# Patient Record
Sex: MALE | Race: BLACK OR AFRICAN AMERICAN | Employment: UNEMPLOYED | URBAN - METROPOLITAN AREA
[De-identification: names, ages, dates, MRNs, and addresses within clinical notes are randomized per-mention and may not be internally consistent; named-entity substitution may affect disease eponyms.]

---

## 2017-04-14 ENCOUNTER — HOSPITAL ENCOUNTER (EMERGENCY)
Age: 52
Discharge: HOME | End: 2017-04-14
Attending: EMERGENCY MEDICINE
Payer: COMMERCIAL

## 2017-04-14 VITALS
WEIGHT: 122 LBS | DIASTOLIC BLOOD PRESSURE: 83 MMHG | OXYGEN SATURATION: 100 % | BODY MASS INDEX: 18.49 KG/M2 | SYSTOLIC BLOOD PRESSURE: 119 MMHG | HEIGHT: 68 IN | TEMPERATURE: 98.2 F | RESPIRATION RATE: 16 BRPM

## 2017-04-14 DIAGNOSIS — R11.2 NON-INTRACTABLE VOMITING WITH NAUSEA, UNSPECIFIED VOMITING TYPE: ICD-10-CM

## 2017-04-14 DIAGNOSIS — K08.409 S/P TOOTH EXTRACTION, UNSPECIFIED EDENTULISM: ICD-10-CM

## 2017-04-14 DIAGNOSIS — F10.20 UNCOMPLICATED ALCOHOL DEPENDENCE (HCC): ICD-10-CM

## 2017-04-14 DIAGNOSIS — K92.1 BLOOD IN STOOL: ICD-10-CM

## 2017-04-14 DIAGNOSIS — R51.9 FACE PAIN: Primary | ICD-10-CM

## 2017-04-14 DIAGNOSIS — E86.0 DEHYDRATION: ICD-10-CM

## 2017-04-14 LAB
ALBUMIN SERPL BCP-MCNC: 3.6 G/DL (ref 3.5–5)
ALBUMIN/GLOB SERPL: 1 {RATIO} (ref 1.1–2.2)
ALP SERPL-CCNC: 90 U/L (ref 45–117)
ALT SERPL-CCNC: 30 U/L (ref 12–78)
ANION GAP BLD CALC-SCNC: 8 MMOL/L (ref 5–15)
APPEARANCE UR: CLEAR
AST SERPL W P-5'-P-CCNC: 35 U/L (ref 15–37)
BACTERIA URNS QL MICRO: NEGATIVE /HPF
BASOPHILS # BLD AUTO: 0 K/UL (ref 0–0.1)
BASOPHILS # BLD: 0 % (ref 0–1)
BILIRUB SERPL-MCNC: 2.8 MG/DL (ref 0.2–1)
BILIRUB UR QL: NEGATIVE
BUN SERPL-MCNC: 25 MG/DL (ref 6–20)
BUN/CREAT SERPL: 28 (ref 12–20)
CALCIUM SERPL-MCNC: 8.9 MG/DL (ref 8.5–10.1)
CHLORIDE SERPL-SCNC: 106 MMOL/L (ref 97–108)
CO2 SERPL-SCNC: 25 MMOL/L (ref 21–32)
COLOR UR: ABNORMAL
CREAT SERPL-MCNC: 0.9 MG/DL (ref 0.7–1.3)
EOSINOPHIL # BLD: 0 K/UL (ref 0–0.4)
EOSINOPHIL NFR BLD: 0 % (ref 0–7)
EPITH CASTS URNS QL MICRO: ABNORMAL /LPF
ERYTHROCYTE [DISTWIDTH] IN BLOOD BY AUTOMATED COUNT: 16 % (ref 11.5–14.5)
GLOBULIN SER CALC-MCNC: 3.7 G/DL (ref 2–4)
GLUCOSE SERPL-MCNC: 113 MG/DL (ref 65–100)
GLUCOSE UR STRIP.AUTO-MCNC: 250 MG/DL
HCT VFR BLD AUTO: 41.5 % (ref 36.6–50.3)
HGB BLD-MCNC: 14.1 G/DL (ref 12.1–17)
HGB UR QL STRIP: NEGATIVE
HYALINE CASTS URNS QL MICRO: ABNORMAL /LPF (ref 0–5)
KETONES UR QL STRIP.AUTO: ABNORMAL MG/DL
LEUKOCYTE ESTERASE UR QL STRIP.AUTO: NEGATIVE
LIPASE SERPL-CCNC: 52 U/L (ref 73–393)
LYMPHOCYTES # BLD AUTO: 22 % (ref 12–49)
LYMPHOCYTES # BLD: 1.9 K/UL (ref 0.8–3.5)
MCH RBC QN AUTO: 32.6 PG (ref 26–34)
MCHC RBC AUTO-ENTMCNC: 34 G/DL (ref 30–36.5)
MCV RBC AUTO: 96.1 FL (ref 80–99)
MONOCYTES # BLD: 1 K/UL (ref 0–1)
MONOCYTES NFR BLD AUTO: 11 % (ref 5–13)
NEUTS SEG # BLD: 5.9 K/UL (ref 1.8–8)
NEUTS SEG NFR BLD AUTO: 67 % (ref 32–75)
NITRITE UR QL STRIP.AUTO: NEGATIVE
PH UR STRIP: 6 [PH] (ref 5–8)
PLATELET # BLD AUTO: 178 K/UL (ref 150–400)
POTASSIUM SERPL-SCNC: 3.7 MMOL/L (ref 3.5–5.1)
PROT SERPL-MCNC: 7.3 G/DL (ref 6.4–8.2)
PROT UR STRIP-MCNC: ABNORMAL MG/DL
RBC # BLD AUTO: 4.32 M/UL (ref 4.1–5.7)
RBC #/AREA URNS HPF: ABNORMAL /HPF (ref 0–5)
SODIUM SERPL-SCNC: 139 MMOL/L (ref 136–145)
SP GR UR REFRACTOMETRY: 1.03 (ref 1–1.03)
UROBILINOGEN UR QL STRIP.AUTO: 1 EU/DL (ref 0.2–1)
WBC # BLD AUTO: 8.8 K/UL (ref 4.1–11.1)
WBC URNS QL MICRO: ABNORMAL /HPF (ref 0–4)

## 2017-04-14 PROCEDURE — 96361 HYDRATE IV INFUSION ADD-ON: CPT

## 2017-04-14 PROCEDURE — 85025 COMPLETE CBC W/AUTO DIFF WBC: CPT | Performed by: PHYSICIAN ASSISTANT

## 2017-04-14 PROCEDURE — 36415 COLL VENOUS BLD VENIPUNCTURE: CPT | Performed by: PHYSICIAN ASSISTANT

## 2017-04-14 PROCEDURE — 74011250636 HC RX REV CODE- 250/636: Performed by: PHYSICIAN ASSISTANT

## 2017-04-14 PROCEDURE — 96374 THER/PROPH/DIAG INJ IV PUSH: CPT

## 2017-04-14 PROCEDURE — 74011250637 HC RX REV CODE- 250/637: Performed by: PHYSICIAN ASSISTANT

## 2017-04-14 PROCEDURE — 80053 COMPREHEN METABOLIC PANEL: CPT | Performed by: PHYSICIAN ASSISTANT

## 2017-04-14 PROCEDURE — 99283 EMERGENCY DEPT VISIT LOW MDM: CPT

## 2017-04-14 PROCEDURE — 83690 ASSAY OF LIPASE: CPT | Performed by: PHYSICIAN ASSISTANT

## 2017-04-14 PROCEDURE — 81001 URINALYSIS AUTO W/SCOPE: CPT | Performed by: PHYSICIAN ASSISTANT

## 2017-04-14 PROCEDURE — 74011000250 HC RX REV CODE- 250: Performed by: PHYSICIAN ASSISTANT

## 2017-04-14 RX ORDER — OXYCODONE AND ACETAMINOPHEN 5; 325 MG/1; MG/1
2 TABLET ORAL
COMMUNITY
End: 2018-08-09

## 2017-04-14 RX ORDER — DIPHENHYDRAMINE HCL 12.5MG/5ML
50 ELIXIR ORAL
Status: COMPLETED | OUTPATIENT
Start: 2017-04-14 | End: 2017-04-14

## 2017-04-14 RX ORDER — ONDANSETRON 2 MG/ML
4 INJECTION INTRAMUSCULAR; INTRAVENOUS
Status: COMPLETED | OUTPATIENT
Start: 2017-04-14 | End: 2017-04-14

## 2017-04-14 RX ORDER — ONDANSETRON 4 MG/1
4 TABLET, ORALLY DISINTEGRATING ORAL
Qty: 10 TAB | Refills: 0 | Status: SHIPPED | OUTPATIENT
Start: 2017-04-14 | End: 2018-08-09

## 2017-04-14 RX ORDER — LIDOCAINE HYDROCHLORIDE 20 MG/ML
10 SOLUTION OROPHARYNGEAL
Status: COMPLETED | OUTPATIENT
Start: 2017-04-14 | End: 2017-04-14

## 2017-04-14 RX ADMIN — ONDANSETRON HYDROCHLORIDE 4 MG: 2 INJECTION, SOLUTION INTRAMUSCULAR; INTRAVENOUS at 19:10

## 2017-04-14 RX ADMIN — SODIUM CHLORIDE 1000 ML: 900 INJECTION, SOLUTION INTRAVENOUS at 19:10

## 2017-04-14 RX ADMIN — LIDOCAINE HYDROCHLORIDE 10 ML: 20 SOLUTION ORAL; TOPICAL at 19:12

## 2017-04-14 RX ADMIN — BENZOCAINE 1 SPRAY: 200 SPRAY DENTAL; ORAL; PERIODONTAL at 19:12

## 2017-04-14 RX ADMIN — DIPHENHYDRAMINE HYDROCHLORIDE 50 MG: 12.5 SOLUTION ORAL at 19:12

## 2017-04-14 NOTE — ED PROVIDER NOTES
HPI Comments: Amanda Umaña is a 45 y.o. Male presenting ambulatory to [address] ED with c/o multiple episodes of bloody emesis and dark stools with blood today. The pt notes additional sx of nausea and 8/10 right sided lower dental pain. The pt reports that he had two teeth extracted yesterday from the right side of his lower mouth and notes that the extraction sites had been bleeding excessively until about 3 hours ago. He states that he has been swallowing blood, throwing up blood and pooping out blood all day. He reports that he was spitting up blood into a bucket earlier today and lost consciousness for a while. He notes that he was given pain medications and ABX from his dentist yesterday and reports taking them as directed today. He denies Hx of kidney disease, Hx of liver disease, drinking any alcohol today or having had any radiation therapy secondary to his Hx of cancer recently. PCP: None  Social History:  (+) Tobacco (0.5 ppd),   (+) EtOH,      (-) Drugs     There are no other complaints, changes, or physical findings at this time. The history is provided by the patient. History reviewed. No pertinent past medical history. History reviewed. No pertinent surgical history. History reviewed. No pertinent family history. Social History     Social History    Marital status:      Spouse name: N/A    Number of children: N/A    Years of education: N/A     Occupational History    Not on file. Social History Main Topics    Smoking status: Current Every Day Smoker     Packs/day: 0.50    Smokeless tobacco: Not on file    Alcohol use Yes      Comment: 6 pack of beers per day    Drug use: No    Sexual activity: Yes     Partners: Female     Other Topics Concern    Not on file     Social History Narrative         ALLERGIES: Review of patient's allergies indicates no known allergies. Review of Systems   Constitutional: Negative. Negative for fever.    HENT: Positive for dental problem. Eyes: Negative. Respiratory: Negative. Cardiovascular: Negative. Gastrointestinal: Positive for blood in stool, nausea and vomiting. Negative for constipation and diarrhea. Denies liver disease   Endocrine:        Denies thyroid disease   Genitourinary: Negative. Negative for dysuria. Denies kidney disease   Musculoskeletal: Negative. Skin: Negative. Neurological: Positive for syncope. All other systems reviewed and are negative. Vitals:    04/14/17 1727   BP: 119/83   Resp: 16   Temp: 98.2 °F (36.8 °C)   SpO2: 100%   Weight: 55.3 kg (122 lb)   Height: 5' 8\" (1.727 m)            Physical Exam   Constitutional: He is oriented to person, place, and time. He appears well-developed and well-nourished. No distress. Pt is ambulatory without any difficulty    HENT:   Head: Normocephalic and atraumatic. Right Ear: External ear normal.   Left Ear: External ear normal.   Nose: Nose normal.   Mouth/Throat: Oropharynx is clear and moist. No oropharyngeal exudate. 3rd and 4th teeth on the bottom right s/p extraction with clots in the sockets. No redness, puss or active bleeding is appreciated. Eyes: Conjunctivae and EOM are normal. Pupils are equal, round, and reactive to light. Right eye exhibits no discharge. Left eye exhibits no discharge. No scleral icterus. Neck: Normal range of motion. Neck supple. No tracheal deviation present. No bony tenderness to the neck or spine. Cardiovascular: Normal rate, regular rhythm, normal heart sounds and intact distal pulses. Exam reveals no gallop and no friction rub. No murmur heard. Pulmonary/Chest: Effort normal and breath sounds normal. No respiratory distress. He has no wheezes. He has no rales. He exhibits no tenderness. Abdominal: Soft. Bowel sounds are normal. He exhibits no distension and no mass. There is tenderness in the left upper quadrant. There is no rebound and no guarding.    Well healed surgical scar on the central abdomen   Musculoskeletal: He exhibits no edema or tenderness. Lymphadenopathy:     He has no cervical adenopathy. Neurological: He is alert and oriented to person, place, and time. No cranial nerve deficit. Coordination normal.   Skin: Skin is warm and dry. No rash noted. No erythema. Psychiatric: He has a normal mood and affect. His behavior is normal. Judgment and thought content normal.   Nursing note and vitals reviewed. MDM  Number of Diagnoses or Management Options  Blood in stool:   Dehydration:   Face pain:   Non-intractable vomiting with nausea, unspecified vomiting type:   S/P tooth extraction, unspecified edentulism:   Uncomplicated alcohol dependence ([facility] [address]):   Diagnosis management comments:   DDx: dehydration, nausea and vomiting       Amount and/or Complexity of Data Reviewed  Clinical lab tests: ordered and reviewed  Review and summarize past medical records: yes    Patient Progress  Patient progress: stable    ED Course       Procedures    6:54 PM  The pt has had a bowel movement that in the ED that is dark and tarry. 7:34 PM  Discussed the pt with Dr. Ricky Dong and she is in agreement with the pt's plan of care. 8:23 PM  The pt's family states that the pt has not eaten anything all day today. 9:17 PM   The pt has expressed a desire to reduce and stop his abuse of alcohol. He has been given a list of local alcohol abuse resources. The pt is eating peanut butter and crackers in the ED at this time with no difficulties. 9:22 PM  [employer] final results have been reviewed with him. He has been counseled regarding his diagnosis. He verbally conveys understanding and agreement of the signs, symptoms, diagnosis, treatment and prognosis .      LABORATORY TESTS:  Recent Results (from the past 12 hour(s))   LIPASE    Collection Time: 04/14/17  7:00 PM   Result Value Ref Range    Lipase 52 (L) 73 - 393 U/L   CBC WITH AUTOMATED DIFF Collection Time: 04/14/17  7:00 PM   Result Value Ref Range    WBC 8.8 4.1 - 11.1 K/uL    RBC 4.32 4.10 - 5.70 M/uL    HGB 14.1 12.1 - 17.0 g/dL    HCT 41.5 36.6 - 50.3 %    MCV 96.1 80.0 - 99.0 FL    MCH 32.6 26.0 - 34.0 PG    MCHC 34.0 30.0 - 36.5 g/dL    RDW 16.0 (H) 11.5 - 14.5 %    PLATELET 008 322 - 374 K/uL    NEUTROPHILS 67 32 - 75 %    LYMPHOCYTES 22 12 - 49 %    MONOCYTES 11 5 - 13 %    EOSINOPHILS 0 0 - 7 %    BASOPHILS 0 0 - 1 %    ABS. NEUTROPHILS 5.9 1.8 - 8.0 K/UL    ABS. LYMPHOCYTES 1.9 0.8 - 3.5 K/UL    ABS. MONOCYTES 1.0 0.0 - 1.0 K/UL    ABS. EOSINOPHILS 0.0 0.0 - 0.4 K/UL    ABS. BASOPHILS 0.0 0.0 - 0.1 K/UL   METABOLIC PANEL, COMPREHENSIVE    Collection Time: 04/14/17  7:00 PM   Result Value Ref Range    Sodium 139 136 - 145 mmol/L    Potassium 3.7 3.5 - 5.1 mmol/L    Chloride 106 97 - 108 mmol/L    CO2 25 21 - 32 mmol/L    Anion gap 8 5 - 15 mmol/L    Glucose 113 (H) 65 - 100 mg/dL    BUN 25 (H) 6 - 20 MG/DL    Creatinine 0.90 0.70 - 1.30 MG/DL    BUN/Creatinine ratio 28 (H) 12 - 20      GFR est AA >60 >60 ml/min/1.73m2    GFR est non-AA >60 >60 ml/min/1.73m2    Calcium 8.9 8.5 - 10.1 MG/DL    Bilirubin, total 2.8 (H) 0.2 - 1.0 MG/DL    ALT (SGPT) 30 12 - 78 U/L    AST (SGOT) 35 15 - 37 U/L    Alk.  phosphatase 90 45 - 117 U/L    Protein, total 7.3 6.4 - 8.2 g/dL    Albumin 3.6 3.5 - 5.0 g/dL    Globulin 3.7 2.0 - 4.0 g/dL    A-G Ratio 1.0 (L) 1.1 - 2.2     URINALYSIS W/ RFLX MICROSCOPIC    Collection Time: 04/14/17  8:00 PM   Result Value Ref Range    Color DARK YELLOW      Appearance CLEAR CLEAR      Specific gravity 1.030 1.003 - 1.030      pH (UA) 6.0 5.0 - 8.0      Protein TRACE (A) NEG mg/dL    Glucose 250 (A) NEG mg/dL    Ketone TRACE (A) NEG mg/dL    Bilirubin NEGATIVE  NEG      Blood NEGATIVE  NEG      Urobilinogen 1.0 0.2 - 1.0 EU/dL    Nitrites NEGATIVE  NEG      Leukocyte Esterase NEGATIVE  NEG      WBC 0-4 0 - 4 /hpf    RBC 0-5 0 - 5 /hpf    Epithelial cells FEW FEW /lpf Bacteria NEGATIVE  NEG /hpf    Hyaline cast 0-2 0 - 5 /lpf       MEDICATIONS GIVEN:  Medications   lidocaine (XYLOCAINE) 2 % viscous solution 10 mL (10 mL Mouth/Throat Given by Provider 4/14/17 1912)   diphenhydrAMINE (BENADRYL) 12.5 mg/5 mL oral elixir 50 mg (50 mg Oral Given by Provider 4/14/17 1912)   benzocaine (HURRICANE) 20 % spray 1 Spray (1 Spray Mucous Membrane Given by Provider 4/14/17 1912)   ondansetron (ZOFRAN) injection 4 mg (4 mg IntraVENous Given 4/14/17 1910)   sodium chloride 0.9 % bolus infusion 1,000 mL (1,000 mL IntraVENous New Bag 4/14/17 1910)       IMPRESSION:  1. Face pain    2. S/P tooth extraction, unspecified edentulism    3. Blood in stool    4. Non-intractable vomiting with nausea, unspecified vomiting type    5. Uncomplicated alcohol dependence ([address]    6. Dehydration        PLAN:  1. Current Discharge Medication List      START taking these medications    Details   ondansetron (ZOFRAN ODT) 4 mg disintegrating tablet Take 1 Tab by mouth every eight (8) hours as needed for Nausea. Qty: 10 Tab, Refills: 0           2. Follow-up Information     Follow up With Details [address], MD  GI doctor [address] [place] [address] Dr [phone]      [address] [place] [address] EMERGENCY DEPT   [address] [place] [address]  [phone]    Your dentist Schedule an appointment as soon as possible for a visit      Local substance use clinic  if you need help quitting alcohol see attached sheet        Return to ED if worse     DISCHARGE NOTE  9:22 PM  The patient has been re-evaluated and is ready for discharge. Reviewed available results with patient. Counseled pt on diagnosis and care plan. Pt has expressed understanding, and all questions have been answered. Pt agrees with plan and agrees to F/U as recommended, or return to the ED if their sxs worsen.  Discharge instructions have been provided and explained to the pt, along with reasons to return to the ED.    This note is prepared by Gisell Corey, acting as Scribe for [employer]. Aziza Lorenz PA-C: The scribe's documentation has been prepared under my direction and personally reviewed by me in its entirety. I confirm that the note above accurately reflects all work, treatment, procedures, and medical decision making performed by me.

## 2017-04-14 NOTE — LETTER
[address] 
[facility] EMERGENCY DEPT 
[address] [place] [address] [address] [id number] [phone] Work/School Note Date: 4/14/2017 To Whom It May concern: 
 
Mili Valadez was seen and treated today in the emergency room by the following provider(s): 
Attending Provider: Denver Hires, MD 
Physician Assistant: GERI Forde. Mili Valadez may return to work on 4/18/17 or sooner, if feeling better. Sincerely, GERI Forde

## 2017-04-15 NOTE — ED NOTES
PA reviewed discharge instructions with the patient. The patient verbalized understanding. All questions and concerns were addressed. The patient declined a wheelchair and is discharged ambulatory in the care of family members with instructions and prescriptions in hand. Pt is alert and oriented x 4. Respirations are clear and unlabored. Pt reports he is feeling much better.

## 2017-04-15 NOTE — ED NOTES
Discharge instructions given to patient by GERI Osorio. Verbalized understanding of instructions. Patient discharged without difficulty. Patient discharged in stable condition via wheelchair accompanied by staff.

## 2018-05-31 ENCOUNTER — HOSPITAL ENCOUNTER (OUTPATIENT)
Dept: MRI IMAGING | Age: 53
Discharge: HOME | End: 2018-05-31
Attending: INTERNAL MEDICINE
Payer: COMMERCIAL

## 2018-05-31 VITALS — WEIGHT: 113 LBS | BODY MASS INDEX: 17.18 KG/M2

## 2018-05-31 DIAGNOSIS — C20 MALIGNANT NEOPLASM OF RECTUM (HCC): ICD-10-CM

## 2018-05-31 PROCEDURE — 74183 MRI ABD W/O CNTR FLWD CNTR: CPT

## 2018-05-31 PROCEDURE — 74011250636 HC RX REV CODE- 250/636: Performed by: INTERNAL MEDICINE

## 2018-05-31 PROCEDURE — A9585 GADOBUTROL INJECTION: HCPCS | Performed by: INTERNAL MEDICINE

## 2018-05-31 RX ADMIN — GADOBUTROL 5.5 ML: 604.72 INJECTION INTRAVENOUS at 08:42

## 2018-08-09 ENCOUNTER — HOSPITAL ENCOUNTER (EMERGENCY)
Age: 53
Discharge: HOME | End: 2018-08-09
Attending: EMERGENCY MEDICINE
Payer: COMMERCIAL

## 2018-08-09 ENCOUNTER — APPOINTMENT (OUTPATIENT)
Dept: CT IMAGING | Age: 53
End: 2018-08-09
Attending: EMERGENCY MEDICINE
Payer: COMMERCIAL

## 2018-08-09 VITALS
DIASTOLIC BLOOD PRESSURE: 92 MMHG | TEMPERATURE: 97.7 F | SYSTOLIC BLOOD PRESSURE: 133 MMHG | OXYGEN SATURATION: 99 % | RESPIRATION RATE: 20 BRPM | HEART RATE: 71 BPM

## 2018-08-09 DIAGNOSIS — E87.6 HYPOKALEMIA: ICD-10-CM

## 2018-08-09 DIAGNOSIS — R19.7 NAUSEA VOMITING AND DIARRHEA: ICD-10-CM

## 2018-08-09 DIAGNOSIS — R10.84 ABDOMINAL PAIN, GENERALIZED: Primary | ICD-10-CM

## 2018-08-09 DIAGNOSIS — R11.2 NAUSEA VOMITING AND DIARRHEA: ICD-10-CM

## 2018-08-09 LAB
ALBUMIN SERPL-MCNC: 3.2 G/DL (ref 3.5–5)
ALBUMIN/GLOB SERPL: 0.9 {RATIO} (ref 1.1–2.2)
ALP SERPL-CCNC: 108 U/L (ref 45–117)
ALT SERPL-CCNC: 23 U/L (ref 12–78)
ANION GAP SERPL CALC-SCNC: 6 MMOL/L (ref 5–15)
APPEARANCE UR: CLEAR
AST SERPL-CCNC: 20 U/L (ref 15–37)
BACTERIA URNS QL MICRO: NEGATIVE /HPF
BASOPHILS # BLD: 0 K/UL (ref 0–0.1)
BASOPHILS NFR BLD: 0 % (ref 0–1)
BILIRUB SERPL-MCNC: 1.3 MG/DL (ref 0.2–1)
BILIRUB UR QL CFM: NEGATIVE
BUN SERPL-MCNC: 8 MG/DL (ref 6–20)
BUN/CREAT SERPL: 7 (ref 12–20)
CALCIUM SERPL-MCNC: 8.9 MG/DL (ref 8.5–10.1)
CHLORIDE SERPL-SCNC: 98 MMOL/L (ref 97–108)
CO2 SERPL-SCNC: 32 MMOL/L (ref 21–32)
COLOR UR: ABNORMAL
CREAT SERPL-MCNC: 1.12 MG/DL (ref 0.7–1.3)
DIFFERENTIAL METHOD BLD: ABNORMAL
EOSINOPHIL # BLD: 0 K/UL (ref 0–0.4)
EOSINOPHIL NFR BLD: 0 % (ref 0–7)
EPITH CASTS URNS QL MICRO: ABNORMAL /LPF
ERYTHROCYTE [DISTWIDTH] IN BLOOD BY AUTOMATED COUNT: 14.9 % (ref 11.5–14.5)
GLOBULIN SER CALC-MCNC: 3.7 G/DL (ref 2–4)
GLUCOSE SERPL-MCNC: 88 MG/DL (ref 65–100)
GLUCOSE UR STRIP.AUTO-MCNC: NEGATIVE MG/DL
HCT VFR BLD AUTO: 35.9 % (ref 36.6–50.3)
HGB BLD-MCNC: 12.7 G/DL (ref 12.1–17)
HGB UR QL STRIP: NEGATIVE
IMM GRANULOCYTES # BLD: 0.1 K/UL (ref 0–0.04)
IMM GRANULOCYTES NFR BLD AUTO: 1 % (ref 0–0.5)
KETONES UR QL STRIP.AUTO: 15 MG/DL
LEUKOCYTE ESTERASE UR QL STRIP.AUTO: ABNORMAL
LYMPHOCYTES # BLD: 1.1 K/UL (ref 0.8–3.5)
LYMPHOCYTES NFR BLD: 12 % (ref 12–49)
MCH RBC QN AUTO: 35.3 PG (ref 26–34)
MCHC RBC AUTO-ENTMCNC: 35.4 G/DL (ref 30–36.5)
MCV RBC AUTO: 99.7 FL (ref 80–99)
MONOCYTES # BLD: 1.4 K/UL (ref 0–1)
MONOCYTES NFR BLD: 15 % (ref 5–13)
MUCOUS THREADS URNS QL MICRO: ABNORMAL /LPF
NEUTS SEG # BLD: 6.8 K/UL (ref 1.8–8)
NEUTS SEG NFR BLD: 73 % (ref 32–75)
NITRITE UR QL STRIP.AUTO: NEGATIVE
NRBC # BLD: 0 K/UL (ref 0–0.01)
NRBC BLD-RTO: 0 PER 100 WBC
PH UR STRIP: 6.5 [PH] (ref 5–8)
PLATELET # BLD AUTO: 247 K/UL (ref 150–400)
PMV BLD AUTO: 9.4 FL (ref 8.9–12.9)
POTASSIUM SERPL-SCNC: 2.7 MMOL/L (ref 3.5–5.1)
PROT SERPL-MCNC: 6.9 G/DL (ref 6.4–8.2)
PROT UR STRIP-MCNC: 30 MG/DL
RBC # BLD AUTO: 3.6 M/UL (ref 4.1–5.7)
RBC #/AREA URNS HPF: ABNORMAL /HPF (ref 0–5)
SODIUM SERPL-SCNC: 136 MMOL/L (ref 136–145)
SP GR UR REFRACTOMETRY: 1.02 (ref 1–1.03)
UA: UC IF INDICATED,UAUC: ABNORMAL
UROBILINOGEN UR QL STRIP.AUTO: 1 EU/DL (ref 0.2–1)
WBC # BLD AUTO: 9.4 K/UL (ref 4.1–11.1)
WBC URNS QL MICRO: ABNORMAL /HPF (ref 0–4)

## 2018-08-09 PROCEDURE — 74011250636 HC RX REV CODE- 250/636: Performed by: EMERGENCY MEDICINE

## 2018-08-09 PROCEDURE — 96365 THER/PROPH/DIAG IV INF INIT: CPT

## 2018-08-09 PROCEDURE — 80053 COMPREHEN METABOLIC PANEL: CPT | Performed by: EMERGENCY MEDICINE

## 2018-08-09 PROCEDURE — 96361 HYDRATE IV INFUSION ADD-ON: CPT

## 2018-08-09 PROCEDURE — 85025 COMPLETE CBC W/AUTO DIFF WBC: CPT | Performed by: EMERGENCY MEDICINE

## 2018-08-09 PROCEDURE — 36415 COLL VENOUS BLD VENIPUNCTURE: CPT | Performed by: EMERGENCY MEDICINE

## 2018-08-09 PROCEDURE — 74011636320 HC RX REV CODE- 636/320: Performed by: EMERGENCY MEDICINE

## 2018-08-09 PROCEDURE — 96376 TX/PRO/DX INJ SAME DRUG ADON: CPT

## 2018-08-09 PROCEDURE — 96375 TX/PRO/DX INJ NEW DRUG ADDON: CPT

## 2018-08-09 PROCEDURE — 81001 URINALYSIS AUTO W/SCOPE: CPT | Performed by: EMERGENCY MEDICINE

## 2018-08-09 PROCEDURE — 96366 THER/PROPH/DIAG IV INF ADDON: CPT

## 2018-08-09 PROCEDURE — 74011250637 HC RX REV CODE- 250/637: Performed by: EMERGENCY MEDICINE

## 2018-08-09 PROCEDURE — 99284 EMERGENCY DEPT VISIT MOD MDM: CPT

## 2018-08-09 PROCEDURE — 74177 CT ABD & PELVIS W/CONTRAST: CPT

## 2018-08-09 RX ORDER — SODIUM CHLORIDE 9 MG/ML
50 INJECTION, SOLUTION INTRAVENOUS
Status: COMPLETED | OUTPATIENT
Start: 2018-08-09 | End: 2018-08-09

## 2018-08-09 RX ORDER — OXYCODONE AND ACETAMINOPHEN 5; 325 MG/1; MG/1
2 TABLET ORAL
Qty: 15 TAB | Refills: 0 | Status: SHIPPED | OUTPATIENT
Start: 2018-08-09 | End: 2018-12-31

## 2018-08-09 RX ORDER — POTASSIUM CHLORIDE 7.45 MG/ML
10 INJECTION INTRAVENOUS
Status: DISPENSED | OUTPATIENT
Start: 2018-08-09 | End: 2018-08-09

## 2018-08-09 RX ORDER — POTASSIUM CHLORIDE 750 MG/1
40 TABLET, FILM COATED, EXTENDED RELEASE ORAL
Status: COMPLETED | OUTPATIENT
Start: 2018-08-09 | End: 2018-08-09

## 2018-08-09 RX ORDER — MORPHINE SULFATE 2 MG/ML
4 INJECTION, SOLUTION INTRAMUSCULAR; INTRAVENOUS ONCE
Status: COMPLETED | OUTPATIENT
Start: 2018-08-09 | End: 2018-08-09

## 2018-08-09 RX ORDER — MORPHINE SULFATE 2 MG/ML
4 INJECTION, SOLUTION INTRAMUSCULAR; INTRAVENOUS
Status: COMPLETED | OUTPATIENT
Start: 2018-08-09 | End: 2018-08-09

## 2018-08-09 RX ORDER — SODIUM CHLORIDE 0.9 % (FLUSH) 0.9 %
10 SYRINGE (ML) INJECTION
Status: COMPLETED | OUTPATIENT
Start: 2018-08-09 | End: 2018-08-09

## 2018-08-09 RX ORDER — MORPHINE SULFATE 2 MG/ML
4 INJECTION, SOLUTION INTRAMUSCULAR; INTRAVENOUS
Status: DISCONTINUED | OUTPATIENT
Start: 2018-08-09 | End: 2018-08-09

## 2018-08-09 RX ORDER — ONDANSETRON 2 MG/ML
4 INJECTION INTRAMUSCULAR; INTRAVENOUS
Status: COMPLETED | OUTPATIENT
Start: 2018-08-09 | End: 2018-08-09

## 2018-08-09 RX ORDER — ONDANSETRON 4 MG/1
4 TABLET, ORALLY DISINTEGRATING ORAL
Qty: 15 TAB | Refills: 0 | Status: SHIPPED | OUTPATIENT
Start: 2018-08-09 | End: 2018-12-31

## 2018-08-09 RX ADMIN — ONDANSETRON 4 MG: 2 INJECTION, SOLUTION INTRAMUSCULAR; INTRAVENOUS at 15:22

## 2018-08-09 RX ADMIN — ONDANSETRON 4 MG: 2 INJECTION, SOLUTION INTRAMUSCULAR; INTRAVENOUS at 11:49

## 2018-08-09 RX ADMIN — MORPHINE SULFATE 4 MG: 2 INJECTION, SOLUTION INTRAMUSCULAR; INTRAVENOUS at 11:49

## 2018-08-09 RX ADMIN — POTASSIUM CHLORIDE 10 MEQ: 10 INJECTION, SOLUTION INTRAVENOUS at 12:38

## 2018-08-09 RX ADMIN — Medication 10 ML: at 13:19

## 2018-08-09 RX ADMIN — SODIUM CHLORIDE 1000 ML: 900 INJECTION, SOLUTION INTRAVENOUS at 11:21

## 2018-08-09 RX ADMIN — MORPHINE SULFATE 4 MG: 2 INJECTION, SOLUTION INTRAMUSCULAR; INTRAVENOUS at 15:23

## 2018-08-09 RX ADMIN — IOPAMIDOL 100 ML: 755 INJECTION, SOLUTION INTRAVENOUS at 13:18

## 2018-08-09 RX ADMIN — POTASSIUM CHLORIDE 40 MEQ: 750 TABLET, EXTENDED RELEASE ORAL at 12:38

## 2018-08-09 RX ADMIN — POTASSIUM CHLORIDE 10 MEQ: 10 INJECTION, SOLUTION INTRAVENOUS at 14:57

## 2018-08-09 RX ADMIN — SODIUM CHLORIDE 50 ML/HR: 900 INJECTION, SOLUTION INTRAVENOUS at 13:18

## 2018-08-09 NOTE — ED NOTES
Discharge instructions reviewed with patient, copy given by Dr. Gareth Lam. Pt is accomponied by family, denies use of wheelchair.

## 2018-08-09 NOTE — ED PROVIDER NOTES
EMERGENCY DEPARTMENT HISTORY AND PHYSICAL EXAM      Date: 8/9/2018  Patient Name: Nahed De Guzman    History of Presenting Illness     Chief Complaint   Patient presents with    Abdominal Pain     x4 days with accompanied n/v/d. Patient unable to eat/drink without vomiting. History Provided By: Patient    HPI: Nahed De Guzman, 52 y.o. male with PMHx significant for rectal CA s/p surgery 2 years ago with radiation, presents ambulatory to the ED with cc of acute and chronic sharp constant 8/10 abdominal pain for 4 days with associated N/V/D that is exacerbated with PO intake and chills. Pt denies any black stools. There are no other complaints, changes, or physical findings at this time. PCP: None   Oncologist: Jerzy Tang MD    Current Facility-Administered Medications   Medication Dose Route Frequency Provider Last Rate Last Dose    potassium chloride 10 mEq in 100 ml IVPB  10 mEq IntraVENous Q1H Juancarlos Romano  mL/hr at 08/09/18 1457 10 mEq at 08/09/18 1457    morphine injection 4 mg  4 mg IntraVENous NOW Juancarlos Romano MD        ondansetron (ZOFRAN) injection 4 mg  4 mg IntraVENous NOW Juancarlos Romano MD         Current Outpatient Prescriptions   Medication Sig Dispense Refill    ondansetron (ZOFRAN ODT) 4 mg disintegrating tablet Take 1 Tab by mouth every eight (8) hours as needed for Nausea. 15 Tab 0    oxyCODONE-acetaminophen (PERCOCET) 5-325 mg per tablet Take 2 Tabs by mouth every six (6) hours as needed for Pain. Max Daily Amount: 8 Tabs. 15 Tab 0       Past History     Past Medical History:  History reviewed. No pertinent past medical history. Past Surgical History:  History reviewed. No pertinent surgical history. Family History:  History reviewed. No pertinent family history.     Social History:  Social History   Substance Use Topics    Smoking status: Current Every Day Smoker     Packs/day: 0.50    Smokeless tobacco: Never Used    Alcohol use Yes Comment: 6 pack of beers per day       Allergies:  No Known Allergies      Review of Systems   Review of Systems   Constitutional: Negative for activity change, chills and fever. HENT: Negative for congestion and sore throat. Eyes: Negative for pain and redness. Respiratory: Negative for cough, chest tightness and shortness of breath. Cardiovascular: Negative for chest pain and palpitations. Gastrointestinal: Positive for abdominal pain, diarrhea, nausea and vomiting. Genitourinary: Negative for dysuria, frequency and urgency. Musculoskeletal: Negative for back pain and neck pain. Skin: Negative for rash. Neurological: Negative for syncope, light-headedness and headaches. Psychiatric/Behavioral: Negative for confusion. All other systems reviewed and are negative. Physical Exam   Physical Exam   Constitutional: He is oriented to person, place, and time. He appears well-developed and well-nourished. No distress. HENT:   Head: Normocephalic. Nose: Nose normal.   Mouth/Throat: Oropharynx is clear and moist. No oropharyngeal exudate. Eyes: Conjunctivae are normal. Pupils are equal, round, and reactive to light. No scleral icterus. Neck: Normal range of motion. Neck supple. No JVD present. No tracheal deviation present. No thyromegaly present. Cardiovascular: Normal rate, regular rhythm and intact distal pulses. Exam reveals no gallop and no friction rub. No murmur heard. Pulmonary/Chest: Effort normal and breath sounds normal. No stridor. No respiratory distress. He has no wheezes. He has no rales. Abdominal: Soft. Bowel sounds are normal. He exhibits no distension. There is tenderness (mild diffused). There is no rebound and no guarding. Musculoskeletal: Normal range of motion. He exhibits no edema. Lymphadenopathy:     He has no cervical adenopathy. Neurological: He is alert and oriented to person, place, and time. No cranial nerve deficit.  He exhibits normal muscle tone. Coordination normal.   Skin: Skin is warm and dry. No rash noted. He is not diaphoretic. No erythema. Psychiatric: He has a normal mood and affect. His behavior is normal.   Nursing note and vitals reviewed. Diagnostic Study Results     Labs -     Recent Results (from the past 12 hour(s))   URINALYSIS W/ REFLEX CULTURE    Collection Time: 08/09/18  9:55 AM   Result Value Ref Range    Color DARK YELLOW      Appearance CLEAR CLEAR      Specific gravity 1.022 1.003 - 1.030      pH (UA) 6.5 5.0 - 8.0      Protein 30 (A) NEG mg/dL    Glucose NEGATIVE  NEG mg/dL    Ketone 15 (A) NEG mg/dL    Blood NEGATIVE  NEG      Urobilinogen 1.0 0.2 - 1.0 EU/dL    Nitrites NEGATIVE  NEG      Leukocyte Esterase SMALL (A) NEG      WBC 0-4 0 - 4 /hpf    RBC 0-5 0 - 5 /hpf    Epithelial cells MODERATE (A) FEW /lpf    Bacteria NEGATIVE  NEG /hpf    UA:UC IF INDICATED CULTURE NOT INDICATED BY UA RESULT CNI      Mucus TRACE (A) NEG /lpf   BILIRUBIN, CONFIRM    Collection Time: 08/09/18  9:55 AM   Result Value Ref Range    Bilirubin UA, confirm NEGATIVE  NEG     CBC WITH AUTOMATED DIFF    Collection Time: 08/09/18 11:20 AM   Result Value Ref Range    WBC 9.4 4.1 - 11.1 K/uL    RBC 3.60 (L) 4.10 - 5.70 M/uL    HGB 12.7 12.1 - 17.0 g/dL    HCT 35.9 (L) 36.6 - 50.3 %    MCV 99.7 (H) 80.0 - 99.0 FL    MCH 35.3 (H) 26.0 - 34.0 PG    MCHC 35.4 30.0 - 36.5 g/dL    RDW 14.9 (H) 11.5 - 14.5 %    PLATELET 250 759 - 539 K/uL    MPV 9.4 8.9 - 12.9 FL    NRBC 0.0 0  WBC    ABSOLUTE NRBC 0.00 0.00 - 0.01 K/uL    NEUTROPHILS 73 32 - 75 %    LYMPHOCYTES 12 12 - 49 %    MONOCYTES 15 (H) 5 - 13 %    EOSINOPHILS 0 0 - 7 %    BASOPHILS 0 0 - 1 %    IMMATURE GRANULOCYTES 1 (H) 0.0 - 0.5 %    ABS. NEUTROPHILS 6.8 1.8 - 8.0 K/UL    ABS. LYMPHOCYTES 1.1 0.8 - 3.5 K/UL    ABS. MONOCYTES 1.4 (H) 0.0 - 1.0 K/UL    ABS. EOSINOPHILS 0.0 0.0 - 0.4 K/UL    ABS. BASOPHILS 0.0 0.0 - 0.1 K/UL    ABS. IMM.  GRANS. 0.1 (H) 0.00 - 0.04 K/UL    DF AUTOMATED     METABOLIC PANEL, COMPREHENSIVE    Collection Time: 08/09/18 11:20 AM   Result Value Ref Range    Sodium 136 136 - 145 mmol/L    Potassium 2.7 (LL) 3.5 - 5.1 mmol/L    Chloride 98 97 - 108 mmol/L    CO2 32 21 - 32 mmol/L    Anion gap 6 5 - 15 mmol/L    Glucose 88 65 - 100 mg/dL    BUN 8 6 - 20 MG/DL    Creatinine 1.12 0.70 - 1.30 MG/DL    BUN/Creatinine ratio 7 (L) 12 - 20      GFR est AA >60 >60 ml/min/1.73m2    GFR est non-AA >60 >60 ml/min/1.73m2    Calcium 8.9 8.5 - 10.1 MG/DL    Bilirubin, total 1.3 (H) 0.2 - 1.0 MG/DL    ALT (SGPT) 23 12 - 78 U/L    AST (SGOT) 20 15 - 37 U/L    Alk. phosphatase 108 45 - 117 U/L    Protein, total 6.9 6.4 - 8.2 g/dL    Albumin 3.2 (L) 3.5 - 5.0 g/dL    Globulin 3.7 2.0 - 4.0 g/dL    A-G Ratio 0.9 (L) 1.1 - 2.2         Radiologic Studies -   CT ABD PELV W CONT   Final Result        CT Results  (Last 48 hours)               08/09/18 1317  CT ABD PELV W CONT Final result    Impression:  IMPRESSION:    1. Generalized distention of the small intestine and colon without significant   dilatation and without bowel wall thickening. Nonspecific pattern. 2. Previous cholecystectomy. Unchanged mild biliary dilatation. .               Narrative:  EXAMINATION:  CT ABD PELV W CONT   INDICATION:  Abdominal pain, 38 history, with nausea, vomiting, diarrhea, status   post rectal cancer and surgery 2 years ago   COMPARISON: 8/30/2015. CONTRAST: 100 mL of Isovue-370. TECHNIQUE:    Multislice helical CT was performed from the diaphragm to the symphysis pubis   during uneventful rapid bolus intravenous contrast administration. Oral contrast   was not administered. Contiguous 5 mm axial images were reconstructed and lung   and soft tissue windows were generated. Coronal and sagittal reformations were   generated. CT dose reduction was achieved through use of a standardized protocol   tailored for this examination and automatic exposure control for dose   modulation. FINDINGS:   LOWER CHEST: The visualized portions of the lung bases are clear. ABDOMEN:   Liver: No focal lesions. Normal size and contour. Gallbladder and bile ducts: Prior cholecystectomy. Mild biliary dilatation with   common duct diameter of 11 mm at the marly hepatis and 8 mm in the pancreatic   head. Unchanged since postcholecystectomy MRCP of 5/31/2018. Spleen: No abnormality. Pancreas: No focal lesions. Unchanged mild prominence of the pancreatic duct. Adrenal glands: No abnormality. Kidneys: No abnormality. BOWEL AND MESENTERY: No significant abnormalities in the stomach. Retained   medication in the gastric fundus. Mild generalized fluid-filled distention of   the small bowel without significant dilatation or bowel wall thickening. Fecal   retention in the colon. Surgical anastomosis in the rectum. No mesenteric mass   or adenopathy. Appendix not identified but not distended. PERITONEUM: No ascites or free intraperitoneal air. RETROPERITONEUM: Aorta  tapers without aneurysm. No retroperitoneal adenopathy   or mass. No pelvic mass or adenopathy. PELVIS:   Reproductive organs:  Unremarkable. Bladder: Diffuse wall thickening. This may be due to post therapeutic change. BONES AND SOFT TISSUES: No significant bony abnormalities. Medical Decision Making   I am the first provider for this patient. I reviewed the vital signs, available nursing notes, past medical history, past surgical history, family history and social history. Vital Signs-Reviewed the patient's vital signs.   Patient Vitals for the past 12 hrs:   Temp Pulse Resp BP SpO2   08/09/18 1101 - - - 127/68 99 %   08/09/18 0948 97.7 °F (36.5 °C) 98 20 124/81 96 %       Pulse Oximetry Analysis - 96% on room air    Cardiac Monitor:   Rate: 98 bpm  Rhythm: Normal Sinus Rhythm 124/81     Records Reviewed: Nursing Notes and Old Medical Records    Provider Notes (Medical Decision Making):   DDx: SBO, gastroenteritis    ED Course:   Initial assessment performed. The patients presenting problems have been discussed, and they are in agreement with the care plan formulated and outlined with them. I have encouraged them to ask questions as they arise throughout their visit. Disposition:  DISCHARGE NOTE  2:51 PM  The patient has been re-evaluated and is ready for discharge. Reviewed available results with patient. Counseled patient on diagnosis and care plan. Patient has expressed understanding, and all questions have been answered. Patient agrees with plan and agrees to follow up as recommended, or return to the ED if their symptoms worsen. Discharge instructions have been provided and explained to the patient, along with reasons to return to the ED. Pain resolved; pt tolerating po without difficulty; suspect viral cause of n/v/d; ct abd pelvis without acute findings; dc home with pcp/gi follow up; Lien Sam MD      PLAN:  1. Current Discharge Medication List      CONTINUE these medications which have CHANGED    Details   ondansetron (ZOFRAN ODT) 4 mg disintegrating tablet Take 1 Tab by mouth every eight (8) hours as needed for Nausea. Qty: 15 Tab, Refills: 0      oxyCODONE-acetaminophen (PERCOCET) 5-325 mg per tablet Take 2 Tabs by mouth every six (6) hours as needed for Pain. Max Daily Amount: 8 Tabs. Qty: 15 Tab, Refills: 0    Associated Diagnoses: Abdominal pain, generalized           2. Follow-up Information     Follow up With Details Comments Contact Info    Your Primary Doctor Schedule an appointment as soon as possible for a visit in 1 day      [facility] EMERGENCY DEPT Go in 1 day If symptoms worsen [address] [place] [address]  [phone]        Return to ED if worse     Diagnosis     Clinical Impression:   1. Abdominal pain, generalized    2. Hypokalemia    3. Nausea vomiting and diarrhea        Attestations:     This note is prepared by Sommer King acting as Scribe for Jacey Hunt MD.    Jacey Hunt MD: The scribe's documentation has been prepared under my direction and personally reviewed by me in its entirety. I confirm that the note above accurately reflects all work, treatment, procedures, and medical decision making performed by me.

## 2018-08-09 NOTE — ED NOTES
Assumed care of pt, pt ambulatory from triage, resting on stretcher in position of comfort, call bell within reach, pt reports abdominal pain, nausea, vomiting and diarrhea that has become worse over past several days, reports history of rectal CA and surgery 2 years ago, has chronic abdominal pain

## 2018-08-09 NOTE — ED NOTES
Bedside and Verbal shift change report given to Barbara Montes RN  (oncoming nurse) by Fabienne Garcia RN  (offgoing nurse). Report included the following information SBAR, Kardex, ED Summary, Intake/Output, MAR, Recent Results and Med Rec Status.

## 2018-08-09 NOTE — DISCHARGE INSTRUCTIONS
Abdominal Pain: Care Instructions  Your Care Instructions    Abdominal pain has many possible causes. Some aren't serious and get better on their own in a few days. Others need more testing and treatment. If your pain continues or gets worse, you need to be rechecked and may need more tests to find out what is wrong. You may need surgery to correct the problem. Don't ignore new symptoms, such as fever, nausea and vomiting, urination problems, pain that gets worse, and dizziness. These may be signs of a more serious problem. Your doctor may have recommended a follow-up visit in the next 8 to 12 hours. If you are not getting better, you may need more tests or treatment. The doctor has checked you carefully, but problems can develop later. If you notice any problems or new symptoms, get medical treatment right away. Follow-up care is a key part of your treatment and safety. Be sure to make and go to all appointments, and call your doctor if you are having problems. It's also a good idea to know your test results and keep a list of the medicines you take. How can you care for yourself at home? · Rest until you feel better. · To prevent dehydration, drink plenty of fluids, enough so that your urine is light yellow or clear like water. Choose water and other caffeine-free clear liquids until you feel better. If you have kidney, heart, or liver disease and have to limit fluids, talk with your doctor before you increase the amount of fluids you drink. · If your stomach is upset, eat mild foods, such as rice, dry toast or crackers, bananas, and applesauce. Try eating several small meals instead of two or three large ones. · Wait until 48 hours after all symptoms have gone away before you have spicy foods, alcohol, and drinks that contain caffeine. · Do not eat foods that are high in fat. · Avoid anti-inflammatory medicines such as aspirin, ibuprofen (Advil, Motrin), and naproxen (Aleve).  These can cause stomach upset. Talk to your doctor if you take daily aspirin for another health problem. When should you call for help? Call 911 anytime you think you may need emergency care. For example, call if:    · You passed out (lost consciousness).     · You pass maroon or very bloody stools.     · You vomit blood or what looks like coffee grounds.     · You have new, severe belly pain.    Call your doctor now or seek immediate medical care if:    · Your pain gets worse, especially if it becomes focused in one area of your belly.     · You have a new or higher fever.     · Your stools are black and look like tar, or they have streaks of blood.     · You have unexpected vaginal bleeding.     · You have symptoms of a urinary tract infection. These may include:  ¨ Pain when you urinate. ¨ Urinating more often than usual.  ¨ Blood in your urine.     · You are dizzy or lightheaded, or you feel like you may faint.    Watch closely for changes in your health, and be sure to contact your doctor if:    · You are not getting better after 1 day (24 hours). Where can you learn more? Go to http://margothCascaad (CircleMe)niles.info/. Enter N477 in the search box to learn more about \"Abdominal Pain: Care Instructions. \"  Current as of: November 20, 2017  Content Version: 11.7  © 8353-5960 [employer]. Care instructions adapted under license by [employer] (which disclaims liability or warranty for this information). If you have questions about a medical condition or this instruction, always ask your healthcare professional. [place] [address] any warranty or liability for your use of this information. Hypokalemia: Care Instructions  Your Care Instructions    Hypokalemia (say \"go-lf-odp-APRIL-katie-uh\") is a low level of potassium. The heart, muscles, kidneys, and nervous system all need potassium to work well. This problem has many different causes.  Kidney problems, diet, and medicines like diuretics and laxatives can cause it. So can vomiting or diarrhea. In some cases, cancer is the cause. Your doctor may do tests to find the cause of your low potassium levels. You may need medicines to bring your potassium levels back to normal. You may also need regular blood tests to check your potassium. If you have very low potassium, you may need intravenous (IV) medicines. You also may need tests to check the electrical activity of your heart. Heart problems caused by low potassium levels can be very serious. Follow-up care is a key part of your treatment and safety. Be sure to make and go to all appointments, and call your doctor if you are having problems. It's also a good idea to know your test results and keep a list of the medicines you take. How can you care for yourself at home? · If your doctor recommends it, eat foods that have a lot of potassium. These include fresh fruits, juices, and vegetables. They also include nuts, beans, and milk. · Be safe with medicines. If your doctor prescribes medicines or potassium supplements, take them exactly as directed. Call your doctor if you have any problems with your medicines. · Get your potassium levels tested as often as your doctor tells you. When should you call for help? Call 911 anytime you think you may need emergency care. For example, call if:    · You feel like your heart is missing beats.  Heart problems caused by low potassium can cause death.     · You passed out (lost consciousness).     · You have a seizure.    Call your doctor now or seek immediate medical care if:    · You feel weak or unusually tired.     · You have severe arm or leg cramps.     · You have tingling or numbness.     · You feel sick to your stomach, or you vomit.     · You have belly cramps.     · You feel bloated or constipated.     · You have to urinate a lot.     · You feel very thirsty most of the time.     · You are dizzy or lightheaded, or you feel like you may faint.     · You feel depressed, or you lose touch with reality.    Watch closely for changes in your health, and be sure to contact your doctor if:    · You do not get better as expected. Where can you learn more? Go to http://margoth-niles.info/. Enter G358 in the search box to learn more about \"Hypokalemia: Care Instructions. \"  Current as of: May 12, 2017  Content Version: 11.7  © 6530-2209 [employer]. Care instructions adapted under license by [employer] (which disclaims liability or warranty for this information). If you have questions about a medical condition or this instruction, always ask your healthcare professional. [address] any warranty or liability for your use of this information.

## 2018-12-31 ENCOUNTER — APPOINTMENT (OUTPATIENT)
Dept: CT IMAGING | Age: 53
DRG: 897 | End: 2018-12-31
Attending: EMERGENCY MEDICINE
Payer: COMMERCIAL

## 2018-12-31 ENCOUNTER — HOSPITAL ENCOUNTER (INPATIENT)
Age: 53
LOS: 3 days | Discharge: HOME | DRG: 897 | End: 2019-01-03
Attending: EMERGENCY MEDICINE | Admitting: INTERNAL MEDICINE
Payer: COMMERCIAL

## 2018-12-31 DIAGNOSIS — R56.9 SEIZURE (HCC): Primary | ICD-10-CM

## 2018-12-31 DIAGNOSIS — F19.20 POLYSUBSTANCE (EXCLUDING OPIOIDS) DEPENDENCE, DAILY USE (HCC): ICD-10-CM

## 2018-12-31 DIAGNOSIS — Z72.0 TOBACCO ABUSE: ICD-10-CM

## 2018-12-31 DIAGNOSIS — F10.920 ALCOHOLIC INTOXICATION WITHOUT COMPLICATION (HCC): ICD-10-CM

## 2018-12-31 PROBLEM — F10.10 ALCOHOL ABUSE: Status: ACTIVE | Noted: 2018-12-31

## 2018-12-31 PROBLEM — C20 RECTAL CANCER (HCC): Status: ACTIVE | Noted: 2018-12-31

## 2018-12-31 PROBLEM — F19.10 DRUG ABUSE (HCC): Status: ACTIVE | Noted: 2018-12-31

## 2018-12-31 LAB
ALBUMIN SERPL-MCNC: 3.5 G/DL (ref 3.5–5)
ALBUMIN/GLOB SERPL: 0.8 {RATIO} (ref 1.1–2.2)
ALP SERPL-CCNC: 204 U/L (ref 45–117)
ALT SERPL-CCNC: 160 U/L (ref 12–78)
AMPHET UR QL SCN: NEGATIVE
ANION GAP SERPL CALC-SCNC: 7 MMOL/L (ref 5–15)
APPEARANCE UR: CLEAR
AST SERPL-CCNC: 381 U/L (ref 15–37)
BACTERIA URNS QL MICRO: NEGATIVE /HPF
BARBITURATES UR QL SCN: NEGATIVE
BENZODIAZ UR QL: NEGATIVE
BILIRUB SERPL-MCNC: 1.1 MG/DL (ref 0.2–1)
BILIRUB UR QL: NEGATIVE
BUN SERPL-MCNC: 4 MG/DL (ref 6–20)
BUN/CREAT SERPL: 4 (ref 12–20)
CALCIUM SERPL-MCNC: 8.6 MG/DL (ref 8.5–10.1)
CANNABINOIDS UR QL SCN: NEGATIVE
CHLORIDE SERPL-SCNC: 106 MMOL/L (ref 97–108)
CO2 SERPL-SCNC: 28 MMOL/L (ref 21–32)
COCAINE UR QL SCN: POSITIVE
COLOR UR: NORMAL
CREAT SERPL-MCNC: 0.91 MG/DL (ref 0.7–1.3)
DRUG SCRN COMMENT,DRGCM: ABNORMAL
EPITH CASTS URNS QL MICRO: NORMAL /LPF
ERYTHROCYTE [DISTWIDTH] IN BLOOD BY AUTOMATED COUNT: 14.2 % (ref 11.5–14.5)
ETHANOL SERPL-MCNC: 342 MG/DL
GLOBULIN SER CALC-MCNC: 4.2 G/DL (ref 2–4)
GLUCOSE SERPL-MCNC: 78 MG/DL (ref 65–100)
GLUCOSE UR STRIP.AUTO-MCNC: NEGATIVE MG/DL
HCT VFR BLD AUTO: 42.5 % (ref 36.6–50.3)
HGB BLD-MCNC: 14.7 G/DL (ref 12.1–17)
HGB UR QL STRIP: NEGATIVE
HYALINE CASTS URNS QL MICRO: NORMAL /LPF (ref 0–5)
KETONES UR QL STRIP.AUTO: NEGATIVE MG/DL
LACTATE SERPL-SCNC: 2.1 MMOL/L (ref 0.4–2)
LACTATE SERPL-SCNC: 3 MMOL/L (ref 0.4–2)
LEUKOCYTE ESTERASE UR QL STRIP.AUTO: NEGATIVE
MCH RBC QN AUTO: 34.8 PG (ref 26–34)
MCHC RBC AUTO-ENTMCNC: 34.6 G/DL (ref 30–36.5)
MCV RBC AUTO: 100.5 FL (ref 80–99)
METHADONE UR QL: NEGATIVE
NITRITE UR QL STRIP.AUTO: NEGATIVE
NRBC # BLD: 0 K/UL (ref 0–0.01)
NRBC BLD-RTO: 0 PER 100 WBC
OPIATES UR QL: NEGATIVE
PCP UR QL: NEGATIVE
PH UR STRIP: 7 [PH] (ref 5–8)
PLATELET # BLD AUTO: 131 K/UL (ref 150–400)
PMV BLD AUTO: 11.3 FL (ref 8.9–12.9)
POTASSIUM SERPL-SCNC: 3.5 MMOL/L (ref 3.5–5.1)
PROT SERPL-MCNC: 7.7 G/DL (ref 6.4–8.2)
PROT UR STRIP-MCNC: NEGATIVE MG/DL
RBC # BLD AUTO: 4.23 M/UL (ref 4.1–5.7)
RBC #/AREA URNS HPF: NORMAL /HPF (ref 0–5)
SODIUM SERPL-SCNC: 141 MMOL/L (ref 136–145)
SP GR UR REFRACTOMETRY: 1.01 (ref 1–1.03)
UA: UC IF INDICATED,UAUC: NORMAL
UROBILINOGEN UR QL STRIP.AUTO: 1 EU/DL (ref 0.2–1)
WBC # BLD AUTO: 5.2 K/UL (ref 4.1–11.1)
WBC URNS QL MICRO: NORMAL /HPF (ref 0–4)

## 2018-12-31 PROCEDURE — 36415 COLL VENOUS BLD VENIPUNCTURE: CPT

## 2018-12-31 PROCEDURE — 81001 URINALYSIS AUTO W/SCOPE: CPT

## 2018-12-31 PROCEDURE — 80053 COMPREHEN METABOLIC PANEL: CPT

## 2018-12-31 PROCEDURE — 95816 EEG AWAKE AND DROWSY: CPT | Performed by: INTERNAL MEDICINE

## 2018-12-31 PROCEDURE — 83605 ASSAY OF LACTIC ACID: CPT

## 2018-12-31 PROCEDURE — 70450 CT HEAD/BRAIN W/O DYE: CPT

## 2018-12-31 PROCEDURE — 96367 TX/PROPH/DG ADDL SEQ IV INF: CPT

## 2018-12-31 PROCEDURE — 65660000000 HC RM CCU STEPDOWN

## 2018-12-31 PROCEDURE — 74011000250 HC RX REV CODE- 250: Performed by: EMERGENCY MEDICINE

## 2018-12-31 PROCEDURE — 74011250637 HC RX REV CODE- 250/637: Performed by: INTERNAL MEDICINE

## 2018-12-31 PROCEDURE — 99285 EMERGENCY DEPT VISIT HI MDM: CPT

## 2018-12-31 PROCEDURE — 80307 DRUG TEST PRSMV CHEM ANLYZR: CPT

## 2018-12-31 PROCEDURE — 85027 COMPLETE CBC AUTOMATED: CPT

## 2018-12-31 PROCEDURE — 96365 THER/PROPH/DIAG IV INF INIT: CPT

## 2018-12-31 PROCEDURE — 74011250636 HC RX REV CODE- 250/636: Performed by: INTERNAL MEDICINE

## 2018-12-31 PROCEDURE — 74011250636 HC RX REV CODE- 250/636: Performed by: EMERGENCY MEDICINE

## 2018-12-31 RX ORDER — SODIUM CHLORIDE 0.9 % (FLUSH) 0.9 %
5-10 SYRINGE (ML) INJECTION EVERY 8 HOURS
Status: DISCONTINUED | OUTPATIENT
Start: 2018-12-31 | End: 2019-01-03 | Stop reason: HOSPADM

## 2018-12-31 RX ORDER — IBUPROFEN 200 MG
1 TABLET ORAL DAILY
Status: DISCONTINUED | OUTPATIENT
Start: 2018-12-31 | End: 2019-01-03 | Stop reason: HOSPADM

## 2018-12-31 RX ORDER — OXYCODONE HYDROCHLORIDE 5 MG/1
15 TABLET ORAL
Status: DISCONTINUED | OUTPATIENT
Start: 2018-12-31 | End: 2019-01-03 | Stop reason: HOSPADM

## 2018-12-31 RX ORDER — LORAZEPAM 2 MG/ML
2 INJECTION INTRAMUSCULAR
Status: DISCONTINUED | OUTPATIENT
Start: 2018-12-31 | End: 2019-01-03 | Stop reason: HOSPADM

## 2018-12-31 RX ORDER — FOLIC ACID 1 MG/1
1 TABLET ORAL DAILY
Status: DISCONTINUED | OUTPATIENT
Start: 2018-12-31 | End: 2019-01-03 | Stop reason: HOSPADM

## 2018-12-31 RX ORDER — OXYCODONE AND ACETAMINOPHEN 10; 325 MG/1; MG/1
1 TABLET ORAL
Status: ON HOLD | COMMUNITY
End: 2020-12-21

## 2018-12-31 RX ORDER — ONDANSETRON 2 MG/ML
4 INJECTION INTRAMUSCULAR; INTRAVENOUS
Status: DISCONTINUED | OUTPATIENT
Start: 2018-12-31 | End: 2019-01-03 | Stop reason: HOSPADM

## 2018-12-31 RX ORDER — ASPIRIN 325 MG/1
100 TABLET, FILM COATED ORAL DAILY
Status: DISCONTINUED | OUTPATIENT
Start: 2018-12-31 | End: 2019-01-03 | Stop reason: HOSPADM

## 2018-12-31 RX ORDER — LORAZEPAM 2 MG/ML
4 INJECTION INTRAMUSCULAR
Status: DISCONTINUED | OUTPATIENT
Start: 2018-12-31 | End: 2019-01-03 | Stop reason: HOSPADM

## 2018-12-31 RX ORDER — LANOLIN ALCOHOL/MO/W.PET/CERES
100 CREAM (GRAM) TOPICAL DAILY
Status: DISCONTINUED | OUTPATIENT
Start: 2018-12-31 | End: 2018-12-31

## 2018-12-31 RX ORDER — LEVETIRACETAM 10 MG/ML
1000 INJECTION INTRAVASCULAR
Status: COMPLETED | OUTPATIENT
Start: 2018-12-31 | End: 2018-12-31

## 2018-12-31 RX ORDER — ACETAMINOPHEN 325 MG/1
650 TABLET ORAL
Status: DISCONTINUED | OUTPATIENT
Start: 2018-12-31 | End: 2019-01-03 | Stop reason: HOSPADM

## 2018-12-31 RX ORDER — CLONIDINE HYDROCHLORIDE 0.1 MG/1
0.1 TABLET ORAL
Status: DISCONTINUED | OUTPATIENT
Start: 2018-12-31 | End: 2019-01-03 | Stop reason: HOSPADM

## 2018-12-31 RX ORDER — ENOXAPARIN SODIUM 100 MG/ML
40 INJECTION SUBCUTANEOUS EVERY 24 HOURS
Status: DISCONTINUED | OUTPATIENT
Start: 2018-12-31 | End: 2018-12-31

## 2018-12-31 RX ORDER — SODIUM CHLORIDE 0.9 % (FLUSH) 0.9 %
5-10 SYRINGE (ML) INJECTION AS NEEDED
Status: DISCONTINUED | OUTPATIENT
Start: 2018-12-31 | End: 2019-01-03 | Stop reason: HOSPADM

## 2018-12-31 RX ORDER — HEPARIN SODIUM 5000 [USP'U]/ML
5000 INJECTION, SOLUTION INTRAVENOUS; SUBCUTANEOUS EVERY 12 HOURS
Status: DISCONTINUED | OUTPATIENT
Start: 2018-12-31 | End: 2019-01-03 | Stop reason: HOSPADM

## 2018-12-31 RX ORDER — HYDRALAZINE HYDROCHLORIDE 20 MG/ML
10 INJECTION INTRAMUSCULAR; INTRAVENOUS
Status: DISCONTINUED | OUTPATIENT
Start: 2018-12-31 | End: 2019-01-03 | Stop reason: HOSPADM

## 2018-12-31 RX ADMIN — SODIUM CHLORIDE 1000 ML: 900 INJECTION, SOLUTION INTRAVENOUS at 02:45

## 2018-12-31 RX ADMIN — Medication 100 MG: at 11:00

## 2018-12-31 RX ADMIN — FOLIC ACID: 5 INJECTION, SOLUTION INTRAMUSCULAR; INTRAVENOUS; SUBCUTANEOUS at 03:03

## 2018-12-31 RX ADMIN — HEPARIN SODIUM 5000 UNITS: 5000 INJECTION INTRAVENOUS; SUBCUTANEOUS at 10:09

## 2018-12-31 RX ADMIN — OXYCODONE HYDROCHLORIDE 15 MG: 5 TABLET ORAL at 16:28

## 2018-12-31 RX ADMIN — MULTIPLE VITAMINS W/ MINERALS TAB 1 TABLET: TAB at 11:00

## 2018-12-31 RX ADMIN — OXYCODONE HYDROCHLORIDE 15 MG: 5 TABLET ORAL at 21:28

## 2018-12-31 RX ADMIN — HEPARIN SODIUM 5000 UNITS: 5000 INJECTION INTRAVENOUS; SUBCUTANEOUS at 21:25

## 2018-12-31 RX ADMIN — FOLIC ACID 1 MG: 1 TABLET ORAL at 10:59

## 2018-12-31 RX ADMIN — Medication 10 ML: at 08:00

## 2018-12-31 RX ADMIN — Medication 10 ML: at 21:24

## 2018-12-31 RX ADMIN — LEVETIRACETAM 1000 MG: 10 INJECTION INTRAVENOUS at 02:34

## 2018-12-31 RX ADMIN — Medication 10 ML: at 16:28

## 2018-12-31 NOTE — ED NOTES
Bedside shift change report given to Chantel [place]  (oncoming nurse) by Mariusz Singh RN (offgoing nurse). Report included the following information SBAR, ED Summary, MAR and Recent Results.

## 2018-12-31 NOTE — ED PROVIDER NOTES
EMERGENCY DEPARTMENT HISTORY AND PHYSICAL EXAM 
 
 
Date: 12/31/2018 Patient Name: Claudell Morales History of Presenting Illness Chief Complaint Patient presents with  Seizure Seizure like activity History Provided By: Patient HPI: Claudell Morales, 52 y.o. male with PMHx significant for rectal CA s/p surgery and XRT, presents via EMS transport to the ED after family witnessed seizure-like activity at 0030 this morning lasting \"a few minutes. \" EMS reports that family stated pt has a hx of seizures in childhood but has not had them since and is not currently on any seizure medications. Per EMS, pt was non-verbal for a short period of time prior to becoming lucid. The pt denies biting his tongue or incontinence of bowel/bladder. He endorses drinking 1 bottle of wine ~ 2 hours ago and notes he drinks 1-2 bottles of wine daily. Pt denies any hx of withdrawal seizures. He is currently c/o GBA. History is limited due to EtOH. Social Hx: + EtOH (daily); + Smoker (1/2 ppd); - Illicit Drugs PCP: None Past History Past Medical History: 
History reviewed. No pertinent past medical history. Past Surgical History: 
History reviewed. No pertinent surgical history. Family History: No family history on file. Social History: 
Social History Tobacco Use  Smoking status: Current Every Day Smoker Packs/day: 0.50  Smokeless tobacco: Never Used Substance Use Topics  Alcohol use: Yes Comment: 6 pack of beers per day  Drug use: No  
 
 
Allergies: 
No Known Allergies Review of Systems Review of Systems Unable to perform ROS: Other (EtOH) Physical Exam  
Physical Exam  
Constitutional: He appears well-developed and well-nourished. Thin male HENT:  
Head: Normocephalic and atraumatic. Eyes: Conjunctivae and EOM are normal.  
Neck: Normal range of motion. Neck supple. Cardiovascular: Normal rate and regular rhythm. Pulmonary/Chest: Effort normal and breath sounds normal. No respiratory distress. Abdominal: Soft. He exhibits no distension. There is no tenderness. Musculoskeletal: Normal range of motion. Neurological: He is alert. Alert but slurring speech. Has some tongue fasciculations. Skin: Skin is warm and dry. Psychiatric: He has a normal mood and affect. Nursing note and vitals reviewed. Diagnostic Study Results Labs - Recent Results (from the past 12 hour(s)) CBC W/O DIFF Collection Time: 12/31/18  2:33 AM  
Result Value Ref Range WBC 5.2 4.1 - 11.1 K/uL  
 RBC 4.23 4. 10 - 5.70 M/uL  
 HGB 14.7 12.1 - 17.0 g/dL HCT 42.5 36.6 - 50.3 % .5 (H) 80.0 - 99.0 FL  
 MCH 34.8 (H) 26.0 - 34.0 PG  
 MCHC 34.6 30.0 - 36.5 g/dL  
 RDW 14.2 11.5 - 14.5 % PLATELET 966 (L) 962 - 400 K/uL MPV 11.3 8.9 - 12.9 FL  
 NRBC 0.0 0  WBC ABSOLUTE NRBC 0.00 0.00 - 0.01 K/uL METABOLIC PANEL, COMPREHENSIVE Collection Time: 12/31/18  2:33 AM  
Result Value Ref Range Sodium 141 136 - 145 mmol/L Potassium 3.5 3.5 - 5.1 mmol/L Chloride 106 97 - 108 mmol/L  
 CO2 28 21 - 32 mmol/L Anion gap 7 5 - 15 mmol/L Glucose 78 65 - 100 mg/dL BUN 4 (L) 6 - 20 MG/DL Creatinine 0.91 0.70 - 1.30 MG/DL  
 BUN/Creatinine ratio 4 (L) 12 - 20 GFR est AA >60 >60 ml/min/1.73m2 GFR est non-AA >60 >60 ml/min/1.73m2 Calcium 8.6 8.5 - 10.1 MG/DL Bilirubin, total 1.1 (H) 0.2 - 1.0 MG/DL  
 ALT (SGPT) 160 (H) 12 - 78 U/L  
 AST (SGOT) 381 (H) 15 - 37 U/L Alk. phosphatase 204 (H) 45 - 117 U/L Protein, total 7.7 6.4 - 8.2 g/dL Albumin 3.5 3.5 - 5.0 g/dL Globulin 4.2 (H) 2.0 - 4.0 g/dL A-G Ratio 0.8 (L) 1.1 - 2.2 LACTIC ACID Collection Time: 12/31/18  2:33 AM  
Result Value Ref Range Lactic acid 3.0 (HH) 0.4 - 2.0 MMOL/L  
URINALYSIS W/ REFLEX CULTURE Collection Time: 12/31/18  2:33 AM  
Result Value Ref Range Color YELLOW/STRAW Appearance CLEAR CLEAR Specific gravity 1.009 1.003 - 1.030    
 pH (UA) 7.0 5.0 - 8.0 Protein NEGATIVE  NEG mg/dL Glucose NEGATIVE  NEG mg/dL Ketone NEGATIVE  NEG mg/dL Bilirubin NEGATIVE  NEG Blood NEGATIVE  NEG Urobilinogen 1.0 0.2 - 1.0 EU/dL Nitrites NEGATIVE  NEG Leukocyte Esterase NEGATIVE  NEG    
 WBC 0-4 0 - 4 /hpf  
 RBC 0-5 0 - 5 /hpf Epithelial cells FEW FEW /lpf Bacteria NEGATIVE  NEG /hpf  
 UA:UC IF INDICATED CULTURE NOT INDICATED BY UA RESULT CNI Hyaline cast 0-2 0 - 5 /lpf ETHYL ALCOHOL Collection Time: 12/31/18  2:33 AM  
Result Value Ref Range ALCOHOL(ETHYL),SERUM 342 (H) <10 MG/DL  
DRUG SCREEN, URINE Collection Time: 12/31/18  2:33 AM  
Result Value Ref Range AMPHETAMINES NEGATIVE  NEG    
 BARBITURATES NEGATIVE  NEG BENZODIAZEPINES NEGATIVE  NEG    
 COCAINE POSITIVE (A) NEG METHADONE NEGATIVE  NEG    
 OPIATES NEGATIVE  NEG    
 PCP(PHENCYCLIDINE) NEGATIVE  NEG    
 THC (TH-CANNABINOL) NEGATIVE  NEG Drug screen comment (NOTE) Radiologic Studies -  
CT HEAD WO CONT Final Result IMPRESSION: No acute intracranial abnormality. CT Results  (Last 48 hours) 12/31/18 0216  CT HEAD WO CONT Final result Impression:  IMPRESSION: No acute intracranial abnormality. Narrative:  HEAD CT WITHOUT CONTRAST: 12/31/2018 2:16 AM  
   
INDICATION: Seizures new or progressive COMPARISON: 8/30/2015. PROCEDURE: Axial images of the head were obtained without contrast. Coronal and  
sagittal reformats were performed. CT dose reduction was achieved through use of  
a standardized protocol tailored for this examination and automatic exposure  
control for dose modulation. FINDINGS: Incidental note is made of cavum septum pellucidum et vergae. The  
ventricles and sulci are appropriate in size and configuration for age. No loss of gray-white differentiation to suggest late acute or early subacute  
infarction. No mass effect or intracranial hemorrhage. CXR Results  (Last 48 hours) None Medical Decision Making I am the first provider for this patient. I reviewed the vital signs, available nursing notes, past medical history, past surgical history, family history and social history. Vital Signs-Reviewed the patient's vital signs. Patient Vitals for the past 12 hrs: 
 Temp Pulse Resp BP SpO2  
12/31/18 0530  (!) 101  131/77 100 % 12/31/18 0400  (!) 112  (!) 132/108 100 % 12/31/18 0331   24 133/78 99 % 12/31/18 0330  94 12  100 % 12/31/18 0201  (!) 112 14 140/90   
12/31/18 0135 97.4 °F (36.3 °C) (!) 106 14 142/90 100 % Pulse Oximetry Analysis - 100% on RA Cardiac Monitor:  
Rate: 106 bpm 
Rhythm: Sinus Tachycardia Records Reviewed: Nursing Notes, Old Medical Records, Ambulance Run Sheet, Previous Radiology Studies and Previous Laboratory Studies Provider Notes (Medical Decision Making):  
Patient presents after a seizure. DDx: seizure 2/2 noncompliance, infection, increased stressor, electrolyte anomaly (hypoglycemia, etc), ETOH withdrawal. Less likely related to meningitis or brain mass at this time. Will obtain UA, labs and provide loading dose of antiepileptic. David Chandler's  results have been reviewed with him. He has been counseled regarding his diagnosis. He verbally conveys understanding and agreement of the signs, symptoms, diagnosis, treatment and prognosis and additionally agrees to follow up as recommended with Neurology. He also agrees with the care-plan and conveys that all of his questions have been answered. He understands also the seizure precautions.   I have also put together some discharge instructions for him that include: 1) educational information regarding their diagnosis, 2) how to care for their diagnosis at home, as well a 3) list of reasons why they would want to return to the ED prior to their follow-up appointment, should their condition change. ED Course:  
Initial assessment performed. The patients presenting problems have been discussed, and they are in agreement with the care plan formulated and outlined with them. I have encouraged them to ask questions as they arise throughout their visit. 3:35 AM 
Pt's lactate 3.0 likely related to seizure vs alcohol ketoacidosis. CONSULT NOTE:  
3:38 AM 
Shira Barksdale MD, spoke with Lexie Kelley MD, Specialty: Hospitalist 
Discussed pt's hx, disposition, and available diagnostic and imaging results. Reviewed care plans. Consultant will evaluate pt for admission. Written by Shane Holley. Mel Noel, ED Scribe, as dictated by Shira Barksdale MD 
 
Critical Care Time: CRITICAL CARE NOTE : 
3:39 AM 
 
IMPENDING DETERIORATION -CNS and Metabolic ASSOCIATED RISK FACTORS - CNS Decompensation MANAGEMENT- Bedside Assessment and Supervision of Care INTERPRETATION -  CT Scan, Blood Pressure and Cardiac Output Measures INTERVENTIONS - Neurologic interventions  and Metobolic interventions CASE REVIEW - Hospitalist, Nursing and Family TREATMENT RESPONSE -Improved PERFORMED BY - Self NOTES   : 
I have spent 35 minutes of critical care time involved in lab review, consultations with specialist, family decision- making, bedside attention and documentation. During this entire length of time I was immediately available to the patient . Shira Barkdsale MD 
 
Disposition: 
Admit Note: 
3:39 AM 
Pt is being admitted by Dr. Julee Alvarado. The results of their tests and reason(s) for their admission have been discussed with pt and/or available family. They convey agreement and understanding for the need to be admitted and for admission diagnosis. PLAN: 
1. Admit to Hospitalist 
Diagnosis Clinical Impression: 1. Seizure ([address] 2. Alcoholic intoxication without complication ([facility] [address] 3. Polysubstance (excluding opioids) dependence, daily use ([facility] [address] This note is prepared by Duy Canales. Niko Paige, acting as Scribe for Rosario Mosley MD, Rosario Mosley MD: The scribe's documentation has been prepared under my direction and personally reviewed by me in its entirety. I confirm that the note above accurately reflects all work, treatment, procedures, and medical decision making performed by me. This note will not be viewable in [address]

## 2018-12-31 NOTE — ROUTINE PROCESS
TRANSFER - OUT REPORT: 
 
Verbal report given to EHSAN Benton(name) on [employer]  being transferred to [facility](unit) for routine progression of care Report consisted of patients Situation, Background, Assessment and  
Recommendations(SBAR). Information from the following report(s) SBAR, ED Summary, [facility] and Recent Results was reviewed with the receiving nurse. Lines:  
Venous Access Device Alma Cath (Active) Date Needle Changed (Medial Site) 12/31/18 12/31/2018  6:00 AM  
Access Lateral Site Assessment Needle changed 12/31/2018  6:00 AM  
Access Time (Lateral Site) 0636 12/31/2018  6:00 AM  
Positive Blood Return (Lateral Site) Yes 12/31/2018  6:00 AM  
Alcohol Cap Used Yes 12/31/2018  6:00 AM  
  
 
Opportunity for questions and clarification was provided.

## 2018-12-31 NOTE — ED TRIAGE NOTES
Emergency Department Triage Note: 
 
Brought in by ambulance from home with chief complaint of seizure activity. Family reports sudden onset seizure like activity beginning 0030 this morning, lasting \"a few minutes. \" History of childhood seizures, none since and no home seizure medications. Per EMS, patient was non verbal for a short period prior to becoming lucid. Denies incontinence of bowel or bladder. Hx daily etoh use, last drink was a bottle of wine about two hours PTA.

## 2018-12-31 NOTE — H&P
Hospitalist Admission NoteNAME: [address] [place] [address] [address] :  1965 MRN:  [id number] Date/Time:  2018 4:09 AM 
 
Patient PCP: None 
______________________________________________________________________ Given the patient's current clinical presentation, I have a high level of concern for decompensation if discharged from the emergency department. Complex decision making was performed, which includes reviewing the patient's available past medical records, laboratory results, and x-ray films. My assessment of this patient's clinical condition and my plan of care is as follows. Assessment / Plan: 
Seizure Alcoholic but needs to rule out Brain Mets as history of rectal cancer Admit to neurotele Seizure precautions PRn IV Ativan Loaded with Keppra in ED Neurology consult Check MRI brain with and without contrast 
Check EEG Lactic acidosis Due to seizure S/p fluid bolus in Ed, will trend Abnormal LFTs Likely alcoholic hepatitis Will trend, if no improvement and worsening then may require further workup Alcohol abuse CIWa with Ativan Thiamine, Folic Acid and MVIs Drug abuse UDS positive for cocaine and alcohol Pt admitted to using weed H/o Rectal cancer Claims to be in remission Code Status: full Surrogate Decision Maker: Wife DVT Prophylaxis: Lovenox Baseline: functional  
  
Subjective: CHIEF COMPLAINT: Seizure HISTORY OF PRESENT ILLNESS:    
[address] [place] [address] [address] is a 52 y.o.  male who presents with seizure. Pt doesn't remember what happened, all he know he was drinking alcohol in the basement. As per wife, he had witnessed seizure by his daughter who called EMS. Pt reported history of seizure when he was child but none since then.  Pt also reported history of rectal cancer and had been to radiation and chemo in the past. Pt denies any fever, chills, nausea, vomiting, diarrhea, cough, shortness of breath, problems urination. In ED pt noted to have elevated lactic acid and abnormal LFTs. We were asked to admit for work up and evaluation of the above problems. Past medical history: history of seizure when kid. Rectal Cancer Past surgical history: positive for abdominal surgery Social History Tobacco Use  Smoking status: Current Every Day Smoker Packs/day: 0.50  Smokeless tobacco: Never Used Substance Use Topics  Alcohol use: Yes Comment: 6 pack of beers per day Family history: he is unaware of seizure disorder in family No Known Allergies Prior to Admission medications Not on File REVIEW OF SYSTEMS:    
I am not able to complete the review of systems because: The patient is intubated and sedated The patient has altered mental status due to his acute medical problems The patient has baseline aphasia from prior stroke(s) The patient has baseline dementia and is not reliable historian The patient is in acute medical distress and unable to provide information Total of 12 systems reviewed as follows:   
   POSITIVE= underlined text  Negative = text not underlined General:  fever, chills, sweats, generalized weakness, weight loss/gain,  
   loss of appetite Eyes:    blurred vision, eye pain, loss of vision, double vision ENT:    rhinorrhea, pharyngitis Respiratory:   cough, sputum production, SOB, SZYMANSKI, wheezing, pleuritic pain  
Cardiology:   chest pain, palpitations, orthopnea, PND, edema, syncope Gastrointestinal:  abdominal pain , N/V, diarrhea, dysphagia, constipation, bleeding Genitourinary:  frequency, urgency, dysuria, hematuria, incontinence Muskuloskeletal :  arthralgia, myalgia, back pain Hematology:  easy bruising, nose or gum bleeding, lymphadenopathy Dermatological: rash, ulceration, pruritis, color change / jaundice Endocrine:   hot flashes or polydipsia Neurological:  headache, dizziness, confusion, focal weakness, paresthesia, Speech difficulties, memory loss, gait difficulty, seizure Psychological: Feelings of anxiety, depression, agitation Objective: VITALS:   
Visit Vitals /78 Pulse 94 Temp 97.4 °F (36.3 °C) Resp 24 Ht 5' 8\" (1.727 m) Wt 54.4 kg (120 lb) SpO2 99% BMI 18.25 kg/m² PHYSICAL EXAM: 
 
 
_______________________________________________________________________ Care Plan discussed with: 
  Comments Patient y Family  y At bedside RN y   
Care Manager Consultant:  radha ED physician  
_______________________________________________________________________ Expected  Disposition:  
Home with Family y HH/PT/OT/RN   
SNF/LTC   
NAUN   
________________________________________________________________________ TOTAL TIME: 60 Minutes Critical Care Provided     Minutes non procedure based   Comments  
 y Reviewed previous records  
>50% of visit spent in counseling and coordination of care y Discussion with patient and family and questions answered 
  
 
________________________________________________________________________ Signed: George Solis MD 
 
Procedures: see electronic medical records for all procedures/Xrays and details which were not copied into this note but were reviewed prior to creation of Plan. LAB DATA REVIEWED:   
Recent Results (from the past 24 hour(s)) CBC W/O DIFF Collection Time: 12/31/18  2:33 AM  
Result Value Ref Range WBC 5.2 4.1 - 11.1 K/uL  
 RBC 4.23 4. 10 - 5.70 M/uL  
 HGB 14.7 12.1 - 17.0 g/dL HCT 42.5 36.6 - 50.3 % .5 (H) 80.0 - 99.0 FL  
 MCH 34.8 (H) 26.0 - 34.0 PG  
 MCHC 34.6 30.0 - 36.5 g/dL  
 RDW 14.2 11.5 - 14.5 % PLATELET 950 (L) 400 - 400 K/uL MPV 11.3 8.9 - 12.9 FL  
 NRBC 0.0 0  WBC ABSOLUTE NRBC 0.00 0.00 - 0.01 K/uL METABOLIC PANEL, COMPREHENSIVE Collection Time: 12/31/18  2:33 AM  
Result Value Ref Range Sodium 141 136 - 145 mmol/L Potassium 3.5 3.5 - 5.1 mmol/L Chloride 106 97 - 108 mmol/L  
 CO2 28 21 - 32 mmol/L Anion gap 7 5 - 15 mmol/L Glucose 78 65 - 100 mg/dL BUN 4 (L) 6 - 20 MG/DL Creatinine 0.91 0.70 - 1.30 MG/DL  
 BUN/Creatinine ratio 4 (L) 12 - 20 GFR est AA >60 >60 ml/min/1.73m2 GFR est non-AA >60 >60 ml/min/1.73m2 Calcium 8.6 8.5 - 10.1 MG/DL Bilirubin, total 1.1 (H) 0.2 - 1.0 MG/DL  
 ALT (SGPT) 160 (H) 12 - 78 U/L  
 AST (SGOT) 381 (H) 15 - 37 U/L Alk. phosphatase 204 (H) 45 - 117 U/L Protein, total 7.7 6.4 - 8.2 g/dL Albumin 3.5 3.5 - 5.0 g/dL Globulin 4.2 (H) 2.0 - 4.0 g/dL A-G Ratio 0.8 (L) 1.1 - 2.2 LACTIC ACID Collection Time: 12/31/18  2:33 AM  
Result Value Ref Range Lactic acid 3.0 (HH) 0.4 - 2.0 MMOL/L  
URINALYSIS W/ REFLEX CULTURE Collection Time: 12/31/18  2:33 AM  
Result Value Ref Range Color YELLOW/STRAW Appearance CLEAR CLEAR Specific gravity 1.009 1.003 - 1.030    
 pH (UA) 7.0 5.0 - 8.0 Protein NEGATIVE  NEG mg/dL Glucose NEGATIVE  NEG mg/dL Ketone NEGATIVE  NEG mg/dL Bilirubin NEGATIVE  NEG Blood NEGATIVE  NEG Urobilinogen 1.0 0.2 - 1.0 EU/dL Nitrites NEGATIVE  NEG Leukocyte Esterase NEGATIVE  NEG    
 WBC 0-4 0 - 4 /hpf  
 RBC 0-5 0 - 5 /hpf Epithelial cells FEW FEW /lpf Bacteria NEGATIVE  NEG /hpf  
 UA:UC IF INDICATED CULTURE NOT INDICATED BY UA RESULT CNI Hyaline cast 0-2 0 - 5 /lpf ETHYL ALCOHOL Collection Time: 12/31/18  2:33 AM  
Result Value Ref Range ALCOHOL(ETHYL),SERUM 342 (H) <10 MG/DL  
DRUG SCREEN, URINE Collection Time: 12/31/18  2:33 AM  
Result Value Ref Range AMPHETAMINES NEGATIVE  NEG    
 BARBITURATES NEGATIVE  NEG BENZODIAZEPINES NEGATIVE  NEG    
 COCAINE POSITIVE (A) NEG METHADONE NEGATIVE  NEG    
 OPIATES NEGATIVE  NEG    
 PCP(PHENCYCLIDINE) NEGATIVE  NEG    
 THC (TH-CANNABINOL) NEGATIVE  NEG Drug screen comment (NOTE)

## 2018-12-31 NOTE — PROGRESS NOTES
-Please complete MRI History and Safety Screening Form for this patient using [facility] only under Orders Requiring a Screening Form: 
 

## 2018-12-31 NOTE — PROGRESS NOTES
Patient has regularly received oxycodone 15 mg PO Q4H as needed based on insurance claim history:  
 
 
 
 
 
Will reorder based on discussion with [facility]/Dr. Zuleima Blanchard

## 2018-12-31 NOTE — PROGRESS NOTES
Enoxaparin 40 mg q24h ordered Pt weight is <60 kg Converted to Heparin 5000 units q12h per protocol.  
Will Emery, MARLENID

## 2018-12-31 NOTE — PROGRESS NOTES
Pt admitted this AM~ 4AM by my partner Saniya High. Chart reviewed. Agree with plans set forth by Dr Saniya High.

## 2018-12-31 NOTE — ED NOTES
Bedside and Verbal shift change report given to Ramila Aaron RN (oncoming nurse) by Rogelio Swanson RN (offgoing nurse). Report included the following information SBAR, ED Summary, MAR and Recent Results.

## 2018-12-31 NOTE — PROGRESS NOTES
* No surgery found * 
* No surgery found * Bedside shift change report given to Erika (oncoming nurse) by Conchita Brewster (offgoing nurse). Report included the following information [facility] Phone:   [phone] Significant changes during shift:  New admit form ER just arrived. Lactic acid 2.1 down from 3 in ER Patient Information Ru Cheatham 52 y.o. 
12/31/2018  1:30 AM by Ijeoma Jason MD. Ru Cheatham was admitted from Home 
 
Problem List 
 
Patient Active Problem List  
 Diagnosis Date Noted  Seizure ([facility] 12/31/2018  Alcohol abuse 12/31/2018  Drug abuse ([facility] 12/31/2018  [facility] ([facility] 12/31/2018 History reviewed. No pertinent past medical history. Core Measures: CVA: No No 
CHF:No No 
PNA:No No 
 
Post Op Surgical (If Applicable):  
 
NActivity Status: OOB to Chair No 
Ambulated this shift No  
Bed Rest No 
 
Supplemental O2: (If Applicable) NC No 
NRB No 
Venti-mask No 
On  Liters/min LINES AND DRAINS:portacath acessed in ER 
 
DVT prophylaxis: DVT prophylaxis Med- Yes DVT prophylaxis SCD or HECTOR- No  
 
Wounds: (If Applicable) Wounds- No 
 
Location Patient Safety: 
 
Falls Score Total Score: 1 Safety Level_______ Bed Alarm On? Yes Sitter? No 
 
Plan for upcoming shift: MRI and EEG needs checklist done Discharge Plan: Yes TBD Active Consults: 
IP CONSULT TO NEUROLOGY

## 2018-12-31 NOTE — CONSULTS
IP CONSULT TO NEUROLOGY  Consult performed by: Faylene Sandifer, MD  Consult ordered by: Jeff Stephen MD            NEUROLOGY CONSULT    NAME Marcela Thomas AGE 52 y.o. MRN [id number]  1965     REQUESTING PHYSICIAN: Patito Stephens MD      CHIEF COMPLAINT:  seizure     This is a 52 y.o. male with a medical history of alcoholism. He was witnessed by his daughter seizing. He denies reducing his alcohol consumption, using other substances or suffering any head trauma. He denies previous history or family history of seizures. He is currently at baseline. ASSESSMENT AND PLAN      1. Seizure ([facility]  Was positive for cocaine which would explain the provoked seizure. EEG  MRI brain    2. Alcoholic intoxication without complication (HCC)  Thiamine  DT prophylaxis  Using ativan  Discussed the dangers of ETOH withdrawal as well as continued use. 3. Tobacco abuse  Advised to quit          ALLERGIES:  Patient has no known allergies.      Current Facility-Administered Medications   Medication Dose Route Frequency    sodium chloride (NS) flush 5-10 mL  5-10 mL IntraVENous Q8H    sodium chloride (NS) flush 5-10 mL  5-10 mL IntraVENous PRN    acetaminophen (TYLENOL) tablet 650 mg  650 mg Oral Q6H PRN    ondansetron (ZOFRAN) injection 4 mg  4 mg IntraVENous Q4H PRN    nicotine (NICODERM CQ) 21 mg/24 hr patch 1 Patch  1 Patch TransDERmal DAILY    LORazepam (ATIVAN) injection 2 mg  2 mg IntraVENous Q1H PRN    LORazepam (ATIVAN) injection 4 mg  4 mg IntraVENous E9W PRN    folic acid (FOLVITE) tablet 1 mg  1 mg Oral DAILY    multivitamin, tx-iron-ca-min (THERA-M w/ IRON) tablet 1 Tab  1 Tab Oral DAILY    heparin (porcine) injection 5,000 Units  5,000 Units SubCUTAneous Q12H    thiamine mononitrate (B-1) tablet 100 mg  100 mg Oral DAILY    hydrALAZINE (APRESOLINE) 20 mg/mL injection 10 mg  10 mg IntraVENous Q6H PRN    cloNIDine HCl (CATAPRES) tablet 0.1 mg  0.1 mg Oral Q6H PRN    oxyCODONE IR (ROXICODONE) tablet 15 mg  15 mg Oral Q4H PRN       Medical History  Alcoholism  Tobacco Use    Social History     Tobacco Use    Smoking status: Current Every Day Smoker     Packs/day: 0.50    Smokeless tobacco: Never Used   Substance Use Topics    Alcohol use: Yes     Comment: 6 pack of beers per day     Family History  No seizures  No [place]     Visit Vitals  BP (!) 134/95   Pulse 93   Temp 98.1 °F (36.7 °C)   Resp 18   Ht 5' 8\" (1.727 m)   Wt 120 lb (54.4 kg)   SpO2 99%   BMI 18.25 kg/m²      General: Well developed, thin. Patient in no apparent distress   Head: Normocephalic, atraumatic, anicteric sclera   Neck Normal ROM, No thyromegally   Lungs:  Clear to auscultation bilaterally, No wheezes or rubs   Cardiac: Regular rate and rhythm with no murmurs. Abd: Bowel sounds were audible. No tenderness on palpation   Ext: No pedal edema   Skin: Supple no rash     NeurologicExam:  Mental Status: Alert and oriented to person place and time   Speech: Fluent no aphasia or dysarthria. Cranial Nerves:  II - XII Intact   Motor:  Full and symmetric strength of upper and lower proximal and distal muscles. Normal bulk and tone. Reflexes:   Deep tendon reflexes 2+/4 and symmetric. Sensory:   Symmetric and intact with no perceived deficits modalities involving small or large fibers. Gait:  deferred    Tremor:   No tremor noted. Cerebellar:  Coordination intact. Neurovascular: No carotid bruits. No JVD       REVIEWED IMAGING:    MRI :  Results from Hospital Encounter encounter on 05/31/18   MRI ABD W WO CONT    Narrative EXAM:  MRI ABD W WO CONT    INDICATION:  Malignant neoplasm of the rectum with elevated LFTs    COMPARISON: None. TECHNIQUE: Coronal T2 and postcontrast T1; Axial T2, in/out of phase, MRCP, pre-  and dynamic postcontrast T1 MRI of the abdomen. 5.5 mL Gadavist. Subtractions  were performed. FINDINGS: Liver:  There is diffuse signal loss within the liver on the out of  phase images compatible with fatty saturation. A focal lesion is not identified. Portal vein enhances normally    Biliary tree: Gallbladder is surgically absent. Common duct is mildly prominent  at 8 to 9 mm with minimal prominence of the central bile ducts. This is not  unexpected in the setting of cholecystectomy    Pancreas: Signal is within normal limits. No evidence of mass or surrounding  inflammation. Pancreatic duct is within normal limits. No divisum. Spleen: Within normal limits. Adrenal glands: Within normal limits. Kidneys: Enhance symmetrically without hydronephrosis. There are subcentimeter  lesions right kidney too small to characterize but appear to represent cyst    Vasculature: Patent. Bowel: No evidence of inflammation. Lymph nodes: No lymphadenopathy. Miscellaneous: No ascites. Bones are within normal limits. Impression IMPRESSION:   1. Diffuse fatty infiltration of the liver. A focal enhancing lesion is not  identified  2. Post cholecystectomy. Common duct measures 8 mm, not unexpected in the  setting of previous cholecystectomy         CT:  Results from Hospital Encounter encounter on 12/31/18   CT HEAD WO CONT    Narrative HEAD CT WITHOUT CONTRAST: 12/31/2018 2:16 AM    INDICATION: Seizures new or progressive    COMPARISON: 8/30/2015. PROCEDURE: Axial images of the head were obtained without contrast. Coronal and  sagittal reformats were performed. CT dose reduction was achieved through use of  a standardized protocol tailored for this examination and automatic exposure  control for dose modulation. FINDINGS: Incidental note is made of cavum septum pellucidum et vergae. The  ventricles and sulci are appropriate in size and configuration for age. No loss  of gray-white differentiation to suggest late acute or early subacute  infarction. No mass effect or intracranial hemorrhage. Impression IMPRESSION: No acute intracranial abnormality.        REVIEWED LABS:  Lab Results Component Value Date/Time    WBC 5.2 12/31/2018 02:33 AM    HCT 42.5 12/31/2018 02:33 AM    HGB 14.7 12/31/2018 02:33 AM    PLATELET 088 (L) 56/98/4018 02:33 AM     Lab Results   Component Value Date/Time    Sodium 141 12/31/2018 02:33 AM    Potassium 3.5 12/31/2018 02:33 AM    Chloride 106 12/31/2018 02:33 AM    CO2 28 12/31/2018 02:33 AM    Glucose 78 12/31/2018 02:33 AM    BUN 4 (L) 12/31/2018 02:33 AM    Creatinine 0.91 12/31/2018 02:33 AM    Calcium 8.6 12/31/2018 02:33 AM

## 2018-12-31 NOTE — PROGRESS NOTES
Received p patient from ER via stretcher. Alert and oriented x 4 C/o pain in abd that he states he has on and off since rectal ca surgery Rates pain at 9. Oriented to room. Wife at bedside.

## 2018-12-31 NOTE — PROGRESS NOTES
Received call from the lab that Lactic Acid value was 2.1. This message was delivered to the primary nurse.

## 2019-01-01 LAB
ALBUMIN SERPL-MCNC: 3 G/DL (ref 3.5–5)
ALBUMIN/GLOB SERPL: 0.8 {RATIO} (ref 1.1–2.2)
ALP SERPL-CCNC: 173 U/L (ref 45–117)
ALT SERPL-CCNC: 108 U/L (ref 12–78)
ANION GAP SERPL CALC-SCNC: 8 MMOL/L (ref 5–15)
AST SERPL-CCNC: 187 U/L (ref 15–37)
BASOPHILS # BLD: 0 K/UL (ref 0–0.1)
BASOPHILS NFR BLD: 0 % (ref 0–1)
BILIRUB SERPL-MCNC: 1.4 MG/DL (ref 0.2–1)
BUN SERPL-MCNC: 3 MG/DL (ref 6–20)
BUN/CREAT SERPL: 4 (ref 12–20)
CALCIUM SERPL-MCNC: 8.6 MG/DL (ref 8.5–10.1)
CHLORIDE SERPL-SCNC: 103 MMOL/L (ref 97–108)
CO2 SERPL-SCNC: 27 MMOL/L (ref 21–32)
CREAT SERPL-MCNC: 0.81 MG/DL (ref 0.7–1.3)
DIFFERENTIAL METHOD BLD: ABNORMAL
EOSINOPHIL # BLD: 0 K/UL (ref 0–0.4)
EOSINOPHIL NFR BLD: 1 % (ref 0–7)
ERYTHROCYTE [DISTWIDTH] IN BLOOD BY AUTOMATED COUNT: 13.9 % (ref 11.5–14.5)
GLOBULIN SER CALC-MCNC: 3.6 G/DL (ref 2–4)
GLUCOSE SERPL-MCNC: 72 MG/DL (ref 65–100)
HCT VFR BLD AUTO: 37.7 % (ref 36.6–50.3)
HGB BLD-MCNC: 13 G/DL (ref 12.1–17)
IMM GRANULOCYTES # BLD: 0 K/UL (ref 0–0.04)
IMM GRANULOCYTES NFR BLD AUTO: 0 % (ref 0–0.5)
LYMPHOCYTES # BLD: 0.8 K/UL (ref 0.8–3.5)
LYMPHOCYTES NFR BLD: 16 % (ref 12–49)
MCH RBC QN AUTO: 34.4 PG (ref 26–34)
MCHC RBC AUTO-ENTMCNC: 34.5 G/DL (ref 30–36.5)
MCV RBC AUTO: 99.7 FL (ref 80–99)
MONOCYTES # BLD: 0.6 K/UL (ref 0–1)
MONOCYTES NFR BLD: 12 % (ref 5–13)
NEUTS SEG # BLD: 3.8 K/UL (ref 1.8–8)
NEUTS SEG NFR BLD: 71 % (ref 32–75)
NRBC # BLD: 0 K/UL (ref 0–0.01)
NRBC BLD-RTO: 0 PER 100 WBC
PLATELET # BLD AUTO: 106 K/UL (ref 150–400)
PMV BLD AUTO: 11.1 FL (ref 8.9–12.9)
POTASSIUM SERPL-SCNC: 3.5 MMOL/L (ref 3.5–5.1)
PROT SERPL-MCNC: 6.6 G/DL (ref 6.4–8.2)
RBC # BLD AUTO: 3.78 M/UL (ref 4.1–5.7)
SODIUM SERPL-SCNC: 138 MMOL/L (ref 136–145)
WBC # BLD AUTO: 5.4 K/UL (ref 4.1–11.1)

## 2019-01-01 PROCEDURE — 80053 COMPREHEN METABOLIC PANEL: CPT

## 2019-01-01 PROCEDURE — 74011250637 HC RX REV CODE- 250/637: Performed by: INTERNAL MEDICINE

## 2019-01-01 PROCEDURE — 36415 COLL VENOUS BLD VENIPUNCTURE: CPT

## 2019-01-01 PROCEDURE — 74011250636 HC RX REV CODE- 250/636: Performed by: INTERNAL MEDICINE

## 2019-01-01 PROCEDURE — 85025 COMPLETE CBC W/AUTO DIFF WBC: CPT

## 2019-01-01 PROCEDURE — 94760 N-INVAS EAR/PLS OXIMETRY 1: CPT

## 2019-01-01 PROCEDURE — 65660000000 HC RM CCU STEPDOWN

## 2019-01-01 RX ADMIN — Medication 10 ML: at 14:32

## 2019-01-01 RX ADMIN — FOLIC ACID 1 MG: 1 TABLET ORAL at 10:24

## 2019-01-01 RX ADMIN — HEPARIN SODIUM 5000 UNITS: 5000 INJECTION INTRAVENOUS; SUBCUTANEOUS at 10:17

## 2019-01-01 RX ADMIN — Medication 100 MG: at 10:23

## 2019-01-01 RX ADMIN — MULTIPLE VITAMINS W/ MINERALS TAB 1 TABLET: TAB at 10:23

## 2019-01-01 RX ADMIN — Medication 10 ML: at 20:14

## 2019-01-01 RX ADMIN — Medication 10 ML: at 03:50

## 2019-01-01 RX ADMIN — ONDANSETRON 4 MG: 2 INJECTION INTRAMUSCULAR; INTRAVENOUS at 02:42

## 2019-01-01 RX ADMIN — HEPARIN SODIUM 5000 UNITS: 5000 INJECTION INTRAVENOUS; SUBCUTANEOUS at 20:14

## 2019-01-01 RX ADMIN — Medication 10 ML: at 07:54

## 2019-01-01 RX ADMIN — LORAZEPAM 2 MG: 2 INJECTION, SOLUTION INTRAMUSCULAR; INTRAVENOUS at 02:57

## 2019-01-01 RX ADMIN — ONDANSETRON 4 MG: 2 INJECTION INTRAMUSCULAR; INTRAVENOUS at 07:54

## 2019-01-01 NOTE — PROGRESS NOTES
Hospitalist Progress Note NAME: Sandra Flores :  1965 MRN:  [id number] Interim Hospital Summary: 52 y.o. male whom presented on 2018 with Assessment / Plan: 
Seizure (alcohol and cocaine induced) - Alcoholic but needs to rule out Brain Mets as history of rectal cancer Drinks 3 regular size bottles of wine daily, last alcohol intake couple days ago Smokes about cigarettes & marijuana - Pt verbally stated that he is defiantly try to quit; CM to provide outpatient rehab program list 
- appreciate neurology input; 
  CT of head (-) MRI of brain to be done EEG to be done 
  
Lactic acidosis - Due to seizure -  Received IV fluid on admission. Lactic acid down to 2.1 from 3.0 
  
Abnormal LFTs 
- Likely alcoholic hepatitis ->108 ->108 ->173 
  
Alcohol abuse  
- CIWa with Ativan 
- continue with Thiamine, Folic Acid and MVIs 
  
Drug abuse  
- UDS positive for cocaine and alcohol Pt admitted to using weed - Pt requested not to share his substance abuse issues with the family include his wife 
  
H/o Rectal cancer - Claims to be in remission 
  
Code Status: full Surrogate Decision Maker: Wife 
  
DVT Prophylaxis: Lovenox 
  
Baseline: functional 
 
Discharge: home with family Subjective: Chief Complaint / Reason for Physician Visit \"I feel tired, otherwise, I feel ok. \"  Discussed with RN events overnight. Review of Systems: 
Symptom Y/N Comments  Symptom Y/N Comments Fever/Chills n   Chest Pain n   
Poor Appetite    Edema Cough    Abdominal Pain Sputum    Joint Pain SOB/SZYMANSKI n   Pruritis/Rash Nausea/vomit n   Tolerating PT/OT Diarrhea    Tolerating Diet Constipation    Other Could NOT obtain due to:   
 
Objective: VITALS:  
Last 24hrs VS reviewed since prior progress note. Most recent are: 
Patient Vitals for the past 24 hrs: 
 Temp Pulse Resp BP SpO2 01/01/19 1203 97.5 °F (36.4 °C) 89 16 (!) 141/92 99 % 01/01/19 0737 98.2 °F (36.8 °C) 89 18 139/85 99 % 01/01/19 0348 98.2 °F (36.8 °C) 93 18 (!) 144/91 99 % 01/01/19 0256 98 °F (36.7 °C) 84 20 139/87 98 % 01/01/19 0021 98.1 °F (36.7 °C) 84 18 (!) 148/93 100 % 12/31/18 2124  95  (!) 156/97 100 % 12/31/18 1905 98.6 °F (37 °C) 87 18 125/75 97 % 12/31/18 1502 98.1 °F (36.7 °C) 93 18 (!) 134/95 99 % Intake/Output Summary (Last 24 hours) at 1/1/2019 1403 Last data filed at 1/1/2019 1329 Gross per 24 hour Intake 680 ml Output 650 ml Net 30 ml PHYSICAL EXAM: 
General: Ill appearing. Alert, cooperative, no acute distress   
EENT:  EOMI. Anicteric sclerae. MMM Resp:  CTA bilaterally, no wheezing or rales. No accessory muscle use CV:  Regular  rhythm,  No edema GI:  Soft, Non distended, Non tender.  +Bowel sounds Neurologic:  Alert and oriented X 3, normal speech, no asterix Psych:   Fair insight. Not anxious nor agitated Skin:  No rashes. No jaundice Reviewed most current lab test results and cultures  YES Reviewed most current radiology test results   YES Review and summation of old records today    NO Reviewed patient's current orders and MAR    YES 
PMH/SH reviewed - no change compared to H&P 
________________________________________________________________________ Care Plan discussed with: 
  Comments Patient y Family RN y   
Care Manager Consultant Multidiciplinary team rounds were held today with , nursing, pharmacist and clinical coordinator. Patient's plan of care was discussed; medications were reviewed and discharge planning was addressed. ________________________________________________________________________ Total NON critical care TIME:     Minutes Total CRITICAL CARE TIME Spent:   Minutes non procedure based Comments >50% of visit spent in counseling and coordination of care ________________________________________________________________________ Venice El NP Procedures: see electronic medical records for all procedures/Xrays and details which were not copied into this note but were reviewed prior to creation of Plan. LABS: 
I reviewed today's most current labs and imaging studies. Pertinent labs include: 
Recent Labs 01/01/19 0350 12/31/18 
8420 WBC 5.4 5.2 HGB 13.0 14.7 HCT 37.7 42.5 * 131* Recent Labs 01/01/19 0350 12/31/18 
5254  141  
K 3.5 3.5  106 CO2 27 28 GLU 72 78 BUN 3* 4*  
CREA 0.81 0.91  
CA 8.6 8.6 ALB 3.0* 3.5 TBILI 1.4* 1.1*  
SGOT 187* 381* * 160* Signed: )Jameel Mcconnell NP

## 2019-01-01 NOTE — PROGRESS NOTES
Pt stated he woke up to take a drink of water, then vomited an unmeasurable amount x1. Zofran given, CIWA 10, Ativan prn given

## 2019-01-01 NOTE — PROGRESS NOTES
Chief Complaint: seizure No seizures overnight. No complaints. Somnolent today wife in the room discussed plan and progress. Assesment and Plan 1. Seizure ([address] Probably cocaine induced 2. Alcoholic intoxication without complication ([address] Wants to stop So far no DTs will continue to follow 3. Polysubstance (excluding opioids) dependence, daily use ([address] Wants to stop 4. Tobacco abuse Advised to quit Allergies Patient has no known allergies. Medications Current Facility-Administered Medications Medication Dose Route Frequency  sodium chloride (NS) flush 5-10 mL  5-10 mL IntraVENous Q8H  
 sodium chloride (NS) flush 5-10 mL  5-10 mL IntraVENous PRN  
 acetaminophen (TYLENOL) tablet 650 mg  650 mg Oral Q6H PRN  
 ondansetron (ZOFRAN) injection 4 mg  4 mg IntraVENous Q4H PRN  
 nicotine (NICODERM CQ) 21 mg/24 hr patch 1 Patch  1 Patch TransDERmal DAILY  LORazepam (ATIVAN) injection 2 mg  2 mg IntraVENous Q1H PRN  
 LORazepam (ATIVAN) injection 4 mg  4 mg IntraVENous O6N PRN  
 folic acid (FOLVITE) tablet 1 mg  1 mg Oral DAILY  multivitamin, tx-iron-ca-min (THERA-M w/ IRON) tablet 1 Tab  1 Tab Oral DAILY  heparin (porcine) injection 5,000 Units  5,000 Units SubCUTAneous Q12H  thiamine mononitrate (B-1) tablet 100 mg  100 mg Oral DAILY  hydrALAZINE (APRESOLINE) 20 mg/mL injection 10 mg  10 mg IntraVENous Q6H PRN  
 cloNIDine HCl (CATAPRES) tablet 0.1 mg  0.1 mg Oral Q6H PRN  
 oxyCODONE IR (ROXICODONE) tablet 15 mg  15 mg Oral Q4H PRN Medical History 
alcoholism No seizures Poly substance abuse Review of Systems Eyes: Negative for blurred vision and double vision. Cardiovascular: Negative for chest pain and palpitations. Gastrointestinal: Negative for heartburn and nausea. Neurological: Negative for seizures and headaches. Psychiatric/Behavioral: Positive for substance abuse. Negative for suicidal ideas. Exam: 
 
Visit Vitals /89 (BP 1 Location: Left arm, BP Patient Position: At rest) Pulse 85 Temp 97.8 °F (36.6 °C) Resp 18 Ht 5' 8\" (1.727 m) Wt 120 lb (54.4 kg) SpO2 100% BMI 18.25 kg/m² General: Well developed, thin. Somnolent no distress Head: Normocephalic, atraumatic, anicteric sclera Neck Normal ROM, No thyromegally Lungs:  Clear to auscultation bilaterally, No wheezes or rubs Cardiac: Regular rate and rhythm with no murmurs. Abd: Bowel sounds were audible. No tenderness on palpation Ext: No pedal edema Skin: Supple no rash  
  
NeurologicExam: 
Mental Status: Alert and oriented to person place and time, somnolent Speech: Fluent no aphasia or dysarthria. Cranial Nerves:  II - XII Intact no diplopia Motor:  Full and symmetric strength of upper and lower proximal and distal muscles. Normal bulk and tone. Reflexes:   Deep tendon reflexes 2+/4 and symmetric. Sensory:   Symmetric and intact with no perceived deficits modalities involving small or large fibers. Gait:  deferred Tremor:   No tremor noted. Cerebellar:  Coordination intact. Neurovascular: No carotid bruits. No JVD Imaging CT Results (most recent): 
Results from [facility] Encounter encounter on 12/31/18 CT HEAD WO CONT Narrative HEAD CT WITHOUT CONTRAST: 12/31/2018 2:16 AM 
 
INDICATION: Seizures new or progressive COMPARISON: 8/30/2015. PROCEDURE: Axial images of the head were obtained without contrast. Coronal and 
sagittal reformats were performed. CT dose reduction was achieved through use of 
a standardized protocol tailored for this examination and automatic exposure 
control for dose modulation. FINDINGS: Incidental note is made of cavum septum pellucidum et vergae. The 
ventricles and sulci are appropriate in size and configuration for age. No loss 
of gray-white differentiation to suggest late acute or early subacute 
infarction. No mass effect or intracranial hemorrhage. Impression IMPRESSION: No acute intracranial abnormality. MRI Results (most recent): 
Results from Hospital Encounter encounter on 05/31/18 MRI ABD W WO CONT Narrative EXAM:  MRI ABD W WO CONT INDICATION:  Malignant neoplasm of the rectum with elevated LFTs 
 
COMPARISON: None. TECHNIQUE: Coronal T2 and postcontrast T1; Axial T2, in/out of phase, MRCP, pre- 
and dynamic postcontrast T1 MRI of the abdomen. 5.5 mL Gadavist. Subtractions 
were performed. FINDINGS: Liver: There is diffuse signal loss within the liver on the out of 
phase images compatible with fatty saturation. A focal lesion is not identified. Portal vein enhances normally Biliary tree: Gallbladder is surgically absent. Common duct is mildly prominent 
at 8 to 9 mm with minimal prominence of the central bile ducts. This is not 
unexpected in the setting of cholecystectomy Pancreas: Signal is within normal limits. No evidence of mass or surrounding 
inflammation. Pancreatic duct is within normal limits. No divisum. Spleen: Within normal limits. Adrenal glands: Within normal limits. Kidneys: Enhance symmetrically without hydronephrosis. There are subcentimeter 
lesions right kidney too small to characterize but appear to represent cyst 
 
Vasculature: Patent. Bowel: No evidence of inflammation. Lymph nodes: No lymphadenopathy. Miscellaneous: No ascites. Bones are within normal limits. Impression IMPRESSION:  
1. Diffuse fatty infiltration of the liver. A focal enhancing lesion is not 
identified 2. Post cholecystectomy. Common duct measures 8 mm, not unexpected in the 
setting of previous cholecystectomy Lab Review Lab Results Component Value Date/Time WBC 5.4 01/01/2019 03:50 AM  
 HCT 37.7 01/01/2019 03:50 AM  
 HGB 13.0 01/01/2019 03:50 AM  
 PLATELET 179 (L) 38/84/1590 03:50 AM  
 
 
Lab Results Component Value Date/Time  Sodium 138 01/01/2019 03:50 AM  
 Potassium 3.5 01/01/2019 03:50 AM  
 Chloride 103 01/01/2019 03:50 AM  
 CO2 27 01/01/2019 03:50 AM  
 Glucose 72 01/01/2019 03:50 AM  
 BUN 3 (L) 01/01/2019 03:50 AM  
 Creatinine 0.81 01/01/2019 03:50 AM  
 Calcium 8.6 01/01/2019 03:50 AM

## 2019-01-01 NOTE — PROGRESS NOTES
Reason for Admission: Seizures ([facility] RRAT Score: 0 LOW Plan for utilizing home health: Per recommendation Likelihood of Readmission: Moderate per pt acuity and comorbidities Transition of Care Plan:                   Pt is a 52year old,  [place] male, admitted with seizures ([facility] Pt was sleeping with CM attempted to meet with him. Pt wife was in the room. Pt wife confirmed address, emergency contact and PCP (Dr. Deborah Morton). Pt wife Romi Adair states they live together in a two level home. Pt has no dme and drives. Pt has not had HH or been to a SNF in the past. Pt's preferred pharmacy is the [facility]. Pt has no problems affording or accessing medications. Pt wife states they do not have advanced directives at home and is not interested in arranging any at this time. Pt wife has no questions or concerns at this time. CM will continue to follow pt for discharge planning. Care Management Interventions PCP Verified by CM: Yes Mode of Transport at Discharge: Self Transition of Care Consult (CM Consult): Discharge Planning MyChart Signup: No 
Discharge Durable Medical Equipment: No 
Health Maintenance Reviewed: Yes Physical Therapy Consult: No 
Occupational Therapy Consult: No 
Speech Therapy Consult: No 
Current Support Network: Lives with Spouse, Own Home Confirm Follow Up Transport: Family Plan discussed with Pt/Family/Caregiver: Yes Discharge Location Discharge Placement: Other:(TBD) SMITHA Robles, CEASAR Turcios [phone]

## 2019-01-01 NOTE — PROGRESS NOTES
* No surgery found * 
* No surgery found * Bedside shift change report given to [address] (oncoming nurse) by Trevin Johnson (offgoing nurse). Report included the following information [facility] Phone:   [phone] Significant changes during shift:  Complaint of dizziness Patient Information Neal Oswald 52 y.o. 
12/31/2018  1:30 AM by Roldan Lopez MD. Neal Oswald was admitted from Home 
 
Problem List 
 
Patient Active Problem List  
 Diagnosis Date Noted  Seizure ([facility] 12/31/2018  Alcohol abuse 12/31/2018  Drug abuse ([facility] 12/31/2018  Rectal cancer ([facility] 12/31/2018 History reviewed. No pertinent past medical history. Core Measures: CVA: No No 
CHF:No No 
PNA:No No 
 
Post Op Surgical (If Applicable):  
 
NActivity Status: OOB to Chair No 
Ambulated this shift No  
Bed Rest No 
 
Supplemental O2: (If Applicable) NC No 
NRB No 
Venti-mask No 
On  Liters/min LINES AND DRAINS:portacath acessed in ER 
DVT prophylaxis: DVT prophylaxis Med- Yes DVT prophylaxis SCD or HECTOR- No  
 
Wounds: (If Applicable) Wounds- No 
 
Location Patient Safety: 
 
Falls Score Total Score: 2 Safety Level_______ Bed Alarm On? Yes Sitter? No 
 
Plan for upcoming shift: Safety, seizure preacautions MRI and EEG in AM 
 
 
 
Discharge Plan: Yes TBD Active Consults: 
IP CONSULT TO NEUROLOGY

## 2019-01-01 NOTE — PROGRESS NOTES
Problem: Falls - Risk of 
Goal: *Absence of Falls Document Citlaly Fearing Fall Risk and appropriate interventions in the flowsheet. Outcome: Progressing Towards Goal 
Fall Risk Interventions: 
Mobility Interventions: Bed/chair exit alarm, Patient to call before getting OOB Medication Interventions: Bed/chair exit alarm, Patient to call before getting OOB, Teach patient to arise slowly

## 2019-01-01 NOTE — PROGRESS NOTES
* No surgery found * 
* No surgery found * Bedside shift change report given to [address] (oncoming nurse) by Mer Horowitz (offgoing nurse). Report included the following information [facility]. [facility] Phone:   [phone] Significant changes during shift:  None Patient Information Ru Cheatham 52 y.o. 
12/31/2018  1:30 AM by Ijeoma Jason MD. Ru Cheatham was admitted from Home 
 
Problem List 
 
Patient Active Problem List  
 Diagnosis Date Noted  [facility] ([facility] 12/31/2018  Alcohol abuse 12/31/2018  Drug abuse ([facility] 12/31/2018  Rectal cancer ([facility] 12/31/2018 History reviewed. No pertinent past medical history. Core Measures: CVA: No No 
CHF:No No 
PNA:No No 
 
Post Op Surgical (If Applicable):  
 
NActivity Status: OOB to Chair No 
Ambulated this shift No  
Bed Rest No 
 
Supplemental O2: (If Applicable) NC No 
NRB No 
Venti-mask No 
On  Liters/min LINES AND DRAINS:portacath acessed in ER 
DVT prophylaxis: DVT prophylaxis Med- Yes DVT prophylaxis SCD or HECTOR- No  
 
Wounds: (If Applicable) Wounds- No 
 
Location Patient Safety: 
 
Falls Score Total Score: 2 Safety Level_______ Bed Alarm On? Yes Sitter? No 
 
Plan for upcoming shift: safety, seizure precautions, MRI, EEG Discharge Plan: Yes TBD Active Consults: 
IP CONSULT TO NEUROLOGY

## 2019-01-01 NOTE — PROGRESS NOTES
* No surgery found * 
* No surgery found * Bedside shift change report given to Erika (oncoming nurse) by Rita Castro (offgoing nurse). Report included the following information [facility]. [facility] Phone:   [phone] Significant changes during shift:  C/o nausea CIWA med with zofran with relief. CIWA down to 1. Patient Information Claudell Morales 52 y.o. 
12/31/2018  1:30 AM by Vanessa Allen MD. Claudell Morales was admitted from Home 
 
Problem List 
 
Patient Active Problem List  
 Diagnosis Date Noted  Seizure ([facility] 12/31/2018  Alcohol abuse 12/31/2018  Drug abuse ([facility] 12/31/2018  Rectal cancer ([facility] 12/31/2018 History reviewed. No pertinent past medical history. Core Measures: CVA: No No 
CHF:No No 
PNA:No No 
 
Post Op Surgical (If Applicable):  
 
NActivity Status: OOB to Chair No 
Ambulated this shift No  
Bed Rest No 
 
Supplemental O2: (If Applicable) NC No 
NRB No 
Venti-mask No 
On  Liters/min LINES AND DRAINS:portacath acessed in ER 
DVT prophylaxis: DVT prophylaxis Med- Yes DVT prophylaxis SCD or HECTOR- No  
 
Wounds: (If Applicable) Wounds- No 
 
Location Patient Safety: 
 
Falls Score Total Score: 3 Safety Level_______ Bed Alarm On? Yes Sitter? No 
 
Plan for upcoming shift: MRI Discharge Plan: Yes TBD Active Consults: 
IP CONSULT TO NEUROLOGY

## 2019-01-01 NOTE — PROGRESS NOTES
Initial Nutrition Assessment: 
 
INTERVENTIONS/RECOMMENDATIONS:  
· Continue current diet · Standing weight when able ASSESSMENT:  
Chart reviewed, medically noted for seizure and ETOH abuse. Assessing pt due to low BMI. Current weight is pt stated. Weigh history shows pt has gained weight since May 2018. Weight likely due to his ETOH use. Will monitor PO intake during hospitalization. On PO thiamine, folic acid and MVI. History reviewed. No pertinent past medical history. Diet Order: Regular 
% Eaten:   
Patient Vitals for the past 72 hrs: 
 % Diet Eaten 01/01/19 0946 50 % 12/31/18 1905 40 % Pertinent Medications: [x]Reviewed: folic acid,  MVI, thiamine Pertinent Labs: [x]Reviewed:  
Food Allergies: [x]NKFA  []Other Last BM: 1/1 Edema:        []RUE   []LUE   []RLE   []LLE Pressure Injury:      [] Stage I   [] Stage II   [] Stage III   [] Stage IV Wt Readings from Last 30 Encounters:  
12/31/18 54.4 kg (120 lb) 12/31/18 54.4 kg (120 lb) 05/31/18 51.3 kg (113 lb) 04/14/17 55.3 kg (122 lb) 08/30/15 59 kg (130 lb) 03/30/15 59 kg (130 lb 0 oz) 07/15/10 59 kg (130 lb) Anthropometrics:  
Height: 5' 8\" (172.7 cm) Weight: 54.4 kg (120 lb) IBW (%IBW):   ( ) UBW (%UBW):   (  %) Last Weight Metrics: 
Weight Loss Metrics 12/31/2018 5/31/2018 4/14/2017 8/30/2015 3/30/2015 7/15/2010 Today's Wt 120 lb 113 lb 122 lb 130 lb 130 lb 130 lb BMI 18.25 kg/m2 17.18 kg/m2 18.55 kg/m2 19.77 kg/m2 19.77 kg/m2 19.77 kg/m2 BMI: Body mass index is 18.25 kg/m². This BMI is indicative of: 
 [x]Underweight    []Normal    []Overweight    [] Obesity   [] Extreme Obesity (BMI>40) Estimated Nutrition Needs (Based on):  
2000 Kcals/day(BMR: 1350 x 1.3 +250 d/t low BMI) , 55 g(1 g/kg) Protein Carbohydrate: At Least 130 g/day  Fluids: 2000 mL/day (1ml/kcal) or per primary team 
 
NUTRITION DIAGNOSES:  
Problem:  Underweight Etiology: related to likely ETOH abuse Signs/Symptoms: as evidenced by BMI of 18.3 NUTRITION INTERVENTIONS: 
Meals/Snacks: General/healthful diet GOAL:  
consume >50% of meals in 3-5 days LEARNING NEEDS (Diet, Food/Nutrient-Drug Interaction):  
 [x] None Identified 
 [] Identified and Education Provided/Documented 
 [] Identified and Pt declined/was not appropriate Cultureal, Religion, OR Ethnic Dietary Needs:  
 [x] None Identified 
 [] Identified and Addressed 
 
 [x] Interdisciplinary Care Plan Reviewed/Documented  
 [x] Discharge Planning:  General healthy diet, abstaining from [address]:  
  
Food/Nutrient Intake Outcomes: Total energy intake Physical Signs/Symptoms Outcomes: Weight/weight change, Electrolyte and renal profile, GI 
 
NUTRITION RISK:  
 [] High              [x] Moderate           []  Low  []  Minimal/Uncompromised PT SEEN FOR:  
 []  MD Consult: []Calorie Count []Diabetic Diet Education []Diet Education []Electrolyte Management []General Nutrition Management and Supplements []Management of Tube Feeding []TPN Recommendations []  RN Referral:  []MST score >=2 
   []Enteral/Parenteral Nutrition PTA []Pregnant: Gestational DM or Multigestation 
   []Pressure Ulcer/Wound Care needs [x]  Low BMI 
[]  LOS Referral  
 
 
Amanda Nathan RDN Pager [phone] Weekend Pager [phone]

## 2019-01-01 NOTE — PROGRESS NOTES
**Consult Information** 
Member Facility: [facility] Facility MRN: [id number] Consult ID: [id number] Facility Time Zone: ET 
Date and Time of Consult: 12/31/2018 10:05:13 PM 
Requesting Clinician: Romina Romero Time of Call : 12/31/2018 11:34:00 PM 
Patient Name: Malu Milligan Date of Birth: [id number] Gender: Male **Clinical Note** Clinical Note: transient episode of dizziness with essentially unremarkable vitals. Pt resolved. talked to nurse. continue to monitor closely. no new orders

## 2019-01-02 ENCOUNTER — APPOINTMENT (OUTPATIENT)
Dept: MRI IMAGING | Age: 54
DRG: 897 | End: 2019-01-02
Attending: PSYCHIATRY & NEUROLOGY
Payer: COMMERCIAL

## 2019-01-02 ENCOUNTER — APPOINTMENT (OUTPATIENT)
Dept: MRI IMAGING | Age: 54
DRG: 897 | End: 2019-01-02
Attending: INTERNAL MEDICINE
Payer: COMMERCIAL

## 2019-01-02 LAB
ALBUMIN SERPL-MCNC: 3 G/DL (ref 3.5–5)
ALBUMIN/GLOB SERPL: 0.8 {RATIO} (ref 1.1–2.2)
ALP SERPL-CCNC: 169 U/L (ref 45–117)
ALT SERPL-CCNC: 82 U/L (ref 12–78)
ANION GAP SERPL CALC-SCNC: 9 MMOL/L (ref 5–15)
AST SERPL-CCNC: 108 U/L (ref 15–37)
BASOPHILS # BLD: 0 K/UL (ref 0–0.1)
BASOPHILS NFR BLD: 0 % (ref 0–1)
BILIRUB SERPL-MCNC: 1.6 MG/DL (ref 0.2–1)
BUN SERPL-MCNC: 3 MG/DL (ref 6–20)
BUN/CREAT SERPL: 4 (ref 12–20)
CALCIUM SERPL-MCNC: 9.2 MG/DL (ref 8.5–10.1)
CHLORIDE SERPL-SCNC: 102 MMOL/L (ref 97–108)
CHOLEST SERPL-MCNC: 147 MG/DL
CO2 SERPL-SCNC: 26 MMOL/L (ref 21–32)
CREAT SERPL-MCNC: 0.78 MG/DL (ref 0.7–1.3)
DIFFERENTIAL METHOD BLD: ABNORMAL
EOSINOPHIL # BLD: 0.1 K/UL (ref 0–0.4)
EOSINOPHIL NFR BLD: 1 % (ref 0–7)
ERYTHROCYTE [DISTWIDTH] IN BLOOD BY AUTOMATED COUNT: 13.8 % (ref 11.5–14.5)
GLOBULIN SER CALC-MCNC: 3.6 G/DL (ref 2–4)
GLUCOSE SERPL-MCNC: 71 MG/DL (ref 65–100)
HCT VFR BLD AUTO: 39.8 % (ref 36.6–50.3)
HDLC SERPL-MCNC: 66 MG/DL
HDLC SERPL: 2.2 {RATIO} (ref 0–5)
HGB BLD-MCNC: 13.8 G/DL (ref 12.1–17)
IMM GRANULOCYTES # BLD: 0 K/UL (ref 0–0.04)
IMM GRANULOCYTES NFR BLD AUTO: 0 % (ref 0–0.5)
LDLC SERPL CALC-MCNC: 65.6 MG/DL (ref 0–100)
LIPID PROFILE,FLP: NORMAL
LYMPHOCYTES # BLD: 1.4 K/UL (ref 0.8–3.5)
LYMPHOCYTES NFR BLD: 22 % (ref 12–49)
MAGNESIUM SERPL-MCNC: 1.8 MG/DL (ref 1.6–2.4)
MCH RBC QN AUTO: 34.7 PG (ref 26–34)
MCHC RBC AUTO-ENTMCNC: 34.7 G/DL (ref 30–36.5)
MCV RBC AUTO: 100 FL (ref 80–99)
MONOCYTES # BLD: 0.8 K/UL (ref 0–1)
MONOCYTES NFR BLD: 13 % (ref 5–13)
NEUTS SEG # BLD: 4 K/UL (ref 1.8–8)
NEUTS SEG NFR BLD: 64 % (ref 32–75)
NRBC # BLD: 0 K/UL (ref 0–0.01)
NRBC BLD-RTO: 0 PER 100 WBC
PLATELET # BLD AUTO: 107 K/UL (ref 150–400)
PMV BLD AUTO: 11.5 FL (ref 8.9–12.9)
POTASSIUM SERPL-SCNC: 3.8 MMOL/L (ref 3.5–5.1)
PROT SERPL-MCNC: 6.6 G/DL (ref 6.4–8.2)
RBC # BLD AUTO: 3.98 M/UL (ref 4.1–5.7)
SODIUM SERPL-SCNC: 137 MMOL/L (ref 136–145)
TRIGL SERPL-MCNC: 77 MG/DL (ref ?–150)
VLDLC SERPL CALC-MCNC: 15.4 MG/DL
WBC # BLD AUTO: 6.3 K/UL (ref 4.1–11.1)

## 2019-01-02 PROCEDURE — A9575 INJ GADOTERATE MEGLUMI 0.1ML: HCPCS | Performed by: INTERNAL MEDICINE

## 2019-01-02 PROCEDURE — 83735 ASSAY OF MAGNESIUM: CPT

## 2019-01-02 PROCEDURE — 97161 PT EVAL LOW COMPLEX 20 MIN: CPT

## 2019-01-02 PROCEDURE — 80053 COMPREHEN METABOLIC PANEL: CPT

## 2019-01-02 PROCEDURE — 85025 COMPLETE CBC W/AUTO DIFF WBC: CPT

## 2019-01-02 PROCEDURE — 74011250637 HC RX REV CODE- 250/637: Performed by: INTERNAL MEDICINE

## 2019-01-02 PROCEDURE — 80061 LIPID PANEL: CPT

## 2019-01-02 PROCEDURE — 74011250636 HC RX REV CODE- 250/636: Performed by: INTERNAL MEDICINE

## 2019-01-02 PROCEDURE — 65660000000 HC RM CCU STEPDOWN

## 2019-01-02 PROCEDURE — 97535 SELF CARE MNGMENT TRAINING: CPT | Performed by: OCCUPATIONAL THERAPIST

## 2019-01-02 PROCEDURE — 36415 COLL VENOUS BLD VENIPUNCTURE: CPT

## 2019-01-02 PROCEDURE — 97165 OT EVAL LOW COMPLEX 30 MIN: CPT | Performed by: OCCUPATIONAL THERAPIST

## 2019-01-02 PROCEDURE — 97116 GAIT TRAINING THERAPY: CPT

## 2019-01-02 PROCEDURE — 70553 MRI BRAIN STEM W/O & W/DYE: CPT

## 2019-01-02 RX ORDER — POLYETHYLENE GLYCOL 3350 17 G/17G
17 POWDER, FOR SOLUTION ORAL DAILY
Status: DISCONTINUED | OUTPATIENT
Start: 2019-01-03 | End: 2019-01-03 | Stop reason: HOSPADM

## 2019-01-02 RX ORDER — GADOTERATE MEGLUMINE 376.9 MG/ML
11 INJECTION INTRAVENOUS
Status: COMPLETED | OUTPATIENT
Start: 2019-01-02 | End: 2019-01-02

## 2019-01-02 RX ADMIN — Medication 10 ML: at 20:04

## 2019-01-02 RX ADMIN — HEPARIN SODIUM 5000 UNITS: 5000 INJECTION INTRAVENOUS; SUBCUTANEOUS at 09:16

## 2019-01-02 RX ADMIN — OXYCODONE HYDROCHLORIDE 15 MG: 5 TABLET ORAL at 00:38

## 2019-01-02 RX ADMIN — Medication 10 ML: at 17:03

## 2019-01-02 RX ADMIN — MULTIPLE VITAMINS W/ MINERALS TAB 1 TABLET: TAB at 09:16

## 2019-01-02 RX ADMIN — FOLIC ACID 1 MG: 1 TABLET ORAL at 09:16

## 2019-01-02 RX ADMIN — Medication 10 ML: at 03:58

## 2019-01-02 RX ADMIN — GADOTERATE MEGLUMINE 11 ML: 376.9 INJECTION INTRAVENOUS at 18:17

## 2019-01-02 RX ADMIN — OXYCODONE HYDROCHLORIDE 15 MG: 5 TABLET ORAL at 20:04

## 2019-01-02 RX ADMIN — HEPARIN SODIUM 5000 UNITS: 5000 INJECTION INTRAVENOUS; SUBCUTANEOUS at 20:04

## 2019-01-02 RX ADMIN — Medication 100 MG: at 09:16

## 2019-01-02 NOTE — PROGRESS NOTES
Problem: Falls - Risk of 
Goal: *Absence of Falls Document Jacinto Carlos Fall Risk and appropriate interventions in the flowsheet. Outcome: Progressing Towards Goal 
Fall Risk Interventions: 
Mobility Interventions: Bed/chair exit alarm, Communicate number of staff needed for ambulation/transfer, Patient to call before getting OOB Medication Interventions: Bed/chair exit alarm, Patient to call before getting OOB, Teach patient to arise slowly Elimination Interventions: Bed/chair exit alarm

## 2019-01-02 NOTE — PROGRESS NOTES
Problem: Falls - Risk of 
Goal: *Absence of Falls Document Zuleima Morataya Fall Risk and appropriate interventions in the flowsheet. Outcome: Progressing Towards Goal 
Fall Risk Interventions: 
Mobility Interventions: Bed/chair exit alarm, Communicate number of staff needed for ambulation/transfer, Patient to call before getting OOB Medication Interventions: Bed/chair exit alarm, Patient to call before getting OOB, Teach patient to arise slowly Elimination Interventions: Bed/chair exit alarm

## 2019-01-02 NOTE — PROGRESS NOTES
Chief Complaint: seizure No seizures. No confusion. Up and walking feels well. Assesment and Plan 1. Seizure ([address] Probably cocaine induced 2. Alcoholic intoxication without complication ([address] Wants to stop So far no DTs will continue to follow 3. Tobacco abuse Advised to quit 4. History of rectal cancer S/p surgical resection and radiation Allergies Patient has no known allergies. Medications Current Facility-Administered Medications Medication Dose Route Frequency  [START ON 1/3/2019] polyethylene glycol (MIRALAX) packet 17 g  17 g Oral DAILY  sodium chloride (NS) flush 5-10 mL  5-10 mL IntraVENous Q8H  
 sodium chloride (NS) flush 5-10 mL  5-10 mL IntraVENous PRN  
 acetaminophen (TYLENOL) tablet 650 mg  650 mg Oral Q6H PRN  
 ondansetron (ZOFRAN) injection 4 mg  4 mg IntraVENous Q4H PRN  
 nicotine (NICODERM CQ) 21 mg/24 hr patch 1 Patch  1 Patch TransDERmal DAILY  LORazepam (ATIVAN) injection 2 mg  2 mg IntraVENous Q1H PRN  
 LORazepam (ATIVAN) injection 4 mg  4 mg IntraVENous S7H PRN  
 folic acid (FOLVITE) tablet 1 mg  1 mg Oral DAILY  multivitamin, tx-iron-ca-min (THERA-M w/ IRON) tablet 1 Tab  1 Tab Oral DAILY  heparin (porcine) injection 5,000 Units  5,000 Units SubCUTAneous Q12H  thiamine mononitrate (B-1) tablet 100 mg  100 mg Oral DAILY  hydrALAZINE (APRESOLINE) 20 mg/mL injection 10 mg  10 mg IntraVENous Q6H PRN  
 cloNIDine HCl (CATAPRES) tablet 0.1 mg  0.1 mg Oral Q6H PRN  
 oxyCODONE IR (ROXICODONE) tablet 15 mg  15 mg Oral Q4H PRN Medical History 
alcoholism No seizures Poly substance abuse Review of Systems Eyes: Negative for blurred vision and double vision. Cardiovascular: Negative for chest pain and palpitations. Gastrointestinal: Positive for abdominal pain. Negative for heartburn and nausea. Neurological: Negative for seizures and headaches. Psychiatric/Behavioral: Positive for substance abuse.  Negative for suicidal ideas. Exam: 
 
Visit Vitals /88 (BP 1 Location: Left arm, BP Patient Position: At rest) Pulse (!) 106 Temp 97.9 °F (36.6 °C) Resp 18 Ht 5' 8\" (1.727 m) Wt 120 lb (54.4 kg) SpO2 100% BMI 18.25 kg/m² General: Well developed, thin. alert Head: Normocephalic, atraumatic, anicteric sclera Neck Normal ROM, No thyromegally Lungs:  Clear to auscultation bilaterally, No wheezes or rubs Cardiac: Regular rate and rhythm with no murmurs. Skin: Supple no rash  
  
NeurologicExam: 
Mental Status: Alert and oriented to person place and time. Speech: Fluent no aphasia or dysarthria. Cranial Nerves:  II - XII Intact no diplopia Motor:  Full and symmetric strength of upper and lower proximal and distal muscles. Normal bulk and tone. Reflexes:   Deep tendon reflexes 2+/4 and symmetric. Sensory:   Symmetric and intact with no perceived deficits modalities involving small or large fibers. Gait:  deferred Tremor:   No tremor noted. Cerebellar:  Coordination intact. Imaging CT Results (most recent): 
Results from [facility] Encounter encounter on 12/31/18 CT HEAD WO CONT Narrative HEAD CT WITHOUT CONTRAST: 12/31/2018 2:16 AM 
 
INDICATION: Seizures new or progressive COMPARISON: 8/30/2015. PROCEDURE: Axial images of the head were obtained without contrast. Coronal and 
sagittal reformats were performed. CT dose reduction was achieved through use of 
a standardized protocol tailored for this examination and automatic exposure 
control for dose modulation. FINDINGS: Incidental note is made of cavum septum pellucidum et vergae. The 
ventricles and sulci are appropriate in size and configuration for age. No loss 
of gray-white differentiation to suggest late acute or early subacute 
infarction. No mass effect or intracranial hemorrhage. Impression IMPRESSION: No acute intracranial abnormality.   
 
 
MRI Results (most recent): 
 Results from Hospital Encounter encounter on 05/31/18 MRI ABD W WO CONT Narrative EXAM:  MRI ABD W WO CONT INDICATION:  Malignant neoplasm of the rectum with elevated LFTs 
 
COMPARISON: None. TECHNIQUE: Coronal T2 and postcontrast T1; Axial T2, in/out of phase, MRCP, pre- 
and dynamic postcontrast T1 MRI of the abdomen. 5.5 mL Gadavist. Subtractions 
were performed. FINDINGS: Liver: There is diffuse signal loss within the liver on the out of 
phase images compatible with fatty saturation. A focal lesion is not identified. Portal vein enhances normally Biliary tree: Gallbladder is surgically absent. Common duct is mildly prominent 
at 8 to 9 mm with minimal prominence of the central bile ducts. This is not 
unexpected in the setting of cholecystectomy Pancreas: Signal is within normal limits. No evidence of mass or surrounding 
inflammation. Pancreatic duct is within normal limits. No divisum. Spleen: Within normal limits. Adrenal glands: Within normal limits. Kidneys: Enhance symmetrically without hydronephrosis. There are subcentimeter 
lesions right kidney too small to characterize but appear to represent cyst 
 
Vasculature: Patent. Bowel: No evidence of inflammation. Lymph nodes: No lymphadenopathy. Miscellaneous: No ascites. Bones are within normal limits. Impression IMPRESSION:  
1. Diffuse fatty infiltration of the liver. A focal enhancing lesion is not 
identified 2. Post cholecystectomy. Common duct measures 8 mm, not unexpected in the 
setting of previous cholecystectomy Lab Review Lab Results Component Value Date/Time WBC 6.3 01/02/2019 04:00 AM  
 HCT 39.8 01/02/2019 04:00 AM  
 HGB 13.8 01/02/2019 04:00 AM  
 PLATELET 845 (L) 65/39/8379 04:00 AM  
 
 
Lab Results Component Value Date/Time  Sodium 137 01/02/2019 04:00 AM  
 Potassium 3.8 01/02/2019 04:00 AM  
 Chloride 102 01/02/2019 04:00 AM  
 CO2 26 01/02/2019 04:00 AM  
 Glucose 71 01/02/2019 04:00 AM  
 BUN 3 (L) 01/02/2019 04:00 AM  
 Creatinine 0.78 01/02/2019 04:00 AM  
 Calcium 9.2 01/02/2019 04:00 AM

## 2019-01-02 NOTE — PROGRESS NOTES
physical Therapy EVALUATION/DISCHARGE Patient: Carlyle Cornejo (52 y.o. male) Date: 1/2/2019 Primary Diagnosis: Seizure ([address] Precautions: Seizure ASSESSMENT : 
Based on the objective data described below, the patient presents with good strength, ROM, balance and mobility skills following admission for Seizure on 12/31/18. He was lying in bed when PT arrived. Agreed to therapy and cleared by his nurse to mobilize. PTA - he reports living on the 1st floor of a 2 story home with 3 steps to enter with his spouse and grand kids. He was independent with all mobility skills and ADLs without need for gait aids. He reports falling a few times due to drinking alcohol, but no injuries. He hopes to return home when he leaves the hospital. Currently he demonstrates independent skill with bed mobility, transfers supine to sit and bed to chair. He ambulated with normal gait pattern and mild path deviations, but no lob for over 300 feet. He was independent going up/down 12 steps with 1 rail using step over step technique. He was minimally fatigued from the above activities. He was left sitting in bedside chair and encouraged to be up ad arnie as tolerated in room and hallway - he verbalized understanding. Recommend home with no PT services upon discharge. No equipment needs either. Skilled physical therapy is not indicated at this time. PLAN : 
Discharge Recommendations: Home with family - no PT services Further Equipment Recommendations for Discharge: None SUBJECTIVE:  
Patient stated I feel more steady on my feet this afternoon.  OBJECTIVE DATA SUMMARY:  
HISTORY:   
History reviewed. No pertinent past medical history. History reviewed. No pertinent surgical history. Prior Level of Function/Home Situation: he reports living on the 1st floor of a 2 story home with 3 steps to enter with his spouse and grand kids.  He was independent with all mobility skills and ADLs without need for gait aids. He reports falling a few times due to drinking alcohol, but no injuries. He hopes to return home when he leaves the hospital. 
 
 
Personal factors and/or comorbidities impacting plan of care: None Home Situation Home Environment: Private residence # Steps to Enter: 3 Rails to Enter: No 
Wheelchair Ramp: No 
One/Two Story Residence: Two story, live on 1st floor Living Alone: No 
Support Systems: Spouse/Significant Other/Partner, Family member(s) Patient Expects to be Discharged to[de-identified] Private residence Current DME Used/Available at Home: None Tub or Shower Type: Tub/Shower combination EXAMINATION/PRESENTATION/DECISION MAKING:  
Critical Behavior: 
Neurologic State: Alert Orientation Level: Oriented X4 Cognition: Appropriate for age attention/concentration Hearing: Auditory Auditory Impairment: None Skin:  No findings Edema: none Range Of Motion: 
AROM: Within functional limits PROM: Within functional limits Strength:   
Strength: Within functional limits Tone & Sensation:  
Tone: Normal 
  
  
  
  
Sensation: Intact Coordination: 
Coordination: Within functional limits Vision:  
  
Functional Mobility: 
Bed Mobility: 
Rolling: Independent Supine to Sit: Independent Sit to Supine: Independent Scooting: Independent Transfers: 
Sit to Stand: Independent Stand to Sit: Independent Bed to Chair: Independent Balance:  
Sitting: Intact Standing: Intact Ambulation/Gait Training: 
  
  
  
  
Gait Description (WDL): Within defined limits(normal gait pattern - no lob) Functional Measure: 
Inocencio Alcala Balance Test: 
 
Sitting to Standing: 3 Standing Unsupported: 4 Sitting with Back Unsupported: 4 Standing to Sittin Transfers: 4 Standing Unsupported with Eyes Closed: 4 Standing Unsupported with Feet Together: 4 Reach Forward with Outstretched Arm: 4  Object: 4 Turn to Look Over Shoulders: 4 Turn 360 Degrees: 4 Alternate Foot on Step/Stool: 4 Standing Unsupported One Foot in Front: 0 Stand on One Le Total: 49 
 
 
 
56=Maximum possible score;  
0-20=High fall risk 21-40=Moderate fall risk 41-56=Low fall risk Physical Therapy Evaluation Charge Determination History Examination Presentation Decision-Making MEDIUM  Complexity : 1-2 comorbidities / personal factors will impact the outcome/ POC  HIGH Complexity : 4+ Standardized tests and measures addressing body structure, function, activity limitation and / or participation in recreation  LOW Complexity : Stable, uncomplicated  Other outcome measures Castro  LOW Based on the above components, the patient evaluation is determined to be of the following complexity level: LOW Pain: 
Pain Scale 1: Numeric (0 - 10) Pain Intensity 1: 0 Activity Tolerance:  
Good Please refer to the flowsheet for vital signs taken during this treatment. After treatment:  
[x]   Patient left in no apparent distress sitting up in chair 
[]   Patient left in no apparent distress in bed 
[x]   Call bell left within reach [x]   Nursing notified 
[]   Caregiver present 
[]   Bed alarm activated COMMUNICATION/EDUCATION:  
Communication/Collaboration: 
[x]   Fall prevention education was provided and the patient/caregiver indicated understanding. [x]   Patient/family have participated as able and agree with findings and recommendations. []   Patient is unable to participate in plan of care at this time. Findings and recommendations were discussed with: Occupational Therapist, Registered Nurse, Physician and Rehabilitation Attendant Thank you for this referral. 
Pat Vick, PT Time Calculation: 21 mins

## 2019-01-02 NOTE — PROGRESS NOTES
Hospitalist Progress Note NAME: Donna Escobar :  1965 MRN:  [id number] Interim Hospital Summary: 52 y.o. male whom presented on 2018 with Assessment / Plan: 
Seizure (alcohol and cocaine induced) - Alcoholic but needs to rule out Brain Mets as history of rectal cancer Drinks 3 regular size bottles of wine daily, last alcohol intake couple days ago Smokes about cigarettes & marijuana - Pt verbally stated that he is defiantly try to quit; CM to provide outpatient rehab program list 
- appreciate neurology input; 
  CT of head (-) MRI of brain to be done EEG normal 
  Lipid panel within normal range - pt to be discharged once MRI is complete 
  
Lactic acidosis - Due to seizure - Received IV fluid on admission. Lactic acid down to 2.1 from 3.0 
  
Abnormal LFTs 
- Likely alcoholic hepatitis; slowly trending down without intervention ->108->82 ->107->108 ->173->169 
  
Alcohol abuse  
- CIWa with Ativan 
- continue with Thiamine, Folic Acid and MVIs 
  
Drug abuse  
- UDS positive for cocaine and alcohol Pt admitted to using weed - Pt requested not to share his substance abuse issues with the family include his wife 
  
H/o Rectal cancer  
- Claims to be in remission 
- takes percocet at home. Will continue with daily bowel regimen 
  
Code Status: full  
Surrogate Decision Maker: Wife 
  
DVT Prophylaxis: Lovenox 
  
Baseline: functional 
  
Discharge: home with family Appreciate PT/OT eval; not need upon discharge Subjective: Chief Complaint / Reason for Physician Visit \"I feel better today than yesterday\". Discussed with RN events overnight. Review of Systems: 
Symptom Y/N Comments  Symptom Y/N Comments Fever/Chills n   Chest Pain n   
Poor Appetite    Edema Cough    Abdominal Pain n   
Sputum    Joint Pain SOB/SZYMANSKI n   Pruritis/Rash Nausea/vomit n   Tolerating PT/OT Diarrhea    Tolerating Diet Constipation    Other Could NOT obtain due to:   
 
Objective: VITALS:  
Last 24hrs VS reviewed since prior progress note. Most recent are: 
Patient Vitals for the past 24 hrs: 
 Temp Pulse Resp BP SpO2  
01/02/19 1210 98.4 °F (36.9 °C) (!) 103 18 (!) 143/100 99 % 01/02/19 0733 97.9 °F (36.6 °C) 88 18 132/88 98 % 01/02/19 0359 98.7 °F (37.1 °C) 100 18 144/90 99 % 01/01/19 2338 98.1 °F (36.7 °C) 94 18 132/85 100 % 01/01/19 1923 98.3 °F (36.8 °C) 99 18 154/89 100 % Intake/Output Summary (Last 24 hours) at 1/2/2019 1547 Last data filed at 1/2/2019 1210 Gross per 24 hour Intake 640 ml Output 800 ml Net -160 ml PHYSICAL EXAM: 
General: WD, WN. Alert, cooperative, no acute distress   
EENT:  EOMI. Anicteric sclerae. MMM Resp:  CTA bilaterally, no wheezing or rales. No accessory muscle use CV:  Regular  rhythm,  No edema GI:  Soft, Non distended, Non tender.  +Bowel sounds Neurologic:  Alert and oriented X 3, normal speech, mild asterix noted Psych:   Good insight. Not anxious nor agitated Skin:  No rashes. No jaundice Reviewed most current lab test results and cultures  YES Reviewed most current radiology test results   YES Review and summation of old records today    NO Reviewed patient's current orders and MAR    YES 
PMH/ reviewed - no change compared to H&P 
________________________________________________________________________ Care Plan discussed with: 
  Comments Patient y Family RN y   
Care Manager Consultant  y Dr. Isrrael Isaac  
                 y Multidiciplinary team rounds were held today with , nursing, pharmacist and clinical coordinator. Patient's plan of care was discussed; medications were reviewed and discharge planning was addressed. ________________________________________________________________________ Total NON critical care TIME:  30  Minutes Total CRITICAL CARE TIME Spent:   Minutes non procedure based Comments >50% of visit spent in counseling and coordination of care    
________________________________________________________________________ Venice Gillis NP Procedures: see electronic medical records for all procedures/Xrays and details which were not copied into this note but were reviewed prior to creation of Plan. LABS: 
I reviewed today's most current labs and imaging studies. Pertinent labs include: 
Recent Labs 01/02/19 
0400 01/01/19 
0350 12/31/18 
7106 WBC 6.3 5.4 5.2 HGB 13.8 13.0 14.7 HCT 39.8 37.7 42.5 * 106* 131* Recent Labs 01/02/19 
0400 01/01/19 
0350 12/31/18 
0284  138 141  
K 3.8 3.5 3.5  103 106 CO2 26 27 28 GLU 71 72 78 BUN 3* 3* 4*  
CREA 0.78 0.81 0.91  
CA 9.2 8.6 8.6 MG 1.8  --   --   
ALB 3.0* 3.0* 3.5 TBILI 1.6* 1.4* 1.1*  
SGOT 108* 187* 381* ALT 82* 108* 160* Signed: )Cynthia Mccarthy NP

## 2019-01-02 NOTE — PROGRESS NOTES
Occupational Therapy Orders received and medical record reviewed. Pt participated in OT Evaluation. Pt reports feeling better and better throughout the day. Pt reports that he is feeling at his baseline. No OT is needed at this time nor at discharge. Full OT note to follow. Will complete the OT orders. Thank you for the consult. 35 minutes.

## 2019-01-02 NOTE — PROGRESS NOTES
Occupational Therapy EVALUATION/discharge Patient: Rosalina Fountain (52 y.o. male) Date: 1/2/2019 Primary Diagnosis: Seizure ([address] Precautions: fall ASSESSMENT:  
Based on the objective data described below, the patient presents with admission for seizure and reports that now he is feeling back to his baseline. Pt has been up to the bathroom and ad arnie in his room prior to therapy evaluation. He demonstrates independent / supervision functioning for adls at this time. Pt is sedentary at baseline and reports independence in adls/IADLs. No OT is needed at this time. . 
Further skilled acute occupational therapy is not indicated at this time. Discharge Recommendations: None Further Equipment Recommendations for Discharge: none SUBJECTIVE:  
Patient stated I usually do.   (sit to dress) OBJECTIVE DATA SUMMARY:  
HISTORY:  
History reviewed. No pertinent past medical history. History reviewed. No pertinent surgical history. Prior Level of Function/Environment/Context: Pt lives with his wife and 4 children. He is independent in adls and IADLs. He reports that he drives, shops, and is generally sedentary and watching TV during the day. His wife does not work and can assist him at home. Occupations in which the patient is/was successful, what are the barriers preventing that success:  Medical condition, per medical record-addictive behaviors. Performance Patterns (routines, roles, habits, and rituals):  
Personal Interests and/or values:  
Expanded or extensive additional review of patient history:  
 
Home Situation Home Environment: Private residence # Steps to Enter: 3 Rails to Enter: No 
Wheelchair Ramp: No 
One/Two Story Residence: Two story, live on 1st floor Living Alone: No 
Support Systems: Spouse/Significant Other/Partner, Family member(s) Patient Expects to be Discharged to[de-identified] Private residence Current DME Used/Available at Home: None Tub or Shower Type: Tub/Shower combination Hand dominance: Right EXAMINATION OF PERFORMANCE DEFICITS: 
Cognitive/Behavioral Status: 
Neurologic State: Alert Orientation Level: Oriented X4 Cognition: Appropriate for age attention/concentration Perception: Appears intact Safety/Judgement: Awareness of environment; Fall prevention;Home safety Skin: generally intact Edema: none observed Hearing: Auditory Auditory Impairment: None Vision/Perceptual:   
Tracking: (scans environment effectively reports no vision concerns) Acuity: (no change from baseline, not formally tested) Corrective Lenses: (none) Range of Motion: BUEs:   
AROM: Within functional limits PROM: Within functional limits Strength: BUEs:  
Strength: Within functional limits Coordination: 
Coordination: Within functional limits Fine Motor Skills-Upper: Left Intact; Right Intact Gross Motor Skills-Upper: Left Intact; Right Intact Tone & Sensation: 
 
Tone: Normal 
Sensation: Intact 
 pt reports at times numbness in feet - since his abdominal surgery. (rectal CA) Balance: 
Sitting: Intact Standing: Intact Functional Mobility and Transfers for ADLs:Bed Mobility: 
Rolling: Independent Supine to Sit: Independent Sit to Supine: Independent Scooting: Independent Transfers: 
Sit to Stand: Independent Stand to Sit: Independent Bed to Chair: Independent Bathroom Mobility: Stand-by assistance Toilet Transfer : Independent ADL Assessment: 
Feeding: Independent Oral Facial Hygiene/Grooming: Independent Bathing: Stand-by assistance;Setup Upper Body Dressing: Setup Lower Body Dressing: Setup Toileting: Independent ADL Intervention and task modifications: 
  
Pt educated on role of OT and OT plan of care.   Pt participated in adls--bathroom mobility without AD, standing grooming, ambulation in room and salvador. Pt is generally independent. Suggested that he sit for dressing. And if needed have assistance stepping over the tub for bathring at home. Pt reported that he had a grab bar to hold onto and also had non slip surface in tub. Cognitive Retraining Safety/Judgement: Awareness of environment; Fall prevention;Home safety Functional Measure: 
Barthel Index: 
 
Bathin Bladder: 5 Bowels: 5 Groomin Dressing: 10 Feeding: 10 Mobility: 10 Stairs: 5 Toilet Use: 10 Transfer (Bed to Chair and Back): 15 Total: 75 The Barthel ADL Index: Guidelines 1. The index should be used as a record of what a patient does, not as a record of what a patient could do. 2. The main aim is to establish degree of independence from any help, physical or verbal, however minor and for whatever reason. 3. The need for supervision renders the patient not independent. 4. A patient's performance should be established using the best available evidence. Asking the patient, friends/relatives and nurses are the usual sources, but direct observation and common sense are also important. However direct testing is not needed. 5. Usually the patient's performance over the preceding 24-48 hours is important, but occasionally longer periods will be relevant. 6. Middle categories imply that the patient supplies over 50 per cent of the effort. 7. Use of aids to be independent is allowed. Leigha Montes De Oca., Barthel, D.W. (2103). Functional evaluation: the Barthel Index. [facility] (14)2. JAMES Rabago, Flores Colón., Charis Apley., Jaquan Vicente, [address] [place] [address] (). Measuring the change indisability after inpatient rehabilitation; comparison of the responsiveness of the Barthel Index and Functional Phelps Measure. Journal of Neurology, Neurosurgery, and Psychiatry, 66(4), 434-323.  
Leland Lennox, DIEGO.JINA.LORI, CARLENE Veliz, Tucker Calhoun M.A. (2004.) Assessment of post-stroke quality of life in cost-effectiveness studies: The usefulness of the Barthel Index and the EuroQoL-5D. [facility], 13, [phone] Occupational Therapy Evaluation Charge Determination History Examination Decision-Making MEDIUM Complexity : Expanded review of history including physical, cognitive and psychosocial  history  MEDIUM Complexity : 3-5 performance deficits relating to physical, cognitive , or psychosocial skils that result in activity limitations and / or participation restrictions MEDIUM Complexity : Patient may present with comorbidities that affect occupational performnce. Miniml to moderate modification of tasks or assistance (eg, physical or verbal ) with assesment(s) is necessary to enable patient to complete evaluation Based on the above components, the patient evaluation is determined to be of the following complexity level: MEDIUM Pain: 
Pain Scale 1: Numeric (0 - 10) Pain Intensity 1: 0 Activity Tolerance:  
Good. Pt had no complaints After treatment:  
[x]  Patient left in no apparent distress sitting up in chair 
[]  Patient left in no apparent distress in bed 
[x]  Call bell left within reach [x]  Nursing notified 
[]  Caregiver present 
[]  Bed alarm activated COMMUNICATION/EDUCATION:  
Communication/Collaboration: 
[x]      Home safety education was provided and the patient/caregiver indicated understanding. [x]      Patient/family have participated as able and agree with findings and recommendations. []      Patient is unable to participate in plan of care at this time. Findings and recommendations were discussed with: Physical Therapist and Registered Nurse ANGELA Mercado/L Time Calculation: 20 mins

## 2019-01-02 NOTE — PROGRESS NOTES
* No surgery found * 
* No surgery found * Bedside shift change report given to Cristhian Garcia RN (oncoming nurse) by Porfirio Kraft (offgoing nurse). Report included the following information [facility] Phone:   [phone] 
  
  
Significant changes during shift:  prn pain med given x1 
  
  
Patient [address] 52 y.o. 
12/31/2018  1:30 AM by Bry Rahman MD. Sandhya Anna was admitted from Home 
  
Problem List 
  
    
Patient Active Problem List  
  Diagnosis Date Noted  [facility] ([facility] 12/31/2018  Alcohol abuse 12/31/2018  Drug abuse ([address] 12/31/2018  Rectal cancer ([facility] 12/31/2018  
  
History reviewed. No pertinent past medical history. 
  
  
Core Measures: 
  
CVA: No No 
CHF:No No 
PNA:No No 
  
Post Op Surgical (If Applicable):  
  
Activity Status: 
  
OOB to Chair No 
Ambulated this shift No  
Bed Rest No 
  
Supplemental O2: (If Applicable) 
  
NC No 
NRB No 
Venti-mask No 
On  Room air 
  
  
LINES AND DRAINS:portacath acessed in ER 
  
DVT prophylaxis: 
  
DVT prophylaxis Med- Yes DVT prophylaxis SCD or HECTOR- No  
  
Wounds: (If Applicable) 
  
Wounds- No 
  
Location  
  
Patient Safety: 
  
Falls Score Total Score: 2 Safety Level_______ Bed Alarm On? Yes Sitter? No 
  
Plan for upcoming shift: safety, seizure precautions, MRI, EEG 
  
  
  
Discharge Plan: Yes TBD 
  
Active Consults: 
IP CONSULT TO NEUROLOGY

## 2019-01-02 NOTE — PROGRESS NOTES
* No surgery found * 
* No surgery found * Bedside shift change report given to Erika (oncoming nurse) by [employer] (offgoing nurse). Report included the following information [facility] Phone:   [phone] Significant changes during shift:  CIWA 1. PT and OT consult done. No needs. Held nicotene patch until after MRI Patient Information Gideon Quarles 52 y.o. 
12/31/2018  1:30 AM by Filipe Bronson MD. Gideon Quarles was admitted from Home 
 
Problem List 
 
Patient Active Problem List  
 Diagnosis Date Noted  [facility] 12/31/2018  Alcohol abuse 12/31/2018  Drug abuse ([facility] 12/31/2018  Rectal cancer ([facility] 12/31/2018 History reviewed. No pertinent past medical history. Core Measures: CVA: No No 
CHF:No No 
PNA:No No 
 
Post Op Surgical (If Applicable):  
 
NActivity Status: OOB to [facility] Ambulated this shift yes Bed Rest No 
 
Supplemental O2: (If Applicable) NC No 
NRB No 
Venti-mask No 
On  Liters/min LINES AND DRAINS:portacath acessed in ER 
DVT prophylaxis: DVT prophylaxis Med- Yes DVT prophylaxis SCD or HECTOR- No  
 
Wounds: (If Applicable) Wounds- No 
 
Location Patient Safety: 
 
Falls Score Total Score: 3 Safety Level_______ Bed Alarm On? Yes Sitter? No 
 
Plan for upcoming shift: MRI Discharge Plan: Home possibly later today Active Consults: 
IP CONSULT TO NEUROLOGY

## 2019-01-02 NOTE — PROCEDURES
Kushal Avilau, [address]      Electroencephalogram         Procedure Date: 01/02/19    Procedure ID: [id number]    Procedure Type: Routine drowsu    Patient Name: Esther Mathew     YOB: 1965    Medical Record No: [id number]    INDICATION: Seizure    Medications:    Current Facility-Administered Medications:     sodium chloride (NS) flush 5-10 mL, 5-10 mL, IntraVENous, Q8H, Mayank Oshea MD, 10 mL at 01/01/19 1432    sodium chloride (NS) flush 5-10 mL, 5-10 mL, IntraVENous, PRN, Mayank Oshea MD, 10 mL at 01/02/19 0358    acetaminophen (TYLENOL) tablet 650 mg, 650 mg, Oral, Q6H PRN, Mayank Oshea MD    ondansetron (ZOFRAN) injection 4 mg, 4 mg, IntraVENous, Q4H PRN, Mayank Oshea MD, 4 mg at 01/01/19 0754    nicotine (NICODERM CQ) 21 mg/24 hr patch 1 Patch, 1 Patch, TransDERmal, DAILY, Mayank Oshea MD, 1 Patch at 01/01/19 1018    LORazepam (ATIVAN) injection 2 mg, 2 mg, IntraVENous, Q1H PRN, Mayank Oshea MD, 2 mg at 01/01/19 0257    LORazepam (ATIVAN) injection 4 mg, 4 mg, IntraVENous, Q1H PRN, Mayank Oshea MD    folic acid (FOLVITE) tablet 1 mg, 1 mg, Oral, DAILY, Mayank Oshea MD, 1 mg at 01/02/19 0916    multivitamin, tx-iron-ca-min (THERA-M w/ IRON) tablet 1 Tab, 1 Tab, Oral, DAILY, Mayank Oshea MD, 1 Tab at 01/02/19 0916    heparin (porcine) injection 5,000 Units, 5,000 Units, SubCUTAneous, Q12H, Dionicio Macias MD, 5,000 Units at 01/02/19 0916    thiamine mononitrate (B-1) tablet 100 mg, 100 mg, Oral, DAILY, Elyssa Gonzalez MD, 100 mg at 01/02/19 0916    hydrALAZINE (APRESOLINE) 20 mg/mL injection 10 mg, 10 mg, IntraVENous, Q6H PRN, Dionicio Macias MD    cloNIDine HCl (CATAPRES) tablet 0.1 mg, 0.1 mg, Oral, Q6H PRN, Dionicio Macias MD    oxyCODONE IR (ROXICODONE) tablet 15 mg, 15 mg, Oral, Q4H PRN, Dionicio Macias MD, 15 mg at 01/02/19 0038    DESCRIPTION OF PROCEDURE: Electrodes were applied in accordance with the international 10-20 system of electrode placement. EEG was reviewed in both bipolar and referential montages    DESCRIPTION OF FINDINGS: Background consists of symmetric  9 hz activity. This activity attenuates with eye opening. With photic stimulation a symmetric occipital driving response is noted. No seizures or interictal hallmarks of epilepsy were noted. No sleep was noted    IMPRESSION:  Normal EEG. Absence of seizures on this study does not exclude epilepsy. Clinical correlation is advised.

## 2019-01-03 VITALS
TEMPERATURE: 97.8 F | HEART RATE: 85 BPM | HEIGHT: 68 IN | RESPIRATION RATE: 18 BRPM | BODY MASS INDEX: 18.19 KG/M2 | OXYGEN SATURATION: 97 % | WEIGHT: 120 LBS | DIASTOLIC BLOOD PRESSURE: 92 MMHG | SYSTOLIC BLOOD PRESSURE: 127 MMHG

## 2019-01-03 PROCEDURE — 74011250637 HC RX REV CODE- 250/637: Performed by: INTERNAL MEDICINE

## 2019-01-03 PROCEDURE — 74011250636 HC RX REV CODE- 250/636: Performed by: NURSE PRACTITIONER

## 2019-01-03 RX ORDER — FOLIC ACID 1 MG/1
1 TABLET ORAL DAILY
Qty: 30 TAB | Refills: 0 | Status: ON HOLD | OUTPATIENT
Start: 2019-01-03 | End: 2020-12-21

## 2019-01-03 RX ORDER — ASPIRIN 325 MG/1
100 TABLET, FILM COATED ORAL DAILY
Qty: 30 TAB | Refills: 0 | Status: ON HOLD | OUTPATIENT
Start: 2019-01-03 | End: 2021-09-01

## 2019-01-03 RX ORDER — IBUPROFEN 200 MG
1 TABLET ORAL DAILY
Qty: 30 PATCH | Refills: 0 | Status: SHIPPED | OUTPATIENT
Start: 2019-01-03 | End: 2019-02-02

## 2019-01-03 RX ORDER — HEPARIN 100 UNIT/ML
500 SYRINGE INTRAVENOUS ONCE
Status: COMPLETED | OUTPATIENT
Start: 2019-01-03 | End: 2019-01-03

## 2019-01-03 RX ORDER — POLYETHYLENE GLYCOL 3350 17 G/17G
17 POWDER, FOR SOLUTION ORAL DAILY
Qty: 30 PACKET | Refills: 0 | Status: ON HOLD | OUTPATIENT
Start: 2019-01-03 | End: 2020-12-21

## 2019-01-03 RX ADMIN — MULTIPLE VITAMINS W/ MINERALS TAB 1 TABLET: TAB at 08:42

## 2019-01-03 RX ADMIN — Medication 10 ML: at 09:14

## 2019-01-03 RX ADMIN — FOLIC ACID 1 MG: 1 TABLET ORAL at 08:42

## 2019-01-03 RX ADMIN — SODIUM CHLORIDE, PRESERVATIVE FREE 500 UNITS: 5 INJECTION INTRAVENOUS at 09:14

## 2019-01-03 RX ADMIN — Medication 10 ML: at 03:35

## 2019-01-03 RX ADMIN — Medication 100 MG: at 08:42

## 2019-01-03 NOTE — PROGRESS NOTES
* No surgery found * 
* No surgery found * Bedside shift change report given to [facility] RN(oncoming nurse) by Godfrey Ellis RN (offgoing nurse). Report included the following information [facility] Phone:   [phone] Significant changes during shift:  MRI done Patient Information Domingo Colorado 52 y.o. 
12/31/2018  1:30 AM by Gennaro Laughlin MD. Domingo Colorado was admitted from Home 
 
Problem List 
 
Patient Active Problem List  
 Diagnosis Date Noted  Seizure ([facility] 12/31/2018  Alcohol abuse 12/31/2018  Drug abuse ([facility] 12/31/2018  Rectal cancer ([facility] 12/31/2018 History reviewed. No pertinent past medical history. Core Measures: CVA: No No 
CHF:No No 
PNA:No No 
 
Post Op Surgical (If Applicable):  
 
NActivity Status: OOB to [facility] Ambulated this shift yes Bed Rest No 
 
Supplemental O2: (If Applicable) NC No 
NRB No 
Venti-mask No 
On  Liters/min LINES AND DRAINS:portacath acessed in ER 
DVT prophylaxis: DVT prophylaxis Med- Yes DVT prophylaxis SCD or HECTOR- No  
 
Wounds: (If Applicable) Wounds- No 
 
Location Patient Safety: 
 
Falls Score Total Score: 2 Safety Level_______ Bed Alarm On? Yes Sitter? No 
 
Plan for upcoming shift: safetyMAURA Discharge Plan: Home possibly later today Active Consults: 
IP CONSULT TO NEUROLOGY

## 2019-01-03 NOTE — DISCHARGE INSTRUCTIONS
Patient Discharge Instructions     Pt Name  [address] [facility] [address]   Date of Birth 1965   Age  52 y.o. Medical Record Number  [id number]   PCP None    Admit date:  12/31/2018 @    [place] [address]    Room Number  2885/97   Date of Discharge 1/3/2019     Admission Diagnoses:     Seizure ([address]        No Known Allergies     You were admitted to [address] for  Seizure [facility])    [place] (BUT NOT LIMITED TO ):  Present on Admission:  **None**      DIET:  Regular Diet       Recommended activity: Activity as tolerated  Follow up : Follow-up Information     Follow up With Specialties Details Why Contact Info    primary care provider    We asked our case mangement team to help with finiding your primary care doctor so you can follow up in one week        Seizure activity induced by Cocaine. Please stop using cocaine and drinking alcohol. Patient Education   Cocaine (On the skin)   Cocaine (koe-AMAYA)  Numbs your mouth, throat, or nose before medical procedures. Brand Name(s):   There may be other brand names for this medicine. When This Medicine Should Not Be Used: This medicine is not right for everyone. Do not use this medicine if you had an allergic reaction to cocaine. How to Use This Medicine:   Liquid  · A nurse or other trained health professional will give you this medicine. Drugs and Foods to Avoid:      Ask your doctor or pharmacist before using any other medicine, including over-the-counter medicines, vitamins, and herbal products. Warnings While Using This Medicine:   · Tell your doctor if you are pregnant or breastfeeding, or if you are allergic to other medicines. · Tell your doctor if you have any irritation in your mouth, throat, or nose, or if you have any disease or illness.   Possible Side Effects While Using This Medicine:   Call your doctor right away if you notice any of these side effects:  · Allergic reaction: Itching or hives, swelling in your face or hands, swelling or tingling in your mouth or throat, chest tightness, trouble breathing  · Blurred vision  · Feeling nervous, restless, agitated, or anxious  · Fever, chills  · Extreme weakness  · Nausea, vomiting, or stomach pain  · Slow or fast heartbeat  · Slow or shallow breathing  · Tremor or seizure  If you notice other side effects that you think are caused by this medicine, tell your doctor. Call your doctor for medical advice about side effects. You may report side effects to FDA at 7-650-AHM-8446  © 2017 [facility] Information is for End User's use only and may not be sold, redistributed or otherwise used for commercial purposes. The above information is an  only. It is not intended as medical advice for individual conditions or treatments. Talk to your doctor, nurse or pharmacist before following any medical regimen to see if it is safe and effective for you. Patient Education   Patient Education        Stopping Smoking: Care Instructions  Your Care Instructions  Cigarette smokers crave the nicotine in cigarettes. Giving it up is much harder than simply changing a habit. Your body has to stop craving the nicotine. It is hard to quit, but you can do it. There are many tools that people use to quit smoking. You may find that combining tools works best for you. There are several steps to quitting. First you get ready to quit. Then you get support to help you. After that, you learn new skills and behaviors to become a nonsmoker. For many people, a necessary step is getting and using medicine. Your doctor will help you set up the plan that best meets your needs. You may want to attend a smoking cessation program to help you quit smoking. When you choose a program, look for one that has proven success. Ask your doctor for ideas.  You will greatly increase your chances of success if you take medicine as well as get counseling or join a cessation program.  Some of the changes you feel when you first quit tobacco are uncomfortable. Your body will miss the nicotine at first, and you may feel short-tempered and grumpy. You may have trouble sleeping or concentrating. Medicine can help you deal with these symptoms. You may struggle with changing your smoking habits and rituals. The last step is the tricky one: Be prepared for the smoking urge to continue for a time. This is a lot to deal with, but keep at it. You will feel better. Follow-up care is a key part of your treatment and safety. Be sure to make and go to all appointments, and call your doctor if you are having problems. It's also a good idea to know your test results and keep a list of the medicines you take. How can you care for yourself at home? · Ask your family, friends, and coworkers for support. You have a better chance of quitting if you have help and support. · Join a support group, such as Nicotine Anonymous, for people who are trying to quit smoking. · Consider signing up for a smoking cessation program, such as the American Lung Association's Freedom from Smoking program.  · Get text messaging support. Go to the website at www.smokefree. gov to sign up for the [facility] program.  · Set a quit date. Pick your date carefully so that it is not right in the middle of a big deadline or stressful time. Once you quit, do not even take a puff. Get rid of all ashtrays and lighters after your last cigarette. Clean your house and your clothes so that they do not smell of smoke. · Learn how to be a nonsmoker. Think about ways you can avoid those things that make you reach for a cigarette. ? Avoid situations that put you at greatest risk for smoking. For some people, it is hard to have a drink with friends without smoking. For others, they might skip a coffee break with coworkers who smoke. ? Change your daily routine.  Take a different route to work or eat a meal in a different place. · Cut down on stress. Calm yourself or release tension by doing an activity you enjoy, such as reading a book, taking a hot bath, or gardening. · Talk to your doctor or pharmacist about nicotine replacement therapy, which replaces the nicotine in your body. You still get nicotine but you do not use tobacco. Nicotine replacement products help you slowly reduce the amount of nicotine you need. These products come in several forms, many of them available over-the-counter:  ? Nicotine patches  ? Nicotine gum and lozenges  ? Nicotine inhaler  · Ask your doctor about bupropion (Wellbutrin) or varenicline (Chantix), which are prescription medicines. They do not contain nicotine. They help you by reducing withdrawal symptoms, such as stress and anxiety. · Some people find hypnosis, acupuncture, and massage helpful for ending the smoking habit. · Eat a healthy diet and get regular exercise. Having healthy habits will help your body move past its craving for nicotine. · Be prepared to keep trying. Most people are not successful the first few times they try to quit. Do not get mad at yourself if you smoke again. Make a list of things you learned and think about when you want to try again, such as next week, next month, or next year. Where can you learn more? Go to http://margoth-niles.info/. Enter W771 in the search box to learn more about \"Stopping Smoking: Care Instructions. \"  Current as of: November 29, 2017  Content Version: 11.8  © 7947-1903 [employer]. Care instructions adapted under license by [employer] (which disclaims liability or warranty for this information). If you have questions about a medical condition or this instruction, always ask your healthcare professional. [place] [address] any warranty or liability for your use of this information.             Alcohol and Drug Problems: Care Instructions  Your Care Instructions    You can improve your life and health by stopping your use of alcohol or drugs. Ending dependency on alcohol or drugs may help you feel and sleep better. You may get along better with your family, friends, and coworkers. There are medicines and programs that can help. Follow-up care is a key part of your treatment and safety. Be sure to make and go to all appointments, and call your doctor if you are having problems. It's also a good idea to know your test results and keep a list of the medicines you take. How can you care for yourself at home? · If you have been given medicine to help keep you sober or reduce your cravings, be sure to take it as prescribed. · Talk to your doctor about programs that can help you stop using drugs or drinking alcohol. · If your doctor prescribes disulfiram (Antabuse), do not drink any alcohol while you are taking this medicine. You may have severe, even life-threatening, side effects from even small amounts of alcohol. · Do not tempt yourself by keeping alcohol or drugs in your home. · Learn how to say no when other people drink or use drugs. Or don't spend time with people who drink or use drugs. · Use the time and money spent on drinking or drugs to do something fun with your family or friends. Preventing a relapse  · Do not drink alcohol or use drugs at all. Using any amount of alcohol or drugs greatly increases your risk for relapse. · Seek help from organizations such as Alcoholics Anonymous, Narcotics Anonymous, or treatment facilities if you feel the need to drink alcohol or use drugs again. · Remember that recovery is a lifelong process. · Stay away from situations, friends, or places that may lead you to drink or use drugs. · Have a plan to spot and deal with relapse. Learn to recognize changes in your thinking that lead you to drink or use drugs. These are warning signs. Get help before you start to drink or use drugs again.   · Get help as soon as you can if you relapse. Some people make a plan with another person that outlines what they want that person to do for them if they relapse. The plan usually includes how to handle the relapse and who to notify in case of relapse. · Don't give up. Remember that a relapse does not mean that you have failed. Use the experience to learn the triggers that lead you to drink or use drugs. Then quit again. Many people have several relapses before they are able to quit for good. When should you call for help? Call 911 anytime you think you may need emergency care. For example, call if:    · You feel you cannot stop from hurting yourself or someone else.   [facility] your doctor now or seek immediate medical care if:    · You have serious withdrawal symptoms such as confusion, hallucinations, or severe trembling.    Watch closely for changes in your health, and be sure to contact your doctor if:    · You have a relapse.     · You need more help or support to stop. Where can you learn more? Go to http://margoth-niles.info/. Enter 299-6345296 in the search box to learn more about \"Alcohol and Drug Problems: Care Instructions. \"  Current as of: May 8, 2018  Content Version: 11.8  © 4986-8775 [employer]. Care instructions adapted under license by [employer] (which disclaims liability or warranty for this information). If you have questions about a medical condition or this instruction, always ask your healthcare professional. [address] any warranty or liability for your use of this information. · It is important that you take the medication exactly as they are prescribed. · Keep your medication in the bottles provided by the pharmacist and keep a list of the medication names, dosages, and times to be taken in your wallet. · Do not take other medications without consulting your doctor.        ADDITIONAL INFORMATION: If you experience any of the following symptoms or have any health problem not listed below, then please call your primary care physician or return to the emergency room if you cannot get hold of your doctor: Fever, chills, nausea, vomiting, diarrhea, change in mentation, falling, bleeding, shortness of breath. I understand that if any problems occur once I am discharged, I am supposed to call my Primary care physician for further care or seek help in the Emergency Department at the nearest Healthcare facility. I have had an opportunity to discuss my clinical issues with my doctor and nursing staff. I understand and acknowledge receipt of the above instructions.                                                                                                                                            Physician's or R.N.'s Signature                                                            Date/Time                                                                                                                                              Patient or Representative Signature                                                 Date/Time

## 2019-01-03 NOTE — DISCHARGE SUMMARY
Hospitalist Discharge Summary     Patient ID:  Domingo Colorado  [id number]  52 y.o.  1965    PCP on record: None    Admit date: 12/31/2018  Discharge date and time: 1/3/2019      DISCHARGE DIAGNOSIS:  Seizure (alcohol and cocaine induced)  Lactic acidosis  Abnormal LFTs  Alcohol abuse   Drug abuse   H/o Rectal cancer         CONSULTATIONS:  IP CONSULT TO NEUROLOGY    Excerpted HPI from H&P of Gennaro Laughlin MD:  Domingo Colorado is a 52 y.o.  male who presents with seizure. Pt doesn't remember what happened, all he know he was drinking alcohol in the basement. As per wife, he had witnessed seizure by his daughter who called EMS. Pt reported history of seizure when he was child but none since then. Pt also reported history of rectal cancer and had been to radiation and chemo in the past. Pt denies any fever, chills, nausea, vomiting, diarrhea, cough, shortness of breath, problems urination. In ED pt noted to have elevated lactic acid and abnormal LFTs.            ______________________________________________________________________  DISCHARGE SUMMARY/HOSPITAL COURSE:  for full details see H&P, daily progress notes, labs, consult notes. Seizure (cocaine induced)  - discussed heavily about stop using cocaine, ETOH and smoking cessation.    Drinks 3 regular size bottles of wine daily, last alcohol intake couple days ago    Smokes about cigarettes & marijuana  - Pt verbally stated that he is defiantly try to quit; CM to provide outpatient rehab program list  - appreciate neurology input;    CT of head (-)    MRI of brain (-)    EEG normal    Lipid panel within normal range    Lactic acidosis  - Due to seizure which resolved. - Received IV fluid on admission.  Lactic acid down to 2.1 from 3.0     Abnormal LFTs  - Likely alcoholic hepatitis; slowly trending down without intervention    ->108->82    ->107->108    ->173->169     Alcohol abuse   - was on CIWA monitoring during admission, did not go through DT.  - continue with Thiamine, Folic Acid and MVIs     Drug abuse   - UDS positive for cocaine and alcohol    Pt admitted to using weed  - Pt requested not to share his substance abuse issues with the family include his wife     H/o Rectal cancer   - Claims to be in remission  - takes percocet at home. Will continue with daily bowel regimen. Pt was evaluated by PT/OT. No needs upon discharge.  _______________________________________________________________________  Patient seen and examined by me on discharge day. Pertinent Findings:  Gen:    Not in distress  Chest: Clear lungs  CVS:   Regular rhythm. No edema  Abd:  Soft, not distended, not tender  Neuro:  Alert, orient x 4  _______________________________________________________________________  DISCHARGE MEDICATIONS:   Discharge Medication List as of 1/3/2019  8:43 AM      START taking these medications    Details   folic acid (FOLVITE) 1 mg tablet Take 1 Tab by mouth daily. , Print, Disp-30 Tab, R-0      multivitamin, tx-iron-ca-min (THERA-M W/ IRON) 9 mg iron-400 mcg tab tablet Take 1 Tab by mouth daily. , Print, Disp-30 Tab, R-0      nicotine (NICODERM CQ) 21 mg/24 hr 1 Patch by TransDERmal route daily for 30 days. , Print, Disp-30 Patch, R-0      polyethylene glycol (MIRALAX) 17 gram packet Take 1 Packet by mouth daily. , Print, Disp-30 Packet, R-0      thiamine mononitrate (B-1) 100 mg tablet Take 1 Tab by mouth daily. , Print, Disp-30 Tab, R-0         CONTINUE these medications which have NOT CHANGED    Details   oxyCODONE-acetaminophen (PERCOCET)  mg per tablet Take 1 Tab by mouth every four (4) hours as needed for Pain (abd pain pt statese he takes 15 mg). , Historical Med             My Recommended Diet, Activity, Wound Care, and follow-up labs are listed in the patient's Discharge Insturctions which I have personally completed and reviewed.     _______________________________________________________________________  DISPOSITION:    Home with Family: y   Home with HH/PT/OT/RN:    SNF/LTC:    NAUN:    OTHER:        Condition at Discharge:  Stable  _______________________________________________________________________  Follow up with:   PCP : None  Follow-up Information     Follow up With Specialties Details Why Contact Info    Navya King MD Internal Medicine Go on 1/18/2019 Please follow up on January 18, 2019 at 3:00.  [address] [facility] [address]  [phone]                Total time in minutes spent coordinating this discharge (includes going over instructions, follow-up, prescriptions, and preparing report for sign off to her PCP) :  31 minutes    Signed:  Venice Gilliam NP

## 2019-01-03 NOTE — PROGRESS NOTES
* No surgery found * 
* No surgery found * Bedside shift change report given to Hugo MOSER(oncoming nurse) by Randolph Paez (offgoing nurse). Report included the following information [facility]. 
  
[facility] Phone:   [phone] 
  
  
Significant changes during shift:  none 
  
  
Patient Information 
  
[employer] 52 y.o. 
12/31/2018  1:30 AM by Kate Iverson MD. [employer] was admitted from Home 
  
Problem List 
  
    
Patient Active Problem List  
  Diagnosis Date Noted  Seizure ([facility] 12/31/2018  Alcohol abuse 12/31/2018  Drug abuse ([facility] 12/31/2018  Rectal cancer ([facility] 12/31/2018  
  
History reviewed. No pertinent past medical history. 
  
  
Core Measures: 
  
CVA: No No 
CHF:No No 
PNA:No No 
  
Post Op Surgical (If Applicable):  
  
NActivity Status: 
  
OOB to [facility] Ambulated this shift yes Bed Rest No 
  
Supplemental O2: (If Applicable) 
  
NC No 
NRB No 
Venti-mask No 
On  Liters/min 
  
  
LINES AND DRAINS:portacath acessed in ER 
  
DVT prophylaxis: 
  
DVT prophylaxis Med- Yes DVT prophylaxis SCD or HECTOR- No  
  
Wounds: (If Applicable) 
  
Wounds- No 
  
Location  
  
Patient Safety: 
  
Falls Score Total Score: 2 Safety Level_______ Bed Alarm On? Yes Sitter?  No 
  
Plan for upcoming shift: safetyMAURA 
  
  
  
Discharge Plan: Home possibly later today 
  
Active Consults: 
IP CONSULT TO NEUROLOGY

## 2019-12-13 NOTE — DISCHARGE INSTRUCTIONS
Dehydration: Care Instructions  Your Care Instructions  Dehydration happens when your body loses too much fluid. This might happen when you do not drink enough water or you lose large amounts of fluids from your body because of diarrhea, vomiting, or sweating. Severe dehydration can be life-threatening. Water and minerals called electrolytes help put your body fluids back in balance. Learn the early signs of fluid loss, and drink more fluids to prevent dehydration. Follow-up care is a key part of your treatment and safety. Be sure to make and go to all appointments, and call your doctor if you are having problems. It's also a good idea to know your test results and keep a list of the medicines you take. How can you care for yourself at home? · To prevent dehydration, drink plenty of fluids, enough so that your urine is light yellow or clear like water. Choose water and other caffeine-free clear liquids until you feel better. If you have kidney, heart, or liver disease and have to limit fluids, talk with your doctor before you increase the amount of fluids you drink. · If you do not feel like eating or drinking, try taking small sips of water, sports drinks, or other rehydration drinks. · Get plenty of rest.  To prevent dehydration  · Add more fluids to your diet and daily routine, unless your doctor has told you not to. · During hot weather, drink more fluids. Drink even more fluids if you exercise a lot. Stay away from drinks with alcohol or caffeine. · Watch for the symptoms of dehydration. These include:  ¨ A dry, sticky mouth. ¨ Dark yellow urine, and not much of it. ¨ Dry and sunken eyes. ¨ Feeling very tired. · Learn what problems can lead to dehydration. These include:  ¨ Diarrhea, fever, and vomiting. ¨ Any illness with a fever, such as pneumonia or the flu. ¨ Activities that cause heavy sweating, such as endurance races and heavy outdoor work in hot or humid weather.   ¨ Alcohol or drug abuse or withdrawal.  ¨ Certain medicines, such as cold and allergy pills (antihistamines), diet pills (diuretics), and laxatives. ¨ Certain diseases, such as diabetes, cancer, and heart or kidney disease. When should you call for help? Call 911 anytime you think you may need emergency care. For example, call if:  · You passed out (lost consciousness). Call your doctor now or seek immediate medical care if:  · You are confused and cannot think clearly. · You are dizzy or lightheaded, or you feel like you may faint. · You have signs of needing more fluids. You have sunken eyes and a dry mouth, and you pass only a little dark urine. · You cannot keep fluids down. Watch closely for changes in your health, and be sure to contact your doctor if:  · You are not making tears. · Your skin is very dry and sags slowly back into place after you pinch it. · Your mouth and eyes are very dry. Where can you learn more? Go to http://margoth-niles.info/. Enter K591 in the search box to learn more about \"Dehydration: Care Instructions. \"  Current as of: May 27, 2016  Content Version: 11.2  © 8395-4237 [employer]. Care instructions adapted under license by [employer] (which disclaims liability or warranty for this information). If you have questions about a medical condition or this instruction, always ask your healthcare professional. [place] [address] any warranty or liability for your use of this information. Emergency [address] by RAFIA [facility]  1138 [facility], [address]  Open M, W, F: 8AM - 5PM and T, Th: 8AM-6PM  Phone: [phone], press 4  $70 for Emergency Care  $60 for first routine care, then pay by sliding scale based upon income.     [facility]  [facility] [address], [address]  Phone: [phone]    [employer]  [address] [place] [address], [address]  Open Monday - Friday 8AM - 4:30 PM  Phone: 2017 Juni  of Dentistry Urgent 1575 [facility], 1000 [facility], [facility] 45, 1st 1000 [facility] starting at 8:30 AM M-F  Phone: [phone], press 2  Fee: $150 per tooth (x-ray & extractions only)  Pediatrics Phone[de-identified] [phone], 8-5 M-F    [address] Dentistry, 1000 [facility], [facility] 45, 2nd Floor, [address] [place] [address] starting at 8:30 AM - 3 PM [facility] [address]  [address], 175 [facility]  Phone: [phone] or [phone]  Emergency Hours: 9:30AM - 11AM (extractions)  Simple tooth extraction $ per tooth. #75 for x-ray    [facility] Residents only, over the age of 25  Phone: [phone]. Leave message saying you need an appointment to register. Hours: Tuesday Evenings     [facility] Departments     For adult and child immunizations, family planning, TB screening, STD testing and women's health services. [facility]: [place] [phone]      [facility] 25   657 [facility]   [address]   170 [facility] 200 [facility] [address]  [phone]      [facility]1 [facility]          [address] [place] [address]     For primary care services, woman and child wellness, and some clinics providing specialty care. [facility] -- 1011 [facility]. [facility]5 [facility] [phone]   411 [facility] 200 [facility] 1720 [facility] [phone]   339 Formerly [facility] [address]  [phone]   [address]  [employer] [address] [place] [address] 2982 [facility]  [phone] 7700 [facility]  [address] [phone]   [facility] 81 [facility] [phone]   [facility] 1051 [facility], 804 \[facility] [place] [facility]\"" Clinic: [facility] keon Corey 79 [facility], [address] [phone]     [address] [phone]   [address] [phone]   [employer]  [address], [place] [address] ([email or web address]) [phone]         Sexual Health/Woman Wellness Clinics    For STD/HIV testing and treatment, pregnancy testing and services, men's health, birth control services, LGBT services, and hepatitis/HPV vaccine services. Wilfrido [facility] Theresa for [employer] 201 N. [facility] 75 [facility] 1579 [address] [phone]   [facility] [address], 5th floor [phone]   Pregnancy 3928 [facility] [address] for Women [address]  [place] [phone]         [place] 9919 [facility] [address] [place] [address]   [phone]   [place]   [phone]   Women, Infant and Children's Services: [address] [phone] [address] [phone]   [facility]   [phone]   [facility]   [phone]   [facility]     [phone]   [address] Crisis   [phone]   [facility]BT[facility] IBS[facility]XQQW[facility] Health/Substance Abuse Authority [phone]     Local Primary [facility] Physicians  64 [facility] [phone]  MD Roddy Chatterjee MD Marily Rossetti, MD [facility] [phone]  FAROOQ Rubio MD Milderd Raring, MD Leonor Blank, MD [address] [phone]  MD Cheryl Garcia MD [facility] [phone]  MD Lidia Silvestre MD Nickey Petrin MD  Swetha Ambika, MD   [facility] [phone]  [facility] MD Crissy Lomeli, MD Oleta Boast, NP [address] [phone]  Ji Espinal, MD Wyline Spatz, MD Rylan Valdez, MD Yael Virk, MD Lucy Santana, MD Jim Gupta, MD Ximena Whitaker MD   33 57 [facility]  Leo Muniz MD [facility] [phone]  Digna Davis, MD Edvin Dougherty, NP  Melissa Pastrana, MD Holly Clark, MD Gatito Brand, MD Dixon Hdz, MD Ella Montanez, MD   651 N [facility] [phone]  Ciaran Hodge, MD Madelin Chavez, P  Oliva Braswell, NP  Pily Santizo, MD Sherin Raymundo, MD Woody Ch, MD Brenda Sanabria MD [facility] [phone]  Frankie Casey, MD Michelle Horowitz, MD Francy Rubi, MD Jo Ann Miguel, MD Dino Warren, MD   [address] [phone]  Rubina Dubose, MD Geronimo Singleton, MD [facility] [phone]  Johnnie Gibbons, MD Radha Padilla, MD Mykel Mcmillan MD   [facility] [phone]  MD Yuli Newton, MD Kelsea Dalton, MD Niki Jackson, MD Radha Bliss, MD Matthias Nelson, NP  Robe López MD 1619 [facility]   [phone]  MD Leigha Ga MD Shaun Decamp, MD   1705 [facility] [phone]  MD Keanu Gallegos, FNP  Harpreet Skinner, SPARKLE Skinner, FNP Tressie Sa, PA-C Spence Sloan, MD Raiford Meigs, NP   Antoni Clark, DO Miscellaneous:  Jane Fernandez -[phone]            Learning About Alcohol Misuse  What is alcohol misuse? Alcohol misuse means drinking so much that it causes problems for you or others. Early problems with alcohol can start at home. You may argue with loved ones about how much you're drinking. Your job may be affected because of drinking. You may drink when it's dangerous or illegal, such as when you drive.   Drinking too much for a long time can lead to health conditions like high blood pressure and liver problems. What are the symptoms? Symptoms of alcohol misuse may include:  · Drinking much more than you planned. · Drinking even though it's causing problems for you or others. · Putting yourself in situations where you might get hurt. · Wanting to cut down or stop drinking, but not being able to. · Feeling guilty about how much you're drinking. How is alcohol misuse treated? Getting help for problems with alcohol is up to you. But you don't have to do it alone. There are many people and kinds of treatments to help with alcohol problems. Talking to your doctor is the first step. When you get a doctor's help, treatment for alcohol problems can be safer and quicker. Treatment options can include:  · Treatment programs. Examples are group therapy, one or more types of counseling, and alcohol education. · Medicines. A doctor or counselor can help you know what kinds of medicines might help with cravings. · Free social support groups. These groups include AA (Alcoholics Anonymous) and SMART (Self-Management and Recovery Training). Your doctor can help you decide which type of program is best for you. Follow-up care is a key part of your treatment and safety. Be sure to make and go to all appointments, and call your doctor if you are having problems. It's also a good idea to know your test results and keep a list of the medicines you take. Where can you learn more? Go to http://margoth-niles.info/. Enter 065 [id number] [id number] in the search box to learn more about \"Learning About Alcohol Misuse. \"  Current as of: January 3, 2017  Content Version: 11.2  © 1840-7649 [employer]. Care instructions adapted under license by [employer] (which disclaims liability or warranty for this information).  If you have questions about a medical condition or this instruction, always ask your healthcare professional. [employer], [facility] disclaims any warranty or liability for your use of this information. Nausea and Vomiting: Care Instructions  Your Care Instructions    When you are nauseated, you may feel weak and sweaty and notice a lot of saliva in your mouth. Nausea often leads to vomiting. Most of the time you do not need to worry about nausea and vomiting, but they can be signs of other illnesses. Two common causes of nausea and vomiting are stomach flu and food poisoning. Nausea and vomiting from viral stomach flu will usually start to improve within 24 hours. Nausea and vomiting from food poisoning may last from 12 to 48 hours. The doctor has checked you carefully, but problems can develop later. If you notice any problems or new symptoms, get medical treatment right away. Follow-up care is a key part of your treatment and safety. Be sure to make and go to all appointments, and call your doctor if you are having problems. It's also a good idea to know your test results and keep a list of the medicines you take. How can you care for yourself at home? · To prevent dehydration, drink plenty of fluids, enough so that your urine is light yellow or clear like water. Choose water and other caffeine-free clear liquids until you feel better. If you have kidney, heart, or liver disease and have to limit fluids, talk with your doctor before you increase the amount of fluids you drink. · Rest in bed until you feel better. · When you are able to eat, try clear soups, mild foods, and liquids until all symptoms are gone for 12 to 48 hours. Other good choices include dry toast, crackers, cooked cereal, and gelatin dessert, such as Jell-O. When should you call for help? Call 911 anytime you think you may need emergency care. For example, call if:  · You passed out (lost consciousness).   Call your doctor now or seek immediate medical care if:  · You have symptoms of dehydration, such as:  ¨ Dry eyes and a dry mouth.  ¨ Passing only a little dark urine. ¨ Feeling thirstier than usual.  · You have new or worsening belly pain. · You have a new or higher fever. · You vomit blood or what looks like coffee grounds. Watch closely for changes in your health, and be sure to contact your doctor if:  · You have ongoing nausea and vomiting. · Your vomiting is getting worse. · Your vomiting lasts longer than 2 days. · You are not getting better as expected. Where can you learn more? Go to http://margoth-niles.info/. Enter 25 [id number] in the search box to learn more about \"Nausea and Vomiting: Care Instructions. \"  Current as of: May 27, 2016  Content Version: 11.2  © 2932-5406 [employer], [employer]. Care instructions adapted under license by [employer] (which disclaims liability or warranty for this information). If you have questions about a medical condition or this instruction, always ask your healthcare professional. [place] [address] any warranty or liability for your use of this information. Dapsone Pregnancy And Lactation Text: This medication is Pregnancy Category C and is not considered safe during pregnancy or breast feeding.

## 2020-11-19 ENCOUNTER — DOCUMENTATION ONLY (OUTPATIENT)
Dept: ONCOLOGY | Age: 55
End: 2020-11-19

## 2020-11-19 NOTE — PROGRESS NOTES
Patient was referred for history of rectal cancer.  Records scanned please review and I will call and get patient scheduled

## 2020-11-20 NOTE — PROGRESS NOTES
Please get further notes from [address] Castillo, looks like Gopi Bocanegra was his doctor. Also please have pt let us know who his GI doctor. We also need him to either bring his latest films with him or he or his PCP needs to get [facility] or [facility] to send us the scans before we can schedule. Thank you.

## 2020-12-01 ENCOUNTER — TELEPHONE (OUTPATIENT)
Dept: ONCOLOGY | Age: 55
End: 2020-12-01

## 2020-12-01 NOTE — TELEPHONE ENCOUNTER
[employer]  Social Work Navigator Encounter     Patient Name:  Vinnie Pena    Medical History: hx of rectal cancer    Advance Directives:    Narrative:     Barriers to Care:     Plan:    Referral:     Other referral  No show for 1st appt

## 2020-12-20 ENCOUNTER — APPOINTMENT (OUTPATIENT)
Dept: CT IMAGING | Age: 55
DRG: 438 | End: 2020-12-20
Attending: EMERGENCY MEDICINE
Payer: MEDICARE

## 2020-12-20 ENCOUNTER — HOSPITAL ENCOUNTER (INPATIENT)
Age: 55
LOS: 7 days | Discharge: NURSING, REHAB OR LONG-TERM CARE | DRG: 438 | End: 2020-12-27
Attending: EMERGENCY MEDICINE | Admitting: STUDENT IN AN ORGANIZED HEALTH CARE EDUCATION/TRAINING PROGRAM
Payer: MEDICARE

## 2020-12-20 DIAGNOSIS — K85.20 ALCOHOL-INDUCED ACUTE PANCREATITIS, UNSPECIFIED COMPLICATION STATUS: Primary | ICD-10-CM

## 2020-12-20 DIAGNOSIS — K62.5 RECTAL BLEEDING: ICD-10-CM

## 2020-12-20 PROBLEM — K85.90 ACUTE PANCREATITIS: Status: ACTIVE | Noted: 2020-12-20

## 2020-12-20 PROBLEM — K85.90 PANCREATITIS: Status: ACTIVE | Noted: 2020-12-20

## 2020-12-20 LAB
ALBUMIN SERPL-MCNC: 4.3 G/DL (ref 3.5–5)
ALBUMIN/GLOB SERPL: 1.1 {RATIO} (ref 1.1–2.2)
ALP SERPL-CCNC: 147 U/L (ref 45–117)
ALT SERPL-CCNC: 52 U/L (ref 12–78)
AMPHET UR QL SCN: NEGATIVE
ANION GAP SERPL CALC-SCNC: 15 MMOL/L (ref 5–15)
APPEARANCE UR: CLEAR
AST SERPL-CCNC: 94 U/L (ref 15–37)
BACTERIA URNS QL MICRO: NEGATIVE /HPF
BARBITURATES UR QL SCN: NEGATIVE
BASOPHILS # BLD: 0 K/UL (ref 0–0.1)
BASOPHILS NFR BLD: 0 % (ref 0–1)
BENZODIAZ UR QL: NEGATIVE
BILIRUB SERPL-MCNC: 1.5 MG/DL (ref 0.2–1)
BILIRUB UR QL CFM: POSITIVE
BUN SERPL-MCNC: 13 MG/DL (ref 6–20)
BUN/CREAT SERPL: 11 (ref 12–20)
CALCIUM SERPL-MCNC: 9.9 MG/DL (ref 8.5–10.1)
CANNABINOIDS UR QL SCN: NEGATIVE
CHLORIDE SERPL-SCNC: 99 MMOL/L (ref 97–108)
CO2 SERPL-SCNC: 21 MMOL/L (ref 21–32)
COCAINE UR QL SCN: NEGATIVE
COLOR UR: ABNORMAL
CREAT SERPL-MCNC: 1.21 MG/DL (ref 0.7–1.3)
DIFFERENTIAL METHOD BLD: ABNORMAL
DRUG SCRN COMMENT,DRGCM: NORMAL
EOSINOPHIL # BLD: 0 K/UL (ref 0–0.4)
EOSINOPHIL NFR BLD: 0 % (ref 0–7)
EPITH CASTS URNS QL MICRO: ABNORMAL /LPF
ERYTHROCYTE [DISTWIDTH] IN BLOOD BY AUTOMATED COUNT: 15.2 % (ref 11.5–14.5)
ETHANOL SERPL-MCNC: <10 MG/DL
GLOBULIN SER CALC-MCNC: 3.8 G/DL (ref 2–4)
GLUCOSE SERPL-MCNC: 136 MG/DL (ref 65–100)
GLUCOSE UR STRIP.AUTO-MCNC: 100 MG/DL
HCT VFR BLD AUTO: 32.8 % (ref 36.6–50.3)
HGB BLD-MCNC: 11.3 G/DL (ref 12.1–17)
HGB UR QL STRIP: ABNORMAL
IMM GRANULOCYTES # BLD AUTO: 0.1 K/UL (ref 0–0.04)
IMM GRANULOCYTES NFR BLD AUTO: 1 % (ref 0–0.5)
KETONES UR QL STRIP.AUTO: >80 MG/DL
LACTATE SERPL-SCNC: 1.8 MMOL/L (ref 0.4–2)
LEUKOCYTE ESTERASE UR QL STRIP.AUTO: NEGATIVE
LIPASE SERPL-CCNC: 1272 U/L (ref 73–393)
LYMPHOCYTES # BLD: 0.7 K/UL (ref 0.8–3.5)
LYMPHOCYTES NFR BLD: 12 % (ref 12–49)
MCH RBC QN AUTO: 32.6 PG (ref 26–34)
MCHC RBC AUTO-ENTMCNC: 34.5 G/DL (ref 30–36.5)
MCV RBC AUTO: 94.5 FL (ref 80–99)
METHADONE UR QL: NEGATIVE
MONOCYTES # BLD: 0.8 K/UL (ref 0–1)
MONOCYTES NFR BLD: 13 % (ref 5–13)
NEUTS SEG # BLD: 4.4 K/UL (ref 1.8–8)
NEUTS SEG NFR BLD: 74 % (ref 32–75)
NITRITE UR QL STRIP.AUTO: NEGATIVE
NRBC # BLD: 0 K/UL (ref 0–0.01)
NRBC BLD-RTO: 0 PER 100 WBC
OPIATES UR QL: NEGATIVE
PCP UR QL: NEGATIVE
PH UR STRIP: 6.5 [PH] (ref 5–8)
PLATELET # BLD AUTO: 93 K/UL (ref 150–400)
PMV BLD AUTO: 11.8 FL (ref 8.9–12.9)
POTASSIUM SERPL-SCNC: 3.7 MMOL/L (ref 3.5–5.1)
PROT SERPL-MCNC: 8.1 G/DL (ref 6.4–8.2)
PROT UR STRIP-MCNC: 30 MG/DL
RBC # BLD AUTO: 3.47 M/UL (ref 4.1–5.7)
RBC #/AREA URNS HPF: ABNORMAL /HPF (ref 0–5)
RBC MORPH BLD: ABNORMAL
SODIUM SERPL-SCNC: 135 MMOL/L (ref 136–145)
SP GR UR REFRACTOMETRY: 1.01 (ref 1–1.03)
UA: UC IF INDICATED,UAUC: ABNORMAL
UROBILINOGEN UR QL STRIP.AUTO: 1 EU/DL (ref 0.2–1)
WBC # BLD AUTO: 6 K/UL (ref 4.1–11.1)
WBC URNS QL MICRO: ABNORMAL /HPF (ref 0–4)
YEAST BUDDING URNS QL: PRESENT
YEAST URNS QL MICRO: PRESENT

## 2020-12-20 PROCEDURE — C9113 INJ PANTOPRAZOLE SODIUM, VIA: HCPCS | Performed by: EMERGENCY MEDICINE

## 2020-12-20 PROCEDURE — 81001 URINALYSIS AUTO W/SCOPE: CPT

## 2020-12-20 PROCEDURE — 74011000250 HC RX REV CODE- 250: Performed by: EMERGENCY MEDICINE

## 2020-12-20 PROCEDURE — 74011250636 HC RX REV CODE- 250/636: Performed by: STUDENT IN AN ORGANIZED HEALTH CARE EDUCATION/TRAINING PROGRAM

## 2020-12-20 PROCEDURE — 83605 ASSAY OF LACTIC ACID: CPT

## 2020-12-20 PROCEDURE — 74011250637 HC RX REV CODE- 250/637: Performed by: STUDENT IN AN ORGANIZED HEALTH CARE EDUCATION/TRAINING PROGRAM

## 2020-12-20 PROCEDURE — 96375 TX/PRO/DX INJ NEW DRUG ADDON: CPT

## 2020-12-20 PROCEDURE — 85025 COMPLETE CBC W/AUTO DIFF WBC: CPT

## 2020-12-20 PROCEDURE — 83690 ASSAY OF LIPASE: CPT

## 2020-12-20 PROCEDURE — 74011000250 HC RX REV CODE- 250: Performed by: STUDENT IN AN ORGANIZED HEALTH CARE EDUCATION/TRAINING PROGRAM

## 2020-12-20 PROCEDURE — 99285 EMERGENCY DEPT VISIT HI MDM: CPT

## 2020-12-20 PROCEDURE — 93005 ELECTROCARDIOGRAM TRACING: CPT

## 2020-12-20 PROCEDURE — 96374 THER/PROPH/DIAG INJ IV PUSH: CPT

## 2020-12-20 PROCEDURE — 86900 BLOOD TYPING SEROLOGIC ABO: CPT

## 2020-12-20 PROCEDURE — 80053 COMPREHEN METABOLIC PANEL: CPT

## 2020-12-20 PROCEDURE — 65270000032 HC RM SEMIPRIVATE

## 2020-12-20 PROCEDURE — 74011000636 HC RX REV CODE- 636: Performed by: STUDENT IN AN ORGANIZED HEALTH CARE EDUCATION/TRAINING PROGRAM

## 2020-12-20 PROCEDURE — 80307 DRUG TEST PRSMV CHEM ANLYZR: CPT

## 2020-12-20 PROCEDURE — 74177 CT ABD & PELVIS W/CONTRAST: CPT

## 2020-12-20 PROCEDURE — 36415 COLL VENOUS BLD VENIPUNCTURE: CPT

## 2020-12-20 PROCEDURE — 74011250636 HC RX REV CODE- 250/636: Performed by: EMERGENCY MEDICINE

## 2020-12-20 RX ORDER — ACETAMINOPHEN 650 MG/1
650 SUPPOSITORY RECTAL
Status: DISCONTINUED | OUTPATIENT
Start: 2020-12-20 | End: 2020-12-27 | Stop reason: HOSPADM

## 2020-12-20 RX ORDER — FOLIC ACID 1 MG/1
1 TABLET ORAL DAILY
Status: DISCONTINUED | OUTPATIENT
Start: 2020-12-20 | End: 2020-12-27 | Stop reason: HOSPADM

## 2020-12-20 RX ORDER — OXYCODONE HYDROCHLORIDE 5 MG/1
5 TABLET ORAL
Status: DISCONTINUED | OUTPATIENT
Start: 2020-12-20 | End: 2020-12-27 | Stop reason: HOSPADM

## 2020-12-20 RX ORDER — LORAZEPAM 1 MG/1
2 TABLET ORAL
Status: DISCONTINUED | OUTPATIENT
Start: 2020-12-20 | End: 2020-12-22

## 2020-12-20 RX ORDER — ACETAMINOPHEN 325 MG/1
650 TABLET ORAL
Status: DISCONTINUED | OUTPATIENT
Start: 2020-12-20 | End: 2020-12-27 | Stop reason: HOSPADM

## 2020-12-20 RX ORDER — ONDANSETRON 2 MG/ML
4 INJECTION INTRAMUSCULAR; INTRAVENOUS
Status: DISCONTINUED | OUTPATIENT
Start: 2020-12-20 | End: 2020-12-27 | Stop reason: HOSPADM

## 2020-12-20 RX ORDER — ONDANSETRON 2 MG/ML
4 INJECTION INTRAMUSCULAR; INTRAVENOUS
Status: COMPLETED | OUTPATIENT
Start: 2020-12-20 | End: 2020-12-20

## 2020-12-20 RX ORDER — HYDROMORPHONE HYDROCHLORIDE 2 MG/ML
1 INJECTION, SOLUTION INTRAMUSCULAR; INTRAVENOUS; SUBCUTANEOUS
Status: DISCONTINUED | OUTPATIENT
Start: 2020-12-20 | End: 2020-12-24

## 2020-12-20 RX ORDER — PROMETHAZINE HYDROCHLORIDE 25 MG/1
12.5 TABLET ORAL
Status: DISCONTINUED | OUTPATIENT
Start: 2020-12-20 | End: 2020-12-27 | Stop reason: HOSPADM

## 2020-12-20 RX ORDER — METRONIDAZOLE 500 MG/100ML
500 INJECTION, SOLUTION INTRAVENOUS EVERY 12 HOURS
Status: DISCONTINUED | OUTPATIENT
Start: 2020-12-20 | End: 2020-12-22

## 2020-12-20 RX ORDER — SODIUM CHLORIDE 0.9 % (FLUSH) 0.9 %
5-40 SYRINGE (ML) INJECTION EVERY 8 HOURS
Status: DISCONTINUED | OUTPATIENT
Start: 2020-12-20 | End: 2020-12-21 | Stop reason: SDUPTHER

## 2020-12-20 RX ORDER — SODIUM CHLORIDE 0.9 % (FLUSH) 0.9 %
5-40 SYRINGE (ML) INJECTION AS NEEDED
Status: DISCONTINUED | OUTPATIENT
Start: 2020-12-20 | End: 2020-12-27 | Stop reason: HOSPADM

## 2020-12-20 RX ORDER — LORAZEPAM 1 MG/1
1 TABLET ORAL
Status: DISCONTINUED | OUTPATIENT
Start: 2020-12-20 | End: 2020-12-22

## 2020-12-20 RX ORDER — LANOLIN ALCOHOL/MO/W.PET/CERES
100 CREAM (GRAM) TOPICAL DAILY
Status: DISCONTINUED | OUTPATIENT
Start: 2020-12-20 | End: 2020-12-27 | Stop reason: HOSPADM

## 2020-12-20 RX ORDER — SODIUM CHLORIDE 0.9 % (FLUSH) 0.9 %
5-40 SYRINGE (ML) INJECTION EVERY 8 HOURS
Status: DISCONTINUED | OUTPATIENT
Start: 2020-12-20 | End: 2020-12-27 | Stop reason: HOSPADM

## 2020-12-20 RX ORDER — SODIUM CHLORIDE, SODIUM LACTATE, POTASSIUM CHLORIDE, CALCIUM CHLORIDE 600; 310; 30; 20 MG/100ML; MG/100ML; MG/100ML; MG/100ML
150 INJECTION, SOLUTION INTRAVENOUS CONTINUOUS
Status: DISCONTINUED | OUTPATIENT
Start: 2020-12-20 | End: 2020-12-23

## 2020-12-20 RX ORDER — OXYCODONE HYDROCHLORIDE 5 MG/1
10 TABLET ORAL
Status: DISCONTINUED | OUTPATIENT
Start: 2020-12-20 | End: 2020-12-24

## 2020-12-20 RX ORDER — ENOXAPARIN SODIUM 100 MG/ML
40 INJECTION SUBCUTANEOUS DAILY
Status: DISCONTINUED | OUTPATIENT
Start: 2020-12-21 | End: 2020-12-20

## 2020-12-20 RX ORDER — POLYETHYLENE GLYCOL 3350 17 G/17G
17 POWDER, FOR SOLUTION ORAL DAILY PRN
Status: DISCONTINUED | OUTPATIENT
Start: 2020-12-20 | End: 2020-12-27 | Stop reason: HOSPADM

## 2020-12-20 RX ADMIN — SODIUM CHLORIDE, SODIUM LACTATE, POTASSIUM CHLORIDE, AND CALCIUM CHLORIDE 150 ML/HR: 600; 310; 30; 20 INJECTION, SOLUTION INTRAVENOUS at 20:46

## 2020-12-20 RX ADMIN — ONDANSETRON 4 MG: 2 INJECTION INTRAMUSCULAR; INTRAVENOUS at 13:32

## 2020-12-20 RX ADMIN — Medication 10 ML: at 20:47

## 2020-12-20 RX ADMIN — OXYCODONE HYDROCHLORIDE 5 MG: 5 TABLET ORAL at 22:12

## 2020-12-20 RX ADMIN — ONDANSETRON 4 MG: 2 INJECTION INTRAMUSCULAR; INTRAVENOUS at 22:12

## 2020-12-20 RX ADMIN — SODIUM CHLORIDE 1000 ML: 900 INJECTION, SOLUTION INTRAVENOUS at 19:29

## 2020-12-20 RX ADMIN — FOLIC ACID 1 MG: 1 TABLET ORAL at 16:57

## 2020-12-20 RX ADMIN — SODIUM CHLORIDE 1000 ML: 9 INJECTION, SOLUTION INTRAVENOUS at 13:32

## 2020-12-20 RX ADMIN — Medication 10 ML: at 20:46

## 2020-12-20 RX ADMIN — SODIUM CHLORIDE 40 MG: 9 INJECTION INTRAMUSCULAR; INTRAVENOUS; SUBCUTANEOUS at 13:32

## 2020-12-20 RX ADMIN — SODIUM CHLORIDE 1000 ML: 9 INJECTION, SOLUTION INTRAVENOUS at 17:03

## 2020-12-20 RX ADMIN — METRONIDAZOLE 500 MG: 500 INJECTION, SOLUTION INTRAVENOUS at 17:15

## 2020-12-20 RX ADMIN — Medication 100 MG: at 16:57

## 2020-12-20 RX ADMIN — IOPAMIDOL 100 ML: 755 INJECTION, SOLUTION INTRAVENOUS at 15:58

## 2020-12-20 RX ADMIN — CEFTRIAXONE SODIUM 1 G: 1 INJECTION, POWDER, FOR SOLUTION INTRAMUSCULAR; INTRAVENOUS at 16:57

## 2020-12-20 NOTE — ED PROVIDER NOTES
EMERGENCY DEPARTMENT HISTORY AND PHYSICAL EXAM      Date: 12/20/2020  Patient Name: Candice Almaraz    History of Presenting Illness     Chief Complaint   Patient presents with    Vomiting       History Provided By: Patient    HPI: Candice Almaraz, 60 y.o. male with h/o rectal CA, and alcoholism presents via EMS to the ED with cc of intermittent episodes of NV x 2 days with associated mild to moderate lightheadedness, abdominal pain. Reports having black stools x a few days. Reports daily alcohol use. Reports hx of stomach ulcers. Denies any alleviating or exacerbating factors. Pt specifically denies any fever, chills, CP, SOB. There are no other complaints, changes, or physical findings at this time. PCP: None    No current facility-administered medications on file prior to encounter. Current Outpatient Medications on File Prior to Encounter   Medication Sig Dispense Refill    folic acid (FOLVITE) 1 mg tablet Take 1 Tab by mouth daily. 30 Tab 0    multivitamin, tx-iron-ca-min (THERA-M W/ IRON) 9 mg iron-400 mcg tab tablet Take 1 Tab by mouth daily. 30 Tab 0    polyethylene glycol (MIRALAX) 17 gram packet Take 1 Packet by mouth daily. 30 Packet 0    thiamine mononitrate (B-1) 100 mg tablet Take 1 Tab by mouth daily. 30 Tab 0    oxyCODONE-acetaminophen (PERCOCET)  mg per tablet Take 1 Tab by mouth every four (4) hours as needed for Pain (abd pain pt statese he takes 15 mg). Past History     Past Medical History:  Past Medical History:   Diagnosis Date    Gastrointestinal disorder     Gastric Ulcers; Rectal CA       Past Surgical History:  History reviewed. No pertinent surgical history. Family History:  History reviewed. No pertinent family history.     Social History:  Social History     Tobacco Use    Smoking status: Current Every Day Smoker     Packs/day: 0.50    Smokeless tobacco: Never Used   Substance Use Topics    Alcohol use: Not on file     Comment: 6 pack of beers per day    Drug use: No       Allergies:  No Known Allergies      Review of Systems   Review of Systems   Constitutional: Negative for fever. HENT: Negative for sore throat and trouble swallowing. Eyes: Negative for photophobia and redness. Respiratory: Negative for cough and shortness of breath. Cardiovascular: Negative for chest pain and leg swelling. Gastrointestinal: Positive for abdominal pain, blood in stool, nausea and vomiting. Negative for constipation and diarrhea. Endocrine: Negative for polydipsia and polyuria. Genitourinary: Negative for dysuria, hematuria and scrotal swelling. Musculoskeletal: Negative for back pain and joint swelling. Skin: Negative for rash. Neurological: Positive for light-headedness. Negative for dizziness, syncope, weakness and headaches. Psychiatric/Behavioral: Negative for suicidal ideas. All other systems reviewed and are negative. Physical Exam   Physical Exam  Vitals signs and nursing note reviewed. Constitutional:       General: He is not in acute distress. Appearance: He is well-developed. HENT:      Head: Normocephalic and atraumatic. Eyes:      Conjunctiva/sclera: Conjunctivae normal.      Pupils: Pupils are equal, round, and reactive to light. Neck:      Musculoskeletal: Normal range of motion and neck supple. Cardiovascular:      Rate and Rhythm: Normal rate and regular rhythm. Heart sounds: Normal heart sounds. Pulmonary:      Effort: Pulmonary effort is normal. No respiratory distress. Breath sounds: Normal breath sounds. No wheezing. Abdominal:      General: Bowel sounds are normal. There is no distension. Palpations: Abdomen is soft. Tenderness: There is abdominal tenderness in the epigastric area. There is no guarding or rebound. Musculoskeletal: Normal range of motion. General: No tenderness. Skin:     General: Skin is warm and dry. Findings: No rash.    Neurological: Mental Status: He is alert and oriented to person, place, and time. Cranial Nerves: No cranial nerve deficit. Psychiatric:         Behavior: Behavior normal.         Diagnostic Study Results     Labs -   No results found for this or any previous visit (from the past 12 hour(s)). Radiologic Studies -   No orders to display     CT Results  (Last 48 hours)    None        CXR Results  (Last 48 hours)    None            Medical Decision Making   I am the first provider for this patient. I reviewed the vital signs, available nursing notes, past medical history, past surgical history, family history and social history. Vital Signs-Reviewed the patient's vital signs. Patient Vitals for the past 12 hrs:   Temp Pulse Resp BP SpO2   12/20/20 1258 98.4 °F (36.9 °C) 81 16 (!) 152/75 100 %       Records Reviewed: Nursing Notes and Old Medical Records    Provider Notes (Medical Decision Making):   DDx: alcoholic gastritis, pancreatitis, GI bleed, alcohol abuse     ED Course:   Initial assessment performed. The patients presenting problems have been discussed, and they are in agreement with the care plan formulated and outlined with them. I have encouraged them to ask questions as they arise throughout their visit. Procedure Note - Rectal Exam:   1:12 PM  Performed by: Aislinn Hogue MD  Chaperoned by: RN  Rectal exam performed. Soft, brown stool was collected. Hemeoccult positive  The procedure took 1-15 minutes, and pt tolerated well. Consult Note:  2:40 Tana Mendenhall MD spoke with Dr. Amrajit Euceda  Specialty: Hospitalist  Discussed pts hx, disposition, and available diagnostic and imaging results. Reviewed care plans. Consultant agrees with plans as outlined. Will evaluate Pt. Consult Note:  3:54 Tana Mendenhall MD spoke with Dr. Amarjit Euceda  Specialty: Hospitalist  Dr. Amarjit Euceda has evaluated Pt in the ED and will admit.          Critical Care Time:   None    Disposition:  ADMIT  3:55 PM  Patient is being admitted to the hospital. The results of their tests and reasons for their admission have been discussed with them and/or available family. They convey agreement and understanding for the need to be admitted and for their admission diagnosis. Consultation has been made with the inpatient physician specialist for hospitalization. PLAN:  1. Current Discharge Medication List        2. Follow-up Information    None       Return to ED if worse     Diagnosis     Clinical Impression: No diagnosis found. Attestations: This note is prepared by Brenda Ovalles, acting as Scribe for Reina Delvalle MD.    Reina Delvalle MD: The scribe's documentation has been prepared under my direction and personally reviewed by me in its entirety. I confirm that the note above accurately reflects all work, treatment, procedures, and medical decision making performed by me.

## 2020-12-20 NOTE — ED NOTES
Patient reported daily drinking of \"liquor\" but none in four days. Also stated a history of rectal ca .

## 2020-12-20 NOTE — Clinical Note
Status[de-identified] INPATIENT [101]   Type of Bed: Telemetry [19]   Inpatient Hospitalization Certified Necessary for the Following Reasons: 3.  Patient receiving treatment that can only be provided in an inpatient setting (further clarification in H&P documentation)   Admitting Diagnosis: Pancreatitis [053860]   Admitting Physician: Willi Ryan   Attending Physician: Willi Ryan   Estimated Length of Stay: 2 Midnights   Discharge Plan[de-identified] [facility]

## 2020-12-20 NOTE — ED NOTES
Emergency Department Nursing Plan of Care       The Nursing Plan of Care is developed from the Nursing assessment and Emergency Department Attending provider initial evaluation. The plan of care may be reviewed in the ED Provider note.     The Plan of Care was developed with the following considerations:   Patient / Family readiness to learn indicated by:verbalized understanding  Persons(s) to be included in education: patient  Barriers to Learning/Limitations:No    Signed     Eduardo Willams RN    12/20/2020   1:23 PM

## 2020-12-21 LAB
ABO + RH BLD: NORMAL
ALBUMIN SERPL-MCNC: 3.6 G/DL (ref 3.5–5)
ALBUMIN/GLOB SERPL: 1 {RATIO} (ref 1.1–2.2)
ALP SERPL-CCNC: 128 U/L (ref 45–117)
ALT SERPL-CCNC: 38 U/L (ref 12–78)
ANION GAP SERPL CALC-SCNC: 13 MMOL/L (ref 5–15)
AST SERPL-CCNC: 67 U/L (ref 15–37)
ATRIAL RATE: 78 BPM
BASOPHILS # BLD: 0.1 K/UL (ref 0–0.1)
BASOPHILS NFR BLD: 1 % (ref 0–1)
BILIRUB DIRECT SERPL-MCNC: 0.2 MG/DL (ref 0–0.2)
BILIRUB SERPL-MCNC: 1 MG/DL (ref 0.2–1)
BUN SERPL-MCNC: 6 MG/DL (ref 6–20)
BUN/CREAT SERPL: 8 (ref 12–20)
CALCIUM SERPL-MCNC: 9.5 MG/DL (ref 8.5–10.1)
CALCULATED P AXIS, ECG09: 77 DEGREES
CALCULATED R AXIS, ECG10: 80 DEGREES
CALCULATED T AXIS, ECG11: 70 DEGREES
CHLORIDE SERPL-SCNC: 101 MMOL/L (ref 97–108)
CO2 SERPL-SCNC: 24 MMOL/L (ref 21–32)
CREAT SERPL-MCNC: 0.74 MG/DL (ref 0.7–1.3)
DIAGNOSIS, 93000: NORMAL
DIFFERENTIAL METHOD BLD: ABNORMAL
EOSINOPHIL # BLD: 0.1 K/UL (ref 0–0.4)
EOSINOPHIL NFR BLD: 1 % (ref 0–7)
ERYTHROCYTE [DISTWIDTH] IN BLOOD BY AUTOMATED COUNT: 15.1 % (ref 11.5–14.5)
FOLATE SERPL-MCNC: 7.5 NG/ML (ref 5–21)
GLOBULIN SER CALC-MCNC: 3.6 G/DL (ref 2–4)
GLUCOSE SERPL-MCNC: 90 MG/DL (ref 65–100)
HCT VFR BLD AUTO: 30.5 % (ref 36.6–50.3)
HGB BLD-MCNC: 10.7 G/DL (ref 12.1–17)
IMM GRANULOCYTES # BLD AUTO: 0 K/UL (ref 0–0.04)
IMM GRANULOCYTES NFR BLD AUTO: 0 % (ref 0–0.5)
INR PPP: 1.1 (ref 0.9–1.1)
IRON SATN MFR SERPL: 43 % (ref 20–50)
IRON SERPL-MCNC: 106 UG/DL (ref 35–150)
LIPASE SERPL-CCNC: 972 U/L (ref 73–393)
LYMPHOCYTES # BLD: 0.9 K/UL (ref 0.8–3.5)
LYMPHOCYTES NFR BLD: 15 % (ref 12–49)
MAGNESIUM SERPL-MCNC: 1.6 MG/DL (ref 1.6–2.4)
MCH RBC QN AUTO: 32.4 PG (ref 26–34)
MCHC RBC AUTO-ENTMCNC: 35.1 G/DL (ref 30–36.5)
MCV RBC AUTO: 92.4 FL (ref 80–99)
MONOCYTES # BLD: 0.6 K/UL (ref 0–1)
MONOCYTES NFR BLD: 10 % (ref 5–13)
NEUTS SEG # BLD: 4.1 K/UL (ref 1.8–8)
NEUTS SEG NFR BLD: 73 % (ref 32–75)
NRBC # BLD: 0 K/UL (ref 0–0.01)
NRBC BLD-RTO: 0 PER 100 WBC
P-R INTERVAL, ECG05: 140 MS
PHOSPHATE SERPL-MCNC: 1.3 MG/DL (ref 2.6–4.7)
PLATELET # BLD AUTO: 81 K/UL (ref 150–400)
PLATELET COMMENTS,PCOM: ABNORMAL
PMV BLD AUTO: 12.4 FL (ref 8.9–12.9)
POTASSIUM SERPL-SCNC: 3.3 MMOL/L (ref 3.5–5.1)
PROT SERPL-MCNC: 7.2 G/DL (ref 6.4–8.2)
PROTHROMBIN TIME: 11 SEC (ref 9–11.1)
Q-T INTERVAL, ECG07: 364 MS
QRS DURATION, ECG06: 82 MS
QTC CALCULATION (BEZET), ECG08: 414 MS
RBC # BLD AUTO: 3.3 M/UL (ref 4.1–5.7)
RBC MORPH BLD: ABNORMAL
RETICS # AUTO: 0.03 M/UL (ref 0.03–0.1)
RETICS/RBC NFR AUTO: 1.1 % (ref 0.7–2.1)
SODIUM SERPL-SCNC: 138 MMOL/L (ref 136–145)
TIBC SERPL-MCNC: 245 UG/DL (ref 250–450)
VENTRICULAR RATE, ECG03: 78 BPM
VIT B12 SERPL-MCNC: 1320 PG/ML (ref 193–986)
WBC # BLD AUTO: 5.8 K/UL (ref 4.1–11.1)

## 2020-12-21 PROCEDURE — 36415 COLL VENOUS BLD VENIPUNCTURE: CPT

## 2020-12-21 PROCEDURE — 85025 COMPLETE CBC W/AUTO DIFF WBC: CPT

## 2020-12-21 PROCEDURE — 65270000032 HC RM SEMIPRIVATE

## 2020-12-21 PROCEDURE — 82746 ASSAY OF FOLIC ACID SERUM: CPT

## 2020-12-21 PROCEDURE — 94760 N-INVAS EAR/PLS OXIMETRY 1: CPT

## 2020-12-21 PROCEDURE — 80048 BASIC METABOLIC PNL TOTAL CA: CPT

## 2020-12-21 PROCEDURE — 85610 PROTHROMBIN TIME: CPT

## 2020-12-21 PROCEDURE — 84100 ASSAY OF PHOSPHORUS: CPT

## 2020-12-21 PROCEDURE — 74011250636 HC RX REV CODE- 250/636: Performed by: STUDENT IN AN ORGANIZED HEALTH CARE EDUCATION/TRAINING PROGRAM

## 2020-12-21 PROCEDURE — 97116 GAIT TRAINING THERAPY: CPT

## 2020-12-21 PROCEDURE — 80076 HEPATIC FUNCTION PANEL: CPT

## 2020-12-21 PROCEDURE — 97535 SELF CARE MNGMENT TRAINING: CPT | Performed by: OCCUPATIONAL THERAPIST

## 2020-12-21 PROCEDURE — 74011000258 HC RX REV CODE- 258: Performed by: STUDENT IN AN ORGANIZED HEALTH CARE EDUCATION/TRAINING PROGRAM

## 2020-12-21 PROCEDURE — 85045 AUTOMATED RETICULOCYTE COUNT: CPT

## 2020-12-21 PROCEDURE — 74011000250 HC RX REV CODE- 250: Performed by: STUDENT IN AN ORGANIZED HEALTH CARE EDUCATION/TRAINING PROGRAM

## 2020-12-21 PROCEDURE — 83690 ASSAY OF LIPASE: CPT

## 2020-12-21 PROCEDURE — 82607 VITAMIN B-12: CPT

## 2020-12-21 PROCEDURE — 83540 ASSAY OF IRON: CPT

## 2020-12-21 PROCEDURE — 97165 OT EVAL LOW COMPLEX 30 MIN: CPT | Performed by: OCCUPATIONAL THERAPIST

## 2020-12-21 PROCEDURE — 74011250636 HC RX REV CODE- 250/636: Performed by: HOSPITALIST

## 2020-12-21 PROCEDURE — 97161 PT EVAL LOW COMPLEX 20 MIN: CPT

## 2020-12-21 PROCEDURE — 83735 ASSAY OF MAGNESIUM: CPT

## 2020-12-21 PROCEDURE — 74011250637 HC RX REV CODE- 250/637: Performed by: STUDENT IN AN ORGANIZED HEALTH CARE EDUCATION/TRAINING PROGRAM

## 2020-12-21 RX ORDER — POTASSIUM CHLORIDE 20 MEQ/1
20 TABLET, EXTENDED RELEASE ORAL DAILY
COMMUNITY
End: 2020-12-27

## 2020-12-21 RX ORDER — FAMOTIDINE 20 MG/1
20 TABLET, FILM COATED ORAL DAILY
Status: ON HOLD | COMMUNITY
End: 2021-05-21

## 2020-12-21 RX ORDER — TAMSULOSIN HYDROCHLORIDE 0.4 MG/1
0.4 CAPSULE ORAL DAILY
Status: DISCONTINUED | OUTPATIENT
Start: 2020-12-21 | End: 2020-12-27

## 2020-12-21 RX ORDER — TAMSULOSIN HYDROCHLORIDE 0.4 MG/1
0.4 CAPSULE ORAL DAILY
Status: DISCONTINUED | OUTPATIENT
Start: 2020-12-21 | End: 2020-12-21

## 2020-12-21 RX ORDER — MAGNESIUM SULFATE HEPTAHYDRATE 40 MG/ML
2 INJECTION, SOLUTION INTRAVENOUS ONCE
Status: COMPLETED | OUTPATIENT
Start: 2020-12-21 | End: 2020-12-21

## 2020-12-21 RX ORDER — TAMSULOSIN HYDROCHLORIDE 0.4 MG/1
0.4 CAPSULE ORAL DAILY
COMMUNITY
End: 2020-12-27

## 2020-12-21 RX ORDER — NALOXONE HYDROCHLORIDE 0.4 MG/ML
0.4 INJECTION, SOLUTION INTRAMUSCULAR; INTRAVENOUS; SUBCUTANEOUS
Status: DISCONTINUED | OUTPATIENT
Start: 2020-12-21 | End: 2020-12-27 | Stop reason: HOSPADM

## 2020-12-21 RX ORDER — LANOLIN ALCOHOL/MO/W.PET/CERES
1000 CREAM (GRAM) TOPICAL DAILY
Status: ON HOLD | COMMUNITY
End: 2021-09-01

## 2020-12-21 RX ORDER — BISMUTH SUBSALICYLATE 262 MG
1 TABLET,CHEWABLE ORAL DAILY
Status: ON HOLD | COMMUNITY
End: 2021-05-21

## 2020-12-21 RX ORDER — POTASSIUM CHLORIDE 7.45 MG/ML
10 INJECTION INTRAVENOUS
Status: COMPLETED | OUTPATIENT
Start: 2020-12-21 | End: 2020-12-21

## 2020-12-21 RX ORDER — ASPIRIN 81 MG/1
81 TABLET ORAL DAILY
Status: ON HOLD | COMMUNITY
End: 2021-09-01

## 2020-12-21 RX ORDER — METOPROLOL TARTRATE 25 MG/1
12.5 TABLET, FILM COATED ORAL 2 TIMES DAILY
COMMUNITY
End: 2020-12-27

## 2020-12-21 RX ADMIN — Medication 1 CAPSULE: at 09:06

## 2020-12-21 RX ADMIN — OXYCODONE HYDROCHLORIDE 5 MG: 5 TABLET ORAL at 09:24

## 2020-12-21 RX ADMIN — POTASSIUM CHLORIDE 10 MEQ: 7.46 INJECTION, SOLUTION INTRAVENOUS at 08:00

## 2020-12-21 RX ADMIN — CEFTRIAXONE SODIUM 1 G: 1 INJECTION, POWDER, FOR SOLUTION INTRAMUSCULAR; INTRAVENOUS at 18:19

## 2020-12-21 RX ADMIN — METRONIDAZOLE 500 MG: 500 INJECTION, SOLUTION INTRAVENOUS at 17:08

## 2020-12-21 RX ADMIN — TAMSULOSIN HYDROCHLORIDE 0.4 MG: 0.4 CAPSULE ORAL at 09:06

## 2020-12-21 RX ADMIN — MAGNESIUM SULFATE HEPTAHYDRATE 2 G: 40 INJECTION, SOLUTION INTRAVENOUS at 10:36

## 2020-12-21 RX ADMIN — POTASSIUM CHLORIDE 10 MEQ: 7.46 INJECTION, SOLUTION INTRAVENOUS at 09:06

## 2020-12-21 RX ADMIN — OXYCODONE HYDROCHLORIDE 5 MG: 5 TABLET ORAL at 19:51

## 2020-12-21 RX ADMIN — Medication 10 ML: at 14:00

## 2020-12-21 RX ADMIN — METRONIDAZOLE 500 MG: 500 INJECTION, SOLUTION INTRAVENOUS at 04:09

## 2020-12-21 RX ADMIN — Medication 10 ML: at 21:02

## 2020-12-21 RX ADMIN — SODIUM CHLORIDE, SODIUM LACTATE, POTASSIUM CHLORIDE, AND CALCIUM CHLORIDE 150 ML/HR: 600; 310; 30; 20 INJECTION, SOLUTION INTRAVENOUS at 03:57

## 2020-12-21 RX ADMIN — Medication 100 MG: at 09:06

## 2020-12-21 RX ADMIN — Medication 10 ML: at 05:48

## 2020-12-21 RX ADMIN — POTASSIUM BICARBONATE 40 MEQ: 391 TABLET, EFFERVESCENT ORAL at 10:36

## 2020-12-21 RX ADMIN — POTASSIUM PHOSPHATE, MONOBASIC AND POTASSIUM PHOSPHATE, DIBASIC: 224; 236 INJECTION, SOLUTION, CONCENTRATE INTRAVENOUS at 13:10

## 2020-12-21 RX ADMIN — ONDANSETRON 4 MG: 2 INJECTION INTRAMUSCULAR; INTRAVENOUS at 19:51

## 2020-12-21 RX ADMIN — FOLIC ACID 1 MG: 1 TABLET ORAL at 09:06

## 2020-12-21 RX ADMIN — Medication 10 ML: at 13:10

## 2020-12-21 NOTE — ACP (ADVANCE CARE PLANNING)
Current Advanced Directive/Advance Care Plan: Patient stated that he would want CPR and ventilation. He would want his sisters, Janna Cramer [phone] and Jd Weiss [phone] to make medical decisions for him.     Gris De Jesus, BENJAMIN/CEASAR  [phone]

## 2020-12-21 NOTE — H&P
Hospitalist Admission Note    NAME: Deanne Dominique   :  1965   MRN:  [id number]   Room Number: 383/97  @ [facility]     Date/Time:  2020 11:45 PM    Patient PCP: Serenity Solis MD  ______________________________________________________________________  Given the patient's current clinical presentation, I have a high level of concern for decompensation if discharged from the emergency department. Complex decision making was performed, which includes reviewing the patient's available past medical records, laboratory results, and x-ray films. My assessment of this patient's clinical condition and my plan of care is as follows. Assessment / Plan: Active Problems:    Pancreatitis (2020)      Acute pancreatitis (2020)    Acute on chronic pancreatitis POA  Known history of chronic pancreatitis. Lipase 1272. Ongoing alcohol abuse. CT abdomen/pelvis reviewed independentlyfat stranding of pancreatic tail representing focal pancreatitis. Clear liquid diet  IV hydration  Pain management with oral oxycodone and IV Dilaudid per pain scale            Low-density lesion in the pancreas POA  Noted on CT abdomen/pelvis. Nonspecific. We will need follow-up CT scan      Enteritis POA  CT abdomen/pelvis  reviewed independentlyabuse moderate long segment enteritis of distal small bowel. History of partial small bowel obstruction few months ago that was treated conservatively at [facility]. Lactic acid 1.8, do not suspect ischemic enteritis. History of radiation therapy in the past with adhesions, and small bowel obstruction. Provide hydration  Ceftriaxone, Flagyl for now. Alcohol abuse POA   EtOH level less than 10. Last drink 4 days ago. CIWA monitoring  Follow Ativan per CIWA scale      Acute renal failure POA  Baseline creatinine 0.72.6. Creatinine 1.21. Patient clinically volume depleted.   Likely prerenal due to hypovolemia. Administered fluid bolus and aggressive IV hydration  CHEM 10 tomorrow  Strict I's and O's, avoid nephrotoxic medications, renally dose medications. Alcoholic hepatitis POA  ALT 52, AST 94, T bili 1.5. US Liver 8/2020 from [facility] revealed hepatis steatosis. - check INR  - Repeat LFT tomorrow AM      Dehydration POA  Due to decreased oral intake.   -Administered 2 L normal saline in the ER  -Additional fluid bolus 1 L normal saline followed by lactated ringers at 150cc/hr          BPH   Continue tamsulosin      Normochromic normocytic anemia POA  At baseline. Baseline is ~ 9. Suspect nutritional deficiency, alcohol induced, depression.  - Anemia work up  - Folate repletion      Thrombocytopenia, POA  likely due to alcohol induced bone marrow suppression. Baseline is within normal limits 200-300 per [facility] records  - Repeat CBC tomorrow AM      Generalized weakness POA  Physical deconditioning POA  -- PT/OT consult     Body mass index is 17.64 kg/m². Severe protein calorie malnutrition POA  - RD consult    Code Status: full   Surrogate Decision Maker:  Sister       DVT Prophylaxis: Lovenox  GI Prophylaxis: not indicated  Lines : PIV  Baseline:ambulatory independently        Subjective:   CHIEF COMPLAINT: Intractable nausea vomiting    HISTORY OF PRESENT ILLNESS:     Jj Chi is a 60 y.o. With hx of colorectal cancer s/p XRT, chemotherapy and resection with anastomosis, chronic pancreatitis, chronic colitis, and chronic diarrhea presents to the ER with nausea, vomiting, abdominal pain for the past 1 week. Patient reports acute on chronic abdominal pain that is periumbilical, sharp, 6/31 intensity, nonradiating, exacerbated by food intake, no specific alleviating factors that began 1 week ago followed by nausea and nonbloody nonbilious emesis and inability to tolerate oral intake.   He has not had anything to eat in the last 1 week, and reports not being able to tolerate water for the past 2 days. Had 3 dark black stools yesterday. Denies eating anything out of the ordinary. Denies bright red blood in stool. No fevers or chills. No sick contacts. No known exposure to COVID-19 or recent travel. History of chronic pancreatitis, most recent exacerbation in September 2020 which he was admitted to [facility]. Hospital course was complicated by partial small bowel obstruction that was treated conservatively, patient required NG tube placement during that hospital admission. History of rectal cancer status post resection followed by chemotherapy and radiation, 4 years ago at [facility].    Patient reports drinking 1 pint of vodka daily, last drink was 4 days ago. He states he has not been able to tolerate alcohol due to nausea. Denies history of alcohol withdrawal.      We were asked to admit for work up and evaluation of the above problems. Past Medical History:   Diagnosis Date    Gastrointestinal disorder     Gastric Ulcers; Rectal CA        History reviewed. No pertinent surgical history. Social History     Tobacco Use    Smoking status: Current Every Day Smoker     Packs/day: 0.50    Smokeless tobacco: Never Used   Substance Use Topics    Alcohol use: Not on file     Comment: 6 pack of beers per day        History reviewed. No family history of diabetes, hypertension, hyperlipidemia. No Known Allergies     Prior to Admission medications    Medication Sig Start Date End Date Taking? Authorizing Provider   folic acid (FOLVITE) 1 mg tablet Take 1 Tab by mouth daily. 1/3/19   Venice Crawford NP   multivitamin, tx-iron-ca-min (THERA-M W/ IRON) 9 mg iron-400 mcg tab tablet Take 1 Tab by mouth daily. 1/3/19   Venice Crawford NP   polyethylene glycol (MIRALAX) 17 gram packet Take 1 Packet by mouth daily. 1/3/19   Venice Crawford NP   thiamine mononitrate (B-1) 100 mg tablet Take 1 Tab by mouth daily.  1/3/19   Venice Marroquin, DEANNA   oxyCODONE-acetaminophen (PERCOCET)  mg per tablet Take 1 Tab by mouth every four (4) hours as needed for Pain (abd pain pt statese he takes 15 mg). Provider, Historical       REVIEW OF SYSTEMS:     I am not able to complete the review of systems because: The patient is intubated and sedated    The patient has altered mental status due to his acute medical problems    The patient has baseline aphasia from prior stroke(s)    The patient has baseline dementia and is not reliable historian    The patient is in acute medical distress and unable to provide information           Total of 12 systems reviewed as follows:       POSITIVE= underlined text  Negative = text not underlined  General:  fever, chills, sweats, generalized weakness, weight loss/gain,      loss of appetite   Eyes:    blurred vision, eye pain, loss of vision, double vision  ENT:    rhinorrhea, pharyngitis   Respiratory:   cough, sputum production, SOB, SZYMANSKI, wheezing, pleuritic pain   Cardiology:   chest pain, palpitations, orthopnea, PND, edema, syncope   Gastrointestinal:  abdominal pain , N/V, diarrhea, dysphagia, constipation, bleeding   Genitourinary:  frequency, urgency, dysuria, hematuria, incontinence   Muskuloskeletal :  arthralgia, myalgia, back pain  Hematology:  easy bruising, nose or gum bleeding, lymphadenopathy   Dermatological: rash, ulceration, pruritis, color change / jaundice  Endocrine:   hot flashes or polydipsia   Neurological:  headache, dizziness, confusion, focal weakness, paresthesia,     Speech difficulties, memory loss, gait difficulty  Psychological: Feelings of anxiety, depression, agitation    Objective:   VITALS:    Visit Vitals  BP (!) 146/86 (BP 1 Location: Left arm, BP Patient Position: Post activity)   Pulse 71   Temp 99 °F (37.2 °C)   Resp 14   Ht 5' 8\" (1.727 m)   Wt 52.6 kg (116 lb)   SpO2 100%   BMI 17.64 kg/m²       PHYSICAL EXAM:    General:    Alert, cooperative, cachectic, no distress, appears stated age.      HEENT: Atraumatic, anicteric sclerae, pink conjunctivae     No oral ulcers, mucosa moist, throat clear, dentition fair  Neck:  Supple, symmetrical,  thyroid: non tender  Lungs:   Clear to auscultation bilaterally. No Wheezing or Rhonchi. No rales. Chest wall:  No tenderness  No Accessory muscle use. Heart:   Regular  rhythm,  No  murmur   No edema  Abdomen:   Soft, diffusely tender to palpation most prominent periumbilical area, + guarding, no rigidity or rebound tenderness. Not distended. Bowel sounds decreased  Extremities: No cyanosis. No clubbing,      Skin turgor normal, Capillary refill normal, Radial dial pulse 2+  Skin:     Not pale. Not Jaundiced  No rashes   Psych:  Good insight. Not depressed. Not anxious or agitated. Neurologic: EOMs intact. No facial asymmetry. No aphasia or slurred speech. Symmetrical strength, Sensation grossly intact. Alert and oriented X 4.     ______________________________________________________________________    Care Plan discussed with:  Patient/Family and Nurse    Expected  Disposition:   PT, OT, RN  ________________________________________________________________________  TOTAL TIME:  36 Minutes    Critical Care Provided     Minutes non procedure based      Comments    x Reviewed previous records   >50% of visit spent in counseling and coordination of care x Discussion with patient and/or family and questions answered       ________________________________________________________________________  Signed: Robert Oreilly MD    Procedures: see electronic medical records for all procedures/Xrays and details which were not copied into this note but were reviewed prior to creation of Plan.     LAB DATA REVIEWED:    Recent Results (from the past 24 hour(s))   CBC WITH AUTOMATED DIFF    Collection Time: 12/20/20  1:19 PM   Result Value Ref Range    WBC 6.0 4.1 - 11.1 K/uL    RBC 3.47 (L) 4.10 - 5.70 M/uL    HGB 11.3 (L) 12.1 - 17.0 g/dL    HCT 32.8 (L) 36.6 - 50.3 %    MCV 94.5 80.0 - 99.0 FL    MCH 32.6 26.0 - 34.0 PG    MCHC 34.5 30.0 - 36.5 g/dL    RDW 15.2 (H) 11.5 - 14.5 %    PLATELET 93 (L) 072 - 400 K/uL    MPV 11.8 8.9 - 12.9 FL    NRBC 0.0 0  WBC    ABSOLUTE NRBC 0.00 0.00 - 0.01 K/uL    NEUTROPHILS 74 32 - 75 %    LYMPHOCYTES 12 12 - 49 %    MONOCYTES 13 5 - 13 %    EOSINOPHILS 0 0 - 7 %    BASOPHILS 0 0 - 1 %    IMMATURE GRANULOCYTES 1 (H) 0.0 - 0.5 %    ABS. NEUTROPHILS 4.4 1.8 - 8.0 K/UL    ABS. LYMPHOCYTES 0.7 (L) 0.8 - 3.5 K/UL    ABS. MONOCYTES 0.8 0.0 - 1.0 K/UL    ABS. EOSINOPHILS 0.0 0.0 - 0.4 K/UL    ABS. BASOPHILS 0.0 0.0 - 0.1 K/UL    ABS. IMM. GRANS. 0.1 (H) 0.00 - 0.04 K/UL    DF SMEAR SCANNED      RBC COMMENTS ANISOCYTOSIS  1+       METABOLIC PANEL, COMPREHENSIVE    Collection Time: 12/20/20  1:19 PM   Result Value Ref Range    Sodium 135 (L) 136 - 145 mmol/L    Potassium 3.7 3.5 - 5.1 mmol/L    Chloride 99 97 - 108 mmol/L    CO2 21 21 - 32 mmol/L    Anion gap 15 5 - 15 mmol/L    Glucose 136 (H) 65 - 100 mg/dL    BUN 13 6 - 20 MG/DL    Creatinine 1.21 0.70 - 1.30 MG/DL    BUN/Creatinine ratio 11 (L) 12 - 20      GFR est AA >60 >60 ml/min/1.73m2    GFR est non-AA >60 >60 ml/min/1.73m2    Calcium 9.9 8.5 - 10.1 MG/DL    Bilirubin, total 1.5 (H) 0.2 - 1.0 MG/DL    ALT (SGPT) 52 12 - 78 U/L    AST (SGOT) 94 (H) 15 - 37 U/L    Alk.  phosphatase 147 (H) 45 - 117 U/L    Protein, total 8.1 6.4 - 8.2 g/dL    Albumin 4.3 3.5 - 5.0 g/dL    Globulin 3.8 2.0 - 4.0 g/dL    A-G Ratio 1.1 1.1 - 2.2     LIPASE    Collection Time: 12/20/20  1:19 PM   Result Value Ref Range    Lipase 1,272 (H) 73 - 393 U/L   ETHYL ALCOHOL    Collection Time: 12/20/20  1:19 PM   Result Value Ref Range    ALCOHOL(ETHYL),SERUM <10 <10 MG/DL   BLOOD TYPE, (ABO+RH)    Collection Time: 12/20/20  1:19 PM   Result Value Ref Range    ABO/Rh(D) O POSITIVE     Comment SAMPLE NOT USABLE FOR CROSSMATCH    LACTIC ACID    Collection Time: 12/20/20  3:21 PM   Result Value Ref Range    Lactic acid 1.8 0.4 - 2.0 MMOL/L   EKG, 12 LEAD, INITIAL    Collection Time: 12/20/20  4:56 PM   Result Value Ref Range    Ventricular Rate 78 BPM    Atrial Rate 78 BPM    P-R Interval 140 ms    QRS Duration 82 ms    Q-T Interval 364 ms    QTC Calculation (Bezet) 414 ms    Calculated P Axis 77 degrees    Calculated R Axis 80 degrees    Calculated T Axis 70 degrees    Diagnosis       Normal sinus rhythm  Normal ECG  When compared with ECG of 30-AUG-2015 13:45,  premature ventricular complexes are no longer present  T wave amplitude has increased in Inferior leads     DRUG SCREEN, URINE    Collection Time: 12/20/20  5:08 PM   Result Value Ref Range    AMPHETAMINES Negative NEG      BARBITURATES Negative NEG      BENZODIAZEPINES Negative NEG      COCAINE Negative NEG      METHADONE Negative NEG      OPIATES Negative NEG      PCP(PHENCYCLIDINE) Negative NEG      THC (TH-CANNABINOL) Negative NEG      Drug screen comment (NOTE)    URINALYSIS W/ REFLEX CULTURE    Collection Time: 12/20/20  5:08 PM    Specimen: Miscellaneous sample; Urine    Urine specimen   Result Value Ref Range    Color YELLOW/STRAW      Appearance CLEAR CLEAR      Specific gravity 1.010 1.003 - 1.030      pH (UA) 6.5 5.0 - 8.0      Protein 30 (A) NEG mg/dL    Glucose 100 (A) NEG mg/dL    Ketone >80 (A) NEG mg/dL    Blood SMALL (A) NEG      Urobilinogen 1.0 0.2 - 1.0 EU/dL    Nitrites Negative NEG      Leukocyte Esterase Negative NEG      WBC 0-4 0 - 4 /hpf    RBC 0-5 0 - 5 /hpf    Epithelial cells FEW FEW /lpf    Bacteria Negative NEG /hpf    UA:UC IF INDICATED CULTURE NOT INDICATED BY UA RESULT CNI      Budding yeast PRESENT (A) NEG      Yeast w/hyphae PRESENT (A) NEG     BILIRUBIN, CONFIRM    Collection Time: 12/20/20  5:08 PM   Result Value Ref Range    Bilirubin UA, confirm Positive (A) NEG

## 2020-12-21 NOTE — PROGRESS NOTES
Hospitalist Progress Note    NAME: Refugio Mayen   :  1965   MRN:  [id number]   Room Number:    @ [facility]       Interim Hospital Summary: 60 y.o. male whom presented on 2020 with      Assessment / Plan:    Acute on chronic pancreatitis POA  Known history of chronic pancreatitis. Lipase 1272. Ongoing alcohol abuse. CT abdomen/pelvis reviewed independentlyfat stranding of pancreatic tail representing focal pancreatitis.      Advance to low fat diet. IV hydration  Pain management with oral oxycodone and IV Dilaudid per pain scale       Enteritis POA  CT abdomen/pelvis  reviewed independentlyabuse moderate long segment enteritis of distal small bowel. History of partial small bowel obstruction few months ago that was treated conservatively at [facility]. Lactic acid 1.8, do not suspect ischemic enteritis. History of radiation therapy in the past with adhesions, and small bowel obstruction.     continue hydration  Ceftriaxone, Flagyl for now. - Advance diet.           Low-density lesion in the pancreas POA  Noted on CT abdomen/pelvis. Nonspecific. We will need follow-up CT scan as outpatient.          Alcohol abuse POA   EtOH level less than 10. Last drink 4 days ago.     CIWA monitoring  Oral Ativan per CIWA scale       Acute renal failure POA,resolved  Baseline creatinine 0.72.6. Creatinine 1.21. Patient clinically volume depleted. Likely prerenal due to hypovolemia. Resolved with hydration.         Elevated transaminases POA  ALT 52, AST 94, T bili 1.5. US Liver 2020 from [facility] revealed hepatis steatosis. INR 1.1. Dehydration POA,resolved  Due to decreased oral intake. BPH   Continue tamsulosin        Normochromic normocytic anemia POA  At baseline. Baseline is ~ 9.   Suspect nutritional deficiency, alcohol induced, depression.  - Anemia work up pending  - Folate repletion        Thrombocytopenia, POA  likely due to alcohol induced bone marrow suppression. Baseline is within normal limits 200-300 per [facility] records. Repeat PLT 81.     - Continue to monitor for s/s of bleeding.         Generalized weakness POA  Physical deconditioning POA  -PT/OT consulted. HH recommended. Needs rolling walker.      Body mass index is 17.64 kg/m². Severe protein calorie malnutrition POA  - RD following     Code Status: full   Surrogate Decision Maker:  Sister         DVT Prophylaxis: Lovenox  GI Prophylaxis: not indicated  Lines : PIV  Baseline:ambulatory independently       Subjective:     Chief Complaint / Reason for Physician Visit  \"My pain is better but it still hurts. ..i want to eat\". Discussed with RN events overnight. Review of Systems:  No fevers, chills, appetite change, cough, sputum production, shortness of breath, dyspnea on exertion, nausea, vomitting, diarrhea, constipation, chest pain, leg edema,  joint pain, rash, itching. Tolerating clear liquid diet. Objective:     VITALS:   Last 24hrs VS reviewed since prior progress note. Most recent are:  Patient Vitals for the past 24 hrs:   Temp Pulse Resp BP SpO2   12/21/20 1453 97 °F (36.1 °C) 67  (!) 150/79 100 %   12/21/20 1200  85      12/21/20 1129 97.9 °F (36.6 °C) 63  (!) 151/86 100 %   12/21/20 0936  83  (!) 140/84 100 %   12/21/20 0809     100 %   12/21/20 0800 97.4 °F (36.3 °C) 76  (!) 140/75 99 %   12/21/20 0408 98.2 °F (36.8 °C) 65 16 (!) 144/84 100 %   12/21/20 0146 98 °F (36.7 °C) 79 14 137/81 100 %   12/20/20 2146 99 °F (37.2 °C) 71 14 (!) 146/86 100 %   12/20/20 2120  76 10  100 %   12/20/20 2030  76 9  100 %   12/20/20 1900  69 12  100 %   12/20/20 1809  90 20 134/73 100 %       Intake/Output Summary (Last 24 hours) at 12/21/2020 1725  Last data filed at 12/21/2020 9598  Gross per 24 hour   Intake 1620 ml   Output 900 ml   Net 720 ml        PHYSICAL EXAM:  General: Cachectic, Alert, cooperative, no acute distress    EENT:  EOMI. Anicteric sclerae. MMM  Resp:  CTA bilaterally, no wheezing or rales. No accessory muscle use  CV:  Regular  rhythm,  normal S1/S2, no murmurs rubs gallops, No edema  GI:  Soft, diffusely tender on palpation, + guarding, Non distended,  +Bowel sounds. Neurologic:  Alert and oriented X 3, normal speech,   Psych:   Good insight. Not anxious nor agitated  Skin:  No rashes. No jaundice    Reviewed most current lab test results and cultures  YES  Reviewed most current radiology test results   YES  Review and summation of old records today    NO  Reviewed patient's current orders and MAR    YES  PMH/SH reviewed - no change compared to H&P  ________________________________________________________________________  Care Plan discussed with:    Comments   Patient x    Family      RN x    Care Manager x    Consultant                       x Multidiciplinary team rounds were held today with , nursing, pharmacist and clinical coordinator. Patient's plan of care was discussed; medications were reviewed and discharge planning was addressed. ________________________________________________________________________  Total NON critical care TIME:  35   Minutes    Total CRITICAL CARE TIME Spent:   Minutes non procedure based      Comments   >50% of visit spent in counseling and coordination of care     ________________________________________________________________________  Johnathan Vasques MD     Procedures: see electronic medical records for all procedures/Xrays and details which were not copied into this note but were reviewed prior to creation of Plan. LABS:  I reviewed today's most current labs and imaging studies.   Pertinent labs include:  Recent Labs     12/21/20  0433 12/20/20  1319   WBC 5.8 6.0   HGB 10.7* 11.3*   HCT 30.5* 32.8*   PLT 81* 93*     Recent Labs     12/21/20  0433 12/20/20  1319    135*   K 3.3* 3.7    99   CO2 24 21   GLU 90 136*   BUN 6 13   CREA 0.74 1.21   CA 9.5 9.9   MG 1.6  --    PHOS 1.3*  --    ALB 3.6 4.3   TBILI 1.0 1.5*   ALT 38 52   INR 1.1  --        Signed: Dustin Martinez MD

## 2020-12-21 NOTE — PROGRESS NOTES
Reason for Admission:   Per Dr. Esther Milligan is a 60 y.o. With hx of colorectal cancer s/p XRT, chemotherapy and resection with anastomosis, chronic pancreatitis, chronic colitis, and chronic diarrhea presents to the ER with nausea, vomiting, abdominal pain for the past 1 week. Patient is here under INPATIENT status. Has [employer] Medicare. Patient signed IM letter. Copy placed in chart and noted on Document List.                   RUR Score: 18%                    Plan for utilizing home health:  Patient has been seen by PT and they are recommending home health services and getting a rolling walker for use at home. PCP: First and Last name:  Oswaldo Kilgore MD   Name of Practice: [facility]   Are you a current patient: Yes/No: Y   Approximate date of last visit: 2 months ago   Can you participate in a virtual visit with your PCP: Y                     Current Advanced Directive/Advance Care Plan: Patient stated that he would want CPR and ventilation. He would want his sisters, Luella Bumpers [phone] and Rolanda Grimes [phone] to make medical decisions for him. Transition of Care Plan:     Met with patient for assessment. Lives alone in senior apartment. Receives $1000 a month is social security. Uses pharmacy in the [employer] and also uses mail service for medications. Patient stated that he will need assistance with a ride home at time of discharge. Patient stated that he has 4 grown children- in contact with 2 of them Ana Dickerson and Gerson García [phone]). Emergency contact listed in wife, Mariola Jorge. Patient stated that they have been  for a year. Plan  1. Arrange for follow-up PCP appointment with Dr. Roberto Nick. CM tried to call for appointment- office closed at 4:30pm.  2.  Substance abuse resource information put on AVS- Warm Line  3. Arrange for home health services and order a Rolling Walker.   Talked with patient and he is in agreement with plan. Stated no preference in home health agencies or DME companies. 0481 Referral sent to [address] [place], [place] [address]e.     BENJAMIN Jeronimo/CEASAR  [phone]

## 2020-12-21 NOTE — PROGRESS NOTES
Problem: Self Care Deficits Care Plan (Adult)  Goal: *Acute Goals and Plan of Care (Insert Text)  Description:   FUNCTIONAL STATUS PRIOR TO ADMISSION: Patient was independent and active without use of DME. Re-orts fall a few weeks ago    HOME SUPPORT: The patient lived alone, reports he does not have assist    Occupational Therapy Goals  Initiated 12/21/2020  1. Patient will perform grooming with modified independence standing at the sink within 7 day(s). 2.  Patient will perform lower body dressing with modified independence within 7 day(s). 3.  Patient will perform simple home management with modified independence within 7 day(s). 4.  Patient will perform toilet transfers with modified independence within 7 day(s). 5.  Patient will perform all aspects of toileting with modified independence within 7 day(s). Outcome: Not Met    OCCUPATIONAL THERAPY EVALUATION  Patient: [place] (89 y.o. male)  Date: 12/21/2020  Primary Diagnosis: Pancreatitis [K85.90]  Acute pancreatitis [K85.90]        Precautions:   Fall, Seizure    ASSESSMENT  Based on the objective data described below, the patient presents with decreased balance standing edge of bed and LE's buckling. Provided walker after discussed use with PT. Patient instructed on need for assist with all transfers at this time and potential need for increased assist at home. Will continue to follow, recommend increased time out of bed after seizure precautions lifted. Current Level of Function Impacting Discharge (ADLs/self-care): min assist functional transfers, min assist toileting, CGA LE ADL's    Functional Outcome Measure: The patient scored 55/100 on the Barthel Index outcome measure       Other factors to consider for discharge: lives alone with minimal support     Patient will benefit from skilled therapy intervention to address the above noted impairments.        PLAN :  Recommendations and Planned Interventions: self care training, functional mobility training, therapeutic exercise, balance training, therapeutic activities, endurance activities, patient education, home safety training, and family training/education    Frequency/Duration: Patient will be followed by occupational therapy 3 times a week to address goals. Recommendation for discharge: (in order for the patient to meet his/her long term goals)  Occupational therapy at least 2 days/week in the home AND ensure assist and/or supervision for safety with ADL mobility, IADL tasks    This discharge recommendation:  Has not yet been discussed the attending provider and/or case management    IF patient discharges home will need the following DME: rolling walker per PT       SUBJECTIVE:   Patient stated I haven't eaten so I'm weak.     OBJECTIVE DATA SUMMARY:   HISTORY:   Past Medical History:   Diagnosis Date    Gastrointestinal disorder     Gastric Ulcers; Rectal CA   History reviewed. No pertinent surgical history. Expanded or extensive additional review of patient history:     Home Situation  Home Environment: Apartment  # Steps to Enter: 0  Living Alone: Yes  Support Systems: None  Patient Expects to be Discharged to[de-identified] Apartment  Current DME Used/Available at Home: None  Tub or Shower Type: Tub/Shower combination    Hand dominance: Right    EXAMINATION OF PERFORMANCE DEFICITS:  Cognitive/Behavioral Status:  Neurologic State: Alert  Orientation Level: Oriented X4  Cognition: Follows commands; Appropriate for age attention/concentration  Perception: Appears intact  Perseveration: No perseveration noted  Safety/Judgement: Awareness of environment; Fall prevention;Home safety;Decreased awareness of need for assistance;Decreased awareness of need for safety;Decreased insight into deficits    Skin: intact    Edema: none noted    Hearing:   Auditory  Auditory Impairment: None    Vision/Perceptual:                                Corrective Lenses: Reading glasses    Range of Motion:  AROM: Within functional limits  PROM: Within functional limits      Strength:  Strength: Generally decreased, functional        Coordination:  Coordination: Generally decreased, functional  Fine Motor Skills-Upper: Left Intact; Right Intact    Gross Motor Skills-Upper: Left Intact; Right Intact    Tone & Sensation:  Tone: Normal        Balance:  Sitting: Intact  Standing: Impaired  Standing - Static: Fair;Constant support  Standing - Dynamic : Fair;Constant support    Functional Mobility and Transfers for ADLs:  Bed Mobility:  Rolling: Modified independent  Supine to Sit: Supervision  Sit to Supine: Supervision  Scooting: Supervision    Transfers:  Sit to Stand: Minimum assistance;Assist x1;Additional time  Stand to Sit: Minimum assistance; Additional time;Assist x1  Bed to Chair: Minimum assistance; Additional time; Adaptive equipment;Assist x1  Bathroom Mobility: Minimum assistance  Toilet Transfer : Minimum assistance; Adaptive equipment; Additional time;Assist x1    ADL Assessment:  Feeding: Independent    Oral Facial Hygiene/Grooming: Setup; Other (comment)(seated)         Upper Body Dressing: Setup    Lower Body Dressing: Contact guard assistance; Other (comment)(over hips in standing, instructed on compensatory technique)    Toileting: Minimum assistance; Other (comment); Assist x1;Adaptive equipment(standing to use urinal with rolling walker)                ADL Intervention and task modifications:   Educated on use of call bell, fall prevention and increased fall risk due to decreased balance and weakness    Instructed on hand placement for sit to stand and stand to sit to increase safety, instructed on body mechanics and benefit of scoot to edge of chair/bed prior to standing, initially demonstrated need for increased assist to stand with hips further back on bed, with reposition to edge of bed completed with min assist.      Patient instructed and indicated understanding the benefits of maintaining activity tolerance, functional mobility, and independence with self care tasks during acute stay  to ensure safe return home and to baseline. Encouraged patient to increase frequency and duration OOB once off of seizure  precautions, be out of bed or edge of bed for all meals with supervision, perform daily ADLs (as approved by RN/MD regarding bathing etc), and performing functional mobility to/from bathroom with assist      Cognitive Retraining  Safety/Judgement: Awareness of environment; Fall prevention;Home safety;Decreased awareness of need for assistance;Decreased awareness of need for safety;Decreased insight into deficits       Functional Measure:  Barthel Index:    Bathin  Bladder: 10  Bowels: 10  Groomin  Dressin  Feeding: 10  Mobility: 0  Stairs: 0  Toilet Use: 5  Transfer (Bed to Chair and Back): 10  Total: 55/100        The Barthel ADL Index: Guidelines  1. The index should be used as a record of what a patient does, not as a record of what a patient could do. 2. The main aim is to establish degree of independence from any help, physical or verbal, however minor and for whatever reason. 3. The need for supervision renders the patient not independent. 4. A patient's performance should be established using the best available evidence. Asking the patient, friends/relatives and nurses are the usual sources, but direct observation and common sense are also important. However direct testing is not needed. 5. Usually the patient's performance over the preceding 24-48 hours is important, but occasionally longer periods will be relevant. 6. Middle categories imply that the patient supplies over 50 per cent of the effort. 7. Use of aids to be independent is allowed. Mary Hernandez., Barthel, D.W. (1391). Functional evaluation: the Barthel Index. [facility] (14)2. JAMES Angeles, Simi Helton., Rosalee Boyle., Gabino Dominguez, [address] ().  Measuring the change indisability after inpatient rehabilitation; comparison of the responsiveness of the Barthel Index and Functional [place] Measure. Journal of Neurology, Neurosurgery, and Psychiatry, 66(4), 239-642. KWAME Reina, CARLENE Veliz, & Janna Rice M.A. (2004.) Assessment of post-stroke quality of life in cost-effectiveness studies: The usefulness of the Barthel Index and the EuroQoL-5D. Quality of Life Research, 15, 105-81         Occupational Therapy Evaluation Charge Determination   History Examination Decision-Making   LOW Complexity : Brief history review  LOW Complexity : 1-3 performance deficits relating to physical, cognitive , or psychosocial skils that result in activity limitations and / or participation restrictions  MEDIUM Complexity : Patient may present with comorbidities that affect occupational performnce. Miniml to moderate modification of tasks or assistance (eg, physical or verbal ) with assesment(s) is necessary to enable patient to complete evaluation       Based on the above components, the patient evaluation is determined to be of the following complexity level: LOW   Pain Rating:  Not rated, abdominal pain at rest and with movment    Activity Tolerance:   Fair and requires rest breaks    After treatment patient left in no apparent distress:    Supine in bed, Call bell within reach, and 4 side rails up and pads on bed for seizure precaution    COMMUNICATION/EDUCATION:   The patients plan of care was discussed with: Physical therapist and Registered nurse. Home safety education was provided and the patient/caregiver indicated understanding. and Patient/family have participated as able in goal setting and plan of care. This patients plan of care is appropriate for delegation to [facility].     Thank you for this referral.  Glenn Ovalles OTR/L  Time Calculation: 27 mins

## 2020-12-21 NOTE — PROGRESS NOTES
Bedside shift change report given to Curly Malik (oncoming nurse) by Slim Welch (offgoing nurse).  Report included the following information SBAR, Kardex, Intake/Output, MAR, Recent Results and Cardiac Rhythm SA.

## 2020-12-21 NOTE — PROGRESS NOTES
Comprehensive Nutrition Assessment    Type and Reason for Visit: (P) Initial, Consult    Nutrition Recommendations/Plan: as medically appropriate advance to GI lite diet high protein high kcal trays. Clear liquid supplement for now, Add Magic Cups and Ensure compact to meal trays once diet is advanced. Nutrition Assessment:  (P) PT admitted for acute pancreatitis, n/v, inability to tolerate po intake. Reports no food intake x 1 week and no water intake x 2 days. acute renal failure likely prerenal per MD 2' hypovolemia; chronic diarrhea and ongoing ETOH use, reporta drinking 1 pint vodka per day; last drink 4 days PTA; hx rectal cancer, partial SBO, US [facility] c/w hepatic steatosis. Pt meet ASPEN criteria for severe malnutrition    Meets Criteria for Chronic Malnutrition   [x] Severe Malnutrition, as evidenced by:   [x] Severe muscle wasting, loss of subcutaneous fat   [x] Nutritional intake of <75% of recommended intake for >1 month   [x] Weight loss of  >5% in 1 month, >7.5% in 3 months, >10% in 6 months, >20% in 1 year   [] Severe edema   []Moderate Malnutrition, as evidenced by:   [] Mild muscle wasting, loss of subcutaneous fat   [] Nutritional intake <75% of recommended intake for >1 month   [] Weight loss of  5% in 1 month, 7.5% in 3 months, 10% in 6 months, or 20% in 1 year   [] Mild edema         Malnutrition Assessment:  Malnutrition Status:     severe  Context:      acute and chronic illness  Findings of the 6 clinical characteristics of malnutrition:   Energy Intake:   0%  Weight Loss:      > 10% x 1 month  Body Fat Loss:   ,   severe  Muscle Mass Loss:   ,  moderation  Fluid Accumulation:   ,  no   Strength:    not measured      Estimated Daily Nutrient Needs:  Energy (kcal): (P) 2018; Weight Used for Energy Requirements: (P) Current  Protein (g): (P) 75.6(1.43 g/kg);  Weight Used for Protein Requirements: (P) Current  Fluid (ml/day): (P) 3600; Method Used for Fluid Requirements: (P) Other (comment)(lactated ringers 150 mL/hr per MD order)      Nutrition Related Findings:  (P) Underweight, meets ASPEN criteria for severe malnutrition      Wounds:    (P) None       Current Nutrition Therapies:  DIET CLEAR LIQUID    Anthropometric Measures:  Height:  (P) 5' 8\" (172.7 cm)  Current Body Wt:  (P) 52.6 kg (116 lb)   Admission Body Wt:       Usual Body Wt:        Ideal Body Wt:  (P) 154 lbs:  (P) 75.3 %   Adjusted Body Weight:   ; Weight Adjustment for: (P) No adjustment   Adjusted BMI:       BMI Category:  (P) Underweight (BMI less than 18.5)       Nutrition Diagnosis:   (P) Inadequate protein-energy intake, Increased nutrient needs, Severe malnutrition, In context of chronic illness, Underweight, Unintended weight loss related to (P) inadequate protein-energy intake, increased demand for energy/nutrients as evidenced by (P) NPO or clear liquid status due to medical condition, intake 0-25%, poor intake prior to admission, BMI, weight loss, weight loss greater than or equal to 10% in 6 months, severe loss of subcutaneous fat, moderate muscle loss, nausea, vomiting, diarrhea, poor dentition    Nutrition Interventions:   Food and/or Nutrient Delivery: (P) Modify current diet, Start oral nutrition supplement  Nutrition Education and Counseling: (P) Education not indicated  Coordination of Nutrition Care: (P) Continue to monitor while inpatient, Feeding assistance/environmental change    Goals:  (P) advance diet to GI lite and po intake > 50% most meals and ONS next 4-7 days       Nutrition Monitoring and Evaluation:   Behavioral-Environmental Outcomes: (P) Beliefs and attitudes, Knowledge or skill  Food/Nutrient Intake Outcomes: (P) Diet advancement/tolerance, Supplement intake  Physical Signs/Symptoms Outcomes: (P) Diarrhea, Nausea/vomiting, GI status, Fluid status or edema, Nutrition focused physical findings, Weight    Discharge Planning:    (P) Continue oral nutrition supplement, Continue current diet(advance diet as medically appropriate)     Electronically signed by Pennie Oscar, PhD, RD, [address]  on 12/21/2020 at 8:13 AM    Contact: [phone]

## 2020-12-21 NOTE — ED NOTES
Pt reting comfortably in hospital bed; denies needing anything at this time; emesis bag, urinal and call bell at bedside.

## 2020-12-21 NOTE — PROGRESS NOTES
Pt is A&Ox4 and complains of lower abdominal pain-PRN oxy given per eMAR w/ improvement. VSS, due medications given per orders. IV patent-LR @150, RA, able to ambulate w/ SBA and walker. Poor appetite observed by writer. Pt is briefed for occasional incontinence. Call light within reach,hourly safety rounds maintained. Will continue to monitor.

## 2020-12-21 NOTE — PROGRESS NOTES
TRANSFER - IN REPORT:    Verbal report received from PauloRN(name) on [facility]  being received from ED(unit) for routine progression of care      Report consisted of patients Situation, Background, Assessment and   Recommendations(SBAR). Information from the following report(s) SBAR, Kardex, ED Summary, Intake/Output, MAR, Recent Results and Cardiac Rhythm NSR was reviewed with the receiving nurse. Opportunity for questions and clarification was provided.

## 2020-12-21 NOTE — ED NOTES
TRANSFER - OUT REPORT:    Verbal report given to EHSAN Bustamante(name) on Leona Krause  being transferred to Telemetry  207(unit) for routine progression of care       Report consisted of patients Situation, Background, Assessment and   Recommendations(SBAR). Information from the following report(s) SBAR, Kardex, ED Summary, [facility] and Recent Results was reviewed with the receiving nurse. Lines:   Venous Access Device [place] Cath (Active)       Peripheral IV 12/20/20 Right Antecubital (Active)   Site Assessment Clean, dry, & intact 12/20/20 1320   Phlebitis Assessment 0 12/20/20 1320   Infiltration Assessment 0 12/20/20 1320   Dressing Status Clean, dry, & intact 12/20/20 1320   Dressing Type Tape;Transparent 12/20/20 1320   Hub Color/Line Status Pink;Flushed;Patent 12/20/20 1320   Action Taken Blood drawn 12/20/20 1320   Alcohol Cap Used No 12/20/20 1320        Opportunity for questions and clarification was provided.       Patient transported with:   Monitor  Registered Nurse

## 2020-12-21 NOTE — PROGRESS NOTES
[facility]: MED RECONCILIATION    Comments/Recommendations:     Medication reconciliation performed with patient's PCP, Dr Dawson Lynn, office at patient's request. Patient states he is not able to recall any medication information and last doses. Medications added:     Tamsulosin  Famotidine  Metoprolol Tartrate  Potassium   Aspirin  Vitamin B-12    Medications removed:    Folic Acid  Perocet  Miralax    Information obtained from: Karin Espinal at Dr. Dawson Lynn office    Allergies: Patient has no known allergies. Prior to Admission Medications:     Medication Documentation Review Audit       Reviewed by Loulou Garcia, Nadine Bose (Pharmacist) on 12/21/20 at 1012      Medication Sig Documenting Provider Last Dose Status Taking?   aspirin delayed-release 81 mg tablet Take 81 mg by mouth daily. Provider, Historical Unknown Unknown time Active No   cyanocobalamin (Vitamin B-12) 1,000 mcg tablet Take 1,000 mcg by mouth daily. Provider, Historical Unknown Unknown time Active No   famotidine (PEPCID) 20 mg tablet Take 20 mg by mouth daily. Provider, Historical Unknown Unknown time Active No   metoprolol tartrate (LOPRESSOR) 25 mg tablet Take 12.5 mg by mouth two (2) times a day. Provider, Historical Unknown Unknown time Active No   multivitamin (ONE A DAY) tablet Take 1 Tab by mouth daily. Provider, Historical Unknown Unknown time Active No   potassium chloride (K-DUR, KLOR-CON) 20 mEq tablet Take 20 mEq by mouth daily. Provider, Historical Unknown Unknown time Active No   tamsulosin (FLOMAX) 0.4 mg capsule Take 0.4 mg by mouth daily. Provider, Historical Unknown Unknown time Active No   thiamine mononitrate (B-1) 100 mg tablet Take 1 Tab by mouth daily.  Sabine NIÑO NP Not Taking Unknown time Active No                    Samantha Hodgkin, PHARMD   Contact: [phone]

## 2020-12-21 NOTE — PROGRESS NOTES
Problem: Mobility Impaired (Adult and Pediatric)  Goal: *Acute Goals and Plan of Care (Insert Text)  Description: FUNCTIONAL STATUS PRIOR TO ADMISSION: Patient was independent and active without use of DME.    HOME SUPPORT PRIOR TO ADMISSION: The patient lived alone with no local support. Physical Therapy Goals  Initiated 12/21/2020  1. Patient will move from supine to sit and sit to supine  in bed with independence within 7 day(s). 2.  Patient will transfer from bed to chair and chair to bed with modified independence using the least restrictive device within 7 day(s). 3.  Patient will perform sit to stand with modified independence within 7 day(s). 4.  Patient will ambulate with modified independence for 150 feet with the least restrictive device within 7 day(s). Outcome: Progressing Towards Goal     PHYSICAL THERAPY EVALUATION  Patient: Irasema Romano (1 y.o. male)  Date: 12/21/2020  Primary Diagnosis: Pancreatitis [K85.90]  Acute pancreatitis [K85.90]        Precautions:   Fall    ASSESSMENT  Based on the objective data described below, the patient presents with generalized weakness, impaired balance and coordination and overall decreased functional mobility d/t acute pancreatitis. Patient required RW to ambulate safely for 40ft. Declined up in chair due to dizziness but VSS. Returned to bed with seizure precautions pads in place. He reports independence mobility without at basel;ine but does experience shakiness and impaired balance when in withdrawal from ETOH. Current Level of Function Impacting Discharge (mobility/balance): min A x1 with RW    Functional Outcome Measure: The patient scored 7/28 on the tinetti outcome measure which is indicative of high fall risk. Other factors to consider for discharge: medical clearance     Patient will benefit from skilled therapy intervention to address the above noted impairments.        PLAN :  Recommendations and Planned Interventions: bed mobility training, transfer training, gait training, therapeutic exercises, neuromuscular re-education, patient and family training/education, and therapeutic activities      Frequency/Duration: Patient will be followed by physical therapy:  3 times a week to address goals. Recommendation for discharge: (in order for the patient to meet his/her long term goals)  Physical therapy at least 2 days/week in the home     This discharge recommendation:  Has not yet been discussed the attending provider and/or case management    IF patient discharges home will need the following DME: rolling walker         SUBJECTIVE:   Patient stated I feel weak.     OBJECTIVE DATA SUMMARY:   HISTORY:    Past Medical History:   Diagnosis Date    Gastrointestinal disorder     Gastric Ulcers; Rectal CA   History reviewed. No pertinent surgical history. Personal factors and/or comorbidities impacting plan of care: ETHO, N/V    Home Situation  Home Environment: Apartment  # Steps to Enter: 0  Living Alone: Yes  Support Systems: None  Patient Expects to be Discharged to[de-identified] Apartment  Current DME Used/Available at Home: None    EXAMINATION/PRESENTATION/DECISION MAKING:   Critical Behavior:              Hearing: Auditory  Auditory Impairment: None  Skin:  NT  Edema: NT  Range Of Motion:  AROM: Within functional limits           PROM: Within functional limits           Strength:    Strength: Generally decreased, functional                    Tone & Sensation:   Tone: Normal                              Coordination:  Coordination: Generally decreased, functional  Vision:      Functional Mobility:  Bed Mobility:  Rolling: Modified independent  Supine to Sit: Supervision  Sit to Supine: Supervision  Scooting: Supervision  Transfers:  Sit to Stand: Minimum assistance;Assist x1;Additional time  Stand to Sit: Minimum assistance; Additional time;Assist x1                       Balance:   Sitting: Intact  Standing: Impaired  Standing - Static: Fair;Constant support  Standing - Dynamic : Fair;Constant support  Ambulation/Gait Training:  Distance (ft): 40 Feet (ft)  Assistive Device: Gait belt;Walker, rolling  Ambulation - Level of Assistance: Minimal assistance;Assist x1;Additional time        Gait Abnormalities: Ataxic; Shuffling gait        Base of Support: Narrowed     Speed/Denise: Pace decreased (<100 feet/min)  Step Length: Left lengthened;Right lengthened                     Functional Measure:  Tinetti test:    Sitting Balance: 1  Arises: 0  Attempts to Rise: 0  Immediate Standing Balance: 0  Standing Balance: 1  Nudged: 0  Eyes Closed: 0  Turn 360 Degrees - Continuous/Discontinuous: 0  Turn 360 Degrees - Steady/Unsteady: 0  Sitting Down: 1  Balance Score: 3 Balance total score  Indication of Gait: 0  R Step Length/Height: 1  L Step Length/Height: 1  R Foot Clearance: 0  L Foot Clearance: 0  Step Symmetry: 1  Step Continuity: 0  Path: 1  Trunk: 0  Walking Time: 0  Gait Score: 4 Gait total score  Total Score: 7/28 Overall total score         Tinetti Tool Score Risk of Falls  <19 = High Fall Risk  19-24 = Moderate Fall Risk  25-28 = Low Fall Risk  Tinetti ME. Performance-Oriented Assessment of Mobility Problems in Elderly Patients. Millan 66; [id number].  (Scoring Description: PT Bulletin Feb. 10, 1993)    Older adults: Brea White et al, 2009; n = 1000 [facility] elderly evaluated with ABC, BRANDI, ADL, and IADL)  · Mean BRANDI score for males aged 69-68 years = 26.21(3.40)  · Mean BRANDI score for females age 69-68 years = 25.16(4.30)  · Mean BRANDI score for males over 80 years = 23.29(6.02)  · Mean BRANDI score for females over 80 years = 17.20(8.32)           Physical Therapy Evaluation Charge Determination   History Examination Presentation Decision-Making   HIGH Complexity :3+ comorbidities / personal factors will impact the outcome/ POC  MEDIUM Complexity : 3 Standardized tests and measures addressing body structure, function, activity limitation and / or participation in recreation  LOW Complexity : Stable, uncomplicated  HIGH Complexity : FOTO score of 1- 25       Based on the above components, the patient evaluation is determined to be of the following complexity level: LOW     Pain Rating:  Denies pain during session    Activity Tolerance:   Fair    After treatment patient left in no apparent distress:   Supine in bed and Call bell within reach    COMMUNICATION/EDUCATION:   The patients plan of care was discussed with: Occupational therapist and Registered nurse. Fall prevention education was provided and the patient/caregiver indicated understanding., Patient/family have participated as able in goal setting and plan of care. , and Patient/family agree to work toward stated goals and plan of care.     Thank you for this referral.  Britni Marc, PT   Time Calculation: 21 mins

## 2020-12-22 LAB
ALBUMIN SERPL-MCNC: 3.5 G/DL (ref 3.5–5)
ALBUMIN/GLOB SERPL: 0.9 {RATIO} (ref 1.1–2.2)
ALP SERPL-CCNC: 131 U/L (ref 45–117)
ALT SERPL-CCNC: 38 U/L (ref 12–78)
ANION GAP SERPL CALC-SCNC: 10 MMOL/L (ref 5–15)
AST SERPL-CCNC: 83 U/L (ref 15–37)
BASOPHILS # BLD: 0.1 K/UL (ref 0–0.1)
BASOPHILS NFR BLD: 1 % (ref 0–1)
BILIRUB SERPL-MCNC: 0.9 MG/DL (ref 0.2–1)
BUN SERPL-MCNC: 2 MG/DL (ref 6–20)
BUN/CREAT SERPL: 3 (ref 12–20)
CALCIUM SERPL-MCNC: 9.5 MG/DL (ref 8.5–10.1)
CHLORIDE SERPL-SCNC: 96 MMOL/L (ref 97–108)
CO2 SERPL-SCNC: 29 MMOL/L (ref 21–32)
CREAT SERPL-MCNC: 0.7 MG/DL (ref 0.7–1.3)
DIFFERENTIAL METHOD BLD: ABNORMAL
EOSINOPHIL # BLD: 0.1 K/UL (ref 0–0.4)
EOSINOPHIL NFR BLD: 2 % (ref 0–7)
ERYTHROCYTE [DISTWIDTH] IN BLOOD BY AUTOMATED COUNT: 15 % (ref 11.5–14.5)
GLOBULIN SER CALC-MCNC: 3.7 G/DL (ref 2–4)
GLUCOSE SERPL-MCNC: 103 MG/DL (ref 65–100)
HCT VFR BLD AUTO: 31.9 % (ref 36.6–50.3)
HGB BLD-MCNC: 11.3 G/DL (ref 12.1–17)
IMM GRANULOCYTES # BLD AUTO: 0 K/UL (ref 0–0.04)
IMM GRANULOCYTES NFR BLD AUTO: 0 % (ref 0–0.5)
LIPASE SERPL-CCNC: 1253 U/L (ref 73–393)
LYMPHOCYTES # BLD: 1.3 K/UL (ref 0.8–3.5)
LYMPHOCYTES NFR BLD: 20 % (ref 12–49)
MAGNESIUM SERPL-MCNC: 1.8 MG/DL (ref 1.6–2.4)
MCH RBC QN AUTO: 32.9 PG (ref 26–34)
MCHC RBC AUTO-ENTMCNC: 35.4 G/DL (ref 30–36.5)
MCV RBC AUTO: 93 FL (ref 80–99)
MONOCYTES # BLD: 0.6 K/UL (ref 0–1)
MONOCYTES NFR BLD: 9 % (ref 5–13)
NEUTS SEG # BLD: 4.2 K/UL (ref 1.8–8)
NEUTS SEG NFR BLD: 68 % (ref 32–75)
NRBC # BLD: 0 K/UL (ref 0–0.01)
NRBC BLD-RTO: 0 PER 100 WBC
PHOSPHATE SERPL-MCNC: 1.8 MG/DL (ref 2.6–4.7)
PLATELET # BLD AUTO: 93 K/UL (ref 150–400)
PMV BLD AUTO: 12.6 FL (ref 8.9–12.9)
POTASSIUM SERPL-SCNC: 3.1 MMOL/L (ref 3.5–5.1)
PROT SERPL-MCNC: 7.2 G/DL (ref 6.4–8.2)
RBC # BLD AUTO: 3.43 M/UL (ref 4.1–5.7)
RBC MORPH BLD: ABNORMAL
SODIUM SERPL-SCNC: 135 MMOL/L (ref 136–145)
WBC # BLD AUTO: 6.3 K/UL (ref 4.1–11.1)

## 2020-12-22 PROCEDURE — 83735 ASSAY OF MAGNESIUM: CPT

## 2020-12-22 PROCEDURE — 74011000258 HC RX REV CODE- 258: Performed by: STUDENT IN AN ORGANIZED HEALTH CARE EDUCATION/TRAINING PROGRAM

## 2020-12-22 PROCEDURE — 97116 GAIT TRAINING THERAPY: CPT

## 2020-12-22 PROCEDURE — 74011250636 HC RX REV CODE- 250/636: Performed by: STUDENT IN AN ORGANIZED HEALTH CARE EDUCATION/TRAINING PROGRAM

## 2020-12-22 PROCEDURE — 94760 N-INVAS EAR/PLS OXIMETRY 1: CPT

## 2020-12-22 PROCEDURE — 74011000250 HC RX REV CODE- 250: Performed by: STUDENT IN AN ORGANIZED HEALTH CARE EDUCATION/TRAINING PROGRAM

## 2020-12-22 PROCEDURE — 97535 SELF CARE MNGMENT TRAINING: CPT

## 2020-12-22 PROCEDURE — 87635 SARS-COV-2 COVID-19 AMP PRB: CPT

## 2020-12-22 PROCEDURE — 85025 COMPLETE CBC W/AUTO DIFF WBC: CPT

## 2020-12-22 PROCEDURE — 80053 COMPREHEN METABOLIC PANEL: CPT

## 2020-12-22 PROCEDURE — 36415 COLL VENOUS BLD VENIPUNCTURE: CPT

## 2020-12-22 PROCEDURE — 83690 ASSAY OF LIPASE: CPT

## 2020-12-22 PROCEDURE — 65270000032 HC RM SEMIPRIVATE

## 2020-12-22 PROCEDURE — 74011250637 HC RX REV CODE- 250/637: Performed by: STUDENT IN AN ORGANIZED HEALTH CARE EDUCATION/TRAINING PROGRAM

## 2020-12-22 PROCEDURE — 84100 ASSAY OF PHOSPHORUS: CPT

## 2020-12-22 RX ORDER — METRONIDAZOLE 250 MG/1
500 TABLET ORAL EVERY 12 HOURS
Status: DISCONTINUED | OUTPATIENT
Start: 2020-12-22 | End: 2020-12-27 | Stop reason: HOSPADM

## 2020-12-22 RX ORDER — HEPARIN SODIUM 5000 [USP'U]/ML
5000 INJECTION, SOLUTION INTRAVENOUS; SUBCUTANEOUS EVERY 12 HOURS
Status: DISCONTINUED | OUTPATIENT
Start: 2020-12-22 | End: 2020-12-27 | Stop reason: HOSPADM

## 2020-12-22 RX ORDER — LANOLIN ALCOHOL/MO/W.PET/CERES
400 CREAM (GRAM) TOPICAL ONCE
Status: COMPLETED | OUTPATIENT
Start: 2020-12-22 | End: 2020-12-22

## 2020-12-22 RX ADMIN — HYDROMORPHONE HYDROCHLORIDE 1 MG: 2 INJECTION, SOLUTION INTRAMUSCULAR; INTRAVENOUS; SUBCUTANEOUS at 18:53

## 2020-12-22 RX ADMIN — OXYCODONE HYDROCHLORIDE 5 MG: 5 TABLET ORAL at 06:26

## 2020-12-22 RX ADMIN — Medication 1 CAPSULE: at 08:27

## 2020-12-22 RX ADMIN — Medication 10 ML: at 13:29

## 2020-12-22 RX ADMIN — OXYCODONE HYDROCHLORIDE 10 MG: 5 TABLET ORAL at 17:17

## 2020-12-22 RX ADMIN — HYDROMORPHONE HYDROCHLORIDE 1 MG: 2 INJECTION, SOLUTION INTRAMUSCULAR; INTRAVENOUS; SUBCUTANEOUS at 08:36

## 2020-12-22 RX ADMIN — CEFTRIAXONE SODIUM 1 G: 1 INJECTION, POWDER, FOR SOLUTION INTRAMUSCULAR; INTRAVENOUS at 18:47

## 2020-12-22 RX ADMIN — Medication 100 MG: at 08:27

## 2020-12-22 RX ADMIN — TAMSULOSIN HYDROCHLORIDE 0.4 MG: 0.4 CAPSULE ORAL at 08:27

## 2020-12-22 RX ADMIN — ONDANSETRON 4 MG: 2 INJECTION INTRAMUSCULAR; INTRAVENOUS at 08:35

## 2020-12-22 RX ADMIN — Medication 10 ML: at 21:54

## 2020-12-22 RX ADMIN — POTASSIUM BICARBONATE 40 MEQ: 391 TABLET, EFFERVESCENT ORAL at 13:28

## 2020-12-22 RX ADMIN — METRONIDAZOLE 500 MG: 250 TABLET ORAL at 16:34

## 2020-12-22 RX ADMIN — SODIUM CHLORIDE: 900 INJECTION, SOLUTION INTRAVENOUS at 13:28

## 2020-12-22 RX ADMIN — METRONIDAZOLE 500 MG: 500 INJECTION, SOLUTION INTRAVENOUS at 04:18

## 2020-12-22 RX ADMIN — HEPARIN SODIUM 5000 UNITS: 5000 INJECTION, SOLUTION INTRAVENOUS; SUBCUTANEOUS at 22:00

## 2020-12-22 RX ADMIN — Medication 10 ML: at 05:12

## 2020-12-22 RX ADMIN — SODIUM CHLORIDE, SODIUM LACTATE, POTASSIUM CHLORIDE, AND CALCIUM CHLORIDE 150 ML/HR: 600; 310; 30; 20 INJECTION, SOLUTION INTRAVENOUS at 11:27

## 2020-12-22 RX ADMIN — FOLIC ACID 1 MG: 1 TABLET ORAL at 08:27

## 2020-12-22 RX ADMIN — MAGNESIUM OXIDE 400 MG (241.3 MG MAGNESIUM) TABLET 400 MG: TABLET at 13:27

## 2020-12-22 RX ADMIN — SODIUM CHLORIDE, SODIUM LACTATE, POTASSIUM CHLORIDE, AND CALCIUM CHLORIDE 150 ML/HR: 600; 310; 30; 20 INJECTION, SOLUTION INTRAVENOUS at 18:52

## 2020-12-22 RX ADMIN — Medication 20 ML: at 08:37

## 2020-12-22 RX ADMIN — ONDANSETRON 4 MG: 2 INJECTION INTRAMUSCULAR; INTRAVENOUS at 17:17

## 2020-12-22 RX ADMIN — SODIUM CHLORIDE, SODIUM LACTATE, POTASSIUM CHLORIDE, AND CALCIUM CHLORIDE 150 ML/HR: 600; 310; 30; 20 INJECTION, SOLUTION INTRAVENOUS at 20:28

## 2020-12-22 RX ADMIN — POTASSIUM BICARBONATE 40 MEQ: 391 TABLET, EFFERVESCENT ORAL at 14:45

## 2020-12-22 RX ADMIN — HEPARIN SODIUM 5000 UNITS: 5000 INJECTION, SOLUTION INTRAVENOUS; SUBCUTANEOUS at 11:41

## 2020-12-22 NOTE — PROGRESS NOTES
KENTRELL  Readmission score=18%  Referral sent through [employer] to the following [address], [facility], [employer], [facility], [facility] and [place]. No answer yet from [facility].   BENJAMIN Daley/CEASAR  [phone]

## 2020-12-22 NOTE — PROGRESS NOTES
Problem: Self Care Deficits Care Plan (Adult)  Goal: *Acute Goals and Plan of Care (Insert Text)  Description:   FUNCTIONAL STATUS PRIOR TO ADMISSION: Patient was independent and active without use of DME. Re-orts fall a few weeks ago    HOME SUPPORT: The patient lived alone, reports he does not have assist    Occupational Therapy Goals  Initiated 12/21/2020  1. Patient will perform grooming with modified independence standing at the sink within 7 day(s). 2.  Patient will perform lower body dressing with modified independence within 7 day(s). 3.  Patient will perform simple home management with modified independence within 7 day(s). 4.  Patient will perform toilet transfers with modified independence within 7 day(s). 5.  Patient will perform all aspects of toileting with modified independence within 7 day(s). Outcome: Progressing Towards Goal    OCCUPATIONAL THERAPY TREATMENT  Patient: Leona Krause (50 y.o. male)  Date: 12/22/2020  Diagnosis: Pancreatitis [K85.90]  Acute pancreatitis [K85.90] <principal problem not specified>       Precautions: Fall, Seizure  Chart, occupational therapy assessment, plan of care, and goals were reviewed. ASSESSMENT  Patient continues with skilled OT services and is progressing towards goals. Patient agreed to walk to bathroom. His legs are very wobbly and he needed increased assistance getting up from toilet as it is low. He verbalized concern about managing at home as he needs physical assistance for basic tasks. Patient receptive to rehab prior to discharge home. Discussed with PT who took concerns to [facility].    Current Level of Function Impacting Discharge (ADLs): mod A    Other factors to consider for discharge: none         PLAN :  Patient continues to benefit from skilled intervention to address the above impairments. Continue treatment per established plan of care. to address goals.     Recommend with staff: use gait belt and RW    Recommend next OT session: continue POC    Recommendation for discharge: (in order for the patient to meet his/her long term goals)  Therapy up to 5 days/week in SNF setting    This discharge recommendation:  Has been made in collaboration with the attending provider and/or case management    IF patient discharges home will need the following DME: walker: rolling       SUBJECTIVE:   Patient stated i'm worried about how I'm going to do at home. Brenda Degroot    OBJECTIVE DATA SUMMARY:   Cognitive/Behavioral Status:                      Functional Mobility and Transfers for ADLs:  Bed Mobility:  Rolling: Supervision  Supine to Sit: Supervision  Scooting: Supervision    Transfers:  Sit to Stand: Minimum assistance(Mod A from low surface)  Functional Transfers  Toilet Transfer : Moderate assistance       Balance:  Sitting: Intact  Standing: Impaired  Standing - Static: Fair;Constant support  Standing - Dynamic : Fair;Constant support    ADL Intervention:            Upper Body Bathing  Bathing Assistance: Set-up  Position Performed: Seated in chair    Lower Body Bathing  Bathing Assistance: Set-up; Stand-by assistance  Position Performed: Seated in chair              Toileting  Toileting Assistance: Minimum assistance             Pain:  none    Activity Tolerance:   Fair    After treatment patient left in no apparent distress:   Sitting in chair    COMMUNICATION/COLLABORATION:   The patients plan of care was discussed with: Physical therapist and Registered nurse.      Charo Arias  Time Calculation: 23 mins

## 2020-12-22 NOTE — PROGRESS NOTES
9676) Bedside shift change report given to Cordelia Davidson RN (oncoming nurse) by Devora Jacobsen RN (offgoing nurse). Report included the following information SBAR, Kardex and MAR.   0945) small amount of vomiting with breakfast  [phone] Discuss with Jaki, care managerGaby for placement  1650) Consult Dr. Rajinder Blandon for COVID test for placement  1920) Bedside shift change report given to Devora Jacobsen RN (oncoming nurse) by Cordelia Davidson RN (offgoing nurse). Report included the following information SBAR, Kardex and MAR.

## 2020-12-22 NOTE — PROGRESS NOTES
Bedside shift change report given to EHSAN Amaya (oncoming nurse) by Riya Alcala (offgoing nurse). Report included the following information SBAR, Kardex, Intake/Output, MAR, Recent Results and Cardiac Rhythm SA. Patient resting in bed quietly watching TV.

## 2020-12-22 NOTE — PROGRESS NOTES
Attempted to see for treatment. Patient just receiving breakfast tray and request OT return later.     Demetrice Mason MS, OTR/L, CSRS

## 2020-12-22 NOTE — PROGRESS NOTES
Hospitalist Progress Note    NAME: Isha Dumas   :  1965   MRN:  [id number]   Room Number:    @ [facility]       Interim Hospital Summary: 60 y.o. male whom presented on 2020 with      Assessment / Plan:      Anticipated dc date : 1-2 days  Anticipated disposition : SNF  Barriers to dc : Pain control, SNF placement    Acute on chronic pancreatitis POA  Known history of chronic pancreatitis. Lipase 1272. Ongoing alcohol abuse. CT abdomen/pelvis reviewed independentlyfat stranding of pancreatic tail representing focal pancreatitis.      low fat diet. IV hydration  Pain management with oral oxycodone and IV Dilaudid per pain scale  - Repeat Lipase increased from 972 () to 1253         Enteritis POA  CT abdomen/pelvis  reviewed independentlyabuse moderate long segment enteritis of distal small bowel. History of partial small bowel obstruction few months ago that was treated conservatively at [facility]. Lactic acid 1.8, do not suspect ischemic enteritis. History of radiation therapy in the past with adhesions, and small bowel obstruction.      Continue hydration  Ceftriaxone, Flagyl for now. Tolerating diet      Refeeding syndrome, not POA  Hypokalemia, K 3.1  - Replete orally. Hypomagnesemia POA  Mg 1.8. Replete  Hypophosphatemia POA   PO4 1.8  - Kphos repletion. BPH   Continue tamsulosin        Normochromic normocytic anemia POA  At baseline. Baseline is ~ 9. Suspect nutritional deficiency, alcohol induced, depression. Lab Results   Component Value Date/Time    Iron 106 2020 04:33 AM    TIBC 245 (L) 2020 04:33 AM    Iron % saturation 43 2020 04:33 AM     Lab Results   Component Value Date/Time    Folate 7.5 2020 31:71 AM     - Folic acid.         Thrombocytopenia, POA  likely due to alcohol induced bone marrow suppression. Baseline is within normal limits 200-300 per [facility] records.  Repeat PLT 93.      - Continue to monitor for s/s of bleeding.         Generalized weakness POA  Physical deconditioning POA  -PT/OT consulted. HH recommended. Needs rolling walker. Low-density lesion in the pancreas POA  Noted on CT abdomen/pelvis. Nonspecific. We will need follow-up CT scan as outpatient.        Elevated transaminases POA  ALT 38, AST 83, T bili 0.9. US Liver 8/2020 from [address] [place] [address] revealed hepatis steatosis. INR 1.1.         Alcohol abuse POA   EtOH level less than 10. Last drink 4 days ago. Discontinue CIWA monitoring.        Acute renal failure POA,resolved  Baseline creatinine 0.72.6. Creatinine 1.21. Patient clinically volume depleted. Likely prerenal due to hypovolemia. Resolved with hydration.      Dehydration POA,resolved  Due to decreased oral intake.           Body mass index is 17.64 kg/m². Severe protein calorie malnutrition POA  - RD following     Code Status: full   Surrogate Decision Maker:  Sister         DVT Prophylaxis: Heparin sc  GI Prophylaxis: not indicated  Lines : PIV  Baseline:ambulatory independently          Subjective:     Chief Complaint / Reason for Physician Visit  \"Still having pain in my belly. .. I'm nauseous\". Patient has emesis after breakfast this morning. Discussed with RN events overnight. Review of Systems:  + abdominal pain, N/V. No fevers, chills, appetite change, cough, sputum production, shortness of breath, dyspnea on exertion,diarrhea, constipation, chest pain, leg edema,  joint pain, rash, itching. Tolerating PT/OT. Tolerating diet. Objective:     VITALS:   Last 24hrs VS reviewed since prior progress note.  Most recent are:  Patient Vitals for the past 24 hrs:   Temp Pulse Resp BP SpO2   12/22/20 0825 96.8 °F (36 °C) 86 16 (!) 148/86 100 %   12/22/20 0444 97.5 °F (36.4 °C) 73 15 (!) 145/80 100 %   12/22/20 0400  64      12/22/20 0000  66      12/21/20 2336 97.6 °F (36.4 °C) 64 14 136/76 100 %   12/21/20 2000  67      12/21/20 1923 97.3 °F (36.3 °C) 63 16 133/74 100 %   12/21/20 1600  66      12/21/20 1453 97 °F (36.1 °C) 67  (!) 150/79 100 %   12/21/20 1200  85      12/21/20 1129 97.9 °F (36.6 °C) 63  (!) 151/86 100 %       Intake/Output Summary (Last 24 hours) at 12/22/2020 1039  Last data filed at 12/22/2020 0945  Gross per 24 hour   Intake 300 ml   Output 850 ml   Net -550 ml        PHYSICAL EXAM:  General: Alert, cooperative, no acute distress    EENT:  EOMI. Anicteric sclerae. MMM  Resp:  CTA bilaterally, no wheezing or rales. No accessory muscle use  CV:  Regular  rhythm,  normal S1/S2, no murmurs rubs gallops, No edema  GI:  Soft, Non distended, TTP diffusely most prominent in periumbilical area, + guarding, no rigidity/RT,  +Bowel sounds  Neurologic:  Alert and oriented X 3, normal speech. Psych:   Good insight. Not anxious nor agitated  Skin:  No rashes. No jaundice    Reviewed most current lab test results and cultures  YES  Reviewed most current radiology test results   YES  Review and summation of old records today    NO  Reviewed patient's current orders and MAR    YES  PMH/SH reviewed - no change compared to H&P  ________________________________________________________________________  Care Plan discussed with:    Comments   Patient x    Family      RN x    Care Manager x    Consultant                       x Multidiciplinary team rounds were held today with , nursing, pharmacist and clinical coordinator. Patient's plan of care was discussed; medications were reviewed and discharge planning was addressed.      ________________________________________________________________________  Total NON critical care TIME:  35  Minutes    Total CRITICAL CARE TIME Spent:   Minutes non procedure based      Comments   >50% of visit spent in counseling and coordination of care     ________________________________________________________________________  Lorrene Bumpers, MD     Procedures: see electronic medical records for all procedures/Xrays and details which were not copied into this note but were reviewed prior to creation of Plan. LABS:  I reviewed today's most current labs and imaging studies.   Pertinent labs include:  Recent Labs     12/22/20  0954 12/21/20 0433 12/20/20  1319   WBC 6.3 5.8 6.0   HGB 11.3* 10.7* 11.3*   HCT 31.9* 30.5* 32.8*   PLT 93* 81* 93*     Recent Labs     12/21/20  0433 12/20/20  1319    135*   K 3.3* 3.7    99   CO2 24 21   GLU 90 136*   BUN 6 13   CREA 0.74 1.21   CA 9.5 9.9   MG 1.6  --    PHOS 1.3*  --    ALB 3.6 4.3   TBILI 1.0 1.5*   ALT 38 52   INR 1.1  --        Signed: Summer Roy MD

## 2020-12-22 NOTE — INTERDISCIPLINARY ROUNDS
IDR with Marcial SAMUELS), Ren Kong and Mary Saenz (pharmacits, Jaki (),  and Jayjay Dumont (RN) to discuss plan of care including labs this morning, continue LR, vomit this morning with breakfast, possible discharge tomorrow, home health, request SNF for PT, discontinue CIWA, substance abuse resources

## 2020-12-22 NOTE — PROGRESS NOTES
KENTRELL   RUR 19%     Referral for Rolling Walker  Freedom of choice discussed Jaki to put form on the chart. Order sent to [address] [place] [address]  [phone] via Fax. Home Health orders sent to [facility] for review     As of today patient asked about SNF. He has a Medicare Advantage Plan and will need to obtain Authorization for this level of care.   See Jaki's note relating to this    [email or web address] MSW RN   [phone]

## 2020-12-22 NOTE — PROGRESS NOTES
KENTRELL  Readmission score: 19%  Discussed in IDT rounds today. Patient stating to therapy that he cannot go home and wishes to go to SNF. IDT in agreement with plan. CM sent referrals to area SNF for possible placement. [address] and spoke with Elvina Rubinstein- admissions rep. for [facility].  asked for clinical information to be sent via [employer]. She will review information and call back with response. [phone]  Received call back from Elvina Rubinstein ([phone]). Patient has been accepted and they will have a bed ready tomorrow at [facility]. CM called floor and requested COVID test be completed. CM ask Ms. Nogueira about whether rapid or PCR test was necessary. She stated that either test is fine. 1547 CM met with patient to discuss plans. Explained that [facility] SNF has accepted patient for short term rehab. Explained that SNF providers are limited due to [facility]. Patient ask where [facility] is located and CM explained that SNF is very near to [facility].  Mr. Sánchez Bowie stated that he is familiar with location and is in agreement with discharge plan. He signed Freedom of Choice letter. Letter placed in patient's chart along with list of SNF. CM talked with Dr. Mayra Last about plans for needed COVID test and placement at [facility].     Niall Cope, BSW/CEASAR  [address] [place] [address], BSW/CM  [phone]

## 2020-12-22 NOTE — FACE TO FACE
Face to Face Order for DME Pt Name  Yancy Mehta Date of Birth 1965 Age  60 y.o. Medical Record Number  [id number] Room Number  207/01 Admit date:  12/20/2020 Date of Face to Face: 12/22/2020 Admission Diagnosis:  Acute on chronic pancreatitis POA Generalized weakness POA Physical deconditioning POA Diagnoses Present on Admission: 
 
Acute on chronic pancreatitis POA Generalized weakness POA Physical deconditioning POA Past Medical History:  
Diagnosis Date  Gastrointestinal disorder Gastric Ulcers; Rectal CA Visit Vitals BP (!) 148/86 Pulse 86 Temp 96.8 °F (36 °C) Resp 16 Ht 5' 8\" (1.727 m) Wt 52.6 kg (116 lb) SpO2 100% BMI 17.64 kg/m² No Known Allergies ? I certify that the patient needs DME as prescribed below and I will not be following this patient in the Community. ? Dr. Susan Hernandez MD will be responsible for signing the Plan of Care and will follow/coordinate ongoing care. ? Initial Orders for Care: ? Rolling walker ? Report to Kelsea Gibbons MD 
 
? I certify that I am taking care of the patient today (Please see hospital Discharge Records for details of clinical issues pertaining to this order). ________________________________________________________________________ Karthik Cox MD

## 2020-12-22 NOTE — PROGRESS NOTES
Problem: Falls - Risk of  Goal: *Absence of Falls  Description: Document Richard Merlin Fall Risk and appropriate interventions in the flowsheet.   Outcome: Progressing Towards Goal  Note: Fall Risk Interventions:  Mobility Interventions: Bed/chair exit alarm, PT Consult for mobility concerns, Strengthening exercises (ROM-active/passive)         Medication Interventions: Bed/chair exit alarm, Patient to call before getting OOB, Teach patient to arise slowly    Elimination Interventions: Bed/chair exit alarm, Call light in reach, Urinal in reach              Problem: Pancreatitis  Goal: *Control of acute pain  Outcome: Progressing Towards Goal     Problem: Pancreatitis  Goal: *Absence of nausea/vomiting  Outcome: Progressing Towards Goal     Problem: Pancreatitis  Goal: *Optimize nutritional status  Outcome: Progressing Towards Goal

## 2020-12-23 LAB
ALBUMIN SERPL-MCNC: 3.1 G/DL (ref 3.5–5)
ALBUMIN/GLOB SERPL: 1 {RATIO} (ref 1.1–2.2)
ALP SERPL-CCNC: 118 U/L (ref 45–117)
ALT SERPL-CCNC: 40 U/L (ref 12–78)
ANION GAP SERPL CALC-SCNC: 4 MMOL/L (ref 5–15)
AST SERPL-CCNC: 85 U/L (ref 15–37)
BASOPHILS # BLD: 0 K/UL (ref 0–0.1)
BASOPHILS NFR BLD: 0 % (ref 0–1)
BILIRUB DIRECT SERPL-MCNC: 0.2 MG/DL (ref 0–0.2)
BILIRUB SERPL-MCNC: 0.7 MG/DL (ref 0.2–1)
BUN SERPL-MCNC: 2 MG/DL (ref 6–20)
BUN/CREAT SERPL: 3 (ref 12–20)
CALCIUM SERPL-MCNC: 9.3 MG/DL (ref 8.5–10.1)
CHLORIDE SERPL-SCNC: 100 MMOL/L (ref 97–108)
CO2 SERPL-SCNC: 32 MMOL/L (ref 21–32)
CREAT SERPL-MCNC: 0.65 MG/DL (ref 0.7–1.3)
DIFFERENTIAL METHOD BLD: ABNORMAL
EOSINOPHIL # BLD: 0.1 K/UL (ref 0–0.4)
EOSINOPHIL NFR BLD: 2 % (ref 0–7)
ERYTHROCYTE [DISTWIDTH] IN BLOOD BY AUTOMATED COUNT: 15.1 % (ref 11.5–14.5)
GLOBULIN SER CALC-MCNC: 3.2 G/DL (ref 2–4)
GLUCOSE SERPL-MCNC: 106 MG/DL (ref 65–100)
HCT VFR BLD AUTO: 29.3 % (ref 36.6–50.3)
HEALTH STATUS, XMCV2T: NORMAL
HGB BLD-MCNC: 10.2 G/DL (ref 12.1–17)
IMM GRANULOCYTES # BLD AUTO: 0 K/UL (ref 0–0.04)
IMM GRANULOCYTES NFR BLD AUTO: 0 % (ref 0–0.5)
LIPASE SERPL-CCNC: 1413 U/L (ref 73–393)
LYMPHOCYTES # BLD: 0.8 K/UL (ref 0.8–3.5)
LYMPHOCYTES NFR BLD: 17 % (ref 12–49)
MAGNESIUM SERPL-MCNC: 1.7 MG/DL (ref 1.6–2.4)
MCH RBC QN AUTO: 32.7 PG (ref 26–34)
MCHC RBC AUTO-ENTMCNC: 34.8 G/DL (ref 30–36.5)
MCV RBC AUTO: 93.9 FL (ref 80–99)
MONOCYTES # BLD: 0.5 K/UL (ref 0–1)
MONOCYTES NFR BLD: 10 % (ref 5–13)
NEUTS SEG # BLD: 3.5 K/UL (ref 1.8–8)
NEUTS SEG NFR BLD: 71 % (ref 32–75)
NRBC # BLD: 0 K/UL (ref 0–0.01)
NRBC BLD-RTO: 0 PER 100 WBC
PHOSPHATE SERPL-MCNC: 2.3 MG/DL (ref 2.6–4.7)
PLATELET # BLD AUTO: 86 K/UL (ref 150–400)
POTASSIUM SERPL-SCNC: 4.1 MMOL/L (ref 3.5–5.1)
PROT SERPL-MCNC: 6.3 G/DL (ref 6.4–8.2)
RBC # BLD AUTO: 3.12 M/UL (ref 4.1–5.7)
RBC MORPH BLD: ABNORMAL
SARS-COV-2, COV2: NOT DETECTED
SODIUM SERPL-SCNC: 136 MMOL/L (ref 136–145)
SOURCE, COVRS: NORMAL
SPECIMEN SOURCE, FCOV2M: NORMAL
SPECIMEN TYPE, XMCV1T: NORMAL
WBC # BLD AUTO: 4.9 K/UL (ref 4.1–11.1)

## 2020-12-23 PROCEDURE — 74011250636 HC RX REV CODE- 250/636: Performed by: STUDENT IN AN ORGANIZED HEALTH CARE EDUCATION/TRAINING PROGRAM

## 2020-12-23 PROCEDURE — 97116 GAIT TRAINING THERAPY: CPT

## 2020-12-23 PROCEDURE — 84100 ASSAY OF PHOSPHORUS: CPT

## 2020-12-23 PROCEDURE — 74011250637 HC RX REV CODE- 250/637: Performed by: STUDENT IN AN ORGANIZED HEALTH CARE EDUCATION/TRAINING PROGRAM

## 2020-12-23 PROCEDURE — 65270000032 HC RM SEMIPRIVATE

## 2020-12-23 PROCEDURE — 74011000258 HC RX REV CODE- 258: Performed by: STUDENT IN AN ORGANIZED HEALTH CARE EDUCATION/TRAINING PROGRAM

## 2020-12-23 PROCEDURE — 85025 COMPLETE CBC W/AUTO DIFF WBC: CPT

## 2020-12-23 PROCEDURE — 83690 ASSAY OF LIPASE: CPT

## 2020-12-23 PROCEDURE — 36415 COLL VENOUS BLD VENIPUNCTURE: CPT

## 2020-12-23 PROCEDURE — 83735 ASSAY OF MAGNESIUM: CPT

## 2020-12-23 PROCEDURE — 80048 BASIC METABOLIC PNL TOTAL CA: CPT

## 2020-12-23 PROCEDURE — 80076 HEPATIC FUNCTION PANEL: CPT

## 2020-12-23 RX ORDER — SODIUM,POTASSIUM PHOSPHATES 280-250MG
1 POWDER IN PACKET (EA) ORAL
Status: COMPLETED | OUTPATIENT
Start: 2020-12-23 | End: 2020-12-23

## 2020-12-23 RX ORDER — SODIUM CHLORIDE, SODIUM LACTATE, POTASSIUM CHLORIDE, CALCIUM CHLORIDE 600; 310; 30; 20 MG/100ML; MG/100ML; MG/100ML; MG/100ML
75 INJECTION, SOLUTION INTRAVENOUS CONTINUOUS
Status: DISCONTINUED | OUTPATIENT
Start: 2020-12-23 | End: 2020-12-24

## 2020-12-23 RX ADMIN — METRONIDAZOLE 500 MG: 250 TABLET ORAL at 09:11

## 2020-12-23 RX ADMIN — SODIUM CHLORIDE, SODIUM LACTATE, POTASSIUM CHLORIDE, AND CALCIUM CHLORIDE 150 ML/HR: 600; 310; 30; 20 INJECTION, SOLUTION INTRAVENOUS at 02:43

## 2020-12-23 RX ADMIN — POTASSIUM & SODIUM PHOSPHATES POWDER PACK 280-160-250 MG 1 PACKET: 280-160-250 PACK at 10:43

## 2020-12-23 RX ADMIN — HEPARIN SODIUM 5000 UNITS: 5000 INJECTION, SOLUTION INTRAVENOUS; SUBCUTANEOUS at 10:43

## 2020-12-23 RX ADMIN — METRONIDAZOLE 500 MG: 250 TABLET ORAL at 21:34

## 2020-12-23 RX ADMIN — FOLIC ACID 1 MG: 1 TABLET ORAL at 09:11

## 2020-12-23 RX ADMIN — Medication 100 MG: at 09:11

## 2020-12-23 RX ADMIN — HEPARIN SODIUM 5000 UNITS: 5000 INJECTION, SOLUTION INTRAVENOUS; SUBCUTANEOUS at 22:16

## 2020-12-23 RX ADMIN — OXYCODONE HYDROCHLORIDE 5 MG: 5 TABLET ORAL at 07:31

## 2020-12-23 RX ADMIN — Medication 10 ML: at 21:34

## 2020-12-23 RX ADMIN — OXYCODONE HYDROCHLORIDE 5 MG: 5 TABLET ORAL at 19:28

## 2020-12-23 RX ADMIN — Medication 10 ML: at 10:44

## 2020-12-23 RX ADMIN — Medication 1 CAPSULE: at 09:11

## 2020-12-23 RX ADMIN — SODIUM CHLORIDE, SODIUM LACTATE, POTASSIUM CHLORIDE, AND CALCIUM CHLORIDE 75 ML/HR: 600; 310; 30; 20 INJECTION, SOLUTION INTRAVENOUS at 19:20

## 2020-12-23 RX ADMIN — CEFTRIAXONE SODIUM 1 G: 1 INJECTION, POWDER, FOR SOLUTION INTRAMUSCULAR; INTRAVENOUS at 19:20

## 2020-12-23 RX ADMIN — TAMSULOSIN HYDROCHLORIDE 0.4 MG: 0.4 CAPSULE ORAL at 09:11

## 2020-12-23 RX ADMIN — Medication 10 ML: at 05:50

## 2020-12-23 NOTE — PROGRESS NOTES
KENTRELL-  Readmission score=19%  CEASAR spoke with Yesika Turner (admissions at [facility]). COVID test is negative. Areli Lemus requested that results of test and last three days of MAR be faxed to her at [phone]. CEASAR to arrange transport for around 3pm today. Notified nursing of plans.     BENJAMIN Wiley/CEASAR  [phone]

## 2020-12-23 NOTE — PROGRESS NOTES
Bedside shift change report given to Jorge Hodge RN (oncoming nurse) by Aleshia Geiger RN (offgoing nurse). Report included the following information SBAR, Kardex and MAR. Patient resting quietly in bed watching tv, no complaints of pain or nausea at this time.

## 2020-12-23 NOTE — PROGRESS NOTES
IDT met to discuss plan of care. Patient has had COVID test in preparation to go to SNF. Resulted negative. CM to work on transition today.   BENJAMIN Garcia/CEASAR  [phone]

## 2020-12-23 NOTE — PROGRESS NOTES
Problem: Falls - Risk of  Goal: *Absence of Falls  Description: Document Shania Neville Fall Risk and appropriate interventions in the flowsheet.   Outcome: Progressing Towards Goal  Note: Fall Risk Interventions:  Mobility Interventions: PT Consult for mobility concerns, Strengthening exercises (ROM-active/passive), Utilize walker, cane, or other assistive device    Mentation Interventions: Adequate sleep, hydration, pain control    Medication Interventions: Bed/chair exit alarm, Evaluate medications/consider consulting pharmacy    Elimination Interventions: Call light in reach              Problem: Pancreatitis  Goal: *Control of acute pain  Outcome: Progressing Towards Goal     Problem: Pancreatitis  Goal: *Absence of nausea/vomiting  Outcome: Progressing Towards Goal     Problem: Pancreatitis  Goal: *Optimize nutritional status  Outcome: Progressing Towards Goal

## 2020-12-23 NOTE — PROGRESS NOTES
KENTRELL:  Readmission score=19%  1315  CM spoke to Dr. Magdalene Restrepo about discharge. Patient is not feeling well and he has cancelled discharge for today. CM called Jayme Morgan [phone] to inform her of change in plans. CM called [facility] [phone] to let them know that discharge has been cancelled. CM called patient's sister, Prakash [facility] [phone] to inform her that transfer to [facility] has been cancelled for today.   Yolis Reno, BSW/CEASAR  [phone]

## 2020-12-23 NOTE — PROGRESS NOTES
4690 Bedside shift report given to [facility] from [facility]. Resting quietly at this time. SBAR, MAR and Results reviewed. 8620 pt took medications without complication. Education provided on medication and uses. Pt verbalized understanding. 1100 order to discontinue telemetry. Order had already . Telemetry monitor removed and returned to monitor tech station. 1130 Case Management states pt will be leaving later this afternoon. Exact time to be determined. 1505 pt vomited small amount of his lunch so transfer is cancelled until tomorrow. Pt unable to keep lunch down. Physician aware. 1600 no change in assessment. 1900 report given to Monique SKY RN using SBAR, MAR, Results.

## 2020-12-23 NOTE — PROGRESS NOTES
Problem: Mobility Impaired (Adult and Pediatric)  Goal: *Acute Goals and Plan of Care (Insert Text)  Description: FUNCTIONAL STATUS PRIOR TO ADMISSION: Patient was independent and active without use of DME.    HOME SUPPORT PRIOR TO ADMISSION: The patient lived alone with no local support. Physical Therapy Goals  Initiated 12/21/2020  1. Patient will move from supine to sit and sit to supine  in bed with independence within 7 day(s). 2.  Patient will transfer from bed to chair and chair to bed with modified independence using the least restrictive device within 7 day(s). 3.  Patient will perform sit to stand with modified independence within 7 day(s). 4.  Patient will ambulate with modified independence for 150 feet with the least restrictive device within 7 day(s). Outcome: Progressing Towards Goal    PHYSICAL THERAPY TREATMENT  Patient: Lenin Vail (41 y.o. male)  Date: 12/23/2020  Diagnosis: Pancreatitis [K85.90]  Acute pancreatitis [K85.90] <principal problem not specified>       Precautions: Fall, Seizure  Chart, physical therapy assessment, plan of care and goals were reviewed. ASSESSMENT  Patient continues with skilled PT services and is progressing towards goals. Patient received in bedside chair and motivated for progress. Noted improvement in overall gait quality compared to last session. He remains challenged with sit to stand transfers requiring verbal cues for hand placment and 2 attempts to come to standing d/t a posteriorly shifted center of gravity. Gait training 2x40' using a RW and requiring minimal assistance with a seated rest break between. He required frequent cues for posture and c/o moderate fatigue upon return to his room. Patient is making gains but lives alone and has no support in home. Recommend discharge to SNF rehab d/t limited support and elevated risk of falls.     Current Level of Function Impacting Discharge (mobility/balance): minimal assist and 2 attempts for transfers, fatigues quickly             PLAN :  Patient continues to benefit from skilled intervention to address the above impairments. Continue treatment per established plan of care. to address goals. Recommendation for discharge: (in order for the patient to meet his/her long term goals)  Therapy up to 5 days/week in SNF setting    This discharge recommendation:  Has been made in collaboration with the attending provider and/or case management    IF patient discharges home will need the following DME: to be determined (TBD)       SUBJECTIVE:   Patient stated I want to get out of here.     OBJECTIVE DATA SUMMARY:   Critical Behavior:  Neurologic State: Alert  Orientation Level: Oriented X4  Cognition: Follows commands  Safety/Judgement: Awareness of environment, Fall prevention, Home safety, Decreased awareness of need for assistance, Decreased awareness of need for safety, Decreased insight into deficits  Functional Mobility Training:  Bed Mobility:        Sit to Supine: Contact guard assistance           Transfers:  Sit to Stand: Minimum assistance; Additional time  Stand to Sit: Minimum assistance                             Balance:  Sitting: Intact  Standing: Impaired  Standing - Static: Fair;Constant support  Standing - Dynamic : Constant support; Fair  Ambulation/Gait Training:  Distance (ft): 40 Feet (ft)(x2)  Assistive Device: Gait belt;Walker, rolling  Ambulation - Level of Assistance: Minimal assistance        Gait Abnormalities: Ataxic; Shuffling gait; Path deviations        Base of Support: Narrowed                          *    Activity Tolerance:   Fair    After treatment patient left in no apparent distress:   Supine in bed and Call bell within reach    COMMUNICATION/COLLABORATION:   The patients plan of care was discussed with: Registered nurse.      Eliza Burt, PT, DPT   Time Calculation: 13 mins

## 2020-12-23 NOTE — PROGRESS NOTES
Hospitalist Progress Note    NAME: Jessica Perales   :  1965   MRN:  [id number]   Room Number:    @ [facility]       Interim Hospital Summary: 60 y.o. male whom presented on 2020 with      Assessment / Plan:    Anticipated dc date : 1-2 days  Anticipated disposition : SNF  Barriers to dc : Pain control, SNF placement     Acute on chronic pancreatitis POA  Known history of chronic pancreatitis. Lipase 1272. Ongoing alcohol abuse. CT abdomen/pelvis reviewed independentlyfat stranding of pancreatic tail representing focal pancreatitis.      low fat diet. IV hydration  Pain management with oral oxycodone and IV Dilaudid per pain scale  - Lipase 972 > 1253> 1413            Enteritis POA  CT abdomen/pelvis  reviewed independentlyabuse moderate long segment enteritis of distal small bowel. History of partial small bowel obstruction few months ago that was treated conservatively at [facility]. Lactic acid 1.8, do not suspect ischemic enteritis. History of radiation therapy in the past with adhesions, and small bowel obstruction.      Continue hydration  Ceftriaxone, Flagyl for now. Tolerating diet      Refeeding syndrome, not POA  Hypokalemia, K 3.1  - Replete orally. Hypomagnesemia POA  Mg 1.8. Replete  Hypophosphatemia POA   PO4 1.8  - Kphos repletion.            BPH   Continue tamsulosin        Normochromic normocytic anemia POA  At baseline. Baseline is ~ 9. Suspect nutritional deficiency, alcohol induced, depression. Lab Results   Component Value Date/Time     Iron 106 2020 04:33 AM     TIBC 245 (L) 2020 04:33 AM     Iron % saturation 43 2020 04:33 AM            Lab Results   Component Value Date/Time     Folate 7.5 2020 43:35 AM      - Folic acid.         Thrombocytopenia, POA  likely due to alcohol induced bone marrow suppression. Baseline is within normal limits 200-300 per [facility] records.  Repeat PLT 93.      - Continue to monitor for s/s of bleeding.         Generalized weakness POA  Physical deconditioning POA  -PT/OT consulted. HH recommended. Needs rolling walker.         Low-density lesion in the pancreas POA  Noted on CT abdomen/pelvis. Nonspecific. We will need follow-up CT scan as outpatient.         Elevated transaminases POA  ALT 38, AST 83, T bili 0.9. US Liver 8/2020 from [facility] revealed hepatis steatosis. INR 1.1.         Alcohol abuse POA   EtOH level less than 10. Last drink 4 days ago. Discontinue CIWA monitoring.         Acute renal failure POA,resolved  Baseline creatinine 0.72.6. Creatinine 1.21. Patient clinically volume depleted. Likely prerenal due to hypovolemia. Resolved with hydration.      Dehydration POA,resolved  Due to decreased oral intake.           Body mass index is 17.64 kg/m². Severe protein calorie malnutrition POA  - RD following     Code Status: full   Surrogate Decision Maker:           DVT Prophylaxis: Heparin sc  GI Prophylaxis: not indicated  Lines : PIV  Baseline:ambulatory independently             Subjective:     Chief Complaint / Reason for Physician Visit  Couldn't tolerate diet had nausea and vomiting   Discussed with RN events overnight. Review of Systems:  No fevers, chills, appetite change, cough, sputum production, shortness of breath, dyspnea on exertion, nausea, vomitting, diarrhea, constipation, chest pain, leg edema, abdominal pain, joint pain, rash, itching. Tolerating PT/OT. Tolerating diet. Objective:     VITALS:   Last 24hrs VS reviewed since prior progress note.  Most recent are:  Patient Vitals for the past 24 hrs:   Temp Pulse Resp BP SpO2   12/23/20 0731 98.2 °F (36.8 °C) 68 16 (!) 141/71 98 %   12/23/20 0004 97.2 °F (36.2 °C) 69 14 138/84 98 %   12/22/20 1925 97.4 °F (36.3 °C) 63 14 (!) 151/79 97 %   12/22/20 1715 97.5 °F (36.4 °C) 63 12 129/76 100 %       Intake/Output Summary (Last 24 hours) at 12/23/2020 1426  Last data filed at 12/23/2020 3768  Gross per 24 hour   Intake    Output 900 ml   Net -900 ml        PHYSICAL EXAM:  General: WD, WN. Alert, cooperative, no acute distress    EENT:  EOMI. Anicteric sclerae. MMM  Resp:  CTA bilaterally, no wheezing or rales. No accessory muscle use  CV:  Regular  rhythm,  normal S1/S2, no murmurs rubs gallops, No edema  GI:  Epigastric tenderness   Neurologic:  Alert and oriented X 3, normal speech,   Psych:   Good insight. Not anxious nor agitated  Skin:  No rashes. No jaundice    Reviewed most current lab test results and cultures  YES  Reviewed most current radiology test results   YES  Review and summation of old records today    NO  Reviewed patient's current orders and MAR    YES  PMH/SH reviewed - no change compared to H&P  ________________________________________________________________________  Care Plan discussed with:    Comments   Patient x    Family      RN x    Care Manager x    Consultant                       x Multidiciplinary team rounds were held today with , nursing, pharmacist and clinical coordinator. Patient's plan of care was discussed; medications were reviewed and discharge planning was addressed. ________________________________________________________________________  Total NON critical care TIME:  25  Minutes    Total CRITICAL CARE TIME Spent:   Minutes non procedure based      Comments   >50% of visit spent in counseling and coordination of care     ________________________________________________________________________  Nikita Luna MD     Procedures: see electronic medical records for all procedures/Xrays and details which were not copied into this note but were reviewed prior to creation of Plan. LABS:  I reviewed today's most current labs and imaging studies.   Pertinent labs include:  Recent Labs     12/23/20  0356 12/22/20  0954 12/21/20  0433   WBC 4.9 6.3 5.8   HGB 10.2* 11.3* 10.7*   HCT 29.3* 31.9* 30.5*   PLT 86* 93* 81*     Recent Labs 12/23/20  0356 12/22/20  0954 12/21/20  0433    135* 138   K 4.1 3.1* 3.3*    96* 101   CO2 32 29 24   * 103* 90   BUN 2* 2* 6   CREA 0.65* 0.70 0.74   CA 9.3 9.5 9.5   MG 1.7 1.8 1.6   PHOS 2.3* 1.8* 1.3*   ALB 3.1* 3.5 3.6   TBILI 0.7 0.9 1.0   ALT 40 38 38   INR  --   --  1.1       Signed: Carlton Suero MD

## 2020-12-24 LAB
ALBUMIN SERPL-MCNC: 3 G/DL (ref 3.5–5)
ALBUMIN/GLOB SERPL: 1 {RATIO} (ref 1.1–2.2)
ALP SERPL-CCNC: 117 U/L (ref 45–117)
ALT SERPL-CCNC: 45 U/L (ref 12–78)
ANION GAP SERPL CALC-SCNC: 7 MMOL/L (ref 5–15)
AST SERPL-CCNC: 92 U/L (ref 15–37)
BASOPHILS # BLD: 0 K/UL (ref 0–0.1)
BASOPHILS NFR BLD: 0 % (ref 0–1)
BILIRUB DIRECT SERPL-MCNC: 0.1 MG/DL (ref 0–0.2)
BILIRUB SERPL-MCNC: 0.3 MG/DL (ref 0.2–1)
BUN SERPL-MCNC: 1 MG/DL (ref 6–20)
BUN/CREAT SERPL: 1 (ref 12–20)
CALCIUM SERPL-MCNC: 9.3 MG/DL (ref 8.5–10.1)
CHLORIDE SERPL-SCNC: 102 MMOL/L (ref 97–108)
CO2 SERPL-SCNC: 29 MMOL/L (ref 21–32)
CREAT SERPL-MCNC: 0.77 MG/DL (ref 0.7–1.3)
DIFFERENTIAL METHOD BLD: ABNORMAL
EOSINOPHIL # BLD: 0.1 K/UL (ref 0–0.4)
EOSINOPHIL NFR BLD: 1 % (ref 0–7)
ERYTHROCYTE [DISTWIDTH] IN BLOOD BY AUTOMATED COUNT: 15.5 % (ref 11.5–14.5)
GLOBULIN SER CALC-MCNC: 3.1 G/DL (ref 2–4)
GLUCOSE SERPL-MCNC: 102 MG/DL (ref 65–100)
HAV IGM SER QL: NONREACTIVE
HBV CORE IGM SER QL: NONREACTIVE
HBV SURFACE AG SER QL: 0.78 INDEX
HBV SURFACE AG SER QL: NEGATIVE
HCT VFR BLD AUTO: 28.4 % (ref 36.6–50.3)
HCV AB SERPL QL IA: NONREACTIVE
HCV COMMENT,HCGAC: NORMAL
HGB BLD-MCNC: 9.8 G/DL (ref 12.1–17)
IMM GRANULOCYTES # BLD AUTO: 0 K/UL (ref 0–0.04)
IMM GRANULOCYTES NFR BLD AUTO: 1 % (ref 0–0.5)
LYMPHOCYTES # BLD: 1.2 K/UL (ref 0.8–3.5)
LYMPHOCYTES NFR BLD: 21 % (ref 12–49)
MCH RBC QN AUTO: 32.7 PG (ref 26–34)
MCHC RBC AUTO-ENTMCNC: 34.5 G/DL (ref 30–36.5)
MCV RBC AUTO: 94.7 FL (ref 80–99)
MONOCYTES # BLD: 0.9 K/UL (ref 0–1)
MONOCYTES NFR BLD: 15 % (ref 5–13)
NEUTS SEG # BLD: 3.6 K/UL (ref 1.8–8)
NEUTS SEG NFR BLD: 62 % (ref 32–75)
NRBC # BLD: 0 K/UL (ref 0–0.01)
NRBC BLD-RTO: 0 PER 100 WBC
PLATELET # BLD AUTO: 121 K/UL (ref 150–400)
PMV BLD AUTO: 11.3 FL (ref 8.9–12.9)
POTASSIUM SERPL-SCNC: 3.7 MMOL/L (ref 3.5–5.1)
PROT SERPL-MCNC: 6.1 G/DL (ref 6.4–8.2)
RBC # BLD AUTO: 3 M/UL (ref 4.1–5.7)
SODIUM SERPL-SCNC: 138 MMOL/L (ref 136–145)
SP1: NORMAL
SP2: NORMAL
SP3: NORMAL
WBC # BLD AUTO: 5.7 K/UL (ref 4.1–11.1)

## 2020-12-24 PROCEDURE — 85025 COMPLETE CBC W/AUTO DIFF WBC: CPT

## 2020-12-24 PROCEDURE — 74011250637 HC RX REV CODE- 250/637: Performed by: STUDENT IN AN ORGANIZED HEALTH CARE EDUCATION/TRAINING PROGRAM

## 2020-12-24 PROCEDURE — 80076 HEPATIC FUNCTION PANEL: CPT

## 2020-12-24 PROCEDURE — 80074 ACUTE HEPATITIS PANEL: CPT

## 2020-12-24 PROCEDURE — 74011250636 HC RX REV CODE- 250/636: Performed by: STUDENT IN AN ORGANIZED HEALTH CARE EDUCATION/TRAINING PROGRAM

## 2020-12-24 PROCEDURE — 36415 COLL VENOUS BLD VENIPUNCTURE: CPT

## 2020-12-24 PROCEDURE — 80048 BASIC METABOLIC PNL TOTAL CA: CPT

## 2020-12-24 PROCEDURE — 65270000032 HC RM SEMIPRIVATE

## 2020-12-24 RX ORDER — LEVOFLOXACIN 750 MG/1
750 TABLET ORAL EVERY 24 HOURS
Status: DISCONTINUED | OUTPATIENT
Start: 2020-12-24 | End: 2020-12-27 | Stop reason: HOSPADM

## 2020-12-24 RX ADMIN — HEPARIN SODIUM 5000 UNITS: 5000 INJECTION, SOLUTION INTRAVENOUS; SUBCUTANEOUS at 22:10

## 2020-12-24 RX ADMIN — OXYCODONE HYDROCHLORIDE 5 MG: 5 TABLET ORAL at 22:10

## 2020-12-24 RX ADMIN — SODIUM CHLORIDE, SODIUM LACTATE, POTASSIUM CHLORIDE, AND CALCIUM CHLORIDE 75 ML/HR: 600; 310; 30; 20 INJECTION, SOLUTION INTRAVENOUS at 11:14

## 2020-12-24 RX ADMIN — LEVOFLOXACIN 750 MG: 750 TABLET, FILM COATED ORAL at 11:30

## 2020-12-24 RX ADMIN — HEPARIN SODIUM 5000 UNITS: 5000 INJECTION, SOLUTION INTRAVENOUS; SUBCUTANEOUS at 11:30

## 2020-12-24 RX ADMIN — Medication 10 ML: at 05:25

## 2020-12-24 RX ADMIN — FOLIC ACID 1 MG: 1 TABLET ORAL at 10:00

## 2020-12-24 RX ADMIN — Medication 1 CAPSULE: at 09:59

## 2020-12-24 RX ADMIN — TAMSULOSIN HYDROCHLORIDE 0.4 MG: 0.4 CAPSULE ORAL at 09:59

## 2020-12-24 RX ADMIN — METRONIDAZOLE 500 MG: 250 TABLET ORAL at 09:59

## 2020-12-24 RX ADMIN — Medication 100 MG: at 09:59

## 2020-12-24 RX ADMIN — Medication 10 ML: at 13:22

## 2020-12-24 RX ADMIN — Medication 10 ML: at 22:13

## 2020-12-24 RX ADMIN — METRONIDAZOLE 500 MG: 250 TABLET ORAL at 22:10

## 2020-12-24 RX ADMIN — ONDANSETRON 4 MG: 2 INJECTION INTRAMUSCULAR; INTRAVENOUS at 13:20

## 2020-12-24 NOTE — PROGRESS NOTES
Hospitalist Progress Note    NAME: Dwayne Fraire   :  1965   MRN:  [id number]   Room Number:    @ [facility] Summary: 60 y.o. male whom presented on 2020 with      Assessment / Plan:        #Acute on chronic pancreatitis POA  -Lipase 972 > 1253> 1413   - CT abd/pelvis Nonspecific 18 mm low-density lesion in the pancreatic tail. There is mild fat stranding surrounding the pancreatic tail which may represent focal  Pancreatitiis. - MRI abdomen 2020 [facility] post cholecystectomy  Findings are consistent with chronic pancreatitis in the head and neck    - Diet challenge passed     # Enteritis   - CT abd/pelvis 2020: Moderate long segment enteritis involving the distal small bowel  - change IV CTX to levo for total 7 days   - Flagyl 500 mg BID for totalt 7 days     #Alcohol abuse POA  -Alcohol level less than 10 last drink was 4 days ago before admission    #TATE on admission resolved    #Alcoholic hepatitis  - AST 67> 83> 85> 92  - ALT 38> 40> 45   - Alt phos 128> 117  - Low Maddrey score    - check hepatitis panel   - CT  liver : No mass or biliary dilatation. Hepatic steatosis    # BPH   -On Flomax    #Normochromic normocytic anemia  -Liver 9.8    #Thrombocytopenia  -Suspect secondary to alcohol use  -Platelet 503 today    #Generalized weakness and physical deconditioning  -Needs SNF          #Low density density lesion in the pancreas  -Outpatient follow-up with MRI         Body mass index is 17.64 kg/m². Severe protein calorie malnutrition POA  - RD following    Code Status: full   Surrogate Decision Maker:  Sister         DVT Prophylaxis: Heparin sc  GI Prophylaxis: not indicated  Lines : PIV  Baseline:ambulatory independently       Subjective:     Chief Complaint / Reason for Physician Visit  \"some belly pain but able to keep food down  Discussed with RN events overnight.      Review of Systems:  No fevers, chills, appetite change, cough, sputum production, shortness of breath, dyspnea on exertion, nausea, vomitting, diarrhea, constipation, chest pain, leg edema, abdominal pain, joint pain, rash, itching. Tolerating PT/OT. Tolerating diet. Objective:     VITALS:   Last 24hrs VS reviewed since prior progress note. Most recent are:  Patient Vitals for the past 24 hrs:   Temp Pulse Resp BP SpO2   12/23/20 1959 98.3 °F (36.8 °C) 86 14 137/74 97 %   12/23/20 1600 98.4 °F (36.9 °C) 72 16 (!) 102/58 95 %       Intake/Output Summary (Last 24 hours) at 12/24/2020 1000  Last data filed at 12/23/2020 1920  Gross per 24 hour   Intake 150 ml   Output    Net 150 ml        PHYSICAL EXAM:  General: WD, WN. Alert, cooperative, no acute distress    EENT:  EOMI. Anicteric sclerae. MMM  Resp:  CTA bilaterally, no wheezing or rales. No accessory muscle use  CV:  Regular  rhythm,  normal S1/S2, no murmurs rubs gallops, No edema  GI:  Mild tenderness on palpation  Neurologic:  Alert and oriented X 3, normal speech,   Psych:   Good insight. Not anxious nor agitated  Skin:  No rashes. No jaundice    Reviewed most current lab test results and cultures  YES  Reviewed most current radiology test results   YES  Review and summation of old records today    NO  Reviewed patient's current orders and MAR    YES  PMH/SH reviewed - no change compared to H&P  ________________________________________________________________________  Care Plan discussed with:    Comments   Patient x    Family      RN x    Care Manager x    Consultant                       x Multidiciplinary team rounds were held today with , nursing, pharmacist and clinical coordinator. Patient's plan of care was discussed; medications were reviewed and discharge planning was addressed.      ________________________________________________________________________  Total NON critical care TIME: 25 Minutes    Total CRITICAL CARE TIME Spent:   Minutes non procedure based      Comments   >50% of visit spent in counseling and coordination of care     ________________________________________________________________________  Aury Gomez MD     Procedures: see electronic medical records for all procedures/Xrays and details which were not copied into this note but were reviewed prior to creation of Plan. LABS:  I reviewed today's most current labs and imaging studies.   Pertinent labs include:  Recent Labs     12/24/20  0400 12/23/20  0356 12/22/20  0954   WBC 5.7 4.9 6.3   HGB 9.8* 10.2* 11.3*   HCT 28.4* 29.3* 31.9*   * 86* 93*     Recent Labs     12/24/20  0400 12/23/20  0356 12/22/20  0954    136 135*   K 3.7 4.1 3.1*    100 96*   CO2 29 32 29   * 106* 103*   BUN 1* 2* 2*   CREA 0.77 0.65* 0.70   CA 9.3 9.3 9.5   MG  --  1.7 1.8   PHOS  --  2.3* 1.8*   ALB 3.0* 3.1* 3.5   TBILI 0.3 0.7 0.9   ALT 45 40 38       Signed: Aury Gomez MD

## 2020-12-24 NOTE — PROGRESS NOTES
Problem: Falls - Risk of  Goal: *Absence of Falls  Description: Document Axel Rubin Fall Risk and appropriate interventions in the flowsheet.   Outcome: Progressing Towards Goal  Note: Fall Risk Interventions:  Mobility Interventions: PT Consult for mobility concerns, Utilize walker, cane, or other assistive device, Strengthening exercises (ROM-active/passive)    Mentation Interventions: Adequate sleep, hydration, pain control    Medication Interventions: Patient to call before getting OOB, Teach patient to arise slowly    Elimination Interventions: Call light in reach, Urinal in reach              Problem: Pancreatitis  Goal: *Control of acute pain  Outcome: Progressing Towards Goal     Problem: Pancreatitis  Goal: *Absence of nausea/vomiting  Outcome: Progressing Towards Goal     Problem: Pancreatitis  Goal: *Optimize nutritional status  Outcome: Progressing Towards Goal

## 2020-12-24 NOTE — PROGRESS NOTES
0715 Bedside shift change report given to Nancy Castillo (oncoming nurse) by Melvin El (offgoing nurse). Report included the following information SBAR, Kardex, MAR and Recent Results. 1145 Hepatitis panel lab drawn and sent to lab.     1320 Patient had a vomiting episode. PRN IV zofran administered as ordered. 1915 Bedside shift change report given to James (oncoming nurse) by Nancy Castillo (offgoing nurse). Report included the following information SBAR, Kardex, MAR and Recent Results.

## 2020-12-24 NOTE — PROGRESS NOTES
Bedside shift report given to Monique from [facility]. Resting quietly at this time. SBAR, MAR and Results reviewed.

## 2020-12-24 NOTE — PROGRESS NOTES
KENTRELL-  Readmission score=19%  IDT to discuss plan of care. Patient's discharge to [facility] has been postponed till tomorrow 12/25. Staff continues to monitor intake. Labs to be drawn today. CM called and left voice mail message for Margaret Lilly ([phone])  to inform her about status of patient's discharge. Margaret Lilly returned call and was informed that patient may be ready for discharge tomorrow. CM will need to call her 12/25 and check to see that they still have a bed available for him. BENJAMIN Castro/CEASAR  [phone]  .

## 2020-12-25 LAB
ALBUMIN SERPL-MCNC: 3.1 G/DL (ref 3.5–5)
ALBUMIN/GLOB SERPL: 0.9 {RATIO} (ref 1.1–2.2)
ALP SERPL-CCNC: 121 U/L (ref 45–117)
ALT SERPL-CCNC: 44 U/L (ref 12–78)
AST SERPL-CCNC: 75 U/L (ref 15–37)
BILIRUB DIRECT SERPL-MCNC: 0.1 MG/DL (ref 0–0.2)
BILIRUB SERPL-MCNC: 0.3 MG/DL (ref 0.2–1)
GLOBULIN SER CALC-MCNC: 3.4 G/DL (ref 2–4)
PROT SERPL-MCNC: 6.5 G/DL (ref 6.4–8.2)

## 2020-12-25 PROCEDURE — 74011250637 HC RX REV CODE- 250/637: Performed by: STUDENT IN AN ORGANIZED HEALTH CARE EDUCATION/TRAINING PROGRAM

## 2020-12-25 PROCEDURE — 74011250636 HC RX REV CODE- 250/636: Performed by: STUDENT IN AN ORGANIZED HEALTH CARE EDUCATION/TRAINING PROGRAM

## 2020-12-25 PROCEDURE — 36415 COLL VENOUS BLD VENIPUNCTURE: CPT

## 2020-12-25 PROCEDURE — 80076 HEPATIC FUNCTION PANEL: CPT

## 2020-12-25 PROCEDURE — 65270000032 HC RM SEMIPRIVATE

## 2020-12-25 PROCEDURE — 87635 SARS-COV-2 COVID-19 AMP PRB: CPT

## 2020-12-25 RX ADMIN — Medication 1 CAPSULE: at 09:08

## 2020-12-25 RX ADMIN — TAMSULOSIN HYDROCHLORIDE 0.4 MG: 0.4 CAPSULE ORAL at 09:08

## 2020-12-25 RX ADMIN — METRONIDAZOLE 500 MG: 250 TABLET ORAL at 09:08

## 2020-12-25 RX ADMIN — LEVOFLOXACIN 750 MG: 750 TABLET, FILM COATED ORAL at 11:59

## 2020-12-25 RX ADMIN — OXYCODONE HYDROCHLORIDE 5 MG: 5 TABLET ORAL at 22:39

## 2020-12-25 RX ADMIN — HEPARIN SODIUM 5000 UNITS: 5000 INJECTION, SOLUTION INTRAVENOUS; SUBCUTANEOUS at 11:58

## 2020-12-25 RX ADMIN — METRONIDAZOLE 500 MG: 250 TABLET ORAL at 22:41

## 2020-12-25 RX ADMIN — Medication 10 ML: at 16:00

## 2020-12-25 RX ADMIN — Medication 10 ML: at 22:40

## 2020-12-25 RX ADMIN — Medication 100 MG: at 09:08

## 2020-12-25 RX ADMIN — OXYCODONE HYDROCHLORIDE 5 MG: 5 TABLET ORAL at 03:15

## 2020-12-25 RX ADMIN — FOLIC ACID 1 MG: 1 TABLET ORAL at 09:08

## 2020-12-25 RX ADMIN — HEPARIN SODIUM 5000 UNITS: 5000 INJECTION, SOLUTION INTRAVENOUS; SUBCUTANEOUS at 22:42

## 2020-12-25 NOTE — PROGRESS NOTES
Care plan reviewed  Problem: Falls - Risk of  Goal: *Absence of Falls  Description: Document Carlos Arteaga Fall Risk and appropriate interventions in the flowsheet.   Outcome: Progressing Towards Goal  Note: Fall Risk Interventions:  Mobility Interventions: Utilize walker, cane, or other assistive device    Mentation Interventions: Adequate sleep, hydration, pain control    Medication Interventions: Patient to call before getting OOB    Elimination Interventions: Call light in reach              Problem: Patient Education: Go to Patient Education Activity  Goal: Patient/Family Education  Outcome: Progressing Towards Goal     Problem: Pancreatitis  Goal: *Control of acute pain  Outcome: Progressing Towards Goal  Goal: *Absence of nausea/vomiting  Outcome: Progressing Towards Goal  Goal: *Optimize nutritional status  Outcome: Progressing Towards Goal  Goal: *Labs within defined limits  Outcome: Progressing Towards Goal     Problem: Patient Education: Go to Patient Education Activity  Goal: Patient/Family Education  Outcome: Progressing Towards Goal     Problem: Patient Education: Go to Patient Education Activity  Goal: Patient/Family Education  Outcome: Progressing Towards Goal     Problem: Patient Education: Go to Patient Education Activity  Goal: Patient/Family Education  Outcome: Progressing Towards Goal

## 2020-12-25 NOTE — PROGRESS NOTES
Hospitalist Progress Note    NAME: Vladimir Muñoz   :  1965   MRN:  [id number]   Room Number:    @ [facility] Summary: 60 y.o. male whom presented on 2020 with      Assessment / Plan:        #Acute on chronic pancreatitis POA  -Lipase 972 > 1253> 1413   - CT abd/pelvis Nonspecific 18 mm low-density lesion in the pancreatic tail. There is mild fat stranding surrounding the pancreatic tail which may represent focal  Pancreatitiis. - MRI abdomen 2020 [facility] post cholecystectomy  Findings are consistent with chronic pancreatitis in the head and neck     - Diet challenge passed      # Enteritis   - CT abd/pelvis 2020: Moderate long segment enteritis involving the distal small bowel  - change IV CTX to levo for total 7 days   - Flagyl 500 mg BID for totalt 7 days      #Alcohol abuse POA  -Alcohol level less than 10 last drink was 4 days ago before admission     #TATE on admission resolved     #Alcoholic hepatitis POA resolving   - AST 67> 83> 85> 92 > 75   - ALT 38> 40> 45 > 44   - Alt phos 128> 117 > 121   - Low Maddrey score    -hepatitis panel neg  - CT  liver : No mass or biliary dilatation. Hepatic steatosis     # BPH   -On Flomax     #Normochromic normocytic anemia  -Liver 9.8     #Thrombocytopenia  -Suspect secondary to alcohol use  -Platelet 760 today     #Generalized weakness and physical deconditioning  -Needs SNF pending covid testing               #Low density density lesion in the pancreas  -Outpatient follow-up with MRI           Body mass index is 17.64 kg/m². Severe protein calorie malnutrition POA  - RD following     Code Status: full   Surrogate Decision Maker:  Sister         DVT Prophylaxis: Heparin sc  GI Prophylaxis: not indicated  Lines : PIV  Baseline:ambulatory independently         Subjective:     Chief Complaint / Reason for Physician Visit  Some discomfort w/ food  Discussed with RN events overnight.      Review of Systems:  No fevers, chills, appetite change, cough, sputum production, shortness of breath, dyspnea on exertion, nausea, vomitting, diarrhea, constipation, chest pain, leg edema, , joint pain, rash, itching. Tolerating PT/OT. Tolerating diet. Objective:     VITALS:   Last 24hrs VS reviewed since prior progress note. Most recent are:  Patient Vitals for the past 24 hrs:   Temp Pulse Resp BP SpO2   12/25/20 0912 98.4 °F (36.9 °C) 91 18 126/83 100 %   12/25/20 0313 98.2 °F (36.8 °C) 75 16 (!) 148/81 100 %   12/24/20 1938 98.5 °F (36.9 °C) 77 16 139/80 100 %   12/24/20 1630 98 °F (36.7 °C) 72 16 (!) 142/82 100 %       Intake/Output Summary (Last 24 hours) at 12/25/2020 0931  Last data filed at 12/25/2020 0520  Gross per 24 hour   Intake    Output 250 ml   Net -250 ml        PHYSICAL EXAM:  General: WD, WN. Alert, cooperative, no acute distress    EENT:  EOMI. Anicteric sclerae. MMM  Resp:  CTA bilaterally, no wheezing or rales. No accessory muscle use  CV:  Regular  rhythm,  normal S1/S2, no murmurs rubs gallops, No edema  GI:  Mild epigastric tenderness   Neurologic:  Alert and oriented X 3, normal speech,   Psych:   Good insight. Not anxious nor agitated  Skin:  No rashes. No jaundice    Reviewed most current lab test results and cultures  YES  Reviewed most current radiology test results   YES  Review and summation of old records today    NO  Reviewed patient's current orders and MAR    YES  PMH/SH reviewed - no change compared to H&P  ________________________________________________________________________  Care Plan discussed with:    Comments   Patient x    Family      RN x    Care Manager x    Consultant                        Multidiciplinary team rounds were held today with , nursing, pharmacist and clinical coordinator. Patient's plan of care was discussed; medications were reviewed and discharge planning was addressed. ________________________________________________________________________  Total NON critical care TIME:  20   Minutes    Total CRITICAL CARE TIME Spent:   Minutes non procedure based      Comments   >50% of visit spent in counseling and coordination of care     ________________________________________________________________________  Yonathan Chatterjee MD     Procedures: see electronic medical records for all procedures/Xrays and details which were not copied into this note but were reviewed prior to creation of Plan. LABS:  I reviewed today's most current labs and imaging studies.   Pertinent labs include:  Recent Labs     12/24/20  0400 12/23/20  0356 12/22/20  0954   WBC 5.7 4.9 6.3   HGB 9.8* 10.2* 11.3*   HCT 28.4* 29.3* 31.9*   * 86* 93*     Recent Labs     12/25/20  0316 12/24/20  0400 12/23/20  0356 12/22/20  0954   NA  --  138 136 135*   K  --  3.7 4.1 3.1*   CL  --  102 100 96*   CO2  --  29 32 29   GLU  --  102* 106* 103*   BUN  --  1* 2* 2*   CREA  --  0.77 0.65* 0.70   CA  --  9.3 9.3 9.5   MG  --   --  1.7 1.8   PHOS  --   --  2.3* 1.8*   ALB 3.1* 3.0* 3.1* 3.5   TBILI 0.3 0.3 0.7 0.9   ALT 44 45 40 38       Signed: Yonathan Chatterjee MD

## 2020-12-25 NOTE — PROGRESS NOTES
Problem: Pancreatitis  Goal: *Control of acute pain  Outcome: Progressing Towards Goal  Goal: *Absence of nausea/vomiting  Outcome: Progressing Towards Goal  Goal: *Optimize nutritional status  Outcome: Progressing Towards Goal  Goal: *Labs within defined limits  Outcome: Progressing Towards Goal

## 2020-12-25 NOTE — PROGRESS NOTES
Bedside shift change report given to Elisabet Madrid RN  (oncoming nurse) by Israel Silver RN  (offgoing nurse). Report included the following information SBAR and MAR.

## 2020-12-26 LAB
ALBUMIN SERPL-MCNC: 3 G/DL (ref 3.5–5)
ALBUMIN/GLOB SERPL: 0.9 {RATIO} (ref 1.1–2.2)
ALP SERPL-CCNC: 117 U/L (ref 45–117)
ALT SERPL-CCNC: 39 U/L (ref 12–78)
AST SERPL-CCNC: 51 U/L (ref 15–37)
BILIRUB DIRECT SERPL-MCNC: 0.1 MG/DL (ref 0–0.2)
BILIRUB SERPL-MCNC: 0.3 MG/DL (ref 0.2–1)
GLOBULIN SER CALC-MCNC: 3.3 G/DL (ref 2–4)
HEALTH STATUS, XMCV2T: NORMAL
PROT SERPL-MCNC: 6.3 G/DL (ref 6.4–8.2)
SARS-COV-2, COV2: NOT DETECTED
SOURCE, COVRS: NORMAL
SPECIMEN SOURCE, FCOV2M: NORMAL
SPECIMEN TYPE, XMCV1T: NORMAL

## 2020-12-26 PROCEDURE — 74011250637 HC RX REV CODE- 250/637: Performed by: STUDENT IN AN ORGANIZED HEALTH CARE EDUCATION/TRAINING PROGRAM

## 2020-12-26 PROCEDURE — 80076 HEPATIC FUNCTION PANEL: CPT

## 2020-12-26 PROCEDURE — 74011250636 HC RX REV CODE- 250/636: Performed by: STUDENT IN AN ORGANIZED HEALTH CARE EDUCATION/TRAINING PROGRAM

## 2020-12-26 PROCEDURE — 65270000032 HC RM SEMIPRIVATE

## 2020-12-26 PROCEDURE — 94760 N-INVAS EAR/PLS OXIMETRY 1: CPT

## 2020-12-26 PROCEDURE — 36415 COLL VENOUS BLD VENIPUNCTURE: CPT

## 2020-12-26 RX ORDER — SODIUM CHLORIDE, SODIUM LACTATE, POTASSIUM CHLORIDE, CALCIUM CHLORIDE 600; 310; 30; 20 MG/100ML; MG/100ML; MG/100ML; MG/100ML
100 INJECTION, SOLUTION INTRAVENOUS CONTINUOUS
Status: DISPENSED | OUTPATIENT
Start: 2020-12-26 | End: 2020-12-26

## 2020-12-26 RX ADMIN — FOLIC ACID 1 MG: 1 TABLET ORAL at 08:38

## 2020-12-26 RX ADMIN — TAMSULOSIN HYDROCHLORIDE 0.4 MG: 0.4 CAPSULE ORAL at 08:38

## 2020-12-26 RX ADMIN — Medication 1 CAPSULE: at 08:38

## 2020-12-26 RX ADMIN — Medication 10 ML: at 14:38

## 2020-12-26 RX ADMIN — LEVOFLOXACIN 750 MG: 750 TABLET, FILM COATED ORAL at 05:34

## 2020-12-26 RX ADMIN — METRONIDAZOLE 500 MG: 250 TABLET ORAL at 08:38

## 2020-12-26 RX ADMIN — Medication 100 MG: at 08:44

## 2020-12-26 RX ADMIN — SODIUM CHLORIDE, POTASSIUM CHLORIDE, SODIUM LACTATE AND CALCIUM CHLORIDE 100 ML/HR: 600; 310; 30; 20 INJECTION, SOLUTION INTRAVENOUS at 16:31

## 2020-12-26 RX ADMIN — METRONIDAZOLE 500 MG: 250 TABLET ORAL at 22:12

## 2020-12-26 RX ADMIN — Medication 10 ML: at 05:35

## 2020-12-26 RX ADMIN — HEPARIN SODIUM 5000 UNITS: 5000 INJECTION, SOLUTION INTRAVENOUS; SUBCUTANEOUS at 10:24

## 2020-12-26 RX ADMIN — Medication 10 ML: at 23:41

## 2020-12-26 RX ADMIN — OXYCODONE HYDROCHLORIDE 5 MG: 5 TABLET ORAL at 22:12

## 2020-12-26 NOTE — PROGRESS NOTES
Problem: Patient Education: Go to Patient Education Activity  Goal: Patient/Family Education  Outcome: Progressing Towards Goal     Problem: Pancreatitis  Goal: *Labs within defined limits  Outcome: Progressing Towards Goal

## 2020-12-26 NOTE — PROGRESS NOTES
KENTRELL   RUR     Patient had been accepted at [facility]  But was not ready for discharge two days ago. Called Karol Bates anticipate a bed at [facility]. She requested Last 2-3 days of MD notes. COVID test results and any supporting documents to show patient ready for discharge. To be fax to  [phone]   She will review the information and get back with me. Talked to Nursing Supervisor regarding the above and will fax. Once get confirmation from Lucille Baum will arrange transportation for am.     Malorie Welch MSW RN   [phone]      Addendum: 5:39PM   Call back from [address] [place] [address] will have a bed tomorrow. Called patient in the room to discussed transfer to SNF and freedom of choice. Patient is still in agreement for transfer to SNF in the am. This will be short-term -then home with home care. Second IMM letter- Verbal ok for discharge. Copy to go on chart and copy for patient. Called sister called patient's sister, Eden Iyer [phone] discussed above. She asked why he could not go home with Home Health. Discussed patient's decision to go to SNF for rehab. Also may have some difficulty finding Home Health as needed. She said she would go along with patient's decision. Asked how long he would be there- this would depend on patient's condition, rehab needs and authorization from insurance co.     Discussed transportation. Patient to go via [facility]. This was previous arranged by Ms. Lina Velasquez . Called - 2033. They changed the date and time. They can transport at 11:00AM.  They will need a new face sheet and PCS fax. Called Nursing Supervisor and she agreed to fax forms the them. [phone]    [address] is [address]  [address] - they will assigned room tomorrow. Nursing to call report to [phone]. Will need to send AVS, Discharge Summary, Currrent MAR,  H &P,  Last PT and OT assessment.   Hard Prescription for any pain medications       Pat BONE RN [phone]

## 2020-12-26 NOTE — PROGRESS NOTES
Bedside shift change report given to Will Pina RN (oncoming nurse) by Yajaira Casey RN (offgoing nurse). Report included the following information SBAR, Kardex, MAR and Recent Results.     Pt awake watching tv

## 2020-12-26 NOTE — PROGRESS NOTES
Problem: Falls - Risk of  Goal: *Absence of Falls  Description: Document Roseline Wheat Fall Risk and appropriate interventions in the flowsheet.   Outcome: Progressing Towards Goal  Note: Fall Risk Interventions:  Mobility Interventions: Utilize walker, cane, or other assistive device    Mentation Interventions: Adequate sleep, hydration, pain control    Medication Interventions: Patient to call before getting OOB    Elimination Interventions: Call light in reach              Problem: Pancreatitis  Goal: *Control of acute pain  Outcome: Progressing Towards Goal  Goal: *Absence of nausea/vomiting  Outcome: Progressing Towards Goal  Goal: *Optimize nutritional status  Outcome: Progressing Towards Goal  Goal: *Labs within defined limits  Outcome: Progressing Towards Goal     Problem: Patient Education: Go to Patient Education Activity  Goal: Patient/Family Education  Outcome: Progressing Towards Goal

## 2020-12-26 NOTE — PROGRESS NOTES
Hospitalist Progress Note    NAME: Magdy Holland   :  1965   MRN:  [id number]   Room Number:  503/13  @ [address] [place] [address] Summary: 60 y.o. male whom presented on 2020 with      Assessment / Plan:    #Dizzy upon standing  - orthostatic positive     Patient Vitals for the past 12 hrs:   Temp Pulse Resp BP SpO2   20 1240  97  119/81    20 1239  80  (!) 144/85    20 1238 97.7 °F (36.5 °C) 71 18 (!) 147/89 100 %   20 0848 98.3 °F (36.8 °C) 82 18 138/82 100 %   - IVF bolus  - recheck in them, if still Positive will hold Flomax and add finasteride     #Acute on chronic pancreatitis POA resolved   -UVYOJF 128 > 7641> 1469   - CT abd/pelvis Nonspecific 18 mm low-density lesion in the pancreatic tail. There is mild fat stranding surrounding the pancreatic tail which may represent focal  Pancreatitiis. - MRI abdomen 2020 [facility] post cholecystectomy  Findings are consistent with chronic pancreatitis in the head and neck     - Diet challenge passed      # Enteritis   - CT abd/pelvis 2020: Moderate long segment enteritis involving the distal small bowel  - change IV CTX to levo for total 7 days   - Flagyl 500 mg BID for totalt 7 days      #Alcohol abuse POA  -Alcohol level less than 10 last drink was 4 days ago before admission     #TATE on admission resolved     #Alcoholic hepatitis POA resolving -stable   - AST 67> 83> 85> 92 > 75 > 51   - ALT 38> 40> 45 > 44 > 39   - Alt phos 128> 117 > 121 > 117   - Low Maddrey score    -hepatitis panel neg  - CT  liver : No mass or biliary dilatation. Hepatic steatosis     # BPH   -On Flomax     #Normochromic normocytic anemia  -Liver 9.8     #Thrombocytopenia  -Suspect secondary to alcohol use  -Platelet 479 today     #Generalized weakness and physical deconditioning  -Needs SNF          #Low density density lesion in the pancreas  -Outpatient follow-up with MRI        Body mass index is 17.64 kg/m². Severe protein calorie malnutrition POA  - RD following     Code Status: full   Surrogate Decision Maker:  Sister         DVT Prophylaxis: Heparin sc  GI Prophylaxis: not indicated  Lines : PIV  Baseline:ambulatory independently     Subjective:     Chief Complaint / Reason for Physician Visit dizzy upon standing discussed with RN events overnight. Review of Systems:  No fevers, chills, appetite change, cough, sputum production, shortness of breath, dyspnea on exertion, nausea, vomitting, diarrhea, constipation, chest pain, leg edema, abdominal pain, joint pain, rash, itching. Tolerating PT/OT. Tolerating diet. Objective:     VITALS:   Last 24hrs VS reviewed since prior progress note. Most recent are:  Patient Vitals for the past 24 hrs:   Temp Pulse Resp BP SpO2   12/26/20 0848 98.3 °F (36.8 °C) 82 18 138/82 100 %   12/25/20 2228 97.4 °F (36.3 °C) 75 18 132/80 100 %   12/25/20 1707 97.6 °F (36.4 °C) 72 18 137/79 100 %   12/25/20 0912 98.4 °F (36.9 °C) 91 18 126/83 100 %       Intake/Output Summary (Last 24 hours) at 12/26/2020 0854  Last data filed at 12/26/2020 0537  Gross per 24 hour   Intake 220 ml   Output 500 ml   Net -280 ml        PHYSICAL EXAM:  General: WD, WN. Alert, cooperative, no acute distress    EENT:  EOMI. Anicteric sclerae. MMM  Resp:  CTA bilaterally, no wheezing or rales. No accessory muscle use  CV:  Regular  rhythm,  normal S1/S2, no murmurs rubs gallops, No edema  GI:  Soft, Non distended, Non tender. +Bowel sounds  Neurologic:  Alert and oriented X 3, normal speech,   Psych:   Good insight. Not anxious nor agitated  Skin:  No rashes.   No jaundice    Reviewed most current lab test results and cultures  YES  Reviewed most current radiology test results   YES  Review and summation of old records today    NO  Reviewed patient's current orders and MAR    YES  PMH/SH reviewed - no change compared to H&P  ________________________________________________________________________  Care Plan discussed with:    Comments   Patient x    Family      RN x    Care Manager x    Consultant                       x Multidiciplinary team rounds were held today with , nursing, pharmacist and clinical coordinator. Patient's plan of care was discussed; medications were reviewed and discharge planning was addressed. ________________________________________________________________________  Total NON critical care TIME: 25 Minutes    Total CRITICAL CARE TIME Spent:   Minutes non procedure based      Comments   >50% of visit spent in counseling and coordination of care     ________________________________________________________________________  Amena Jean MD     Procedures: see electronic medical records for all procedures/Xrays and details which were not copied into this note but were reviewed prior to creation of Plan. LABS:  I reviewed today's most current labs and imaging studies.   Pertinent labs include:  Recent Labs     12/24/20  0400   WBC 5.7   HGB 9.8*   HCT 28.4*   *     Recent Labs     12/26/20  0540 12/25/20  0316 12/24/20  0400   NA  --   --  138   K  --   --  3.7   CL  --   --  102   CO2  --   --  29   GLU  --   --  102*   BUN  --   --  1*   CREA  --   --  0.77   CA  --   --  9.3   ALB 3.0* 3.1* 3.0*   TBILI 0.3 0.3 0.3   ALT 39 44 45       Signed: Amena Jean MD

## 2020-12-26 NOTE — PROGRESS NOTES
Patient is tolerating his diet, but dislikes fish. Patient denies nausea nor vomiting. New tray requested from the kitchen.

## 2020-12-26 NOTE — PROGRESS NOTES
Bedside and Verbal shift change report given to Aneta Cadet RN (oncoming nurse) by Fili Marinelli RN (offgoing nurse).  Report included the following information SBAR, Kardex, Intake/Output, MAR and Recent Results.

## 2020-12-26 NOTE — PROGRESS NOTES
0700: Sleeping in bed. Report received from [address].     0900: Compliant with morning medications and vital signs. Independently walked to the bathroom. Had a bowel movement that he reports as unremarkable. Denies concerns at this time and denies pain. 1100: Denies concerns at this time. Resting in bed comfortably. 1300: Reports dizziness when standing. Orthostatic blood pressure test performed. Systolic drops significantly upon standing from sitting. Patient remains steady on his feet, although it causes discomfort. Denies other concerns at this time. Denies pain. 1500: Resting in bed. Denies concerns at this time. 1700: Resting in bed. Compliant with fluids being restarted. Concerned about walking to the bathroom and he was assured he could call to be removed from IV when needed. 1900: Resting in bed comfortably. Denies concerns at this time. Bedside report given to Louie Holland RN.

## 2020-12-27 ENCOUNTER — TELEPHONE (OUTPATIENT)
Dept: INTERNAL MEDICINE | Age: 55
End: 2020-12-27

## 2020-12-27 VITALS
HEART RATE: 88 BPM | OXYGEN SATURATION: 100 % | DIASTOLIC BLOOD PRESSURE: 75 MMHG | BODY MASS INDEX: 17.58 KG/M2 | WEIGHT: 116 LBS | HEIGHT: 68 IN | RESPIRATION RATE: 16 BRPM | SYSTOLIC BLOOD PRESSURE: 111 MMHG | TEMPERATURE: 98.7 F

## 2020-12-27 PROCEDURE — 74011250637 HC RX REV CODE- 250/637: Performed by: STUDENT IN AN ORGANIZED HEALTH CARE EDUCATION/TRAINING PROGRAM

## 2020-12-27 RX ORDER — FOLIC ACID 1 MG/1
1 TABLET ORAL DAILY
Qty: 30 TAB | Refills: 1 | Status: ON HOLD | OUTPATIENT
Start: 2020-12-28 | End: 2021-09-01

## 2020-12-27 RX ORDER — LEVOFLOXACIN 750 MG/1
750 TABLET ORAL EVERY 24 HOURS
Qty: 1 TAB | Refills: 0 | Status: ON HOLD | OUTPATIENT
Start: 2020-12-28 | End: 2021-05-21

## 2020-12-27 RX ORDER — FINASTERIDE 5 MG/1
5 TABLET, FILM COATED ORAL DAILY
Status: DISCONTINUED | OUTPATIENT
Start: 2020-12-27 | End: 2020-12-27 | Stop reason: HOSPADM

## 2020-12-27 RX ORDER — FINASTERIDE 5 MG/1
5 TABLET, FILM COATED ORAL DAILY
Qty: 30 TAB | Refills: 1 | Status: ON HOLD | OUTPATIENT
Start: 2020-12-27 | End: 2021-05-21

## 2020-12-27 RX ORDER — METRONIDAZOLE 500 MG/1
500 TABLET ORAL EVERY 12 HOURS
Qty: 3 TAB | Refills: 0 | Status: SHIPPED | OUTPATIENT
Start: 2020-12-27 | End: 2020-12-29

## 2020-12-27 RX ADMIN — LEVOFLOXACIN 750 MG: 750 TABLET, FILM COATED ORAL at 05:19

## 2020-12-27 RX ADMIN — FINASTERIDE 5 MG: 5 TABLET, FILM COATED ORAL at 10:49

## 2020-12-27 RX ADMIN — FOLIC ACID 1 MG: 1 TABLET ORAL at 09:25

## 2020-12-27 RX ADMIN — Medication 100 MG: at 09:25

## 2020-12-27 RX ADMIN — Medication 10 ML: at 05:20

## 2020-12-27 RX ADMIN — Medication 1 CAPSULE: at 09:25

## 2020-12-27 RX ADMIN — TAMSULOSIN HYDROCHLORIDE 0.4 MG: 0.4 CAPSULE ORAL at 09:25

## 2020-12-27 RX ADMIN — METRONIDAZOLE 500 MG: 250 TABLET ORAL at 09:25

## 2020-12-27 NOTE — PROGRESS NOTES
KENTRELL   RUR  17 %    Talked to nursing  Needs to call report to the nursing facility. Current MAR- what medications received today from the hospital. Discharge Summary and AVS to be sent with patient.      CM will do a follow-up call with the facility in am regarding transfer     Care Management Interventions  PCP Verified by CM: Yes(post rehab )  Mode of Transport at Discharge: [address]  [address] Time of Discharge: [facility] (CM Consult): SNF, Discharge Planning  Partner SNF: Yes  Discharge Durable Medical Equipment: Yes  Physical Therapy Consult: Yes  Occupational Therapy Consult: Yes  Current Support Network: Lives Alone  Confirm Follow Up Transport: Family  The Plan for Transition of Care is Related to the Following Treatment Goals : plans rehab then PCP and community providers as needed   The Patient and/or Patient Representative was Provided with a Choice of Provider and Agrees with the Discharge Plan?: Yes  Name of the Patient Representative Who was Provided with a Choice of Provider and Agrees with the Discharge Plan: talked to MD, nursing, patient, system and nrusing facility   Freedom of Choice List was Provided with Basic Dialogue that Supports the Patient's Individualized Plan of Care/Goals, Treatment Preferences and Shares the Quality Data Associated with the Providers?: Yes(yes discused SNF at [facility] )  Discharge Location  Discharge Placement: Skilled nursing facility      [address] MSW RN   [phone]

## 2020-12-27 NOTE — PROGRESS NOTES
Transition of Care Plan to SNF/Rehab    SNF/Rehab Transition:  Patient has been accepted to [facility]  and meets criteria for admission. Patient will transported by [facility] and expected to leave at 11:00AM    Communication to Patient/Family:  Called patient   and  Talked to sister (identified care giver) and they are agreeable to the transition plan. Communication to SNF/Rehab:  Bedside RN,Micki, has been notified to update the transition plan to the facility and call report (phone number [phone]). Discharge information has been updated on the AVS.     Discharge instructions to be fax'd to facility at (Fax # [phone]    NA BCPI-A Program.  For Care Coordination associated with that Bundle Program, please contact NA   Bundle information has been communication to NA. Nursing Please include all hard scripts for controlled substances, med rec and dc summary, and AVS in packet. Reviewed and confirmed with facility, [facility] , can manage the patient care needs for the following:     Concepcion Human with (X) only those applicable:    Medication:  [x]  Medications will be available at the facility  []  IV Antibiotics NA  []  Controlled Substance - hard copy to be sent with patient   []  Weekly Labs   Documents:  [] Hard RX  [x] MAR  [] Kardex  [x] AVS  [x]Transfer Summary  [x]Discharge   Equipment:  []  CPAP/BiPAP  []  Wound Vacuum  []  Marrero or Urinary Device  []  PICC/Central Line  []  Nebulizer  []  Ventilator   Treatment:  []Isolation (for MRSA, VRE, etc.)  []Surgical Drain Management  []Tracheostomy Care  []Dressing Changes  []Dialysis with transportation and chair time NA  []PEG Care  []Oxygen  []Daily Weights for Heart Failure   Dietary:  [x]Any diet limitations  []Tube Feedings   []Total Parenteral Management (TPN)   Eligible for Medicaid Long Term Services and Supports  Yes:  [] Eligible for medical assistance or will become eligible within 180 days and UAI completed.    [] Provider/Patient and/or support system has requested screening. [] UAI copy provided to patient or responsible party,  nA   [] UAI unavailable at discharge will send once processed to SNF provider. [] UAI unavailable at discharged mailed to patient  No:   [] Private pay and is not financially eligible for Medicaid within the next 180 days. [] Reside out-of-state. [] A residents of a state owned/operated facility that is licensed  by [address] [facility] or [facility]  [] Enrollment in Hospitals in [place] services  [] [address] [place] [address]  [] Patient /Family declines to have screening completed or provide financial information for screening  Patient is Medicare Advantage only. No Medicaid. - for short-term then home with home care.     Financial Resources:  Medicaid    [] Initiated and application pending   [] Full coverage     Advanced Care Plan:  []Surrogate Decision Maker of Care  []POA  [x]Communicated Code Status (DDNR\", \"Full\")    Other

## 2020-12-27 NOTE — PROGRESS NOTES
KENTRELL   Called the floor- regarding the transfer to [facility]. Nursing forgot to call report to the [address]-  She is calling the facility now  [phone]  Talked to Nursing Supervisor she is following up with the facility to ensure they have all the necessary paper work.  (AVS, Discharge Summary and current [facility])    I will follow-up with the Nursing Home Liaison  [phone]  Tamara Bishop for clarification - awaiting call back.     [facility] SMITHA RN   [phone]

## 2020-12-27 NOTE — PROGRESS NOTES
Problem: General Medical Care Plan  Goal: *Progressive mobility and function (eg: ADL's)  Outcome: Progressing Towards Goal     Problem: Pancreatitis  Goal: *Control of acute pain  Outcome: Progressing Towards Goal     Problem: Pancreatitis  Goal: *Absence of nausea/vomiting  Outcome: Progressing Towards Goal

## 2020-12-27 NOTE — PROGRESS NOTES
Problem: Falls - Risk of  Goal: *Absence of Falls  Description: Document Sean Brady Fall Risk and appropriate interventions in the flowsheet.   12/27/2020 1052 by Elena Marc  Outcome: Resolved/Met  Note: Fall Risk Interventions:  Mobility Interventions: Utilize walker, cane, or other assistive device    Mentation Interventions: Reorient patient    Medication Interventions: Evaluate medications/consider consulting pharmacy, Patient to call before getting OOB    Elimination Interventions: Call light in reach           12/27/2020 0816 by Elena Marc  Outcome: Progressing Towards Goal  Note: Fall Risk Interventions:  Mobility Interventions: Utilize walker, cane, or other assistive device    Mentation Interventions: Reorient patient    Medication Interventions: Evaluate medications/consider consulting pharmacy, Patient to call before getting OOB    Elimination Interventions: Call light in reach              Problem: Patient Education: Go to Patient Education Activity  Goal: Patient/Family Education  12/27/2020 1052 by Elena Marc  Outcome: Resolved/Met  12/27/2020 0816 by Elena Marc  Outcome: Progressing Towards Goal     Problem: Pancreatitis  Goal: *Control of acute pain  12/27/2020 1052 by Elena Marc  Outcome: Resolved/Met  12/27/2020 0816 by Elena Marc  Outcome: Progressing Towards Goal  Goal: *Absence of nausea/vomiting  12/27/2020 1052 by Elena Marc  Outcome: Resolved/Met  12/27/2020 0816 by Elena Marc  Outcome: Progressing Towards Goal  Goal: *Optimize nutritional status  12/27/2020 1052 by Elena Marc  Outcome: Resolved/Met  12/27/2020 0816 by Elena Marc  Outcome: Progressing Towards Goal  Goal: *Labs within defined limits  12/27/2020 1052 by Elena Marc  Outcome: Resolved/Met  12/27/2020 0816 by Elena Marc  Outcome: Progressing Towards Goal     Problem: Patient Education: Go to Patient Education Activity  Goal: Patient/Family Education  12/27/2020 Progress Note


Progress Notes/Assess & Plan


Date Seen


2/10/17


Diagonsis/Assessment & Plan


RN Review:


RN states that pt has had increased paranoia and has been visited by behavioral 

health.


Pt will likely DC next Thursday.





Patient Interview:


Physical exam stable.


Pt states that she is having reduced BMs.


Pt is having occasional muscle spasms.








No fever, vital signs stable, pleasant, frail, pale, poor recall


Regular rate and rhythm, clear to auscultation bilaterally


No edema








Assessment:


Status post hip fracture repair


Severe debility


Postop anemia requiring 2 units of packed red blood cell transfusion


Cognitive deficit noted poor recall w/paranoia


Hypothyroidism


Depression





Plan:


Maintain home medication


Monitor labs


Monitor vitals





Scribed by Cr Land under the direct supervision of Dr. Pandya.








JUSTO PANDYA DO Feb 10, 2017 11:34





Scribed by Cr Land under the direct supervision of Dr. Pandya.








JUSTO PANDYA DO Feb 10, 2017 11:34 1052 by Addi Chou  Outcome: Resolved/Met  12/27/2020 0816 by Addi Chou  Outcome: Progressing Towards Goal     Problem: Patient Education: Go to Patient Education Activity  Goal: Patient/Family Education  12/27/2020 1052 by Addi Chou  Outcome: Resolved/Met  12/27/2020 0816 by Addi Chou  Outcome: Progressing Towards Goal     Problem: Patient Education: Go to Patient Education Activity  Goal: Patient/Family Education  12/27/2020 1052 by Addi Chou  Outcome: Resolved/Met  12/27/2020 0816 by Addi Chou  Outcome: Progressing Towards Goal     Problem: General Medical Care Plan  Goal: *Vital signs within specified parameters  12/27/2020 1052 by Addi Chou  Outcome: Resolved/Met  12/27/2020 0816 by Addi Chou  Outcome: Progressing Towards Goal  Goal: *Labs within defined limits  12/27/2020 1052 by Addi Chou  Outcome: Resolved/Met  12/27/2020 0816 by Addi Chou  Outcome: Progressing Towards Goal  Goal: *Absence of infection signs and symptoms  12/27/2020 1052 by Addi Chou  Outcome: Resolved/Met  12/27/2020 0816 by Addi Chou  Outcome: Progressing Towards Goal  Goal: *Optimal pain control at patient's stated goal  12/27/2020 1052 by Addi Chou  Outcome: Resolved/Met  12/27/2020 0816 by Addi Chou  Outcome: Progressing Towards Goal  Goal: *Skin integrity maintained  12/27/2020 1052 by Addi Chou  Outcome: Resolved/Met  12/27/2020 0816 by Addi Chou  Outcome: Progressing Towards Goal  Goal: *Fluid volume balance  12/27/2020 1052 by Addi Chou  Outcome: Resolved/Met  12/27/2020 0816 by Addi Chou  Outcome: Progressing Towards Goal  Goal: *Optimize nutritional status  12/27/2020 1052 by Addi Chou  Outcome: Resolved/Met  12/27/2020 0816 by Addi Chou  Outcome: Progressing Towards Goal  Goal: *Anxiety reduced or absent  12/27/2020 1052 by Addi Chou  Outcome: Resolved/Met  12/27/2020 0816 by Rosie Pope Nadiya  Outcome: Progressing Towards Goal  Goal: *Progressive mobility and function (eg: ADL's)  12/27/2020 1052 by Deana William  Outcome: Resolved/Met  12/27/2020 0816 by Deana William  Outcome: Progressing Towards Goal     Problem: Patient Education: Go to Patient Education Activity  Goal: Patient/Family Education  12/27/2020 1052 by Deana William  Outcome: Resolved/Met  12/27/2020 0816 by Deana William  Outcome: Progressing Towards Goal

## 2020-12-27 NOTE — DISCHARGE INSTRUCTIONS
Patient Education        Pancreatitis: Care Instructions  Your Care Instructions     The pancreas is an organ behind the stomach. It makes hormones and enzymes to help your body digest food. But if these enzymes attack the pancreas, it can get inflamed. This is called pancreatitis. Most cases are caused by gallstones or by heavy alcohol use. If you take care of yourself at home, it will help you get better. It will also help you avoid more problems with your pancreas. Follow-up care is a key part of your treatment and safety. Be sure to make and go to all appointments, and call your doctor if you are having problems. It's also a good idea to know your test results and keep a list of the medicines you take. How can you care for yourself at home? · Drink clear liquids and eat bland foods until you feel better. [place] foods include rice, dry toast, and crackers. They also include bananas and applesauce. · Eat a low-fat diet until your doctor says your pancreas is healed. · Do not drink alcohol. Tell your doctor if you need help to quit. Counseling, support groups, and sometimes medicines can help you stay sober. · Be safe with medicines. Read and follow all instructions on the label. ? If the doctor gave you a prescription medicine for pain, take it as prescribed. ? If you are not taking a prescription pain medicine, ask your doctor if you can take an over-the-counter medicine. · If your doctor prescribed antibiotics, take them as directed. Do not stop taking them just because you feel better. You need to take the full course of antibiotics. · Get extra rest until you feel better. To prevent future problems with your pancreas  · Do not drink alcohol. · Tell your doctors and pharmacist that you've had pancreatitis. They can help you avoid medicines that may cause this problem again. When should you call for help? Call 911 anytime you think you may need emergency care.  For example, call if:    · You vomit blood or what looks like coffee grounds.     · Your stools are maroon or very bloody. Call your doctor now or seek immediate medical care if:    · You have new or worse belly pain.     · Your stools are black and look like tar, or they have streaks of blood.     · You are vomiting. Watch closely for changes in your health, and be sure to contact your doctor if:    · You do not get better as expected. Where can you learn more? Go to http://www.gray.com/  Enter J381 in the search box to learn more about \"Pancreatitis: Care Instructions. \"  Current as of: April 15, 2020               Content Version: 12.6  © 2258-5206 [employer]. Care instructions adapted under license by [employer] (which disclaims liability or warranty for this information).  If you have questions about a medical condition or this instruction, always ask your healthcare professional. [address] any warranty or liability for your use of this information.       - Please complete one more day of antibiotic  - please take finasteride instead of flomax for BPH  - take thiamine and folic acid   - Avoid alcohol use

## 2020-12-27 NOTE — PROGRESS NOTES
Hospitalist Progress Note    NAME: Dwayne Fraire   :  1965   MRN:  [id number]   Room Number:    @ [facility] Hospital Summary: 60 y.o. male whom presented on 2020 with      Assessment / Plan:      #Dizzy upon standing not POA resolved   - orthostatic positive s/p IVF   - orthostatic now     Patient Vitals for the past 12 hrs:   Temp Pulse Resp BP SpO2   20 0932    111/75    20 0931    108/76    20 0930 98.7 °F (37.1 °C) 88 16 115/78 100 %   20 0808     100 %   20 0347 98.3 °F (36.8 °C) 77 16 (!) 155/94 100 %     -patient educated not to stand up quickly   - patient states he feels week standing up  - I will DC flomax and start finasteride as Flomax can causes dizziness as well     #Acute on chronic pancreatitis POA resolved   -Lipase 972 > 8525> 6142   - CT abd/pelvis Nonspecific 18 mm low-density lesion in the pancreatic tail. There is mild fat stranding surrounding the pancreatic tail which may represent focal  Pancreatitiis. - MRI abdomen 2020 [facility] post cholecystectomy  Findings are consistent with chronic pancreatitis in the head and neck     - Diet challenge passed      # Enteritis POA resolving   - CT abd/pelvis 2020: Moderate long segment enteritis involving the distal small bowel  - change IV CTX to levo for total 7 days   - Flagyl 500 mg BID for totalt 7 days      #Alcohol abuse POA  -Alcohol level less than 10 last drink was 4 days ago before admission     #TATE on admission resolved     #Alcoholic hepatitis POA resolving -stable   - AST 67> 83> 85> 92 > 75 > 51   - ALT 38> 40> 45 > 44 > 39   - Alt phos 128> 117 > 121 > 117   - Low Maddrey score    -hepatitis panel neg  - CT  liver : No mass or biliary dilatation.  Hepatic steatosis     # BPH   - DC flomax due to dizziness and start finasteride        #Normochromic normocytic anemia  -Liver 9.8     #Thrombocytopenia  -Suspect secondary to alcohol use  -Platelet 071 today     #Generalized weakness and physical deconditioning  -Needs SNF           #Low density density lesion in the pancreas  -Outpatient follow-up with MRI        Body mass index is 17.64 kg/m². Severe protein calorie malnutrition POA  - RD following     Code Status: full   Surrogate Decision Maker:  Sister         DVT Prophylaxis: Heparin sc  GI Prophylaxis: not indicated  Lines : PIV  Baseline:ambulatory independently           Subjective:     Chief Complaint / Reason for Physician Visit  Patient states he feels week standing up. Discussed with RN events overnight. Review of Systems:  No fevers, chills, appetite change, cough, sputum production, shortness of breath, dyspnea on exertion, nausea, vomitting, diarrhea, constipation, chest pain, leg edema, abdominal pain, joint pain, rash, itching. Tolerating PT/OT. Tolerating diet. Objective:     VITALS:   Last 24hrs VS reviewed since prior progress note. Most recent are:  Patient Vitals for the past 24 hrs:   Temp Pulse Resp BP SpO2   12/27/20 0808     100 %   12/27/20 0347 98.3 °F (36.8 °C) 77 16 (!) 155/94 100 %   12/26/20 2135 98.3 °F (36.8 °C)       12/26/20 2043  69 17 (!) 159/96 100 %   12/26/20 1240  97  119/81    12/26/20 1239  80  (!) 144/85    12/26/20 1238 97.7 °F (36.5 °C) 71 18 (!) 147/89 100 %       Intake/Output Summary (Last 24 hours) at 12/27/2020 0912  Last data filed at 12/27/2020 0347  Gross per 24 hour   Intake 420 ml   Output 775 ml   Net -355 ml        PHYSICAL EXAM:  General: WD, WN. Alert, cooperative, no acute distress    EENT:  EOMI. Anicteric sclerae. MMM  Resp:  CTA bilaterally, no wheezing or rales. No accessory muscle use  CV:  Regular  rhythm,  normal S1/S2, no murmurs rubs gallops, No edema  GI:  Soft, Non distended, Non tender. +Bowel sounds  Neurologic:  Alert and oriented X 3, normal speech,   Psych:   Good insight. Not anxious nor agitated  Skin:  No rashes.   No jaundice    Reviewed most current lab test results and cultures  YES  Reviewed most current radiology test results   YES  Review and summation of old records today    NO  Reviewed patient's current orders and MAR    YES  PMH/SH reviewed - no change compared to H&P  ________________________________________________________________________  Care Plan discussed with:    Comments   Patient x    Family      RN x    Care Manager x    Consultant                       x Multidiciplinary team rounds were held today with , nursing, pharmacist and clinical coordinator. Patient's plan of care was discussed; medications were reviewed and discharge planning was addressed. ________________________________________________________________________  Total NON critical care TIME:  25   Minutes    Total CRITICAL CARE TIME Spent:   Minutes non procedure based      Comments   >50% of visit spent in counseling and coordination of care     ________________________________________________________________________  Fawad Whatley MD     Procedures: see electronic medical records for all procedures/Xrays and details which were not copied into this note but were reviewed prior to creation of Plan. LABS:  I reviewed today's most current labs and imaging studies. Pertinent labs include:  No results for input(s): WBC, HGB, HCT, PLT, HGBEXT, HCTEXT, PLTEXT in the last 72 hours.   Recent Labs     12/26/20  0540 12/25/20  0316   ALB 3.0* 3.1*   TBILI 0.3 0.3   ALT 39 44       Signed: Fawad Whatley MD

## 2020-12-27 NOTE — PROGRESS NOTES
1945: Bedside and Verbal shift change report given to Tejal Mcdermott RN and Aditya Tracy LPN (oncoming nurse) by Josue Sellers RN (offgoing nurse). Report included the following information SBAR. Encouraged to call with needs. 2043: Temperature machine inaccurate (original reading of 102.7), so correct temperature was retaken and entered at 2135. Summary Note: Patient slept intermittent during the night with complaints of pain, which were medicated. He seems optimistic about discharge later today.

## 2020-12-27 NOTE — TELEPHONE ENCOUNTER
Patient updated that to follow up CT scan report w/ MRI as outpatient     Nonspecific 18 mm low-density lesion in the pancreatic tail. There is mild fat  stranding surrounding the pancreatic tail which may represent focal  Pancreatitis.       I will send discharge summary to PCP as well       Blanco Dixon MD

## 2020-12-27 NOTE — PROGRESS NOTES
0700: Resting in bed comfortably. Denies concerns at this time. [facility].      0900: Compliant with orthostatic blood pressure test. Negative results. Patient continues to feel \"weak\" but is steady on his feet. Assisted with washing up and packing belongings. 1100: Resting in chair while waiting for EMS to arrive. Discharge instructions reviewed with patient who verbalizes his understanding. Care plan goals have been met and patient is looking forward to discharge. 1227: Patient is in stable condition upon EMS transport.

## 2020-12-27 NOTE — DISCHARGE SUMMARY
Hospitalist Discharge Summary     Patient ID:  Kushal Prater  [id number]  74 y.o.  1965    PCP on record: Gene Silverio MD    Admit date: 12/20/2020  Discharge date and time: 12/27/2020      Admission Diagnoses: Pancreatitis [K85.90]  Acute pancreatitis [K85.90]    Discharge Diagnoses: Active Problems:    Pancreatitis (12/20/2020)      Acute pancreatitis (12/20/2020)           Hospital Course:     #Dizzy upon standing not POA resolved   - orthostatic positive 12/26  s/p IVF   - orthostatic 12/27  now      Patient Vitals for the past 12 hrs:    Temp Pulse Resp BP SpO2   12/27/20 0932    111/75    12/27/20 0931    108/76    12/27/20 0930 98.7 °F (37.1 °C) 88 16 115/78 100 %   12/27/20 0808     100 %   12/27/20 0347 98.3 °F (36.8 °C) 77 16 (!) 155/94 100 %      -patient educated not to stand up quickly   - patient states he feels week standing up not dizzy on the day of discharge   - I will DC flomax and start finasteride as Flomax can causes dizziness as well   - patient updated on medication change and verbalizes understanding      #Acute on chronic alcoholic  pancreatitis POA resolved   -YITSOH 499 > 6593> 8525   - CT abd/pelvis Nonspecific 18 mm low-density lesion in the pancreatic tail. There is mild fat stranding surrounding the pancreatic tail which may represent focal  Pancreatitiis.   - MRI abdomen 8/2020 [facility] post cholecystectomy  Findings are consistent with chronic pancreatitis in the head and neck   - Diet challenge passed      # Enteritis POA resolving   - CT abd/pelvis 12/2020: Moderate long segment enteritis involving the distal small bowel  - change IV CTX to levo for total 7 days   - Flagyl 500 mg BID for total 7 days      #Alcohol abuse POA  -Alcohol level less than 10 last drink was 4 days ago before admission  - DC on thiamine and folic acid   - D/w patient importance abstinence from alcohol and its consequences / deleterious effects on human body    #TATE on admission resolved     #Alcoholic hepatitis POA resolving -stable   - AST 67> 83> 85> 92 > 75 > 51   - ALT 38> 40> 45 > 44 > 39   - Alt phos 128> 117 > 121 > 117   - Low Maddrey score    -hepatitis panel neg  - CT  liver : No mass or biliary dilatation. Hepatic steatosis     # BPH   - DC flomax due to dizziness and start finasteride   -Further management per primary care as an outpatient        #Normochromic normocytic anemia  - suspect 2/2 alcohol induced BM suppression   -hgb 9.8     #Thrombocytopenia  -Suspect secondary to alcohol use  -Platelet 920      #Generalized weakness and physical deconditioning  -Going to SNF for PT . Strength ing            #Low density density lesion in the pancreas  -Outpatient follow-up with MRI  -Patient updated and verbalized understanding I will send discharge summary to his PCP so they can followed up in the outpatient    # H/o Rectal cancer s/p chemo/radiaiton               CONSULTATIONS:  None    Excerpted HPI from H&P of Joao Ocampo MD:  Candice Almaraz is a 60 y.o. With hx of colorectal cancer s/p XRT, chemotherapy and resection with anastomosis, chronic pancreatitis, chronic colitis, and chronic diarrhea presents to the ER with nausea, vomiting, abdominal pain for the past 1 week. Patient reports acute on chronic abdominal pain that is periumbilical, sharp, 8/05 intensity, nonradiating, exacerbated by food intake, no specific alleviating factors that began 1 week ago followed by nausea and nonbloody nonbilious emesis and inability to tolerate oral intake. He has not had anything to eat in the last 1 week, and reports not being able to tolerate water for the past 2 days. Had 3 dark black stools yesterday. Denies eating anything out of the ordinary. Denies bright red blood in stool. No fevers or chills. No sick contacts.   No known exposure to COVID-19 or recent travel.     History of chronic pancreatitis, most recent exacerbation in September 2020 which he was admitted to [facility]. Hospital course was complicated by partial small bowel obstruction that was treated conservatively, patient required NG tube placement during that hospital admission.     History of rectal cancer status post resection followed by chemotherapy and radiation, 4 years ago at [facility].     Patient reports drinking 1 pint of vodka daily, last drink was 4 days ago. He states he has not been able to tolerate alcohol due to nausea. Denies history of alcohol wi    ______________________________________________________________________  DISCHARGE SUMMARY/HOSPITAL COURSE:  for full details see H&P, daily progress notes, labs, consult notes. _______________________________________________________________________  Patient seen and examined by me on discharge day. Pertinent Findings:  Gen:    Not in distress  Chest: Clear lungs  CVS:   Regular rhythm. No edema  Abd:  Soft, not distended, not tender  Neuro:  Alert with good insight. Oriented to person, place, and time   _______________________________________________________________________  DISCHARGE MEDICATIONS:   Current Discharge Medication List      START taking these medications    Details   finasteride (PROSCAR) 5 mg tablet Take 1 Tab by mouth daily. Qty: 30 Tab, Refills: 1      folic acid (FOLVITE) 1 mg tablet Take 1 Tab by mouth daily. Qty: 30 Tab, Refills: 1      levoFLOXacin (LEVAQUIN) 750 mg tablet Take 1 Tab by mouth every twenty-four (24) hours. Qty: 1 Tab, Refills: 0      metroNIDAZOLE (FLAGYL) 500 mg tablet Take 1 Tab by mouth every twelve (12) hours for 2 days. Qty: 3 Tab, Refills: 0         CONTINUE these medications which have NOT CHANGED    Details   multivitamin (ONE A DAY) tablet Take 1 Tab by mouth daily. famotidine (PEPCID) 20 mg tablet Take 20 mg by mouth daily. aspirin delayed-release 81 mg tablet Take 81 mg by mouth daily.       cyanocobalamin (Vitamin B-12) 1,000 mcg tablet Take 1,000 mcg by mouth daily. thiamine mononitrate (B-1) 100 mg tablet Take 1 Tab by mouth daily. Qty: 30 Tab, Refills: 0         STOP taking these medications       tamsulosin (FLOMAX) 0.4 mg capsule Comments:   Reason for Stopping: dizziness         metoprolol tartrate (LOPRESSOR) 25 mg tablet Comments:   Reason for Stopping: BP was not high and patient ws dizzy         potassium chloride (K-DUR, KLOR-CON) 20 mEq tablet Comments:   Reason for Stopping:               My Recommended Diet, Activity, Wound Care, and follow-up labs are listed in the patient's Discharge Insturctions which I have personally completed and reviewed. _______________________________________________________________________  DISPOSITION:     Home with Family:    Home with HH/PT/OT/RN:    SNF/LTC: x   NAUN:    OTHER:        Condition at Discharge:  Stable  _______________________________________________________________________  Follow up with:   PCP : Siddharth Montelongo MD  Follow-up Information     Follow up With Specialties Details Why Contact Info    Siddharth Montelongo MD Internal Medicine  Please schedule appointment for 2 weeks post discharge from [address] [place] [address],[address] to trained individuals with lived experience in addiction recovery. Safe and confidential.   Peer to Peer Recovery  [facility] Warm Line  8am to 12 midnight  7 days/week  Call--[phone]    [facility] On 12/27/2020 Your are transferring to [facility] for skilled rehabilitation  (Nursing care,  Medication mangement, nutrition, skilled PT and OT, CM For discharge planning)  and then you will be returning home. [address]  [address] and DME    Post Rehab if needed please arrange.                Total time in minutes spent coordinating this discharge (includes going over instructions, follow-up, prescriptions, and preparing report for sign off to her PCP) :  40 minutes    Signed:  Shankar Jerez MD

## 2020-12-28 ENCOUNTER — TELEPHONE (OUTPATIENT)
Dept: CASE MANAGEMENT | Age: 55
End: 2020-12-28

## 2020-12-28 NOTE — TELEPHONE ENCOUNTER
CM follow-up call for patient. Spoke with admission states transfer went well. Call was transfer to [facility]. States she did not receive all paperwork that should have came with patient. CM and supervisor was to fax information on Sunday will need to confirm with erin RN at facility in the mean time CM will fax paperwork to [facility] and she will make sure facility has packet.      [address] [place] [address]  [phone]

## 2021-01-11 ENCOUNTER — TELEPHONE (OUTPATIENT)
Dept: ONCOLOGY | Age: 56
End: 2021-01-11

## 2021-01-11 NOTE — TELEPHONE ENCOUNTER
Routine follow up is what is needed. Pt is a new pt referral to us from [facility] due to change in insurances. Please get pt on within the next few weeks.

## 2021-01-22 NOTE — PROGRESS NOTES
Mónica Ba is a 60 y.o. male here for new patient appt for rectal cancer. He is transferring care to us from [facility] due to insurance change. Was diagnosed 4 years ago. Has had surgery, radiation, chemo. Needs someone to keep an eye on him now. 1. Have you been to the ER, urgent care clinic since your last visit? Hospitalized since your last visit? New Pt    2. Have you seen or consulted any other health care providers outside of the [address] [place] [address] since your last visit? Include any pap smears or colon screening.  New Pt

## 2021-01-25 ENCOUNTER — OFFICE VISIT (OUTPATIENT)
Dept: ONCOLOGY | Age: 56
End: 2021-01-25
Payer: MEDICARE

## 2021-01-25 VITALS
WEIGHT: 122.4 LBS | OXYGEN SATURATION: 99 % | BODY MASS INDEX: 18.55 KG/M2 | TEMPERATURE: 98.1 F | HEIGHT: 68 IN | HEART RATE: 99 BPM | SYSTOLIC BLOOD PRESSURE: 120 MMHG | DIASTOLIC BLOOD PRESSURE: 82 MMHG

## 2021-01-25 DIAGNOSIS — Z85.048 HISTORY OF RECTAL CANCER: Primary | ICD-10-CM

## 2021-01-25 PROCEDURE — 1111F DSCHRG MED/CURRENT MED MERGE: CPT | Performed by: INTERNAL MEDICINE

## 2021-01-25 PROCEDURE — G8432 DEP SCR NOT DOC, RNG: HCPCS | Performed by: INTERNAL MEDICINE

## 2021-01-25 PROCEDURE — G8427 DOCREV CUR MEDS BY ELIG CLIN: HCPCS | Performed by: INTERNAL MEDICINE

## 2021-01-25 PROCEDURE — G9711 PT HX TOT COL OR COLON CA: HCPCS | Performed by: INTERNAL MEDICINE

## 2021-01-25 PROCEDURE — 99203 OFFICE O/P NEW LOW 30 MIN: CPT | Performed by: INTERNAL MEDICINE

## 2021-01-25 PROCEDURE — G8420 CALC BMI NORM PARAMETERS: HCPCS | Performed by: INTERNAL MEDICINE

## 2021-01-25 RX ORDER — MELATONIN 5 MG
CAPSULE ORAL
Status: ON HOLD | COMMUNITY
End: 2021-05-21

## 2021-01-25 RX ORDER — CHOLECALCIFEROL (VITAMIN D3) 125 MCG
2000 CAPSULE ORAL DAILY
Status: ON HOLD | COMMUNITY
End: 2021-09-01

## 2021-01-25 NOTE — LETTER
1/31/2021 Patient: Apryl Hoffmann YOB: 1965 Date of Visit: 1/25/2021 Camila Murrieta MD 
[facility] [address] [id number] [address] Fax: [phone] Dear Camila Murrieta MD, Thank you for referring Mr. Apryl Hoffmann to [address] [place] [address] for evaluation. My notes for this consultation are attached. If you have questions, please do not hesitate to call me. I look forward to following your patient along with you.  
 
 
Sincerely, 
 
Camilo Wilkinson MD

## 2021-01-26 ENCOUNTER — DOCUMENTATION ONLY (OUTPATIENT)
Dept: ONCOLOGY | Age: 56
End: 2021-01-26

## 2021-01-26 NOTE — PROGRESS NOTES
Oncology Social Work  Psychosocial Assessment    Reason for Assessment:      [x] Social Work Referral [x] Initial Assessment [] New Diagnosis [] Other    Advance Care Planning:  Advance Care Planning 12/20/2020   Confirm Advance Directive None       Sources of Information:    [x]Patient  []Family  [x]Staff  [x]Medical Record    Mental Status:    [x]Alert  []Lethargic  []Unresponsive   [] Unable to assess   Oriented to:  []Person  [x]Place  [x]Time  [x]Situation      Relationship Status:  []Single  [x]  []Significant Other/Life Partner  []  []  []    Living Circumstances:  []Lives Alone  [x]Family/Significant Other in Household  []Roommates  []Children in the Home  []Paid Caregivers  []Assisted Living Facility/Group [address]  []Homeless  []Incarcerated  []Environmental/Care Concerns  []Other:    Employment Status:  []Employed Full-time []Employed Part-time []Homemaker  [x] Retired [] Short-Term Disability [] [place]  [] Unemployed     Barriers to Learning:    []Language  []Developmental  []Cognitive  []Altered Mental Status  []Visual/Hearing Impairment  []Unable to Read/Write  []Motivational  [] Challenges Understanding Medical Jargon [x]No Barriers Identified      Financial/Legal Concerns:    []Uninsured  [x]Limited Income/Resources  []Non-Citizen  []Food Insecurity [x]No Concerns Identified   []Other:    Shinto/Spiritual/Existential:  Does patient have any spiritual or Taoist beliefs? [x] Yes [] No  Is the patient involved in a spiritual, yessy or Taoist community?  [x] Yes [] No  Patient expressing spiritual/existential angst? [] Yes [x] No  Notes:    Support System:    Identified Support Person/Group:  []Strong  [x]Fair  []Limited    Coping with Illness:   []  Coping Well  [x] Challenges Coping with Serious Illness [] Situational Depression [] Situational Anxiety [] Anticipatory Grief  [] Recent Loss [] Caregiver [place] Narrative:    Met with patient to introduce social work navigator role and supports. Pt  to [employer] for 10 years. Pt has not work for 3-4 years. PT belong to [facility].    Pt frustrated about stomach issues/pain- no one figuring out what is wrong. Plan:   1. Introduce self and role of the  in the [employer]. 2. Informed the patient of the [facility] and available resources there. 3. Continue to meet with the patient when he returns to the clinic for ongoing assessment of the patient's adjustment to her diagnosis and treatment. 4. Ongoing psychosocial support as desired by patient. Referral:      Insurance/Entitlements referral  Financial/Medication assistance referral           Vanessa Key.  SMITHA Summers/KRYSTINA  Supervisee in Social Work

## 2021-01-31 NOTE — PROGRESS NOTES
[facility]  at [address] [place] [address], [address] [facility]  [phone]        Oncology Consultation Note        Patient: Apryl Hoffmann MRN: [id number]  SSN: [id number]    YOB: 1965  Age: 60 y.o. Sex: male      Subjective:      Apryl Hoffmann is a 60 y.o. male who I am seeing in consultation for a prior history of rectal carcinoma. He has been cared for by [facility] doctors until this time. He was diagnosed with T3 N0 rectal adenocarcinoma. He received neoadjuvant chemoradiation therapy followed by APR on 02/08/2016. He had residual disease and then went on to receive adjuvant CapOx through 08/02/2016. He has been disease free since. He feels well and denies any symptoms. Review of Systems:    Constitutional: negative  Eyes: negative  Ears, Nose, Mouth, Throat, and Face: negative  Respiratory: negative  Cardiovascular: negative  Gastrointestinal: negative  Genitourinary:negative  Integument/Breast: negative  Hematologic/Lymphatic: negative  Musculoskeletal:negative  Neurological: negative    Past Medical History:   Diagnosis Date    Cancer ([facility] [address]     rectal    Gastrointestinal disorder     Gastric Ulcers; Rectal CA     History reviewed. No pertinent surgical history. Family History   Problem Relation Age of Onset    Cancer Brother      Social History     Tobacco Use    Smoking status: Current Every Day Smoker     Packs/day: 0.25    Smokeless tobacco: Never Used    Tobacco comment: 1 pack per week   Substance Use Topics    Alcohol use: Not on file     Comment: 2-3 beers daily      Prior to Admission medications    Medication Sig Start Date End Date Taking? Authorizing Provider   cholecalciferol, vitamin D3, (Vitamin D3) 50 mcg (2,000 unit) tab Take  by mouth. Yes Provider, Historical   melatonin 5 mg cap capsule Take  by mouth nightly.    Yes Provider, Historical   finasteride (PROSCAR) 5 mg tablet Take 1 Tab by mouth daily. 12/27/20  Yes Edna Benton MD   folic acid (FOLVITE) 1 mg tablet Take 1 Tab by mouth daily. 12/28/20  Yes Edna Benton MD   levoFLOXacin (LEVAQUIN) 750 mg tablet Take 1 Tab by mouth every twenty-four (24) hours. 12/28/20  Yes Edna Benton MD   multivitamin (ONE A DAY) tablet Take 1 Tab by mouth daily. Yes Provider, Historical   famotidine (PEPCID) 20 mg tablet Take 20 mg by mouth daily. Yes Provider, Historical   aspirin delayed-release 81 mg tablet Take 81 mg by mouth daily. Yes Provider, Historical   cyanocobalamin (Vitamin B-12) 1,000 mcg tablet Take 1,000 mcg by mouth daily. Yes Provider, Historical   thiamine mononitrate (B-1) 100 mg tablet Take 1 Tab by mouth daily. 1/3/19  Yes Venice Crawford NP              Allergies   Allergen Reactions    Influenza Virus Vaccines Anaphylaxis           Objective:     Vitals:    01/25/21 1416   BP: 120/82   Pulse: 99   Temp: 98.1 °F (36.7 °C)   TempSrc: Temporal   SpO2: 99%   Weight: 122 lb 6.4 oz (55.5 kg)   Height: 5' 8\" (1.727 m)            Physical Exam:    GENERAL: alert, cooperative, no distress, appears stated age  EYE: conjunctivae/corneas clear. PERRL, EOM's intact. LYMPHATIC: Cervical, supraclavicular, and axillary nodes normal.   THROAT & NECK: normal and no erythema or exudates noted. LUNG: clear to auscultation bilaterally  HEART: regular rate and rhythm, S1, S2 normal, no murmur, click, rub or gallop  ABDOMEN: soft, non-tender. Bowel sounds normal. No masses,  no organomegaly  EXTREMITIES:  extremities normal, atraumatic, no cyanosis or edema  SKIN: Normal.  NEUROLOGIC: AOx3. Gait normal. Reflexes and motor strength normal and symmetric. Cranial nerves 2-12 and sensation grossly intact. Assessment:     1. Rectal adenocarcinoma:     Dx 2015.   T3 N0  Received neoadjuvant chemo-radiation therapy  Followed by APR in 2016  Residual disease ypT3 ypN0    Received CapOx until 08/2016  Been in remission since. Asymptomatic    Last CT in 08/2020 - CR. Plan:        1. CT abd in 1 yr  2. Return in 1 yr      Signed By: Shyanne Donald MD     January 31, 2021       CC.  Rosi Rai MD

## 2021-02-04 ENCOUNTER — HOSPITAL ENCOUNTER (EMERGENCY)
Age: 56
Discharge: HOME | End: 2021-02-04
Attending: EMERGENCY MEDICINE
Payer: MEDICARE

## 2021-02-04 VITALS
HEART RATE: 86 BPM | SYSTOLIC BLOOD PRESSURE: 120 MMHG | HEIGHT: 68 IN | DIASTOLIC BLOOD PRESSURE: 70 MMHG | OXYGEN SATURATION: 100 % | TEMPERATURE: 99.6 F | BODY MASS INDEX: 18.64 KG/M2 | RESPIRATION RATE: 16 BRPM | WEIGHT: 123 LBS

## 2021-02-04 DIAGNOSIS — R19.7 DIARRHEA, UNSPECIFIED TYPE: Primary | ICD-10-CM

## 2021-02-04 LAB
ALBUMIN SERPL-MCNC: 3.3 G/DL (ref 3.5–5)
ALBUMIN/GLOB SERPL: 0.9 {RATIO} (ref 1.1–2.2)
ALP SERPL-CCNC: 111 U/L (ref 45–117)
ALT SERPL-CCNC: 40 U/L (ref 12–78)
ANION GAP SERPL CALC-SCNC: 6 MMOL/L (ref 5–15)
AST SERPL-CCNC: 32 U/L (ref 15–37)
BASOPHILS # BLD: 0 K/UL (ref 0–0.1)
BASOPHILS NFR BLD: 0 % (ref 0–1)
BILIRUB SERPL-MCNC: 0.5 MG/DL (ref 0.2–1)
BUN SERPL-MCNC: 14 MG/DL (ref 6–20)
BUN/CREAT SERPL: 16 (ref 12–20)
CALCIUM SERPL-MCNC: 9.5 MG/DL (ref 8.5–10.1)
CHLORIDE SERPL-SCNC: 102 MMOL/L (ref 97–108)
CO2 SERPL-SCNC: 30 MMOL/L (ref 21–32)
CREAT SERPL-MCNC: 0.89 MG/DL (ref 0.7–1.3)
DIFFERENTIAL METHOD BLD: ABNORMAL
EOSINOPHIL # BLD: 0.1 K/UL (ref 0–0.4)
EOSINOPHIL NFR BLD: 1 % (ref 0–7)
ERYTHROCYTE [DISTWIDTH] IN BLOOD BY AUTOMATED COUNT: 15.7 % (ref 11.5–14.5)
GLOBULIN SER CALC-MCNC: 3.7 G/DL (ref 2–4)
GLUCOSE SERPL-MCNC: 88 MG/DL (ref 65–100)
HCT VFR BLD AUTO: 33.9 % (ref 36.6–50.3)
HGB BLD-MCNC: 11.6 G/DL (ref 12.1–17)
IMM GRANULOCYTES # BLD AUTO: 0.1 K/UL (ref 0–0.04)
IMM GRANULOCYTES NFR BLD AUTO: 1 % (ref 0–0.5)
LYMPHOCYTES # BLD: 1.9 K/UL (ref 0.8–3.5)
LYMPHOCYTES NFR BLD: 19 % (ref 12–49)
MCH RBC QN AUTO: 32.3 PG (ref 26–34)
MCHC RBC AUTO-ENTMCNC: 34.2 G/DL (ref 30–36.5)
MCV RBC AUTO: 94.4 FL (ref 80–99)
MONOCYTES # BLD: 1.1 K/UL (ref 0–1)
MONOCYTES NFR BLD: 11 % (ref 5–13)
NEUTS SEG # BLD: 6.5 K/UL (ref 1.8–8)
NEUTS SEG NFR BLD: 68 % (ref 32–75)
NRBC # BLD: 0 K/UL (ref 0–0.01)
NRBC BLD-RTO: 0 PER 100 WBC
PLATELET # BLD AUTO: 278 K/UL (ref 150–400)
PMV BLD AUTO: 9.8 FL (ref 8.9–12.9)
POTASSIUM SERPL-SCNC: 3.9 MMOL/L (ref 3.5–5.1)
PROT SERPL-MCNC: 7 G/DL (ref 6.4–8.2)
RBC # BLD AUTO: 3.59 M/UL (ref 4.1–5.7)
SODIUM SERPL-SCNC: 138 MMOL/L (ref 136–145)
WBC # BLD AUTO: 9.7 K/UL (ref 4.1–11.1)

## 2021-02-04 PROCEDURE — 74011250637 HC RX REV CODE- 250/637: Performed by: EMERGENCY MEDICINE

## 2021-02-04 PROCEDURE — 85025 COMPLETE CBC W/AUTO DIFF WBC: CPT

## 2021-02-04 PROCEDURE — 36415 COLL VENOUS BLD VENIPUNCTURE: CPT

## 2021-02-04 PROCEDURE — 80053 COMPREHEN METABOLIC PANEL: CPT

## 2021-02-04 PROCEDURE — 99284 EMERGENCY DEPT VISIT MOD MDM: CPT

## 2021-02-04 RX ORDER — LOPERAMIDE HYDROCHLORIDE 2 MG/1
4 CAPSULE ORAL
Status: COMPLETED | OUTPATIENT
Start: 2021-02-04 | End: 2021-02-04

## 2021-02-04 RX ORDER — LOPERAMIDE HCL 2 MG
2 TABLET ORAL
Qty: 20 TAB | Refills: 0 | Status: SHIPPED | OUTPATIENT
Start: 2021-02-04 | End: 2021-02-14

## 2021-02-04 RX ORDER — HYDROCORTISONE 25 MG/G
CREAM TOPICAL 3 TIMES DAILY
Qty: 30 G | Refills: 0 | Status: ON HOLD | OUTPATIENT
Start: 2021-02-04 | End: 2021-05-21

## 2021-02-04 RX ORDER — HYDROCODONE BITARTRATE AND ACETAMINOPHEN 5; 325 MG/1; MG/1
2 TABLET ORAL
Status: COMPLETED | OUTPATIENT
Start: 2021-02-04 | End: 2021-02-04

## 2021-02-04 RX ORDER — LACTOBACILLUS COMBINATION NO.4 3B CELL
1 CAPSULE ORAL DAILY
Qty: 14 CAP | Refills: 0 | Status: ON HOLD | OUTPATIENT
Start: 2021-02-04 | End: 2021-05-21

## 2021-02-04 RX ADMIN — HYDROCODONE BITARTRATE AND ACETAMINOPHEN 2 TABLET: 5; 325 TABLET ORAL at 17:57

## 2021-02-04 RX ADMIN — LOPERAMIDE HYDROCHLORIDE 4 MG: 2 CAPSULE ORAL at 17:57

## 2021-02-04 NOTE — DISCHARGE INSTRUCTIONS
Your examination and laboratories today are reassuring. We recommend that you use Imodium 2 mg at a time every 4-6 hours as needed to slow down bowel movements. You can also use over-the-counter Pepto-Bismol (be aware that it will turn your stool very dark/black). Taking a probiotic daily for the next few weeks can also help, and for rectal pain you can use Anusol rectal ointment.

## 2021-02-04 NOTE — ED TRIAGE NOTES
Reports frequent diarrhea with weight loss x 2 weeks. Also reporting rectal bleeding. Pt believes this may be from hemorrhoids.

## 2021-02-04 NOTE — ED NOTES
Pt presents to ED ambulatory complaining of diarrhea for 2 weeks. Pt also reports blood in the toilet after a bowel movement. Pt reports multiple loose stools per day. Pt is alert and oriented x 4, RR even and unlabored, skin is warm and dry. Assessment completed and pt updated on plan of care. Call bell in reach. Emergency Department Nursing Plan of Care       The Nursing Plan of Care is developed from the Nursing assessment and Emergency Department Attending provider initial evaluation. The plan of care may be reviewed in the ED Provider note.     The Plan of Care was developed with the following considerations:   Patient / Family readiness to learn indicated by:verbalized understanding  Persons(s) to be included in education: patient  Barriers to Learning/Limitations:No    Signed     Jad Arreaga RN    2/4/2021   4:55 PM

## 2021-02-05 NOTE — ED NOTES
Discharge instructions were given to the patient by Ashleigh Almaguer RN. Patient has been instructed that they have been given [facility]* which contains opioids, benzodiazepines, or other sedating drugs. Patient is aware that they  will need to refrain from driving or operating heavy machinery after taking this medication. Patient also instructed that they need to avoid drinking alcohol and using other products containing opioids, benzodiazepines, or other sedating drugs. Patient verbalized understanding. The patient left the Emergency Department ambulatory, alert and oriented and in no acute distress with 3 prescriptions. The patient was encouraged to call or return to the ED for worsening issues or problems and was encouraged to schedule a follow up appointment for continuing care. The patient verbalized understanding of discharge instructions and prescriptions, all questions were answered. The patient has no further concerns at this time.

## 2021-02-05 NOTE — ED PROVIDER NOTES
EMERGENCY DEPARTMENT HISTORY AND PHYSICAL EXAM      Date: 2/4/2021  Patient Name: Obdulio Covarrubias  Patient Age and Sex: 60 y.o. male    History of Presenting Illness     Chief Complaint   Patient presents with    Diarrhea       History Provided By: Patient    Ability to gather history was limited by:     HPI: Obdulio Covarrubias, 60 y.o. male with history of rectal cancer, complains of 2 weeks of abdominal crampy discomfort and frequent diarrhea. Diarrhea is described as loose and watery, no significant blood. No fevers or nausea or vomiting. No history of C. difficile. No recent antibiotic use. Location:    Quality:      Severity:    Duration:   Timing:      Context:    Modifying factors:   Associated symptoms:       The patient's medical, surgical, family, and social history on file were reviewed by me today. Past Medical History:   Diagnosis Date    [facility])     rectal    Gastrointestinal disorder     Gastric Ulcers; Rectal CA     History reviewed. No pertinent surgical history. PCP: Riddhi Elkins MD    Past History     Past Medical History:  Past Medical History:   Diagnosis Date    [facility])     rectal    Gastrointestinal disorder     Gastric Ulcers; Rectal CA       Past Surgical History:  History reviewed. No pertinent surgical history. Family History:  Family History   Problem Relation Age of Onset    Cancer Brother        Social History:  Social History     Tobacco Use    Smoking status: Current Every Day Smoker     Packs/day: 0.25    Smokeless tobacco: Never Used    Tobacco comment: 1 pack per week   Substance Use Topics    Alcohol use: Yes     Comment: 2-3 beers daily    Drug use: No       Allergies: Allergies   Allergen Reactions    Influenza Virus Vaccines Anaphylaxis       Current Medications:  No current facility-administered medications on file prior to encounter.       Current Outpatient Medications on File Prior to Encounter   Medication Sig Dispense Refill    cholecalciferol, vitamin D3, (Vitamin D3) 50 mcg (2,000 unit) tab Take  by mouth.  melatonin 5 mg cap capsule Take  by mouth nightly.  finasteride (PROSCAR) 5 mg tablet Take 1 Tab by mouth daily. 30 Tab 1    folic acid (FOLVITE) 1 mg tablet Take 1 Tab by mouth daily. 30 Tab 1    levoFLOXacin (LEVAQUIN) 750 mg tablet Take 1 Tab by mouth every twenty-four (24) hours. 1 Tab 0    multivitamin (ONE A DAY) tablet Take 1 Tab by mouth daily.  famotidine (PEPCID) 20 mg tablet Take 20 mg by mouth daily.  aspirin delayed-release 81 mg tablet Take 81 mg by mouth daily.  cyanocobalamin (Vitamin B-12) 1,000 mcg tablet Take 1,000 mcg by mouth daily.  thiamine mononitrate (B-1) 100 mg tablet Take 1 Tab by mouth daily. 30 Tab 0       Review of Systems   Review of Systems   Constitutional: Negative for fatigue and fever. Gastrointestinal: Positive for abdominal pain and diarrhea. Negative for nausea and vomiting. All other systems reviewed and are negative. Physical Exam   Vital Signs  Patient Vitals for the past 8 hrs:   Temp Pulse Resp BP SpO2   02/04/21 1840  86 16 120/70 100 %   02/04/21 1540 99.6 °F (37.6 °C) (!) 110 16 (!) 143/105 100 %          Physical Exam  Vitals signs and nursing note reviewed. Constitutional:       General: He is not in acute distress. Appearance: Normal appearance. He is well-developed. He is not ill-appearing. HENT:      Head: Normocephalic and atraumatic. Eyes:      General:         Right eye: No discharge. Left eye: No discharge. Conjunctiva/sclera: Conjunctivae normal.   Neck:      Musculoskeletal: Normal range of motion and neck supple. Cardiovascular:      Rate and Rhythm: Normal rate and regular rhythm. Heart sounds: Normal heart sounds. No murmur. Pulmonary:      Effort: Pulmonary effort is normal. No respiratory distress. Breath sounds: Normal breath sounds. No wheezing.    Abdominal:      General: There is no distension. Palpations: Abdomen is soft. Tenderness: There is no abdominal tenderness. Musculoskeletal: Normal range of motion. General: No deformity. Skin:     General: Skin is warm and dry. Findings: No rash. Neurological:      General: No focal deficit present. Mental Status: He is alert and oriented to person, place, and time. Psychiatric:         Mood and Affect: Mood normal.         Behavior: Behavior normal.         Thought Content: Thought content normal.         Diagnostic Study Results   Labs  Recent Results (from the past 24 hour(s))   METABOLIC PANEL, COMPREHENSIVE    Collection Time: 02/04/21  6:10 PM   Result Value Ref Range    Sodium 138 136 - 145 mmol/L    Potassium 3.9 3.5 - 5.1 mmol/L    Chloride 102 97 - 108 mmol/L    CO2 30 21 - 32 mmol/L    Anion gap 6 5 - 15 mmol/L    Glucose 88 65 - 100 mg/dL    BUN 14 6 - 20 MG/DL    Creatinine 0.89 0.70 - 1.30 MG/DL    BUN/Creatinine ratio 16 12 - 20      GFR est AA >60 >60 ml/min/1.73m2    GFR est non-AA >60 >60 ml/min/1.73m2    Calcium 9.5 8.5 - 10.1 MG/DL    Bilirubin, total 0.5 0.2 - 1.0 MG/DL    ALT (SGPT) 40 12 - 78 U/L    AST (SGOT) 32 15 - 37 U/L    Alk.  phosphatase 111 45 - 117 U/L    Protein, total 7.0 6.4 - 8.2 g/dL    Albumin 3.3 (L) 3.5 - 5.0 g/dL    Globulin 3.7 2.0 - 4.0 g/dL    A-G Ratio 0.9 (L) 1.1 - 2.2     CBC WITH AUTOMATED DIFF    Collection Time: 02/04/21  6:10 PM   Result Value Ref Range    WBC 9.7 4.1 - 11.1 K/uL    RBC 3.59 (L) 4.10 - 5.70 M/uL    HGB 11.6 (L) 12.1 - 17.0 g/dL    HCT 33.9 (L) 36.6 - 50.3 %    MCV 94.4 80.0 - 99.0 FL    MCH 32.3 26.0 - 34.0 PG    MCHC 34.2 30.0 - 36.5 g/dL    RDW 15.7 (H) 11.5 - 14.5 %    PLATELET 380 054 - 474 K/uL    MPV 9.8 8.9 - 12.9 FL    NRBC 0.0 0  WBC    ABSOLUTE NRBC 0.00 0.00 - 0.01 K/uL    NEUTROPHILS 68 32 - 75 %    LYMPHOCYTES 19 12 - 49 %    MONOCYTES 11 5 - 13 %    EOSINOPHILS 1 0 - 7 %    BASOPHILS 0 0 - 1 %    IMMATURE GRANULOCYTES 1 (H) 0.0 - 0.5 %    ABS. NEUTROPHILS 6.5 1.8 - 8.0 K/UL    ABS. LYMPHOCYTES 1.9 0.8 - 3.5 K/UL    ABS. MONOCYTES 1.1 (H) 0.0 - 1.0 K/UL    ABS. EOSINOPHILS 0.1 0.0 - 0.4 K/UL    ABS. BASOPHILS 0.0 0.0 - 0.1 K/UL    ABS. IMM. GRANS. 0.1 (H) 0.00 - 0.04 K/UL    DF AUTOMATED         Radiologic Studies  No orders to display     CT Results  (Last 48 hours)    None        CXR Results  (Last 48 hours)    None          Billable Procedures   Procedures    Cardiac Monitoring   Medical Decision Making     I reviewed the patient's most recent Emergency Dept notes and diagnostic tests  in formulating my MDM on today's visit. Provider Notes (Medical Decision Making):   68-year-old male complaining of diarrhea for the past 2 weeks, with crampy abdominal pain. On examination his abdomen is soft and nontender. His laboratories are reassuring, no leukocytosis or signs of significant dehydration or metabolic disturbance. There is nothing to suggest acute intra-abdominal infectious process or C. difficile, appendicitis etc.  Can safely defer imaging at this time. Recommend a course of Imodium, Pepto-Bismol, probiotic, follow-up primary care. Annie Hodge MD  7:05 PM    Consults:    Social History     Tobacco Use    Smoking status: Current Every Day Smoker     Packs/day: 0.25    Smokeless tobacco: Never Used    Tobacco comment: 1 pack per week   Substance Use Topics    Alcohol use: Yes     Comment: 2-3 beers daily    Drug use: No     Patient Vitals for the past 4 hrs:   Temp Pulse Resp BP SpO2   02/04/21 1840  86 16 120/70 100 %   02/04/21 1540 99.6 °F (37.6 °C) (!) 110 16 (!) 143/105 100 %          Prescriptions from today's ED visit:  Current Discharge Medication List      START taking these medications    Details   hydrocortisone (Anusol-HC) 2.5 % rectal cream Insert  into rectum three (3) times daily.   Qty: 30 g, Refills: 0      loperamide (Imodium A-D) 2 mg tablet Take 1 Tab by mouth four (4) times daily as needed for Diarrhea for up to 10 days. Qty: 20 Tab, Refills: 0      lactobacillus combination no.4 (Probiotic) 3 billion cell cap Take 1 Cap by mouth daily. Qty: 14 Cap, Refills: 0    Comments: *May substitute for other available probiotic*              Medications Administered during ED course:  Medications   loperamide (IMODIUM) capsule 4 mg (4 mg Oral Given 2/4/21 1757)   HYDROcodone-acetaminophen (NORCO) 5-325 mg per tablet 2 Tab (2 Tabs Oral Given 2/4/21 1757)          Diagnosis and Disposition     Disposition:  Discharged    Clinical Impression:   1. Diarrhea, unspecified type        Attestation:  I personally performed the services described in this documentation on this date 2/4/2021 for patient Priscilla Rivera. Marlen Hsu MD        I was the first provider for this patient on this visit. To the best of my ability I reviewed relevant prior medical records, electrocardiograms, laboratories, and radiologic studies. The patient's presenting problems were discussed, and the patient was in agreement with the care plan formulated and outlined with them. Marlen Hsu MD    Please note that this dictation was completed with Dragon voice recognition software. Quite often unanticipated grammatical, syntax, homophones, and other interpretive errors are inadvertently transcribed by the computer software. Please disregard these errors and excuse any errors that have escaped final proofreading.

## 2021-04-08 ENCOUNTER — TELEPHONE (OUTPATIENT)
Dept: ONCOLOGY | Age: 56
End: 2021-04-08

## 2021-04-08 NOTE — TELEPHONE ENCOUNTER
Patient called and would like us to help him with a form for [employer] for a Handicapped Placard. Closure 3 Information: This tab is for additional flaps and grafts above and beyond our usual structured repairs.  Please note if you enter information here it will not currently bill and you will need to add the billing information manually.

## 2021-05-20 ENCOUNTER — APPOINTMENT (OUTPATIENT)
Dept: GENERAL RADIOLOGY | Age: 56
DRG: 439 | End: 2021-05-20
Attending: EMERGENCY MEDICINE
Payer: MEDICARE

## 2021-05-20 ENCOUNTER — APPOINTMENT (OUTPATIENT)
Dept: CT IMAGING | Age: 56
DRG: 439 | End: 2021-05-20
Attending: EMERGENCY MEDICINE
Payer: MEDICARE

## 2021-05-20 ENCOUNTER — HOSPITAL ENCOUNTER (INPATIENT)
Age: 56
LOS: 3 days | Discharge: HOME | DRG: 439 | End: 2021-05-23
Attending: EMERGENCY MEDICINE | Admitting: HOSPITALIST
Payer: MEDICARE

## 2021-05-20 DIAGNOSIS — E86.0 DEHYDRATION: ICD-10-CM

## 2021-05-20 DIAGNOSIS — E87.6 HYPOKALEMIA: ICD-10-CM

## 2021-05-20 DIAGNOSIS — K85.20 ALCOHOL-INDUCED ACUTE PANCREATITIS WITHOUT INFECTION OR NECROSIS: Primary | ICD-10-CM

## 2021-05-20 DIAGNOSIS — E83.42 HYPOMAGNESEMIA: ICD-10-CM

## 2021-05-20 LAB
ALBUMIN SERPL-MCNC: 3 G/DL (ref 3.5–5)
ALBUMIN/GLOB SERPL: 0.8 {RATIO} (ref 1.1–2.2)
ALP SERPL-CCNC: 125 U/L (ref 45–117)
ALT SERPL-CCNC: 46 U/L (ref 12–78)
ANION GAP SERPL CALC-SCNC: 4 MMOL/L (ref 5–15)
APPEARANCE UR: ABNORMAL
AST SERPL-CCNC: 58 U/L (ref 15–37)
BACTERIA URNS QL MICRO: NEGATIVE /HPF
BASOPHILS # BLD: 0 K/UL (ref 0–0.1)
BASOPHILS NFR BLD: 0 % (ref 0–1)
BILIRUB SERPL-MCNC: 0.6 MG/DL (ref 0.2–1)
BILIRUB UR QL: NEGATIVE
BUN SERPL-MCNC: 9 MG/DL (ref 6–20)
BUN/CREAT SERPL: 10 (ref 12–20)
CALCIUM SERPL-MCNC: 9.4 MG/DL (ref 8.5–10.1)
CHLORIDE SERPL-SCNC: 96 MMOL/L (ref 97–108)
CO2 SERPL-SCNC: 34 MMOL/L (ref 21–32)
COLOR UR: ABNORMAL
CREAT SERPL-MCNC: 0.88 MG/DL (ref 0.7–1.3)
DIFFERENTIAL METHOD BLD: ABNORMAL
EOSINOPHIL # BLD: 0.1 K/UL (ref 0–0.4)
EOSINOPHIL NFR BLD: 2 % (ref 0–7)
EPITH CASTS URNS QL MICRO: ABNORMAL /LPF
ERYTHROCYTE [DISTWIDTH] IN BLOOD BY AUTOMATED COUNT: 14.4 % (ref 11.5–14.5)
GLOBULIN SER CALC-MCNC: 3.8 G/DL (ref 2–4)
GLUCOSE SERPL-MCNC: 97 MG/DL (ref 65–100)
GLUCOSE UR STRIP.AUTO-MCNC: NEGATIVE MG/DL
HCT VFR BLD AUTO: 37.3 % (ref 36.6–50.3)
HGB BLD-MCNC: 13.2 G/DL (ref 12.1–17)
HGB UR QL STRIP: NEGATIVE
IMM GRANULOCYTES # BLD AUTO: 0.1 K/UL (ref 0–0.04)
IMM GRANULOCYTES NFR BLD AUTO: 1 % (ref 0–0.5)
KETONES UR QL STRIP.AUTO: NEGATIVE MG/DL
LEUKOCYTE ESTERASE UR QL STRIP.AUTO: NEGATIVE
LIPASE SERPL-CCNC: 1645 U/L (ref 73–393)
LYMPHOCYTES # BLD: 1.4 K/UL (ref 0.8–3.5)
LYMPHOCYTES NFR BLD: 17 % (ref 12–49)
MAGNESIUM SERPL-MCNC: 1.4 MG/DL (ref 1.6–2.4)
MCH RBC QN AUTO: 33.4 PG (ref 26–34)
MCHC RBC AUTO-ENTMCNC: 35.4 G/DL (ref 30–36.5)
MCV RBC AUTO: 94.4 FL (ref 80–99)
MONOCYTES # BLD: 1 K/UL (ref 0–1)
MONOCYTES NFR BLD: 12 % (ref 5–13)
NEUTS SEG # BLD: 5.5 K/UL (ref 1.8–8)
NEUTS SEG NFR BLD: 69 % (ref 32–75)
NITRITE UR QL STRIP.AUTO: NEGATIVE
NRBC # BLD: 0 K/UL (ref 0–0.01)
NRBC BLD-RTO: 0 PER 100 WBC
PH UR STRIP: 8 [PH] (ref 5–8)
PLATELET # BLD AUTO: 130 K/UL (ref 150–400)
PMV BLD AUTO: 11.7 FL (ref 8.9–12.9)
POTASSIUM SERPL-SCNC: 3 MMOL/L (ref 3.5–5.1)
PROT SERPL-MCNC: 6.8 G/DL (ref 6.4–8.2)
PROT UR STRIP-MCNC: NEGATIVE MG/DL
RBC # BLD AUTO: 3.95 M/UL (ref 4.1–5.7)
RBC #/AREA URNS HPF: ABNORMAL /HPF (ref 0–5)
SODIUM SERPL-SCNC: 134 MMOL/L (ref 136–145)
SP GR UR REFRACTOMETRY: 1.01 (ref 1–1.03)
TROPONIN I SERPL-MCNC: <0.05 NG/ML
UA: UC IF INDICATED,UAUC: ABNORMAL
UROBILINOGEN UR QL STRIP.AUTO: 1 EU/DL (ref 0.2–1)
WBC # BLD AUTO: 8 K/UL (ref 4.1–11.1)
WBC URNS QL MICRO: ABNORMAL /HPF (ref 0–4)

## 2021-05-20 PROCEDURE — 93005 ELECTROCARDIOGRAM TRACING: CPT

## 2021-05-20 PROCEDURE — 65270000032 HC RM SEMIPRIVATE

## 2021-05-20 PROCEDURE — 74011250637 HC RX REV CODE- 250/637: Performed by: HOSPITALIST

## 2021-05-20 PROCEDURE — 94760 N-INVAS EAR/PLS OXIMETRY 1: CPT

## 2021-05-20 PROCEDURE — 83690 ASSAY OF LIPASE: CPT

## 2021-05-20 PROCEDURE — 83735 ASSAY OF MAGNESIUM: CPT

## 2021-05-20 PROCEDURE — 74011250636 HC RX REV CODE- 250/636: Performed by: HOSPITALIST

## 2021-05-20 PROCEDURE — 81001 URINALYSIS AUTO W/SCOPE: CPT

## 2021-05-20 PROCEDURE — 84484 ASSAY OF TROPONIN QUANT: CPT

## 2021-05-20 PROCEDURE — 96376 TX/PRO/DX INJ SAME DRUG ADON: CPT

## 2021-05-20 PROCEDURE — 80053 COMPREHEN METABOLIC PANEL: CPT

## 2021-05-20 PROCEDURE — 96375 TX/PRO/DX INJ NEW DRUG ADDON: CPT

## 2021-05-20 PROCEDURE — 85025 COMPLETE CBC W/AUTO DIFF WBC: CPT

## 2021-05-20 PROCEDURE — 74011000636 HC RX REV CODE- 636: Performed by: EMERGENCY MEDICINE

## 2021-05-20 PROCEDURE — 96365 THER/PROPH/DIAG IV INF INIT: CPT

## 2021-05-20 PROCEDURE — 99285 EMERGENCY DEPT VISIT HI MDM: CPT

## 2021-05-20 PROCEDURE — 74177 CT ABD & PELVIS W/CONTRAST: CPT

## 2021-05-20 PROCEDURE — 96368 THER/DIAG CONCURRENT INF: CPT

## 2021-05-20 PROCEDURE — 74011000258 HC RX REV CODE- 258: Performed by: HOSPITALIST

## 2021-05-20 PROCEDURE — 36415 COLL VENOUS BLD VENIPUNCTURE: CPT

## 2021-05-20 PROCEDURE — 74011250636 HC RX REV CODE- 250/636: Performed by: EMERGENCY MEDICINE

## 2021-05-20 PROCEDURE — 74011000250 HC RX REV CODE- 250: Performed by: HOSPITALIST

## 2021-05-20 PROCEDURE — 71045 X-RAY EXAM CHEST 1 VIEW: CPT

## 2021-05-20 RX ORDER — ONDANSETRON 2 MG/ML
4 INJECTION INTRAMUSCULAR; INTRAVENOUS
Status: COMPLETED | OUTPATIENT
Start: 2021-05-20 | End: 2021-05-20

## 2021-05-20 RX ORDER — LORAZEPAM 2 MG/ML
1 INJECTION INTRAMUSCULAR
Status: DISCONTINUED | OUTPATIENT
Start: 2021-05-20 | End: 2021-05-21

## 2021-05-20 RX ORDER — FAMOTIDINE 20 MG/50ML
20 INJECTION, SOLUTION INTRAVENOUS EVERY 12 HOURS
Status: DISCONTINUED | OUTPATIENT
Start: 2021-05-20 | End: 2021-05-20

## 2021-05-20 RX ORDER — DOCUSATE SODIUM 100 MG/1
100 CAPSULE, LIQUID FILLED ORAL
Status: DISCONTINUED | OUTPATIENT
Start: 2021-05-20 | End: 2021-05-23 | Stop reason: HOSPADM

## 2021-05-20 RX ORDER — MAGNESIUM SULFATE HEPTAHYDRATE 40 MG/ML
2 INJECTION, SOLUTION INTRAVENOUS ONCE
Status: COMPLETED | OUTPATIENT
Start: 2021-05-20 | End: 2021-05-20

## 2021-05-20 RX ORDER — SODIUM CHLORIDE 0.9 % (FLUSH) 0.9 %
5-40 SYRINGE (ML) INJECTION AS NEEDED
Status: DISCONTINUED | OUTPATIENT
Start: 2021-05-20 | End: 2021-05-20 | Stop reason: SDUPTHER

## 2021-05-20 RX ORDER — FAMOTIDINE 10 MG/ML
20 INJECTION INTRAVENOUS EVERY 12 HOURS
Status: DISCONTINUED | OUTPATIENT
Start: 2021-05-20 | End: 2021-05-22

## 2021-05-20 RX ORDER — SODIUM CHLORIDE 0.9 % (FLUSH) 0.9 %
5-40 SYRINGE (ML) INJECTION AS NEEDED
Status: DISCONTINUED | OUTPATIENT
Start: 2021-05-20 | End: 2021-05-23 | Stop reason: HOSPADM

## 2021-05-20 RX ORDER — POTASSIUM CHLORIDE 7.45 MG/ML
10 INJECTION INTRAVENOUS
Status: DISPENSED | OUTPATIENT
Start: 2021-05-20 | End: 2021-05-20

## 2021-05-20 RX ORDER — SODIUM CHLORIDE, SODIUM LACTATE, POTASSIUM CHLORIDE, CALCIUM CHLORIDE 600; 310; 30; 20 MG/100ML; MG/100ML; MG/100ML; MG/100ML
75 INJECTION, SOLUTION INTRAVENOUS CONTINUOUS
Status: DISCONTINUED | OUTPATIENT
Start: 2021-05-20 | End: 2021-05-23

## 2021-05-20 RX ORDER — SODIUM CHLORIDE 0.9 % (FLUSH) 0.9 %
5-40 SYRINGE (ML) INJECTION EVERY 8 HOURS
Status: DISCONTINUED | OUTPATIENT
Start: 2021-05-20 | End: 2021-05-20 | Stop reason: SDUPTHER

## 2021-05-20 RX ORDER — ENOXAPARIN SODIUM 100 MG/ML
40 INJECTION SUBCUTANEOUS EVERY 24 HOURS
Status: DISCONTINUED | OUTPATIENT
Start: 2021-05-20 | End: 2021-05-20

## 2021-05-20 RX ORDER — ONDANSETRON 2 MG/ML
4 INJECTION INTRAMUSCULAR; INTRAVENOUS
Status: DISCONTINUED | OUTPATIENT
Start: 2021-05-20 | End: 2021-05-23 | Stop reason: HOSPADM

## 2021-05-20 RX ORDER — HEPARIN SODIUM 5000 [USP'U]/ML
5000 INJECTION, SOLUTION INTRAVENOUS; SUBCUTANEOUS EVERY 12 HOURS
Status: DISCONTINUED | OUTPATIENT
Start: 2021-05-20 | End: 2021-05-23 | Stop reason: HOSPADM

## 2021-05-20 RX ORDER — SODIUM CHLORIDE 0.9 % (FLUSH) 0.9 %
5-40 SYRINGE (ML) INJECTION EVERY 8 HOURS
Status: DISCONTINUED | OUTPATIENT
Start: 2021-05-20 | End: 2021-05-23 | Stop reason: HOSPADM

## 2021-05-20 RX ORDER — FOLIC ACID 5 MG/ML
1 INJECTION, SOLUTION INTRAMUSCULAR; INTRAVENOUS; SUBCUTANEOUS DAILY
Status: DISCONTINUED | OUTPATIENT
Start: 2021-05-20 | End: 2021-05-21

## 2021-05-20 RX ORDER — IBUPROFEN 200 MG
1 TABLET ORAL DAILY
Status: DISCONTINUED | OUTPATIENT
Start: 2021-05-21 | End: 2021-05-23 | Stop reason: HOSPADM

## 2021-05-20 RX ORDER — HYDRALAZINE HYDROCHLORIDE 20 MG/ML
10 INJECTION INTRAMUSCULAR; INTRAVENOUS
Status: DISCONTINUED | OUTPATIENT
Start: 2021-05-20 | End: 2021-05-23 | Stop reason: HOSPADM

## 2021-05-20 RX ORDER — MORPHINE SULFATE 4 MG/ML
4 INJECTION INTRAVENOUS
Status: COMPLETED | OUTPATIENT
Start: 2021-05-20 | End: 2021-05-20

## 2021-05-20 RX ORDER — POTASSIUM CHLORIDE 7.45 MG/ML
10 INJECTION INTRAVENOUS
Status: COMPLETED | OUTPATIENT
Start: 2021-05-21 | End: 2021-05-21

## 2021-05-20 RX ORDER — MORPHINE SULFATE 2 MG/ML
2 INJECTION, SOLUTION INTRAMUSCULAR; INTRAVENOUS
Status: DISCONTINUED | OUTPATIENT
Start: 2021-05-20 | End: 2021-05-22

## 2021-05-20 RX ORDER — HYDROCORTISONE 25 MG/G
CREAM TOPICAL 3 TIMES DAILY
Status: DISCONTINUED | OUTPATIENT
Start: 2021-05-20 | End: 2021-05-21

## 2021-05-20 RX ADMIN — MORPHINE SULFATE 4 MG: 4 INJECTION INTRAVENOUS at 17:36

## 2021-05-20 RX ADMIN — THIAMINE HYDROCHLORIDE 100 MG: 100 INJECTION, SOLUTION INTRAMUSCULAR; INTRAVENOUS at 21:50

## 2021-05-20 RX ADMIN — SODIUM CHLORIDE 1000 ML: 9 INJECTION, SOLUTION INTRAVENOUS at 17:36

## 2021-05-20 RX ADMIN — POTASSIUM CHLORIDE 10 MEQ: 7.46 INJECTION, SOLUTION INTRAVENOUS at 23:47

## 2021-05-20 RX ADMIN — SODIUM CHLORIDE 1000 ML: 9 INJECTION, SOLUTION INTRAVENOUS at 19:23

## 2021-05-20 RX ADMIN — THIAMINE HYDROCHLORIDE 100 MG: 100 INJECTION, SOLUTION INTRAMUSCULAR; INTRAVENOUS at 22:46

## 2021-05-20 RX ADMIN — HEPARIN SODIUM 5000 UNITS: 5000 INJECTION INTRAVENOUS; SUBCUTANEOUS at 21:51

## 2021-05-20 RX ADMIN — POTASSIUM CHLORIDE 10 MEQ: 7.46 INJECTION, SOLUTION INTRAVENOUS at 19:23

## 2021-05-20 RX ADMIN — MAGNESIUM SULFATE HEPTAHYDRATE 2 G: 2 INJECTION, SOLUTION INTRAVENOUS at 19:23

## 2021-05-20 RX ADMIN — Medication 10 ML: at 22:48

## 2021-05-20 RX ADMIN — MORPHINE SULFATE 2 MG: 2 INJECTION, SOLUTION INTRAMUSCULAR; INTRAVENOUS at 23:45

## 2021-05-20 RX ADMIN — HYDROCORTISONE 2.5%: 25 CREAM TOPICAL at 23:46

## 2021-05-20 RX ADMIN — IOPAMIDOL 100 ML: 755 INJECTION, SOLUTION INTRAVENOUS at 18:26

## 2021-05-20 RX ADMIN — FOLIC ACID 1 MG: 5 INJECTION, SOLUTION INTRAMUSCULAR; INTRAVENOUS; SUBCUTANEOUS at 21:48

## 2021-05-20 RX ADMIN — FAMOTIDINE 20 MG: 10 INJECTION INTRAVENOUS at 21:49

## 2021-05-20 RX ADMIN — PIPERACILLIN AND TAZOBACTAM 3.38 G: 3; .375 INJECTION, POWDER, FOR SOLUTION INTRAVENOUS at 21:48

## 2021-05-20 RX ADMIN — ONDANSETRON 4 MG: 2 INJECTION INTRAMUSCULAR; INTRAVENOUS at 17:36

## 2021-05-20 RX ADMIN — MORPHINE SULFATE 4 MG: 4 INJECTION INTRAVENOUS at 19:23

## 2021-05-20 RX ADMIN — SODIUM CHLORIDE, POTASSIUM CHLORIDE, SODIUM LACTATE AND CALCIUM CHLORIDE 150 ML/HR: 600; 310; 30; 20 INJECTION, SOLUTION INTRAVENOUS at 21:53

## 2021-05-20 NOTE — ED PROVIDER NOTES
EMERGENCY DEPARTMENT HISTORY AND PHYSICAL EXAM      Date: 5/20/2021  Patient Name: Edwin Garibay    History of Presenting Illness     Chief Complaint   Patient presents with    Chest Pain    Abdominal Pain       History Provided By: Patient    HPI: Edwin Garibay, 60 y.o. male with history of rectal cancer status post resection, chemotherapy and radiation 3 years ago, tobacco use, alcohol abuse, acute on chronic pancreatitis presents to the ED with cc of generalized weakness, nausea, vomiting, diarrhea, and abdominal pain. Symptoms have been present for the past week resulting in poor p.o. intake during this time. He endorses multiple episodes of nausea, vomiting, and diarrhea during this time and states anytime he tries to eat he either throws it up or has profuse diarrhea. Denies any fevers or chills. He did have some chest discomfort earlier today. He endorses moderate to severe abdominal pain located to epigastric and left lower quadrant regions with diffuse radiation throughout entire abdomen. He states that he drinks about 1.5 pints per day of liquor and last drink was 3 days ago. He states that he has gotten so weak that he has had recurrent falls and is very unsteady on his feet. There are no other complaints, changes, or physical findings at this time. PCP: Leah Kumar MD    No current facility-administered medications on file prior to encounter. Current Outpatient Medications on File Prior to Encounter   Medication Sig Dispense Refill    hydrocortisone (Anusol-HC) 2.5 % rectal cream Insert  into rectum three (3) times daily. 30 g 0    lactobacillus combination no.4 (Probiotic) 3 billion cell cap Take 1 Cap by mouth daily. 14 Cap 0    cholecalciferol, vitamin D3, (Vitamin D3) 50 mcg (2,000 unit) tab Take  by mouth.  melatonin 5 mg cap capsule Take  by mouth nightly.  finasteride (PROSCAR) 5 mg tablet Take 1 Tab by mouth daily.  30 Tab 1    folic acid (FOLVITE) 1 mg tablet Take 1 Tab by mouth daily. 30 Tab 1    levoFLOXacin (LEVAQUIN) 750 mg tablet Take 1 Tab by mouth every twenty-four (24) hours. 1 Tab 0    multivitamin (ONE A DAY) tablet Take 1 Tab by mouth daily.  famotidine (PEPCID) 20 mg tablet Take 20 mg by mouth daily.  aspirin delayed-release 81 mg tablet Take 81 mg by mouth daily.  cyanocobalamin (Vitamin B-12) 1,000 mcg tablet Take 1,000 mcg by mouth daily.  thiamine mononitrate (B-1) 100 mg tablet Take 1 Tab by mouth daily. 30 Tab 0       Past History     Past Medical History:  Past Medical History:   Diagnosis Date    [facility]     rectal    Gastrointestinal disorder     Gastric Ulcers; Rectal CA       Past Surgical History:  History reviewed. No pertinent surgical history. Family History:  Family History   Problem Relation Age of Onset    Cancer Brother        Social History:  Social History     Tobacco Use    Smoking status: Current Every Day Smoker     Packs/day: 0.25    Smokeless tobacco: Never Used   Substance Use Topics    Alcohol use: Yes     Comment: 2-3 beers daily    Drug use: No       Allergies: Allergies   Allergen Reactions    Influenza Virus Vaccines Anaphylaxis         Review of Systems   Review of Systems   Constitutional: Negative for chills and fever. Respiratory: Negative for cough and shortness of breath. Cardiovascular: Positive for chest pain. Negative for leg swelling. Gastrointestinal: Positive for abdominal pain, diarrhea, nausea and vomiting. Genitourinary: Negative. Musculoskeletal: Positive for gait problem. Negative for back pain. Skin: Negative for color change and rash. Neurological: Positive for weakness. Negative for dizziness, light-headedness and headaches. All other systems reviewed and are negative. Physical Exam   Physical Exam  Vitals and nursing note reviewed. Constitutional:       General: He is not in acute distress.      Appearance: Normal appearance. He is not ill-appearing or toxic-appearing. HENT:      Head: Normocephalic and atraumatic. Nose: Nose normal.      Mouth/Throat:      Mouth: Mucous membranes are dry. Eyes:      Extraocular Movements: Extraocular movements intact. Pupils: Pupils are equal, round, and reactive to light. Cardiovascular:      Rate and Rhythm: Normal rate and regular rhythm. Heart sounds: No murmur heard. Pulmonary:      Effort: Pulmonary effort is normal. No respiratory distress. Breath sounds: Normal breath sounds. No wheezing. Abdominal:      General: There is no distension. Palpations: Abdomen is soft. There is no mass. Tenderness: There is abdominal tenderness. There is guarding and rebound. Hernia: No hernia is present. Comments: There is diffuse reproducible TTP with guarding and rebound located to epigastric, right upper quadrant, left upper quadrant, and left lower quadrant regions. Musculoskeletal:         General: No swelling or tenderness. Normal range of motion. Cervical back: Normal range of motion and neck supple. Right lower leg: No edema. Left lower leg: No edema. Skin:     General: Skin is warm and dry. Coloration: Skin is not pale. Findings: No erythema. Neurological:      General: No focal deficit present. Mental Status: He is alert and oriented to person, place, and time.          Diagnostic Study Results     Labs -     Recent Results (from the past 12 hour(s))   EKG, 12 LEAD, INITIAL    Collection Time: 05/20/21  4:43 PM   Result Value Ref Range    Ventricular Rate 75 BPM    Atrial Rate 75 BPM    P-R Interval 146 ms    QRS Duration 80 ms    Q-T Interval 380 ms    QTC Calculation (Bezet) 424 ms    Calculated P Axis 82 degrees    Calculated R Axis 83 degrees    Calculated T Axis 71 degrees    Diagnosis       Normal sinus rhythm  Early repolarization  Normal ECG  When compared with ECG of 20-DEC-2020 16:56,  No significant change was found     CBC WITH AUTOMATED DIFF    Collection Time: 05/20/21  5:25 PM   Result Value Ref Range    WBC 8.0 4.1 - 11.1 K/uL    RBC 3.95 (L) 4.10 - 5.70 M/uL    HGB 13.2 12.1 - 17.0 g/dL    HCT 37.3 36.6 - 50.3 %    MCV 94.4 80.0 - 99.0 FL    MCH 33.4 26.0 - 34.0 PG    MCHC 35.4 30.0 - 36.5 g/dL    RDW 14.4 11.5 - 14.5 %    PLATELET 681 (L) 025 - 400 K/uL    MPV 11.7 8.9 - 12.9 FL    NRBC 0.0 0  WBC    ABSOLUTE NRBC 0.00 0.00 - 0.01 K/uL    NEUTROPHILS 69 32 - 75 %    LYMPHOCYTES 17 12 - 49 %    MONOCYTES 12 5 - 13 %    EOSINOPHILS 2 0 - 7 %    BASOPHILS 0 0 - 1 %    IMMATURE GRANULOCYTES 1 (H) 0.0 - 0.5 %    ABS. NEUTROPHILS 5.5 1.8 - 8.0 K/UL    ABS. LYMPHOCYTES 1.4 0.8 - 3.5 K/UL    ABS. MONOCYTES 1.0 0.0 - 1.0 K/UL    ABS. EOSINOPHILS 0.1 0.0 - 0.4 K/UL    ABS. BASOPHILS 0.0 0.0 - 0.1 K/UL    ABS. IMM. GRANS. 0.1 (H) 0.00 - 0.04 K/UL    DF AUTOMATED     METABOLIC PANEL, COMPREHENSIVE    Collection Time: 05/20/21  5:25 PM   Result Value Ref Range    Sodium 134 (L) 136 - 145 mmol/L    Potassium 3.0 (L) 3.5 - 5.1 mmol/L    Chloride 96 (L) 97 - 108 mmol/L    CO2 34 (H) 21 - 32 mmol/L    Anion gap 4 (L) 5 - 15 mmol/L    Glucose 97 65 - 100 mg/dL    BUN 9 6 - 20 MG/DL    Creatinine 0.88 0.70 - 1.30 MG/DL    BUN/Creatinine ratio 10 (L) 12 - 20      GFR est AA >60 >60 ml/min/1.73m2    GFR est non-AA >60 >60 ml/min/1.73m2    Calcium 9.4 8.5 - 10.1 MG/DL    Bilirubin, total 0.6 0.2 - 1.0 MG/DL    ALT (SGPT) 46 12 - 78 U/L    AST (SGOT) 58 (H) 15 - 37 U/L    Alk.  phosphatase 125 (H) 45 - 117 U/L    Protein, total 6.8 6.4 - 8.2 g/dL    Albumin 3.0 (L) 3.5 - 5.0 g/dL    Globulin 3.8 2.0 - 4.0 g/dL    A-G Ratio 0.8 (L) 1.1 - 2.2     TROPONIN I    Collection Time: 05/20/21  5:25 PM   Result Value Ref Range    Troponin-I, Qt. <0.05 <0.05 ng/mL   LIPASE    Collection Time: 05/20/21  5:25 PM   Result Value Ref Range    Lipase 1,645 (H) 73 - 393 U/L   MAGNESIUM    Collection Time: 05/20/21  5:25 PM Result Value Ref Range    Magnesium 1.4 (L) 1.6 - 2.4 mg/dL   URINALYSIS W/ REFLEX CULTURE    Collection Time: 05/20/21  7:22 PM    Specimen: Urine   Result Value Ref Range    Color DARK YELLOW      Appearance CLOUDY (A) CLEAR      Specific gravity 1.010 1.003 - 1.030      pH (UA) 8.0 5.0 - 8.0      Protein Negative NEG mg/dL    Glucose Negative NEG mg/dL    Ketone Negative NEG mg/dL    Bilirubin Negative NEG      Blood Negative NEG      Urobilinogen 1.0 0.2 - 1.0 EU/dL    Nitrites Negative NEG      Leukocyte Esterase Negative NEG      WBC PENDING /hpf    RBC PENDING /hpf    Epithelial cells PENDING /lpf    Bacteria PENDING /hpf    UA:UC IF INDICATED PENDING        Radiologic Studies -   CT ABD PELV W CONT   Final Result      1. Chronic long segment enteritis of the distal small bowel. Interval   development of partial small bowel obstruction proximal to the abnormal loops of   distal small bowel. Findings are compatible with an acute flare of inflammatory   bowel disease. 2. Acute on chronic pancreatitis of the distal pancreatic body and tail. No   focal fluid collections. 3. No other significant change. XR CHEST PORT   Final Result   No evidence of acute cardiopulmonary process. CT Results  (Last 48 hours)               05/20/21 1826  CT ABD PELV W CONT Final result    Impression:      1. Chronic long segment enteritis of the distal small bowel. Interval   development of partial small bowel obstruction proximal to the abnormal loops of   distal small bowel. Findings are compatible with an acute flare of inflammatory   bowel disease. 2. Acute on chronic pancreatitis of the distal pancreatic body and tail. No   focal fluid collections. 3. No other significant change. Narrative:  EXAM: CT ABD PELV W CONT       INDICATION: LLQ pain, N/V/D       COMPARISON: CT December 2020        CONTRAST: 100 mL of Isovue-370.        TECHNIQUE:    Following the uneventful intravenous administration of contrast, thin axial   images were obtained through the abdomen and pelvis. Coronal and sagittal   reconstructions were generated. Oral contrast was not administered. CT dose   reduction was achieved through use of a standardized protocol tailored for this   examination and automatic exposure control for dose modulation. FINDINGS:    LOWER THORAX: No significant abnormality in the incidentally imaged lower chest.   LIVER: Diffuse fatty infiltration of the liver. BILIARY TREE: Status post cholecystectomy. Chronic CBD dilatation, up to 9 mm. SPLEEN: within normal limits. PANCREAS: Low-density lesions are involving the pancreatic tail, with less   inflammation than previous. However, there is some peripancreatic inflammation   involving the distal body and tail. ADRENALS: Unremarkable. KIDNEYS: Right renal cyst requires no follow-up. No hydronephrosis   STOMACH: Unremarkable. SMALL BOWEL: Mild ileus in the central abdomen, with a small bowel loop   measuring 3.1 cm. Chronic long segment enteritis in the pelvis, with resultant   partial small bowel obstruction, with small bowel dilatation worse than prior   study, now up to 4.4 cm. COLON: Postsurgical changes in the pelvis. Presacral edema   APPENDIX: Not visualized   PERITONEUM: No ascites or pneumoperitoneum. RETROPERITONEUM: No lymphadenopathy or aortic aneurysm. REPRODUCTIVE ORGANS: Prostate is noted   URINARY BLADDER: Bladder distention   BONES: No destructive bone lesion. ABDOMINAL WALL: No mass or hernia. ADDITIONAL COMMENTS: N/A               CXR Results  (Last 48 hours)               05/20/21 1706  XR CHEST PORT Final result    Impression:  No evidence of acute cardiopulmonary process. Narrative:  INDICATION:  CP, SOB        FINDINGS: Single AP portable view of the chest obtained at 1700 demonstrates a   normal cardiomediastinal silhouette. The lungs are clear bilaterally. There is a   right chest portacatheter.  No osseous abnormalities are seen. Medical Decision Making   I am the first provider for this patient. I reviewed the vital signs, available nursing notes, past medical history, past surgical history, family history and social history. Vital Signs-Reviewed the patient's vital signs. Patient Vitals for the past 12 hrs:   Temp Pulse Resp BP SpO2   05/20/21 1655 98.6 °F (37 °C)       05/20/21 1650  83 20 127/78 100 %       Records Reviewed: Nursing Notes, Old Medical Records, Previous Radiology Studies and Previous Laboratory Studies  I personally reviewed oncology office visit records from 1/25/21. Provider Notes (Medical Decision Making):   58-year-old male here with acute on chronic alcohol induced pancreatitis with nausea, vomiting, diarrhea, poor p.o. intake, dehydration, and gait instability. He is found to have hypomagnesemia and hypokalemia. Exam as above. He is afebrile and vital signs are stable. CT with findings consistent of acute on chronic pancreatitis especially given lipase 1600, CT also demonstrating concern for possible partial SBO. I discussed with Dr. John Hale, surgery, who feels that there is no acute surgical intervention needed, this may represent an ileus and the patient is passing flatus and diarrhea, no need to transfer at this time and patient can stay here and will be seen by Dr. John Hale in the morning. I feel patient will benefit from admission for IV fluids, reconditioning, pain management, and electrolyte replacement. I have repleted magnesium and potassium here in the ED. ED Course:   Initial assessment performed. The patients presenting problems have been discussed, and they are in agreement with the care plan formulated and outlined with them. I have encouraged them to ask questions as they arise throughout their visit.     ED Course as of May 20 1951   Thu May 20, 2021   1658 EKG per my interpretation normal sinus rhythm, rate 75 bpm, normal axis, no acute ischemic changes, no interval changes. [AK]      ED Course User Index  [AK] Jennifer Neville MD         Cardiac Monitoring: The cardiac monitor revealed the following rhythm as interpreted by me: Normal Sinus Rhythm rate 85 bpm  The cardiac monitor was ordered secondary to the patient's reported complaint of chest pain and to monitor the patient for dysrhythmia. Kathi Newby MD      Admission Note:  Patient is being admitted to the hospital by Dr. Monica Burgos, Service: Hospitalist.  The results of their tests and reasons for their admission have been discussed with them and available family. They convey agreement and understanding for the need to be admitted and for their admission diagnosis. Disposition:  Admit      Diagnosis     Clinical Impression:   1. Alcohol-induced acute pancreatitis without infection or necrosis    2. Hypomagnesemia    3. Hypokalemia    4. Dehydration        Attestations:  I am the first and primary provider of record for this patient's ED encounter. I personally performed the services described above in this documentation. Kathi Newby MD    Please note that this dictation was completed with [employer], the computer voice recognition software. Quite often unanticipated grammatical, syntax, homophones, and other interpretive errors are inadvertently transcribed by the computer software. Please disregard these errors. Please excuse any errors that have escaped final proofreading. Thank you.

## 2021-05-20 NOTE — ED NOTES
Pt presents to ED via EMS with multiple complaints. Pt reports chest pain stating \"my heart hurts\" with hx of MI. Pt NSR on EKG and cardiac monitor. Pt reports lower abdominal pain, but noted to be tender on palpation throughout abdomen. Pt reports hx of rectal cancer and pancreatitis. Pt reports he drinks alcohol almost every day. Pt also reports loose stools and vomiting for several days. Pt has port a cath, reports it has not been accessed in over a year. Pt reports feeling weak, has been having more frequent falls, and reports weight loss. Pt denies taking medications for relief. Pt is alert and oriented x 4, RR even and unlabored, skin is warm and dry. Assessment completed and pt updated on plan of care. Call bell in reach. Emergency Department Nursing Plan of Care       The Nursing Plan of Care is developed from the Nursing assessment and Emergency Department Attending provider initial evaluation. The plan of care may be reviewed in the ED Provider note.     The Plan of Care was developed with the following considerations:   Patient / Family readiness to learn indicated by:verbalized understanding  Persons(s) to be included in education: patient  Barriers to Learning/Limitations:No    Signed     Tia Benitez RN    5/20/2021   5:00 PM

## 2021-05-21 LAB
ALBUMIN SERPL-MCNC: 2.5 G/DL (ref 3.5–5)
ALBUMIN/GLOB SERPL: 0.7 {RATIO} (ref 1.1–2.2)
ALP SERPL-CCNC: 120 U/L (ref 45–117)
ALT SERPL-CCNC: 86 U/L (ref 12–78)
ANION GAP SERPL CALC-SCNC: 5 MMOL/L (ref 5–15)
AST SERPL-CCNC: 141 U/L (ref 15–37)
ATRIAL RATE: 75 BPM
BASOPHILS # BLD: 0 K/UL (ref 0–0.1)
BASOPHILS NFR BLD: 0 % (ref 0–1)
BILIRUB DIRECT SERPL-MCNC: 0.2 MG/DL (ref 0–0.2)
BILIRUB SERPL-MCNC: 0.7 MG/DL (ref 0.2–1)
BUN SERPL-MCNC: 5 MG/DL (ref 6–20)
BUN/CREAT SERPL: 6 (ref 12–20)
CALCIUM SERPL-MCNC: 8.1 MG/DL (ref 8.5–10.1)
CALCULATED P AXIS, ECG09: 82 DEGREES
CALCULATED R AXIS, ECG10: 83 DEGREES
CALCULATED T AXIS, ECG11: 71 DEGREES
CHLORIDE SERPL-SCNC: 105 MMOL/L (ref 97–108)
CO2 SERPL-SCNC: 30 MMOL/L (ref 21–32)
CREAT SERPL-MCNC: 0.85 MG/DL (ref 0.7–1.3)
DIAGNOSIS, 93000: NORMAL
DIFFERENTIAL METHOD BLD: ABNORMAL
EOSINOPHIL # BLD: 0.1 K/UL (ref 0–0.4)
EOSINOPHIL NFR BLD: 2 % (ref 0–7)
ERYTHROCYTE [DISTWIDTH] IN BLOOD BY AUTOMATED COUNT: 14.7 % (ref 11.5–14.5)
GLOBULIN SER CALC-MCNC: 3.4 G/DL (ref 2–4)
GLUCOSE SERPL-MCNC: 108 MG/DL (ref 65–100)
HCT VFR BLD AUTO: 30.6 % (ref 36.6–50.3)
HGB BLD-MCNC: 10.6 G/DL (ref 12.1–17)
IMM GRANULOCYTES # BLD AUTO: 0 K/UL (ref 0–0.04)
IMM GRANULOCYTES NFR BLD AUTO: 1 % (ref 0–0.5)
LACTATE SERPL-SCNC: 0.8 MMOL/L (ref 0.4–2)
LYMPHOCYTES # BLD: 0.9 K/UL (ref 0.8–3.5)
LYMPHOCYTES NFR BLD: 16 % (ref 12–49)
MAGNESIUM SERPL-MCNC: 1.8 MG/DL (ref 1.6–2.4)
MCH RBC QN AUTO: 33.5 PG (ref 26–34)
MCHC RBC AUTO-ENTMCNC: 34.6 G/DL (ref 30–36.5)
MCV RBC AUTO: 96.8 FL (ref 80–99)
MONOCYTES # BLD: 0.6 K/UL (ref 0–1)
MONOCYTES NFR BLD: 11 % (ref 5–13)
NEUTS SEG # BLD: 3.9 K/UL (ref 1.8–8)
NEUTS SEG NFR BLD: 70 % (ref 32–75)
NRBC # BLD: 0 K/UL (ref 0–0.01)
NRBC BLD-RTO: 0 PER 100 WBC
P-R INTERVAL, ECG05: 146 MS
PHOSPHATE SERPL-MCNC: 2.7 MG/DL (ref 2.6–4.7)
PLATELET # BLD AUTO: 102 K/UL (ref 150–400)
PMV BLD AUTO: 10.9 FL (ref 8.9–12.9)
POTASSIUM SERPL-SCNC: 3.1 MMOL/L (ref 3.5–5.1)
PROT SERPL-MCNC: 5.9 G/DL (ref 6.4–8.2)
Q-T INTERVAL, ECG07: 380 MS
QRS DURATION, ECG06: 80 MS
QTC CALCULATION (BEZET), ECG08: 424 MS
RBC # BLD AUTO: 3.16 M/UL (ref 4.1–5.7)
SODIUM SERPL-SCNC: 140 MMOL/L (ref 136–145)
VENTRICULAR RATE, ECG03: 75 BPM
WBC # BLD AUTO: 5.6 K/UL (ref 4.1–11.1)

## 2021-05-21 PROCEDURE — 74011250637 HC RX REV CODE- 250/637: Performed by: STUDENT IN AN ORGANIZED HEALTH CARE EDUCATION/TRAINING PROGRAM

## 2021-05-21 PROCEDURE — 74011000258 HC RX REV CODE- 258: Performed by: HOSPITALIST

## 2021-05-21 PROCEDURE — 99221 1ST HOSP IP/OBS SF/LOW 40: CPT | Performed by: SURGERY

## 2021-05-21 PROCEDURE — 74011250636 HC RX REV CODE- 250/636: Performed by: STUDENT IN AN ORGANIZED HEALTH CARE EDUCATION/TRAINING PROGRAM

## 2021-05-21 PROCEDURE — 74011000258 HC RX REV CODE- 258: Performed by: STUDENT IN AN ORGANIZED HEALTH CARE EDUCATION/TRAINING PROGRAM

## 2021-05-21 PROCEDURE — 74011250636 HC RX REV CODE- 250/636: Performed by: HOSPITALIST

## 2021-05-21 PROCEDURE — 85025 COMPLETE CBC W/AUTO DIFF WBC: CPT

## 2021-05-21 PROCEDURE — 83735 ASSAY OF MAGNESIUM: CPT

## 2021-05-21 PROCEDURE — 94760 N-INVAS EAR/PLS OXIMETRY 1: CPT

## 2021-05-21 PROCEDURE — 83605 ASSAY OF LACTIC ACID: CPT

## 2021-05-21 PROCEDURE — 84100 ASSAY OF PHOSPHORUS: CPT

## 2021-05-21 PROCEDURE — 74011250637 HC RX REV CODE- 250/637: Performed by: HOSPITALIST

## 2021-05-21 PROCEDURE — 80048 BASIC METABOLIC PNL TOTAL CA: CPT

## 2021-05-21 PROCEDURE — 80076 HEPATIC FUNCTION PANEL: CPT

## 2021-05-21 PROCEDURE — 36415 COLL VENOUS BLD VENIPUNCTURE: CPT

## 2021-05-21 PROCEDURE — 65270000032 HC RM SEMIPRIVATE

## 2021-05-21 RX ORDER — LORAZEPAM 1 MG/1
4 TABLET ORAL
Status: DISCONTINUED | OUTPATIENT
Start: 2021-05-21 | End: 2021-05-23 | Stop reason: HOSPADM

## 2021-05-21 RX ORDER — METRONIDAZOLE 500 MG/100ML
500 INJECTION, SOLUTION INTRAVENOUS EVERY 12 HOURS
Status: DISCONTINUED | OUTPATIENT
Start: 2021-05-21 | End: 2021-05-23 | Stop reason: HOSPADM

## 2021-05-21 RX ORDER — LANOLIN ALCOHOL/MO/W.PET/CERES
100 CREAM (GRAM) TOPICAL DAILY
Status: DISCONTINUED | OUTPATIENT
Start: 2021-05-22 | End: 2021-05-23 | Stop reason: HOSPADM

## 2021-05-21 RX ORDER — FOLIC ACID 1 MG/1
1 TABLET ORAL DAILY
Status: DISCONTINUED | OUTPATIENT
Start: 2021-05-21 | End: 2021-05-23 | Stop reason: HOSPADM

## 2021-05-21 RX ORDER — METOPROLOL TARTRATE 25 MG/1
12.5 TABLET, FILM COATED ORAL 2 TIMES DAILY
COMMUNITY
End: 2022-02-05

## 2021-05-21 RX ORDER — LORAZEPAM 1 MG/1
2 TABLET ORAL
Status: DISCONTINUED | OUTPATIENT
Start: 2021-05-21 | End: 2021-05-23 | Stop reason: HOSPADM

## 2021-05-21 RX ADMIN — FAMOTIDINE 20 MG: 10 INJECTION INTRAVENOUS at 08:50

## 2021-05-21 RX ADMIN — FOLIC ACID 1 MG: 1 TABLET ORAL at 12:04

## 2021-05-21 RX ADMIN — HYDROCORTISONE 2.5%: 25 CREAM TOPICAL at 08:55

## 2021-05-21 RX ADMIN — HEPARIN SODIUM 5000 UNITS: 5000 INJECTION INTRAVENOUS; SUBCUTANEOUS at 21:33

## 2021-05-21 RX ADMIN — POTASSIUM CHLORIDE 10 MEQ: 7.46 INJECTION, SOLUTION INTRAVENOUS at 02:29

## 2021-05-21 RX ADMIN — HYDROCORTISONE 2.5%: 25 CREAM TOPICAL at 16:32

## 2021-05-21 RX ADMIN — METRONIDAZOLE 500 MG: 500 INJECTION, SOLUTION INTRAVENOUS at 10:53

## 2021-05-21 RX ADMIN — Medication 10 ML: at 16:30

## 2021-05-21 RX ADMIN — ONDANSETRON 4 MG: 2 INJECTION INTRAMUSCULAR; INTRAVENOUS at 16:30

## 2021-05-21 RX ADMIN — MORPHINE SULFATE 2 MG: 2 INJECTION, SOLUTION INTRAMUSCULAR; INTRAVENOUS at 16:30

## 2021-05-21 RX ADMIN — SODIUM CHLORIDE, POTASSIUM CHLORIDE, SODIUM LACTATE AND CALCIUM CHLORIDE 150 ML/HR: 600; 310; 30; 20 INJECTION, SOLUTION INTRAVENOUS at 08:50

## 2021-05-21 RX ADMIN — METRONIDAZOLE 500 MG: 500 INJECTION, SOLUTION INTRAVENOUS at 21:33

## 2021-05-21 RX ADMIN — FAMOTIDINE 20 MG: 10 INJECTION INTRAVENOUS at 21:33

## 2021-05-21 RX ADMIN — Medication 10 ML: at 06:25

## 2021-05-21 RX ADMIN — THIAMINE HYDROCHLORIDE 100 MG: 100 INJECTION, SOLUTION INTRAMUSCULAR; INTRAVENOUS at 12:04

## 2021-05-21 RX ADMIN — CEFTRIAXONE SODIUM 1 G: 1 INJECTION, POWDER, FOR SOLUTION INTRAMUSCULAR; INTRAVENOUS at 10:53

## 2021-05-21 RX ADMIN — MORPHINE SULFATE 2 MG: 2 INJECTION, SOLUTION INTRAMUSCULAR; INTRAVENOUS at 10:53

## 2021-05-21 RX ADMIN — PIPERACILLIN AND TAZOBACTAM 3.38 G: 3; .375 INJECTION, POWDER, FOR SOLUTION INTRAVENOUS at 06:25

## 2021-05-21 RX ADMIN — MORPHINE SULFATE 2 MG: 2 INJECTION, SOLUTION INTRAMUSCULAR; INTRAVENOUS at 21:36

## 2021-05-21 RX ADMIN — HEPARIN SODIUM 5000 UNITS: 5000 INJECTION INTRAVENOUS; SUBCUTANEOUS at 08:50

## 2021-05-21 RX ADMIN — PIPERACILLIN AND TAZOBACTAM 3.38 G: 3; .375 INJECTION, POWDER, FOR SOLUTION INTRAVENOUS at 02:16

## 2021-05-21 NOTE — PROGRESS NOTES
TRANSFER - IN REPORT:    Verbal report received from [place] [id number] [facility] (name) on Gabriel Soares  being received from ED (unit) for routine progression of care      Report consisted of patients Situation, Background, Assessment and   Recommendations(SBAR). Information from the following report(s) SBAR, Kardex, ED Summary, MAR, Accordion and Recent Results was reviewed with the receiving nurse. Opportunity for questions and clarification was provided. Assessment completed upon patients arrival to unit and care assumed.

## 2021-05-21 NOTE — PROGRESS NOTES
[facility] Dosing Services: Renal Dosing    Physician:     Piperacillin-Tazobactam was automatically dose-adjusted per P&T Committee Protocol    Consult provided for this 60 y.o. male for the indication of intra-abdominal infection    Previous Regimen Piperacillin-Tazobactam 3.375g IV every 8 hours over 240 minutes    Serum Creatinine Lab Results   Component Value Date/Time    Creatinine 0.88 05/20/2021 05:25 PM       Creatinine Clearance CrCl cannot be calculated (Unknown ideal weight.). Dosage changed to:  Piperacillin-Tazobactam 3.375 grams IV now over 30 minutes followed in 6 hours by Piperacillin-Tazobactam 3.375 grams IV every 8 hours over 240 minutes     Pharmacy to continue to monitor patient daily.   TEA Cano

## 2021-05-21 NOTE — TELEMEDICINE
2181: On call provider nursing staff attempted to collect lactic acid drawn x4 this shift unsuccessful attempt.  Confirmation # [id number]

## 2021-05-21 NOTE — ED NOTES
TRANSFER - OUT REPORT:    Verbal report given to [facility], RN(name) on Michelle Mcqueen  being transferred to Telemetry (unit) for routine progression of care       Report consisted of patients Situation, Background, Assessment and   Recommendations(SBAR). Information from the following report(s) SBAR and ED Summary was reviewed with the receiving nurse. Lines:   Venous Access Device [place]ma Cath (Active)       Peripheral IV 05/20/21 Right Antecubital (Active)   Site Assessment Clean, dry, & intact 05/20/21 1729   Phlebitis Assessment 0 05/20/21 1729   Infiltration Assessment 0 05/20/21 1729   Dressing Status Clean, dry, & intact 05/20/21 1729   Dressing Type Transparent 05/20/21 1729   Hub Color/Line Status Blue;Flushed 05/20/21 1729        Opportunity for questions and clarification was provided.       Patient transported with:   Registered Nurse

## 2021-05-21 NOTE — ED NOTES
Pt ambulatory to restroom with unsteady gait x1 assist. Attempted to call report, informed that accepting RN will call back

## 2021-05-21 NOTE — PROGRESS NOTES
0715 - Bedside shift report received from [facility], RN    0900 - Pt resting comfortably    1100 - Pt has visitor at bedside    1300 - Pt eating lunch    1500 - Pt watching TV    1700 - Pt eating supper    1900 - Bedside shift report given to Marisol Laboy Tranexamic Acid Pregnancy And Lactation Text: It is unknown if this medication is safe during pregnancy or breast feeding.

## 2021-05-21 NOTE — PROGRESS NOTES
[facility]: MED RECONCILIATION    Comments/Recommendations:   Med rec performed via interview with patient at bedside. Patient was a very poor historian so refill history was obtained for PTA med list.   Of note, colestipol was last filled 2/24 x 30 day supply -- patient did not recognize and patient does not believe he's taking. Mirtazapine 15 mg was last filled 2/9 x 30 days -- patient does not believe he's taking and does not recall medication. Tamsulosin 0.4 mg, last filled 1/15 #90 -- patient does not recall medication and does not believe he's taking. These medications were left off of medication list based on refill history. Medications added:     Metoprolol 25 mg po daily -- patient unsure if he's taking; last filled 3/18 # 90 and appears patient would be currently on. Medications removed:    Melatonin -- pt states he stopped taking because he ran out  MVI -- patient did not believe he was taking  Probiotic -- pt did not recognize and does not think he is taking  Finasteride 5 mg po daily -- Last filled 1/29/21 #90, patient does not recall medication and does not believe he is taking  Famotidine -- last filled 1/12 # 30, patient states he ran out    Allergies: Influenza virus vaccines    Prior to Admission Medications:     Prior to Admission Medications   Prescriptions Last Dose Informant Patient Reported? Taking?   aspirin delayed-release 81 mg tablet  Self Yes Yes   Sig: Take 81 mg by mouth daily. cholecalciferol, vitamin D3, (Vitamin D3) 50 mcg (2,000 unit) tab  Self Yes Yes   Sig: Take  by mouth.   cyanocobalamin (Vitamin B-12) 1,000 mcg tablet  Self Yes Yes   Sig: Take 1,000 mcg by mouth daily. folic acid (FOLVITE) 1 mg tablet  Self No Yes   Sig: Take 1 Tab by mouth daily. metoprolol tartrate (LOPRESSOR) 25 mg tablet  Self Yes Yes   Sig: Take 25 mg by mouth daily. thiamine mononitrate (B-1) 100 mg tablet  Self No Yes   Sig: Take 1 Tab by mouth daily.       Facility-Administered Medications: None       Lucy Gleason, PHARMD   Contact: [phone]

## 2021-05-21 NOTE — PROGRESS NOTES
2101: pt arrived on unit from ED via wheelchair with ED staff. Pt exhibited pleasant disposition. Axo: 4 with period of forgetfulness. Cooperative with staff. Assessment and vital sign collected. 2149: IV medication administered per order. Patient tolerated well. 2345: PRN IV morphine request via pt relation left lower quadrant 8/10. Positive effect.

## 2021-05-21 NOTE — CONSULTS
Patient seen at request of Dr. Karol Crum. Information obtained from patient and review of chart. Dangelo Wong is an 60 y.o. male who was recently admitted with abdominal pain, nausea and vomitting. Mr. Leonard Montero tells me that he has been experiencing abdominal pain for some time now. Over the past few days, he began experiencing nausea and vomitting. No hematemesis or coffee ground emesis. No NSAID use. No fevers or chills Associated diarrhea. No melena or blood per rectum. No dysuria or hematuria. Mr. Leonard Montero also reports chest pain and shortness of breath. He has otherwise been in his usual state of health. CT scan abdomen/pelvis with IV contrast - 5/20/2021 - Chronic long segment enteritis of the distal small bowel. Interval development of partial small bowel obstruction proximal to the abnormal loops of distal small bowel. Findings are compatible with an acute flare of inflammatory bowel disease. Acute on chronic pancreatitis of the distal pancreatic body and tail. No focal fluid collections. Of note, Mr. Leonard Montero was admitted in December, 2020 with pancreatitis and enteritis. He improved with non-operative management. Furthermore, Mr. Leonard Montero is s/p neoadjuvant chemoradiotherapy for a T3N0 rectal adenocarcinoma followed by LAR and subsequent ileostomy closure. Allergies - Influenza virus vaccines    Meds - Reviewed. PMH -   Past Medical History:   Diagnosis Date    Cancer [facility])     rectal    Gastrointestinal disorder     Gastric Ulcers; Rectal CA     PSH - History reviewed. No pertinent surgical history. Fam Hx -   Family History   Problem Relation Age of Onset    Cancer Brother      Soc Hx -   Social History     Tobacco Use    Smoking status: Current Every Day Smoker     Packs/day: 0.25    Smokeless tobacco: Never Used   Substance Use Topics    Alcohol use: Yes     Comment: 2-3 beers daily     Patient is a thin man in no acute distress.   Visit Vitals  BP 131/75   Pulse (!) 57   Temp 97.9 °F (36.6 °C)   Resp 16   Wt 118 lb 4.8 oz (53.7 kg)   SpO2 100%   BMI 17.99 kg/m²     HEENT: Anicteric. Neck: Supple without palpable lymphadenopathy. Cor: RRR. Chest: Right side marly-cath site is clean. Lungs: Bilateral breath sounds. Clear to auscultation. Abd: Soft. Somewhat distended. Tender. No guarding or rebound. Well healed surgical scars. Ext: No edema. Neuro: Grossly Non focal.     Labs -   Recent Results (from the past 24 hour(s))   EKG, 12 LEAD, INITIAL    Collection Time: 05/20/21  4:43 PM   Result Value Ref Range    Ventricular Rate 75 BPM    Atrial Rate 75 BPM    P-R Interval 146 ms    QRS Duration 80 ms    Q-T Interval 380 ms    QTC Calculation (Bezet) 424 ms    Calculated P Axis 82 degrees    Calculated R Axis 83 degrees    Calculated T Axis 71 degrees    Diagnosis       Normal sinus rhythm  Early repolarization  Normal ECG  When compared with ECG of 20-DEC-2020 16:56,  No significant change was found  Confirmed by Ratna Saunders M.D., [place] ([id number]) on 5/21/2021 8:01:41 AM     CBC WITH AUTOMATED DIFF    Collection Time: 05/20/21  5:25 PM   Result Value Ref Range    WBC 8.0 4.1 - 11.1 K/uL    RBC 3.95 (L) 4.10 - 5.70 M/uL    HGB 13.2 12.1 - 17.0 g/dL    HCT 37.3 36.6 - 50.3 %    MCV 94.4 80.0 - 99.0 FL    MCH 33.4 26.0 - 34.0 PG    MCHC 35.4 30.0 - 36.5 g/dL    RDW 14.4 11.5 - 14.5 %    PLATELET 685 (L) 570 - 400 K/uL    MPV 11.7 8.9 - 12.9 FL    NRBC 0.0 0  WBC    ABSOLUTE NRBC 0.00 0.00 - 0.01 K/uL    NEUTROPHILS 69 32 - 75 %    LYMPHOCYTES 17 12 - 49 %    MONOCYTES 12 5 - 13 %    EOSINOPHILS 2 0 - 7 %    BASOPHILS 0 0 - 1 %    IMMATURE GRANULOCYTES 1 (H) 0.0 - 0.5 %    ABS. NEUTROPHILS 5.5 1.8 - 8.0 K/UL    ABS. LYMPHOCYTES 1.4 0.8 - 3.5 K/UL    ABS. MONOCYTES 1.0 0.0 - 1.0 K/UL    ABS. EOSINOPHILS 0.1 0.0 - 0.4 K/UL    ABS. BASOPHILS 0.0 0.0 - 0.1 K/UL    ABS. IMM.  GRANS. 0.1 (H) 0.00 - 0.04 K/UL    DF AUTOMATED     METABOLIC PANEL, COMPREHENSIVE    Collection Time: 05/20/21  5:25 PM   Result Value Ref Range    Sodium 134 (L) 136 - 145 mmol/L    Potassium 3.0 (L) 3.5 - 5.1 mmol/L    Chloride 96 (L) 97 - 108 mmol/L    CO2 34 (H) 21 - 32 mmol/L    Anion gap 4 (L) 5 - 15 mmol/L    Glucose 97 65 - 100 mg/dL    BUN 9 6 - 20 MG/DL    Creatinine 0.88 0.70 - 1.30 MG/DL    BUN/Creatinine ratio 10 (L) 12 - 20      GFR est AA >60 >60 ml/min/1.73m2    GFR est non-AA >60 >60 ml/min/1.73m2    Calcium 9.4 8.5 - 10.1 MG/DL    Bilirubin, total 0.6 0.2 - 1.0 MG/DL    ALT (SGPT) 46 12 - 78 U/L    AST (SGOT) 58 (H) 15 - 37 U/L    Alk.  phosphatase 125 (H) 45 - 117 U/L    Protein, total 6.8 6.4 - 8.2 g/dL    Albumin 3.0 (L) 3.5 - 5.0 g/dL    Globulin 3.8 2.0 - 4.0 g/dL    A-G Ratio 0.8 (L) 1.1 - 2.2     TROPONIN I    Collection Time: 05/20/21  5:25 PM   Result Value Ref Range    Troponin-I, Qt. <0.05 <0.05 ng/mL   LIPASE    Collection Time: 05/20/21  5:25 PM   Result Value Ref Range    Lipase 1,645 (H) 73 - 393 U/L   MAGNESIUM    Collection Time: 05/20/21  5:25 PM   Result Value Ref Range    Magnesium 1.4 (L) 1.6 - 2.4 mg/dL   URINALYSIS W/ REFLEX CULTURE    Collection Time: 05/20/21  7:22 PM    Specimen: Urine   Result Value Ref Range    Color DARK YELLOW      Appearance CLOUDY (A) CLEAR      Specific gravity 1.010 1.003 - 1.030      pH (UA) 8.0 5.0 - 8.0      Protein Negative NEG mg/dL    Glucose Negative NEG mg/dL    Ketone Negative NEG mg/dL    Bilirubin Negative NEG      Blood Negative NEG      Urobilinogen 1.0 0.2 - 1.0 EU/dL    Nitrites Negative NEG      Leukocyte Esterase Negative NEG      WBC 5-10 0 - 4 /hpf    RBC 0-5 0 - 5 /hpf    Epithelial cells FEW FEW /lpf    Bacteria Negative NEG /hpf    UA:UC IF INDICATED CULTURE NOT INDICATED BY UA RESULT CNI     LACTIC ACID    Collection Time: 05/21/21  5:35 AM   Result Value Ref Range    Lactic acid 0.8 0.4 - 2.0 MMOL/L   CBC WITH AUTOMATED DIFF    Collection Time: 05/21/21 12:05 PM   Result Value Ref Range    WBC 5.6 4.1 - 11.1 K/uL    RBC 3.16 (L) 4.10 - 5.70 M/uL    HGB 10.6 (L) 12.1 - 17.0 g/dL    HCT 30.6 (L) 36.6 - 50.3 %    MCV 96.8 80.0 - 99.0 FL    MCH 33.5 26.0 - 34.0 PG    MCHC 34.6 30.0 - 36.5 g/dL    RDW 14.7 (H) 11.5 - 14.5 %    PLATELET 135 (L) 464 - 400 K/uL    MPV 10.9 8.9 - 12.9 FL    NRBC 0.0 0  WBC    ABSOLUTE NRBC 0.00 0.00 - 0.01 K/uL    NEUTROPHILS 70 32 - 75 %    LYMPHOCYTES 16 12 - 49 %    MONOCYTES 11 5 - 13 %    EOSINOPHILS 2 0 - 7 %    BASOPHILS 0 0 - 1 %    IMMATURE GRANULOCYTES 1 (H) 0.0 - 0.5 %    ABS. NEUTROPHILS 3.9 1.8 - 8.0 K/UL    ABS. LYMPHOCYTES 0.9 0.8 - 3.5 K/UL    ABS. MONOCYTES 0.6 0.0 - 1.0 K/UL    ABS. EOSINOPHILS 0.1 0.0 - 0.4 K/UL    ABS. BASOPHILS 0.0 0.0 - 0.1 K/UL    ABS. IMM. GRANS. 0.0 0.00 - 0.04 K/UL    DF AUTOMATED     METABOLIC PANEL, BASIC    Collection Time: 05/21/21 12:05 PM   Result Value Ref Range    Sodium 140 136 - 145 mmol/L    Potassium 3.1 (L) 3.5 - 5.1 mmol/L    Chloride 105 97 - 108 mmol/L    CO2 30 21 - 32 mmol/L    Anion gap 5 5 - 15 mmol/L    Glucose 108 (H) 65 - 100 mg/dL    BUN 5 (L) 6 - 20 MG/DL    Creatinine 0.85 0.70 - 1.30 MG/DL    BUN/Creatinine ratio 6 (L) 12 - 20      GFR est AA >60 >60 ml/min/1.73m2    GFR est non-AA >60 >60 ml/min/1.73m2    Calcium 8.1 (L) 8.5 - 10.1 MG/DL   MAGNESIUM    Collection Time: 05/21/21 12:05 PM   Result Value Ref Range    Magnesium 1.8 1.6 - 2.4 mg/dL   PHOSPHORUS    Collection Time: 05/21/21 12:05 PM   Result Value Ref Range    Phosphorus 2.7 2.6 - 4.7 MG/DL   HEPATIC FUNCTION PANEL    Collection Time: 05/21/21 12:05 PM   Result Value Ref Range    Protein, total 5.9 (L) 6.4 - 8.2 g/dL    Albumin 2.5 (L) 3.5 - 5.0 g/dL    Globulin 3.4 2.0 - 4.0 g/dL    A-G Ratio 0.7 (L) 1.1 - 2.2      Bilirubin, total 0.7 0.2 - 1.0 MG/DL    Bilirubin, direct 0.2 0.0 - 0.2 MG/DL    Alk. phosphatase 120 (H) 45 - 117 U/L    AST (SGOT) 141 (H) 15 - 37 U/L    ALT (SGPT) 86 (H) 12 - 78 U/L     CT Scan - Reviewed.     Imp: Mr. Sofia Elizalde is a 60 y.o. male with pancreatitis and a partial SBO due to an acute flair of enteritis of the distal small bowel. Plan: 1. At this point in time, do not believe that there is an acute indication for surgical intervention. 2. Clear liquid diet as tolerated. 3. IV hydration - will decrease rate to 75 ml/hour. 4. Continue IV antibiotics - Rocephin and Flagyl. 5. Can use [place] cath as needed. 6. Plans per Dr. Francisco Holland. Following.

## 2021-05-21 NOTE — PROGRESS NOTES
Problem: Falls - Risk of  Goal: *Absence of Falls  Description: Document Gerardo Yip Fall Risk and appropriate interventions in the flowsheet.   Outcome: Progressing Towards Goal  Note: Fall Risk Interventions:            Medication Interventions: Teach patient to arise slowly                   Problem: Patient Education: Go to Patient Education Activity  Goal: Patient/Family Education  Outcome: Progressing Towards Goal     Problem: General Medical Care Plan  Goal: *Vital signs within specified parameters  Outcome: Progressing Towards Goal  Goal: *Absence of infection signs and symptoms  Outcome: Progressing Towards Goal  Goal: *Skin integrity maintained  Outcome: Progressing Towards Goal     Problem: Pain  Goal: *Control of Pain  Outcome: Progressing Towards Goal     Problem: Patient Education: Go to Patient Education Activity  Goal: Patient/Family Education  Outcome: Progressing Towards Goal     Problem: Nausea/Vomiting (Adult)  Goal: *Absence of nausea/vomiting  Outcome: Progressing Towards Goal

## 2021-05-21 NOTE — H&P
Hospitalist Admission Note    NAME: Adam Corona   :  1965   MRN:  [id number]   Room Number: 960/41  @ [facility]     Date/Time:  2021 2:11 PM    Patient PCP: Ashish Robins MD  ______________________________________________________________________  Given the patient's current clinical presentation, I have a high level of concern for decompensation if discharged from the emergency department. Complex decision making was performed, which includes reviewing the patient's available past medical records, laboratory results, and x-ray films. My assessment of this patient's clinical condition and my plan of care is as follows. Assessment / Plan: Active Problems:    Pancreatitis (2020)      Acute on chronic alcoholic pancreatitis POA  Enteritis POA  Acute on chronic abdominal pain, lipase elevated at 1645, CT abdomen pelvis independently reviewed reveals chronic long segment enteritis of distal small bowel, acute on chronic pancreatitis, no focal fluid collections.  - NPO, advance diet as tolerated  - Pain control  With oral and IV medications  - General surgery  - IV Abx for intra-abdominal infection    Orthostatic hypotension POA  Likely due to hypovolemia  - Continue IV hydration     Alcoholic hepatitis POA  Due to ongoing alcohol abuse.   -Check PT/INR   -repeat LFT tomorrow    Alcohol abuse and dependence POA  Patient was counseled extensively on the need to abstain from drugs its addictive tendencies, its deleterious effects on the brain, cardiovascular system, lungs as well as its financial & social sequelae      Normochromic normocytic anemia POA      Benign prostatic hyperplasia POA  Continue finasteride. Low-density lesion in the pancreas POA  - outpatient follow up. History of rectal cancer status post chemoradiation POA      Body mass index is 17.99 kg/m².   Code Status: full   Surrogate Decision Maker:  Name:     Rel: Shyla Lacey     Spouse Home Ph:  Work Ph:  Mobile Ph: [phone]          DVT Prophylaxis: Lovenox  GI Prophylaxis: not indicated  Baseline: ambulatory independently        Subjective:   CHIEF COMPLAINT: abdominal pain, nausea, vomitting    HISTORY OF PRESENT ILLNESS:     Michelle Mcqueen is a 60 y.o.  male with PMH of rectal cancer, alcohol abuse, pancreatitis, enteritis who presents to ED with c/o abdominal pain, nausea, loss of appetite. Patient reports chronic lower abdominal pain and rectal pain since rectal surgeries rectal carcinoma about 4 years ago. Patient reports that for the last 2 weeks he has had progressively worsening dull aching 8/10 abdominal pain dominantly in the periumbilical area similar to his pancreatitis pain. Reports diarrhea, nausea, loss of appetite and inability to tolerate oral intake for the past few days. Continues to drink alcohol daily about 1 pint. He has had previous episodes for pancreatitis, enteritis most recently in December 2020. Denies current illicit drug use. Denies fevers or chills or recent exposure to COVID-19. We were asked to admit for work up and evaluation of the above problems. Past Medical History:   Diagnosis Date    Cancer [facility])     rectal    Gastrointestinal disorder     Gastric Ulcers; Rectal CA        History reviewed. No pertinent surgical history. Social History     Tobacco Use    Smoking status: Current Every Day Smoker     Packs/day: 0.25    Smokeless tobacco: Never Used   Substance Use Topics    Alcohol use: Yes     Comment: 2-3 beers daily        Family History   Problem Relation Age of Onset    Cancer Brother      Allergies   Allergen Reactions    Influenza Virus Vaccines Anaphylaxis        Prior to Admission medications    Medication Sig Start Date End Date Taking? Authorizing Provider   hydrocortisone (Anusol-HC) 2.5 % rectal cream Insert  into rectum three (3) times daily.  2/4/21   Matthew Turner MD ABRAN   lactobacillus combination no.4 (Probiotic) 3 billion cell cap Take 1 Cap by mouth daily. 2/4/21   Marysol Rojas MD   cholecalciferol, vitamin D3, (Vitamin D3) 50 mcg (2,000 unit) tab Take  by mouth. Provider, Historical   melatonin 5 mg cap capsule Take  by mouth nightly. Provider, Historical   finasteride (PROSCAR) 5 mg tablet Take 1 Tab by mouth daily. 12/27/20   Walter Holly MD   folic acid (FOLVITE) 1 mg tablet Take 1 Tab by mouth daily. 12/28/20   Walter Holly MD   levoFLOXacin (LEVAQUIN) 750 mg tablet Take 1 Tab by mouth every twenty-four (24) hours. 12/28/20   Walter Holly MD   multivitamin (ONE A DAY) tablet Take 1 Tab by mouth daily. Provider, Historical   famotidine (PEPCID) 20 mg tablet Take 20 mg by mouth daily. Provider, Historical   aspirin delayed-release 81 mg tablet Take 81 mg by mouth daily. Provider, Historical   cyanocobalamin (Vitamin B-12) 1,000 mcg tablet Take 1,000 mcg by mouth daily. Provider, Historical   thiamine mononitrate (B-1) 100 mg tablet Take 1 Tab by mouth daily. 1/3/19   Venice Crawford, NP       REVIEW OF SYSTEMS:     I am not able to complete the review of systems because:    The patient is intubated and sedated    The patient has altered mental status due to his acute medical problems    The patient has baseline aphasia from prior stroke(s)    The patient has baseline dementia and is not reliable historian    The patient is in acute medical distress and unable to provide information           Total of 12 systems reviewed as follows:       POSITIVE= underlined text  Negative = text not underlined  General:  fever, chills, sweats, generalized weakness, weight loss/gain,      loss of appetite   Eyes:    blurred vision, eye pain, loss of vision, double vision  ENT:    rhinorrhea, pharyngitis   Respiratory:   cough, sputum production, SOB, SZYMANSKI, wheezing, pleuritic pain   Cardiology:   chest pain, palpitations, orthopnea, PND, edema, syncope Gastrointestinal:  abdominal pain , N/V, diarrhea, dysphagia, constipation, bleeding   Genitourinary:  frequency, urgency, dysuria, hematuria, incontinence   Muskuloskeletal :  arthralgia, myalgia, back pain  Hematology:  easy bruising, nose or gum bleeding, lymphadenopathy   Dermatological: rash, ulceration, pruritis, color change / jaundice  Endocrine:   hot flashes or polydipsia   Neurological:  headache, dizziness, confusion, focal weakness, paresthesia,     Speech difficulties, memory loss, gait difficulty  Psychological: Feelings of anxiety, depression, agitation    Objective:   VITALS:    Visit Vitals  /75   Pulse (!) 57   Temp 97.9 °F (36.6 °C)   Resp 16   Wt 53.7 kg (118 lb 4.8 oz)   SpO2 100%   BMI 17.99 kg/m²       PHYSICAL EXAM:    General:    Alert, cooperative, no distress, appears stated age. HEENT: Atraumatic, anicteric sclerae, pink conjunctivae     No oral ulcers, mucosa moist, throat clear, dentition fair  Neck:  Supple, symmetrical,  thyroid: non tender  Lungs:   Clear to auscultation bilaterally. No Wheezing or Rhonchi. No rales. Chest wall:  No tenderness  No Accessory muscle use. Heart:   Regular  rhythm,  No  murmur   No edema  Abdomen:   Soft, tenderness to palpation in periumbilical area, Not distended. Bowel sounds normal  Extremities: No cyanosis. No clubbing,      Skin turgor normal, Capillary refill normal, Radial dial pulse 2+  Skin:     Not pale. Not Jaundiced  No rashes   Psych:  Good insight. Not depressed. Not anxious or agitated. Neurologic: EOMs intact. No facial asymmetry. No aphasia or slurred speech. Symmetrical strength, Sensation grossly intact.  Alert and oriented X 4     ______________________________________________________________________    Care Plan discussed with:  Patient/Family, Nurse and     Expected  Disposition:  Home w/Family  ________________________________________________________________________  TOTAL TIME:  25 Minutes    Critical Care Provided     Minutes non procedure based      Comments    x Reviewed previous records   >50% of visit spent in counseling and coordination of care x Discussion with patient and/or family and questions answered       ________________________________________________________________________  Signed: Kera Flores MD    Procedures: see electronic medical records for all procedures/Xrays and details which were not copied into this note but were reviewed prior to creation of Plan. LAB DATA REVIEWED:    Recent Results (from the past 24 hour(s))   EKG, 12 LEAD, INITIAL    Collection Time: 05/20/21  4:43 PM   Result Value Ref Range    Ventricular Rate 75 BPM    Atrial Rate 75 BPM    P-R Interval 146 ms    QRS Duration 80 ms    Q-T Interval 380 ms    QTC Calculation (Bezet) 424 ms    Calculated P Axis 82 degrees    Calculated R Axis 83 degrees    Calculated T Axis 71 degrees    Diagnosis       Normal sinus rhythm  Early repolarization  Normal ECG  When compared with ECG of 20-DEC-2020 16:56,  No significant change was found  Confirmed by Cordelia Coleman M.D., [place] ([id number]) on 5/21/2021 8:01:41 AM     CBC WITH AUTOMATED DIFF    Collection Time: 05/20/21  5:25 PM   Result Value Ref Range    WBC 8.0 4.1 - 11.1 K/uL    RBC 3.95 (L) 4.10 - 5.70 M/uL    HGB 13.2 12.1 - 17.0 g/dL    HCT 37.3 36.6 - 50.3 %    MCV 94.4 80.0 - 99.0 FL    MCH 33.4 26.0 - 34.0 PG    MCHC 35.4 30.0 - 36.5 g/dL    RDW 14.4 11.5 - 14.5 %    PLATELET 515 (L) 425 - 400 K/uL    MPV 11.7 8.9 - 12.9 FL    NRBC 0.0 0  WBC    ABSOLUTE NRBC 0.00 0.00 - 0.01 K/uL    NEUTROPHILS 69 32 - 75 %    LYMPHOCYTES 17 12 - 49 %    MONOCYTES 12 5 - 13 %    EOSINOPHILS 2 0 - 7 %    BASOPHILS 0 0 - 1 %    IMMATURE GRANULOCYTES 1 (H) 0.0 - 0.5 %    ABS. NEUTROPHILS 5.5 1.8 - 8.0 K/UL    ABS. LYMPHOCYTES 1.4 0.8 - 3.5 K/UL    ABS. MONOCYTES 1.0 0.0 - 1.0 K/UL    ABS. EOSINOPHILS 0.1 0.0 - 0.4 K/UL    ABS. BASOPHILS 0.0 0.0 - 0.1 K/UL    ABS. IMM. GRANS. 0.1 (H) 0.00 - 0.04 K/UL    DF AUTOMATED     METABOLIC PANEL, COMPREHENSIVE    Collection Time: 05/20/21  5:25 PM   Result Value Ref Range    Sodium 134 (L) 136 - 145 mmol/L    Potassium 3.0 (L) 3.5 - 5.1 mmol/L    Chloride 96 (L) 97 - 108 mmol/L    CO2 34 (H) 21 - 32 mmol/L    Anion gap 4 (L) 5 - 15 mmol/L    Glucose 97 65 - 100 mg/dL    BUN 9 6 - 20 MG/DL    Creatinine 0.88 0.70 - 1.30 MG/DL    BUN/Creatinine ratio 10 (L) 12 - 20      GFR est AA >60 >60 ml/min/1.73m2    GFR est non-AA >60 >60 ml/min/1.73m2    Calcium 9.4 8.5 - 10.1 MG/DL    Bilirubin, total 0.6 0.2 - 1.0 MG/DL    ALT (SGPT) 46 12 - 78 U/L    AST (SGOT) 58 (H) 15 - 37 U/L    Alk.  phosphatase 125 (H) 45 - 117 U/L    Protein, total 6.8 6.4 - 8.2 g/dL    Albumin 3.0 (L) 3.5 - 5.0 g/dL    Globulin 3.8 2.0 - 4.0 g/dL    A-G Ratio 0.8 (L) 1.1 - 2.2     TROPONIN I    Collection Time: 05/20/21  5:25 PM   Result Value Ref Range    Troponin-I, Qt. <0.05 <0.05 ng/mL   LIPASE    Collection Time: 05/20/21  5:25 PM   Result Value Ref Range    Lipase 1,645 (H) 73 - 393 U/L   MAGNESIUM    Collection Time: 05/20/21  5:25 PM   Result Value Ref Range    Magnesium 1.4 (L) 1.6 - 2.4 mg/dL   URINALYSIS W/ REFLEX CULTURE    Collection Time: 05/20/21  7:22 PM    Specimen: Urine   Result Value Ref Range    Color DARK YELLOW      Appearance CLOUDY (A) CLEAR      Specific gravity 1.010 1.003 - 1.030      pH (UA) 8.0 5.0 - 8.0      Protein Negative NEG mg/dL    Glucose Negative NEG mg/dL    Ketone Negative NEG mg/dL    Bilirubin Negative NEG      Blood Negative NEG      Urobilinogen 1.0 0.2 - 1.0 EU/dL    Nitrites Negative NEG      Leukocyte Esterase Negative NEG      WBC 5-10 0 - 4 /hpf    RBC 0-5 0 - 5 /hpf    Epithelial cells FEW FEW /lpf    Bacteria Negative NEG /hpf    UA:UC IF INDICATED CULTURE NOT INDICATED BY UA RESULT CNI     LACTIC ACID    Collection Time: 05/21/21  5:35 AM   Result Value Ref Range    Lactic acid 0.8 0.4 - 2.0 MMOL/L   CBC WITH AUTOMATED DIFF Collection Time: 05/21/21 12:05 PM   Result Value Ref Range    WBC 5.6 4.1 - 11.1 K/uL    RBC 3.16 (L) 4.10 - 5.70 M/uL    HGB 10.6 (L) 12.1 - 17.0 g/dL    HCT 30.6 (L) 36.6 - 50.3 %    MCV 96.8 80.0 - 99.0 FL    MCH 33.5 26.0 - 34.0 PG    MCHC 34.6 30.0 - 36.5 g/dL    RDW 14.7 (H) 11.5 - 14.5 %    PLATELET 207 (L) 797 - 400 K/uL    MPV 10.9 8.9 - 12.9 FL    NRBC 0.0 0  WBC    ABSOLUTE NRBC 0.00 0.00 - 0.01 K/uL    NEUTROPHILS 70 32 - 75 %    LYMPHOCYTES 16 12 - 49 %    MONOCYTES 11 5 - 13 %    EOSINOPHILS 2 0 - 7 %    BASOPHILS 0 0 - 1 %    IMMATURE GRANULOCYTES 1 (H) 0.0 - 0.5 %    ABS. NEUTROPHILS 3.9 1.8 - 8.0 K/UL    ABS. LYMPHOCYTES 0.9 0.8 - 3.5 K/UL    ABS. MONOCYTES 0.6 0.0 - 1.0 K/UL    ABS. EOSINOPHILS 0.1 0.0 - 0.4 K/UL    ABS. BASOPHILS 0.0 0.0 - 0.1 K/UL    ABS. IMM. GRANS. 0.0 0.00 - 0.04 K/UL    DF AUTOMATED     METABOLIC PANEL, BASIC    Collection Time: 05/21/21 12:05 PM   Result Value Ref Range    Sodium 140 136 - 145 mmol/L    Potassium 3.1 (L) 3.5 - 5.1 mmol/L    Chloride 105 97 - 108 mmol/L    CO2 30 21 - 32 mmol/L    Anion gap 5 5 - 15 mmol/L    Glucose 108 (H) 65 - 100 mg/dL    BUN 5 (L) 6 - 20 MG/DL    Creatinine 0.85 0.70 - 1.30 MG/DL    BUN/Creatinine ratio 6 (L) 12 - 20      GFR est AA >60 >60 ml/min/1.73m2    GFR est non-AA >60 >60 ml/min/1.73m2    Calcium 8.1 (L) 8.5 - 10.1 MG/DL   MAGNESIUM    Collection Time: 05/21/21 12:05 PM   Result Value Ref Range    Magnesium 1.8 1.6 - 2.4 mg/dL   PHOSPHORUS    Collection Time: 05/21/21 12:05 PM   Result Value Ref Range    Phosphorus 2.7 2.6 - 4.7 MG/DL   HEPATIC FUNCTION PANEL    Collection Time: 05/21/21 12:05 PM   Result Value Ref Range    Protein, total 5.9 (L) 6.4 - 8.2 g/dL    Albumin 2.5 (L) 3.5 - 5.0 g/dL    Globulin 3.4 2.0 - 4.0 g/dL    A-G Ratio 0.7 (L) 1.1 - 2.2      Bilirubin, total 0.7 0.2 - 1.0 MG/DL    Bilirubin, direct 0.2 0.0 - 0.2 MG/DL    Alk.  phosphatase 120 (H) 45 - 117 U/L    AST (SGOT) 141 (H) 15 - 37 U/L    ALT (SGPT) 86 (H) 12 - 78 U/L

## 2021-05-21 NOTE — PROGRESS NOTES
[facility] Dosing Services: Anticoagulation    Enoxaparin has been changed to heparin for a weight less than 60kg. Pharmacy automatically make dose adjustments for patients with a weight less than 60kg.    60 y.o. male  Indication: prophylaxis of VTE. Wt Readings from Last 1 Encounters:   05/20/21 48.7 kg (107 lb 5.8 oz)       Ht Readings from Last 1 Encounters:   02/04/21 172.7 cm (68\")         Plan:  Enoxaparin changed to heparin 5,000 units subcutaneously every 12 hours. Previous  Regimen Enoxaparin 40 mg subcutaneously daily   Creatinine Clearance CrCl cannot be calculated (Unknown ideal weight.). Creatinine Lab Results   Component Value Date/Time    Creatinine 0.88 05/20/2021 05:25 PM       Platelet Lab Results   Component Value Date/Time    PLATELET 162 (L) 72/67/5919 05:25 PM      H/H Lab Results   Component Value Date/Time    HGB 13.2 05/20/2021 05:25 PM        Epidural Catheter: None  Other anticoagulants: None  Relevant drug interactions: None    Pharmacy to monitor patients progress. Will communicate further recommendations regarding patients anticoagulation therapy with prescriber.   Thank you,     Diana Smith, PharmD   Contact: [phone]

## 2021-05-21 NOTE — H&P
**Physician Signature**  This document was electronically signed by: Zoey Jean MD  05/20/2021 08:16 PM    **Consult Information**  Member Facility: [address] [place] [address] Rd., [address] MRN: [id number]  Facility Time Zone: ET  Date and Time of Request: 05/20/2021 06:59:30 PM  Requesting Clinician: Dr. Muriel Martin  Patient Name: Walter Butcher  Date of Birth: [id number]  Gender: Male    **Problem/Plan**    Acute on chronic pancreatitis: - secondary to alcohol use  - lipase markedly elevated. - WBC wnl. BS wnl as well. - stable vitals  - NPO, IV hydration with LR, pain control. Enteritis, SBO: - h/o rectal ca. - no h/o IBD  - ED attending d/w Gen surgeon, thinks findings consistent with ileus  - check lactic acid  - IV zosyn x 2 doses for now. - IV fluids. - stool softener prn    Alcohol abuse: - [facility] protocol  - IV thiamine, folic acid, prn ativan    h/o rectal ca: - resume anusol suppository for now given CT abd findings. Tobacco use: - nicotine patch  - tobacco cessation advised. **General**  Code Status: Full Code  History of Present Illness: Pt is a 60 yr old male with h/o tobacco use, daily alcohol use, rectal cancer, pancreatitis who presents with acute pancreatitis, abdominal pain. Pt states that he has had lower abdominal, rectal pain for the past 4 years since rectal surgery. However, over the past 2 weeks, has not been able to tolerate oral intake. He has been drinking alcohol though, about a pint a day. Abdominal pain currently is 8/10. No nausea/emesis. Abdomen is non distended. Last hospitalization was in December with similar episode including pancreatitis, enteritis . ED course - stable vitals. WBC wnl. Cr wnl. CT abd:  1. Chronic long segment enteritis of the distal small bowel. Interval  development of partial small bowel obstruction proximal to the abnormal loops of  distal small bowel.  Findings are compatible with an acute flare of inflammatory  bowel disease. 2. Acute on chronic pancreatitis of the distal pancreatic body and tail. No  focal fluid collections. 3. No other significant change. General surgery consulted: no indication for surgery at the moment. Review of Systems: Per HPI, otherwise, rest of the ROS reviewed and negative. Past Medical History:  Cancer [facility])      rectal   Gastrointestinal disorder      Gastric Ulcers; Rectal CA  Surgical History: Cholecystectomy  Social History: daily smoker  2-3 beers/pint a day  Family History: Brother - cancer    **Allergies and Medications**  Allergies: NKDA  Influenza vaccine  Current Medications: IV  fluids  Home Medications: Reviewed    **Vital Signs**  Temperature F: 98.6  Temperature C: 37.0  Blood Pressure (mmHg): 122/78  Heart Rate (bpm): 83  Vitals Entry Time: 05/20/2021 07:54:21 PM    **Exam**  Exam: Gen: awake, alert. No acute distress. HEENT: NCAT, sclera non icteric  Pulm: no resp distress, no wheezing or use of accessory muscles. Abd: non distended. Ext: non edematous  Neuro: CN II - XII grossly intact, speech clear  Psych: alert and oriented x3. Cooperative with exam.    **Attestation**  Interaction Mode: Video & Phone  Phone Duration (mins): 3  Time of Call : 05/20/2021 07:28 PM  Video Duration (mins): 10  Time of Video : 05/20/2021 08:00 PM  Interaction Attestation: Clinical telemedicine services delivered using HIPAA-compliant interactive video audio telecommunications while the patient and the rendering provider were not in the same physical location. A written summary report was provided to the requesting/treating provider at the originating site.   Evaluation Duration (mins): 49

## 2021-05-22 LAB
ALBUMIN SERPL-MCNC: 2.4 G/DL (ref 3.5–5)
ALBUMIN/GLOB SERPL: 0.8 {RATIO} (ref 1.1–2.2)
ALP SERPL-CCNC: 122 U/L (ref 45–117)
ALT SERPL-CCNC: 68 U/L (ref 12–78)
ANION GAP SERPL CALC-SCNC: 7 MMOL/L (ref 5–15)
AST SERPL-CCNC: 77 U/L (ref 15–37)
BASOPHILS # BLD: 0 K/UL (ref 0–0.1)
BASOPHILS NFR BLD: 0 % (ref 0–1)
BILIRUB DIRECT SERPL-MCNC: 0.2 MG/DL (ref 0–0.2)
BILIRUB SERPL-MCNC: 0.5 MG/DL (ref 0.2–1)
BUN SERPL-MCNC: 4 MG/DL (ref 6–20)
BUN/CREAT SERPL: 6 (ref 12–20)
CALCIUM SERPL-MCNC: 8.1 MG/DL (ref 8.5–10.1)
CHLORIDE SERPL-SCNC: 102 MMOL/L (ref 97–108)
CO2 SERPL-SCNC: 30 MMOL/L (ref 21–32)
CREAT SERPL-MCNC: 0.7 MG/DL (ref 0.7–1.3)
DIFFERENTIAL METHOD BLD: ABNORMAL
EOSINOPHIL # BLD: 0.1 K/UL (ref 0–0.4)
EOSINOPHIL NFR BLD: 1 % (ref 0–7)
ERYTHROCYTE [DISTWIDTH] IN BLOOD BY AUTOMATED COUNT: 14.7 % (ref 11.5–14.5)
FOLATE SERPL-MCNC: 22.4 NG/ML (ref 5–21)
GLOBULIN SER CALC-MCNC: 3.2 G/DL (ref 2–4)
GLUCOSE SERPL-MCNC: 95 MG/DL (ref 65–100)
HCT VFR BLD AUTO: 32.5 % (ref 36.6–50.3)
HGB BLD-MCNC: 11.2 G/DL (ref 12.1–17)
IMM GRANULOCYTES # BLD AUTO: 0 K/UL (ref 0–0.04)
IMM GRANULOCYTES NFR BLD AUTO: 1 % (ref 0–0.5)
IRON SATN MFR SERPL: 13 % (ref 20–50)
IRON SERPL-MCNC: 22 UG/DL (ref 35–150)
LIPASE SERPL-CCNC: 368 U/L (ref 73–393)
LIPASE SERPL-CCNC: 380 U/L (ref 73–393)
LYMPHOCYTES # BLD: 1.1 K/UL (ref 0.8–3.5)
LYMPHOCYTES NFR BLD: 13 % (ref 12–49)
MAGNESIUM SERPL-MCNC: 1.7 MG/DL (ref 1.6–2.4)
MCH RBC QN AUTO: 32.8 PG (ref 26–34)
MCHC RBC AUTO-ENTMCNC: 34.5 G/DL (ref 30–36.5)
MCV RBC AUTO: 95.3 FL (ref 80–99)
MONOCYTES # BLD: 1 K/UL (ref 0–1)
MONOCYTES NFR BLD: 13 % (ref 5–13)
NEUTS SEG # BLD: 6 K/UL (ref 1.8–8)
NEUTS SEG NFR BLD: 73 % (ref 32–75)
NRBC # BLD: 0 K/UL (ref 0–0.01)
NRBC BLD-RTO: 0 PER 100 WBC
PHOSPHATE SERPL-MCNC: 2.6 MG/DL (ref 2.6–4.7)
PLATELET # BLD AUTO: 133 K/UL (ref 150–400)
PMV BLD AUTO: 10.6 FL (ref 8.9–12.9)
POTASSIUM SERPL-SCNC: 3 MMOL/L (ref 3.5–5.1)
PROT SERPL-MCNC: 5.6 G/DL (ref 6.4–8.2)
RBC # BLD AUTO: 3.41 M/UL (ref 4.1–5.7)
RETICS # AUTO: 0.05 M/UL (ref 0.03–0.1)
RETICS/RBC NFR AUTO: 1.6 % (ref 0.7–2.1)
SODIUM SERPL-SCNC: 139 MMOL/L (ref 136–145)
TIBC SERPL-MCNC: 171 UG/DL (ref 250–450)
VIT B12 SERPL-MCNC: >2000 PG/ML (ref 193–986)
WBC # BLD AUTO: 8.3 K/UL (ref 4.1–11.1)

## 2021-05-22 PROCEDURE — 83690 ASSAY OF LIPASE: CPT

## 2021-05-22 PROCEDURE — 80076 HEPATIC FUNCTION PANEL: CPT

## 2021-05-22 PROCEDURE — 83735 ASSAY OF MAGNESIUM: CPT

## 2021-05-22 PROCEDURE — 36415 COLL VENOUS BLD VENIPUNCTURE: CPT

## 2021-05-22 PROCEDURE — 74011250636 HC RX REV CODE- 250/636: Performed by: HOSPITALIST

## 2021-05-22 PROCEDURE — 65270000032 HC RM SEMIPRIVATE

## 2021-05-22 PROCEDURE — 85025 COMPLETE CBC W/AUTO DIFF WBC: CPT

## 2021-05-22 PROCEDURE — 74011250637 HC RX REV CODE- 250/637: Performed by: STUDENT IN AN ORGANIZED HEALTH CARE EDUCATION/TRAINING PROGRAM

## 2021-05-22 PROCEDURE — 74011250636 HC RX REV CODE- 250/636: Performed by: SURGERY

## 2021-05-22 PROCEDURE — 74011250636 HC RX REV CODE- 250/636: Performed by: STUDENT IN AN ORGANIZED HEALTH CARE EDUCATION/TRAINING PROGRAM

## 2021-05-22 PROCEDURE — 82746 ASSAY OF FOLIC ACID SERUM: CPT

## 2021-05-22 PROCEDURE — 83540 ASSAY OF IRON: CPT

## 2021-05-22 PROCEDURE — 82607 VITAMIN B-12: CPT

## 2021-05-22 PROCEDURE — 74011000258 HC RX REV CODE- 258: Performed by: STUDENT IN AN ORGANIZED HEALTH CARE EDUCATION/TRAINING PROGRAM

## 2021-05-22 PROCEDURE — 80048 BASIC METABOLIC PNL TOTAL CA: CPT

## 2021-05-22 PROCEDURE — 74011250637 HC RX REV CODE- 250/637: Performed by: HOSPITALIST

## 2021-05-22 PROCEDURE — 85045 AUTOMATED RETICULOCYTE COUNT: CPT

## 2021-05-22 PROCEDURE — 84100 ASSAY OF PHOSPHORUS: CPT

## 2021-05-22 RX ORDER — OXYCODONE HYDROCHLORIDE 5 MG/1
5 TABLET ORAL
Status: DISCONTINUED | OUTPATIENT
Start: 2021-05-22 | End: 2021-05-23 | Stop reason: HOSPADM

## 2021-05-22 RX ORDER — OXYCODONE HYDROCHLORIDE 5 MG/1
10 TABLET ORAL
Status: DISCONTINUED | OUTPATIENT
Start: 2021-05-22 | End: 2021-05-23 | Stop reason: HOSPADM

## 2021-05-22 RX ORDER — PANTOPRAZOLE SODIUM 40 MG/1
40 TABLET, DELAYED RELEASE ORAL
Status: DISCONTINUED | OUTPATIENT
Start: 2021-05-23 | End: 2021-05-23 | Stop reason: HOSPADM

## 2021-05-22 RX ORDER — MORPHINE SULFATE 2 MG/ML
1 INJECTION, SOLUTION INTRAMUSCULAR; INTRAVENOUS
Status: DISCONTINUED | OUTPATIENT
Start: 2021-05-22 | End: 2021-05-23 | Stop reason: HOSPADM

## 2021-05-22 RX ORDER — NALOXONE HYDROCHLORIDE 0.4 MG/ML
0.4 INJECTION, SOLUTION INTRAMUSCULAR; INTRAVENOUS; SUBCUTANEOUS
Status: DISCONTINUED | OUTPATIENT
Start: 2021-05-22 | End: 2021-05-23 | Stop reason: HOSPADM

## 2021-05-22 RX ADMIN — Medication 10 ML: at 16:28

## 2021-05-22 RX ADMIN — METRONIDAZOLE 500 MG: 500 INJECTION, SOLUTION INTRAVENOUS at 09:19

## 2021-05-22 RX ADMIN — Medication 10 ML: at 01:46

## 2021-05-22 RX ADMIN — HEPARIN SODIUM 5000 UNITS: 5000 INJECTION INTRAVENOUS; SUBCUTANEOUS at 09:18

## 2021-05-22 RX ADMIN — Medication 100 MG: at 09:18

## 2021-05-22 RX ADMIN — MORPHINE SULFATE 2 MG: 2 INJECTION, SOLUTION INTRAMUSCULAR; INTRAVENOUS at 09:48

## 2021-05-22 RX ADMIN — SODIUM CHLORIDE, POTASSIUM CHLORIDE, SODIUM LACTATE AND CALCIUM CHLORIDE 75 ML/HR: 600; 310; 30; 20 INJECTION, SOLUTION INTRAVENOUS at 22:52

## 2021-05-22 RX ADMIN — MORPHINE SULFATE 2 MG: 2 INJECTION, SOLUTION INTRAMUSCULAR; INTRAVENOUS at 01:41

## 2021-05-22 RX ADMIN — FAMOTIDINE 20 MG: 10 INJECTION INTRAVENOUS at 09:19

## 2021-05-22 RX ADMIN — Medication 10 ML: at 21:01

## 2021-05-22 RX ADMIN — Medication 10 ML: at 06:00

## 2021-05-22 RX ADMIN — OXYCODONE HYDROCHLORIDE 5 MG: 5 TABLET ORAL at 16:28

## 2021-05-22 RX ADMIN — OXYCODONE HYDROCHLORIDE 5 MG: 5 TABLET ORAL at 21:04

## 2021-05-22 RX ADMIN — FOLIC ACID 1 MG: 1 TABLET ORAL at 09:18

## 2021-05-22 RX ADMIN — HEPARIN SODIUM 5000 UNITS: 5000 INJECTION INTRAVENOUS; SUBCUTANEOUS at 21:00

## 2021-05-22 RX ADMIN — CEFTRIAXONE SODIUM 1 G: 1 INJECTION, POWDER, FOR SOLUTION INTRAMUSCULAR; INTRAVENOUS at 09:19

## 2021-05-22 RX ADMIN — METRONIDAZOLE 500 MG: 500 INJECTION, SOLUTION INTRAVENOUS at 21:01

## 2021-05-22 NOTE — PROGRESS NOTES
KENTRELL     Need PCP  To call on Monday. Possible GI /Surgery follow-up   Transportation home   Second IMM   Possible completion on ACP forms     Reason for Admission:     Patient came in through the ER.     60 y.o.  male with PMH of rectal cancer, alcohol abuse, pancreatitis, enteritis who presents to ED with c/o abdominal pain, nausea, loss of appetite. Patient reports chronic lower abdominal pain and rectal pain since rectal surgeries rectal carcinoma about 4 years ago. Patient reports that for the last 2 weeks he has had progressively worsening dull aching 8/10 abdominal pain dominantly in the periumbilical area similar to his pancreatitis pain. Reports diarrhea, nausea, loss of appetite and inability to tolerate oral intake for the past few days. Continues to drink alcohol daily about 1 pint. He has had previous episodes for pancreatitis, enteritis most recently in December 2020. He was send by Hematology , Dr. Ferguson Mins back in Jan 2021. Outpatient     Work-up in progress   Surgery consult     Patient has Medicare Advantage Plan- Inpatient status. Second IMM letter signed tonight. If not discharged in  48 hours will update the form. He has medications benefits with co-payment. Asked about Medicaid- he said he did not know if he qualified. His monthly income is about $ 1400.00 - most likely will not qualified for full benefits but meet for partial benefits unless he meets a spend-down and accept LTC. He can follow-up with an application - discussed how to do this. He gave a lower income last time he was here. RUR Score:   28 %                  PCP: First and Last name:   Skip Lo MD     Name of Practice:    Are you a current patient: Yes/No:    Approximate date of last visit:  A few months ago    Can you participate in a virtual visit if needed: think in person visit is needed     Do you (patient/family) have any concerns for transition/discharge? Transportation to appointments               Plan for utilizing home health: To assess ongoing to determine needs. Current Advanced Directive/Advance Care Plan:  Full Code      Healthcare Decision Maker:   Click here to complete HealthCare Decision Makers including selection of the Healthcare Decision Maker Relationship (ie \"Primary\")            Primary Decision Maker: Moose Quintero - Sister - [phone]    Secondary Decision Maker: Jen Dasilva - Sister - [phone]    Transition of Care Plan:        CM Discussed patient in rounds with MD and team.   Met with patient at the bedside. Patient is known to us from previous admit. His last inpatient admit he went to [facility] for SNF. December 2020. He indicates he  has been having some problems with nausea for a few months. .    He indicates the address and phone # on the face sheet correct. Had him repeat the phone #  [phone]     He said he live alone then he said he lives with his wife Letty Kim. He said she has a  Lot of medical problems -   Asked about ACP-He said he wants his sister to [facility] [phone] to make medical decision for him if he is unable to do so. Asked about completing the paper work - he agreed. When I went back the room to give him the copy of the IMM letter  He said he may have to wait for this. Asked nursing to consult Spiritual Care to see if someone can see him tomorrow. Asked about PCP appointment. He agreed for me to arrange this. Will called on Monday. Asked about specialist he was seeing  He [facility] was unsure of the name of the provider think Akimarva Pitts ? ? Care Management Interventions  PCP Verified by CM: Yes  Mode of Transport at Discharge:  Other (see comment)  Transition of Care Consult (CM Consult): Discharge Planning  Current Support Network: Lives with Spouse  Confirm Follow Up Transport: Family  The Plan for Transition of Care is Related to the Following Treatment Goals : PCP. surgeradha follow-up    The Patient and/or Patient Representative was Provided with a Choice of Provider and Agrees with the Discharge Plan?: Yes  Name of the Patient Representative Who was Provided with a Choice of Provider and Agrees with the Discharge Plan: talked to patient and care team to follow-up with sister   [place] of Choice List was Provided with Basic Dialogue that Supports the Patient's Individualized Plan of Care/Goals, Treatment Preferences and Shares the Quality Data Associated with the Providers?: Yes  Discharge Location  Discharge Placement: Home with outpatient services    [facility] SMITHA RN   [phone]

## 2021-05-22 NOTE — PROGRESS NOTES
0710 - Bedside shift report received from [facility], RN    0900 - Pt eating breakfast    1100 - Pt watching TV    1300 - Pt eating lunch    1500 - Pt watching TV    1700 - Pt eating lunch    1900 - Bedside shift report given to Crystal Neely

## 2021-05-22 NOTE — PROGRESS NOTES
Problem: Falls - Risk of  Goal: *Absence of Falls  Description: Document [facility] Fall Risk and appropriate interventions in the flowsheet.   Outcome: Progressing Towards Goal  Note: Fall Risk Interventions:            Medication Interventions: Teach patient to arise slowly                   Problem: Patient Education: Go to Patient Education Activity  Goal: Patient/Family Education  Outcome: Progressing Towards Goal     Problem: General Medical Care Plan  Goal: *Vital signs within specified parameters  Outcome: Progressing Towards Goal  Goal: *Absence of infection signs and symptoms  Outcome: Progressing Towards Goal  Goal: *Skin integrity maintained  Outcome: Progressing Towards Goal     Problem: Pain  Goal: *Control of Pain  Outcome: Progressing Towards Goal     Problem: Patient Education: Go to Patient Education Activity  Goal: Patient/Family Education  Outcome: Progressing Towards Goal     Problem: Nausea/Vomiting (Adult)  Goal: *Absence of nausea/vomiting  Outcome: Progressing Towards Goal

## 2021-05-22 NOTE — PROGRESS NOTES
Hospitalist Progress Note    NAME: Rama Phan   :  1965   MRN:  [id number]   Room Number:    @ [facility] Summary: 60 y.o. male whom presented on 2021 with      Assessment / Plan:    Acute on chronic alcoholic pancreatitis POA  Enteritis POA  Acute on chronic abdominal pain, lipase elevated at 1645, CT abdomen pelvis independently reviewed reveals chronic long segment enteritis of distal small bowel, acute on chronic pancreatitis, no focal fluid collections. - Advance diet  - Pain control  With oral and IV medications  - General surgery consulted - no acute surgical intervention needed  - Patient needs outpatient GI follow up  - IV Abx for intra-abdominal infection     Orthostatic hypotension POA  Likely due to hypovolemia  - Continue IV hydration      Alcoholic hepatitis POA  Due to ongoing alcohol abuse.        Alcohol abuse and dependence POA  Patient was counseled extensively on the need to abstain from drugs its addictive tendencies, its deleterious effects on the brain, cardiovascular system, lungs as well as its financial & social sequelae        Normochromic normocytic anemia POA  Suggestive of iron deficiency anemia.   - Start iron supplementation at discharge  - Needs age appropriate cancer screening      Benign prostatic hyperplasia POA  Continue finasteride.      Low-density lesion in the pancreas POA  - outpatient follow up.      History of rectal cancer status post chemoradiation POA        Body mass index is 17.99 kg/m². Code Status: full   Surrogate Decision Maker:  Name:     Rel:  cara Molina     Spouse Home Ph:  Work Ph:  Mobile Ph: [phone]           DVT Prophylaxis: Lovenox  GI Prophylaxis: not indicated  Baseline: ambulatory independently         Subjective:     Chief Complaint / Reason for Physician Visit  \"feeling much better, want to try eating\". Discussed with RN events overnight.      Review of Systems:  + nausea, vomitting, abd pain. No fevers, chills, appetite change, cough, sputum production, shortness of breath, dyspnea on exertion, , diarrhea, constipation, chest pain, leg edema, joint pain, rash, itching. . Tolerating diet. Objective:     VITALS:   Last 24hrs VS reviewed since prior progress note. Most recent are:  Patient Vitals for the past 24 hrs:   Temp Pulse Resp BP SpO2   05/22/21 1241 98.3 °F (36.8 °C) 75 12 134/71 100 %   05/22/21 0801 98.2 °F (36.8 °C) 67 12 (!) 153/93 100 %   05/22/21 0346 98 °F (36.7 °C) 69 18 124/74 100 %   05/21/21 1939 98 °F (36.7 °C) 83 18 132/80 100 %   05/21/21 1628 98.6 °F (37 °C) 82 16 133/73 100 %       Intake/Output Summary (Last 24 hours) at 5/22/2021 1328  Last data filed at 5/22/2021 0346  Gross per 24 hour   Intake 240 ml   Output 300 ml   Net -60 ml        PHYSICAL EXAM:  General: WD, WN. Alert, cooperative, no acute distress    EENT:  EOMI. Anicteric sclerae. MMM  Resp:  CTA bilaterally, no wheezing or rales. No accessory muscle use  CV:  Regular  rhythm,  normal S1/S2, no murmurs rubs gallops, No edema  GI:  Soft, Non distended, twan-umbilical tenderness  +Bowel sounds  Neurologic:  Alert and oriented X 3, normal speech,   Psych:   Good insight. Not anxious nor agitated  Skin:  No rashes. No jaundice    Reviewed most current lab test results and cultures  YES  Reviewed most current radiology test results   YES  Review and summation of old records today    NO  Reviewed patient's current orders and MAR    YES  PMH/SH reviewed - no change compared to H&P  ________________________________________________________________________  Care Plan discussed with:    Comments   Patient x    Family      RN x    Care Manager x    Consultant  x                     x Multidiciplinary team rounds were held today with , nursing, pharmacist and clinical coordinator. Patient's plan of care was discussed; medications were reviewed and discharge planning was addressed. ________________________________________________________________________  Total NON critical care TIME: 35  Minutes    Total CRITICAL CARE TIME Spent:   Minutes non procedure based      Comments   >50% of visit spent in counseling and coordination of care     ________________________________________________________________________  Mikey Capps MD     Procedures: see electronic medical records for all procedures/Xrays and details which were not copied into this note but were reviewed prior to creation of Plan. LABS:  I reviewed today's most current labs and imaging studies.   Pertinent labs include:  Recent Labs     05/22/21  0323 05/21/21  1205 05/20/21  1725   WBC 8.3 5.6 8.0   HGB 11.2* 10.6* 13.2   HCT 32.5* 30.6* 37.3   * 102* 130*     Recent Labs     05/22/21  0323 05/21/21  1205 05/20/21  1725    140 134*   K 3.0* 3.1* 3.0*    105 96*   CO2 30 30 34*   GLU 95 108* 97   BUN 4* 5* 9   CREA 0.70 0.85 0.88   CA 8.1* 8.1* 9.4   MG 1.7 1.8 1.4*   PHOS 2.6 2.7  --    ALB 2.4* 2.5* 3.0*   TBILI 0.5 0.7 0.6   ALT 68 86* 46       Signed: Mikey Capps MD

## 2021-05-22 NOTE — PROGRESS NOTES
0710: Bedside shift change report given to Marlena Antony RN  (oncoming nurse) by José Miguel Gamez RN(offgoing nurse). Report included the following information SBAR, Kardex, MAR and Accordion. Cardiac monitor NSR.

## 2021-05-22 NOTE — PROGRESS NOTES
1930:Bedside shift change report given to [facility] (oncoming nurse) by Glendy Pope (offgoing nurse). Report included the following information SBAR, Kardex, MAR, Accordion and Recent Results. Cardiac monitor NSR.      2000: pt alert and orientated. Resting in bed watching tv. Pt pleasant and cooperative. Assessment and vital collected. 2136: PRN morphine IV requested via patient r/t left lower abdomen pain 8/10. positve effect. 0141:PRN morphine IV requested via patient r/t left lower abdomen pain 8/10. positve effect. 0400: labs collected and sent for evaluation.

## 2021-05-23 VITALS
OXYGEN SATURATION: 100 % | SYSTOLIC BLOOD PRESSURE: 140 MMHG | HEART RATE: 79 BPM | TEMPERATURE: 98.5 F | DIASTOLIC BLOOD PRESSURE: 84 MMHG | RESPIRATION RATE: 12 BRPM | BODY MASS INDEX: 17.99 KG/M2 | WEIGHT: 118.3 LBS

## 2021-05-23 LAB
ALBUMIN SERPL-MCNC: 2.4 G/DL (ref 3.5–5)
ALBUMIN/GLOB SERPL: 0.7 {RATIO} (ref 1.1–2.2)
ALP SERPL-CCNC: 136 U/L (ref 45–117)
ALT SERPL-CCNC: 51 U/L (ref 12–78)
ANION GAP SERPL CALC-SCNC: 6 MMOL/L (ref 5–15)
AST SERPL-CCNC: 44 U/L (ref 15–37)
BASOPHILS # BLD: 0 K/UL (ref 0–0.1)
BASOPHILS NFR BLD: 0 % (ref 0–1)
BILIRUB DIRECT SERPL-MCNC: 0.1 MG/DL (ref 0–0.2)
BILIRUB SERPL-MCNC: 0.4 MG/DL (ref 0.2–1)
BUN SERPL-MCNC: 2 MG/DL (ref 6–20)
BUN/CREAT SERPL: 3 (ref 12–20)
CALCIUM SERPL-MCNC: 8.7 MG/DL (ref 8.5–10.1)
CHLORIDE SERPL-SCNC: 104 MMOL/L (ref 97–108)
CO2 SERPL-SCNC: 30 MMOL/L (ref 21–32)
CREAT SERPL-MCNC: 0.67 MG/DL (ref 0.7–1.3)
DIFFERENTIAL METHOD BLD: ABNORMAL
EOSINOPHIL # BLD: 0.1 K/UL (ref 0–0.4)
EOSINOPHIL NFR BLD: 1 % (ref 0–7)
ERYTHROCYTE [DISTWIDTH] IN BLOOD BY AUTOMATED COUNT: 14.7 % (ref 11.5–14.5)
GLOBULIN SER CALC-MCNC: 3.3 G/DL (ref 2–4)
GLUCOSE SERPL-MCNC: 88 MG/DL (ref 65–100)
HCT VFR BLD AUTO: 31.9 % (ref 36.6–50.3)
HGB BLD-MCNC: 11.1 G/DL (ref 12.1–17)
IMM GRANULOCYTES # BLD AUTO: 0.1 K/UL (ref 0–0.04)
IMM GRANULOCYTES NFR BLD AUTO: 1 % (ref 0–0.5)
LYMPHOCYTES # BLD: 1.1 K/UL (ref 0.8–3.5)
LYMPHOCYTES NFR BLD: 15 % (ref 12–49)
MAGNESIUM SERPL-MCNC: 1.7 MG/DL (ref 1.6–2.4)
MCH RBC QN AUTO: 32.7 PG (ref 26–34)
MCHC RBC AUTO-ENTMCNC: 34.8 G/DL (ref 30–36.5)
MCV RBC AUTO: 94.1 FL (ref 80–99)
MONOCYTES # BLD: 1.2 K/UL (ref 0–1)
MONOCYTES NFR BLD: 17 % (ref 5–13)
NEUTS SEG # BLD: 4.8 K/UL (ref 1.8–8)
NEUTS SEG NFR BLD: 66 % (ref 32–75)
NRBC # BLD: 0 K/UL (ref 0–0.01)
NRBC BLD-RTO: 0 PER 100 WBC
PHOSPHATE SERPL-MCNC: 2.6 MG/DL (ref 2.6–4.7)
PLATELET # BLD AUTO: 150 K/UL (ref 150–400)
PMV BLD AUTO: 10.9 FL (ref 8.9–12.9)
POTASSIUM SERPL-SCNC: 3 MMOL/L (ref 3.5–5.1)
PROT SERPL-MCNC: 5.7 G/DL (ref 6.4–8.2)
RBC # BLD AUTO: 3.39 M/UL (ref 4.1–5.7)
RBC MORPH BLD: ABNORMAL
SODIUM SERPL-SCNC: 140 MMOL/L (ref 136–145)
WBC # BLD AUTO: 7.3 K/UL (ref 4.1–11.1)

## 2021-05-23 PROCEDURE — 80048 BASIC METABOLIC PNL TOTAL CA: CPT

## 2021-05-23 PROCEDURE — 36415 COLL VENOUS BLD VENIPUNCTURE: CPT

## 2021-05-23 PROCEDURE — 74011250637 HC RX REV CODE- 250/637: Performed by: HOSPITALIST

## 2021-05-23 PROCEDURE — 85025 COMPLETE CBC W/AUTO DIFF WBC: CPT

## 2021-05-23 PROCEDURE — 74011250636 HC RX REV CODE- 250/636: Performed by: STUDENT IN AN ORGANIZED HEALTH CARE EDUCATION/TRAINING PROGRAM

## 2021-05-23 PROCEDURE — 74011250636 HC RX REV CODE- 250/636: Performed by: HOSPITALIST

## 2021-05-23 PROCEDURE — 80076 HEPATIC FUNCTION PANEL: CPT

## 2021-05-23 PROCEDURE — 84100 ASSAY OF PHOSPHORUS: CPT

## 2021-05-23 PROCEDURE — 99231 SBSQ HOSP IP/OBS SF/LOW 25: CPT | Performed by: SURGERY

## 2021-05-23 PROCEDURE — 74011250637 HC RX REV CODE- 250/637: Performed by: STUDENT IN AN ORGANIZED HEALTH CARE EDUCATION/TRAINING PROGRAM

## 2021-05-23 PROCEDURE — 74011000258 HC RX REV CODE- 258: Performed by: STUDENT IN AN ORGANIZED HEALTH CARE EDUCATION/TRAINING PROGRAM

## 2021-05-23 PROCEDURE — 83735 ASSAY OF MAGNESIUM: CPT

## 2021-05-23 RX ORDER — METRONIDAZOLE 500 MG/1
500 TABLET ORAL 2 TIMES DAILY
Qty: 14 TABLET | Refills: 0 | Status: SHIPPED | OUTPATIENT
Start: 2021-05-23 | End: 2021-05-23 | Stop reason: SDUPTHER

## 2021-05-23 RX ORDER — OXYCODONE HYDROCHLORIDE 5 MG/1
5 TABLET ORAL
Qty: 12 TABLET | Refills: 0 | Status: SHIPPED | OUTPATIENT
Start: 2021-05-23 | End: 2021-05-23 | Stop reason: SDUPTHER

## 2021-05-23 RX ORDER — PANTOPRAZOLE SODIUM 40 MG/1
40 TABLET, DELAYED RELEASE ORAL
Qty: 30 TABLET | Refills: 0 | Status: ON HOLD | OUTPATIENT
Start: 2021-05-24 | End: 2021-09-01

## 2021-05-23 RX ORDER — CEFDINIR 300 MG/1
300 CAPSULE ORAL 2 TIMES DAILY
Qty: 15 CAPSULE | Refills: 0 | Status: SHIPPED | OUTPATIENT
Start: 2021-05-23 | End: 2021-08-31 | Stop reason: ALTCHOICE

## 2021-05-23 RX ORDER — IBUPROFEN 200 MG
1 TABLET ORAL DAILY
Qty: 30 PATCH | Refills: 0 | Status: SHIPPED | OUTPATIENT
Start: 2021-05-24 | End: 2021-06-23

## 2021-05-23 RX ORDER — METRONIDAZOLE 500 MG/1
500 TABLET ORAL 2 TIMES DAILY
Qty: 15 TABLET | Refills: 0 | Status: ON HOLD | OUTPATIENT
Start: 2021-05-23 | End: 2021-09-01

## 2021-05-23 RX ORDER — OXYCODONE HYDROCHLORIDE 5 MG/1
5 TABLET ORAL
Qty: 10 TABLET | Refills: 0 | Status: SHIPPED | OUTPATIENT
Start: 2021-05-23 | End: 2021-05-26

## 2021-05-23 RX ORDER — CEFDINIR 300 MG/1
300 CAPSULE ORAL 2 TIMES DAILY
Qty: 14 CAPSULE | Refills: 0 | Status: SHIPPED | OUTPATIENT
Start: 2021-05-23 | End: 2021-05-23 | Stop reason: SDUPTHER

## 2021-05-23 RX ADMIN — FOLIC ACID 1 MG: 1 TABLET ORAL at 09:27

## 2021-05-23 RX ADMIN — METRONIDAZOLE 500 MG: 500 INJECTION, SOLUTION INTRAVENOUS at 10:22

## 2021-05-23 RX ADMIN — PANTOPRAZOLE SODIUM 40 MG: 40 TABLET, DELAYED RELEASE ORAL at 06:35

## 2021-05-23 RX ADMIN — POTASSIUM BICARBONATE 40 MEQ: 782 TABLET, EFFERVESCENT ORAL at 09:28

## 2021-05-23 RX ADMIN — Medication 100 MG: at 09:27

## 2021-05-23 RX ADMIN — CEFTRIAXONE SODIUM 1 G: 1 INJECTION, POWDER, FOR SOLUTION INTRAMUSCULAR; INTRAVENOUS at 09:29

## 2021-05-23 RX ADMIN — Medication 10 ML: at 06:35

## 2021-05-23 RX ADMIN — OXYCODONE HYDROCHLORIDE 10 MG: 5 TABLET ORAL at 08:11

## 2021-05-23 RX ADMIN — ONDANSETRON 4 MG: 2 INJECTION INTRAMUSCULAR; INTRAVENOUS at 08:24

## 2021-05-23 RX ADMIN — HEPARIN SODIUM 5000 UNITS: 5000 INJECTION INTRAVENOUS; SUBCUTANEOUS at 08:14

## 2021-05-23 RX ADMIN — OXYCODONE HYDROCHLORIDE 5 MG: 5 TABLET ORAL at 04:17

## 2021-05-23 NOTE — PROGRESS NOTES
Pt cell phone [phone] (cell phone) Pt alternative number [phone] (house number) if needed to call for appointment information. [phone]) pt permission to speak with daughter Peter Sanchez [phone] about appointments since she will be driving him. [phone]) . Catrina Muller Reviewed discharge instructions with pt and pt daughter including planning for follow-up appointments, new medications and side effects, medications to continue, pancreatitis education, signs/symptoms of stroke and heart attack, and MyChart information. Pt expressed understanding. IV was removed. Belongings sent home with pt. Pt wheeled downstairs, ride with daughter Adonis Wills [phone]) Daughter at nurses station, stating the [address] was closed, ask if prescriptions could be sent to another pharmacy    [phone]) Daughter call from [facility] DR BRENNA VAZ, prescriptions not yet received. Discuss with Dr. Eric Rogers; prescriptions now sent.

## 2021-05-23 NOTE — PROGRESS NOTES
This writer sent message to on call MD, via [employer]:    Patient am labs revealed a potassium of 3.0.  Please advise

## 2021-05-23 NOTE — DISCHARGE SUMMARY
Hospitalist Discharge Summary     Patient ID:  Mikie Munguia  [id number]  34 y.o.  1965    PCP on record: Lizzy Rojas MD    Admit date: 5/20/2021  Discharge date and time: 5/23/2021      Admission Diagnoses: Pancreatitis [K85.90]    Discharge Diagnoses: Active Problems:    Pancreatitis (12/20/2020)           Hospital Course:   Acute on chronic alcoholic pancreatitis POA  Enteritis POA  Acute on chronic abdominal pain, lipase elevated at 1645, CT abdomen pelvis independently reviewed reveals chronic long segment enteritis of distal small bowel, acute on chronic pancreatitis, no focal fluid collections. Treated with IV fluids, pain management, abx. General surgery consulted - no acute surgical intervention needed    - Patient needs outpatient GI follow up       Orthostatic hypotension POA,resolved  Likely due to hypovolemia       Alcoholic hepatitis POA,resolved  Due to ongoing alcohol abuse.         Alcohol abuse and dependence POA  Patient was counseled extensively on the need to abstain from drugs its addictive tendencies, its deleterious effects on the brain, cardiovascular system, lungs as well as its financial & social sequelae        Normochromic normocytic anemia POA  Suggestive of iron deficiency anemia.   - Start iron supplementation at discharge  - Needs age appropriate cancer screening      Benign prostatic hyperplasia POA  Continue finasteride.      Low-density lesion in the pancreas POA  - outpatient follow up.      History of rectal cancer status post chemoradiation POA           CONSULTATIONS:  IP CONSULT TO GENERAL SURGERY    Excerpted HPI from H&P of Lyndsay Navarro MD  60 y.o.  male with PMH of rectal cancer, alcohol abuse, pancreatitis, enteritis who presents to ED with c/o abdominal pain, nausea, loss of appetite. Patient reports chronic lower abdominal pain and rectal pain since rectal surgeries rectal carcinoma about 4 years ago.   Patient reports that for the last 2 weeks he has had progressively worsening dull aching 8/10 abdominal pain dominantly in the periumbilical area similar to his pancreatitis pain. Reports diarrhea, nausea, loss of appetite and inability to tolerate oral intake for the past few days. Continues to drink alcohol daily about 1 pint. He has had previous episodes for pancreatitis, enteritis most recently in December 2020. Denies current illicit drug use. Denies fevers or chills or recent exposure to COVID-19.     We were asked to admit for work up and evaluation of the above problems.        ______________________________________________________________________  DISCHARGE SUMMARY/HOSPITAL COURSE:  for full details see H&P, daily progress notes, labs, consult notes. Visit Vitals  BP (!) 140/84   Pulse 79   Temp 98.5 °F (36.9 °C)   Resp 12   Wt 53.7 kg (118 lb 4.8 oz)   SpO2 100%   BMI 17.99 kg/m²       _______________________________________________________________________  Patient seen and examined by me on discharge day. Pertinent Findings:  Gen:    Not in distress  Chest: Clear lungs  CVS:   Regular rhythm. No edema  Abd:  Soft, not distended, not tender  Neuro:  Alert with good insight. Oriented to person, place, and time   _______________________________________________________________________  DISCHARGE MEDICATIONS:   Current Discharge Medication List      START taking these medications    Details   pantoprazole (PROTONIX) 40 mg tablet Take 1 Tablet by mouth Daily (before breakfast). Qty: 30 Tablet, Refills: 0  Start date: 5/24/2021      nicotine (NICODERM CQ) 14 mg/24 hr patch 1 Patch by TransDERmal route daily for 30 days. Qty: 30 Patch, Refills: 0  Start date: 5/24/2021, End date: 6/23/2021      oxyCODONE IR (Roxicodone) 5 mg immediate release tablet Take 1 Tablet by mouth every six (6) hours as needed for Pain for up to 3 days.  Max Daily Amount: 20 mg.  Qty: 12 Tablet, Refills: 0  Start date: 5/23/2021, End date: 5/26/2021    Associated Diagnoses: Alcohol-induced acute pancreatitis without infection or necrosis      metroNIDAZOLE (FLAGYL) 500 mg tablet Take 1 Tablet by mouth two (2) times a day. Qty: 14 Tablet, Refills: 0  Start date: 5/23/2021      cefdinir (OMNICEF) 300 mg capsule Take 1 Capsule by mouth two (2) times a day. Qty: 14 Capsule, Refills: 0  Start date: 5/23/2021         CONTINUE these medications which have NOT CHANGED    Details   metoprolol tartrate (LOPRESSOR) 25 mg tablet Take 25 mg by mouth daily. cholecalciferol, vitamin D3, (Vitamin D3) 50 mcg (2,000 unit) tab Take 2,000 Units by mouth daily. folic acid (FOLVITE) 1 mg tablet Take 1 Tab by mouth daily. Qty: 30 Tab, Refills: 1      aspirin delayed-release 81 mg tablet Take 81 mg by mouth daily. cyanocobalamin (Vitamin B-12) 1,000 mcg tablet Take 1,000 mcg by mouth daily. thiamine mononitrate (B-1) 100 mg tablet Take 1 Tab by mouth daily. Qty: 30 Tab, Refills: 0             My Recommended Diet, Activity, Wound Care, and follow-up labs are listed in the patient's Discharge Insturctions which I have personally completed and reviewed. _______________________________________________________________________  DISPOSITION:     Home with Family: x   Home with HH/PT/OT/RN:    SNF/LTC:    NAUN:    OTHER:        Condition at Discharge:  Stable  _______________________________________________________________________  Follow up with:   PCP : Lena Castro MD  Follow-up Information     Follow up With Specialties Details Why Contact Info    Lena Castro MD Internal Medicine Schedule an appointment as soon as possible for a visit Dr. Isis Henriquez would like for you to follow-up with your PCP within 1 week from discharge. A Care Manager will call your PCP to assist with setting up this appointment tomorrow and will call you back with the date/time.  [address] [facility]      Eva Jameson MD General Surgery, Breast Surgery, Oncology  A Care Manager will call Dr. Jeanne Paz office tomorrow to set up a follow-up appointment for you. She will call you with the date/time. [address] [facility]  [phone]      GI [address] will contact a GI Specialist office tomorrow to set up a follow-up appointment. She will call you with the date/time. [employer] Call their [address] [facility]  for subtance abuse and mental health services. [address] [facility]  [address] [facility]  Call Trained individuals will offer support and recovery resources in [place].                Total time in minutes spent coordinating this discharge (includes going over instructions, follow-up, prescriptions, and preparing report for sign off to her PCP) :  35 minutes    Signed:  Candis Fonseca MD

## 2021-05-23 NOTE — PROGRESS NOTES
0710) Bedside shift change report given to Sary Cruz, RN (oncoming nurse) by Salome Gomes RN (offgoing nurse). Report included the following information SBAR, Kardex and MAR.   0825) pt given zofran for nausea before oral potassium  0900) pt able to tolerate oatmeal for breakfast  1125) Discuss with Dr. Delphine Hay, ambulate pt and then discharge  65) Pt walk independently to end of hallway  [phone]) discuss pt daughter request to speak with doctor.  Pt would like outpatient resources for rehab

## 2021-05-23 NOTE — PROGRESS NOTES
Mr. Dominic Najera is moving bowels, tolerating diet. No emesis. Tm 98.7 Tc 98.5 HR: 79 BP: 140/84 Resp Rate: 12 100% sat on room air. Intake/Output Summary (Last 24 hours) at 5/23/2021 1120  Last data filed at 5/23/2021 0635  Gross per 24 hour   Intake 180 ml   Output    Net 180 ml   Exam: Cor: RRR. Lungs: Bilateral breath sounds. Clear to auscultation. Abd: Soft. Less tender. No guarding or rebound. No distension. Labs:   Recent Results (from the past 12 hour(s))   CBC WITH AUTOMATED DIFF    Collection Time: 05/23/21  4:37 AM   Result Value Ref Range    WBC 7.3 4.1 - 11.1 K/uL    RBC 3.39 (L) 4.10 - 5.70 M/uL    HGB 11.1 (L) 12.1 - 17.0 g/dL    HCT 31.9 (L) 36.6 - 50.3 %    MCV 94.1 80.0 - 99.0 FL    MCH 32.7 26.0 - 34.0 PG    MCHC 34.8 30.0 - 36.5 g/dL    RDW 14.7 (H) 11.5 - 14.5 %    PLATELET 916 755 - 574 K/uL    MPV 10.9 8.9 - 12.9 FL    NRBC 0.0 0  WBC    ABSOLUTE NRBC 0.00 0.00 - 0.01 K/uL    NEUTROPHILS 66 32 - 75 %    LYMPHOCYTES 15 12 - 49 %    MONOCYTES 17 (H) 5 - 13 %    EOSINOPHILS 1 0 - 7 %    BASOPHILS 0 0 - 1 %    IMMATURE GRANULOCYTES 1 (H) 0.0 - 0.5 %    ABS. NEUTROPHILS 4.8 1.8 - 8.0 K/UL    ABS. LYMPHOCYTES 1.1 0.8 - 3.5 K/UL    ABS. MONOCYTES 1.2 (H) 0.0 - 1.0 K/UL    ABS. EOSINOPHILS 0.1 0.0 - 0.4 K/UL    ABS. BASOPHILS 0.0 0.0 - 0.1 K/UL    ABS. IMM.  GRANS. 0.1 (H) 0.00 - 0.04 K/UL    DF SMEAR SCANNED      RBC COMMENTS ANISOCYTOSIS  1+       METABOLIC PANEL, BASIC    Collection Time: 05/23/21  4:37 AM   Result Value Ref Range    Sodium 140 136 - 145 mmol/L    Potassium 3.0 (L) 3.5 - 5.1 mmol/L    Chloride 104 97 - 108 mmol/L    CO2 30 21 - 32 mmol/L    Anion gap 6 5 - 15 mmol/L    Glucose 88 65 - 100 mg/dL    BUN 2 (L) 6 - 20 MG/DL    Creatinine 0.67 (L) 0.70 - 1.30 MG/DL    BUN/Creatinine ratio 3 (L) 12 - 20      GFR est AA >60 >60 ml/min/1.73m2    GFR est non-AA >60 >60 ml/min/1.73m2    Calcium 8.7 8.5 - 10.1 MG/DL   MAGNESIUM Collection Time: 05/23/21  4:37 AM   Result Value Ref Range    Magnesium 1.7 1.6 - 2.4 mg/dL   PHOSPHORUS    Collection Time: 05/23/21  4:37 AM   Result Value Ref Range    Phosphorus 2.6 2.6 - 4.7 MG/DL   HEPATIC FUNCTION PANEL    Collection Time: 05/23/21  4:37 AM   Result Value Ref Range    Protein, total 5.7 (L) 6.4 - 8.2 g/dL    Albumin 2.4 (L) 3.5 - 5.0 g/dL    Globulin 3.3 2.0 - 4.0 g/dL    A-G Ratio 0.7 (L) 1.1 - 2.2      Bilirubin, total 0.4 0.2 - 1.0 MG/DL    Bilirubin, direct 0.1 0.0 - 0.2 MG/DL    Alk. phosphatase 136 (H) 45 - 117 U/L    AST (SGOT) 44 (H) 15 - 37 U/L    ALT (SGPT) 51 12 - 78 U/L   Mr. Octavia Cedillo is doing well, partial SBO seems to have resolved. Also, pancreatitis resolved. Diet as tolerated. Saline lock IVF. Pain medication and anti-emetics as needed. OOB, Ambulate. Can discharge to home when ready. Plans per Dr. Mayank Guidry.

## 2021-05-23 NOTE — PROGRESS NOTES
Bedside shift change report given to [facility] DARA (oncoming nurse) by Antonia Banks (offgoing nurse). Report included the following information SBAR, Kardex, Intake/Output, MAR and Recent Results.

## 2021-05-23 NOTE — PROGRESS NOTES
**Physician Signature**  This document was electronically signed by: Joy Jimenez MD  05/23/2021 06:51 AM    **Consult Information**  Member Facility: [address] [place] [address] Rd[address] MRN: [id number]  Consult ID: [id number]  Facility Time Zone: ET  Date and Time of Request: 05/23/2021 06:42:54 AM  Requesting Clinician: Yessy Steven  Patient Name: Gala Rodriges  Date of Birth: [id number]  Gender: Male    **Clinical Note**  Clinical Note: Order placed for PO Potassium for K of 3. **Attestation**  Interaction Mode: Phone Only  Phone Duration (mins): 2  Time of Call : 05/23/2021 06:51 AM  Interaction Attestation: Interprofessional internet consultation was delivered through telephonic and/or electronic communication upon the request of the patients treating provider, while the requesting and the rendering provider were not in the same physical location. A written summary report was provided to the requesting provider at the originating site.   Evaluation Duration (mins): 3

## 2021-05-23 NOTE — PROGRESS NOTES
While this writer in to obtain blood for am labs, patient asked about administering rectal hydrocortisone cream (Anusol) that was at the bedside. Educated patient that instructions for cream read to be administered \"three times daily\" and it was not scheduled at this time. Patient stated \"so when do I start using it because I never used any of it before. \" This writer opened [facility] for exact times for patient and medication was not listed on patient's MAR. Through further investigation, medication was found to be discontinued on 5/22/2021 but scanned as given three times prior to discontinuing. Medication at bedside with silver tamper seal intact. Nursing Supervisor, Katya Rubin RN notified.

## 2021-06-14 ENCOUNTER — TELEPHONE (OUTPATIENT)
Dept: SURGERY | Age: 56
End: 2021-06-14

## 2021-06-14 ENCOUNTER — OFFICE VISIT (OUTPATIENT)
Dept: SURGERY | Age: 56
End: 2021-06-14
Payer: MEDICARE

## 2021-06-14 VITALS
OXYGEN SATURATION: 96 % | RESPIRATION RATE: 18 BRPM | HEART RATE: 87 BPM | BODY MASS INDEX: 18.34 KG/M2 | TEMPERATURE: 98 F | DIASTOLIC BLOOD PRESSURE: 76 MMHG | SYSTOLIC BLOOD PRESSURE: 158 MMHG | WEIGHT: 121 LBS | HEIGHT: 68 IN

## 2021-06-14 DIAGNOSIS — C20 RECTAL CANCER (HCC): Primary | ICD-10-CM

## 2021-06-14 PROCEDURE — G8432 DEP SCR NOT DOC, RNG: HCPCS | Performed by: SURGERY

## 2021-06-14 PROCEDURE — G8419 CALC BMI OUT NRM PARAM NOF/U: HCPCS | Performed by: SURGERY

## 2021-06-14 PROCEDURE — G9711 PT HX TOT COL OR COLON CA: HCPCS | Performed by: SURGERY

## 2021-06-14 PROCEDURE — 1111F DSCHRG MED/CURRENT MED MERGE: CPT | Performed by: SURGERY

## 2021-06-14 PROCEDURE — G8427 DOCREV CUR MEDS BY ELIG CLIN: HCPCS | Performed by: SURGERY

## 2021-06-14 PROCEDURE — 99212 OFFICE O/P EST SF 10 MIN: CPT | Performed by: SURGERY

## 2021-06-14 RX ORDER — OXYCODONE HYDROCHLORIDE 5 MG/1
5 TABLET ORAL
Qty: 6 TABLET | Refills: 0 | Status: SHIPPED | OUTPATIENT
Start: 2021-06-14 | End: 2021-06-17

## 2021-06-14 NOTE — TELEPHONE ENCOUNTER
Patient identified with two patient identifiers. Patient informed script sent to [facility] on file. Patient expressed understanding will  and return call if any other questions or concerns.

## 2021-06-14 NOTE — TELEPHONE ENCOUNTER
Pt. Called and stated he called the pharmacy to  his medication and it had not been called in yet. I verbally verified pharmacy info. V[facility]ella Goodpasture Vonzella Goodpasture V[facility]ella Goodpasture [address] [facility] [phone]

## 2021-06-14 NOTE — PROGRESS NOTES
1. Have you been to the ER, urgent care clinic since your last visit? Hospitalized since your last visit? No    2. Have you seen or consulted any other health care providers outside of the [address] [place] [address] since your last visit? Include any pap smears or colon screening.  No

## 2021-06-14 NOTE — PROGRESS NOTES
Yahir Calhoun is a 60 y.o. male who returns following a recent hospitalization for pancreatitis and enteritis. Mr. Vida Sifuentes was admitted on May 20, 2021 with acute on chronic alcoholic pancreatitis and enteritis. He was managed non-operatively and was discharged on May 23, 2021. Doing well since then. Still c/o abdominal pain. No fevers or chills. No nausea or vomitting. Moving bowels. He has otherwise been in his usual state of health. In terms of his h/o rectal cancer, Mr. Vida Sifuentes tells me that his last colonoscopy, approximately six months ago, was unrevealing. (Records not available.)    Past Medical History:   Diagnosis Date    Cancer [facility])     rectal    Gastrointestinal disorder     Gastric Ulcers; Rectal CA     History reviewed. No pertinent surgical history. Family History   Problem Relation Age of Onset    Cancer Brother      Social History     Socioeconomic History    Marital status: LEGALLY      Spouse name: Not on file    Number of children: Not on file    Years of education: Not on file    Highest education level: Not on file   Tobacco Use    Smoking status: Current Every Day Smoker     Packs/day: 0.25    Smokeless tobacco: Never Used   Substance and Sexual Activity    Alcohol use: Yes     Comment: 2-3 beers daily    Drug use: No    Sexual activity: Yes     Partners: Female     Social Determinants of Health     Financial Resource Strain:     Difficulty of Paying Living Expenses:    Food Insecurity:     Worried About Running Out of Food in the Last Year:     [address] in the Last Year:    Transportation Needs:     Lack of Transportation (Medical):      Lack of Transportation (Non-Medical):    Physical Activity:     Days of Exercise per Week:     Minutes of Exercise per Session:    Stress:     Feeling of Stress :    Social Connections:     Frequency of Communication with Friends and Family:     Frequency of Social Gatherings with Friends and Family:     Attends Hoahaoism Services:     Active Member of Clubs or Organizations:     Attends Club or Organization Meetings:     Marital Status:      Review of systems negative except as noted. Review of Systems   Constitutional: Negative for chills and fever. Gastrointestinal: Positive for abdominal pain. Negative for blood in stool, nausea and vomiting. Physical Exam  Vitals reviewed. Constitutional:       General: He is not in acute distress. Appearance: Normal appearance. He is normal weight. HENT:      Head: Normocephalic and atraumatic. Eyes:      General: No scleral icterus. Cardiovascular:      Rate and Rhythm: Normal rate and regular rhythm. Pulmonary:      Effort: Pulmonary effort is normal.      Breath sounds: Normal breath sounds. Abdominal:      General: There is no distension. Palpations: Abdomen is soft. Tenderness: There is abdominal tenderness. There is no guarding or rebound. Comments: Well healed surgical scars. Musculoskeletal:         General: Normal range of motion. Neurological:      General: No focal deficit present. Mental Status: He is alert. ASSESSMENT and PLAN  Mr. Octavia Cedillo is doing well following recent hospitalization. Will check a CT scan of the abdomen/pelvis with po/IV contrast to reassess the segment of enteritis noted on the previous CT and will see him following that to review findings with him. Diet and activity as tolerated. Follow up with 'mckenzie Martinez as scheduled. Discussed plan with Mr. Octavia Cedillo and he is agreeable.       CC: MD Lianet Marquis MD

## 2021-08-31 ENCOUNTER — APPOINTMENT (OUTPATIENT)
Dept: CT IMAGING | Age: 56
DRG: 871 | End: 2021-08-31
Attending: NURSE PRACTITIONER
Payer: MEDICARE

## 2021-08-31 ENCOUNTER — HOSPITAL ENCOUNTER (INPATIENT)
Age: 56
LOS: 2 days | Discharge: ANOTHER HOSPITAL | DRG: 871 | End: 2021-09-02
Attending: STUDENT IN AN ORGANIZED HEALTH CARE EDUCATION/TRAINING PROGRAM | Admitting: STUDENT IN AN ORGANIZED HEALTH CARE EDUCATION/TRAINING PROGRAM
Payer: MEDICARE

## 2021-08-31 ENCOUNTER — APPOINTMENT (OUTPATIENT)
Dept: ULTRASOUND IMAGING | Age: 56
DRG: 871 | End: 2021-08-31
Attending: NURSE PRACTITIONER
Payer: MEDICARE

## 2021-08-31 DIAGNOSIS — K52.9 ENTERITIS: Primary | ICD-10-CM

## 2021-08-31 DIAGNOSIS — N30.00 ACUTE CYSTITIS WITHOUT HEMATURIA: ICD-10-CM

## 2021-08-31 DIAGNOSIS — N43.3 HYDROCELE IN ADULT: ICD-10-CM

## 2021-08-31 DIAGNOSIS — N13.30 HYDRONEPHROSIS, BILATERAL: ICD-10-CM

## 2021-08-31 LAB
ALBUMIN SERPL-MCNC: 2.1 G/DL (ref 3.5–5)
ALBUMIN/GLOB SERPL: 0.5 {RATIO} (ref 1.1–2.2)
ALP SERPL-CCNC: 179 U/L (ref 45–117)
ALT SERPL-CCNC: 65 U/L (ref 12–78)
ANION GAP SERPL CALC-SCNC: 6 MMOL/L (ref 5–15)
APPEARANCE UR: ABNORMAL
APPEARANCE UR: ABNORMAL
AST SERPL-CCNC: 81 U/L (ref 15–37)
BACTERIA URNS QL MICRO: ABNORMAL /HPF
BACTERIA URNS QL MICRO: NEGATIVE /HPF
BASOPHILS # BLD: 0 K/UL (ref 0–0.1)
BASOPHILS NFR BLD: 0 % (ref 0–1)
BILIRUB SERPL-MCNC: 0.7 MG/DL (ref 0.2–1)
BILIRUB UR QL CFM: POSITIVE
BILIRUB UR QL: NEGATIVE
BUN SERPL-MCNC: 9 MG/DL (ref 6–20)
BUN/CREAT SERPL: 8 (ref 12–20)
CALCIUM SERPL-MCNC: 8.6 MG/DL (ref 8.5–10.1)
CHLORIDE SERPL-SCNC: 96 MMOL/L (ref 97–108)
CO2 SERPL-SCNC: 33 MMOL/L (ref 21–32)
COLOR UR: ABNORMAL
COLOR UR: ABNORMAL
CREAT SERPL-MCNC: 1.13 MG/DL (ref 0.7–1.3)
DIFFERENTIAL METHOD BLD: ABNORMAL
EOSINOPHIL # BLD: 0 K/UL (ref 0–0.4)
EOSINOPHIL NFR BLD: 0 % (ref 0–7)
EPITH CASTS URNS QL MICRO: ABNORMAL /LPF
EPITH CASTS URNS QL MICRO: ABNORMAL /LPF
ERYTHROCYTE [DISTWIDTH] IN BLOOD BY AUTOMATED COUNT: 14 % (ref 11.5–14.5)
GLOBULIN SER CALC-MCNC: 4.5 G/DL (ref 2–4)
GLUCOSE SERPL-MCNC: 123 MG/DL (ref 65–100)
GLUCOSE UR STRIP.AUTO-MCNC: NEGATIVE MG/DL
GLUCOSE UR STRIP.AUTO-MCNC: NEGATIVE MG/DL
HCT VFR BLD AUTO: 35.9 % (ref 36.6–50.3)
HCT VFR BLD AUTO: 38.2 % (ref 36.6–50.3)
HGB BLD-MCNC: 12.7 G/DL (ref 12.1–17)
HGB BLD-MCNC: 13.4 G/DL (ref 12.1–17)
HGB UR QL STRIP: ABNORMAL
HGB UR QL STRIP: ABNORMAL
IMM GRANULOCYTES # BLD AUTO: 0.2 K/UL (ref 0–0.04)
IMM GRANULOCYTES NFR BLD AUTO: 1 % (ref 0–0.5)
KETONES UR QL STRIP.AUTO: NEGATIVE MG/DL
KETONES UR QL STRIP.AUTO: NEGATIVE MG/DL
LACTATE SERPL-SCNC: 1.1 MMOL/L (ref 0.4–2)
LACTATE SERPL-SCNC: 1.8 MMOL/L (ref 0.4–2)
LEUKOCYTE ESTERASE UR QL STRIP.AUTO: ABNORMAL
LEUKOCYTE ESTERASE UR QL STRIP.AUTO: ABNORMAL
LIPASE SERPL-CCNC: 74 U/L (ref 73–393)
LYMPHOCYTES # BLD: 1.1 K/UL (ref 0.8–3.5)
LYMPHOCYTES NFR BLD: 7 % (ref 12–49)
MCH RBC QN AUTO: 33.4 PG (ref 26–34)
MCHC RBC AUTO-ENTMCNC: 35.1 G/DL (ref 30–36.5)
MCV RBC AUTO: 95.3 FL (ref 80–99)
MONOCYTES # BLD: 1.4 K/UL (ref 0–1)
MONOCYTES NFR BLD: 9 % (ref 5–13)
NEUTS SEG # BLD: 13 K/UL (ref 1.8–8)
NEUTS SEG NFR BLD: 83 % (ref 32–75)
NITRITE UR QL STRIP.AUTO: NEGATIVE
NITRITE UR QL STRIP.AUTO: NEGATIVE
NRBC # BLD: 0 K/UL (ref 0–0.01)
NRBC BLD-RTO: 0 PER 100 WBC
PH UR STRIP: 6.5 [PH] (ref 5–8)
PH UR STRIP: 7.5 [PH] (ref 5–8)
PLATELET # BLD AUTO: 300 K/UL (ref 150–400)
PMV BLD AUTO: 10.5 FL (ref 8.9–12.9)
POTASSIUM SERPL-SCNC: 3.4 MMOL/L (ref 3.5–5.1)
PROT SERPL-MCNC: 6.6 G/DL (ref 6.4–8.2)
PROT UR STRIP-MCNC: 30 MG/DL
PROT UR STRIP-MCNC: >300 MG/DL
RBC # BLD AUTO: 4.01 M/UL (ref 4.1–5.7)
RBC #/AREA URNS HPF: >100 /HPF (ref 0–5)
RBC #/AREA URNS HPF: ABNORMAL /HPF (ref 0–5)
RBC MORPH BLD: ABNORMAL
SODIUM SERPL-SCNC: 135 MMOL/L (ref 136–145)
SP GR UR REFRACTOMETRY: 1.01 (ref 1–1.03)
SP GR UR REFRACTOMETRY: 1.01 (ref 1–1.03)
UA: UC IF INDICATED,UAUC: ABNORMAL
UA: UC IF INDICATED,UAUC: ABNORMAL
UROBILINOGEN UR QL STRIP.AUTO: 1 EU/DL (ref 0.2–1)
UROBILINOGEN UR QL STRIP.AUTO: 1 EU/DL (ref 0.2–1)
WBC # BLD AUTO: 15.7 K/UL (ref 4.1–11.1)
WBC MORPH BLD: ABNORMAL
WBC URNS QL MICRO: ABNORMAL /HPF (ref 0–4)
WBC URNS QL MICRO: ABNORMAL /HPF (ref 0–4)

## 2021-08-31 PROCEDURE — 87077 CULTURE AEROBIC IDENTIFY: CPT

## 2021-08-31 PROCEDURE — 96365 THER/PROPH/DIAG IV INF INIT: CPT

## 2021-08-31 PROCEDURE — 96375 TX/PRO/DX INJ NEW DRUG ADDON: CPT

## 2021-08-31 PROCEDURE — 83690 ASSAY OF LIPASE: CPT

## 2021-08-31 PROCEDURE — 96368 THER/DIAG CONCURRENT INF: CPT

## 2021-08-31 PROCEDURE — 87591 N.GONORRHOEAE DNA AMP PROB: CPT

## 2021-08-31 PROCEDURE — 85018 HEMOGLOBIN: CPT

## 2021-08-31 PROCEDURE — 74177 CT ABD & PELVIS W/CONTRAST: CPT

## 2021-08-31 PROCEDURE — 96374 THER/PROPH/DIAG INJ IV PUSH: CPT

## 2021-08-31 PROCEDURE — 87086 URINE CULTURE/COLONY COUNT: CPT

## 2021-08-31 PROCEDURE — 76870 US EXAM SCROTUM: CPT

## 2021-08-31 PROCEDURE — 83605 ASSAY OF LACTIC ACID: CPT

## 2021-08-31 PROCEDURE — 65270000032 HC RM SEMIPRIVATE

## 2021-08-31 PROCEDURE — 74011000636 HC RX REV CODE- 636: Performed by: STUDENT IN AN ORGANIZED HEALTH CARE EDUCATION/TRAINING PROGRAM

## 2021-08-31 PROCEDURE — 74011250636 HC RX REV CODE- 250/636: Performed by: STUDENT IN AN ORGANIZED HEALTH CARE EDUCATION/TRAINING PROGRAM

## 2021-08-31 PROCEDURE — 74011000258 HC RX REV CODE- 258: Performed by: STUDENT IN AN ORGANIZED HEALTH CARE EDUCATION/TRAINING PROGRAM

## 2021-08-31 PROCEDURE — 96366 THER/PROPH/DIAG IV INF ADDON: CPT

## 2021-08-31 PROCEDURE — 87040 BLOOD CULTURE FOR BACTERIA: CPT

## 2021-08-31 PROCEDURE — 85025 COMPLETE CBC W/AUTO DIFF WBC: CPT

## 2021-08-31 PROCEDURE — 74011250637 HC RX REV CODE- 250/637: Performed by: STUDENT IN AN ORGANIZED HEALTH CARE EDUCATION/TRAINING PROGRAM

## 2021-08-31 PROCEDURE — 36415 COLL VENOUS BLD VENIPUNCTURE: CPT

## 2021-08-31 PROCEDURE — 81001 URINALYSIS AUTO W/SCOPE: CPT

## 2021-08-31 PROCEDURE — 74011000250 HC RX REV CODE- 250: Performed by: NURSE PRACTITIONER

## 2021-08-31 PROCEDURE — 74011250636 HC RX REV CODE- 250/636: Performed by: NURSE PRACTITIONER

## 2021-08-31 PROCEDURE — 96361 HYDRATE IV INFUSION ADD-ON: CPT

## 2021-08-31 PROCEDURE — 80053 COMPREHEN METABOLIC PANEL: CPT

## 2021-08-31 PROCEDURE — 93005 ELECTROCARDIOGRAM TRACING: CPT

## 2021-08-31 PROCEDURE — 99285 EMERGENCY DEPT VISIT HI MDM: CPT

## 2021-08-31 PROCEDURE — 51702 INSERT TEMP BLADDER CATH: CPT

## 2021-08-31 RX ORDER — FOLIC ACID 1 MG/1
1 TABLET ORAL DAILY
Status: DISCONTINUED | OUTPATIENT
Start: 2021-09-01 | End: 2021-09-02 | Stop reason: HOSPADM

## 2021-08-31 RX ORDER — LORAZEPAM 2 MG/ML
2 INJECTION INTRAMUSCULAR
Status: DISCONTINUED | OUTPATIENT
Start: 2021-08-31 | End: 2021-09-02 | Stop reason: HOSPADM

## 2021-08-31 RX ORDER — ONDANSETRON 2 MG/ML
4 INJECTION INTRAMUSCULAR; INTRAVENOUS
Status: DISCONTINUED | OUTPATIENT
Start: 2021-08-31 | End: 2021-09-01

## 2021-08-31 RX ORDER — SODIUM CHLORIDE 0.9 % (FLUSH) 0.9 %
5-10 SYRINGE (ML) INJECTION
Status: COMPLETED | OUTPATIENT
Start: 2021-08-31 | End: 2021-08-31

## 2021-08-31 RX ORDER — POLYETHYLENE GLYCOL 3350 17 G/17G
17 POWDER, FOR SOLUTION ORAL DAILY PRN
Status: DISCONTINUED | OUTPATIENT
Start: 2021-08-31 | End: 2021-09-02 | Stop reason: HOSPADM

## 2021-08-31 RX ORDER — HEPARIN SODIUM 5000 [USP'U]/ML
5000 INJECTION, SOLUTION INTRAVENOUS; SUBCUTANEOUS EVERY 12 HOURS
Status: DISCONTINUED | OUTPATIENT
Start: 2021-09-01 | End: 2021-08-31

## 2021-08-31 RX ORDER — LANOLIN ALCOHOL/MO/W.PET/CERES
100 CREAM (GRAM) TOPICAL DAILY
Status: DISCONTINUED | OUTPATIENT
Start: 2021-09-01 | End: 2021-09-02 | Stop reason: HOSPADM

## 2021-08-31 RX ORDER — LORAZEPAM 2 MG/ML
1 INJECTION INTRAMUSCULAR
Status: DISCONTINUED | OUTPATIENT
Start: 2021-08-31 | End: 2021-09-02 | Stop reason: HOSPADM

## 2021-08-31 RX ORDER — ACETAMINOPHEN 650 MG/1
650 SUPPOSITORY RECTAL
Status: DISCONTINUED | OUTPATIENT
Start: 2021-08-31 | End: 2021-09-01

## 2021-08-31 RX ORDER — ENOXAPARIN SODIUM 100 MG/ML
40 INJECTION SUBCUTANEOUS DAILY
Status: DISCONTINUED | OUTPATIENT
Start: 2021-09-01 | End: 2021-08-31

## 2021-08-31 RX ORDER — FENTANYL CITRATE 50 UG/ML
50 INJECTION, SOLUTION INTRAMUSCULAR; INTRAVENOUS
Status: COMPLETED | OUTPATIENT
Start: 2021-08-31 | End: 2021-08-31

## 2021-08-31 RX ORDER — ACETAMINOPHEN 325 MG/1
650 TABLET ORAL
Status: DISCONTINUED | OUTPATIENT
Start: 2021-08-31 | End: 2021-09-01

## 2021-08-31 RX ORDER — SODIUM CHLORIDE 0.9 % (FLUSH) 0.9 %
5-40 SYRINGE (ML) INJECTION EVERY 8 HOURS
Status: DISCONTINUED | OUTPATIENT
Start: 2021-08-31 | End: 2021-09-02 | Stop reason: HOSPADM

## 2021-08-31 RX ORDER — MORPHINE SULFATE 2 MG/ML
2 INJECTION, SOLUTION INTRAMUSCULAR; INTRAVENOUS
Status: COMPLETED | OUTPATIENT
Start: 2021-08-31 | End: 2021-08-31

## 2021-08-31 RX ORDER — TRAMADOL HYDROCHLORIDE 50 MG/1
50 TABLET ORAL
Status: DISCONTINUED | OUTPATIENT
Start: 2021-08-31 | End: 2021-09-01

## 2021-08-31 RX ORDER — SODIUM CHLORIDE, SODIUM LACTATE, POTASSIUM CHLORIDE, CALCIUM CHLORIDE 600; 310; 30; 20 MG/100ML; MG/100ML; MG/100ML; MG/100ML
75 INJECTION, SOLUTION INTRAVENOUS CONTINUOUS
Status: DISCONTINUED | OUTPATIENT
Start: 2021-08-31 | End: 2021-09-02

## 2021-08-31 RX ORDER — SODIUM CHLORIDE 0.9 % (FLUSH) 0.9 %
5-40 SYRINGE (ML) INJECTION AS NEEDED
Status: DISCONTINUED | OUTPATIENT
Start: 2021-08-31 | End: 2021-09-02 | Stop reason: HOSPADM

## 2021-08-31 RX ORDER — ONDANSETRON 4 MG/1
4 TABLET, ORALLY DISINTEGRATING ORAL
Status: DISCONTINUED | OUTPATIENT
Start: 2021-08-31 | End: 2021-09-02 | Stop reason: HOSPADM

## 2021-08-31 RX ADMIN — TRAMADOL HYDROCHLORIDE 50 MG: 50 TABLET, FILM COATED ORAL at 21:52

## 2021-08-31 RX ADMIN — IOPAMIDOL 100 ML: 755 INJECTION, SOLUTION INTRAVENOUS at 11:10

## 2021-08-31 RX ADMIN — VANCOMYCIN HYDROCHLORIDE 1250 MG: 1 INJECTION, POWDER, LYOPHILIZED, FOR SOLUTION INTRAVENOUS at 21:42

## 2021-08-31 RX ADMIN — SODIUM CHLORIDE, POTASSIUM CHLORIDE, SODIUM LACTATE AND CALCIUM CHLORIDE 150 ML/HR: 600; 310; 30; 20 INJECTION, SOLUTION INTRAVENOUS at 21:42

## 2021-08-31 RX ADMIN — CEFEPIME HYDROCHLORIDE 2 G: 2 INJECTION, POWDER, FOR SOLUTION INTRAVENOUS at 20:50

## 2021-08-31 RX ADMIN — ACETAMINOPHEN 650 MG: 325 TABLET ORAL at 20:05

## 2021-08-31 RX ADMIN — CEFTRIAXONE SODIUM 1 G: 1 INJECTION, POWDER, FOR SOLUTION INTRAMUSCULAR; INTRAVENOUS at 11:44

## 2021-08-31 RX ADMIN — Medication 10 ML: at 21:43

## 2021-08-31 RX ADMIN — MORPHINE SULFATE 2 MG: 2 INJECTION, SOLUTION INTRAMUSCULAR; INTRAVENOUS at 17:14

## 2021-08-31 RX ADMIN — SODIUM CHLORIDE 1000 ML: 9 INJECTION, SOLUTION INTRAVENOUS at 14:26

## 2021-08-31 RX ADMIN — FENTANYL CITRATE 50 MCG: 50 INJECTION, SOLUTION INTRAMUSCULAR; INTRAVENOUS at 13:24

## 2021-08-31 RX ADMIN — Medication 10 ML: at 11:10

## 2021-08-31 NOTE — ED NOTES
Pt presents to ED ambulatory complaining of urinary hesitancy x 1 week. Pt reports his is also having urine frequency, dysuria, and hematuria for a week. Pt reports he is unable to sleep because of his urine frequency. Pt reports he has noticed swelling to his testicles. Pt denies injury to area. Pt reports lower abdominal cramping. Pt denies N/V. Pt reports he has had a decreased appetite for 1 week. Pt is alert and oriented x 4, RR even and unlabored, skin is warm and dry. Assessment completed and pt updated on plan of care. Call bell in reach. Emergency Department Nursing Plan of Care       The Nursing Plan of Care is developed from the Nursing assessment and Emergency Department Attending provider initial evaluation. The plan of care may be reviewed in the ED Provider note.     The Plan of Care was developed with the following considerations:   Patient / Family readiness to learn indicated by:verbalized understanding  Persons(s) to be included in education: patient  Barriers to Learning/Limitations:No    Signed     Breann Constantino RN    8/31/2021   9:58 AM

## 2021-08-31 NOTE — CONSULTS
[facility] Consult - [facility]  Requesting physician:   Date of consult: 08/31/21    CC: b/l severe hydronephrosis and hydroureter    This is an \"e-consult\" with data obtained by chart review and by discussion with the requesting physician. I did not directly examine or interview the patient. HPI:  56yr with pmhx of colorectal cancer s/p chemotherapy/xrt and resection, htn, GERD, seizure during etoh withdrawal, CAD, chronic pancreatitis, BPH, b/l hydro noted on abd ct 8/20, chronic malnutrition who presents to ED with complaints of dysuria and difficulty urinating associated with poor po intake x 1 week. Abd ct with contrast with radiology findings of acute on chronic pancreatitis, b/l hydronephrosis and hydroureter and cyststis 2/2 enteritis, distal sbo 2/2 enteritis and scrotal simple hydrocele. Pt is afebrile with sbp in 140's, wbc of  15.7 with 83% neutrophilia. PMH:  Past Medical History:   Diagnosis Date    Cancer [facility])     rectal    Gastrointestinal disorder     Gastric Ulcers; Rectal CA    Hypertension          PSH:  History reviewed. No pertinent surgical history.     FH:  Family History   Problem Relation Age of Onset   Del Kiki Cancer Brother        SH:  Social History     Socioeconomic History    Marital status: LEGALLY      Spouse name: Not on file    Number of children: Not on file    Years of education: Not on file    Highest education level: Not on file   Occupational History    Not on file   Tobacco Use    Smoking status: Current Every Day Smoker     Packs/day: 0.25    Smokeless tobacco: Never Used   Substance and Sexual Activity    Alcohol use: Yes     Comment: 2-3 beers daily    Drug use: No    Sexual activity: Yes     Partners: Female   Other Topics Concern    Not on file   Social History Narrative    Not on file     Social Determinants of Health     Financial Resource Strain:     Difficulty of Paying Living Expenses:    Food Insecurity:     Worried About [facility] in the Last Year:    [address] in the Last Year:    Transportation Needs:     Lack of Transportation (Medical):  Lack of Transportation (Non-Medical):    Physical Activity:     Days of Exercise per Week:     Minutes of Exercise per Session:    Stress:     Feeling of Stress :    Social Connections:     Frequency of Communication with Friends and Family:     Frequency of Social Gatherings with Friends and Family:     Attends [employer] Services:     Active Member of Clubs or Organizations:     Attends Club or Organization Meetings:     Marital Status:    Intimate Partner Violence:     Fear of Current or Ex-Partner:     Emotionally Abused:     Physically Abused:     Sexually Abused:        Home meds:  Prior to Admission Medications   Prescriptions Last Dose Informant Patient Reported? Taking?   aspirin delayed-release 81 mg tablet 8/31/2021 at Unknown time Self Yes Yes   Sig: Take 81 mg by mouth daily. cholecalciferol, vitamin D3, (Vitamin D3) 50 mcg (2,000 unit) tab 8/31/2021 at Unknown time Self Yes Yes   Sig: Take 2,000 Units by mouth daily. cyanocobalamin (Vitamin B-12) 1,000 mcg tablet 8/31/2021 at Unknown time Self Yes Yes   Sig: Take 1,000 mcg by mouth daily. folic acid (FOLVITE) 1 mg tablet 8/31/2021 at Unknown time Self No Yes   Sig: Take 1 Tab by mouth daily. metoprolol tartrate (LOPRESSOR) 25 mg tablet 8/31/2021 at Unknown time Self Yes Yes   Sig: Take 25 mg by mouth daily. metroNIDAZOLE (FLAGYL) 500 mg tablet Not Taking at Unknown time  No No   Sig: Take 1 Tablet by mouth two (2) times a day. Patient not taking: Reported on 8/31/2021   pantoprazole (PROTONIX) 40 mg tablet 8/31/2021 at Unknown time  No Yes   Sig: Take 1 Tablet by mouth Daily (before breakfast). thiamine mononitrate (B-1) 100 mg tablet 8/31/2021 at Unknown time Self No Yes   Sig: Take 1 Tab by mouth daily.       Facility-Administered Medications: None       Current inpatient medications:  No current facility-administered medications for this encounter. Current Outpatient Medications:     pantoprazole (PROTONIX) 40 mg tablet, Take 1 Tablet by mouth Daily (before breakfast). , Disp: 30 Tablet, Rfl: 0    metoprolol tartrate (LOPRESSOR) 25 mg tablet, Take 25 mg by mouth daily. , Disp: , Rfl:     cholecalciferol, vitamin D3, (Vitamin D3) 50 mcg (2,000 unit) tab, Take 2,000 Units by mouth daily. , Disp: , Rfl:     folic acid (FOLVITE) 1 mg tablet, Take 1 Tab by mouth daily. , Disp: 30 Tab, Rfl: 1    aspirin delayed-release 81 mg tablet, Take 81 mg by mouth daily. , Disp: , Rfl:     cyanocobalamin (Vitamin B-12) 1,000 mcg tablet, Take 1,000 mcg by mouth daily. , Disp: , Rfl:     thiamine mononitrate (B-1) 100 mg tablet, Take 1 Tab by mouth daily. , Disp: 30 Tab, Rfl: 0    metroNIDAZOLE (FLAGYL) 500 mg tablet, Take 1 Tablet by mouth two (2) times a day. (Patient not taking: Reported on 8/31/2021), Disp: 15 Tablet, Rfl: 0    Allergies:   Allergies   Allergen Reactions    Influenza Virus Vaccines Anaphylaxis       ROS:   Not performed    Vitals:  Patient Vitals for the past 24 hrs:   Temp Pulse Resp BP SpO2   08/31/21 1500 -- 88 -- (!) 146/87 100 %   08/31/21 0903 98.7 °F (37.1 °C) 84 18 (!) 148/82 99 %       I/O:    Intake/Output Summary (Last 24 hours) at 8/31/2021 1737  Last data filed at 8/31/2021 1716  Gross per 24 hour   Intake 1000 ml   Output --   Net 1000 ml       Physical exam:  Not performed    Labs/imaging:  Recent Results (from the past 24 hour(s))   URINALYSIS W/ REFLEX CULTURE    Collection Time: 08/31/21  9:42 AM    Specimen: Urine   Result Value Ref Range    Color DARK YELLOW      Appearance TURBID (A) CLEAR      Specific gravity 1.010 1.003 - 1.030      pH (UA) 6.5 5.0 - 8.0      Protein 30 (A) NEG mg/dL    Glucose Negative NEG mg/dL    Ketone Negative NEG mg/dL    Blood SMALL (A) NEG      Urobilinogen 1.0 0.2 - 1.0 EU/dL    Nitrites Negative NEG      Leukocyte Esterase LARGE (A) NEG      Bilirubin UA, confirm Positive (A) NEG      WBC 20-50 0 - 4 /hpf    RBC 0-5 0 - 5 /hpf    Epithelial cells FEW FEW /lpf    Bacteria 4+ (A) NEG /hpf    UA:UC IF INDICATED URINE CULTURE ORDERED (A) CNI     METABOLIC PANEL, COMPREHENSIVE    Collection Time: 08/31/21 10:06 AM   Result Value Ref Range    Sodium 135 (L) 136 - 145 mmol/L    Potassium 3.4 (L) 3.5 - 5.1 mmol/L    Chloride 96 (L) 97 - 108 mmol/L    CO2 33 (H) 21 - 32 mmol/L    Anion gap 6 5 - 15 mmol/L    Glucose 123 (H) 65 - 100 mg/dL    BUN 9 6 - 20 MG/DL    Creatinine 1.13 0.70 - 1.30 MG/DL    BUN/Creatinine ratio 8 (L) 12 - 20      GFR est AA >60 >60 ml/min/1.73m2    GFR est non-AA >60 >60 ml/min/1.73m2    Calcium 8.6 8.5 - 10.1 MG/DL    Bilirubin, total 0.7 0.2 - 1.0 MG/DL    ALT (SGPT) 65 12 - 78 U/L    AST (SGOT) 81 (H) 15 - 37 U/L    Alk. phosphatase 179 (H) 45 - 117 U/L    Protein, total 6.6 6.4 - 8.2 g/dL    Albumin 2.1 (L) 3.5 - 5.0 g/dL    Globulin 4.5 (H) 2.0 - 4.0 g/dL    A-G Ratio 0.5 (L) 1.1 - 2.2     CBC WITH AUTOMATED DIFF    Collection Time: 08/31/21 10:06 AM   Result Value Ref Range    WBC 15.7 (H) 4.1 - 11.1 K/uL    RBC 4.01 (L) 4.10 - 5.70 M/uL    HGB 13.4 12.1 - 17.0 g/dL    HCT 38.2 36.6 - 50.3 %    MCV 95.3 80.0 - 99.0 FL    MCH 33.4 26.0 - 34.0 PG    MCHC 35.1 30.0 - 36.5 g/dL    RDW 14.0 11.5 - 14.5 %    PLATELET 535 686 - 957 K/uL    MPV 10.5 8.9 - 12.9 FL    NRBC 0.0 0  WBC    ABSOLUTE NRBC 0.00 0.00 - 0.01 K/uL    NEUTROPHILS 83 (H) 32 - 75 %    LYMPHOCYTES 7 (L) 12 - 49 %    MONOCYTES 9 5 - 13 %    EOSINOPHILS 0 0 - 7 %    BASOPHILS 0 0 - 1 %    IMMATURE GRANULOCYTES 1 (H) 0.0 - 0.5 %    ABS. NEUTROPHILS 13.0 (H) 1.8 - 8.0 K/UL    ABS. LYMPHOCYTES 1.1 0.8 - 3.5 K/UL    ABS. MONOCYTES 1.4 (H) 0.0 - 1.0 K/UL    ABS. EOSINOPHILS 0.0 0.0 - 0.4 K/UL    ABS. BASOPHILS 0.0 0.0 - 0.1 K/UL    ABS. IMM.  GRANS. 0.2 (H) 0.00 - 0.04 K/UL    DF SMEAR SCANNED      RBC COMMENTS ANISOCYTOSIS  1+        WBC COMMENTS TOXIC GRANULATION     LACTIC ACID    Collection Time: 08/31/21 11:57 AM   Result Value Ref Range    Lactic acid 1.8 0.4 - 2.0 MMOL/L         IMPRESSION     Significant improvement of acute superimposed on chronic pancreatitis  Interval development of bilateral hydronephrosis and hydroureter and cystitis,  probably secondary to enteritis  Persistent or recurrent distal small bowel obstruction secondary to enteritis,  with a similar pattern as that seen in May, 2021  Right scrotal simple hydrocele demonstrated on recent ultrasound. A/P:    1. B/L hydronephrosis and hydroureter with cystitis:   -etiology likely 2/2 PAYTON with history of radiation, bph and severe enteritis. -would place mondragon and get Urology evaluation   -get blood and urine cultures   -given severe obstruction and cystitis would start on vanc and imipenem and titrate based on cultures to cover for esbl and mrsa    2. Macrina stage 1 :    -crt rise from baseline 0.6  -2/2 obstruction + UTI   -abd ct notes as above, would get renal us     3. Pancreatitis:   -check lipase and lactate     4 . SBO  -surgery to evaluate today         Thank you for this consult. I will continue to follow the patient with you. Please feel free to call with questions.      Raymundo Victor M.D.  [facility]

## 2021-08-31 NOTE — PROGRESS NOTES
[facility] Dosing Services: Vancomycin Dosing Progress Note    Consult for antibiotic dosing of vancomycin by Dr. Nora Bob  Pharmacist reviewed antibiotic appropriateness for 60year old , male  for indication of UTI  Day of Therapy: 1    Plan:  Vancomycin therapy:   Start with loading dose of vancomycin 1250 mg IV (25 mg/kg, max 2500 mg)   Follow with maintenance dose of vancomycin 500 mg IV every 12 hours at 9/1/21 on 0800    Dose calculated to approximate a   Target AUC/FILIPE of <500  Trough of 10-15 mcg/mL. Plan: Scr elevated. Start loading and maintenance dose for a predicted AUC of 405 and trough of 13.4 based on [address] Date Dose & Interval Measured (mcg/mL) Extrapolated (mcg/mL)   ? ? ? ?   ? ? ? ?   ? ? ? ? Other Antimicrobial  (not dosed by pharmacist)   Cefepime 2g IV every 12 hours    Cultures     8/31/21: Blood Cx: pending   8/31/21: Urine Cx: pending      Serum Creatinine     Lab Results   Component Value Date/Time    Creatinine 1.13 08/31/2021 10:06 AM       Creatinine Clearance Estimated Creatinine Clearance: 55.3 mL/min (based on SCr of 1.13 mg/dL). Temp   98.7 °F (37.1 °C)    WBC   Lab Results   Component Value Date/Time    WBC 15.7 (H) 08/31/2021 10:06 AM         Pharmacy to follow daily and will make changes to dose and/or frequency based on clinical status.     Thank you,     Anna Marie Jacobs, PharmD   Contact: [phone]

## 2021-08-31 NOTE — ED NOTES
Patient has been instructed that they have been given Fentanyl* which contains opioids, benzodiazepines, or other sedating drugs. Patient is aware that they  will need to refrain from driving or operating heavy machinery after taking this medication. Patient also instructed that they need to avoid drinking alcohol and using other products containing opioids, benzodiazepines, or other sedating drugs. Patient verbalized understanding.

## 2021-08-31 NOTE — ED PROVIDER NOTES
EMERGENCY DEPARTMENT HISTORY AND PHYSICAL EXAM      Date: 8/31/2021  Patient Name: Canary Severance    History of Presenting Illness     Chief Complaint   Patient presents with    Urinary Pain    Decreased Appetite       History Provided By: Patient    Additional History (Context): Canary Severance is a 60 y.o. male with Rectal Cancer, hypertension who presents with urinary pain and decreased appetite. Symptom onset 1 week ago. Patient states he has decreased stream, urgency and dysuria. Patient states he noticed a little blood around his toilet seat. He also noticed white pus coming out of his penis. Patient states he is not sexually active. He reports initial scrotal swelling that he states has improved. Initially testicular pain which has resolved. Denies fever, chills, nausea and vomiting. Reports lower abdominal pain and left side pain. Reports hx of intermittent constipation and diarrhea > 3 years since his rectal surgery for rectal CA. He is not taking stool softners.   Denies history of kidney stones, inguinal/scrotal hernia, testicular torsion, BPH    PCP: Sandre Galeazzi, MD    Current Facility-Administered Medications   Medication Dose Route Frequency Provider Last Rate Last Admin    sodium chloride (NS) flush 5-40 mL  5-40 mL IntraVENous Q8H Tanya Sterling MD        sodium chloride (NS) flush 5-40 mL  5-40 mL IntraVENous PRN Gage العلي MD        acetaminophen (TYLENOL) tablet 650 mg  650 mg Oral Q6H PRN Gage العلي MD        Or    acetaminophen (TYLENOL) suppository 650 mg  650 mg Rectal Q6H PRN Gage العلي MD        polyethylene glycol (MIRALAX) packet 17 g  17 g Oral DAILY PRN Gage العلي MD        ondansetron (ZOFRAN ODT) tablet 4 mg  4 mg Oral Q8H PRN Gage العلي MD        Or    ondansetron [facility]) injection 4 mg  4 mg IntraVENous Q6H PRN Gage العلي MD        lactated Ringers infusion  150 mL/hr IntraVENous CONTINUOUS Gage العلي MD  LORazepam (ATIVAN) injection 1 mg  1 mg IntraVENous Q4H PRN Kate Meier MD        LORazepam (ATIVAN) injection 2 mg  2 mg IntraVENous Q4H PRN Kate Meier MD        [START ON 9/1/2021] thiamine HCL (B-1) tablet 100 mg  100 mg Oral DAILY Kate Meier MD        [START ON 5/3/0979] folic acid (FOLVITE) tablet 1 mg  1 mg Oral DAILY Albert Dueñas MD        traMADoL Jamie Keira) tablet 50 mg  50 mg Oral Q6H PRN Kate Meier MD        cefepime (MAXIPIME) 2 g in 0.9% sodium chloride (MBP/ADV) 100 mL MBP  2 g IntraVENous Q12H Kate Meier MD        vancomycin (VANCOCIN) 1,250 mg in 0.9% sodium chloride 250 mL IVPB  1,250 mg IntraVENous ONCE Kate Meier MD         Current Outpatient Medications   Medication Sig Dispense Refill    pantoprazole (PROTONIX) 40 mg tablet Take 1 Tablet by mouth Daily (before breakfast). 30 Tablet 0    metoprolol tartrate (LOPRESSOR) 25 mg tablet Take 25 mg by mouth daily.  cholecalciferol, vitamin D3, (Vitamin D3) 50 mcg (2,000 unit) tab Take 2,000 Units by mouth daily.  folic acid (FOLVITE) 1 mg tablet Take 1 Tab by mouth daily. 30 Tab 1    aspirin delayed-release 81 mg tablet Take 81 mg by mouth daily.  cyanocobalamin (Vitamin B-12) 1,000 mcg tablet Take 1,000 mcg by mouth daily.  thiamine mononitrate (B-1) 100 mg tablet Take 1 Tab by mouth daily. 30 Tab 0    metroNIDAZOLE (FLAGYL) 500 mg tablet Take 1 Tablet by mouth two (2) times a day. (Patient not taking: Reported on 8/31/2021) 15 Tablet 0       Past History     Past Medical History:  Past Medical History:   Diagnosis Date    Cancer [facility])     rectal    Gastrointestinal disorder     Gastric Ulcers; Rectal CA    Hypertension        Past Surgical History:  History reviewed. No pertinent surgical history.     Family History:  Family History   Problem Relation Age of Onset    Cancer Brother        Social History:  Social History     Tobacco Use    Smoking status: Current Every Day Smoker     Packs/day: 0.25    Smokeless tobacco: Never Used   Substance Use Topics    Alcohol use: Yes     Comment: 2-3 beers daily    Drug use: No       Allergies: Allergies   Allergen Reactions    Influenza Virus Vaccines Anaphylaxis         Review of Systems   Review of Systems   Constitutional: Negative for chills and fever. HENT: Negative for congestion, rhinorrhea and sore throat. Respiratory: Negative for cough and shortness of breath. Cardiovascular: Negative for chest pain and leg swelling. Gastrointestinal: Positive for abdominal pain. Negative for constipation, diarrhea, nausea and vomiting. Genitourinary: Positive for decreased urine volume, discharge, dysuria, flank pain, hematuria, scrotal swelling, testicular pain and urgency. Negative for frequency, penile pain and penile swelling. Musculoskeletal: Negative for arthralgias, back pain and neck pain. Skin: Negative for wound. Neurological: Negative for dizziness, weakness, numbness and headaches. All other systems reviewed and are negative. Physical Exam     Vitals:    08/31/21 0903 08/31/21 1500 08/31/21 1931   BP: (!) 148/82 (!) 146/87 (!) 152/89   Pulse: 84 88 100   Resp: 18  19   Temp: 98.7 °F (37.1 °C)  100.3 °F (37.9 °C)   SpO2: 99% 100% 100%   Weight: 54.9 kg (121 lb)     Height: 5' 8\" (1.727 m)       Physical Exam  Vitals and nursing note reviewed. Exam conducted with a chaperone present. Constitutional:       General: He is not in acute distress. Appearance: He is well-developed. He is not toxic-appearing or diaphoretic. HENT:      Head: Normocephalic and atraumatic. Right Ear: Tympanic membrane, ear canal and external ear normal.      Left Ear: Tympanic membrane, ear canal and external ear normal.      Nose: Nose normal.      Mouth/Throat:      Mouth: Mucous membranes are moist.      Pharynx: Oropharynx is clear. No oropharyngeal exudate or posterior oropharyngeal erythema.    Eyes:      Extraocular Movements: Extraocular movements intact. Conjunctiva/sclera: Conjunctivae normal.      Pupils: Pupils are equal, round, and reactive to light. Cardiovascular:      Rate and Rhythm: Normal rate and regular rhythm. Pulses: Normal pulses. Heart sounds: Normal heart sounds. Pulmonary:      Effort: Pulmonary effort is normal.      Breath sounds: Normal breath sounds. Abdominal:      General: Bowel sounds are normal. There is no distension. Palpations: Abdomen is soft. There is no mass. Tenderness: There is abdominal tenderness in the left lower quadrant. There is left CVA tenderness. There is no right CVA tenderness, guarding or rebound. Hernia: No hernia is present. Genitourinary:     Penis: Normal.       Comments: Mild scrotal swelling. No palpable scrotal mass. Tenderness to L testes. White discharge noted at urethra. Musculoskeletal:         General: Normal range of motion. Cervical back: Normal range of motion and neck supple. Lymphadenopathy:      Cervical: No cervical adenopathy. Skin:     General: Skin is warm and dry. Neurological:      Mental Status: He is alert and oriented to person, place, and time. GCS: GCS eye subscore is 4. GCS verbal subscore is 5. GCS motor subscore is 6. Cranial Nerves: No cranial nerve deficit. Psychiatric:         Thought Content:  Thought content normal.           Diagnostic Study Results     Labs -     Recent Results (from the past 12 hour(s))   URINALYSIS W/ REFLEX CULTURE    Collection Time: 08/31/21  9:42 AM    Specimen: Urine   Result Value Ref Range    Color DARK YELLOW      Appearance TURBID (A) CLEAR      Specific gravity 1.010 1.003 - 1.030      pH (UA) 6.5 5.0 - 8.0      Protein 30 (A) NEG mg/dL    Glucose Negative NEG mg/dL    Ketone Negative NEG mg/dL    Blood SMALL (A) NEG      Urobilinogen 1.0 0.2 - 1.0 EU/dL    Nitrites Negative NEG      Leukocyte Esterase LARGE (A) NEG      Bilirubin UA, confirm Positive (A) NEG      WBC 20-50 0 - 4 /hpf    RBC 0-5 0 - 5 /hpf    Epithelial cells FEW FEW /lpf    Bacteria 4+ (A) NEG /hpf    UA:UC IF INDICATED URINE CULTURE ORDERED (A) CNI     METABOLIC PANEL, COMPREHENSIVE    Collection Time: 08/31/21 10:06 AM   Result Value Ref Range    Sodium 135 (L) 136 - 145 mmol/L    Potassium 3.4 (L) 3.5 - 5.1 mmol/L    Chloride 96 (L) 97 - 108 mmol/L    CO2 33 (H) 21 - 32 mmol/L    Anion gap 6 5 - 15 mmol/L    Glucose 123 (H) 65 - 100 mg/dL    BUN 9 6 - 20 MG/DL    Creatinine 1.13 0.70 - 1.30 MG/DL    BUN/Creatinine ratio 8 (L) 12 - 20      GFR est AA >60 >60 ml/min/1.73m2    GFR est non-AA >60 >60 ml/min/1.73m2    Calcium 8.6 8.5 - 10.1 MG/DL    Bilirubin, total 0.7 0.2 - 1.0 MG/DL    ALT (SGPT) 65 12 - 78 U/L    AST (SGOT) 81 (H) 15 - 37 U/L    Alk. phosphatase 179 (H) 45 - 117 U/L    Protein, total 6.6 6.4 - 8.2 g/dL    Albumin 2.1 (L) 3.5 - 5.0 g/dL    Globulin 4.5 (H) 2.0 - 4.0 g/dL    A-G Ratio 0.5 (L) 1.1 - 2.2     CBC WITH AUTOMATED DIFF    Collection Time: 08/31/21 10:06 AM   Result Value Ref Range    WBC 15.7 (H) 4.1 - 11.1 K/uL    RBC 4.01 (L) 4.10 - 5.70 M/uL    HGB 13.4 12.1 - 17.0 g/dL    HCT 38.2 36.6 - 50.3 %    MCV 95.3 80.0 - 99.0 FL    MCH 33.4 26.0 - 34.0 PG    MCHC 35.1 30.0 - 36.5 g/dL    RDW 14.0 11.5 - 14.5 %    PLATELET 746 978 - 359 K/uL    MPV 10.5 8.9 - 12.9 FL    NRBC 0.0 0  WBC    ABSOLUTE NRBC 0.00 0.00 - 0.01 K/uL    NEUTROPHILS 83 (H) 32 - 75 %    LYMPHOCYTES 7 (L) 12 - 49 %    MONOCYTES 9 5 - 13 %    EOSINOPHILS 0 0 - 7 %    BASOPHILS 0 0 - 1 %    IMMATURE GRANULOCYTES 1 (H) 0.0 - 0.5 %    ABS. NEUTROPHILS 13.0 (H) 1.8 - 8.0 K/UL    ABS. LYMPHOCYTES 1.1 0.8 - 3.5 K/UL    ABS. MONOCYTES 1.4 (H) 0.0 - 1.0 K/UL    ABS. EOSINOPHILS 0.0 0.0 - 0.4 K/UL    ABS. BASOPHILS 0.0 0.0 - 0.1 K/UL    ABS. IMM.  GRANS. 0.2 (H) 0.00 - 0.04 K/UL    DF SMEAR SCANNED      RBC COMMENTS ANISOCYTOSIS  1+        WBC COMMENTS TOXIC GRANULATION     LACTIC ACID    Collection Time: 08/31/21 11:57 AM   Result Value Ref Range    Lactic acid 1.8 0.4 - 2.0 MMOL/L       Radiologic Studies -   CT ABD PELV W CONT   Final Result      Significant improvement of acute superimposed on chronic pancreatitis   Interval development of bilateral hydronephrosis and hydroureter and cystitis,   probably secondary to enteritis   Persistent or recurrent distal small bowel obstruction secondary to enteritis,   with a similar pattern as that seen in May, 2021   Right scrotal simple hydrocele demonstrated on recent ultrasound. US SCROTUM/TESTICLES   Final Result      Large right anechoic hydrocele   No evidence for testicular torsion or hernia                   US RETROPERITONEUM COMP    (Results Pending)     CT Results  (Last 48 hours)               08/31/21 1118  CT ABD PELV W CONT Final result    Impression:      Significant improvement of acute superimposed on chronic pancreatitis   Interval development of bilateral hydronephrosis and hydroureter and cystitis,   probably secondary to enteritis   Persistent or recurrent distal small bowel obstruction secondary to enteritis,   with a similar pattern as that seen in May, 2021   Right scrotal simple hydrocele demonstrated on recent ultrasound. Narrative:  EXAM: CT ABD PELV W CONT       INDICATION: LLQ pain with testicular swelling  r/o kidney stone and inguinal   hernia       COMPARISON: 5/20/2021        CONTRAST: 100 mL of Isovue-370. TECHNIQUE:    Following the uneventful intravenous administration of contrast, thin axial   images were obtained through the abdomen and pelvis. Coronal and sagittal   reconstructions were generated. Oral contrast was not administered. CT dose   reduction was achieved through use of a standardized protocol tailored for this   examination and automatic exposure control for dose modulation. FINDINGS:    LOWER THORAX: No significant abnormality in the incidentally imaged lower chest.   LIVER: No mass.    BILIARY TREE: Prior cholecystectomy CBD is not dilated. SPLEEN: within normal limits. PANCREAS: No ductal dilatation. Pancreatic tail contains a persistent 1.5 cm   pancreatic cyst (axial image 24). There are calcifications in the pancreatic   head as previously seen. The previously described inflammatory change   surrounding the body has resolved. There is near complete resolution surrounding   the tail (series 3, image 29). ADRENALS: Unremarkable. KIDNEYS: Bilateral new severe hydronephrosis and hydroureter to the level of the   bladder. Mild diffuse bladder wall thickening. STOMACH: Unremarkable. SMALL BOWEL: Diffuse thickening, elongation, and straightening of the small   bowel loops is redemonstrated in the pelvis. Small bowel dilatation up to 3.9   cm. COLON: Postsurgical changes in the pelvis. Presacral edema unchanged (series 5,   image 65). .   APPENDIX: Not visualized   PERITONEUM: No pneumoperitoneum. Small amount of the associated free fluid in   the pelvis. RETROPERITONEUM: No lymphadenopathy or aortic aneurysm. REPRODUCTIVE ORGANS: Small prostate. Right hydrocele. URINARY BLADDER: No mass or calculus. BONES: No destructive bone lesion. ABDOMINAL WALL: No mass or hernia. ADDITIONAL COMMENTS: N/A               CXR Results  (Last 48 hours)    None            Medical Decision Making   I am the first provider for this patient. I reviewed the vital signs, available nursing notes, past medical history, past surgical history, family history and social history. Vital Signs-Reviewed the patient's vital signs    Records Reviewed: Nursing Notes, Old Medical Records, Previous Radiology Studies and Previous Laboratory Studies     75-year-old male presenting for urinary pain and decreased appetite exhibiting tenderness to left lower quadrant and left testes tenderness. No palpable masses noted to scrotal region but mild swelling present.   Plan obtain labs in addition will obtain ultrasound of scrotum and CT of abdomen pelvis. Differential diagnosis included Testicular torsion, scrotal abscess, inguinal hernia, scrotal hernia, kidney stone, UTI, pyelonephritis, BPH    ED Course:   ED Course as of Aug 31 1940   Tue Aug 31, 2021   1255 Consult Note:   Discussed case with Dr Collin Ramos. Leukocytosis with CT remarkable for enteritis, acute cystitis. He recommends admission for enteritis with Pain management and IV abx. Pt to remain NPO. [NA]   1310 Consult Note:   Discussed H&P, labs and imaging with hospitalist Dr Юлия Hernadez. He recommends urology consult for hydronephrosis and hydroureter prior to acceptance for admission. [NA]   1353 Consult Note:   Discussed case with Dr Ciera Melo with urology but states he is unable to consult because he is located with [facility]. Plan to transfer pt due to lack of urology services. Will consult hospitalist at [facility]     [NA]   1435 Consult Note:   Discussed case with inpatient hospitalist Dr Erica Drew at [facility]. He recommends pt is stable for admission at [facility]. Recommend IV hydration and repeat creatinine for hydronephrosis. Discussed recommendations with Dr Юлия Hernadez. [NA]   1553 Consult Note:   Hospitalist Dr Fred Dickerson assess patient and consulted with [facility] nephrology. Pt has previous hx of hydronephrosis s/p radiation for rectal cancer and did not follow up as recommended. It was suggested pt needs urology evaluation. Will have consultations collaborated between hospitalist at [facility] and Dr Keira Lorenzo   6459 Consult note:   Per Dr Юлия Hernadez, Dr Erica Drew accepts pt for admission. ETA on bed placement up to 48 hours. Will attempt ER to ER transfer     [NA]   6851 Consult Note:   Discussed case with Dr Luh Tariq, ER attending at [facility]  whom declines due to bed capacity and pt is stable to wait inpatient bed.  Dr Erica Drew present with Dr Luh Tariq  during this call and agrees that pt is stable to wait for inpatient bed assignment    [NA]   9927 Consult Note:   Acceptance for admission pending bladder scan and mondragon placement. Bladder scan 125 ml pre mondragon mondragon placement. After mondragon placement large amount blood noted along with a long clot which I suspect may be cause of hydronephrosis. [NA]   [id number] Consult Note:   Dr Reba Pringle at bedside to evaluate patient. Plan is to admit patient to the floor at [facility] pending bed placement at [facility]. Admission orders placed by Dr Reba Pringle. [NA]      ED Course User Index  [NA] Sulaiman Cifuentes, DEANNA         Disposition:  Admit to [facility] pending transfer to [facility] for urological consultation. Follow-up Information    None         Current Discharge Medication List          Provider Notes (Medical Decision Making):     Procedures:  Procedures    Core Measures:    Critical Care Time:       Diagnosis     Clinical Impression:   1. Enteritis    2. Acute cystitis without hematuria    3.  Hydrocele in adult

## 2021-08-31 NOTE — ED NOTES
Pt tolerated mondragon insertion well. Initial drainage in mondragon was yellow urine then the drainage became bloody. Belén NP was informed and came to assess the pt. A long clot was seen in the mondragon tubing. The clot passed through tubing into the bag. Dr. Nisa Yao made aware and visualized clot.

## 2021-08-31 NOTE — CONSULTS
Hospitalist Consultation Note    NAME:  Angela Oreilly   :   1965   MRN:   [id number]   Room Number: ER06/06  @ [facility]     ATTENDING: No admitting provider for patient encounter. PCP:  Mariella Nageotte, MD    Date/Time:  2021 4:15 PM      Recommendations/Plan:       Active Problems:    * No active hospital problems. *       #Acute kidney injury  #Severe b/l hydronephrosis and hydroureter  #Acute cystitis  -Patient complaining of dysuria abdominal pain and difficulty in urination  -CT abdomen pelvis reviewed in dependentlyBilateral new severe hydronephrosis and hydroureter to the level of the bladder. Mild diffuse bladder wall thickening.  -Serum creatinine 1.17(  baseline 0.69) , UA with blood leukoesterase this bacteria and WBC, serum white count 15     -Discussed case extensively with [facility] nephrology ( Dr Karissa Boggs) patient had similar complaints last year and was admitted to [facility]. Given recurrence of severe  B/l hydronephrosis and hydroureter. Recommendation is to be assessed by urology inpatient as patient might need intervention if hydronephrosis/  hydroureter persist.   [facility]  neither has interventional radiology nor urology services to assess the patient, patient would need higher level of care.   Dr. Karissa Boggs, nephrologist from [facility] in agreement with the plan  -Recommend ceftriaxone IV   -Urine culture/ Blood CTX   -Ultrasound of kidneys and bladder to make sure bladder distention is not causing hydroureter/hydronephrosis  -Trend creatinine  -May need Marrero catheter for decompression and reevaluation of hydronephrosis and hydroureter  -I have discussed my recommendation and my discussion with nephrology with ED providers (NP  + MD )       #Small bowel obstruction surgery on board  #Acute on chronic pancreatitis  #Rectal cancer status post chemoradiation  #History of BPH  #Alcohol use              Subjective:   REQUESTING PHYSICIAN:  REASON FOR CONSULT:      Belle Bhatia is a 60 y.o.  male who I was asked to see for hydronephrosis and hydroureter. Patient states that he been having difficulty with urination for past couple of days including dysuria and difficulty starting urination and burning sensation while he pees. Patient endorsed some low abdominal pain as well. Patient also endorsed  decreased appetite for 1 week and unable to sleep because of urinary complaints  Patient has a history of rectal cancer and received chemoradiation for that in the past            Past Medical History:   Diagnosis Date    Cancer [facility])     rectal    Gastrointestinal disorder     Gastric Ulcers; Rectal CA    Hypertension       History reviewed. No pertinent surgical history. Social History     Tobacco Use    Smoking status: Current Every Day Smoker     Packs/day: 0.25    Smokeless tobacco: Never Used   Substance Use Topics    Alcohol use: Yes     Comment: 2-3 beers daily      Family History   Problem Relation Age of Onset    Cancer Brother        Allergies   Allergen Reactions    Influenza Virus Vaccines Anaphylaxis      Prior to Admission medications    Medication Sig Start Date End Date Taking? Authorizing Provider   pantoprazole (PROTONIX) 40 mg tablet Take 1 Tablet by mouth Daily (before breakfast). 5/24/21  Yes Orene Schaumann, MD   metoprolol tartrate (LOPRESSOR) 25 mg tablet Take 25 mg by mouth daily. Yes Provider, Historical   cholecalciferol, vitamin D3, (Vitamin D3) 50 mcg (2,000 unit) tab Take 2,000 Units by mouth daily. Yes Provider, Historical   folic acid (FOLVITE) 1 mg tablet Take 1 Tab by mouth daily. 12/28/20  Yes Carloz Latif MD   aspirin delayed-release 81 mg tablet Take 81 mg by mouth daily. Yes Provider, Historical   cyanocobalamin (Vitamin B-12) 1,000 mcg tablet Take 1,000 mcg by mouth daily. Yes Provider, Historical   thiamine mononitrate (B-1) 100 mg tablet Take 1 Tab by mouth daily.  1/3/19  Yes Venice Crawford NP metroNIDAZOLE (FLAGYL) 500 mg tablet Take 1 Tablet by mouth two (2) times a day. Patient not taking: Reported on 2021   Marcia Mao MD       REVIEW OF SYSTEMS:     Total of 12 systems reviewed as follows:   I am not able to complete the review of systems because: The patient is intubated and sedated    The patient has altered mental status due to his acute medical problems    The patient has baseline aphasia from prior stroke(s)    The patient has baseline dementia and is not reliable historian                 POSITIVE= underlined text  Negative = text not underlined  General:  fever, chills, sweats, generalized weakness, weight loss/gain,      loss of appetite   Eyes:    blurred vision, eye pain, loss of vision, double vision  ENT:    rhinorrhea, pharyngitis   Respiratory:   cough, sputum production, SOB, wheezing, SZYMANSKI, pleuritic pain   Cardiology:   chest pain, palpitations, orthopnea, PND, edema, syncope   Gastrointestinal:  abdominal pain , N/V, dysphagia, diarrhea, constipation, bleeding   Genitourinary:  frequency, urgency, dysuria, hematuria, incontinence   Muskuloskeletal :  arthralgia, myalgia   Hematology:  easy bruising, nose or gum bleeding, lymphadenopathy   Dermatological: rash, ulceration, pruritis   Endocrine:   hot flashes or polydipsia   Neurological:  headache, dizziness, confusion, focal weakness, paresthesia,     Speech difficulties, memory loss, gait disturbance  Psychological: Feelings of anxiety, depression, agitation    Objective:   VITALS:    Visit Vitals  BP (!) 146/87 (BP 1 Location: Right upper arm, BP Patient Position: At rest)   Pulse 88   Temp 98.7 °F (37.1 °C)   Resp 18   Ht 5' 8\" (1.727 m)   Wt 54.9 kg (121 lb)   SpO2 100%   BMI 18.40 kg/m²     Temp (24hrs), Av.7 °F (37.1 °C), Min:98.7 °F (37.1 °C), Max:98.7 °F (37.1 °C)      PHYSICAL EXAM:   General:    Alert, cooperative, no distress, appears stated age.      HEENT: Atraumatic, anicteric sclerae, pink conjunctivae     No oral ulcers, mucosa moist, throat clear  Neck:  Supple, symmetrical,  thyroid: non tender  Lungs:   Clear to auscultation bilaterally. No Wheezing or Rhonchi. No rales. Chest wall:  No tenderness  No Accessory muscle use. Heart:   Regular  rhythm,  No  murmur   No edema  Abdomen:   Soft, non-tender. Not distended. Bowel sounds normal, supra pubic tenderness   Extremities: No cyanosis. No clubbing  Skin:     Not pale. Not Jaundiced  No rashes   Psych:  Good insight. Not depressed. Not anxious or agitated. Neurologic: EOMs intact. No facial asymmetry. No aphasia or slurred speech. Symmetrical strength, Alert and oriented X 4.     _______________________________________________________________________  Care Plan discussed with:    Comments   Patient x    Family      RN x    Care Manager                    Consultant:  x Nephrology    ____________________________________________________________________  TOTAL TIME:     60 mins    Comments    x Reviewed previous records   >50% of visit spent in counseling and coordination of care x Discussion with patient and/or family and questions answered       Critical Care Provided     Minutes non procedure based  ________________________________________________________________________  Signed: Shankar Newton MD      Procedures: see electronic medical records for all procedures/Xrays and details which were not copied into this note but were reviewed prior to creation of Plan.     LAB DATA REVIEWED:    Recent Results (from the past 24 hour(s))   URINALYSIS W/ REFLEX CULTURE    Collection Time: 08/31/21  9:42 AM    Specimen: Urine   Result Value Ref Range    Color DARK YELLOW      Appearance TURBID (A) CLEAR      Specific gravity 1.010 1.003 - 1.030      pH (UA) 6.5 5.0 - 8.0      Protein 30 (A) NEG mg/dL    Glucose Negative NEG mg/dL    Ketone Negative NEG mg/dL    Blood SMALL (A) NEG      Urobilinogen 1.0 0.2 - 1.0 EU/dL    Nitrites Negative NEG Leukocyte Esterase LARGE (A) NEG      Bilirubin UA, confirm Positive (A) NEG      WBC 20-50 0 - 4 /hpf    RBC 0-5 0 - 5 /hpf    Epithelial cells FEW FEW /lpf    Bacteria 4+ (A) NEG /hpf    UA:UC IF INDICATED URINE CULTURE ORDERED (A) CNI     METABOLIC PANEL, COMPREHENSIVE    Collection Time: 08/31/21 10:06 AM   Result Value Ref Range    Sodium 135 (L) 136 - 145 mmol/L    Potassium 3.4 (L) 3.5 - 5.1 mmol/L    Chloride 96 (L) 97 - 108 mmol/L    CO2 33 (H) 21 - 32 mmol/L    Anion gap 6 5 - 15 mmol/L    Glucose 123 (H) 65 - 100 mg/dL    BUN 9 6 - 20 MG/DL    Creatinine 1.13 0.70 - 1.30 MG/DL    BUN/Creatinine ratio 8 (L) 12 - 20      GFR est AA >60 >60 ml/min/1.73m2    GFR est non-AA >60 >60 ml/min/1.73m2    Calcium 8.6 8.5 - 10.1 MG/DL    Bilirubin, total 0.7 0.2 - 1.0 MG/DL    ALT (SGPT) 65 12 - 78 U/L    AST (SGOT) 81 (H) 15 - 37 U/L    Alk. phosphatase 179 (H) 45 - 117 U/L    Protein, total 6.6 6.4 - 8.2 g/dL    Albumin 2.1 (L) 3.5 - 5.0 g/dL    Globulin 4.5 (H) 2.0 - 4.0 g/dL    A-G Ratio 0.5 (L) 1.1 - 2.2     CBC WITH AUTOMATED DIFF    Collection Time: 08/31/21 10:06 AM   Result Value Ref Range    WBC 15.7 (H) 4.1 - 11.1 K/uL    RBC 4.01 (L) 4.10 - 5.70 M/uL    HGB 13.4 12.1 - 17.0 g/dL    HCT 38.2 36.6 - 50.3 %    MCV 95.3 80.0 - 99.0 FL    MCH 33.4 26.0 - 34.0 PG    MCHC 35.1 30.0 - 36.5 g/dL    RDW 14.0 11.5 - 14.5 %    PLATELET 724 559 - 025 K/uL    MPV 10.5 8.9 - 12.9 FL    NRBC 0.0 0  WBC    ABSOLUTE NRBC 0.00 0.00 - 0.01 K/uL    NEUTROPHILS 83 (H) 32 - 75 %    LYMPHOCYTES 7 (L) 12 - 49 %    MONOCYTES 9 5 - 13 %    EOSINOPHILS 0 0 - 7 %    BASOPHILS 0 0 - 1 %    IMMATURE GRANULOCYTES 1 (H) 0.0 - 0.5 %    ABS. NEUTROPHILS 13.0 (H) 1.8 - 8.0 K/UL    ABS. LYMPHOCYTES 1.1 0.8 - 3.5 K/UL    ABS. MONOCYTES 1.4 (H) 0.0 - 1.0 K/UL    ABS. EOSINOPHILS 0.0 0.0 - 0.4 K/UL    ABS. BASOPHILS 0.0 0.0 - 0.1 K/UL    ABS. IMM.  GRANS. 0.2 (H) 0.00 - 0.04 K/UL    DF SMEAR SCANNED      RBC COMMENTS ANISOCYTOSIS  1+ WBC COMMENTS TOXIC GRANULATION     LACTIC ACID    Collection Time: 08/31/21 11:57 AM   Result Value Ref Range    Lactic acid 1.8 0.4 - 2.0 MMOL/L       _____________________________  Hospitalist: Jessica Ruth MD

## 2021-08-31 NOTE — H&P
Hospitalist Admission Note    NAME: Yancy Mehta   :  1965   MRN:  [id number]   Room Number: ER06/06  @ [facility]     Date/Time:  2021 6:12 PM    Patient PCP: Susan Hernandez MD  ______________________________________________________________________  Given the patient's current clinical presentation, I have a high level of concern for decompensation if discharged from the emergency department. Complex decision making was performed, which includes reviewing the patient's available past medical records, laboratory results, and x-ray films. My assessment of this patient's clinical condition and my plan of care is as follows. Assessment / Plan: Active Problems:    * No active hospital problems. *    #Acute kidney injury  #Severe b/l hydronephrosis and hydroureter  #Acute cystitis  -Patient complaining of dysuria abdominal pain and difficulty in urination  -CT abdomen pelvis reviewed in dependentlyBilateral new severe hydronephrosis and hydroureter to the level of the bladder.  Mild diffuse bladder wall thickening.  -Serum creatinine 1.17(  baseline 0.69) , UA with blood leukoesterase this bacteria and WBC, serum white count 15      -Patient is accepted to [facility] by Dr. Abhijeet Ornelas as primary and urology to consult but given no bed availability, patient to be temporarily admitted to [facility] until he has a bed  - IV vanc and Cefepime    -Urine culture/ Blood CTX   -Ultrasound of kidneys and bladder to make sure bladder distention is not causing hydroureter/hydronephrosis  -Trend creatinine  -Marrero catheter for decompression and reevaluation of hydronephrosis and hydroureter          #Small bowel obstruction  - NPO   -  surgery on board  - IVF     #Acute on chronic pancreatitis  #Rectal cancer status post chemoradiation  #History of BPH  #Alcohol use  - [facility] protocol   - thiamine and folic acid     # Tobacco use   - Patient was counseled extensively on the need to abstain from tobacco, its addictive tendencies, its deleterious effects on the lungs, cardiovascular  as well as its financial & social sequelae            Body mass index is 18.4 kg/m². Code Status: Full   Surrogate Decision Maker:    DVT Prophylaxis: Lovenox and SCD's  GI Prophylaxis: not indicated          Subjective:   CHIEF COMPLAINT: Burning sensation and lower abdominal pain x 1 week     HISTORY OF PRESENT ILLNESS:     Jessica Campbell is a 60 y.o.  male who I was asked to see for hydronephrosis and hydroureter. Patient states that he been having difficulty with urination for past couple of days including dysuria and difficulty starting urination and burning sensation while he pees. Patient endorsed some low abdominal pain as well. Patient also endorsed  decreased appetite for 1 week and unable to sleep because of urinary complaints  Patient has a history of rectal cancer and received chemoradiation for that in the past    We were asked to admit for work up and evaluation of the above problems. Past Medical History:   Diagnosis Date    Cancer [facility])     rectal    Gastrointestinal disorder     Gastric Ulcers; Rectal CA    Hypertension         History reviewed. No pertinent surgical history. Social History     Tobacco Use    Smoking status: Current Every Day Smoker     Packs/day: 0.25    Smokeless tobacco: Never Used   Substance Use Topics    Alcohol use: Yes     Comment: 2-3 beers daily        Family History   Problem Relation Age of Onset    Cancer Brother      Allergies   Allergen Reactions    Influenza Virus Vaccines Anaphylaxis        Prior to Admission medications    Medication Sig Start Date End Date Taking? Authorizing Provider   pantoprazole (PROTONIX) 40 mg tablet Take 1 Tablet by mouth Daily (before breakfast). 5/24/21  Yes Alexandrea Morgan MD   metoprolol tartrate (LOPRESSOR) 25 mg tablet Take 25 mg by mouth daily.    Yes Provider, Historical   cholecalciferol, vitamin D3, (Vitamin D3) 50 mcg (2,000 unit) tab Take 2,000 Units by mouth daily. Yes Provider, Historical   folic acid (FOLVITE) 1 mg tablet Take 1 Tab by mouth daily. 12/28/20  Yes Bruce Santa MD   aspirin delayed-release 81 mg tablet Take 81 mg by mouth daily. Yes Provider, Historical   cyanocobalamin (Vitamin B-12) 1,000 mcg tablet Take 1,000 mcg by mouth daily. Yes Provider, Historical   thiamine mononitrate (B-1) 100 mg tablet Take 1 Tab by mouth daily. 1/3/19  Yes Venice Crawford NP   metroNIDAZOLE (FLAGYL) 500 mg tablet Take 1 Tablet by mouth two (2) times a day. Patient not taking: Reported on 8/31/2021 5/23/21   Maciej Rice MD       REVIEW OF SYSTEMS:     I am not able to complete the review of systems because:    The patient is intubated and sedated    The patient has altered mental status due to his acute medical problems    The patient has baseline aphasia from prior stroke(s)    The patient has baseline dementia and is not reliable historian    The patient is in acute medical distress and unable to provide information           Total of 12 systems reviewed as follows:       POSITIVE= underlined text  Negative = text not underlined  General:  fever, chills, sweats, generalized weakness, weight loss/gain,      loss of appetite   Eyes:    blurred vision, eye pain, loss of vision, double vision  ENT:    rhinorrhea, pharyngitis   Respiratory:   cough, sputum production, SOB, SZYMANSKI, wheezing, pleuritic pain   Cardiology:   chest pain, palpitations, orthopnea, PND, edema, syncope   Gastrointestinal:  abdominal pain , N/V, diarrhea, dysphagia, constipation, bleeding   Genitourinary:  frequency, urgency, dysuria, hematuria, incontinence   Muskuloskeletal :  arthralgia, myalgia, back pain  Hematology:  easy bruising, nose or gum bleeding, lymphadenopathy   Dermatological: rash, ulceration, pruritis, color change / jaundice  Endocrine:   hot flashes or polydipsia Neurological:  headache, dizziness, confusion, focal weakness, paresthesia,     Speech difficulties, memory loss, gait difficulty  Psychological: Feelings of anxiety, depression, agitation    Objective:   VITALS:    Visit Vitals  BP (!) 146/87 (BP 1 Location: Right upper arm, BP Patient Position: At rest)   Pulse 88   Temp 98.7 °F (37.1 °C)   Resp 18   Ht 5' 8\" (1.727 m)   Wt 54.9 kg (121 lb)   SpO2 100%   BMI 18.40 kg/m²       PHYSICAL EXAM:    General:    Alert, cooperative, no distress, appears stated age. HEENT: Atraumatic, anicteric sclerae, pink conjunctivae     No oral ulcers, mucosa moist, throat clear, dentition fair  Neck:  Supple, symmetrical,  thyroid: non tender  Lungs:   Clear to auscultation bilaterally. No Wheezing or Rhonchi. No rales. Chest wall:  No tenderness  No Accessory muscle use. Heart:   Regular  rhythm,  No  murmur   No edema  Abdomen:   Soft, non-tender. Not distended. Bowel sounds normal, suprapubic tenderness   Extremities: No cyanosis. No clubbing,      Skin turgor normal, Capillary refill normal, Radial dial pulse 2+  Skin:     Not pale. Not Jaundiced  No rashes   Psych:  Good insight. Not depressed. Not anxious or agitated. Neurologic: EOMs intact. No facial asymmetry. No aphasia or slurred speech. Symmetrical strength, Sensation grossly intact.  Alert and oriented X 4.     ______________________________________________________________________    Care Plan discussed with:  Patient/Family    Expected  Disposition:  Home w/Family  ________________________________________________________________________  TOTAL TIME:  28 Minutes    Critical Care Provided     Minutes non procedure based      Comments    x Reviewed previous records   >50% of visit spent in counseling and coordination of care x Discussion with patient and/or family and questions answered       ________________________________________________________________________  Signed: Deena Mckinney, MD    Procedures: see electronic medical records for all procedures/Xrays and details which were not copied into this note but were reviewed prior to creation of Plan. LAB DATA REVIEWED:    Recent Results (from the past 24 hour(s))   URINALYSIS W/ REFLEX CULTURE    Collection Time: 08/31/21  9:42 AM    Specimen: Urine   Result Value Ref Range    Color DARK YELLOW      Appearance TURBID (A) CLEAR      Specific gravity 1.010 1.003 - 1.030      pH (UA) 6.5 5.0 - 8.0      Protein 30 (A) NEG mg/dL    Glucose Negative NEG mg/dL    Ketone Negative NEG mg/dL    Blood SMALL (A) NEG      Urobilinogen 1.0 0.2 - 1.0 EU/dL    Nitrites Negative NEG      Leukocyte Esterase LARGE (A) NEG      Bilirubin UA, confirm Positive (A) NEG      WBC 20-50 0 - 4 /hpf    RBC 0-5 0 - 5 /hpf    Epithelial cells FEW FEW /lpf    Bacteria 4+ (A) NEG /hpf    UA:UC IF INDICATED URINE CULTURE ORDERED (A) CNI     METABOLIC PANEL, COMPREHENSIVE    Collection Time: 08/31/21 10:06 AM   Result Value Ref Range    Sodium 135 (L) 136 - 145 mmol/L    Potassium 3.4 (L) 3.5 - 5.1 mmol/L    Chloride 96 (L) 97 - 108 mmol/L    CO2 33 (H) 21 - 32 mmol/L    Anion gap 6 5 - 15 mmol/L    Glucose 123 (H) 65 - 100 mg/dL    BUN 9 6 - 20 MG/DL    Creatinine 1.13 0.70 - 1.30 MG/DL    BUN/Creatinine ratio 8 (L) 12 - 20      GFR est AA >60 >60 ml/min/1.73m2    GFR est non-AA >60 >60 ml/min/1.73m2    Calcium 8.6 8.5 - 10.1 MG/DL    Bilirubin, total 0.7 0.2 - 1.0 MG/DL    ALT (SGPT) 65 12 - 78 U/L    AST (SGOT) 81 (H) 15 - 37 U/L    Alk.  phosphatase 179 (H) 45 - 117 U/L    Protein, total 6.6 6.4 - 8.2 g/dL    Albumin 2.1 (L) 3.5 - 5.0 g/dL    Globulin 4.5 (H) 2.0 - 4.0 g/dL    A-G Ratio 0.5 (L) 1.1 - 2.2     CBC WITH AUTOMATED DIFF    Collection Time: 08/31/21 10:06 AM   Result Value Ref Range    WBC 15.7 (H) 4.1 - 11.1 K/uL    RBC 4.01 (L) 4.10 - 5.70 M/uL    HGB 13.4 12.1 - 17.0 g/dL    HCT 38.2 36.6 - 50.3 %    MCV 95.3 80.0 - 99.0 FL    MCH 33.4 26.0 - 34.0 PG    MCHC 35.1 30.0 - 36.5 g/dL    RDW 14.0 11.5 - 14.5 %    PLATELET 755 152 - 971 K/uL    MPV 10.5 8.9 - 12.9 FL    NRBC 0.0 0  WBC    ABSOLUTE NRBC 0.00 0.00 - 0.01 K/uL    NEUTROPHILS 83 (H) 32 - 75 %    LYMPHOCYTES 7 (L) 12 - 49 %    MONOCYTES 9 5 - 13 %    EOSINOPHILS 0 0 - 7 %    BASOPHILS 0 0 - 1 %    IMMATURE GRANULOCYTES 1 (H) 0.0 - 0.5 %    ABS. NEUTROPHILS 13.0 (H) 1.8 - 8.0 K/UL    ABS. LYMPHOCYTES 1.1 0.8 - 3.5 K/UL    ABS. MONOCYTES 1.4 (H) 0.0 - 1.0 K/UL    ABS. EOSINOPHILS 0.0 0.0 - 0.4 K/UL    ABS. BASOPHILS 0.0 0.0 - 0.1 K/UL    ABS. IMM.  GRANS. 0.2 (H) 0.00 - 0.04 K/UL    DF SMEAR SCANNED      RBC COMMENTS ANISOCYTOSIS  1+        WBC COMMENTS TOXIC GRANULATION     LACTIC ACID    Collection Time: 08/31/21 11:57 AM   Result Value Ref Range    Lactic acid 1.8 0.4 - 2.0 MMOL/L

## 2021-09-01 ENCOUNTER — APPOINTMENT (OUTPATIENT)
Dept: ULTRASOUND IMAGING | Age: 56
DRG: 871 | End: 2021-09-01
Attending: STUDENT IN AN ORGANIZED HEALTH CARE EDUCATION/TRAINING PROGRAM
Payer: MEDICARE

## 2021-09-01 PROBLEM — R31.9 HEMATURIA: Status: ACTIVE | Noted: 2021-09-01

## 2021-09-01 LAB
ANION GAP SERPL CALC-SCNC: 10 MMOL/L (ref 5–15)
ANION GAP SERPL CALC-SCNC: 9 MMOL/L (ref 5–15)
ATRIAL RATE: 79 BPM
BASOPHILS # BLD: 0.1 K/UL (ref 0–0.1)
BASOPHILS NFR BLD: 0 % (ref 0–1)
BUN SERPL-MCNC: 10 MG/DL (ref 6–20)
BUN SERPL-MCNC: 8 MG/DL (ref 6–20)
BUN/CREAT SERPL: 10 (ref 12–20)
BUN/CREAT SERPL: 11 (ref 12–20)
CALCIUM SERPL-MCNC: 8.2 MG/DL (ref 8.5–10.1)
CALCIUM SERPL-MCNC: 8.2 MG/DL (ref 8.5–10.1)
CALCULATED P AXIS, ECG09: 78 DEGREES
CALCULATED R AXIS, ECG10: 66 DEGREES
CALCULATED T AXIS, ECG11: 63 DEGREES
CHLORIDE SERPL-SCNC: 100 MMOL/L (ref 97–108)
CHLORIDE SERPL-SCNC: 101 MMOL/L (ref 97–108)
CO2 SERPL-SCNC: 27 MMOL/L (ref 21–32)
CO2 SERPL-SCNC: 29 MMOL/L (ref 21–32)
CREAT SERPL-MCNC: 0.8 MG/DL (ref 0.7–1.3)
CREAT SERPL-MCNC: 0.93 MG/DL (ref 0.7–1.3)
DIAGNOSIS, 93000: NORMAL
DIFFERENTIAL METHOD BLD: ABNORMAL
EOSINOPHIL # BLD: 0 K/UL (ref 0–0.4)
EOSINOPHIL NFR BLD: 0 % (ref 0–7)
ERYTHROCYTE [DISTWIDTH] IN BLOOD BY AUTOMATED COUNT: 13.8 % (ref 11.5–14.5)
GLUCOSE SERPL-MCNC: 68 MG/DL (ref 65–100)
GLUCOSE SERPL-MCNC: 81 MG/DL (ref 65–100)
HCT VFR BLD AUTO: 32.5 % (ref 36.6–50.3)
HCT VFR BLD AUTO: 34.3 % (ref 36.6–50.3)
HGB BLD-MCNC: 11.4 G/DL (ref 12.1–17)
HGB BLD-MCNC: 11.9 G/DL (ref 12.1–17)
IMM GRANULOCYTES # BLD AUTO: 0.1 K/UL (ref 0–0.04)
IMM GRANULOCYTES NFR BLD AUTO: 1 % (ref 0–0.5)
LYMPHOCYTES # BLD: 1 K/UL (ref 0.8–3.5)
LYMPHOCYTES NFR BLD: 8 % (ref 12–49)
MAGNESIUM SERPL-MCNC: 1.5 MG/DL (ref 1.6–2.4)
MCH RBC QN AUTO: 32.6 PG (ref 26–34)
MCHC RBC AUTO-ENTMCNC: 34.7 G/DL (ref 30–36.5)
MCV RBC AUTO: 94 FL (ref 80–99)
MONOCYTES # BLD: 1.5 K/UL (ref 0–1)
MONOCYTES NFR BLD: 11 % (ref 5–13)
NEUTS SEG # BLD: 10.8 K/UL (ref 1.8–8)
NEUTS SEG NFR BLD: 80 % (ref 32–75)
NRBC # BLD: 0 K/UL (ref 0–0.01)
NRBC BLD-RTO: 0 PER 100 WBC
P-R INTERVAL, ECG05: 124 MS
PLATELET # BLD AUTO: 275 K/UL (ref 150–400)
PMV BLD AUTO: 10.7 FL (ref 8.9–12.9)
POTASSIUM SERPL-SCNC: 2.4 MMOL/L (ref 3.5–5.1)
POTASSIUM SERPL-SCNC: 2.9 MMOL/L (ref 3.5–5.1)
Q-T INTERVAL, ECG07: 386 MS
QRS DURATION, ECG06: 82 MS
QTC CALCULATION (BEZET), ECG08: 442 MS
RBC # BLD AUTO: 3.65 M/UL (ref 4.1–5.7)
SODIUM SERPL-SCNC: 137 MMOL/L (ref 136–145)
SODIUM SERPL-SCNC: 139 MMOL/L (ref 136–145)
VENTRICULAR RATE, ECG03: 79 BPM
WBC # BLD AUTO: 13.5 K/UL (ref 4.1–11.1)

## 2021-09-01 PROCEDURE — 80048 BASIC METABOLIC PNL TOTAL CA: CPT

## 2021-09-01 PROCEDURE — 74011000258 HC RX REV CODE- 258: Performed by: STUDENT IN AN ORGANIZED HEALTH CARE EDUCATION/TRAINING PROGRAM

## 2021-09-01 PROCEDURE — 83735 ASSAY OF MAGNESIUM: CPT

## 2021-09-01 PROCEDURE — 77030040361 HC SLV COMPR DVT MDII -B

## 2021-09-01 PROCEDURE — 85025 COMPLETE CBC W/AUTO DIFF WBC: CPT

## 2021-09-01 PROCEDURE — 74011250636 HC RX REV CODE- 250/636: Performed by: STUDENT IN AN ORGANIZED HEALTH CARE EDUCATION/TRAINING PROGRAM

## 2021-09-01 PROCEDURE — 85018 HEMOGLOBIN: CPT

## 2021-09-01 PROCEDURE — 65270000032 HC RM SEMIPRIVATE

## 2021-09-01 PROCEDURE — 74011250637 HC RX REV CODE- 250/637: Performed by: STUDENT IN AN ORGANIZED HEALTH CARE EDUCATION/TRAINING PROGRAM

## 2021-09-01 PROCEDURE — 99231 SBSQ HOSP IP/OBS SF/LOW 25: CPT | Performed by: SURGERY

## 2021-09-01 PROCEDURE — 76770 US EXAM ABDO BACK WALL COMP: CPT

## 2021-09-01 PROCEDURE — 2709999900 HC NON-CHARGEABLE SUPPLY

## 2021-09-01 PROCEDURE — 74011250636 HC RX REV CODE- 250/636: Performed by: INTERNAL MEDICINE

## 2021-09-01 PROCEDURE — 51798 US URINE CAPACITY MEASURE: CPT

## 2021-09-01 PROCEDURE — 36415 COLL VENOUS BLD VENIPUNCTURE: CPT

## 2021-09-01 RX ORDER — ACETAMINOPHEN 500 MG
1000 TABLET ORAL
Status: DISCONTINUED | OUTPATIENT
Start: 2021-09-01 | End: 2021-09-02 | Stop reason: HOSPADM

## 2021-09-01 RX ORDER — NALOXONE HYDROCHLORIDE 0.4 MG/ML
0.4 INJECTION, SOLUTION INTRAMUSCULAR; INTRAVENOUS; SUBCUTANEOUS
Status: DISCONTINUED | OUTPATIENT
Start: 2021-09-01 | End: 2021-09-02 | Stop reason: HOSPADM

## 2021-09-01 RX ORDER — OXYCODONE HYDROCHLORIDE 5 MG/1
5 TABLET ORAL
Status: DISCONTINUED | OUTPATIENT
Start: 2021-09-01 | End: 2021-09-02 | Stop reason: HOSPADM

## 2021-09-01 RX ORDER — OXYCODONE AND ACETAMINOPHEN 5; 325 MG/1; MG/1
1 TABLET ORAL
Status: DISCONTINUED | OUTPATIENT
Start: 2021-09-01 | End: 2021-09-01

## 2021-09-01 RX ORDER — MAGNESIUM SULFATE HEPTAHYDRATE 40 MG/ML
2 INJECTION, SOLUTION INTRAVENOUS ONCE
Status: COMPLETED | OUTPATIENT
Start: 2021-09-01 | End: 2021-09-01

## 2021-09-01 RX ORDER — FAMOTIDINE 20 MG/1
20 TABLET, FILM COATED ORAL
COMMUNITY

## 2021-09-01 RX ORDER — OXYCODONE HYDROCHLORIDE 5 MG/1
10 TABLET ORAL
Status: DISCONTINUED | OUTPATIENT
Start: 2021-09-01 | End: 2021-09-02 | Stop reason: HOSPADM

## 2021-09-01 RX ORDER — MIRTAZAPINE 15 MG/1
15 TABLET, FILM COATED ORAL
COMMUNITY

## 2021-09-01 RX ORDER — POTASSIUM CHLORIDE 750 MG/1
20 TABLET, FILM COATED, EXTENDED RELEASE ORAL
Status: DISCONTINUED | OUTPATIENT
Start: 2021-09-01 | End: 2021-09-01

## 2021-09-01 RX ORDER — POTASSIUM CHLORIDE 7.45 MG/ML
10 INJECTION INTRAVENOUS
Status: COMPLETED | OUTPATIENT
Start: 2021-09-01 | End: 2021-09-01

## 2021-09-01 RX ORDER — FINASTERIDE 5 MG/1
5 TABLET, FILM COATED ORAL DAILY
COMMUNITY

## 2021-09-01 RX ADMIN — POTASSIUM CHLORIDE 10 MEQ: 7.46 INJECTION, SOLUTION INTRAVENOUS at 05:49

## 2021-09-01 RX ADMIN — ONDANSETRON 4 MG: 4 TABLET, ORALLY DISINTEGRATING ORAL at 09:29

## 2021-09-01 RX ADMIN — POTASSIUM CHLORIDE 10 MEQ: 7.46 INJECTION, SOLUTION INTRAVENOUS at 09:05

## 2021-09-01 RX ADMIN — MAGNESIUM SULFATE 2 G: 2 INJECTION INTRAVENOUS at 09:06

## 2021-09-01 RX ADMIN — Medication 10 ML: at 09:37

## 2021-09-01 RX ADMIN — Medication 10 ML: at 23:50

## 2021-09-01 RX ADMIN — CEFEPIME HYDROCHLORIDE 2 G: 2 INJECTION, POWDER, FOR SOLUTION INTRAVENOUS at 09:35

## 2021-09-01 RX ADMIN — LORAZEPAM 1 MG: 2 INJECTION INTRAMUSCULAR; INTRAVENOUS at 13:07

## 2021-09-01 RX ADMIN — Medication 100 MG: at 09:27

## 2021-09-01 RX ADMIN — Medication 10 ML: at 16:49

## 2021-09-01 RX ADMIN — VANCOMYCIN HYDROCHLORIDE 1000 MG: 1 INJECTION, POWDER, LYOPHILIZED, FOR SOLUTION INTRAVENOUS at 17:18

## 2021-09-01 RX ADMIN — POTASSIUM CHLORIDE 10 MEQ: 7.46 INJECTION, SOLUTION INTRAVENOUS at 04:36

## 2021-09-01 RX ADMIN — POTASSIUM BICARBONATE 40 MEQ: 391 TABLET, EFFERVESCENT ORAL at 19:25

## 2021-09-01 RX ADMIN — POTASSIUM BICARBONATE 40 MEQ: 391 TABLET, EFFERVESCENT ORAL at 20:33

## 2021-09-01 RX ADMIN — CEFEPIME HYDROCHLORIDE 2 G: 2 INJECTION, POWDER, FOR SOLUTION INTRAVENOUS at 20:32

## 2021-09-01 RX ADMIN — SODIUM CHLORIDE, POTASSIUM CHLORIDE, SODIUM LACTATE AND CALCIUM CHLORIDE 150 ML/HR: 600; 310; 30; 20 INJECTION, SOLUTION INTRAVENOUS at 07:48

## 2021-09-01 RX ADMIN — SODIUM CHLORIDE, POTASSIUM CHLORIDE, SODIUM LACTATE AND CALCIUM CHLORIDE 75 ML/HR: 600; 310; 30; 20 INJECTION, SOLUTION INTRAVENOUS at 17:18

## 2021-09-01 RX ADMIN — OXYCODONE HYDROCHLORIDE AND ACETAMINOPHEN 1 TABLET: 5; 325 TABLET ORAL at 11:53

## 2021-09-01 RX ADMIN — ONDANSETRON 4 MG: 4 TABLET, ORALLY DISINTEGRATING ORAL at 19:25

## 2021-09-01 RX ADMIN — TRAMADOL HYDROCHLORIDE 50 MG: 50 TABLET, FILM COATED ORAL at 06:56

## 2021-09-01 RX ADMIN — POTASSIUM CHLORIDE 10 MEQ: 7.46 INJECTION, SOLUTION INTRAVENOUS at 06:56

## 2021-09-01 RX ADMIN — FOLIC ACID 1 MG: 1 TABLET ORAL at 09:26

## 2021-09-01 NOTE — PROGRESS NOTES
0710) Bedside shift change report given to Ariela Rose RN (oncoming nurse) by Mena Stephens, EHSAN (offgoing nurse). Report included the following information SBAR, Kardex, [facility] and Cardiac Rhythm NSR with PAC's.   0934) Consult Dr. Shasta Canales, pt able to take oral pills, pt request another pain medication. Urine still bloody  1115) IDR with Dr. Shasta Canales (MD), Luis Gracia and Pili Jacome (), Cm Saenz ([address] [place] [address]) Yoandy Preciado, EHSAN (nurse supervisor), Vivienne Case (wound RN/infection control), and Mena Stephens (RN) to discuss plan of care including pt BP elevated, pt pain, DVT prevention, bladder irrigation. 26) [facility]. Pt bladder was manually irrigated five times with 60 mL of sterile water. Several blood blots visualized. 1307) 1 mg of ativan given for CIWA 8. Pt vomiting, sweaty, with tremors in hands  1310) Pt bladder irrigated with 20 mL, large clot  1650) Dr. Micah Rodriguez at bedside. Bladder scan--no urine found (only 10 mL for bladder balloon). 1655) Discuss BP was in 170's with Dr. Shasta Canales, now 252  1922) Consult Dr. Shasta Canales for potassium 2.9. Order for two doses of 40 mEq potassium. 1920) Bedside shift change report given to Emani Sharma RN (oncoming nurse) by Mena Stephens RN (offgoing nurse). Report included the following information SBAR, Kardex, MAR and Cardiac Rhythm NSR. Pt large amount of emesis. Gown changed.

## 2021-09-01 NOTE — PROGRESS NOTES
Physician Progress Note      PATIENT:               La Seth  CSN #:                  [id number]  :                       1965  ADMIT DATE:       2021 9:14 AM  DISCH DATE:  RESPONDING  PROVIDER #:        Junette Prader MD          QUERY TEXT:    Pt admitted with Urinary pain and decreased appetite. Pt noted to have HR: 100; RR: 20, Tmax: 100.3; and WBC: 15.7. If possible, please document in the progress notes and discharge summary if you are evaluating and /or treating any of the following: The medical record reflects the following:  Risk Factors: C/o Urinary pain and decreased appetite; Hx: Rectal CA/ HTN/ Smoker  Clinical Indicators: tmax: 100.3; hr: 100; rr: 20. ...wbc: 15.7; bands: 13.0; h/h:13.4/38. 2. Aidan Brittle ^ 11.4/32.5.... Aidan Brittle Aidan Brittle Bun/Cr: 9/1.13. .^ 8/0.80; Alk phos: 179; Lac acid: 1.8. Aidan Brittle Aidan Brittle Aidan Brittle UA: le: large; wbc: 20-50; bact: 4+. .. Aidan Brittle Aidan Brittle Repeat UA: le: mod; wbc: 10-20; bact: neg. .. Aidan Brittle UCx: >100,000 colonies/ml; PROBABLE ALPHA STREPTOCOCCUS. ....... US Scrotum w/ Testicles: large right anechoic hydrocele. ... CT Abd/Pelv: A/C Pancreatitis. .. Aidan Brittle Aidan Brittle Interval development B/L hydronephrosis and hydroureter and cystitis==> probably 2/2 Enteritis; Persistent or Recurrent distal SBO 2/2 Enteritis  Treatment: IVF NS Bolus; IV Abx: PO Tylenol; Lac acid; UA/UCx; US Scrotum; CT Abd/Pelvis; BCx: Trend creatinine; Marrero catheter for decompression and reevaluation of hydronephrosis and hydroureter  ? Thank you,    Jeff Patel  [facility]    Options provided:  -- Sepsis, present on admission  -- Sepsis, present on admission now resolved  -- Sepsis, not present on admission  -- Sepsis was ruled out  -- Other - I will add my own diagnosis  -- Disagree - Not applicable / Not valid  -- Disagree - Clinically unable to determine / Unknown  -- Refer to Clinical Documentation Reviewer    PROVIDER RESPONSE TEXT:    This patient has Sepsis which was present on admission.     Query created by: Baljit Dioxn on 2021 2:38 PM      Electronically signed by:  Abad Little MD 9/1/2021 3:28 PM

## 2021-09-01 NOTE — PROGRESS NOTES
Hospitalist Progress Note    NAME: Alpesh Bernal   :  1965   MRN:  [id number]   Room Number:  293/76  @ [facility]       Interim Hospital Summary: 60 y.o. male whom presented on 2021 with      Assessment / Plan:    #Acute kidney injury POA resolving   #Severe b/l hydronephrosis and hydroureter POA resolving  d/t UB blood clot s/p evacuation w/ mondragon catheter   # Gross hematuria   #Acute cystitis  # sepsis d/t cystitis   -Patient complaining of dysuria abdominal pain and difficulty in urination  -CT abdomen pelvis reviewed in dependentlyBilateral new severe hydronephrosis and hydroureter to the level of the bladder. Mild diffuse bladder wall thickening.  -Serum creatinine 1.17(  baseline 0.69) , UA with blood leukoesterase this bacteria and WBC, serum white count 15   - passage of blood clot w/ mondragon            -Patient is accepted to [facility] [place] by Dr. Zaki Tabares as primary and urology to consult but given no bed availability, patient to be temporarily admitted to [facility] until he has a bed  --US retroperitoneum, w/ minimal left hydronephrosis   - manual bladder irrigation w/ clot evacuation   - Continue IV vanc and Cefepime    -Urine culture/ Blood CTX   -Trend creatinine  -Trend H&H's  -Transfuse to keep hemoglobin above 7  -Hold DVT prophylaxis           #Small bowel obstruction resolving  -  surgery on board  - IVF   - passing gas and BM   - advance diet      #Acute on chronic pancreatitis lipase 74  #Rectal cancer status post chemoradiation  #History of BPH  #Alcohol use  - [facility] protocol   - thiamine and folic acid      # Tobacco use   - Patient was counseled extensively on the need to abstain from tobacco, its addictive tendencies, its deleterious effects on the lungs, cardiovascular  as well as its financial & social sequelae              Body mass index is 18.4 kg/m².   Code Status: Full   Surrogate Decision Maker:     DVT Prophylaxis: Lovenox and SCD's  GI Prophylaxis: not indicated             Subjective:     Chief Complaint / Reason for Physician Visit  Lower abdominal pain . Discussed with RN events overnight. Review of Systems:  No fevers, chills, appetite change, cough, sputum production, shortness of breath, dyspnea on exertion, nausea, vomitting, diarrhea, constipation, chest pain, leg edema, abdominal pain, joint pain, rash, itching. Tolerating PT/OT. Tolerating diet. Objective:     VITALS:   Last 24hrs VS reviewed since prior progress note. Most recent are:  Patient Vitals for the past 24 hrs:   Temp Pulse Resp BP SpO2   09/01/21 0742 98.3 °F (36.8 °C) 78 16 (!) 164/85 99 %   09/01/21 0409 97.4 °F (36.3 °C) 88 18 (!) 142/72 98 %   09/01/21 0110 98.6 °F (37 °C) (!) 101 18 (!) 152/72 98 %   08/31/21 2215 -- -- -- -- 98 %   08/31/21 2139 98 °F (36.7 °C) 83 20 (!) 162/86 98 %   08/31/21 2054 99 °F (37.2 °C) 89 20 (!) 145/86 99 %   08/31/21 1931 100.3 °F (37.9 °C) 100 19 (!) 152/89 100 %   08/31/21 1500 -- 88 -- (!) 146/87 100 %   08/31/21 0903 98.7 °F (37.1 °C) 84 18 (!) 148/82 99 %       Intake/Output Summary (Last 24 hours) at 9/1/2021 0848  Last data filed at 9/1/2021 0600  Gross per 24 hour   Intake 2795 ml   Output 1250 ml   Net 1545 ml        PHYSICAL EXAM:  General: WD, WN. Alert, cooperative, no acute distress    EENT:  EOMI. Anicteric sclerae. MMM  Resp:  CTA bilaterally, no wheezing or rales. No accessory muscle use  CV:  Regular  rhythm,  normal S1/S2, no murmurs rubs gallops, No edema  GI:  Soft, Non distended, TTP lower abdomen . +Bowel sounds  Neurologic:  Alert and oriented X 3, normal speech,   Psych:   Good insight. Not anxious nor agitated  Skin:  No rashes.   No jaundice    Reviewed most current lab test results and cultures  YES  Reviewed most current radiology test results   YES  Review and summation of old records today    NO  Reviewed patient's current orders and MAR    YES  PMH/SH reviewed - no change compared to H&P  ________________________________________________________________________  Care Plan discussed with:    Comments   Patient x    Family      RN x    Care Manager x    Consultant  x GS                     x Multidiciplinary team rounds were held today with , nursing, pharmacist and clinical coordinator. Patient's plan of care was discussed; medications were reviewed and discharge planning was addressed. ________________________________________________________________________  Total NON critical care TIME:25 Minutes    Total CRITICAL CARE TIME Spent:   Minutes non procedure based      Comments   >50% of visit spent in counseling and coordination of care x    ________________________________________________________________________  Leah Allison MD     Procedures: see electronic medical records for all procedures/Xrays and details which were not copied into this note but were reviewed prior to creation of Plan. LABS:  I reviewed today's most current labs and imaging studies.   Pertinent labs include:  Recent Labs     09/01/21 0220 08/31/21 2035 08/31/21  1006   WBC 13.5*  --  15.7*   HGB 11.9* 12.7 13.4   HCT 34.3* 35.9* 38.2     --  300     Recent Labs     09/01/21 0220 08/31/21  1006    135*   K 2.4* 3.4*    96*   CO2 29 33*   GLU 81 123*   BUN 10 9   CREA 0.93 1.13   CA 8.2* 8.6   MG 1.5*  --    ALB  --  2.1*   TBILI  --  0.7   ALT  --  65       Signed: Leah Allison MD

## 2021-09-01 NOTE — ED NOTES
TRANSFER - OUT REPORT:    Verbal report given to [facility]) on Melissa Gilmore  being transferred to telemetry(unit) for routine progression of care       Report consisted of patients Situation, Background, Assessment and   Recommendations(SBAR). Information from the following report(s) SBAR, ED Summary, [facility] and Recent Results was reviewed with the receiving nurse. Lines:   Venous Access Device Alma Cath (Active)       Peripheral IV 08/31/21 Right Wrist (Active)   Site Assessment Clean, dry, & intact 08/31/21 2042   Phlebitis Assessment 0 08/31/21 2042   Infiltration Assessment 0 08/31/21 2042   Dressing Status Clean, dry, & intact 08/31/21 2042   Dressing Type Transparent 08/31/21 2042   Hub Color/Line Status Pink;Flushed 08/31/21 2042        Opportunity for questions and clarification was provided.       Patient transported with:   Monitor

## 2021-09-01 NOTE — PROGRESS NOTES
KENTRELL  RUR: 22%    1656  CM attempted to assess patient, was asleep at the time. On chart sisters are listed as decisionmaker, they did not answer any questions for me. They asked me to call his wife, but they are . Will call wife for further questions. Ongoing assessment. Full assessment to follow.      HARMONY ThackerN, [place], [place]  [phone]

## 2021-09-01 NOTE — PROGRESS NOTES
[facility] Ad[facility] Pharmacy Medication Reconciliation- IN PROGRESS    Information obtained from: [facility] available1:Yes     Comments/recommendations:    Unable to reach patient for medication reconciliation. Per medication reconciliation progress notes on 5/21/21 and 12/21/20 , patient is a poor historian. PTA medication list updated from recent dispense history from [facility]. Compliance could not be assessed. Please interpret PTA list with caution. Pharmacy to re-attempt to speak to patient tomorrow. Per [facility], a 90 days supply of all PTA medications were last dispensed on 8/2/21. Medication changes (since last review): Added   Finasteride   Mirtazapine   Famotidine  Removed   Aspirin   Vitamin D   Vitamin N-86   Folic Acid   Pantoprazole   Thiamine  Adjusted   Metoprolol-adjusted to 12.5 mg bid     [facility] [employer] () was accessed to determine fill history of any controlled medications. Last filled 1 day supply of oxycodone 5mg Qty 6; 1 DS on 6/14/21   [facility] reflects medications filled and processed through the patient's insurance, however                this data does NOT capture whether the medication was picked up or is currently being taken by the patient. Patient allergies: Allergies as of 08/31/2021 - Fully Reviewed 08/31/2021   Allergen Reaction Noted    Influenza virus vaccines Anaphylaxis 12/24/2020     Prior to Admission Medications   Prescriptions Last Dose Informant Patient Reported? Taking?   famotidine (Pepcid) 20 mg tablet   Yes Yes   Sig: Take 20 mg by mouth nightly as needed for Heartburn. finasteride (PROSCAR) 5 mg tablet   Yes Yes   Sig: Take 5 mg by mouth daily. metoprolol tartrate (LOPRESSOR) 25 mg tablet  Self Yes Yes   Sig: Take 12.5 mg by mouth two (2) times a day. mirtazapine (REMERON) 15 mg tablet   Yes Yes   Sig: Take 15 mg by mouth nightly. Facility-Administered Medications: None     Thank you,     Albert Roberson, PharmD   Contact: [phone]

## 2021-09-01 NOTE — PROGRESS NOTES
Patient passing blood clots in the mondragon Catheter. Dot have services to do Manual or  continuous  bladder irrigation for clot evacuation.  Patient is accepted to [facility] for urology eval. awaiting bed   Come H/H monitoring

## 2021-09-01 NOTE — PROGRESS NOTES
Primary Nurse Lance Mcpherson RN and [facility], RN performed a dual skin assessment on this patient No impairment noted  Jack score is 23  Enlarged scrotum noted.

## 2021-09-01 NOTE — PROGRESS NOTES
Telemed: Pt is NPO. can we change the PO potassium order to IV please.     Plan:  Potassium replacement changed to IV

## 2021-09-01 NOTE — PROGRESS NOTES
[facility] Dosing Services: Antimicrobial Stewardship Daily Doc  Consult for dosing of vancomycin by Dr. Srikanth Bekcman  Indication: UTI  Day of Therapy: 2    Ht Readings from Last 1 Encounters:   08/31/21 172.7 cm (68\")        Wt Readings from Last 1 Encounters:   08/31/21 49.3 kg (108 lb 11.2 oz)        Vancomycin therapy:  Previous maintenance dose was not ordered. Will order vancomycin 1000 mg IV x 1 followed by maintenance dose vancomycin 750 mg IV every 12 hours   Last level:  mcg/mL  Dose calculated to approximate a           a. Target AUC/FILIPE of 400-600          b. Trough of 10-15 mcg/mL     Dose administration notes:   Doses given appropriately as scheduled    Other Antimicrobial   (not dosed by pharmacist) cefepime   Cultures 8/31 urine: Probable alpha Strep, Probable Enterocccus (prelim)  8/31 blood: In process   Serum Creatinine Lab Results   Component Value Date/Time    Creatinine 0.80 09/01/2021 10:37 AM       Creatinine Clearance Estimated Creatinine Clearance: 70.2 mL/min (based on SCr of 0.8 mg/dL). Temp Temp: 97.9 °F (36.6 °C)     WBC Lab Results   Component Value Date/Time    WBC 13.5 (H) 09/01/2021 02:20 AM      Procalcitonin No results found for: PCT   For Antifungals, Metronidazole and Nafcillin: Lab Results   Component Value Date/Time    ALT (SGPT) 65 08/31/2021 10:06 AM    AST (SGOT) 81 (H) 08/31/2021 10:06 AM    Alk.  phosphatase 179 (H) 08/31/2021 10:06 AM    Bilirubin, total 0.7 08/31/2021 10:06 AM        Thank you,  Daniela Burleson, PharmD, BCPS  [phone]

## 2021-09-01 NOTE — PROGRESS NOTES
TRANSFER - IN REPORT:    Verbal report received from [facility]) on Amelia Pastor  being received from ED(unit) for routine progression of care      Report consisted of patients Situation, Background, Assessment and   Recommendations(SBAR). Information from the following report(s) Kardex, ED Summary, Procedure Summary, Intake/Output, MAR and Recent Results was reviewed with the receiving nurse. Opportunity for questions and clarification was provided. Assessment completed upon patients arrival to unit and care assumed.

## 2021-09-01 NOTE — CONSULTS
Patient seen at request of Dr. Catalina Johnson. Information obtained from patient and review of chart. Jennifer Quinones is an 60 y.o. male who was recently admitted with TATE, bilateral hydronephrosis, hydroureter and acute cystitis as well as a small bowel obstruction. Mr. Christina Rodriguez has been experiencing difficulty with urination as well as dysuria for the past few days. Associated lower abdominal pain. No fevers or chills. Nausea but no emesis. Mr. Christina Rodriguez has been experiencing diarrhea. No bleeding per rectum. He has otherwise been in his usual state of health. Of note, Mr. Christina Rodriguez received neoadjuvant chemoradiation therapy prior to [facility] for a T3N0 rectal adenocarcinoma. Scrotal ultrasound - 8/31/2021 - Large right anechoic hydrocele No evidence for testicular torsion or hernia. CT scan abdomen/pelvis with IV contrast - 8/31/2021 - Significant improvement of acute superimposed on chronic pancreatitis Interval development of bilateral hydronephrosis and hydroureter and cystitis, probably secondary to enteritis  Persistent or recurrent distal small bowel obstruction secondary to enteritis, with a similar pattern as that seen in May, 2021. Right scrotal simple hydrocele demonstrated on recent ultrasound. A mondragon catheter has been placed with bloody appearing urine with clots. Retroperitoneal ultrasound - today - Minimal left hydronephrosis. Otherwise unremarkable. Allergies - Influenza virus vaccines    Meds - Reviewed. PMH -   Past Medical History:   Diagnosis Date    Cancer [facility])     rectal    Gastrointestinal disorder     Gastric Ulcers; Rectal CA    Hematuria 9/1/2021    Hypertension      PSH - History reviewed. No pertinent surgical history.     Fam Hx -   Family History   Problem Relation Age of Onset    Cancer Brother      Soc Hx -   Social History     Tobacco Use    Smoking status: Current Every Day Smoker     Packs/day: 0.25    Smokeless tobacco: Never Used Substance Use Topics    Alcohol use: Yes     Comment: 2-3 beers daily     Patient is a thin man in no acute distress. Visit Vitals  BP (!) 151/86   Pulse 86   Temp 98 °F (36.7 °C)   Resp 14   Ht 5' 8\" (1.727 m)   Wt 108 lb 11.2 oz (49.3 kg)   SpO2 100%   BMI 16.53 kg/m²     HEENT: Anicteric. Neck: Supple without palpable lymphadenopathy. Cor: RRR. Chest: Alma Cath site is clean. Lungs: Clear to auscultation. Bilateral breath sounds. Abd: Soft. Non distended. Non tender. No associated guarding or rebound. : Marrero in place with bloody urine and clots. Ext: No edema.   Neuro: Grossly Non focal.     Labs -   Recent Results (from the past 24 hour(s))   EKG, 12 LEAD, INITIAL    Collection Time: 08/31/21  8:13 PM   Result Value Ref Range    Ventricular Rate 79 BPM    Atrial Rate 79 BPM    P-R Interval 124 ms    QRS Duration 82 ms    Q-T Interval 386 ms    QTC Calculation (Bezet) 442 ms    Calculated P Axis 78 degrees    Calculated R Axis 66 degrees    Calculated T Axis 63 degrees    Diagnosis       Sinus rhythm with premature atrial complexes  Otherwise normal ECG  When compared with ECG of 20-MAY-2021 16:43,  premature atrial complexes are now present    Confirmed by Irina Martin M.D., [place] ([id number]) on 9/1/2021 8:41:41 AM     LACTIC ACID    Collection Time: 08/31/21  8:35 PM   Result Value Ref Range    Lactic acid 1.1 0.4 - 2.0 MMOL/L   URINALYSIS W/ REFLEX CULTURE    Collection Time: 08/31/21  8:35 PM    Specimen: Urine   Result Value Ref Range    Color RED      Appearance BLOODY (A) CLEAR      Specific gravity 1.010 1.003 - 1.030      pH (UA) 7.5 5.0 - 8.0      Protein >300 (A) NEG mg/dL    Glucose Negative NEG mg/dL    Ketone Negative NEG mg/dL    Bilirubin Negative NEG      Blood LARGE (A) NEG      Urobilinogen 1.0 0.2 - 1.0 EU/dL    Nitrites Negative NEG      Leukocyte Esterase MODERATE (A) NEG      WBC 10-20 0 - 4 /hpf    RBC >100 (H) 0 - 5 /hpf    Epithelial cells FEW FEW /lpf Bacteria Negative NEG /hpf    UA:UC IF INDICATED URINE CULTURE ORDERED (A) CNI     HGB & HCT    Collection Time: 08/31/21  8:35 PM   Result Value Ref Range    HGB 12.7 12.1 - 17.0 g/dL    HCT 35.9 (L) 36.6 - 50.3 %   LIPASE    Collection Time: 08/31/21  8:35 PM   Result Value Ref Range    Lipase 74 73 - 393 U/L   CULTURE, BLOOD, PAIRED    Collection Time: 08/31/21  8:35 PM    Specimen: Blood   Result Value Ref Range    Special Requests: NO SPECIAL REQUESTS      Culture result: NO GROWTH AFTER 6 HOURS     CBC WITH AUTOMATED DIFF    Collection Time: 09/01/21  2:20 AM   Result Value Ref Range    WBC 13.5 (H) 4.1 - 11.1 K/uL    RBC 3.65 (L) 4.10 - 5.70 M/uL    HGB 11.9 (L) 12.1 - 17.0 g/dL    HCT 34.3 (L) 36.6 - 50.3 %    MCV 94.0 80.0 - 99.0 FL    MCH 32.6 26.0 - 34.0 PG    MCHC 34.7 30.0 - 36.5 g/dL    RDW 13.8 11.5 - 14.5 %    PLATELET 432 507 - 521 K/uL    MPV 10.7 8.9 - 12.9 FL    NRBC 0.0 0  WBC    ABSOLUTE NRBC 0.00 0.00 - 0.01 K/uL    NEUTROPHILS 80 (H) 32 - 75 %    LYMPHOCYTES 8 (L) 12 - 49 %    MONOCYTES 11 5 - 13 %    EOSINOPHILS 0 0 - 7 %    BASOPHILS 0 0 - 1 %    IMMATURE GRANULOCYTES 1 (H) 0.0 - 0.5 %    ABS. NEUTROPHILS 10.8 (H) 1.8 - 8.0 K/UL    ABS. LYMPHOCYTES 1.0 0.8 - 3.5 K/UL    ABS. MONOCYTES 1.5 (H) 0.0 - 1.0 K/UL    ABS. EOSINOPHILS 0.0 0.0 - 0.4 K/UL    ABS. BASOPHILS 0.1 0.0 - 0.1 K/UL    ABS. IMM.  GRANS. 0.1 (H) 0.00 - 0.04 K/UL    DF AUTOMATED     METABOLIC PANEL, BASIC    Collection Time: 09/01/21  2:20 AM   Result Value Ref Range    Sodium 139 136 - 145 mmol/L    Potassium 2.4 (LL) 3.5 - 5.1 mmol/L    Chloride 100 97 - 108 mmol/L    CO2 29 21 - 32 mmol/L    Anion gap 10 5 - 15 mmol/L    Glucose 81 65 - 100 mg/dL    BUN 10 6 - 20 MG/DL    Creatinine 0.93 0.70 - 1.30 MG/DL    BUN/Creatinine ratio 11 (L) 12 - 20      GFR est AA >60 >60 ml/min/1.73m2    GFR est non-AA >60 >60 ml/min/1.73m2    Calcium 8.2 (L) 8.5 - 10.1 MG/DL   MAGNESIUM    Collection Time: 09/01/21  2:20 AM   Result Value Ref Range    Magnesium 1.5 (L) 1.6 - 2.4 mg/dL   HGB & HCT    Collection Time: 09/01/21 10:37 AM   Result Value Ref Range    HGB 11.4 (L) 12.1 - 17.0 g/dL    HCT 32.5 (L) 36.6 - 47.3 %   METABOLIC PANEL, BASIC    Collection Time: 09/01/21 10:37 AM   Result Value Ref Range    Sodium 137 136 - 145 mmol/L    Potassium 2.9 (L) 3.5 - 5.1 mmol/L    Chloride 101 97 - 108 mmol/L    CO2 27 21 - 32 mmol/L    Anion gap 9 5 - 15 mmol/L    Glucose 68 65 - 100 mg/dL    BUN 8 6 - 20 MG/DL    Creatinine 0.80 0.70 - 1.30 MG/DL    BUN/Creatinine ratio 10 (L) 12 - 20      GFR est AA >60 >60 ml/min/1.73m2    GFR est non-AA >60 >60 ml/min/1.73m2    Calcium 8.2 (L) 8.5 - 10.1 MG/DL     Imaging studies - Reviewed. Imp: Mr. Jazz Lamar is a 60 y.o. male with bilateral hydronephrosis, hydroureter and a small bowel obstruction. Plan: 1. Clinically, SBO seems to have resolved. 2. Will try regular diet. 3. Continue IVF at 75 ml/hour for now. 4. Leave mondragon and irrigate as needed. 5. Will stop q 12 hour H and H as Hgb has been stable. 6. Labs in AM.    7. Pain medication and anti-emetics as needed. 8. Plans per Dr. Octavio Stinson - awaiting transfer to [facility] for Urology evaluation. Following.

## 2021-09-01 NOTE — PROGRESS NOTES
Telemed: Patient's potassium is 2.4. Plan: Chart reviewed.  Check mag, replete potassium and follow repeat labs

## 2021-09-02 ENCOUNTER — HOSPITAL ENCOUNTER (INPATIENT)
Age: 56
LOS: 6 days | Discharge: HOME HEALTH | DRG: 696 | End: 2021-09-08
Attending: INTERNAL MEDICINE | Admitting: GENERAL ACUTE CARE HOSPITAL
Payer: MEDICARE

## 2021-09-02 VITALS
HEIGHT: 68 IN | DIASTOLIC BLOOD PRESSURE: 94 MMHG | TEMPERATURE: 97.8 F | OXYGEN SATURATION: 100 % | RESPIRATION RATE: 18 BRPM | SYSTOLIC BLOOD PRESSURE: 178 MMHG | WEIGHT: 121.5 LBS | HEART RATE: 96 BPM | BODY MASS INDEX: 18.41 KG/M2

## 2021-09-02 DIAGNOSIS — N13.30 HYDRONEPHROSIS, BILATERAL: Primary | ICD-10-CM

## 2021-09-02 DIAGNOSIS — K56.609 SBO (SMALL BOWEL OBSTRUCTION) (HCC): ICD-10-CM

## 2021-09-02 LAB
ANION GAP SERPL CALC-SCNC: 6 MMOL/L (ref 5–15)
BACTERIA SPEC CULT: ABNORMAL
BACTERIA SPEC CULT: ABNORMAL
BACTERIA SPEC CULT: NORMAL
BASOPHILS # BLD: 0.1 K/UL (ref 0–0.1)
BASOPHILS NFR BLD: 1 % (ref 0–1)
BUN SERPL-MCNC: 6 MG/DL (ref 6–20)
BUN/CREAT SERPL: 8 (ref 12–20)
C TRACH RRNA SPEC QL NAA+PROBE: NEGATIVE
CALCIUM SERPL-MCNC: 8.2 MG/DL (ref 8.5–10.1)
CC UR VC: ABNORMAL
CHLORIDE SERPL-SCNC: 101 MMOL/L (ref 97–108)
CO2 SERPL-SCNC: 33 MMOL/L (ref 21–32)
CREAT SERPL-MCNC: 0.74 MG/DL (ref 0.7–1.3)
DIFFERENTIAL METHOD BLD: ABNORMAL
EOSINOPHIL # BLD: 0.1 K/UL (ref 0–0.4)
EOSINOPHIL NFR BLD: 1 % (ref 0–7)
ERYTHROCYTE [DISTWIDTH] IN BLOOD BY AUTOMATED COUNT: 13.7 % (ref 11.5–14.5)
GLUCOSE SERPL-MCNC: 100 MG/DL (ref 65–100)
HCT VFR BLD AUTO: 30 % (ref 36.6–50.3)
HGB BLD-MCNC: 10.6 G/DL (ref 12.1–17)
IMM GRANULOCYTES # BLD AUTO: 0.1 K/UL (ref 0–0.04)
IMM GRANULOCYTES NFR BLD AUTO: 1 % (ref 0–0.5)
LYMPHOCYTES # BLD: 1 K/UL (ref 0.8–3.5)
LYMPHOCYTES NFR BLD: 10 % (ref 12–49)
MAGNESIUM SERPL-MCNC: 1.5 MG/DL (ref 1.6–2.4)
MCH RBC QN AUTO: 33.2 PG (ref 26–34)
MCHC RBC AUTO-ENTMCNC: 35.3 G/DL (ref 30–36.5)
MCV RBC AUTO: 94 FL (ref 80–99)
MONOCYTES # BLD: 1.2 K/UL (ref 0–1)
MONOCYTES NFR BLD: 13 % (ref 5–13)
N GONORRHOEA RRNA SPEC QL NAA+PROBE: NEGATIVE
NEUTS SEG # BLD: 7.1 K/UL (ref 1.8–8)
NEUTS SEG NFR BLD: 74 % (ref 32–75)
NRBC # BLD: 0 K/UL (ref 0–0.01)
NRBC BLD-RTO: 0 PER 100 WBC
PLATELET # BLD AUTO: 272 K/UL (ref 150–400)
PMV BLD AUTO: 10.6 FL (ref 8.9–12.9)
POTASSIUM SERPL-SCNC: 2.9 MMOL/L (ref 3.5–5.1)
POTASSIUM SERPL-SCNC: 3.2 MMOL/L (ref 3.5–5.1)
RBC # BLD AUTO: 3.19 M/UL (ref 4.1–5.7)
SERVICE CMNT-IMP: ABNORMAL
SERVICE CMNT-IMP: NORMAL
SODIUM SERPL-SCNC: 140 MMOL/L (ref 136–145)
SPECIMEN SOURCE: NORMAL
WBC # BLD AUTO: 9.6 K/UL (ref 4.1–11.1)

## 2021-09-02 PROCEDURE — 74011000258 HC RX REV CODE- 258: Performed by: STUDENT IN AN ORGANIZED HEALTH CARE EDUCATION/TRAINING PROGRAM

## 2021-09-02 PROCEDURE — 36415 COLL VENOUS BLD VENIPUNCTURE: CPT

## 2021-09-02 PROCEDURE — 74011000258 HC RX REV CODE- 258: Performed by: GENERAL ACUTE CARE HOSPITAL

## 2021-09-02 PROCEDURE — 74011250637 HC RX REV CODE- 250/637: Performed by: STUDENT IN AN ORGANIZED HEALTH CARE EDUCATION/TRAINING PROGRAM

## 2021-09-02 PROCEDURE — 80048 BASIC METABOLIC PNL TOTAL CA: CPT

## 2021-09-02 PROCEDURE — 74011250636 HC RX REV CODE- 250/636: Performed by: FAMILY MEDICINE

## 2021-09-02 PROCEDURE — 84132 ASSAY OF SERUM POTASSIUM: CPT

## 2021-09-02 PROCEDURE — 99231 SBSQ HOSP IP/OBS SF/LOW 25: CPT | Performed by: SURGERY

## 2021-09-02 PROCEDURE — 74011250636 HC RX REV CODE- 250/636: Performed by: STUDENT IN AN ORGANIZED HEALTH CARE EDUCATION/TRAINING PROGRAM

## 2021-09-02 PROCEDURE — 74011250636 HC RX REV CODE- 250/636: Performed by: NURSE PRACTITIONER

## 2021-09-02 PROCEDURE — 85025 COMPLETE CBC W/AUTO DIFF WBC: CPT

## 2021-09-02 PROCEDURE — 83735 ASSAY OF MAGNESIUM: CPT

## 2021-09-02 PROCEDURE — 74011250636 HC RX REV CODE- 250/636: Performed by: GENERAL ACUTE CARE HOSPITAL

## 2021-09-02 PROCEDURE — 65270000029 HC RM PRIVATE

## 2021-09-02 PROCEDURE — 74011250637 HC RX REV CODE- 250/637: Performed by: SURGERY

## 2021-09-02 PROCEDURE — 74011000250 HC RX REV CODE- 250: Performed by: GENERAL ACUTE CARE HOSPITAL

## 2021-09-02 RX ORDER — SODIUM CHLORIDE 0.9 % (FLUSH) 0.9 %
5-40 SYRINGE (ML) INJECTION EVERY 8 HOURS
Status: CANCELLED | OUTPATIENT
Start: 2021-09-02

## 2021-09-02 RX ORDER — LANOLIN ALCOHOL/MO/W.PET/CERES
100 CREAM (GRAM) TOPICAL DAILY
Status: DISCONTINUED | OUTPATIENT
Start: 2021-09-03 | End: 2021-09-02

## 2021-09-02 RX ORDER — LIDOCAINE HYDROCHLORIDE 20 MG/ML
JELLY TOPICAL ONCE
Status: COMPLETED | OUTPATIENT
Start: 2021-09-02 | End: 2021-09-02

## 2021-09-02 RX ORDER — LANOLIN ALCOHOL/MO/W.PET/CERES
100 CREAM (GRAM) TOPICAL DAILY
Status: CANCELLED | OUTPATIENT
Start: 2021-09-03

## 2021-09-02 RX ORDER — OXYCODONE HYDROCHLORIDE 5 MG/1
10 TABLET ORAL
Status: CANCELLED | OUTPATIENT
Start: 2021-09-02

## 2021-09-02 RX ORDER — POLYETHYLENE GLYCOL 3350 17 G/17G
17 POWDER, FOR SOLUTION ORAL DAILY PRN
Status: DISCONTINUED | OUTPATIENT
Start: 2021-09-02 | End: 2021-09-07

## 2021-09-02 RX ORDER — ASPIRIN 325 MG/1
100 TABLET, FILM COATED ORAL DAILY
Status: DISCONTINUED | OUTPATIENT
Start: 2021-09-03 | End: 2021-09-08 | Stop reason: HOSPADM

## 2021-09-02 RX ORDER — ONDANSETRON 2 MG/ML
4 INJECTION INTRAMUSCULAR; INTRAVENOUS
Status: DISCONTINUED | OUTPATIENT
Start: 2021-09-02 | End: 2021-09-08 | Stop reason: HOSPADM

## 2021-09-02 RX ORDER — NALOXONE HYDROCHLORIDE 0.4 MG/ML
0.4 INJECTION, SOLUTION INTRAMUSCULAR; INTRAVENOUS; SUBCUTANEOUS
Status: CANCELLED | OUTPATIENT
Start: 2021-09-02

## 2021-09-02 RX ORDER — POLYETHYLENE GLYCOL 3350 17 G/17G
17 POWDER, FOR SOLUTION ORAL DAILY PRN
Status: CANCELLED | OUTPATIENT
Start: 2021-09-02

## 2021-09-02 RX ORDER — LORAZEPAM 2 MG/ML
2 INJECTION INTRAMUSCULAR
Status: CANCELLED | OUTPATIENT
Start: 2021-09-02

## 2021-09-02 RX ORDER — ONDANSETRON 4 MG/1
4 TABLET, ORALLY DISINTEGRATING ORAL
Status: CANCELLED | OUTPATIENT
Start: 2021-09-02

## 2021-09-02 RX ORDER — ACETAMINOPHEN 500 MG
1000 TABLET ORAL
Status: CANCELLED | OUTPATIENT
Start: 2021-09-02

## 2021-09-02 RX ORDER — POTASSIUM CHLORIDE 7.45 MG/ML
10 INJECTION INTRAVENOUS ONCE
Status: COMPLETED | OUTPATIENT
Start: 2021-09-02 | End: 2021-09-02

## 2021-09-02 RX ORDER — SODIUM CHLORIDE 0.9 % (FLUSH) 0.9 %
5-40 SYRINGE (ML) INJECTION AS NEEDED
Status: CANCELLED | OUTPATIENT
Start: 2021-09-02

## 2021-09-02 RX ORDER — ONDANSETRON 4 MG/1
4 TABLET, ORALLY DISINTEGRATING ORAL
Status: DISCONTINUED | OUTPATIENT
Start: 2021-09-02 | End: 2021-09-08 | Stop reason: HOSPADM

## 2021-09-02 RX ORDER — LORAZEPAM 2 MG/ML
1 INJECTION INTRAMUSCULAR
Status: CANCELLED | OUTPATIENT
Start: 2021-09-02

## 2021-09-02 RX ORDER — MAGNESIUM SULFATE HEPTAHYDRATE 40 MG/ML
2 INJECTION, SOLUTION INTRAVENOUS ONCE
Status: COMPLETED | OUTPATIENT
Start: 2021-09-02 | End: 2021-09-02

## 2021-09-02 RX ORDER — LORAZEPAM 2 MG/ML
1 INJECTION INTRAMUSCULAR
Status: DISCONTINUED | OUTPATIENT
Start: 2021-09-02 | End: 2021-09-08 | Stop reason: HOSPADM

## 2021-09-02 RX ORDER — SODIUM CHLORIDE 0.9 % (FLUSH) 0.9 %
5-40 SYRINGE (ML) INJECTION EVERY 8 HOURS
Status: DISCONTINUED | OUTPATIENT
Start: 2021-09-02 | End: 2021-09-08 | Stop reason: HOSPADM

## 2021-09-02 RX ORDER — ACETAMINOPHEN 500 MG
1000 TABLET ORAL
Status: DISCONTINUED | OUTPATIENT
Start: 2021-09-02 | End: 2021-09-08 | Stop reason: HOSPADM

## 2021-09-02 RX ORDER — FENTANYL CITRATE 50 UG/ML
25 INJECTION, SOLUTION INTRAMUSCULAR; INTRAVENOUS ONCE
Status: COMPLETED | OUTPATIENT
Start: 2021-09-02 | End: 2021-09-02

## 2021-09-02 RX ORDER — FOLIC ACID 1 MG/1
1 TABLET ORAL DAILY
Status: DISCONTINUED | OUTPATIENT
Start: 2021-09-03 | End: 2021-09-08 | Stop reason: HOSPADM

## 2021-09-02 RX ORDER — FOLIC ACID 1 MG/1
1 TABLET ORAL DAILY
Status: CANCELLED | OUTPATIENT
Start: 2021-09-03

## 2021-09-02 RX ORDER — LIDOCAINE HYDROCHLORIDE 20 MG/ML
JELLY TOPICAL ONCE
Status: ACTIVE | OUTPATIENT
Start: 2021-09-02 | End: 2021-09-03

## 2021-09-02 RX ORDER — ONDANSETRON 2 MG/ML
4 INJECTION INTRAMUSCULAR; INTRAVENOUS
Status: CANCELLED | OUTPATIENT
Start: 2021-09-02

## 2021-09-02 RX ORDER — SODIUM CHLORIDE 0.9 % (FLUSH) 0.9 %
5-40 SYRINGE (ML) INJECTION AS NEEDED
Status: DISCONTINUED | OUTPATIENT
Start: 2021-09-02 | End: 2021-09-08 | Stop reason: HOSPADM

## 2021-09-02 RX ORDER — POTASSIUM CHLORIDE 7.45 MG/ML
10 INJECTION INTRAVENOUS
Status: DISPENSED | OUTPATIENT
Start: 2021-09-02 | End: 2021-09-02

## 2021-09-02 RX ORDER — OXYCODONE HYDROCHLORIDE 5 MG/1
10 TABLET ORAL
Status: DISCONTINUED | OUTPATIENT
Start: 2021-09-02 | End: 2021-09-08 | Stop reason: HOSPADM

## 2021-09-02 RX ORDER — LORAZEPAM 2 MG/ML
2 INJECTION INTRAMUSCULAR
Status: DISCONTINUED | OUTPATIENT
Start: 2021-09-02 | End: 2021-09-08 | Stop reason: HOSPADM

## 2021-09-02 RX ORDER — NALOXONE HYDROCHLORIDE 0.4 MG/ML
0.4 INJECTION, SOLUTION INTRAMUSCULAR; INTRAVENOUS; SUBCUTANEOUS
Status: DISCONTINUED | OUTPATIENT
Start: 2021-09-02 | End: 2021-09-08 | Stop reason: HOSPADM

## 2021-09-02 RX ORDER — ONDANSETRON 2 MG/ML
4 INJECTION INTRAMUSCULAR; INTRAVENOUS
Status: DISCONTINUED | OUTPATIENT
Start: 2021-09-02 | End: 2021-09-02 | Stop reason: HOSPADM

## 2021-09-02 RX ADMIN — OXYCODONE HYDROCHLORIDE 10 MG: 5 TABLET ORAL at 15:50

## 2021-09-02 RX ADMIN — LIDOCAINE HYDROCHLORIDE 1 ML/ML: 20 JELLY TOPICAL at 21:57

## 2021-09-02 RX ADMIN — ONDANSETRON 4 MG: 2 INJECTION INTRAMUSCULAR; INTRAVENOUS at 13:21

## 2021-09-02 RX ADMIN — Medication 10 ML: at 13:22

## 2021-09-02 RX ADMIN — ONDANSETRON 4 MG: 2 INJECTION INTRAMUSCULAR; INTRAVENOUS at 22:33

## 2021-09-02 RX ADMIN — ONDANSETRON 4 MG: 4 TABLET, ORALLY DISINTEGRATING ORAL at 06:11

## 2021-09-02 RX ADMIN — MAGNESIUM SULFATE 2 G: 2 INJECTION INTRAVENOUS at 09:11

## 2021-09-02 RX ADMIN — OXYCODONE HYDROCHLORIDE 10 MG: 5 TABLET ORAL at 07:40

## 2021-09-02 RX ADMIN — SODIUM CHLORIDE, POTASSIUM CHLORIDE, SODIUM LACTATE AND CALCIUM CHLORIDE 75 ML/HR: 600; 310; 30; 20 INJECTION, SOLUTION INTRAVENOUS at 07:17

## 2021-09-02 RX ADMIN — LORAZEPAM 2 MG: 2 INJECTION INTRAMUSCULAR; INTRAVENOUS at 14:24

## 2021-09-02 RX ADMIN — Medication 100 MG: at 09:14

## 2021-09-02 RX ADMIN — VANCOMYCIN HYDROCHLORIDE 750 MG: 750 INJECTION, POWDER, LYOPHILIZED, FOR SOLUTION INTRAVENOUS at 04:30

## 2021-09-02 RX ADMIN — Medication 10 ML: at 23:33

## 2021-09-02 RX ADMIN — FENTANYL CITRATE 25 MCG: 50 INJECTION, SOLUTION INTRAMUSCULAR; INTRAVENOUS at 22:33

## 2021-09-02 RX ADMIN — POTASSIUM CHLORIDE 10 MEQ: 7.46 INJECTION, SOLUTION INTRAVENOUS at 09:11

## 2021-09-02 RX ADMIN — Medication 10 ML: at 07:18

## 2021-09-02 RX ADMIN — CEFEPIME HYDROCHLORIDE 2 G: 2 INJECTION, POWDER, FOR SOLUTION INTRAVENOUS at 07:29

## 2021-09-02 RX ADMIN — POTASSIUM CHLORIDE 10 MEQ: 7.46 INJECTION, SOLUTION INTRAVENOUS at 07:17

## 2021-09-02 RX ADMIN — POTASSIUM CHLORIDE 10 MEQ: 7.46 INJECTION, SOLUTION INTRAVENOUS at 11:25

## 2021-09-02 RX ADMIN — POTASSIUM CHLORIDE 10 MEQ: 7.46 INJECTION, SOLUTION INTRAVENOUS at 05:16

## 2021-09-02 RX ADMIN — LORAZEPAM 2 MG: 2 INJECTION INTRAMUSCULAR; INTRAVENOUS at 07:40

## 2021-09-02 RX ADMIN — LORAZEPAM 2 MG: 2 INJECTION INTRAMUSCULAR; INTRAVENOUS at 13:20

## 2021-09-02 RX ADMIN — CEFEPIME HYDROCHLORIDE 2 G: 2 INJECTION, POWDER, FOR SOLUTION INTRAVENOUS at 23:30

## 2021-09-02 RX ADMIN — FOLIC ACID 1 MG: 1 TABLET ORAL at 09:14

## 2021-09-02 NOTE — PROGRESS NOTES
Physician Progress Note      PATIENT:               Jay Lei  CSN #:                  [id number]  :                       1965  ADMIT DATE:       2021 9:14 AM  DISCH DATE:  RESPONDING  PROVIDER #:        Harmony Spangler MD          QUERY TEXT:    Patient admitted with hydronephrosis . If possible, please document in progress notes and discharge summary if you are evaluating and /or treating any of the following: The medical record reflects the following:  Risk Factors:  ETOH use, reports drinking 1 pint vodka per day; hx pancreatitis, rectal cancer  Clinical Indicators:  RD: Meets Criteria for Chronic Malnutrition  ? ? Severe Malnutrition, as evidenced by:  ?? Severe muscle wasting, loss of subcutaneous fat  ?? Nutritional intake of <75% of recommended intake for >1 month  ? ? Weight loss of  >5% in 1 month, >7.5% in 3 months, >10% in 6 months, >20% in 1 year  Treatment: Reg diet as tolerated with high protein high kcal trays. RD added Kirsten and [employer] RN/FEDE     ASPEN Criteria:  https://aspenjournals. onlinelibrary. huntley. com/doi/full/10.1177/8182995475060059  Options provided:  -- Protein calorie malnutrition severe  -- Underweight with BMI 18  -- Other - I will add my own diagnosis  -- Disagree - Not applicable / Not valid  -- Disagree - Clinically unable to determine / Unknown  -- Refer to Clinical Documentation Reviewer    PROVIDER RESPONSE TEXT:    This patient has severe protein calorie malnutrition.     Query created by: Melissa Willingham on 2021 10:10 AM      Electronically signed by:  Harmony Spangler MD 2021 10:18 AM

## 2021-09-02 NOTE — PROGRESS NOTES
Discharge Disposition    Patient is being transferred to [facility] for higher level of care and urology services.      Care Management Interventions  PCP Verified by CM: No  Mode of Transport at Discharge: BLS  Transition of Care Consult (CM Consult): Discharge Planning  The Plan for Transition of Care is Related to the Following Treatment Goals : transfer  Discharge Location  Discharge Placement: Transferred to higher level of care    MOJGAN Babin, [address] [place] [address]  [phone]

## 2021-09-02 NOTE — DISCHARGE SUMMARY
Hospitalist Discharge Summary     Patient ID:  Gerhard Fuchs  [id number]  41 y.o.  1965    PCP on record: Glenn Villaseñor MD    Admit date: 8/31/2021  Discharge date and time: 9/2/2021      Admission Diagnoses: Hydronephrosis, bilateral [N13.30]    Discharge Diagnoses: Active Problems:    Hydronephrosis, bilateral (8/31/2021)      Hematuria (9/1/2021)           Hospital Course:   #Acute kidney injury POA resolved  #Severe b/l hydronephrosis and hydroureter POA resolving  d/t UB blood clot s/p evacuation w/ mondragon catheter   # Gross hematuria   #Acute cystitis  # sepsis d/t cystitis resolved   -Patient complaining of dysuria abdominal pain and difficulty in urination  -CT abdomen pelvis reviewed in dependentlyBilateral new severe hydronephrosis and hydroureter to the level of the bladder. Mild diffuse bladder wall thickening.  -Serum creatinine 1. 17(  baseline 0.69) , UA with blood leukoesterase this bacteria and WBC, serum white count 15   - passage of blood clot w/ mondragon              -Patient is accepted to  [facility] by Dr Carol Carrillo as primary and urology to consult but given no bed availability, patient to be temporarily admitted to [facility] until he has a bed  --US retroperitoneum, w/ minimal left hydronephrosis   - manual bladder irrigation w/ clot evacuation   - Continue IV vanc and Cefepime    -Urine culture w/ alpha streptococcus and some enterococcus pending sensitives   -Trend creatinine  -Trend H&H's  -Transfuse to keep hemoglobin above 7  -Hold DVT prophylaxis           #Small bowel obstruction resolved  - surgery on board  - IVF   - passing gas and BM   - advance diet      #Acute on chronic pancreatitis lipase 74  #Rectal cancer status post chemoradiation  #History of BPH  #Alcohol use  - [facility] protocol   - thiamine and folic acid      # Tobacco use   - Patient was counseled extensively on the need to abstain from tobacco, its addictive tendencies, its deleterious effects on the lungs, cardiovascular  as well as its financial & social sequelae             CONSULTATIONS:  IP CONSULT TO GENERAL SURGERY  IP CONSULT TO NEPHROLOGY    Excerpted HPI from H&P of Peyman Guardado MD:  Ayaz is a 60 y. o.   male who I was asked to see for hydronephrosis and hydroureter. Mushtaq Guzman states that he been having difficulty with urination for past couple of days including dysuria and difficulty starting urination and burning sensation while he pees.  Patient endorsed some low abdominal pain as well.  Patient also endorsed  decreased appetite for 1 week and unable to sleep because of urinary complaints  Patient has a history of rectal cancer and received chemoradiation for that in the past    ______________________________________________________________________  DISCHARGE SUMMARY/HOSPITAL COURSE:  for full details see H&P, daily progress notes, labs, consult notes. _______________________________________________________________________  Patient seen and examined by me on discharge day. Pertinent Findings:  Gen:    Not in distress  Chest: Clear lungs  CVS:   Regular rhythm. No edema  Abd:  Soft, not distended, not tender  Neuro:  Alert with good insight. Oriented to person, place, and time   _______________________________________________________________________  DISCHARGE MEDICATIONS:   Current Discharge Medication List          My Recommended Diet, Activity, Wound Care, and follow-up labs are listed in the patient's Discharge Insturctions which I have personally completed and reviewed.     _______________________________________________________________________  DISPOSITION:     Home with Family:    Home with HH/PT/OT/RN:    SNF/LTC:    NAUN:    OTHER:        Condition at Discharge:  ED [facility]   _______________________________________________________________________  Follow up with:   PCP : Soy Sarmiento MD  Follow-up Information    None Total time in minutes spent coordinating this discharge (includes going over instructions, follow-up, prescriptions, and preparing report for sign off to her PCP) :  50 minutes    Signed:  Ruy Carey MD

## 2021-09-02 NOTE — PROGRESS NOTES
TELE-HOSPITALIST CROSS COVER NOTE:    Visit Vitals  BP (!) 150/86 (BP 1 Location: Left lower arm, BP Patient Position: At rest)   Pulse 89   Temp 98.9 °F (37.2 °C)   Resp 16   Ht 5' 8\" (1.727 m)   Wt 49.3 kg (108 lb 11.2 oz)   SpO2 98%   BMI 16.53 kg/m²         D/W RN; medical records reviewed; S.K 2.9; electrolyte replacement with KCl 10mEq IV Q1H x 4 doses now.       Yumiko Pena

## 2021-09-02 NOTE — PROGRESS NOTES
Pharmacy Automatic Renal Dosing Protocol - Antimicrobials    Indication for Antimicrobials: UTI    Current Regimen of Each Antimicrobial:  Cefepime 2 grams Q12H  (Start Date ; Day # 1)  Vancomycin 1250 mg x 1 then 750 mg Q12H (Start , 1/10)    Previous Antimicrobial Therapy:    Vancomycin Goal Level: -500 mg*hr/liter per 24 hours    Vancomycin Levels  Date Dose & Interval Measured (mcg/mL) Steady State (mcg/mL)                       Date & time of next level:     Significant Cultures:     Radiology / Imaging results: (X-ray, CT scan or MRI):       Labs:  Recent Labs     21  0240 21  1037 21  0220 21  1006   CREA 0.74 0.80 0.93 1.13   BUN 6 8 10 9   WBC 9.6  --  13.5* 15.7*     Temp (24hrs), Av.2 °F (36.8 °C), Min:97.7 °F (36.5 °C), Max:98.9 °F (37.2 °C)      Paralysis, amputations, malnutrition: BMI 8.5, Albumin 2.1  Creatinine Clearance (mL/min) or Dialysis: 84.8    Impression/Plan:   Antibiotics as above  BMP daily     Pharmacy will follow daily and adjust medications as appropriate for renal function and/or serum levels. Thank you,  Zaid Murphy, [facility]      Recommended duration of therapy  http://[facility]/[place]/[facility]/Pharmacy/Clinical%20Companion/Duration%20of%20ABX%20therapy. docx    Renal Dosing  http://[facility]/[place]/[facility]/Pharmacy/Clinical%20Companion/Renal%[id number]. pdf

## 2021-09-02 NOTE — PROGRESS NOTES
Mr. Carleene Harada has no complaints today. Tolerating diet. Moving bowels. Tm 98.9 Tc 97.9 HR: 84 BP: 103/59 Resp Rate: 14 100% sat on room air. Intake/Output Summary (Last 24 hours) at 9/2/2021 1001  Last data filed at 9/2/2021 0751  Gross per 24 hour   Intake 280 ml   Output 2075 ml   Net -1795 ml   Exam: Cor: RRR. Lungs: Bilateral breath sounds. Clear to auscultation. Abd: Soft. Tender. No guarding or rebound. Slightly distended. : Marrero in place. Urine still bloody with clots. Labs:   Recent Results (from the past 12 hour(s))   CBC WITH AUTOMATED DIFF    Collection Time: 09/02/21  2:40 AM   Result Value Ref Range    WBC 9.6 4.1 - 11.1 K/uL    RBC 3.19 (L) 4.10 - 5.70 M/uL    HGB 10.6 (L) 12.1 - 17.0 g/dL    HCT 30.0 (L) 36.6 - 50.3 %    MCV 94.0 80.0 - 99.0 FL    MCH 33.2 26.0 - 34.0 PG    MCHC 35.3 30.0 - 36.5 g/dL    RDW 13.7 11.5 - 14.5 %    PLATELET 326 902 - 576 K/uL    MPV 10.6 8.9 - 12.9 FL    NRBC 0.0 0  WBC    ABSOLUTE NRBC 0.00 0.00 - 0.01 K/uL    NEUTROPHILS 74 32 - 75 %    LYMPHOCYTES 10 (L) 12 - 49 %    MONOCYTES 13 5 - 13 %    EOSINOPHILS 1 0 - 7 %    BASOPHILS 1 0 - 1 %    IMMATURE GRANULOCYTES 1 (H) 0.0 - 0.5 %    ABS. NEUTROPHILS 7.1 1.8 - 8.0 K/UL    ABS. LYMPHOCYTES 1.0 0.8 - 3.5 K/UL    ABS. MONOCYTES 1.2 (H) 0.0 - 1.0 K/UL    ABS. EOSINOPHILS 0.1 0.0 - 0.4 K/UL    ABS. BASOPHILS 0.1 0.0 - 0.1 K/UL    ABS. IMM.  GRANS. 0.1 (H) 0.00 - 0.04 K/UL    DF AUTOMATED     METABOLIC PANEL, BASIC    Collection Time: 09/02/21  2:40 AM   Result Value Ref Range    Sodium 140 136 - 145 mmol/L    Potassium 2.9 (L) 3.5 - 5.1 mmol/L    Chloride 101 97 - 108 mmol/L    CO2 33 (H) 21 - 32 mmol/L    Anion gap 6 5 - 15 mmol/L    Glucose 100 65 - 100 mg/dL    BUN 6 6 - 20 MG/DL    Creatinine 0.74 0.70 - 1.30 MG/DL    BUN/Creatinine ratio 8 (L) 12 - 20      GFR est AA >60 >60 ml/min/1.73m2    GFR est non-AA >60 >60 ml/min/1.73m2    Calcium 8.2 (L) 8.5 - 10.1 MG/DL   MAGNESIUM    Collection Time: 09/02/21  2:40 AM   Result Value Ref Range    Magnesium 1.5 (L) 1.6 - 2.4 mg/dL   Mr. Osiris Glez is doing well - hematuria persists. Diet as tolerated. Saline lock IVF. In view of hematuria, will leave mondragon for now. Continue IV abx - Cefepime and Vancomycin. Urine C and S pending. Pain medication and anti-emetics as needed. Plans per Dr. Atif Lundberg. Following.

## 2021-09-02 NOTE — PROGRESS NOTES
0968) .. TRANSFER - OUT REPORT:    Verbal report given to Beata Heredia RN(name) on Gerhard Fuchs  being transferred to Oncology (unit) for routine progression of care       Report consisted of patients Situation, Background, Assessment and   Recommendations(SBAR). Information from the following report(s) SBAR, Kardex, [facility] and Cardiac Rhythm NSR was reviewed with the receiving nurse. Lines:   Venous Access Device Alma Cath (Active)       Peripheral IV 08/31/21 Right Wrist (Active)   Site Assessment Clean, dry, & intact 09/02/21 0400   Phlebitis Assessment 0 09/02/21 0400   Infiltration Assessment 0 09/02/21 0400   Dressing Status Clean, dry, & intact 09/02/21 0400   Dressing Type Transparent;Tape 09/02/21 0400   Hub Color/Line Status Pink; Infusing 09/02/21 0400   Action Taken Open ports on tubing capped 09/02/21 0400   Alcohol Cap Used Yes 09/02/21 0400       Peripheral IV 09/02/21 Anterior;Left;Proximal Forearm (Active)   Site Assessment Clean, dry, & intact 09/02/21 0626   Phlebitis Assessment 0 09/02/21 0626   Infiltration Assessment 0 09/02/21 0626   Dressing Status Clean, dry, & intact 09/02/21 0626   Dressing Type Transparent;Tape 09/02/21 0626   Hub Color/Line Status Blue;Flushed;Capped 09/02/21 6799   Action Taken Open ports on tubing capped 09/02/21 0626   Alcohol Cap Used Yes 09/02/21 6538        Opportunity for questions and clarification was provided.       Patient transported with:   [facility]

## 2021-09-02 NOTE — PROGRESS NOTES
Comprehensive Nutrition Assessment     Type and Reason for Visit: (P) Initial     Nutrition Recommendations/Plan: Reg diet as tolerated with high protein high kcal trays. RD added Magic Cups and Ensure compact to meal trays    Nutrition Assessment:  (P) PT admitted for hydronephrosis (BL), dysuria and poor appetite. K+ noted low. Previously admitted for acute pancreatitis, n/v, inability to tolerate po intake. Reports poor appetite x 1 week this admission. Hx of ongoing ETOH use, reports drinking 1 pint vodka per day; hx pancreatitis, rectal cancer, partial SBO, US [facility] c/w hepatic steatosis. Pt meets ASPEN criteria for severe malnutrition     Meets Criteria for Chronic Malnutrition   [x]? Severe Malnutrition, as evidenced by:              [x]? Severe muscle wasting, loss of subcutaneous fat              [x]? Nutritional intake of <75% of recommended intake for >1 month              [x]? Weight loss of  >5% in 1 month, >7.5% in 3 months, >10% in 6 months, >20% in 1 year              []? Severe edema   []? Moderate Malnutrition, as evidenced by:              []? Mild muscle wasting, loss of subcutaneous fat              []? Nutritional intake <75% of recommended intake for >1 month              []? Weight loss of  5% in 1 month, 7.5% in 3 months, 10% in 6 months, or 20% in 1 year              []? Mild edema                     Malnutrition Assessment:  Malnutrition Status:     severe  Context:      acute and chronic illness  Findings of the 6 clinical characteristics of malnutrition:   Energy Intake:   0%  Weight Loss:      > 10% x 1 month  Body Fat Loss:   ,   severe  Muscle Mass Loss:   ,  moderate  Fluid Accumulation:   ,  no   Strength:    not measured        Estimated Daily Nutrient Needs:  Energy (kcal): (P) 2018; Weight Used for Energy Requirements: (P) Current  Protein (g): (P) 75.6(1.43 g/kg);  Weight Used for Protein Requirements: (P) Current  Fluid (ml/day): (P) 1800; Method Used for Fluid Requirements: (P) Other (comment)(lactated ringers 75 mL/hr per MD order)        Nutrition Related Findings:  (P) Underweight, meets ASPEN criteria for severe malnutrition       Wounds:    (P) None        Current Nutrition Therapies:  DIET REG     Anthropometric Measures:  · Height:  (P) 5' 8\" (172.7 cm)  · Current Body Wt:  (P) 52.6 kg (121 lb 8 oz)   · Admission Body Wt:       · Usual Body Wt:        · Ideal Body Wt:  (P) 154 lbs:  (P) 75.3 %   · Adjusted Body Weight:   ; Weight Adjustment for: (P) No adjustment   · Adjusted BMI:       · BMI Category:  (P) Underweight (BMI less than 18.5)        Nutrition Diagnosis:   · (P) Inadequate protein-energy intake, Increased nutrient needs, Severe malnutrition, In context of chronic illness, Underweight, Unintended weight loss related to (P) inadequate protein-energy intake, increased demand for energy/nutrients as evidenced by (P) NPO or clear liquid status due to medical condition, intake 0-25%, poor intake prior to admission, BMI, weight loss, weight loss greater than or equal to 10% in 6 months, severe loss of subcutaneous fat, moderate muscle loss, nausea, vomiting, diarrhea, poor dentition     Nutrition Interventions:   Food and/or Nutrient Delivery: (P) Modify current diet, Start oral nutrition supplement  Nutrition Education and Counseling: (P) Education not indicated  Coordination of Nutrition Care: (P) Continue to monitor while inpatient, Feeding assistance/environmental change     Goals:  (P) advance diet to GI lite and po intake > 50% most meals and ONS next 4-7 days        Nutrition Monitoring and Evaluation:   Behavioral-Environmental Outcomes: (P) Beliefs and attitudes, Knowledge or skill  Food/Nutrient Intake Outcomes: (P) Diet advancement/tolerance, Supplement intake  Physical Signs/Symptoms Outcomes: (P) Diarrhea, Nausea/vomiting, GI status, Fluid status or edema, Nutrition focused physical findings, Weight     Discharge Planning:    (P) Continue oral nutrition supplement, Continue current diet(advance diet as medically appropriate)      Electronically signed by Rohan Alonso, PhD, RD, [address] on 9/2/2021 at 6:51 AM

## 2021-09-02 NOTE — PROGRESS NOTES
0710) Bedside shift change report given to [facility], RN (oncoming nurse) by Prema Quintanilla RN (offgoing nurse). Report included the following information SBAR, Kardex and MAR.   1030) IDR with Dr. Otis Prater (MD), Maverick Jackson, EHSAN (), Fletcher Anaya (nurse supervisor), Albaro Mcmillan (wound RN/infection control), and [facility] (RN) to discuss plan of care including IV zofran, CIWA monitoring, recheck potassium, consider [facility] for transfer  [phone]) Call to [facility] [phone] to give report. Request to call back in 15 minutes  1600) Pt transfer with [facility]. [facility] notified.

## 2021-09-02 NOTE — H&P
Hospitalist Admission Note    NAME: Amelia Pastor   :  1965   MRN:  [id number]     Date/Time:  2021 7:19 PM    Patient PCP: Rai Fitch MD  ______________________________________________________________________  Given the patient's current clinical presentation, I have a high level of concern for decompensation if discharged from the emergency department. Complex decision making was performed, which includes reviewing the patient's available past medical records, laboratory results, and x-ray films. My assessment of this patient's clinical condition and my plan of care is as follows. Assessment / Plan:  Gross hematuria  B/L Hydronephrosis and hydroureter - improved  Acute cystitis  TATE - resolved  BPH  Pt transferred from [facility] for Urology eval  Urology Consult  Will continue with empiric abx  Follow up CX results  Trend Hgb - currently 10.6  Transfuse pRBC if Hgb less than 7    CT Abdo/Pelv: IMPRESSION     Significant improvement of acute superimposed on chronic pancreatitis  Interval development of bilateral hydronephrosis and hydroureter and cystitis,  probably secondary to enteritis  Persistent or recurrent distal small bowel obstruction secondary to enteritis,  with a similar pattern as that seen in May, 2021  Right scrotal simple hydrocele demonstrated on recent ultrasound. Acute on chronic pancreatitis, resolved  Lipase wnl now    Alcohol abuse  CIWA protocol    SBO, resolved  Pt passing gas and moving his bowels  Was seen by Surg at [facility]    Code Status: Full  Surrogate Decision Maker:   DVT Prophylaxis: scd  GI Prophylaxis: not indicated  Baseline: independent      Subjective:   CHIEF COMPLAINT: transfer from [address] [place] [address] Rd:     Amelia Pastor is a 60 y.o.  male who presents with CC listed above. Pt is a poor historian. He denies any previous history of hematuria. He does not know why he was transferred to [facility].  As per EMR review, it is for Urology desire. He initially presented to [facility] for \"urinary pain\" and \"decreased appetite\". He was noted to be in TATE with severe b/l hyrdronephrosis and hydroureter - both of which hae now resolved. He w as also treated for acute cystitis. We were asked to admit for work up and evaluation of the above problems. Past Medical History:   Diagnosis Date    Cancer [facility])     rectal    Gastrointestinal disorder     Gastric Ulcers; Rectal CA    Hematuria 9/1/2021    Hypertension         No past surgical history on file. Social History     Tobacco Use    Smoking status: Current Every Day Smoker     Packs/day: 0.25    Smokeless tobacco: Never Used   Substance Use Topics    Alcohol use: Yes     Comment: 2-3 beers daily        Family History   Problem Relation Age of Onset    Cancer Brother      Allergies   Allergen Reactions    Influenza Virus Vaccines Anaphylaxis        Prior to Admission medications    Medication Sig Start Date End Date Taking? Authorizing Provider   finasteride (PROSCAR) 5 mg tablet Take 5 mg by mouth daily. Provider, Historical   mirtazapine (REMERON) 15 mg tablet Take 15 mg by mouth nightly. Provider, Historical   famotidine (Pepcid) 20 mg tablet Take 20 mg by mouth nightly as needed for Heartburn. Provider, Historical   metoprolol tartrate (LOPRESSOR) 25 mg tablet Take 12.5 mg by mouth two (2) times a day. Provider, Historical       REVIEW OF SYSTEMS:     I am not able to complete the review of systems because:    The patient is intubated and sedated    The patient has altered mental status due to his acute medical problems    The patient has baseline aphasia from prior stroke(s)    The patient has baseline dementia and is not reliable historian    The patient is in acute medical distress and unable to provide information           Total of 12 systems reviewed as follows:       POSITIVE= underlined text  Negative = text not underlined  General:  fever, chills, sweats, generalized weakness, weight loss/gain,      loss of appetite   Eyes:    blurred vision, eye pain, loss of vision, double vision  ENT:    rhinorrhea, pharyngitis   Respiratory:   cough, sputum production, SOB, SZYMANSKI, wheezing, pleuritic pain   Cardiology:   chest pain, palpitations, orthopnea, PND, edema, syncope   Gastrointestinal:  abdominal pain , N/V, diarrhea, dysphagia, constipation, bleeding   Genitourinary:  frequency, urgency, dysuria, hematuria, incontinence   Muskuloskeletal :  arthralgia, myalgia, back pain  Hematology:  easy bruising, nose or gum bleeding, lymphadenopathy   Dermatological: rash, ulceration, pruritis, color change / jaundice  Endocrine:   hot flashes or polydipsia   Neurological:  headache, dizziness, confusion, focal weakness, paresthesia,     Speech difficulties, memory loss, gait difficulty  Psychological: Feelings of anxiety, depression, agitation    Objective:   VITALS:    Visit Vitals  BP (!) 156/91 (BP 1 Location: Left upper arm)   Pulse 80   Temp 98.2 °F (36.8 °C)   Resp 18   SpO2 100%       PHYSICAL EXAM:    General:    Alert, cooperative, no distress, appears stated age. HEENT: Atraumatic, anicteric sclerae, pink conjunctivae  Neck:  Supple, symmetrical  Lungs:   Clear to auscultation bilaterally. No Wheezing or Rhonchi. No rales. Chest wall:  No tenderness  No Accessory muscle use. Heart:   Regular  rhythm,  No  murmur   No edema  Abdomen:   Soft, non-tender. Not distended. Bowel sounds normal  Extremities: No cyanosis. No clubbing,      Skin turgor normal, Capillary refill normal, Radial dial pulse 2+  Skin:     Not pale. Not Jaundiced  No rashes   Psych:  Poor insight. Not depressed. Not anxious or agitated. :  Blood mixed with urine  Neurologic: EOMs intact. No facial asymmetry. No aphasia or slurred speech. Symmetrical strength, Sensation grossly intact. Alert and oriented X 4. _______________________________________________________________________  Care Plan discussed with:    Comments   Patient x    Family      RN x    Care Manager                    Consultant:      _______________________________________________________________________  Expected  Disposition:   Home with Family    HH/PT/OT/RN x   SNF/LTC    NAUN    ________________________________________________________________________  TOTAL TIME:  54 Minutes    Critical Care Provided     Minutes non procedure based      Comments     Reviewed previous records   >50% of visit spent in counseling and coordination of care  Discussion with patient and/or family and questions answered       ________________________________________________________________________  Signed: Everette Samaniego MD    Procedures: see electronic medical records for all procedures/Xrays and details which were not copied into this note but were reviewed prior to creation of Plan. LAB DATA REVIEWED:    Recent Results (from the past 24 hour(s))   CBC WITH AUTOMATED DIFF    Collection Time: 09/02/21  2:40 AM   Result Value Ref Range    WBC 9.6 4.1 - 11.1 K/uL    RBC 3.19 (L) 4.10 - 5.70 M/uL    HGB 10.6 (L) 12.1 - 17.0 g/dL    HCT 30.0 (L) 36.6 - 50.3 %    MCV 94.0 80.0 - 99.0 FL    MCH 33.2 26.0 - 34.0 PG    MCHC 35.3 30.0 - 36.5 g/dL    RDW 13.7 11.5 - 14.5 %    PLATELET 898 742 - 133 K/uL    MPV 10.6 8.9 - 12.9 FL    NRBC 0.0 0  WBC    ABSOLUTE NRBC 0.00 0.00 - 0.01 K/uL    NEUTROPHILS 74 32 - 75 %    LYMPHOCYTES 10 (L) 12 - 49 %    MONOCYTES 13 5 - 13 %    EOSINOPHILS 1 0 - 7 %    BASOPHILS 1 0 - 1 %    IMMATURE GRANULOCYTES 1 (H) 0.0 - 0.5 %    ABS. NEUTROPHILS 7.1 1.8 - 8.0 K/UL    ABS. LYMPHOCYTES 1.0 0.8 - 3.5 K/UL    ABS. MONOCYTES 1.2 (H) 0.0 - 1.0 K/UL    ABS. EOSINOPHILS 0.1 0.0 - 0.4 K/UL    ABS. BASOPHILS 0.1 0.0 - 0.1 K/UL    ABS. IMM.  GRANS. 0.1 (H) 0.00 - 0.04 K/UL    DF AUTOMATED     METABOLIC PANEL, BASIC    Collection Time: 09/02/21  2:40 AM   Result Value Ref Range    Sodium 140 136 - 145 mmol/L    Potassium 2.9 (L) 3.5 - 5.1 mmol/L    Chloride 101 97 - 108 mmol/L    CO2 33 (H) 21 - 32 mmol/L    Anion gap 6 5 - 15 mmol/L    Glucose 100 65 - 100 mg/dL    BUN 6 6 - 20 MG/DL    Creatinine 0.74 0.70 - 1.30 MG/DL    BUN/Creatinine ratio 8 (L) 12 - 20      GFR est AA >60 >60 ml/min/1.73m2    GFR est non-AA >60 >60 ml/min/1.73m2    Calcium 8.2 (L) 8.5 - 10.1 MG/DL   MAGNESIUM    Collection Time: 09/02/21  2:40 AM   Result Value Ref Range    Magnesium 1.5 (L) 1.6 - 2.4 mg/dL   POTASSIUM    Collection Time: 09/02/21  1:40 PM   Result Value Ref Range    Potassium 3.2 (L) 3.5 - 5.1 mmol/L

## 2021-09-03 ENCOUNTER — HOSPITAL ENCOUNTER (OUTPATIENT)
Dept: ULTRASOUND IMAGING | Age: 56
Discharge: HOME | DRG: 696 | End: 2021-09-03
Attending: NURSE PRACTITIONER
Payer: MEDICARE

## 2021-09-03 ENCOUNTER — APPOINTMENT (OUTPATIENT)
Dept: CT IMAGING | Age: 56
DRG: 696 | End: 2021-09-03
Attending: NURSE PRACTITIONER
Payer: MEDICARE

## 2021-09-03 LAB
ANION GAP SERPL CALC-SCNC: 4 MMOL/L (ref 5–15)
APPEARANCE UR: ABNORMAL
BACTERIA URNS QL MICRO: NEGATIVE /HPF
BASOPHILS # BLD: 0.1 K/UL (ref 0–0.1)
BASOPHILS NFR BLD: 0 % (ref 0–1)
BILIRUB UR QL: NEGATIVE
BUN SERPL-MCNC: 4 MG/DL (ref 6–20)
BUN/CREAT SERPL: 7 (ref 12–20)
CALCIUM SERPL-MCNC: 8.2 MG/DL (ref 8.5–10.1)
CHLORIDE SERPL-SCNC: 101 MMOL/L (ref 97–108)
CO2 SERPL-SCNC: 33 MMOL/L (ref 21–32)
COLOR UR: ABNORMAL
CREAT SERPL-MCNC: 0.55 MG/DL (ref 0.7–1.3)
DATE LAST DOSE: ABNORMAL
DIFFERENTIAL METHOD BLD: ABNORMAL
EOSINOPHIL # BLD: 0.1 K/UL (ref 0–0.4)
EOSINOPHIL NFR BLD: 1 % (ref 0–7)
EPITH CASTS URNS QL MICRO: ABNORMAL /LPF
ERYTHROCYTE [DISTWIDTH] IN BLOOD BY AUTOMATED COUNT: 14.2 % (ref 11.5–14.5)
GLUCOSE SERPL-MCNC: 95 MG/DL (ref 65–100)
GLUCOSE UR STRIP.AUTO-MCNC: NEGATIVE MG/DL
HCT VFR BLD AUTO: 31.7 % (ref 36.6–50.3)
HCT VFR BLD AUTO: 31.9 % (ref 36.6–50.3)
HGB BLD-MCNC: 11 G/DL (ref 12.1–17)
HGB BLD-MCNC: 11.2 G/DL (ref 12.1–17)
HGB UR QL STRIP: ABNORMAL
IMM GRANULOCYTES # BLD AUTO: 0.1 K/UL (ref 0–0.04)
IMM GRANULOCYTES NFR BLD AUTO: 1 % (ref 0–0.5)
KETONES UR QL STRIP.AUTO: 40 MG/DL
LEUKOCYTE ESTERASE UR QL STRIP.AUTO: ABNORMAL
LYMPHOCYTES # BLD: 1.2 K/UL (ref 0.8–3.5)
LYMPHOCYTES NFR BLD: 10 % (ref 12–49)
MCH RBC QN AUTO: 33.4 PG (ref 26–34)
MCHC RBC AUTO-ENTMCNC: 34.7 G/DL (ref 30–36.5)
MCV RBC AUTO: 96.4 FL (ref 80–99)
MONOCYTES # BLD: 1.3 K/UL (ref 0–1)
MONOCYTES NFR BLD: 11 % (ref 5–13)
NEUTS SEG # BLD: 8.9 K/UL (ref 1.8–8)
NEUTS SEG NFR BLD: 77 % (ref 32–75)
NITRITE UR QL STRIP.AUTO: NEGATIVE
NRBC # BLD: 0 K/UL (ref 0–0.01)
NRBC BLD-RTO: 0 PER 100 WBC
PH UR STRIP: 8 [PH] (ref 5–8)
PLATELET # BLD AUTO: 288 K/UL (ref 150–400)
PMV BLD AUTO: 10.3 FL (ref 8.9–12.9)
POTASSIUM SERPL-SCNC: 3 MMOL/L (ref 3.5–5.1)
PROT UR STRIP-MCNC: ABNORMAL MG/DL
RBC # BLD AUTO: 3.29 M/UL (ref 4.1–5.7)
RBC #/AREA URNS HPF: >100 /HPF (ref 0–5)
REPORTED DOSE,DOSE: ABNORMAL UNITS
REPORTED DOSE/TIME,TMG: 38
SODIUM SERPL-SCNC: 138 MMOL/L (ref 136–145)
SP GR UR REFRACTOMETRY: 1.01 (ref 1–1.03)
UA: UC IF INDICATED,UAUC: ABNORMAL
UROBILINOGEN UR QL STRIP.AUTO: 0.2 EU/DL (ref 0.2–1)
VANCOMYCIN TROUGH SERPL-MCNC: 11.6 UG/ML (ref 5–10)
WBC # BLD AUTO: 11.7 K/UL (ref 4.1–11.1)
WBC URNS QL MICRO: ABNORMAL /HPF (ref 0–4)

## 2021-09-03 PROCEDURE — 74011250636 HC RX REV CODE- 250/636: Performed by: NURSE PRACTITIONER

## 2021-09-03 PROCEDURE — 85025 COMPLETE CBC W/AUTO DIFF WBC: CPT

## 2021-09-03 PROCEDURE — 76770 US EXAM ABDO BACK WALL COMP: CPT

## 2021-09-03 PROCEDURE — 80048 BASIC METABOLIC PNL TOTAL CA: CPT

## 2021-09-03 PROCEDURE — 74011250636 HC RX REV CODE- 250/636: Performed by: GENERAL ACUTE CARE HOSPITAL

## 2021-09-03 PROCEDURE — 74011250637 HC RX REV CODE- 250/637: Performed by: GENERAL ACUTE CARE HOSPITAL

## 2021-09-03 PROCEDURE — 74011250637 HC RX REV CODE- 250/637: Performed by: NURSE PRACTITIONER

## 2021-09-03 PROCEDURE — 77030008771 HC TU NG SALEM SUMP -A

## 2021-09-03 PROCEDURE — 74011000250 HC RX REV CODE- 250: Performed by: NURSE PRACTITIONER

## 2021-09-03 PROCEDURE — 51798 US URINE CAPACITY MEASURE: CPT

## 2021-09-03 PROCEDURE — 65270000029 HC RM PRIVATE

## 2021-09-03 PROCEDURE — 74011000258 HC RX REV CODE- 258: Performed by: GENERAL ACUTE CARE HOSPITAL

## 2021-09-03 PROCEDURE — C9113 INJ PANTOPRAZOLE SODIUM, VIA: HCPCS | Performed by: NURSE PRACTITIONER

## 2021-09-03 PROCEDURE — 80202 ASSAY OF VANCOMYCIN: CPT

## 2021-09-03 PROCEDURE — 81001 URINALYSIS AUTO W/SCOPE: CPT

## 2021-09-03 PROCEDURE — 85018 HEMOGLOBIN: CPT

## 2021-09-03 PROCEDURE — 74011000636 HC RX REV CODE- 636: Performed by: INTERNAL MEDICINE

## 2021-09-03 PROCEDURE — 74177 CT ABD & PELVIS W/CONTRAST: CPT

## 2021-09-03 PROCEDURE — 36415 COLL VENOUS BLD VENIPUNCTURE: CPT

## 2021-09-03 RX ORDER — POTASSIUM CHLORIDE 20 MEQ/1
40 TABLET, EXTENDED RELEASE ORAL 3 TIMES DAILY
Status: DISPENSED | OUTPATIENT
Start: 2021-09-03 | End: 2021-09-04

## 2021-09-03 RX ORDER — LORAZEPAM 2 MG/ML
2 INJECTION INTRAMUSCULAR
Status: DISCONTINUED | OUTPATIENT
Start: 2021-09-03 | End: 2021-09-08 | Stop reason: HOSPADM

## 2021-09-03 RX ORDER — TAMSULOSIN HYDROCHLORIDE 0.4 MG/1
0.4 CAPSULE ORAL DAILY
Status: DISCONTINUED | OUTPATIENT
Start: 2021-09-03 | End: 2021-09-08 | Stop reason: HOSPADM

## 2021-09-03 RX ADMIN — IOPAMIDOL 100 ML: 755 INJECTION, SOLUTION INTRAVENOUS at 19:04

## 2021-09-03 RX ADMIN — PROCHLORPERAZINE EDISYLATE 10 MG: 5 INJECTION INTRAMUSCULAR; INTRAVENOUS at 21:49

## 2021-09-03 RX ADMIN — POTASSIUM CHLORIDE 40 MEQ: 20 TABLET, EXTENDED RELEASE ORAL at 09:05

## 2021-09-03 RX ADMIN — VANCOMYCIN HYDROCHLORIDE 750 MG: 750 INJECTION, POWDER, LYOPHILIZED, FOR SOLUTION INTRAVENOUS at 13:33

## 2021-09-03 RX ADMIN — CEFEPIME HYDROCHLORIDE 2 G: 2 INJECTION, POWDER, FOR SOLUTION INTRAVENOUS at 11:18

## 2021-09-03 RX ADMIN — Medication 10 ML: at 13:58

## 2021-09-03 RX ADMIN — Medication 100 MG: at 09:04

## 2021-09-03 RX ADMIN — ONDANSETRON 4 MG: 2 INJECTION INTRAMUSCULAR; INTRAVENOUS at 08:06

## 2021-09-03 RX ADMIN — LORAZEPAM 2 MG: 2 INJECTION INTRAMUSCULAR; INTRAVENOUS at 16:17

## 2021-09-03 RX ADMIN — Medication 10 ML: at 21:49

## 2021-09-03 RX ADMIN — ONDANSETRON 4 MG: 2 INJECTION INTRAMUSCULAR; INTRAVENOUS at 13:53

## 2021-09-03 RX ADMIN — PROCHLORPERAZINE EDISYLATE 10 MG: 5 INJECTION INTRAMUSCULAR; INTRAVENOUS at 15:23

## 2021-09-03 RX ADMIN — OXYCODONE 10 MG: 5 TABLET ORAL at 09:16

## 2021-09-03 RX ADMIN — TAMSULOSIN HYDROCHLORIDE 0.4 MG: 0.4 CAPSULE ORAL at 13:52

## 2021-09-03 RX ADMIN — FOLIC ACID 1 MG: 1 TABLET ORAL at 09:15

## 2021-09-03 RX ADMIN — OXYCODONE 10 MG: 5 TABLET ORAL at 00:38

## 2021-09-03 RX ADMIN — VANCOMYCIN HYDROCHLORIDE 750 MG: 750 INJECTION, POWDER, LYOPHILIZED, FOR SOLUTION INTRAVENOUS at 00:38

## 2021-09-03 RX ADMIN — SODIUM CHLORIDE 40 MG: 9 INJECTION INTRAMUSCULAR; INTRAVENOUS; SUBCUTANEOUS at 21:48

## 2021-09-03 NOTE — PROGRESS NOTES
Pharmacy Automatic Renal Dosing Protocol - Antimicrobials    Indication for Antimicrobials: UTI (Hydronephrosis with bladder wall thickening and inflammation, hematuria)     Current Regimen of Each Antimicrobial:  Cefepime 2 grams Q12H  (Start Date ; Day 2)  Vancomycin 750 mg Q12H (Start ; day 2)    Previous Antimicrobial Therapy:      Goal Level: VANCOMYCIN TROUGH GOA: 15 mcg/ml (AUC: 400 - 600 mg/hr/Liter/day)     Date Dose & Interval Measured (mcg/mL) Extrapolated (mcg/mL)   9/3 750 mg q12h 11.6 --                 Date & time of next level:     Significant Cultures:    Urine: aerococcus urinae >100k. final    Radiology / Imaging results: (X-ray, CT scan or MRI):     Paralysis, amputations, malnutrition:     Labs:  Recent Labs     21  0402 21  0240 21  1037 21  0220 21  0220   CREA 0.55* 0.74 0.80   < > 0.93   BUN 4* 6 8   < > 10   WBC 11.7* 9.6  --   --  13.5*    < > = values in this interval not displayed. Temp (24hrs), Av.3 °F (36.8 °C), Min:97.8 °F (36.6 °C), Max:98.6 °F (37 °C)      Is the Patient on Dialysis? no    Creatinine Clearance (mL/min):   CrCl (Actual Body Weight): 85.7  CrCl (Adjusted Body Weight): 101.0  CrCl (Ideal Body Weight): 111.3    Impression/Plan:   Vancomycin trough subtherapeutic. Increase Vancomycin to 1g iv q12h for an expected trough of 17-18,   Continue Cefepime 2g IV q12h  BMP daily. Recheck Vanco level in 2 days  Antimicrobial stop date : cefepime and Vancomycin 10 days     Pharmacy will follow daily and adjust medications as appropriate for renal function and/or serum levels. Thank you,  MARLENI OrtegaD    Recommended duration of therapy  [email or web address]/Pharmacy/Clinical%20Companion/Duration%20of%20ABX%20therapy. docx    Renal Dosing  [email or web address]/[email or web address]%[email or web address]/Renal%[email or web address]. pdf

## 2021-09-03 NOTE — PROGRESS NOTES
End of Shift Note    Bedside shift change report given to Halina Chi, RN (oncoming nurse) by Jamarcus Moon RN (offgoing nurse). Report included the following information SBAR, Kardex, Intake/Output and MAR     Patient direct admit transferred in from [facility] for Urology eval arrived to unit just before 1800, called registration,  ED registration several times to have patient transferred into unit, 1845 patient transferred into unit but a provider needed to release the orders. Provider came at 1915, saw patient and released patient's orders.

## 2021-09-03 NOTE — PROGRESS NOTES
Hospitalist Progress Note    NAME: Joseph Nurse   :  1965   MRN:  [id number]     I reviewed pertinent labs and imaging, and discussed /agreed on the plan of care with Dr. Usha Ornelas. Assessment / Plan:  Patient with violent N/V. Add PRN ativan. Check CT abd STAT and may need to place NGT. Gross hematuria  B/L Hydronephrosis and hydroureter - improved  Acute cystitis  Acute Kidney Injury - resolved  BPH  -Patient transferred from [facility] for Urology evaluation  -CT Abdo/Pelv - Significant improvement of acute superimposed on chronic pancreatitis  Interval development of bilateral hydronephrosis and hydroureter and cystitis,  probably secondary to enteritis. Persistent or recurrent distal small bowel obstruction secondary to enteritis, with a similar pattern as that seen in May, 2021  Right scrotal simple hydrocele demonstrated on recent ultrasound. -Appreciate Urology input - Marrero replaced overnight after patient pulled out. Continue to to gently irrigate. Obtain renal US to confirm placement and evaluate hydrohenrosis. If not in correct placement may require cystoscopy. -Renal US         1. Stable minimal pelviectasis in the right kidney. 2. Stable very mild hydronephrosis on the left. 3. Urinary bladder decompressed and not well assessed. Marrero balloon catheter is  visualized. Although its exact location cannot be confirmed sonographically, it  likely lies in the urinary bladder.  -Continue empiric Cefepime and Vancomycin   -Follow Hgb every 12 hrs with gross hematuria   -Transfuse pRBC if Hgb less than 7    Acute on chronic pancreatitis, resolved Lipase wnl now    Alcohol abuse  -CIWA protocol  -S/p 4 days since last ETOH use      SBO, resolved Pt passing gas and moving his bowels    less than 18.5 Underweight / Body mass index is 17.65 kg/m².     Estimated discharge date:   Barriers:    Code status: Full  Prophylaxis: SCD's  Recommended Disposition: Home w/Family Subjective:     Chief Complaint / Reason for Physician Visit  Patient seen at bedside. No complaints. Discussed plan of care, patient verbalized understanding and plan of care. Discussed with RN events overnight. Review of Systems:  Symptom Y/N Comments  Symptom Y/N Comments   Fever/Chills n   Chest Pain n    Poor Appetite n   Edema n    Cough n   Abdominal Pain n    Sputum n   Joint Pain n    SOB/SZYMANSKI n   Pruritis/Rash n    Nausea/vomit n   Tolerating PT/OT     Diarrhea n   Tolerating Diet y    Constipation n   Other       Could NOT obtain due to:      Objective:     VITALS:   Last 24hrs VS reviewed since prior progress note. Most recent are:  Patient Vitals for the past 24 hrs:   Temp Pulse Resp BP SpO2   09/03/21 1054 98.4 °F (36.9 °C) 88 18 (!) 159/89 100 %   09/03/21 0759 98.6 °F (37 °C) 96 24 (!) 159/77 100 %   09/03/21 0051 98.4 °F (36.9 °C) 93 18 (!) 146/77 100 %   09/02/21 1908 98.2 °F (36.8 °C) 80 18 (!) 156/91 100 %       Intake/Output Summary (Last 24 hours) at 9/3/2021 1311  Last data filed at 9/3/2021 0615  Gross per 24 hour   Intake --   Output 550 ml   Net -550 ml        I had a face to face encounter and independently examined this patient on 9/3/2021, as outlined below:  PHYSICAL EXAM:  General: WD, WN. Alert, cooperative, no acute distress    EENT:  EOMI. Anicteric sclerae. MMM  Resp:  CTA bilaterally, no wheezing or rales. No accessory muscle use  CV:  Regular  rhythm,  No edema  GI:  Soft, Non distended, Non tender. +Bowel sounds, blood draining in mondragon  Neurologic:  Alert and oriented X 3, normal speech,   Psych:   Good insight. Not anxious nor agitated  Skin:  No rashes.   No jaundice    Reviewed most current lab test results and cultures  YES  Reviewed most current radiology test results   YES  Review and summation of old records today    NO  Reviewed patient's current orders and MAR    YES  PMH/SH reviewed - no change compared to H&P  ________________________________________________________________________  Care Plan discussed with:    Comments   Patient x    Family      RN x    Care Manager x    Consultant                        Multidiciplinary team rounds were held today with , nursing, pharmacist and clinical coordinator. Patient's plan of care was discussed; medications were reviewed and discharge planning was addressed. ________________________________________________________________________  Total NON critical care TIME:  25   Minutes    Total CRITICAL CARE TIME Spent:   Minutes non procedure based      Comments   >50% of visit spent in counseling and coordination of care x    ________________________________________________________________________  Hakan Gamez NP     Procedures: see electronic medical records for all procedures/Xrays and details which were not copied into this note but were reviewed prior to creation of Plan. LABS:  I reviewed today's most current labs and imaging studies. Pertinent labs include:  Recent Labs     09/03/21  0402 09/02/21  0240 09/01/21  1037 09/01/21  0220 09/01/21  0220   WBC 11.7* 9.6  --   --  13.5*   HGB 11.0* 10.6* 11.4*   < > 11.9*   HCT 31.7* 30.0* 32.5*   < > 34.3*    272  --   --  275    < > = values in this interval not displayed. Recent Labs     09/03/21  0402 09/02/21  1340 09/02/21  0240 09/01/21  1037 09/01/21  1037 09/01/21 0220 09/01/21 0220     --  140  --  137   < > 139   K 3.0* 3.2* 2.9*   < > 2.9*   < > 2.4*     --  101  --  101   < > 100   CO2 33*  --  33*  --  27   < > 29   GLU 95  --  100  --  68   < > 81   BUN 4*  --  6  --  8   < > 10   CREA 0.55*  --  0.74  --  0.80   < > 0.93   CA 8.2*  --  8.2*  --  8.2*   < > 8.2*   MG  --   --  1.5*  --   --   --  1.5*    < > = values in this interval not displayed.        Signed: Hakan Gamez NP

## 2021-09-03 NOTE — PROGRESS NOTES
End of Shift Note    Bedside shift change report given to Donovan Vargas (oncoming nurse) by Lala Patiño (offgoing nurse). Report included the following information SBAR, Kardex and MAR    Shift worked:  Day shift. Shift summary and any significant changes:     Patient tolerated care fairly throughout shift. [address] completed Q4 hours. Medications given and education provided regarding all meds. Patient mondragon irrigated frequently to ensure draining and remove clots. Bladder scan completed prior to every  Irrigation (see Flow sheet). Complaints of pain x1 and PRN pain meds given. Patient nausea and vomiting and PRN meds given (see PRN MAR). Patient refused NG tube and NP informed. CT of the abdomen completed. Per Dr María Elena Thompson if mondragon not draining In 3-4 hours to bladder scan and if retaining greater than 200 than we are to irrigate. Concerns for physician to address:       Zone phone for oncoming shift:          Activity:  Activity Level: Up with Assistance  Number times ambulated in hallways past shift: 0  Number of times OOB to chair past shift: 0    Cardiac:   Cardiac Monitoring: Yes           Access:   Current line(s): PIV     Genitourinary:   Urinary status: voiding and mondragon hematuria    Respiratory:   O2 Device: None (Room air)  Chronic home O2 use?: NO  Incentive spirometer at bedside: N/A     GI:     Current diet:  ADULT DIET Regular  Passing flatus: YES  Tolerating current diet: YES       Pain Management:   Patient states pain is manageable on current regimen: YES    Skin:  Jack Score: 18  Interventions: float heels, increase time out of bed, limit briefs and internal/external urinary devices    Patient Safety:  Fall Score:  Total Score: 2  Interventions: bed/chair alarm, gripper socks and pt to call before getting OOB  High Fall Risk: Yes    Length of Stay:  Expected LOS: - - -  Actual LOS: 1      Lala Patiño

## 2021-09-03 NOTE — PROGRESS NOTES
Patient with bladder spasms, hematuria with clots this am. Catheter currently draining ok, bloody urine with a few small clots. UO 550cc. Unable to irrigated catheter, patient vomiting. Will get renal US to confirm catheter placement and evaluate hydronephrosis. If catheter not in correct position will require cystoscopy for placement. Discussed with Dr. Raegan Huber. Hgb 11.0 this am     and catheter   15:35 - Renal US with stable mild left hydronephrosis, bladder decompressed and catheter likely in appropriate position. No change in plan at this time. Added Flomax, continue catheter and manually irrigate was needed. Continue abx. Catheter for at least 3 days for presumed cystitis. Monitor hgb.  Will see in am.

## 2021-09-03 NOTE — CONSULTS
Urology Consult    Subjective:     Date of Consultation:  September 3, 2021    Referring Physician: Colin Gracia    Reason for Consultation:  Gross hematuria and frequency    History of Present Illness:   Mr. Charmaine Tariq was transferred from [facility] due to gross hematuria. He had been admitted for small bowel obstruction and also noted to have bilateral hydronephrosis with bladder wall thickening and inflammation. He reports that for the last month he has had blood in the toilet which he thinks came from his urine. He feels almost a constant need to urinate or defecate. At [facility] a Marrero catheter was inserted and has been grossly bloody. This was very uncomfortable for him. Nursing states he pulled the catheter out with the balloon inflated upon arrival this evening. Patient with persistent urge to void. He finds it difficult to tell whether this is the urge to void or defecate and will strain. Nursing attempted to place a three-way catheter unsuccessfully without trauma. Upon arrival he is having spasms. He tried to stand to void unsuccessfully. Past Medical History:   Diagnosis Date    Cancer [facility])     rectal    Gastrointestinal disorder     Gastric Ulcers; Rectal CA    Hematuria 9/1/2021    Hypertension       No past surgical history on file. Family History   Problem Relation Age of Onset    Cancer Brother       Social History     Tobacco Use    Smoking status: Current Every Day Smoker     Packs/day: 0.25    Smokeless tobacco: Never Used   Substance Use Topics    Alcohol use: Yes     Comment: 2-3 beers daily     Allergies   Allergen Reactions    Influenza Virus Vaccines Anaphylaxis      Prior to Admission medications    Medication Sig Start Date End Date Taking? Authorizing Provider   finasteride (PROSCAR) 5 mg tablet Take 5 mg by mouth daily. Provider, Historical   mirtazapine (REMERON) 15 mg tablet Take 15 mg by mouth nightly.     Provider, Historical famotidine (Pepcid) 20 mg tablet Take 20 mg by mouth nightly as needed for Heartburn. Provider, Historical   metoprolol tartrate (LOPRESSOR) 25 mg tablet Take 12.5 mg by mouth two (2) times a day. Provider, Historical         Review of Systems:  Pertinent items are noted in HPI. Objective:     Patient Vitals for the past 8 hrs:   BP Temp Pulse Resp SpO2   21 1908 (!) 156/91 98.2 °F (36.8 °C) 80 18 100 %     Temp (24hrs), Av.1 °F (36.7 °C), Min:97.7 °F (36.5 °C), Max:98.6 °F (37 °C)      Intake and Output:   No intake/output data recorded. Physical Exam:  EXAMINATION:    Appearance: well-developed NAD   Respiratory Effort:  breathing easily   Abdomen/Flank:  soft non-tender without masses; no CVA tenderness   Liver/Spleen:  no organomegaly    Hernia: no ventral hernia   Scrotum: R hemiscrotal swelling h/o hydrocele    Testes: bilaterally non-tender, no masses   Epididymes: bilaterally no masses palpated, non-tender   Penis: no plaques; no lesions    Meatus: patent    Mood/Affect: normal                 Assessment:     Active Problems:    Hematuria (2021)        Plan:     Upon arrival I discussed his current issues as well as the last month. After discussing options we decided to try to place a Marrero catheter. Initially tried an 25 Western Janice coudé which initially got a little bit of clear urine and then grossly bloody urine. I suspect this was either at the prostate or bladder neck. Gross clots then followed and I was not confident in placement. I remove this and bladder scan him. He had very low volume in his bladder. I then placed a 14 French coudé tip catheter and felt this go into the bladder. The balloon was inflated without difficulty. I monitored for 10 minutes and urine persisted and draining. This was blood-tinged but drained well.     Multiple potential sources and causes of hematuria at this time including catheter trauma both from placement as well as traumatic removal. Patient with notable history of radiation for rectal cancer and CT showed thickened bladder wall with inflammation likely the bladder wall and hydronephrosis on original CT imaging. Follow-up ultrasound showed mild hydronephrosis only. At this time recommend continued Marrero catheter drainage overnight and see if he does not clear and received some relief. He may need formal cystoscopy to evaluate the bladder wall and for urethral trauma if bleeding persists. Spoke with nursing. May gently irrigate if clots.

## 2021-09-03 NOTE — PROGRESS NOTES
Received notification from bedside RN about patient with regards to: requesting pain medication prior to reinsertion of mondragon catheter  VS: /91, HR 80, RR 18, O2 sat 100% on RA    Intervention given: Lidocaine uro-jet x 1, Fentanyl 25 mcg IV x 1 dose ordered

## 2021-09-04 LAB
ANION GAP SERPL CALC-SCNC: 9 MMOL/L (ref 5–15)
BASOPHILS # BLD: 0.1 K/UL (ref 0–0.1)
BASOPHILS NFR BLD: 0 % (ref 0–1)
BUN SERPL-MCNC: 5 MG/DL (ref 6–20)
BUN/CREAT SERPL: 9 (ref 12–20)
CALCIUM SERPL-MCNC: 8.5 MG/DL (ref 8.5–10.1)
CHLORIDE SERPL-SCNC: 98 MMOL/L (ref 97–108)
CO2 SERPL-SCNC: 32 MMOL/L (ref 21–32)
CREAT SERPL-MCNC: 0.56 MG/DL (ref 0.7–1.3)
DIFFERENTIAL METHOD BLD: ABNORMAL
EOSINOPHIL # BLD: 0 K/UL (ref 0–0.4)
EOSINOPHIL NFR BLD: 0 % (ref 0–7)
ERYTHROCYTE [DISTWIDTH] IN BLOOD BY AUTOMATED COUNT: 14.3 % (ref 11.5–14.5)
GLUCOSE SERPL-MCNC: 93 MG/DL (ref 65–100)
HCT VFR BLD AUTO: 28.4 % (ref 36.6–50.3)
HCT VFR BLD AUTO: 30.6 % (ref 36.6–50.3)
HGB BLD-MCNC: 10 G/DL (ref 12.1–17)
HGB BLD-MCNC: 10.8 G/DL (ref 12.1–17)
IMM GRANULOCYTES # BLD AUTO: 0.2 K/UL (ref 0–0.04)
IMM GRANULOCYTES NFR BLD AUTO: 1 % (ref 0–0.5)
LYMPHOCYTES # BLD: 1.5 K/UL (ref 0.8–3.5)
LYMPHOCYTES NFR BLD: 7 % (ref 12–49)
MCH RBC QN AUTO: 33.6 PG (ref 26–34)
MCHC RBC AUTO-ENTMCNC: 35.3 G/DL (ref 30–36.5)
MCV RBC AUTO: 95.3 FL (ref 80–99)
MONOCYTES # BLD: 1.6 K/UL (ref 0–1)
MONOCYTES NFR BLD: 8 % (ref 5–13)
NEUTS SEG # BLD: 16.6 K/UL (ref 1.8–8)
NEUTS SEG NFR BLD: 84 % (ref 32–75)
NRBC # BLD: 0 K/UL (ref 0–0.01)
NRBC BLD-RTO: 0 PER 100 WBC
PLATELET # BLD AUTO: 373 K/UL (ref 150–400)
PMV BLD AUTO: 10.4 FL (ref 8.9–12.9)
POTASSIUM SERPL-SCNC: 3.1 MMOL/L (ref 3.5–5.1)
RBC # BLD AUTO: 3.21 M/UL (ref 4.1–5.7)
SODIUM SERPL-SCNC: 139 MMOL/L (ref 136–145)
WBC # BLD AUTO: 19.9 K/UL (ref 4.1–11.1)

## 2021-09-04 PROCEDURE — C9113 INJ PANTOPRAZOLE SODIUM, VIA: HCPCS | Performed by: NURSE PRACTITIONER

## 2021-09-04 PROCEDURE — 74011250636 HC RX REV CODE- 250/636: Performed by: GENERAL ACUTE CARE HOSPITAL

## 2021-09-04 PROCEDURE — 80048 BASIC METABOLIC PNL TOTAL CA: CPT

## 2021-09-04 PROCEDURE — 36415 COLL VENOUS BLD VENIPUNCTURE: CPT

## 2021-09-04 PROCEDURE — 74011250636 HC RX REV CODE- 250/636: Performed by: NURSE PRACTITIONER

## 2021-09-04 PROCEDURE — 51798 US URINE CAPACITY MEASURE: CPT

## 2021-09-04 PROCEDURE — 74011000636 HC RX REV CODE- 636: Performed by: SURGERY

## 2021-09-04 PROCEDURE — 74011250636 HC RX REV CODE- 250/636: Performed by: INTERNAL MEDICINE

## 2021-09-04 PROCEDURE — 74011000250 HC RX REV CODE- 250: Performed by: NURSE PRACTITIONER

## 2021-09-04 PROCEDURE — 85018 HEMOGLOBIN: CPT

## 2021-09-04 PROCEDURE — 74011000258 HC RX REV CODE- 258: Performed by: GENERAL ACUTE CARE HOSPITAL

## 2021-09-04 PROCEDURE — 74011250637 HC RX REV CODE- 250/637: Performed by: GENERAL ACUTE CARE HOSPITAL

## 2021-09-04 PROCEDURE — 65270000029 HC RM PRIVATE

## 2021-09-04 PROCEDURE — 99221 1ST HOSP IP/OBS SF/LOW 40: CPT | Performed by: SURGERY

## 2021-09-04 PROCEDURE — 85025 COMPLETE CBC W/AUTO DIFF WBC: CPT

## 2021-09-04 RX ORDER — FENTANYL CITRATE 50 UG/ML
25 INJECTION, SOLUTION INTRAMUSCULAR; INTRAVENOUS ONCE
Status: COMPLETED | OUTPATIENT
Start: 2021-09-04 | End: 2021-09-04

## 2021-09-04 RX ORDER — POTASSIUM CHLORIDE AND SODIUM CHLORIDE 900; 300 MG/100ML; MG/100ML
INJECTION, SOLUTION INTRAVENOUS CONTINUOUS
Status: DISCONTINUED | OUTPATIENT
Start: 2021-09-04 | End: 2021-09-08 | Stop reason: HOSPADM

## 2021-09-04 RX ADMIN — OXYCODONE 10 MG: 5 TABLET ORAL at 14:37

## 2021-09-04 RX ADMIN — Medication 10 ML: at 06:00

## 2021-09-04 RX ADMIN — LORAZEPAM 2 MG: 2 INJECTION INTRAMUSCULAR; INTRAVENOUS at 00:24

## 2021-09-04 RX ADMIN — POTASSIUM CHLORIDE AND SODIUM CHLORIDE: 900; 300 INJECTION, SOLUTION INTRAVENOUS at 23:48

## 2021-09-04 RX ADMIN — CEFEPIME HYDROCHLORIDE 2 G: 2 INJECTION, POWDER, FOR SOLUTION INTRAVENOUS at 12:01

## 2021-09-04 RX ADMIN — SODIUM CHLORIDE 40 MG: 9 INJECTION INTRAMUSCULAR; INTRAVENOUS; SUBCUTANEOUS at 20:06

## 2021-09-04 RX ADMIN — POTASSIUM CHLORIDE AND SODIUM CHLORIDE: 900; 300 INJECTION, SOLUTION INTRAVENOUS at 11:59

## 2021-09-04 RX ADMIN — DIATRIZOATE MEGLUMINE AND DIATRIZOATE SODIUM 80 ML: 600; 100 SOLUTION ORAL; RECTAL at 18:29

## 2021-09-04 RX ADMIN — SODIUM CHLORIDE 40 MG: 9 INJECTION INTRAMUSCULAR; INTRAVENOUS; SUBCUTANEOUS at 11:59

## 2021-09-04 RX ADMIN — CEFEPIME HYDROCHLORIDE 2 G: 2 INJECTION, POWDER, FOR SOLUTION INTRAVENOUS at 00:29

## 2021-09-04 RX ADMIN — Medication 10 ML: at 18:31

## 2021-09-04 RX ADMIN — VANCOMYCIN HYDROCHLORIDE 1000 MG: 1 INJECTION, POWDER, LYOPHILIZED, FOR SOLUTION INTRAVENOUS at 14:36

## 2021-09-04 RX ADMIN — OXYCODONE 10 MG: 5 TABLET ORAL at 20:06

## 2021-09-04 RX ADMIN — VANCOMYCIN HYDROCHLORIDE 1000 MG: 1 INJECTION, POWDER, LYOPHILIZED, FOR SOLUTION INTRAVENOUS at 02:41

## 2021-09-04 RX ADMIN — FENTANYL CITRATE 25 MCG: 50 INJECTION, SOLUTION INTRAMUSCULAR; INTRAVENOUS at 03:54

## 2021-09-04 RX ADMIN — CEFEPIME HYDROCHLORIDE 2 G: 2 INJECTION, POWDER, FOR SOLUTION INTRAVENOUS at 23:48

## 2021-09-04 RX ADMIN — ONDANSETRON 4 MG: 2 INJECTION INTRAMUSCULAR; INTRAVENOUS at 00:25

## 2021-09-04 NOTE — PROGRESS NOTES
Progress Note    Patient: Gerhard Fuchs MRN: [id number]  SSN: [id number]    YOB: 1965 Age: 60 y.o. Sex: male   Height: Height: 5' 8.11\" (173 cm) Weight: Weight: 52.7 kg (116 lb 1.6 oz) BMI: Body mass index is 17.6 kg/m². Emergency  Contact:  Primary Emergency Contact: Mini Alfonso Phone: [phone]   PCP:   Glenn Villaseñor, [address] [place] [address] [phone]     Hospital Day: 3 - Admitted 2021  6:23 PM by Omid Muñoz MD - [address] Problems    Diagnosis Date Noted    Hematuria 2021    * No surgery found *   * Surgery not found *         Assessment/Plan:     · Gross hematuria  · Urinary retention with difficult mondragon placement  · B hydro, improved with mondragon placement to mild pelviectasis  · Aerococcus uti on cefepime  -cont mondragon to drain  -irrigate as needed if not draining     Subjective: Sleeping    Imaging:    Exam: Mondragon in place draining coolaid colored urine   ROS:  [] SOB (Respiratory)  [] Flatulance (GI)         Labs: Recent Labs     21  0438 21  0023 21  1637 21  0402 21  0402 21  0240 21  0240   WBC  --  19.9*  --   --  11.7*  --  9.6   HGB 10.0* 10.8* 11.2*   < > 11.0*   < > 10.6*   HCT 28.4* 30.6* 31.9*   < > 31.7*   < > 30.0*   PLT  --  373  --   --  288  --  272    < > = values in this interval not displayed. Recent Labs     21  0023 21  0402 21  1340 21  0240 21  0240    138  --   --  140   K 3.1* 3.0* 3.2*   < > 2.9*   CL 98 101  --   --  101   CO2 32 33*  --   --  33*   GLU 93 95  --   --  100   BUN 5* 4*  --   --  6   CREA 0.56* 0.55*  --   --  0.74   CA 8.5 8.2*  --   --  8.2*   MG  --   --   --   --  1.5*    < > = values in this interval not displayed.       ID: Temp (24hrs), Av.9 °F (37.2 °C), Min:98.2 °F (36.8 °C), Max:99.2 °F (37.3 °C)    2021: WBC 15.7 K/uL* (Ref range: 4.1 - 11.1 K/uL)  2021: WBC 13.5 K/uL* (Ref range: 4.1 - 11.1 K/uL)  9/2/2021: WBC 9.6 K/uL (Ref range: 4.1 - 11.1 K/uL)  9/3/2021: WBC 11.7 K/uL* (Ref range: 4.1 - 11.1 K/uL)  9/4/2021: WBC 19.9 K/uL* (Ref range: 4.1 - 11.1 K/uL)  Current Antimicrobial Therapy (168h ago, onward)     Ordered     Start Stop    09/03/21 1338  vancomycin (VANCOCIN) 1,000 mg in 0.9% sodium chloride 250 mL (VIAL-MATE)  1,000 mg,   IntraVENous,   EVERY 12 HOURS      09/04/21 0200 09/10/21 1359    09/03/21 0922  cefepime (MAXIPIME) 2 g in 0.9% sodium chloride (MBP/ADV) 100 mL MBP  2 g,   IntraVENous,   EVERY 12 HOURS      09/03/21 1100 09/10/21 2259              Cultures: All Micro Results     None         GI: Intake: DIET NPO Other (Specify); oral contrast allowed   Appetite: Fair          Abdominal Assessment: Tender  Bowel Sounds: Hypoactive    No data found. Pain: 7/10 Aching, Sore - Abdomen - Medial, Mid      Current Analgesic Therapy (168h ago, onward)     Ordered     Start Stop    09/02/21 1931  acetaminophen (TYLENOL) tablet 1,000 mg  1,000 mg,   Oral,   EVERY 6 HOURS AS NEEDED      09/02/21 1931 --    09/02/21 1931  oxyCODONE IR (ROXICODONE) tablet 10 mg  10 mg,   Oral,   EVERY 4 HOURS AS NEEDED      09/02/21 1931 --               :      -      8/31/2021: Creatinine 1.13 MG/DL (Ref range: 0.70 - 1.30 MG/DL)  9/1/2021: Creatinine 0.93 MG/DL (Ref range: 0.70 - 1.30 MG/DL);  Creatinine 0.80 MG/DL (Ref range: 0.70 - 1.30 MG/DL)  9/2/2021: Creatinine 0.74 MG/DL (Ref range: 0.70 - 1.30 MG/DL)  9/3/2021: Creatinine 0.55 MG/DL* (Ref range: 0.70 - 1.30 MG/DL)  9/4/2021: Creatinine 0.56 MG/DL* (Ref range: 0.70 - 1.30 MG/DL)       Vitals: O2 Device: None (Room air) @    Patient Vitals for the past 24 hrs:   BP Temp Pulse Resp SpO2   09/04/21 1122 (!) 140/82 98.2 °F (36.8 °C) (!) 106 18 99 %   09/04/21 0731 (!) 153/85 98.7 °F (37.1 °C) (!) 110 18 100 %   09/04/21 0400 -- -- (!) 112 -- --   09/04/21 0238 (!) 145/87 99.2 °F (37.3 °C) (!) 120 18 98 %   09/04/21 0016 (!) 158/90 99 °F (37.2 °C) (!) 120 18 99 %   09/04/21 0000 -- -- (!) 126 -- --   09/03/21 2052 (!) 160/94 99.1 °F (37.3 °C) (!) 114 18 100 %   09/03/21 1531 (!) 160/75 99.1 °F (37.3 °C) 100 18 100 %      I&O's:    Date 09/03/21 0700 - 09/04/21 0659 09/04/21 0700 - 09/05/21 0659   Shift 5308-4543 5064-2487 24 Hour Total 0887-1123 0403-3881 24 Hour Total   INTAKE   Other  60 60        Irrigation Volume Input (mL) (Urinary Catheter 09/03/21 2- way)  60 60      Shift Total(mL/kg)  60(1.1) 60(1.1)      OUTPUT   Urine(mL/kg/hr)  1500(2.4) 1500(1.2)        Urine Output (mL) (Urinary Catheter 09/03/21 2- way)  1500 1500      Shift Total(mL/kg)  1500(28.5) 1500(28.5)      NET  -1440 -1440      Weight (kg) 52.7 52.7 52.7 52.7 52.7 52.7       Meds:    Current Facility-Administered Medications:     0.9% sodium chloride with KCl 40 mEq/L infusion, , IntraVENous, CONTINUOUS, Mundo Lopez MD, Last Rate: 100 mL/hr at 09/04/21 1159, New Bag at 09/04/21 1159    diatrizoate ernestina-diatrizoat sod (MD-GASTROVIEW,GASTROGRAFIN) 66-10 % contrast solution 80 mL, 80 mL, Oral, ONCE, Lopez Gibbs MD    cefepime (MAXIPIME) 2 g in 0.9% sodium chloride (MBP/ADV) 100 mL MBP, 2 g, IntraVENous, Q12H, Lionel Vasquez MD, Last Rate: 200 mL/hr at 09/04/21 1201, 2 g at 09/04/21 1201    tamsulosin (FLOMAX) capsule 0.4 mg, 0.4 mg, Oral, DAILY, Olivia NIÑO, NP, 0.4 mg at 09/03/21 1352    vancomycin (VANCOCIN) 1,000 mg in 0.9% sodium chloride 250 mL (VIAL-MATE), 1,000 mg, IntraVENous, Q12H, Albert Tirado NP, Last Rate: 250 mL/hr at 09/04/21 0241, 1,000 mg at 09/04/21 0241    prochlorperazine (COMPAZINE) with saline injection 10 mg, 10 mg, IntraVENous, Q6H PRN, Albert Lizarraga NP, 10 mg at 09/03/21 2149    pantoprazole (PROTONIX) 40 mg in 0.9% sodium chloride 10 mL injection, 40 mg, IntraVENous, Q12H, Albert Tirado NP, 40 mg at 09/04/21 1159    LORazepam (ATIVAN) injection 2 mg, 2 mg, IntraVENous, Q6H PRN, Albert Lizarraga NP, 2 mg at 09/04/21 0903    folic acid (FOLVITE) tablet 1 mg, 1 mg, Oral, DAILY, Jeannette Trotter MD, 1 mg at 09/03/21 0915    LORazepam (ATIVAN) injection 1 mg, 1 mg, IntraVENous, Q4H PRN, Jeannette Trotter MD    LORazepam (ATIVAN) injection 2 mg, 2 mg, IntraVENous, Q4H PRN, Jeannette Trotter MD    ondansetron (ZOFRAN ODT) tablet 4 mg, 4 mg, Oral, Q8H PRN, Jeannette Trotter MD    polyethylene glycol (MIRALAX) packet 17 g, 17 g, Oral, DAILY PRN, Jeannette Trotter MD    sodium chloride (NS) flush 5-40 mL, 5-40 mL, IntraVENous, Q8H, Narcisa Colunga MD, 10 mL at 09/04/21 0600    sodium chloride (NS) flush 5-40 mL, 5-40 mL, IntraVENous, PRN, Jeannette Trotter MD    acetaminophen (TYLENOL) tablet 1,000 mg, 1,000 mg, Oral, Q6H PRN, Jeannette Trotter MD    [facility]) injection 0.4 mg, 0.4 mg, IntraVENous, EVERY 2 MINUTES AS NEEDED, Constance Kent MD    ondansetron [facility]) injection 4 mg, 4 mg, IntraVENous, Q4H PRN, Jeannette Trotter MD, 4 mg at 09/04/21 0025    oxyCODONE IR (ROXICODONE) tablet 10 mg, 10 mg, Oral, Q4H PRN, Jeannette Trotter MD, 10 mg at 09/03/21 8205    thiamine mononitrate (B-1) tablet 100 mg, 100 mg, Oral, DAILY, Jeannette Trotter MD, 100 mg at 09/03/21 6356          Signed By: Yamileth Zuñiga MD - September 4, 2021

## 2021-09-04 NOTE — PROGRESS NOTES
End of Shift Note    Bedside shift change report given to Kate (oncoming nurse) by Lon Flowers (offgoing nurse). Report included the following information SBAR, Kardex, ED Summary, Intake/Output, MAR, Accordion and Recent Results    Shift worked:  night     Shift summary and any significant changes:    Pt stable throughout shift with some abdominal tenderness. Mondragon irrigated multiple times during the shift with clots removed. 1 time dose of fentanyl given for pain with no relief. Labs drawn   Concerns for physician to address:    Zone phone for oncoming shift:       Activity:  Activity Level: Up with Assistance  Number times ambulated in hallways past shift: 0  Number of times OOB to chair past shift: 0    Cardiac:   Cardiac Monitoring: Yes      Cardiac Rhythm: Sinus Tachy    Access:   Current line(s): PIV     Genitourinary:   Urinary status: mondragon    Respiratory:   O2 Device: None (Room air)  Chronic home O2 use?: NO  Incentive spirometer at bedside: NO     GI:     Current diet:  DIET NPO  Passing flatus: YES  Tolerating current diet: YES       Pain Management:   Patient states pain is manageable on current regimen: NO    Skin:  Jack Score: 20  Interventions: increase time out of bed and internal/external urinary devices    Patient Safety:  Fall Score:  Total Score: 3  Interventions: bed/chair alarm, assistive device (walker, cane, etc), gripper socks and pt to call before getting OOB  High Fall Risk: Yes    Length of Stay:  Expected LOS: 2d 7h  Actual LOS: [address]

## 2021-09-04 NOTE — PROGRESS NOTES
Received notification from bedside RN about patient with regards to: request for pain medication, currently NPO  VS: /87, , RR 18, O2 sat 98% on RA    Intervention given: Fentanyl IV x 1 dose ordered

## 2021-09-04 NOTE — PROGRESS NOTES
Hospitalist Progress Note    NAME: Aaron Grey   :  1965   MRN:  [id number]         Assessment / Plan:    SBO    History of adenocarcinoma of rectum status post neoadjuvant chemoradiation and L AR with ileostomy, ileostomy was removed  Patient had nausea and vomiting yesterday, stat CT scan ordered and and which showed  1. Mechanical small bowel obstruction, with worsened small bowel dilatation  since the prior study, and a gradual transition from mid distal to distal small  bowel but no obstructing mass or sharp transition is identified. 2. Bladder wall thickening with Marrero balloon catheter intraluminal.  3. Mild bilateral hydronephrosis. 4. Pyelonephritis is suggested on the right. Correlate with urinalysis. 5. Stable changes of chronic pancreatitis with a pancreatic tail cyst.  6. Stable right hydrocele. 7. Other incidental and postoperative changes stable. NG tube ordered and surgery consult, keep n.p.o. Patient refused NG tube, surgery input noted and appreciated. No need for NG tube,  Advised p.o. Gastrografin this morning and repeat films tomorrow a.m. Per surgery, may require revision of enteroenterostomy if no improvement    Hypokalemia  Repleted with IV fluid plus KCl  Gross hematuria  B/L Hydronephrosis and hydroureter - improved  Acute cystitis/right pyelonephritis  Acute Kidney Injury - resolved  BPH  -Patient transferred from [facility] for [facility]rology evaluation  -CT Abdo/Pelv - Significant improvement of acute superimposed on chronic pancreatitis  Interval development of bilateral hydronephrosis and hydroureter and cystitis,  probably secondary to enteritis. Persistent or recurrent distal small bowel obstruction secondary to enteritis, with a similar pattern as that seen in May, 2021  Right scrotal simple hydrocele demonstrated on recent ultrasound. -Appreciate Urology input - Marrero replaced overnight after patient pulled out. Continue to to gently irrigate.  Obtain renal US to confirm placement and evaluate hydrohenrosis. If not in correct placement may require cystoscopy. -Renal US         1. Stable minimal pelviectasis in the right kidney. 2. Stable very mild hydronephrosis on the left. 3. Urinary bladder decompressed and not well assessed. Marrero balloon catheter is  visualized. Although its exact location cannot be confirmed sonographically, it  likely lies in the urinary bladder.  -Continue empiric Cefepime and Vancomycin   -Follow Hgb every 12 hrs with gross hematuria   -Transfuse pRBC if Hgb less than 7  09/04: Persistent hematuria, urology on board  Continue with cefepime and vancomycin  Urine culture showed Aerococcus urinae    Acute on chronic pancreatitis, resolved Lipase wnl now    Alcohol abuse  -[facility] protocol  -S/p 4 days since last ETOH use      SBO, resolved Pt passing gas and moving his bowels    less than 18.5 Underweight / Body mass index is 17.6 kg/m². Estimated discharge date: September 6  Barriers:    Code status: Full  Prophylaxis: SCD's  Recommended Disposition: Home w/Family     Subjective:     Chief Complaint / Reason for Physician Visit  Patient seen at bedside. Complaining of abdominal pain, no bowel sounds  No further nausea or vomiting    Review of Systems:  Symptom Y/N Comments  Symptom Y/N Comments   Fever/Chills n   Chest Pain n    Poor Appetite n   Edema n    Cough n   Abdominal Pain n    Sputum n   Joint Pain n    SOB/SZYMANSKI n   Pruritis/Rash n    Nausea/vomit n   Tolerating PT/OT     Diarrhea n   Tolerating Diet n  n.p.o. Constipation n   Other       Could NOT obtain due to:      Objective:     VITALS:   Last 24hrs VS reviewed since prior progress note.  Most recent are:  Patient Vitals for the past 24 hrs:   Temp Pulse Resp BP SpO2   09/04/21 0731 98.7 °F (37.1 °C) (!) 110 18 (!) 153/85 100 %   09/04/21 0400 -- (!) 112 -- -- --   09/04/21 0238 99.2 °F (37.3 °C) (!) 120 18 (!) 145/87 98 %   09/04/21 0016 99 °F (37.2 °C) (!) 120 18 (!) 158/90 99 %   09/04/21 0000 -- (!) 126 -- -- --   09/03/21 2052 99.1 °F (37.3 °C) (!) 114 18 (!) 160/94 100 %   09/03/21 1531 99.1 °F (37.3 °C) 100 18 (!) 160/75 100 %   09/03/21 1054 98.4 °F (36.9 °C) 88 18 (!) 159/89 100 %       Intake/Output Summary (Last 24 hours) at 9/4/2021 8459  Last data filed at 9/3/2021 2100  Gross per 24 hour   Intake 60 ml   Output 1500 ml   Net -1440 ml        I had a face to face encounter and independently examined this patient on 9/4/2021, as outlined below:  PHYSICAL EXAM:  General: WD, WN. Alert, cooperative, no acute distress    EENT:  EOMI. Anicteric sclerae. MMM  Resp:  CTA bilaterally, no wheezing or rales. No accessory muscle use  CV:  Regular  rhythm,  No edema  GI:  Soft, Non distended, Non tender. +Bowel sounds, blood draining in mondragon  Neurologic:  Alert and oriented X 3, normal speech,   Psych:   Fair insight. Not anxious nor agitated  Skin:  No rashes. No jaundice    Reviewed most current lab test results and cultures  YES  Reviewed most current radiology test results   YES  Review and summation of old records today    NO  Reviewed patient's current orders and MAR    YES  PMH/ reviewed - no change compared to H&P  ________________________________________________________________________  Care Plan discussed with:    Comments   Patient x    Family      RN x    Care Manager x    Consultant                        Multidiciplinary team rounds were held today with , nursing, pharmacist and clinical coordinator. Patient's plan of care was discussed; medications were reviewed and discharge planning was addressed.      ________________________________________________________________________  Total NON critical care TIME:  35  Minutes    Total CRITICAL CARE TIME Spent:   Minutes non procedure based      Comments   >50% of visit spent in counseling and coordination of care x    ________________________________________________________________________  Claudeen Greaser Iman Baum MD     Procedures: see electronic medical records for all procedures/Xrays and details which were not copied into this note but were reviewed prior to creation of Plan. LABS:  I reviewed today's most current labs and imaging studies. Pertinent labs include:  Recent Labs     09/04/21  0438 09/04/21  0023 09/03/21  1637 09/03/21  0402 09/03/21  0402 09/02/21  0240 09/02/21  0240   WBC  --  19.9*  --   --  11.7*  --  9.6   HGB 10.0* 10.8* 11.2*   < > 11.0*   < > 10.6*   HCT 28.4* 30.6* 31.9*   < > 31.7*   < > 30.0*   PLT  --  373  --   --  288  --  272    < > = values in this interval not displayed. Recent Labs     09/04/21  0023 09/03/21  0402 09/02/21  1340 09/02/21  0240 09/02/21  0240    138  --   --  140   K 3.1* 3.0* 3.2*   < > 2.9*   CL 98 101  --   --  101   CO2 32 33*  --   --  33*   GLU 93 95  --   --  100   BUN 5* 4*  --   --  6   CREA 0.56* 0.55*  --   --  0.74   CA 8.5 8.2*  --   --  8.2*   MG  --   --   --   --  1.5*    < > = values in this interval not displayed.        Signed: Zhen Enrique MD

## 2021-09-04 NOTE — CONSULTS
Surgery Consult    Subjective:      Cinda Montez is a 60 y.o. male transferred from \[facility]\"" for urology evaluation for hematuria. He was also being seen by Dr. Craig Kessler for SBO and had also seen him in May for the same. This appears to be secondary to long segment distal small bowel enteritis. Pt is s/p neoadjuvant chemoradiotherapy for a T3N0 rectal adenocarcinoma followed by LAR and subsequent ileostomy closure about 4 years ago. He has been having loose stools, last was yesterday. He c/o LLQ pain, which he states is unchanged over the past year. He had one episode of emesis this morning. Past Medical History:   Diagnosis Date    Cancer [facility])     rectal    Gastrointestinal disorder     Gastric Ulcers; Rectal CA    Hematuria 9/1/2021    Hypertension      No past surgical history on file. Family History   Problem Relation Age of Onset    Cancer Brother      Social History     Tobacco Use    Smoking status: Current Every Day Smoker     Packs/day: 0.25    Smokeless tobacco: Never Used   Substance Use Topics    Alcohol use: Yes     Comment: 2-3 beers daily      Prior to Admission medications    Medication Sig Start Date End Date Taking? Authorizing Provider   finasteride (PROSCAR) 5 mg tablet Take 5 mg by mouth daily. Provider, Historical   mirtazapine (REMERON) 15 mg tablet Take 15 mg by mouth nightly. Provider, Historical   famotidine (Pepcid) 20 mg tablet Take 20 mg by mouth nightly as needed for Heartburn. Provider, Historical   metoprolol tartrate (LOPRESSOR) 25 mg tablet Take 12.5 mg by mouth two (2) times a day. Provider, Historical      Allergies   Allergen Reactions    Influenza Virus Vaccines Anaphylaxis       Review of Systems   Constitutional: Negative for chills, diaphoresis and fever. Respiratory: Negative for shortness of breath and wheezing. Cardiovascular: Negative for chest pain and palpitations.    Gastrointestinal: Positive for abdominal pain, diarrhea, nausea and vomiting. Genitourinary: Positive for hematuria. Musculoskeletal: Negative for myalgias. Hematological: Does not bruise/bleed easily. All other systems reviewed and are negative. Objective:     Patient Vitals for the past 8 hrs:   BP Temp Pulse Resp SpO2   21 0731 (!) 153/85 98.7 °F (37.1 °C) (!) 110 18 100 %   21 0400 -- -- (!) 112 -- --   21 0238 (!) 145/87 99.2 °F (37.3 °C) (!) 120 18 98 %       Temp (24hrs), Av.9 °F (37.2 °C), Min:98.4 °F (36.9 °C), Max:99.2 °F (37.3 °C)      Physical Exam  Constitutional:       General: He is not in acute distress. Appearance: He is well-developed. HENT:      Head: Normocephalic and atraumatic. Eyes:      General: No scleral icterus. Pupils: Pupils are equal, round, and reactive to light. Cardiovascular:      Rate and Rhythm: Normal rate and regular rhythm. Heart sounds: Normal heart sounds. Pulmonary:      Breath sounds: Normal breath sounds. No wheezing or rales. Abdominal:      General: Abdomen is flat. Bowel sounds are normal. There is no distension. Palpations: Abdomen is soft. There is no mass. Tenderness: There is abdominal tenderness in the left lower quadrant. There is no guarding or rebound. Comments: No peritoneal signs   Musculoskeletal:         General: Normal range of motion. Lymphadenopathy:      Cervical: No cervical adenopathy. Neurological:      General: No focal deficit present. Mental Status: He is alert and oriented to person, place, and time. Assessment:     59 yo with h/o T3 adenoca of rectum, s/p neoadjuvant chemoradiation and LAR with ileostomy and subsequent takedown. He has recurrent PSBO with transition likely at the ileostomy takedown anastomosis site with some chronic fecalization and increased small bowel distention proximally. Plan:      Will give po gastrografin this morning, repeat films in am.  NPO today other than contrast.  May require revision of enteroenterostomy if no improvement, will follow along. No need for NGT at this point.

## 2021-09-04 NOTE — PROGRESS NOTES
Problem: Falls - Risk of  Goal: *Absence of Falls  Description: Document Svetlana Styles Fall Risk and appropriate interventions in the flowsheet.   Outcome: Progressing Towards Goal  Note: Fall Risk Interventions:  Mobility Interventions: Bed/chair exit alarm, Communicate number of staff needed for ambulation/transfer, Patient to call before getting OOB         Medication Interventions: Patient to call before getting OOB, Teach patient to arise slowly, Bed/chair exit alarm    Elimination Interventions: Call light in reach, Bed/chair exit alarm

## 2021-09-05 ENCOUNTER — APPOINTMENT (OUTPATIENT)
Dept: GENERAL RADIOLOGY | Age: 56
DRG: 696 | End: 2021-09-05
Attending: SURGERY
Payer: MEDICARE

## 2021-09-05 LAB
DATE LAST DOSE: ABNORMAL
REPORTED DOSE,DOSE: ABNORMAL UNITS
REPORTED DOSE/TIME,TMG: 1436
VANCOMYCIN TROUGH SERPL-MCNC: 12.8 UG/ML (ref 5–10)

## 2021-09-05 PROCEDURE — 74011250636 HC RX REV CODE- 250/636: Performed by: GENERAL ACUTE CARE HOSPITAL

## 2021-09-05 PROCEDURE — 74011250637 HC RX REV CODE- 250/637: Performed by: GENERAL ACUTE CARE HOSPITAL

## 2021-09-05 PROCEDURE — 74011250636 HC RX REV CODE- 250/636: Performed by: INTERNAL MEDICINE

## 2021-09-05 PROCEDURE — C9113 INJ PANTOPRAZOLE SODIUM, VIA: HCPCS | Performed by: NURSE PRACTITIONER

## 2021-09-05 PROCEDURE — 74011000258 HC RX REV CODE- 258: Performed by: GENERAL ACUTE CARE HOSPITAL

## 2021-09-05 PROCEDURE — 74019 RADEX ABDOMEN 2 VIEWS: CPT

## 2021-09-05 PROCEDURE — 74011250636 HC RX REV CODE- 250/636: Performed by: NURSE PRACTITIONER

## 2021-09-05 PROCEDURE — 36415 COLL VENOUS BLD VENIPUNCTURE: CPT

## 2021-09-05 PROCEDURE — 74011000258 HC RX REV CODE- 258: Performed by: INTERNAL MEDICINE

## 2021-09-05 PROCEDURE — 80202 ASSAY OF VANCOMYCIN: CPT

## 2021-09-05 PROCEDURE — 74011000250 HC RX REV CODE- 250: Performed by: NURSE PRACTITIONER

## 2021-09-05 PROCEDURE — 65270000029 HC RM PRIVATE

## 2021-09-05 PROCEDURE — 99232 SBSQ HOSP IP/OBS MODERATE 35: CPT | Performed by: SURGERY

## 2021-09-05 PROCEDURE — 74011250637 HC RX REV CODE- 250/637: Performed by: NURSE PRACTITIONER

## 2021-09-05 RX ORDER — PROMETHAZINE HYDROCHLORIDE 25 MG/1
12.5 SUPPOSITORY RECTAL
Status: DISCONTINUED | OUTPATIENT
Start: 2021-09-05 | End: 2021-09-08 | Stop reason: HOSPADM

## 2021-09-05 RX ADMIN — CEFEPIME HYDROCHLORIDE 2 G: 2 INJECTION, POWDER, FOR SOLUTION INTRAVENOUS at 22:50

## 2021-09-05 RX ADMIN — PROMETHAZINE HYDROCHLORIDE 12.5 MG: 25 INJECTION INTRAMUSCULAR; INTRAVENOUS at 17:42

## 2021-09-05 RX ADMIN — TAMSULOSIN HYDROCHLORIDE 0.4 MG: 0.4 CAPSULE ORAL at 09:44

## 2021-09-05 RX ADMIN — SODIUM CHLORIDE 40 MG: 9 INJECTION INTRAMUSCULAR; INTRAVENOUS; SUBCUTANEOUS at 09:45

## 2021-09-05 RX ADMIN — OXYCODONE 10 MG: 5 TABLET ORAL at 03:56

## 2021-09-05 RX ADMIN — VANCOMYCIN HYDROCHLORIDE 1000 MG: 1 INJECTION, POWDER, LYOPHILIZED, FOR SOLUTION INTRAVENOUS at 01:32

## 2021-09-05 RX ADMIN — Medication 100 MG: at 09:44

## 2021-09-05 RX ADMIN — POTASSIUM CHLORIDE AND SODIUM CHLORIDE: 900; 300 INJECTION, SOLUTION INTRAVENOUS at 14:41

## 2021-09-05 RX ADMIN — CEFEPIME HYDROCHLORIDE 2 G: 2 INJECTION, POWDER, FOR SOLUTION INTRAVENOUS at 10:57

## 2021-09-05 RX ADMIN — Medication 10 ML: at 10:59

## 2021-09-05 RX ADMIN — OXYCODONE 10 MG: 5 TABLET ORAL at 10:58

## 2021-09-05 RX ADMIN — FOLIC ACID 1 MG: 1 TABLET ORAL at 09:45

## 2021-09-05 RX ADMIN — SODIUM CHLORIDE 40 MG: 9 INJECTION INTRAMUSCULAR; INTRAVENOUS; SUBCUTANEOUS at 22:50

## 2021-09-05 RX ADMIN — VANCOMYCIN HYDROCHLORIDE 1000 MG: 1 INJECTION, POWDER, LYOPHILIZED, FOR SOLUTION INTRAVENOUS at 14:37

## 2021-09-05 RX ADMIN — ONDANSETRON 4 MG: 2 INJECTION INTRAMUSCULAR; INTRAVENOUS at 15:00

## 2021-09-05 RX ADMIN — OXYCODONE 10 MG: 5 TABLET ORAL at 19:08

## 2021-09-05 RX ADMIN — Medication 10 ML: at 14:38

## 2021-09-05 RX ADMIN — Medication 10 ML: at 22:56

## 2021-09-05 NOTE — PROGRESS NOTES
Pharmacy Automatic Renal Dosing Protocol - Antimicrobials    Indication for Antimicrobials: UTI (Hydronephrosis with bladder wall thickening and inflammation, hematuria)     Current Regimen of Each Antimicrobial:  Cefepime 2 grams Q12H  (Start Date ; Day 4)  Vancomycin 1000 mg Q12H (Start ; day 4)    Previous Antimicrobial Therapy:      Goal Level: VANCOMYCIN TROUGH GOA: 15 mcg/ml (AUC: 400 - 600 mg/hr/Liter/day)     Date Dose & Interval Measured (mcg/mL) Extrapolated (mcg/mL)   9/3 750 mg q12h 11.6 --    1000 mg q12h 12.8 13.8;            Date & time of next level:     Significant Cultures:    Urine: aerococcus urinae >100k. final    Radiology / Imaging results: (X-ray, CT scan or MRI):     Paralysis, amputations, malnutrition:     Labs:  Recent Labs     21  0023 21  0402   CREA 0.56* 0.55*   BUN 5* 4*   WBC 19.9* 11.7*     Temp (24hrs), Av.3 °F (36.8 °C), Min:97.9 °F (36.6 °C), Max:98.6 °F (37 °C)      Is the Patient on Dialysis? no    Creatinine Clearance (mL/min):   CrCl (Actual Body Weight): 85.7  CrCl (Adjusted Body Weight): 101.3  CrCl (Ideal Body Weight): 111.7    Impression/Plan:   Urology and Surgery on board  Vancomycin trough and AUC within goal range. Continue with the same dosage. Continue Cefepime 2g IV q12h  BMP daily. Antimicrobial stop date : 10 days     Pharmacy will follow daily and adjust medications as appropriate for renal function and/or serum levels.     Thank you,  Marisa Morales, PHARMD

## 2021-09-05 NOTE — PROGRESS NOTES
End of Shift Note    Bedside shift change report given to EHSAN Love (oncoming nurse) by Leonie Granger (offgoing nurse). Report included the following information SBAR, Kardex, MAR and Accordion    Shift worked:  7p-7a     Shift summary and any significant changes:    Pt remained stable throughout shift. Scheduled meds given, PRN meds given for pain, education provided. Hourly rounding completed, pt on bedrest, pt had several liquid stools throughout the night. Pt encouraged to turn q2h. Marrero irrigated once, small clots returned. Concerns for physician to address:    Zone phone for oncoming shift:       Activity:  Activity Level: Up with Assistance  Number times ambulated in hallways past shift: 0  Number of times OOB to chair past shift: 0    Cardiac:   Cardiac Monitoring: Yes      Cardiac Rhythm: Sinus Rhythm, SV Tachy    Access:   Current line(s): PIV     Genitourinary:   Urinary status: voiding    Respiratory:   O2 Device: None (Room air)  Chronic home O2 use?: NO  Incentive spirometer at bedside: N/A     GI:  Last Bowel Movement Date: 09/04/21  Current diet:  DIET NPO Other (Specify); oral contrast allowed  Passing flatus: YES  Tolerating current diet: NO       Pain Management:   Patient states pain is manageable on current regimen: YES    Skin:  Jack Score: 18  Interventions: speciality bed, float heels and increase time out of bed    Patient Safety:  Fall Score:  Total Score: 3  Interventions: bed/chair alarm, assistive device (walker, cane, etc) and pt to call before getting OOB  High Fall Risk: Yes    Length of Stay:  Expected LOS: 2d 7h  Actual LOS: [address]

## 2021-09-05 NOTE — PROGRESS NOTES
Hospitalist Progress Note    NAME: Kiki Mosley   :  1965   MRN:  [id number]         Assessment / Plan:    SBO    History of adenocarcinoma of rectum status post neoadjuvant chemoradiation and L AR with ileostomy, ileostomy was removed  Patient had nausea and vomiting, stat CT scan ordered and and which showed  1. Mechanical small bowel obstruction, with worsened small bowel dilatation  since the prior study, and a gradual transition from mid distal to distal small  bowel but no obstructing mass or sharp transition is identified. 2. Bladder wall thickening with Marrero balloon catheter intraluminal.  3. Mild bilateral hydronephrosis. 4. Pyelonephritis is suggested on the right. Correlate with urinalysis. 5. Stable changes of chronic pancreatitis with a pancreatic tail cyst.  6. Stable right hydrocele. 7. Other incidental and postoperative changes stable. NG tube ordered and surgery consult, keep n.p.o. Patient refused NG tube, surgery input noted and appreciated. No need for NG tube,  Advised p.o. Gastrografin this morning and repeat films tomorrow a.m. Per surgery, may require revision of enteroenterostomy if no improvement  : Abdominal x-ray after Gastografin  showed contrast in the distal colon, patient was allowed to have a diet, tolerated well. Reported that he had a bowel movement  Continue with IV cefepime and vancomycin    Hypokalemia  Repleted with IV fluid plus KCl  Gross hematuria  B/L Hydronephrosis and hydroureter - improved  Acute cystitis/right pyelonephritis  Acute Kidney Injury - resolved  BPH  -Patient transferred from [facility] for Urology evaluation  -CT Abdo/Pelv - Significant improvement of acute superimposed on chronic pancreatitis  Interval development of bilateral hydronephrosis and hydroureter and cystitis,  probably secondary to enteritis. Persistent or recurrent distal small bowel obstruction secondary to enteritis, with a similar pattern as that seen in May, 2021  Right scrotal simple hydrocele demonstrated on recent ultrasound. -Appreciate Urology input - Marrero replaced overnight after patient pulled out. Continue to to gently irrigate. Obtain renal US to confirm placement and evaluate hydrohenrosis. If not in correct placement may require cystoscopy. -Renal US         1. Stable minimal pelviectasis in the right kidney. 2. Stable very mild hydronephrosis on the left. 3. Urinary bladder decompressed and not well assessed. Marrero balloon catheter is  visualized. Although its exact location cannot be confirmed sonographically, it  likely lies in the urinary bladder.  -Continue empiric Cefepime and Vancomycin   -Follow Hgb every 12 hrs with gross hematuria   -Transfuse pRBC if Hgb less than 7    09/04: Persistent hematuria, urology on board  Continue with cefepime and vancomycin  Urine culture showed Aerococcus urinae    Acute on chronic pancreatitis, resolved Lipase wnl now    Alcohol abuse  -[facility] protocol  -S/p 4 days since last ETOH use      SBO, resolved Pt passing gas and moving his bowels    less than 18.5 Underweight / Body mass index is 17.6 kg/m². Estimated discharge date: September 6  Barriers:    Code status: Full  Prophylaxis: SCD's  Recommended Disposition: Home w/Family     Subjective:     Chief Complaint / Reason for Physician Visit  Patient seen at bedside. Tolerated diet   Still complaining of abdominal pain, had a bowel movement today  review of Systems:  Symptom Y/N Comments  Symptom Y/N Comments   Fever/Chills n   Chest Pain n    Poor Appetite n   Edema n    Cough n   Abdominal Pain n    Sputum n   Joint Pain n    SOB/SZYMANSKI n   Pruritis/Rash n    Nausea/vomit n   Tolerating PT/OT     Diarrhea n   Tolerating Diet y     Constipation n   Other       Could NOT obtain due to:      Objective:     VITALS:   Last 24hrs VS reviewed since prior progress note.  Most recent are:  Patient Vitals for the past 24 hrs:   Temp Pulse Resp BP SpO2 09/05/21 0740 97.9 °F (36.6 °C) 81 18 (!) 159/90 100 %   09/05/21 0034 98.6 °F (37 °C) 85 18 (!) 157/87 100 %   09/04/21 2024 98.5 °F (36.9 °C) 98 18 (!) 145/88 100 %   09/04/21 1457 98.2 °F (36.8 °C) 100 18 (!) 151/89 100 %   09/04/21 1122 98.2 °F (36.8 °C) (!) 106 18 (!) 140/82 99 %       Intake/Output Summary (Last 24 hours) at 9/5/2021 0826  Last data filed at 9/4/2021 2024  Gross per 24 hour   Intake 1761.67 ml   Output 600 ml   Net 1161.67 ml        I had a face to face encounter and independently examined this patient on 9/5/2021, as outlined below:  PHYSICAL EXAM:  General: WD, WN. Alert, cooperative, no acute distress    EENT:  EOMI. Anicteric sclerae. MMM  Resp:  CTA bilaterally, no wheezing or rales. No accessory muscle use  CV:  Regular  rhythm,  No edema  GI:  Soft, Non distended, Non tender. +Bowel sounds, blood draining in mondragon  Neurologic:  Alert and oriented X 3, normal speech,   Psych:   Fair insight. Not anxious nor agitated  Skin:  No rashes. No jaundice    Reviewed most current lab test results and cultures  YES  Reviewed most current radiology test results   YES  Review and summation of old records today    NO  Reviewed patient's current orders and MAR    YES  PMH/ reviewed - no change compared to H&P  ________________________________________________________________________  Care Plan discussed with:    Comments   Patient x    Family      RN x    Care Manager x    Consultant                        Multidiciplinary team rounds were held today with , nursing, pharmacist and clinical coordinator. Patient's plan of care was discussed; medications were reviewed and discharge planning was addressed.      ________________________________________________________________________  Total NON critical care TIME:  35  Minutes    Total CRITICAL CARE TIME Spent:   Minutes non procedure based      Comments   >50% of visit spent in counseling and coordination of care x ________________________________________________________________________  Jef Brennan MD     Procedures: see electronic medical records for all procedures/Xrays and details which were not copied into this note but were reviewed prior to creation of Plan. LABS:  I reviewed today's most current labs and imaging studies. Pertinent labs include:  Recent Labs     09/04/21  0438 09/04/21  0023 09/03/21  1637 09/03/21  0402 09/03/21  0402   WBC  --  19.9*  --   --  11.7*   HGB 10.0* 10.8* 11.2*   < > 11.0*   HCT 28.4* 30.6* 31.9*   < > 31.7*   PLT  --  373  --   --  288    < > = values in this interval not displayed.      Recent Labs     09/04/21  0023 09/03/21  0402 09/02/21  1340    138  --    K 3.1* 3.0* 3.2*   CL 98 101  --    CO2 32 33*  --    GLU 93 95  --    BUN 5* 4*  --    CREA 0.56* 0.55*  --    CA 8.5 8.2*  --        Signed: Jef Brennan MD

## 2021-09-05 NOTE — PROGRESS NOTES
Admit Date: 2021    POD * No surgery found *    Procedure:  * No surgery found *    Subjective:     Patient has no complaints. Abdomen feels better today. Has had frequent loose stools overnight since taking contrast.    Objective:     Blood pressure (!) 159/90, pulse 81, temperature 97.9 °F (36.6 °C), resp. rate 18, height 5' 8.11\" (1.73 m), weight 116 lb 1.6 oz (52.7 kg), SpO2 100 %. Temp (24hrs), Av.3 °F (36.8 °C), Min:97.9 °F (36.6 °C), Max:98.6 °F (37 °C)      Physical Exam:  GENERAL: alert, cooperative, no distress, appears stated age, LUNG: clear to auscultation bilaterally, HEART: regular rate and rhythm, ABDOMEN: soft, non-tender. Bowel sounds normal. No masses,  no organomegaly, EXTREMITIES:  extremities normal, atraumatic, no cyanosis or edema    Labs:   Recent Results (from the past 24 hour(s))   VANCOMYCIN, TROUGH    Collection Time: 21  1:30 AM   Result Value Ref Range    Vancomycin,trough 12.8 (H) 5.0 - 10.0 ug/mL    Reported dose date 2021      Reported dose time:       Reported dose: 1000 MG UNITS       Data Review images and reports reviewed    Assessment:     Active Problems:    Hematuria (2021)        Plan/Recommendations/Medical Decision Making:     Continue present treatment   Films pending this morning. If contrast in colon, will resume po intake. Doubt will require surgical intervention.     Ruben Lees MD  [facility]

## 2021-09-05 NOTE — PROGRESS NOTES
SW went to patient's room to conduct initial assessment. Assigned RN informed writer patient has been vomiting; and not feeling well. Acknowledged understanding. SW/CM to follow up later re: initial assessment.          SMITHA Peralta

## 2021-09-05 NOTE — PROGRESS NOTES
End of Shift Note    Bedside shift change report given to EHSAN Conde (oncoming nurse) by Israel Ryan RN (offgoing nurse). Report included the following information SBAR, Kardex, Intake/Output and MAR    Shift worked:  [id number]     Shift summary and any significant changes:     Patient complained of abdominal pain, provided 1 dose of PRN pain med, (see MAR), all scheduled meds provided including contrast for tomorrow's XRay Abd. Mondragon irrigated several times during shift with clots removed, one clot (mid-day) approximately 6 inches long removed. Bladder scanned patient after removing clot - 55-60 mLs. Mondragon draining after removal.       Concerns for physician to address:       Zone phone for oncoming shift:          Activity:  Activity Level: Up with Assistance  Number times ambulated in hallways past shift: 0  Number of times OOB to chair past shift: 0    Cardiac:   Cardiac Monitoring: Yes      Cardiac Rhythm: Sinus Rhythm, SV Tachy    Access:   Current line(s): PIV     Genitourinary:   Urinary status: mondragon    Respiratory:   O2 Device: None (Room air)  Chronic home O2 use?: NO  Incentive spirometer at bedside: NO     GI:  Last Bowel Movement Date: 09/04/21  Current diet:  DIET NPO Other (Specify); oral contrast allowed  Passing flatus: YES  Tolerating current diet: YES       Pain Management:   Patient states pain is manageable on current regimen: YES    Skin:  Jack Score: 18  Interventions: float heels and increase time out of bed    Patient Safety:  Fall Score:  Total Score: 3  Interventions: bed/chair alarm, gripper socks and pt to call before getting OOB  High Fall Risk: Yes    Length of Stay:  Expected LOS: 2d 7h  Actual LOS: 2      Israel Ryan RN

## 2021-09-05 NOTE — PROGRESS NOTES
Problem: Falls - Risk of  Goal: *Absence of Falls  Description: Document Kirstin Mendosa Fall Risk and appropriate interventions in the flowsheet. Outcome: Progressing Towards Goal  Note: Fall Risk Interventions:  Mobility Interventions: Bed/chair exit alarm         Medication Interventions: Patient to call before getting OOB    Elimination Interventions: Call light in reach              Problem: Pressure Injury - Risk of  Goal: *Prevention of pressure injury  Description: Document Jack Scale and appropriate interventions in the flowsheet. Outcome: Progressing Towards Goal  Note: Pressure Injury Interventions:  Sensory Interventions: Assess need for specialty bed, Assess changes in LOC    Moisture Interventions: Absorbent underpads, Apply protective barrier, creams and emollients    Activity Interventions: Assess need for specialty bed    Mobility Interventions: Assess need for specialty bed, HOB 30 degrees or less    Nutrition Interventions: Document food/fluid/supplement intake                     Problem: Pressure Injury - Risk of  Goal: *Prevention of pressure injury  Description: Document Jack Scale and appropriate interventions in the flowsheet.   Outcome: Progressing Towards Goal  Note: Pressure Injury Interventions:  Sensory Interventions: Assess need for specialty bed, Assess changes in LOC    Moisture Interventions: Absorbent underpads, Apply protective barrier, creams and emollients    Activity Interventions: Assess need for specialty bed    Mobility Interventions: Assess need for specialty bed, HOB 30 degrees or less    Nutrition Interventions: Document food/fluid/supplement intake

## 2021-09-05 NOTE — PROGRESS NOTES
Progress Note    Patient: Laura Seay MRN: [id number]  SSN: [id number]    YOB: 1965 Age: 60 y.o. Sex: male   Height: Height: 5' 8.11\" (173 cm) Weight: Weight: 52.7 kg (116 lb 1.6 oz) BMI: Body mass index is 17.6 kg/m². Emergency  Contact:  Primary Emergency Contact: Lizzkwaku Bach Phone: [phone]   PCP:   Lev Guzman MD [phone]     Hospital Day: 4 - Admitted 2021  6:23 PM by Joanna Morales MD - Full Code  Active Hospital Problems    Diagnosis Date Noted    Hematuria 2021    * No surgery found *   * Surgery not found *         Assessment/Plan:     · Gross hematuria  · Urinary retention with difficult mondragon placement  · B hydro, improved with mondragon placement to mild pelviectasis  · Aerococcus uti on cefepime  -cont mondragon to drain  -irrigate as needed if not draining     Subjective: RN irrigated clot yesterday, has been mostly yellow since   Imaging:    Exam: nad  nonlabored breathing  Catheter draining yellow with dependent reddish sediment. ROS:  [] SOB (Respiratory)  [] Flatulance (GI)         Labs: Recent Labs     21  0438 21  0023 21  1637 21  0402 21  0402   WBC  --  19.9*  --   --  11.7*   HGB 10.0* 10.8* 11.2*   < > 11.0*   HCT 28.4* 30.6* 31.9*   < > 31.7*   PLT  --  373  --   --  288    < > = values in this interval not displayed.      Recent Labs     21  0023 21  0402 21  1340    138  --    K 3.1* 3.0* 3.2*   CL 98 101  --    CO2 32 33*  --    GLU 93 95  --    BUN 5* 4*  --    CREA 0.56* 0.55*  --    CA 8.5 8.2*  --       ID: Temp (24hrs), Av.2 °F (36.8 °C), Min:97.9 °F (36.6 °C), Max:98.6 °F (37 °C)    2021: WBC 15.7 K/uL* (Ref range: 4.1 - 11.1 K/uL)  2021: WBC 13.5 K/uL* (Ref range: 4.1 - 11.1 K/uL)  2021: WBC 9.6 K/uL (Ref range: 4.1 - 11.1 K/uL)  9/3/2021: WBC 11.7 K/uL* (Ref range: 4.1 - 11.1 K/uL)  2021: WBC 19.9 K/uL* (Ref range: 4.1 - 11.1 K/uL)  Current Antimicrobial Therapy (168h ago, onward)     Ordered     Start Stop    09/04/21 1455  Vancomycin trough on 9/5 prior to the 0200 dose. thanks  Other,   ONCE      09/05/21 0100 09/05/21 1259    09/03/21 1338  vancomycin (VANCOCIN) 1,000 mg in 0.9% sodium chloride 250 mL (VIAL-MATE)  1,000 mg,   IntraVENous,   EVERY 12 HOURS      09/04/21 0200 09/10/21 1359    09/03/21 0922  cefepime (MAXIPIME) 2 g in 0.9% sodium chloride (MBP/ADV) 100 mL MBP  2 g,   IntraVENous,   EVERY 12 HOURS      09/03/21 1100 09/10/21 2259              Cultures: All Micro Results     None         GI: Intake: ADULT DIET Regular; Low Fiber   Appetite: NPO          Abdominal Assessment: Tender  Bowel Sounds: Hypoactive  Last Bowel Movement Date: 09/04/21 Patient Vitals for the past 168 hrs:   Stool Occurrence(s)   09/04/21 1759 1         Pain: 8/10 Aching - Abdomen - Lower      Current Analgesic Therapy (168h ago, onward)     Ordered     Start Stop    09/02/21 1931  acetaminophen (TYLENOL) tablet 1,000 mg  1,000 mg,   Oral,   EVERY 6 HOURS AS NEEDED      09/02/21 1931 --    09/02/21 1931  oxyCODONE IR (ROXICODONE) tablet 10 mg  10 mg,   Oral,   EVERY 4 HOURS AS NEEDED      09/02/21 1931 --               :      -      8/31/2021: Creatinine 1.13 MG/DL (Ref range: 0.70 - 1.30 MG/DL)  9/1/2021: Creatinine 0.93 MG/DL (Ref range: 0.70 - 1.30 MG/DL);  Creatinine 0.80 MG/DL (Ref range: 0.70 - 1.30 MG/DL)  9/2/2021: Creatinine 0.74 MG/DL (Ref range: 0.70 - 1.30 MG/DL)  9/3/2021: Creatinine 0.55 MG/DL* (Ref range: 0.70 - 1.30 MG/DL)  9/4/2021: Creatinine 0.56 MG/DL* (Ref range: 0.70 - 1.30 MG/DL)       Vitals: O2 Device: None (Room air) @    Patient Vitals for the past 24 hrs:   BP Temp Pulse Resp SpO2   09/05/21 1119 (!) 191/103 98 °F (36.7 °C) 100 18 100 %   09/05/21 0740 (!) 159/90 97.9 °F (36.6 °C) 81 18 100 %   09/05/21 0034 (!) 157/87 98.6 °F (37 °C) 85 18 100 %   09/04/21 2024 (!) 145/88 98.5 °F (36.9 °C) 98 18 100 % 09/04/21 1457 (!) 151/89 98.2 °F (36.8 °C) 100 18 100 %      I&O's:    Date 09/04/21 0700 - 09/05/21 0659 09/05/21 0700 - 09/06/21 0659   Shift 9547-5261 6467-2690 24 Hour Total 1239-2085 7601-9029 24 Hour Total   INTAKE   I.V.(mL/kg/hr)  1641.7(2.6) 1641.7(1.3)        Volume (0.9% sodium chloride with KCl 40 mEq/L infusion)  841.7 841.7        Volume (cefepime (MAXIPIME) 2 g in 0.9% sodium chloride (MBP/ADV) 100 mL MBP)  300 300        Volume (vancomycin (VANCOCIN) 1,000 mg in 0.9% sodium chloride 250 mL (VIAL-MATE))  500 500      Other 180  180        Irrigation Volume Input (mL) (Urinary Catheter 09/03/21 2- way) 180  180      Shift Total(mL/kg) 180(3.4) 1641. 7(31.2) 1821. 7(34.6)      OUTPUT   Urine(mL/kg/hr) 600(0.9)  600(0.5)        Urine Output (mL) (Urinary Catheter 09/03/21 2- way) 600  600      Stool           Stool Occurrence(s) 1 x  1 x      Shift Total(mL/kg) 600(11.4)  600(11.4)      NET -420 1641.7 1221.7      Weight (kg) 52.7 52.7 52.7 52.7 52.7 52.7       Meds:    Current Facility-Administered Medications:     0.9% sodium chloride with KCl 40 mEq/L infusion, , IntraVENous, CONTINUOUS, Bill Solis MD, Last Rate: 100 mL/hr at 09/04/21 2348, New Bag at 09/04/21 2348    Vancomycin trough on 9/5 prior to the 0200 dose.  thanks, , Other, ONCE, Albert Tirado, NP    cefepime (MAXIPIME) 2 g in 0.9% sodium chloride (MBP/ADV) 100 mL MBP, 2 g, IntraVENous, Q12H, Gale Perez MD, Last Rate: 200 mL/hr at 09/05/21 1057, 2 g at 09/05/21 1057    tamsulosin (FLOMAX) capsule 0.4 mg, 0.4 mg, Oral, DAILY, Holley NIÑO, NP, 0.4 mg at 09/05/21 0944    vancomycin (VANCOCIN) 1,000 mg in 0.9% sodium chloride 250 mL (VIAL-MATE), 1,000 mg, IntraVENous, Q12H, Albert Tirado NP, Last Rate: 250 mL/hr at 09/05/21 0132, 1,000 mg at 09/05/21 0132    prochlorperazine (COMPAZINE) with saline injection 10 mg, 10 mg, IntraVENous, Q6H PRN, MoustaphaAlbert Cooley NP, 10 mg at 09/03/21 3446    pantoprazole (PROTONIX) 40 mg in 0.9% sodium chloride 10 mL injection, 40 mg, IntraVENous, Q12H, Albetr Tirado, NP, 40 mg at 09/05/21 0945    LORazepam (ATIVAN) injection 2 mg, 2 mg, IntraVENous, Q6H PRN, Albert Tirado, NP, 2 mg at 38/23/71 6412    folic acid (FOLVITE) tablet 1 mg, 1 mg, Oral, DAILY, Duke Mercado MD, 1 mg at 09/05/21 0945    LORazepam (ATIVAN) injection 1 mg, 1 mg, IntraVENous, Q4H PRN, Duke Mercado MD    LORazepam (ATIVAN) injection 2 mg, 2 mg, IntraVENous, Q4H PRN, Duke Mercado MD    ondansetron (ZOFRAN ODT) tablet 4 mg, 4 mg, Oral, Q8H PRN, Duke Mercado MD    polyethylene glycol (MIRALAX) packet 17 g, 17 g, Oral, DAILY PRN, Duke Mercado MD    sodium chloride (NS) flush 5-40 mL, 5-40 mL, IntraVENous, Q8H, Narcisa Colunga MD, 10 mL at 09/05/21 1059    sodium chloride (NS) flush 5-40 mL, 5-40 mL, IntraVENous, PRN, Duke Mercado MD    acetaminophen (TYLENOL) tablet 1,000 mg, 1,000 mg, Oral, Q6H PRN, Duke Mercado MD    [facility]) injection 0.4 mg, 0.4 mg, IntraVENous, EVERY 2 MINUTES AS NEEDED, Rosi Martin MD    ondansetron [facility]) injection 4 mg, 4 mg, IntraVENous, Q4H PRN, Duke Mercado MD, 4 mg at 09/04/21 0025    oxyCODONE IR (ROXICODONE) tablet 10 mg, 10 mg, Oral, Q4H PRN, Duke Mercado MD, 10 mg at 09/05/21 1058    thiamine mononitrate (B-1) tablet 100 mg, 100 mg, Oral, DAILY, Duke Mercado MD, 100 mg at 09/05/21 9659          Signed By: Bartlett Hodgkin, MD - September 5, 2021

## 2021-09-06 ENCOUNTER — APPOINTMENT (OUTPATIENT)
Dept: GENERAL RADIOLOGY | Age: 56
DRG: 696 | End: 2021-09-06
Attending: INTERNAL MEDICINE
Payer: MEDICARE

## 2021-09-06 LAB
ANION GAP SERPL CALC-SCNC: 6 MMOL/L (ref 5–15)
BACTERIA SPEC CULT: NORMAL
BASOPHILS # BLD: 0.1 K/UL (ref 0–0.1)
BASOPHILS NFR BLD: 0 % (ref 0–1)
BUN SERPL-MCNC: 3 MG/DL (ref 6–20)
BUN/CREAT SERPL: 5 (ref 12–20)
CALCIUM SERPL-MCNC: 8.2 MG/DL (ref 8.5–10.1)
CHLORIDE SERPL-SCNC: 106 MMOL/L (ref 97–108)
CO2 SERPL-SCNC: 28 MMOL/L (ref 21–32)
COMMENT, HOLDF: NORMAL
CREAT SERPL-MCNC: 0.52 MG/DL (ref 0.7–1.3)
CREAT SERPL-MCNC: 0.56 MG/DL (ref 0.7–1.3)
DIFFERENTIAL METHOD BLD: ABNORMAL
EOSINOPHIL # BLD: 0.3 K/UL (ref 0–0.4)
EOSINOPHIL NFR BLD: 2 % (ref 0–7)
ERYTHROCYTE [DISTWIDTH] IN BLOOD BY AUTOMATED COUNT: 14.4 % (ref 11.5–14.5)
GLUCOSE SERPL-MCNC: 107 MG/DL (ref 65–100)
HCT VFR BLD AUTO: 27.4 % (ref 36.6–50.3)
HGB BLD-MCNC: 9.3 G/DL (ref 12.1–17)
IMM GRANULOCYTES # BLD AUTO: 0.2 K/UL (ref 0–0.04)
IMM GRANULOCYTES NFR BLD AUTO: 1 % (ref 0–0.5)
LYMPHOCYTES # BLD: 1.4 K/UL (ref 0.8–3.5)
LYMPHOCYTES NFR BLD: 8 % (ref 12–49)
MCH RBC QN AUTO: 33 PG (ref 26–34)
MCHC RBC AUTO-ENTMCNC: 33.9 G/DL (ref 30–36.5)
MCV RBC AUTO: 97.2 FL (ref 80–99)
MONOCYTES # BLD: 1.5 K/UL (ref 0–1)
MONOCYTES NFR BLD: 9 % (ref 5–13)
NEUTS SEG # BLD: 13.6 K/UL (ref 1.8–8)
NEUTS SEG NFR BLD: 80 % (ref 32–75)
NRBC # BLD: 0 K/UL (ref 0–0.01)
NRBC BLD-RTO: 0 PER 100 WBC
PLATELET # BLD AUTO: 374 K/UL (ref 150–400)
PMV BLD AUTO: 9.9 FL (ref 8.9–12.9)
POTASSIUM SERPL-SCNC: 3.9 MMOL/L (ref 3.5–5.1)
RBC # BLD AUTO: 2.82 M/UL (ref 4.1–5.7)
SAMPLES BEING HELD,HOLD: NORMAL
SERVICE CMNT-IMP: NORMAL
SODIUM SERPL-SCNC: 140 MMOL/L (ref 136–145)
TROPONIN I SERPL-MCNC: <0.05 NG/ML
WBC # BLD AUTO: 17 K/UL (ref 4.1–11.1)

## 2021-09-06 PROCEDURE — 74019 RADEX ABDOMEN 2 VIEWS: CPT

## 2021-09-06 PROCEDURE — 85025 COMPLETE CBC W/AUTO DIFF WBC: CPT

## 2021-09-06 PROCEDURE — 80048 BASIC METABOLIC PNL TOTAL CA: CPT

## 2021-09-06 PROCEDURE — 74011250637 HC RX REV CODE- 250/637: Performed by: NURSE PRACTITIONER

## 2021-09-06 PROCEDURE — 65270000029 HC RM PRIVATE

## 2021-09-06 PROCEDURE — 99232 SBSQ HOSP IP/OBS MODERATE 35: CPT | Performed by: SURGERY

## 2021-09-06 PROCEDURE — 74011250636 HC RX REV CODE- 250/636: Performed by: NURSE PRACTITIONER

## 2021-09-06 PROCEDURE — 36415 COLL VENOUS BLD VENIPUNCTURE: CPT

## 2021-09-06 PROCEDURE — 74011250637 HC RX REV CODE- 250/637: Performed by: GENERAL ACUTE CARE HOSPITAL

## 2021-09-06 PROCEDURE — 84484 ASSAY OF TROPONIN QUANT: CPT

## 2021-09-06 PROCEDURE — 74011250636 HC RX REV CODE- 250/636: Performed by: INTERNAL MEDICINE

## 2021-09-06 PROCEDURE — 74011000250 HC RX REV CODE- 250: Performed by: NURSE PRACTITIONER

## 2021-09-06 PROCEDURE — C9113 INJ PANTOPRAZOLE SODIUM, VIA: HCPCS | Performed by: NURSE PRACTITIONER

## 2021-09-06 PROCEDURE — 74011000258 HC RX REV CODE- 258: Performed by: GENERAL ACUTE CARE HOSPITAL

## 2021-09-06 PROCEDURE — 74011250636 HC RX REV CODE- 250/636: Performed by: GENERAL ACUTE CARE HOSPITAL

## 2021-09-06 PROCEDURE — 93005 ELECTROCARDIOGRAM TRACING: CPT

## 2021-09-06 RX ADMIN — Medication 10 ML: at 22:58

## 2021-09-06 RX ADMIN — CEFEPIME HYDROCHLORIDE 2 G: 2 INJECTION, POWDER, FOR SOLUTION INTRAVENOUS at 11:55

## 2021-09-06 RX ADMIN — FOLIC ACID 1 MG: 1 TABLET ORAL at 08:44

## 2021-09-06 RX ADMIN — Medication 10 ML: at 15:04

## 2021-09-06 RX ADMIN — VANCOMYCIN HYDROCHLORIDE 1000 MG: 1 INJECTION, POWDER, LYOPHILIZED, FOR SOLUTION INTRAVENOUS at 02:40

## 2021-09-06 RX ADMIN — ONDANSETRON 4 MG: 2 INJECTION INTRAMUSCULAR; INTRAVENOUS at 06:01

## 2021-09-06 RX ADMIN — OXYCODONE 10 MG: 5 TABLET ORAL at 06:01

## 2021-09-06 RX ADMIN — Medication 10 ML: at 06:00

## 2021-09-06 RX ADMIN — POTASSIUM CHLORIDE AND SODIUM CHLORIDE: 900; 300 INJECTION, SOLUTION INTRAVENOUS at 03:28

## 2021-09-06 RX ADMIN — SODIUM CHLORIDE 40 MG: 9 INJECTION INTRAMUSCULAR; INTRAVENOUS; SUBCUTANEOUS at 08:44

## 2021-09-06 RX ADMIN — VANCOMYCIN HYDROCHLORIDE 1000 MG: 1 INJECTION, POWDER, LYOPHILIZED, FOR SOLUTION INTRAVENOUS at 15:57

## 2021-09-06 RX ADMIN — TAMSULOSIN HYDROCHLORIDE 0.4 MG: 0.4 CAPSULE ORAL at 08:43

## 2021-09-06 RX ADMIN — OXYCODONE 10 MG: 5 TABLET ORAL at 11:34

## 2021-09-06 RX ADMIN — CEFEPIME HYDROCHLORIDE 2 G: 2 INJECTION, POWDER, FOR SOLUTION INTRAVENOUS at 23:56

## 2021-09-06 RX ADMIN — OXYCODONE 10 MG: 5 TABLET ORAL at 18:25

## 2021-09-06 RX ADMIN — SODIUM CHLORIDE 40 MG: 9 INJECTION INTRAMUSCULAR; INTRAVENOUS; SUBCUTANEOUS at 22:58

## 2021-09-06 RX ADMIN — Medication 100 MG: at 08:44

## 2021-09-06 NOTE — PROGRESS NOTES
Comprehensive Nutrition Assessment    Type and Reason for Visit: Initial (BMI)    Nutrition Recommendations/Plan:   · Continue diet per attending; now back on clear liquids. · Please document % meals and supplements consumed in flowsheet I/O's under intake. Nutrition Assessment:     9/6: Chart reviewed; med noted for SBO, hx of adenocarcinoma of rectum s/p neoadjuvant chemo. Pt previously refused NGT. Hx of alcohol abuse, on thiamine and folic acid. Diet was advanced yesterday and initially tolerating. This morning, made NPO at [address] by attending. Per RN, pt began vomiting again this morning. Noted attending ordered clear liquids to begin today at 1115. RD screened pre low BMI. Appears weight trends mostly stable x 6 months +/- 5 lbs. No data found. Last Weight Metric  Weight Loss Metrics 9/6/2021 9/2/2021 6/14/2021 5/20/2021 2/4/2021 1/25/2021 12/20/2020   Today's Wt 119 lb 12.8 oz 121 lb 8 oz 121 lb 118 lb 4.8 oz 123 lb 122 lb 6.4 oz 116 lb   BMI 18.22 kg/m2 18.47 kg/m2 18.4 kg/m2 17.99 kg/m2 18.7 kg/m2 18.61 kg/m2 17.64 kg/m2       Estimated Daily Nutrient Needs:  Energy (kcal): 1990 (BMR 1339 x 1. 3AF) + 250 kcals; Weight Used for Energy Requirements: Current  Protein (g): 65 (1.2 g/kg bw); Weight Used for Protein Requirements: Current  Fluid (ml/day): 2000 ml/day; Method Used for Fluid Requirements: 1 ml/kcal    Nutrition Related Findings:  BM: 9/5; Labs: reviewed; Meds: thiamine, folvite      Wounds:    None       Current Nutrition Therapies:  ADULT DIET Clear Liquid    Anthropometric Measures:  · Height:  5' 8\" (172.7 cm)  · Current Body Wt:  54.3 kg (119 lb 11.4 oz)    · Ideal Body Wt:  154 lbs:  77.7 %   · BMI Category:  Underweight (BMI less than 22) age over 72       Nutrition Diagnosis:   · Inadequate protein-energy intake related to  (SBO) as evidenced by NPO or clear liquid status due to medical condition, nausea, vomiting    Nutrition Interventions:   Food and/or Nutrient Delivery: Continue current diet (Diet per attending)  Nutrition Education and Counseling: No recommendations at this time  Coordination of Nutrition Care: Continue to monitor while inpatient    Goals:  Pt will be able to tolerate clear liquids without n/v and further progression next 2-4 days       Nutrition Monitoring and Evaluation:   Behavioral-Environmental Outcomes: None identified  Food/Nutrient Intake Outcomes: Diet advancement/tolerance, Food and nutrient intake  Physical Signs/Symptoms Outcomes: Biochemical data, GI status, Weight    Discharge Planning:     Too soon to determine     Electronically signed by Callie Rogel RD on 9/6/2021 at 11:27 AM

## 2021-09-06 NOTE — PROGRESS NOTES
End of Shift Note    Bedside shift change report given to Donovan Vargas (oncoming nurse) by Darius Salvador (offgoing nurse). Report included the following information SBAR, Kardex and MAR    Shift worked:  Day shift     Shift summary and any significant changes:     Patient tolerated care fairly throughout shift. Hourly rounding completed. Medications given and education provided. PRN pain meds given for complaints of pain (see PRN MAR). Patient mondragon draining and patient. Mondragon care completed. No complaints of nausea and patient tolerated      Concerns for physician to address:       Zone phone for oncoming shift:          Activity:  Activity Level: Up with Assistance  Number times ambulated in hallways past shift: 0  Number of times OOB to chair past shift: 0    Cardiac:   Cardiac Monitoring: Yes      Cardiac Rhythm: Sinus Rhythm    Access:   Current line(s): PIV     Genitourinary:   Urinary status: voiding    Respiratory:   O2 Device: None (Room air)  Chronic home O2 use?: NO  Incentive spirometer at bedside: N/A     GI:  Last Bowel Movement Date: 09/05/21  Current diet:  ADULT DIET Regular; Low Fiber  Passing flatus: YES  Tolerating current diet: YES       Pain Management:   Patient states pain is manageable on current regimen: YES    Skin:  Jack Score: 18  Interventions: float heels, increase time out of bed and limit briefs    Patient Safety:  Fall Score:  Total Score: 3  Interventions: bed/chair alarm, gripper socks and pt to call before getting OOB  High Fall Risk: Yes    Length of Stay:  Expected LOS: 2d 7h  Actual LOS: [address]

## 2021-09-06 NOTE — PROGRESS NOTES
Problem: Falls - Risk of  Goal: *Absence of Falls  Description: Document Cheri Pillow Fall Risk and appropriate interventions in the flowsheet. Outcome: Progressing Towards Goal  Note: Fall Risk Interventions:  Mobility Interventions: Bed/chair exit alarm, Communicate number of staff needed for ambulation/transfer, Patient to call before getting OOB         Medication Interventions: Patient to call before getting OOB, Teach patient to arise slowly, Bed/chair exit alarm    Elimination Interventions: Bed/chair exit alarm, Call light in reach              Problem: Pressure Injury - Risk of  Goal: *Prevention of pressure injury  Description: Document Jack Scale and appropriate interventions in the flowsheet.   Outcome: Progressing Towards Goal  Note: Pressure Injury Interventions:  Sensory Interventions: Assess changes in LOC, Check visual cues for pain    Moisture Interventions: Absorbent underpads, Internal/External urinary devices    Activity Interventions: Increase time out of bed    Mobility Interventions: Float heels    Nutrition Interventions: Document food/fluid/supplement intake, Offer support with meals,snacks and hydration

## 2021-09-06 NOTE — PROGRESS NOTES
End of Shift Note    Bedside shift change report given to Monik (oncoming nurse) by Gerhard Grijalva (offgoing nurse).   Report included the following information SBAR, Kardex, ED Summary, Intake/Output, MAR, Accordion and Recent Results    Shift worked:  night     Shift summary and any significant changes:    PT stable throughout shift   Concerns for physician to address:    Zone phone for oncoming shift:

## 2021-09-06 NOTE — PROGRESS NOTES
End of Shift Note    Bedside shift change report given to EHSAN Melchor (oncoming nurse) by Mel Sheehan RN (offgoing nurse). Report included the following information SBAR, Kardex and MAR    Shift worked:  7a - 7:30p     Shift summary and any significant changes:     Patient tolerated care fairly well during shift. Two doses of prn pain meds given. Hourly rounding completed. Medications given and education provided. Mondragon care completed. Mondragon draining yellow, clear urine. [id number] [facility] completed. EKG and troponin completed per MD; both negative. Concerns for physician to address:       Zone phone for oncoming shift:          Activity:  Activity Level: Up with Assistance  Number times ambulated in hallways past shift: 0  Number of times OOB to chair past shift: 0    Cardiac:   Cardiac Monitoring: Yes      Cardiac Rhythm: Sinus Rhythm    Access:   Current line(s): PIV     Genitourinary:   Urinary status: mondragon    Respiratory:   O2 Device: None (Room air)  Chronic home O2 use?: NO  Incentive spirometer at bedside: N/A     GI:  Last Bowel Movement Date: 09/06/21  Current diet:  ADULT DIET Clear Liquid  Passing flatus: YES  Tolerating current diet: YES       Pain Management:   Patient states pain is manageable on current regimen: YES    Skin:  Jack Score: 19  Interventions: increase time out of bed, PT/OT consult, limit briefs, internal/external urinary devices and nutritional support     Patient Safety:  Fall Score:  Total Score: 3  Interventions: gripper socks and pt to call before getting OOB  High Fall Risk: Yes    Length of Stay:  Expected LOS: 2d 7h  Actual LOS: [address] [place] RN

## 2021-09-06 NOTE — PROGRESS NOTES
Progress Note    Patient: Gera Alvarez MRN: [id number]  SSN: [id number]    YOB: 1965 Age: 60 y.o. Sex: male   Height: Height: 5' 8\" (172.7 cm) Weight: Weight: 54.3 kg (119 lb 12.8 oz) BMI: Body mass index is 18.22 kg/m².    Emergency  Contact:  Primary Emergency Contact: Lisseth hawley, Home Phone: [phone]   PCP:   Pippa Reno MD [phone]     Hospital Day: 5 - Admitted 2021  6:23 PM by Reba Easley MD - Full Code  Active Hospital Problems    Diagnosis Date Noted    Hematuria 2021    * No surgery found *   * Surgery not found *         Assessment/Plan:     · Gross hematuria, resolving  · Urinary retention with difficult mondragon placement  · B hydro, improved with mondragon placement to mild pelviectasis  · Aerococcus uti on cefepime  -cont mondragon to drain  -irrigate as needed if not draining     Subjective: No catheter issues   Imaging:    Exam: Mondragon draining yellow, some red sediment dependent in tube   ROS:  [] SOB (Respiratory)  [] Flatulance (GI)         Labs: Recent Labs     21  0423 21  0438 21  0023   WBC 17.0*  --  19.9*   HGB 9.3* 10.0* 10.8*   HCT 27.4* 28.4* 30.6*     --  373     Recent Labs     21  0426 21  0423 21  0023   NA  --  140 139   K  --  3.9 3.1*   CL  --  106 98   CO2  --  28 32   GLU  --  107* 93   BUN  --  3* 5*   CREA 0.52* 0.56* 0.56*   CA  --  8.2* 8.5      ID: Temp (24hrs), Av.1 °F (36.7 °C), Min:97.7 °F (36.5 °C), Max:98.5 °F (36.9 °C)    2021: WBC 15.7 K/uL* (Ref range: 4.1 - 11.1 K/uL)  2021: WBC 13.5 K/uL* (Ref range: 4.1 - 11.1 K/uL)  2021: WBC 9.6 K/uL (Ref range: 4.1 - 11.1 K/uL)  9/3/2021: WBC 11.7 K/uL* (Ref range: 4.1 - 11.1 K/uL)  2021: WBC 19.9 K/uL* (Ref range: 4.1 - 11.1 K/uL)  2021: WBC 17.0 K/uL* (Ref range: 4.1 - 11.1 K/uL)  Current Antimicrobial Therapy (168h ago, onward)     Ordered     Start Stop    21 3939 Vancomycin trough on 9/5 prior to the 0200 dose. thanks  Other,   ONCE      09/05/21 0100 09/05/21 1259    09/03/21 1338  vancomycin (VANCOCIN) 1,000 mg in 0.9% sodium chloride 250 mL (VIAL-MATE)  1,000 mg,   IntraVENous,   EVERY 12 HOURS      09/04/21 0200 09/10/21 1359    09/03/21 0922  cefepime (MAXIPIME) 2 g in 0.9% sodium chloride (MBP/ADV) 100 mL MBP  2 g,   IntraVENous,   EVERY 12 HOURS      09/03/21 1100 09/10/21 2259              Cultures: All Micro Results     None         GI: Intake: ADULT DIET Clear Liquid   Appetite: Fair          Abdominal Assessment: Tender  Bowel Sounds: Active  Last Bowel Movement Date: 09/05/21 (per patient) Patient Vitals for the past 168 hrs:   Stool Occurrence(s)   09/04/21 1759 1         Pain: 3/10 Sore, Throbbing - Abdomen - Lower, Left      Current Analgesic Therapy (168h ago, onward)     Ordered     Start Stop    09/02/21 1931  acetaminophen (TYLENOL) tablet 1,000 mg  1,000 mg,   Oral,   EVERY 6 HOURS AS NEEDED      09/02/21 1931 --    09/02/21 1931  oxyCODONE IR (ROXICODONE) tablet 10 mg  10 mg,   Oral,   EVERY 4 HOURS AS NEEDED      09/02/21 1931 --               :    Marrero; Hematuria - Urinary Catheter 09/03/21 2- way-Status: Draining;Patent    8/31/2021: Creatinine 1.13 MG/DL (Ref range: 0.70 - 1.30 MG/DL)  9/1/2021: Creatinine 0.93 MG/DL (Ref range: 0.70 - 1.30 MG/DL); Creatinine 0.80 MG/DL (Ref range: 0.70 - 1.30 MG/DL)  9/2/2021: Creatinine 0.74 MG/DL (Ref range: 0.70 - 1.30 MG/DL)  9/3/2021: Creatinine 0.55 MG/DL* (Ref range: 0.70 - 1.30 MG/DL)  9/4/2021: Creatinine 0.56 MG/DL* (Ref range: 0.70 - 1.30 MG/DL)  9/6/2021: Creatinine 0.56 MG/DL* (Ref range: 0.70 - 1.30 MG/DL);  Creatinine 0.52 MG/DL* (Ref range: 0.70 - 1.30 MG/DL)       Vitals: O2 Device: None (Room air) @    Patient Vitals for the past 24 hrs:   BP Temp Pulse Resp SpO2 Height Weight   09/06/21 1124 -- -- -- -- -- 5' 8\" (1.727 m) --   09/06/21 0839 -- -- -- -- -- -- 54.3 kg (119 lb 12.8 oz)   09/06/21 0736 (!) 140/94 97.7 °F (36.5 °C) 87 18 100 % -- --   09/06/21 0337 (!) 156/84 97.8 °F (36.6 °C) 91 18 100 % -- --   09/05/21 2328 (!) 161/93 98.5 °F (36.9 °C) 92 18 96 % -- --   09/05/21 1453 (!) 169/87 -- -- -- -- -- --   09/05/21 1451 (!) 177/106 98.4 °F (36.9 °C) 95 18 100 % -- --      I&O's:    Date 09/05/21 0700 - 09/06/21 0659 09/06/21 0700 - 09/07/21 0659   Shift 3301-4350 1492-0629 24 Hour Total 0084-5150 1365-1013 24 Hour Total   INTAKE   Shift Total(mL/kg)         OUTPUT   Urine(mL/kg/hr) 780(1.2)  780(0.6)        Urine Output (mL) (Urinary Catheter 09/03/21 2- way) 780  780      Shift Total(mL/kg) 780(14.8)  780(14.8)      NET -780  -780      Weight (kg) 52.7 52.7 52.7 54.3 54.3 54.3       Meds:    Current Facility-Administered Medications:     promethazine (PHENERGAN) suppository 12.5 mg, 12.5 mg, Rectal, Q6H PRN, Nickolas Benitez MD    promethazine (PHENERGAN) 12.5 mg in 0.9% sodium chloride 50 mL IVPB, 12.5 mg, IntraVENous, Q6H PRN, Nickolas Benitez MD, Last Rate: 200 mL/hr at 09/05/21 1742, 12.5 mg at 09/05/21 1742    0.9% sodium chloride with KCl 40 mEq/L infusion, , IntraVENous, CONTINUOUS, Nickolas Benitez MD, Last Rate: 100 mL/hr at 09/06/21 0328, New Bag at 09/06/21 0328    cefepime (MAXIPIME) 2 g in 0.9% sodium chloride (MBP/ADV) 100 mL MBP, 2 g, IntraVENous, Q12H, Eulah Polite S, MD, Last Rate: 200 mL/hr at 09/05/21 2250, 2 g at 09/05/21 2250    tamsulosin (FLOMAX) capsule 0.4 mg, 0.4 mg, Oral, DAILY, Carmel NIÑO NP, 0.4 mg at 09/06/21 0843    vancomycin (VANCOCIN) 1,000 mg in 0.9% sodium chloride 250 mL (VIAL-MATE), 1,000 mg, IntraVENous, Q12H, Albert Tirado NP, Last Rate: 250 mL/hr at 09/06/21 0240, 1,000 mg at 09/06/21 0240    prochlorperazine (COMPAZINE) with saline injection 10 mg, 10 mg, IntraVENous, Q6H PRN, Albert Tirado NP, 10 mg at 09/03/21 2149    pantoprazole (PROTONIX) 40 mg in 0.9% sodium chloride 10 mL injection, 40 mg, IntraVENous, Q12H, Rolanda Tirado NP, 40 mg at 09/06/21 0844    LORazepam (ATIVAN) injection 2 mg, 2 mg, IntraVENous, Q6H PRN, Albert Tay NP, 2 mg at 73/07/35 9863    folic acid (FOLVITE) tablet 1 mg, 1 mg, Oral, DAILY, Mikaela Llanes MD, 1 mg at 09/06/21 0844    LORazepam (ATIVAN) injection 1 mg, 1 mg, IntraVENous, Q4H PRN, Mikaela Llanes MD    LORazepam (ATIVAN) injection 2 mg, 2 mg, IntraVENous, Q4H PRN, Mikaela Llanes MD    ondansetron (ZOFRAN ODT) tablet 4 mg, 4 mg, Oral, Q8H PRN, Mikaela Llanes MD    polyethylene glycol (MIRALAX) packet 17 g, 17 g, Oral, DAILY PRN, Mikaela Llanes MD    sodium chloride (NS) flush 5-40 mL, 5-40 mL, IntraVENous, Q8H, Narcisa Colunga MD, 10 mL at 09/06/21 0600    sodium chloride (NS) flush 5-40 mL, 5-40 mL, IntraVENous, PRN, Mikaela Llanes MD    acetaminophen (TYLENOL) tablet 1,000 mg, 1,000 mg, Oral, Q6H PRN, Mikaela Llanes MD    [facility]) injection 0.4 mg, 0.4 mg, IntraVENous, EVERY 2 MINUTES AS NEEDED, Griselda Carver, Alvester Chary, MD    ondansetron [facility]) injection 4 mg, 4 mg, IntraVENous, Q4H PRN, Mikaela Llanes MD, 4 mg at 09/06/21 0601    oxyCODONE IR (ROXICODONE) tablet 10 mg, 10 mg, Oral, Q4H PRN, Mkiaela Llanes MD, 10 mg at 09/06/21 1134    thiamine mononitrate (B-1) tablet 100 mg, 100 mg, Oral, DAILY, Mikaela Llanes MD, 100 mg at 09/06/21 2839          Signed By: Mini Caldera MD - September 6, 2021

## 2021-09-06 NOTE — PROGRESS NOTES
Transition of Care Plan:    RUR: 24% - moderate risk  Disposition: Anticipating home with [facility]  Follow up appointments: PCP, specialists  DME needed: No needs identified. Transportation at Discharge: Needs assistance with transportation. CM to arrange. Keys or means to access home:   yes     IM Medicare Letter: 2nd IM Medicare letter to be given prior to discharge. Is patient a BCPI-A Bundle: No          If yes, was Bundle Letter given?: N/A    Caregiver Contact: Ines Israel, spouse, [phone]  Discharge Caregiver contacted prior to discharge? CM to contact prior to discharge, if pt requests. Reason for Admission:   Small bowel obstruction               RUR Score:  24% - moderated risk                PCP: First and Last name:   Lev Guzman MD   Name of Practice: [facility]. [address] [address] [facility] [address] [facility]   Are you a current patient: Yes/No: yes   Approximate date of last visit: 3 months ago   Can you participate in a virtual visit if needed: No    Do you (patient/family) have any concerns for transition/discharge? No concerns verbalized. Plan for utilizing home health:   Anticipating Home with [facility]    Current Advanced Directive/Advance Care Plan:  Full Code   Advance Care Planning     General Advance Care Planning (ACP) Conversation      Date of Conversation: 9/62021  Conducted with: Patient with Decision Making Capacity    Healthcare Decision Maker:     Primary Decision Maker: Michelle Garber - [facility] - [phone]    Secondary Decision Maker: Rafaela Yao - Sister - [phone]  Click here to complete [employer] including selection of the Healthcare Decision Maker Relationship (ie \"Primary\")      Today we documented Decision Maker(s) consistent with Legal Next of Kin hierarchy. Pt has ACP on file.      Content/Action Overview:   Has NO ACP documents/care preferences - information provided, considering goals and options  Reviewed DNR/DNI and patient elects Full Code (Attempt Resuscitation)    Length of Voluntary ACP Conversation in minutes:  <16 minutes (Non-Billable)    Healthcare Decision Maker:               Primary Decision Maker: Osmar Mccarty - Sister - [phone]    Secondary Decision Maker: Shahzad Pittman - Sister - [phone]    Transition of Care Plan:    Anticipating Home with [facility]    CM contacted pt via phone to complete initial assessment. CM introduced role, verified demographics, and discussed discharge planning. Mr. Osiris Glez is a 60year old male admitted for small bowel obstruction. He is insured by [employer] and uses the [employer]. Pt lives alone in an apartment on the 3rd floor. States he has access to an elevator but it is about 40 steps. Pt is independent with ADLs and IADLs. Pt drives. Pt denies any DME or hx of inpt rehab. Pt states he has received [facility] services (cannot remember agency) and been at SNF before [facility] At time of discharge, pt will need transportation arranged. CM to assist.    Unit CM will continue to follow. Care Management Interventions  PCP Verified by CM: Yes (Dr. Bettina Babin. last appt 3 months ago. )  Mode of Transport at Discharge:  Other (see comment) (CM need to arrange a Lyft or [place]. )  Transition of Care Consult (CM Consult): Discharge Planning  Discharge Durable Medical Equipment: No  Physical Therapy Consult: No  Occupational Therapy Consult: No  Speech Therapy Consult: No  Current Support Network: Lives Alone  Confirm Follow Up Transport: Self  Discharge Location  Discharge Placement: Home with home health    Kiki Martinez RN, BSN, [address] [facility]  [phone]

## 2021-09-06 NOTE — PROGRESS NOTES
End of Shift Note    Bedside shift change report given to Ulisses Little RN (oncoming nurse) by Kayla Viera RN (offgoing nurse). Report included the following information SBAR, Kardex, Intake/Output and MAR    Shift worked:  2754-1306     Shift summary and any significant changes:     Patient had several loose stools and vomiting through shift, provided PRN zofran and IV phenergan for vomiting. 2 doses of PRN pain meds, all scheduled meds and frequent rounding provided. Mondragon draining without clots, clearer red. Concerns for physician to address:       Zone phone for oncoming shift:          Activity:  Activity Level: Up with Assistance  Number times ambulated in hallways past shift: 0  Number of times OOB to chair past shift: 0    Cardiac:   Cardiac Monitoring: Yes      Cardiac Rhythm: Sinus Rhythm    Access:   Current line(s): PIV     Genitourinary:   Urinary status: mondragon    Respiratory:   O2 Device: None (Room air)  Chronic home O2 use?: NO  Incentive spirometer at bedside: NO     GI:  Last Bowel Movement Date: 09/05/21  Current diet:  ADULT DIET Regular; Low Fiber  Passing flatus: YES  Tolerating current diet: YES       Pain Management:   Patient states pain is manageable on current regimen: YES    Skin:  Jack Score: 18  Interventions: float heels, internal/external urinary devices and nutritional support     Patient Safety:  Fall Score:  Total Score: 3  Interventions: assistive device (walker, cane, etc), gripper socks and pt to call before getting OOB  High Fall Risk: Yes    Length of Stay:  Expected LOS: 2d 7h  Actual LOS: 3      Kayla Viera RN

## 2021-09-06 NOTE — PROGRESS NOTES
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 Mechanical small bowel obstruction, with worsened small bowel dilatation  since the prior study, and a gradual transition from mid distal to distal small  bowel but no obstructing mass or sharp transition is identified. 2. Bladder wall thickening with Marrero balloon catheter intraluminal.  3. Mild bilateral hydronephrosis. 4. Pyelonephritis is suggested on the right. Correlate with urinalysis. 5. Stable changes of chronic pancreatitis with a pancreatic tail cyst.  6. Stable right hydrocele. 7. Other incidental and postoperative changes stable. NG tube ordered and surgery consult, keep n.p.o. Patient refused NG tube, surgery input noted and appreciated. No need for NG tube,  Advised p.o. Gastrografin this morning and repeat films tomorrow a.m. Per surgery, may require revision of enteroenterostomy if no improvement    : Patient had multiple episodes of nausea and vomiting yesterday afternoon, unable to tolerate diet, this morning no nausea vomiting, discussed with surgery who advised to start clear liquid diet. Repeat KUB showed no change  : Abdominal x-ray after Gastografin  showed contrast in the distal colon, patient was allowed to have a diet, tolerated well.   Reported that he had a bowel movement  Continue with IV cefepime and vancomycin    Hypokalemia  Repleted with IV fluid plus KCl  Gross hematuria  B/L Hydronephrosis and hydroureter - improved  Acute cystitis/right pyelonephritis  Acute Kidney Injury - resolved  BPH  -Patient transferred from [facility] for Urology evaluation  -CT Abdo/Pelv - Significant improvement of acute superimposed on chronic pancreatitis  Interval development of bilateral hydronephrosis and hydroureter and cystitis,  probably secondary to enteritis. Persistent or recurrent distal small bowel obstruction secondary to enteritis, with a similar pattern as that seen in May, 2021  Right scrotal simple hydrocele demonstrated on recent ultrasound. -Appreciate Urology input - Marrero replaced overnight after patient pulled out. Continue to to gently irrigate. Obtain renal US to confirm placement and evaluate hydrohenrosis. If not in correct placement may require cystoscopy. -Renal US         1. Stable minimal pelviectasis in the right kidney. 2. Stable very mild hydronephrosis on the left. 3. Urinary bladder decompressed and not well assessed. Marrero balloon catheter is  visualized. Although its exact location cannot be confirmed sonographically, it  likely lies in the urinary bladder.  -Continue empiric Cefepime and Vancomycin   -Follow Hgb every 12 hrs with gross hematuria   -Transfuse pRBC if Hgb less than 7  09/06: Hematuria starting to clear up  09/04: Persistent hematuria, urology on board  Continue with cefepime and vancomycin  Urine culture showed Aerococcus urinae    Acute on chronic pancreatitis, resolved Lipase wnl now    Alcohol abuse  -CIWA protocol  -S/p 4 days since last ETOH use      SBO, resolved Pt passing gas and moving his bowels    less than 18.5 Underweight / Body mass index is 18.16 kg/m². Estimated discharge date: September 6  Barriers:  Updated wife over the phone  Code status: Full  Prophylaxis: SCD's  Recommended Disposition: Home w/Family     Subjective:     Chief Complaint / Reason for Physician Visit  Patient seen at bedside.     Yesterday event noted, patient had recurrent nausea and vomiting  No vomiting today  Was c/o musculoskeletal chest pain   review of Systems:  Symptom Y/N Comments  Symptom Y/N Comments   Fever/Chills n   Chest Pain n    Poor Appetite n   Edema n    Cough n   Abdominal Pain n Sputum n   Joint Pain n    SOB/SZYMANSKI n   Pruritis/Rash n    Nausea/vomit n   Tolerating PT/OT     Diarrhea n   Tolerating Diet      Constipation n   Other       Could NOT obtain due to:      Objective:     VITALS:   Last 24hrs VS reviewed since prior progress note. Most recent are:  Patient Vitals for the past 24 hrs:   Temp Pulse Resp BP SpO2   09/06/21 0736 97.7 °F (36.5 °C) 87 18 (!) 140/94 100 %   09/06/21 0337 97.8 °F (36.6 °C) 91 18 (!) 156/84 100 %   09/05/21 2328 98.5 °F (36.9 °C) 92 18 (!) 161/93 96 %   09/05/21 1453 -- -- -- (!) 169/87 --   09/05/21 1451 98.4 °F (36.9 °C) 95 18 (!) 177/106 100 %   09/05/21 1119 98 °F (36.7 °C) 100 18 (!) 191/103 100 %     No intake or output data in the 24 hours ending 09/06/21 0841     I had a face to face encounter and independently examined this patient on 9/6/2021, as outlined below:  PHYSICAL EXAM:  General: WD, WN. Alert, cooperative, no acute distress    EENT:  EOMI. Anicteric sclerae. MMM  Resp:  CTA bilaterally, no wheezing or rales. No accessory muscle use  CV:  Regular  rhythm,  No edema  GI:  Soft, Non distended, Non tender. +Bowel sounds, blood draining in mondragon  Neurologic:  Alert and oriented X 3, normal speech,   Psych:   Fair insight. Not anxious nor agitated  Skin:  No rashes. No jaundice    Reviewed most current lab test results and cultures  YES  Reviewed most current radiology test results   YES  Review and summation of old records today    NO  Reviewed patient's current orders and MAR    YES  PMH/SH reviewed - no change compared to H&P  ________________________________________________________________________  Care Plan discussed with:    Comments   Patient x    Family      RN x    Care Manager x    Consultant                        Multidiciplinary team rounds were held today with , nursing, pharmacist and clinical coordinator. Patient's plan of care was discussed; medications were reviewed and discharge planning was addressed. ________________________________________________________________________  Total NON critical care TIME:  35  Minutes    Total CRITICAL CARE TIME Spent:   Minutes non procedure based      Comments   >50% of visit spent in counseling and coordination of care x    ________________________________________________________________________  Portia Shearer MD     Procedures: see electronic medical records for all procedures/Xrays and details which were not copied into this note but were reviewed prior to creation of Plan. LABS:  I reviewed today's most current labs and imaging studies.   Pertinent labs include:  Recent Labs     09/06/21 0423 09/04/21 0438 09/04/21 0023   WBC 17.0*  --  19.9*   HGB 9.3* 10.0* 10.8*   HCT 27.4* 28.4* 30.6*     --  373     Recent Labs     09/06/21 0426 09/06/21 0423 09/04/21 0023   NA  --  140 139   K  --  3.9 3.1*   CL  --  106 98   CO2  --  28 32   GLU  --  107* 93   BUN  --  3* 5*   CREA 0.52* 0.56* 0.56*   CA  --  8.2* 8.5       Signed: Portia Shearer MD

## 2021-09-06 NOTE — PROGRESS NOTES
Admit Date: 2021    Subjective:     Patient has no complaints. Had some abdominal discomfort yesterday with po intake. Feeling well now.  + BMs. Objective:     Blood pressure (!) 140/94, pulse 87, temperature 97.7 °F (36.5 °C), resp. rate 18, height 5' 8.11\" (1.73 m), weight 119 lb 12.8 oz (54.3 kg), SpO2 100 %. Temp (24hrs), Av.1 °F (36.7 °C), Min:97.7 °F (36.5 °C), Max:98.5 °F (36.9 °C)      Physical Exam:  GENERAL: alert, cooperative, no distress, appears stated age, LUNG: clear to auscultation bilaterally, HEART: regular rate and rhythm, ABDOMEN: soft, non-tender. Bowel sounds normal. No masses,  no organomegaly, EXTREMITIES:  extremities normal, atraumatic, no cyanosis or edema    Labs:   Recent Results (from the past 24 hour(s))   CBC WITH AUTOMATED DIFF    Collection Time: 21  4:23 AM   Result Value Ref Range    WBC 17.0 (H) 4.1 - 11.1 K/uL    RBC 2.82 (L) 4.10 - 5.70 M/uL    HGB 9.3 (L) 12.1 - 17.0 g/dL    HCT 27.4 (L) 36.6 - 50.3 %    MCV 97.2 80.0 - 99.0 FL    MCH 33.0 26.0 - 34.0 PG    MCHC 33.9 30.0 - 36.5 g/dL    RDW 14.4 11.5 - 14.5 %    PLATELET 226 033 - 187 K/uL    MPV 9.9 8.9 - 12.9 FL    NRBC 0.0 0  WBC    ABSOLUTE NRBC 0.00 0.00 - 0.01 K/uL    NEUTROPHILS 80 (H) 32 - 75 %    LYMPHOCYTES 8 (L) 12 - 49 %    MONOCYTES 9 5 - 13 %    EOSINOPHILS 2 0 - 7 %    BASOPHILS 0 0 - 1 %    IMMATURE GRANULOCYTES 1 (H) 0.0 - 0.5 %    ABS. NEUTROPHILS 13.6 (H) 1.8 - 8.0 K/UL    ABS. LYMPHOCYTES 1.4 0.8 - 3.5 K/UL    ABS. MONOCYTES 1.5 (H) 0.0 - 1.0 K/UL    ABS. EOSINOPHILS 0.3 0.0 - 0.4 K/UL    ABS. BASOPHILS 0.1 0.0 - 0.1 K/UL    ABS. IMM.  GRANS. 0.2 (H) 0.00 - 0.04 K/UL    DF AUTOMATED     METABOLIC PANEL, BASIC    Collection Time: 21  4:23 AM   Result Value Ref Range    Sodium 140 136 - 145 mmol/L    Potassium 3.9 3.5 - 5.1 mmol/L    Chloride 106 97 - 108 mmol/L    CO2 28 21 - 32 mmol/L    Anion gap 6 5 - 15 mmol/L    Glucose 107 (H) 65 - 100 mg/dL    BUN 3 (L) 6 - 20 MG/DL Creatinine 0.56 (L) 0.70 - 1.30 MG/DL    BUN/Creatinine ratio 5 (L) 12 - 20      GFR est AA >60 >60 ml/min/1.73m2    GFR est non-AA >60 >60 ml/min/1.73m2    Calcium 8.2 (L) 8.5 - 10.1 MG/DL   SAMPLES BEING HELD    Collection Time: 09/06/21  4:23 AM   Result Value Ref Range    SAMPLES BEING HELD  2 LAV     COMMENT        Add-on orders for these samples will be processed based on acceptable specimen integrity and analyte stability, which may vary by analyte.    CREATININE    Collection Time: 09/06/21  4:26 AM   Result Value Ref Range    Creatinine 0.52 (L) 0.70 - 1.30 MG/DL    GFR est AA >60 >60 ml/min/1.73m2    GFR est non-AA >60 >60 ml/min/1.73m2       Data Review images and reports reviewed    Assessment:     Active Problems:    Hematuria (9/1/2021)        Plan/Recommendations/Medical Decision Making:     Continue present treatment   If able to eat today, should be okay for discharge after lunch      Ijeoma Cleary MD  [facility]

## 2021-09-07 LAB
ANION GAP SERPL CALC-SCNC: 4 MMOL/L (ref 5–15)
BASOPHILS # BLD: 0.1 K/UL (ref 0–0.1)
BASOPHILS NFR BLD: 0 % (ref 0–1)
BUN SERPL-MCNC: 2 MG/DL (ref 6–20)
BUN/CREAT SERPL: 3 (ref 12–20)
CALCIUM SERPL-MCNC: 8.2 MG/DL (ref 8.5–10.1)
CHLORIDE SERPL-SCNC: 106 MMOL/L (ref 97–108)
CO2 SERPL-SCNC: 25 MMOL/L (ref 21–32)
CREAT SERPL-MCNC: 0.61 MG/DL (ref 0.7–1.3)
CREAT SERPL-MCNC: 0.62 MG/DL (ref 0.7–1.3)
DIFFERENTIAL METHOD BLD: ABNORMAL
EOSINOPHIL # BLD: 0.2 K/UL (ref 0–0.4)
EOSINOPHIL NFR BLD: 1 % (ref 0–7)
ERYTHROCYTE [DISTWIDTH] IN BLOOD BY AUTOMATED COUNT: 14.2 % (ref 11.5–14.5)
GLUCOSE SERPL-MCNC: 91 MG/DL (ref 65–100)
HCT VFR BLD AUTO: 29 % (ref 36.6–50.3)
HCT VFR BLD AUTO: 29 % (ref 36.6–50.3)
HGB BLD-MCNC: 10 G/DL (ref 12.1–17)
HGB BLD-MCNC: 10 G/DL (ref 12.1–17)
IMM GRANULOCYTES # BLD AUTO: 0.2 K/UL (ref 0–0.04)
IMM GRANULOCYTES NFR BLD AUTO: 1 % (ref 0–0.5)
LYMPHOCYTES # BLD: 1.6 K/UL (ref 0.8–3.5)
LYMPHOCYTES NFR BLD: 11 % (ref 12–49)
MCH RBC QN AUTO: 33.6 PG (ref 26–34)
MCHC RBC AUTO-ENTMCNC: 34.5 G/DL (ref 30–36.5)
MCV RBC AUTO: 97.3 FL (ref 80–99)
MONOCYTES # BLD: 1.4 K/UL (ref 0–1)
MONOCYTES NFR BLD: 10 % (ref 5–13)
NEUTS SEG # BLD: 10.9 K/UL (ref 1.8–8)
NEUTS SEG NFR BLD: 77 % (ref 32–75)
NRBC # BLD: 0 K/UL (ref 0–0.01)
NRBC BLD-RTO: 0 PER 100 WBC
PLATELET # BLD AUTO: 402 K/UL (ref 150–400)
PMV BLD AUTO: 9.8 FL (ref 8.9–12.9)
POTASSIUM SERPL-SCNC: 4.6 MMOL/L (ref 3.5–5.1)
RBC # BLD AUTO: 2.98 M/UL (ref 4.1–5.7)
SODIUM SERPL-SCNC: 135 MMOL/L (ref 136–145)
WBC # BLD AUTO: 14.2 K/UL (ref 4.1–11.1)

## 2021-09-07 PROCEDURE — 85025 COMPLETE CBC W/AUTO DIFF WBC: CPT

## 2021-09-07 PROCEDURE — 85018 HEMOGLOBIN: CPT

## 2021-09-07 PROCEDURE — 74011250636 HC RX REV CODE- 250/636: Performed by: INTERNAL MEDICINE

## 2021-09-07 PROCEDURE — 36415 COLL VENOUS BLD VENIPUNCTURE: CPT

## 2021-09-07 PROCEDURE — 74011000250 HC RX REV CODE- 250: Performed by: NURSE PRACTITIONER

## 2021-09-07 PROCEDURE — 74011000258 HC RX REV CODE- 258: Performed by: GENERAL ACUTE CARE HOSPITAL

## 2021-09-07 PROCEDURE — 80048 BASIC METABOLIC PNL TOTAL CA: CPT

## 2021-09-07 PROCEDURE — C9113 INJ PANTOPRAZOLE SODIUM, VIA: HCPCS | Performed by: NURSE PRACTITIONER

## 2021-09-07 PROCEDURE — 74011250636 HC RX REV CODE- 250/636: Performed by: NURSE PRACTITIONER

## 2021-09-07 PROCEDURE — 74011250637 HC RX REV CODE- 250/637: Performed by: NURSE PRACTITIONER

## 2021-09-07 PROCEDURE — 74011250637 HC RX REV CODE- 250/637: Performed by: GENERAL ACUTE CARE HOSPITAL

## 2021-09-07 PROCEDURE — 74011250636 HC RX REV CODE- 250/636: Performed by: GENERAL ACUTE CARE HOSPITAL

## 2021-09-07 PROCEDURE — 99232 SBSQ HOSP IP/OBS MODERATE 35: CPT | Performed by: NURSE PRACTITIONER

## 2021-09-07 PROCEDURE — 65270000029 HC RM PRIVATE

## 2021-09-07 RX ORDER — POLYETHYLENE GLYCOL 3350 17 G/17G
17 POWDER, FOR SOLUTION ORAL DAILY
Status: DISCONTINUED | OUTPATIENT
Start: 2021-09-07 | End: 2021-09-08 | Stop reason: HOSPADM

## 2021-09-07 RX ADMIN — Medication 10 ML: at 13:31

## 2021-09-07 RX ADMIN — Medication 10 ML: at 23:50

## 2021-09-07 RX ADMIN — Medication 10 ML: at 06:00

## 2021-09-07 RX ADMIN — CEFEPIME HYDROCHLORIDE 2 G: 2 INJECTION, POWDER, FOR SOLUTION INTRAVENOUS at 23:49

## 2021-09-07 RX ADMIN — POLYETHYLENE GLYCOL 3350 17 G: 17 POWDER, FOR SOLUTION ORAL at 11:59

## 2021-09-07 RX ADMIN — OXYCODONE 10 MG: 5 TABLET ORAL at 02:27

## 2021-09-07 RX ADMIN — SODIUM CHLORIDE 40 MG: 9 INJECTION INTRAMUSCULAR; INTRAVENOUS; SUBCUTANEOUS at 23:49

## 2021-09-07 RX ADMIN — POTASSIUM CHLORIDE AND SODIUM CHLORIDE: 900; 300 INJECTION, SOLUTION INTRAVENOUS at 17:13

## 2021-09-07 RX ADMIN — OXYCODONE 10 MG: 5 TABLET ORAL at 19:40

## 2021-09-07 RX ADMIN — VANCOMYCIN HYDROCHLORIDE 1000 MG: 1 INJECTION, POWDER, LYOPHILIZED, FOR SOLUTION INTRAVENOUS at 15:54

## 2021-09-07 RX ADMIN — Medication 100 MG: at 09:08

## 2021-09-07 RX ADMIN — POTASSIUM CHLORIDE AND SODIUM CHLORIDE: 900; 300 INJECTION, SOLUTION INTRAVENOUS at 03:01

## 2021-09-07 RX ADMIN — CEFEPIME HYDROCHLORIDE 2 G: 2 INJECTION, POWDER, FOR SOLUTION INTRAVENOUS at 11:57

## 2021-09-07 RX ADMIN — TAMSULOSIN HYDROCHLORIDE 0.4 MG: 0.4 CAPSULE ORAL at 09:07

## 2021-09-07 RX ADMIN — FOLIC ACID 1 MG: 1 TABLET ORAL at 09:08

## 2021-09-07 RX ADMIN — SODIUM CHLORIDE 40 MG: 9 INJECTION INTRAMUSCULAR; INTRAVENOUS; SUBCUTANEOUS at 09:16

## 2021-09-07 RX ADMIN — VANCOMYCIN HYDROCHLORIDE 1000 MG: 1 INJECTION, POWDER, LYOPHILIZED, FOR SOLUTION INTRAVENOUS at 04:30

## 2021-09-07 RX ADMIN — OXYCODONE 10 MG: 5 TABLET ORAL at 10:08

## 2021-09-07 NOTE — PROGRESS NOTES
Spiritual Care Assessment/Progress Note  [facility]      NAME: Ruy Chowdhury      MRN: [id number]  AGE: 60 y.o.  SEX: male  Roman Catholic Affiliation: Catholic   Language: English     9/7/2021     Total Time (in minutes): 6     Spiritual Assessment begun in [facility] through conversation with:         [x]Patient        [] Family    [] Friend(s)        Reason for Consult: Initial visit     Spiritual beliefs: (Please include comment if needed)     [] Identifies with a yessy tradition:         [] Supported by a yessy community:            [] Claims no spiritual orientation:           [] Seeking spiritual identity:                [] Adheres to an individual form of spirituality:           [x] Not able to assess:                           Identified resources for coping:      [] Prayer                               [] Music                  [] Guided Imagery     [] Family/friends                 [] Pet visits     [] Devotional reading                         [x] Unknown     [] Other:                                            Interventions offered during this visit: (See comments for more details)    Patient Interventions: Initial visit, Initial/Spiritual assessment, patient floor, Affirmation of emotions/emotional suffering           Plan of Care:     [] Support spiritual and/or cultural needs    [] Support AMD and/or advance care planning process      [] Support grieving process   [] Coordinate Rites and/or Rituals    [] Coordination with community clergy   [] No spiritual needs identified at this time   [] Detailed Plan of Care below (See Comments)  [] Make referral to Music Therapy  [] Make referral to Pet Therapy     [] Make referral to Addiction services  [] Make referral to [facility]  [] Make referral to Spiritual Care Partner  [] No future visits requested        [x] Follow up upon further referrals     Comments:     Initial Spiritual Care Assessment visit for Mr. Newell Newton in [phone].  Patient was alert, no family was present during the visit. Patient said he is feeling better, has no concern. Patient asked to adjust the IV device,  informed it to adjacent RN so he can get the intervention from the nurse. Advised of  availability.       [id number] [facility] Tobi Hurtado,Sung, Zina [address] Provider   Paging Service [phone]

## 2021-09-07 NOTE — PROGRESS NOTES
Admit Date: 2021    Subjective:     Patient feeling ok today. Passing flatus. Last bowel movement was the day before yesterday. Tolerating clear liquids without nausea. Has not been mobilizing due to mondragon catheter. Objective:     Blood pressure (!) 140/85, pulse 78, temperature 98.3 °F (36.8 °C), resp. rate 16, height 5' 8\" (1.727 m), weight 54.3 kg (119 lb 12.8 oz), SpO2 100 %. Temp (24hrs), Av.3 °F (36.8 °C), Min:98.1 °F (36.7 °C), Max:98.4 °F (36.9 °C)      Physical Exam:  GENERAL: alert, cooperative, no distress, appears stated age, LUNG: clear to auscultation bilaterally, HEART: regular rate and rhythm, ABDOMEN: soft, non-tender. Bowel sounds normal. No masses,  no organomegaly, EXTREMITIES:  extremities normal, atraumatic, no cyanosis or edema    Labs:   Recent Results (from the past 24 hour(s))   TROPONIN I    Collection Time: 21 11:59 AM   Result Value Ref Range    Troponin-I, Qt. <0.05 <0.05 ng/mL   CREATININE    Collection Time: 21  3:08 AM   Result Value Ref Range    Creatinine 0.62 (L) 0.70 - 1.30 MG/DL    GFR est AA >60 >60 ml/min/1.73m2    GFR est non-AA >60 >60 ml/min/1.73m2   HGB & HCT    Collection Time: 21  3:08 AM   Result Value Ref Range    HGB 10.0 (L) 12.1 - 17.0 g/dL    HCT 29.0 (L) 36.6 - 50.3 %   CBC WITH AUTOMATED DIFF    Collection Time: 21  3:08 AM   Result Value Ref Range    WBC 14.2 (H) 4.1 - 11.1 K/uL    RBC 2.98 (L) 4.10 - 5.70 M/uL    HGB 10.0 (L) 12.1 - 17.0 g/dL    HCT 29.0 (L) 36.6 - 50.3 %    MCV 97.3 80.0 - 99.0 FL    MCH 33.6 26.0 - 34.0 PG    MCHC 34.5 30.0 - 36.5 g/dL    RDW 14.2 11.5 - 14.5 %    PLATELET 006 (H) 561 - 400 K/uL    MPV 9.8 8.9 - 12.9 FL    NRBC 0.0 0  WBC    ABSOLUTE NRBC 0.00 0.00 - 0.01 K/uL    NEUTROPHILS 77 (H) 32 - 75 %    LYMPHOCYTES 11 (L) 12 - 49 %    MONOCYTES 10 5 - 13 %    EOSINOPHILS 1 0 - 7 %    BASOPHILS 0 0 - 1 %    IMMATURE GRANULOCYTES 1 (H) 0.0 - 0.5 %    ABS.  NEUTROPHILS 10.9 (H) 1.8 - 8.0 K/UL ABS. LYMPHOCYTES 1.6 0.8 - 3.5 K/UL    ABS. MONOCYTES 1.4 (H) 0.0 - 1.0 K/UL    ABS. EOSINOPHILS 0.2 0.0 - 0.4 K/UL    ABS. BASOPHILS 0.1 0.0 - 0.1 K/UL    ABS. IMM. GRANS. 0.2 (H) 0.00 - 0.04 K/UL    DF AUTOMATED     METABOLIC PANEL, BASIC    Collection Time: 09/07/21  3:08 AM   Result Value Ref Range    Sodium 135 (L) 136 - 145 mmol/L    Potassium 4.6 3.5 - 5.1 mmol/L    Chloride 106 97 - 108 mmol/L    CO2 25 21 - 32 mmol/L    Anion gap 4 (L) 5 - 15 mmol/L    Glucose 91 65 - 100 mg/dL    BUN 2 (L) 6 - 20 MG/DL    Creatinine 0.61 (L) 0.70 - 1.30 MG/DL    BUN/Creatinine ratio 3 (L) 12 - 20      GFR est AA >60 >60 ml/min/1.73m2    GFR est non-AA >60 >60 ml/min/1.73m2    Calcium 8.2 (L) 8.5 - 10.1 MG/DL       Data Review images and reports reviewed    Assessment:     Active Problems:    Hematuria (9/1/2021)    pSBO resolving. Plan/Recommendations/Medical Decision Making:     Continue present treatment   Continue to advance diet as tolerated. Patient should be mobilizing. Assist with catheter and lines as needed. Walking program.     Marisel Olmedo NP  [facility]      Surgeon Addendum    I have personally performed a face to face diagnostic evaluation on this patient. I have reviewed and agree with the plan as outlined above. Patient tolerating clear liquids in small amounts. Had 3 soft BMs yesterday. Will add miralax and see how he does with po intake. If his diet cannot be advanced he may require surgical exploration and revision of his small bowel anastomosis.        Jessie Hernandez MD   09/07/21   10:38 AM

## 2021-09-07 NOTE — PROGRESS NOTES
End of Shift Note    Bedside shift change report given to Ade (oncoming nurse) by Stas Abbott (offgoing nurse). Report included the following information SBAR, Kardex, ED Summary, Procedure Summary, Intake/Output, MAR, Accordion and Recent Results    Shift worked:  night     Shift summary and any significant changes:    Pt stable throughout shift. 1 prn dose of pain meds given   Concerns for physician to address:    Zone phone for oncoming shift:       Activity:  Activity Level: Up with Assistance  Number times ambulated in hallways past shift: 0  Number of times OOB to chair past shift: 0    Cardiac:   Cardiac Monitoring: Yes      Cardiac Rhythm: Sinus Rhythm    Access:   Current line(s): PIV     Genitourinary:   Urinary status: voiding    Respiratory:   O2 Device: None (Room air)  Chronic home O2 use?: NO  Incentive spirometer at bedside: NO     GI:  Last Bowel Movement Date: 09/06/21  Current diet:  ADULT DIET Clear Liquid  Passing flatus: YES  Tolerating current diet: YES       Pain Management:   Patient states pain is manageable on current regimen: YES    Skin:  Jack Score: 18  Interventions: increase time out of bed    Patient Safety:  Fall Score:  Total Score: 3  Interventions: gripper socks and pt to call before getting OOB  High Fall Risk: Yes    Length of Stay:  Expected LOS: 2d 7h  Actual LOS: [address]

## 2021-09-07 NOTE — PROGRESS NOTES
Problem: Falls - Risk of  Goal: *Absence of Falls  Description: Document Andrea Johnson Fall Risk and appropriate interventions in the flowsheet. Outcome: Progressing Towards Goal  Note: Fall Risk Interventions:  Mobility Interventions: Communicate number of staff needed for ambulation/transfer, Patient to call before getting OOB         Medication Interventions: Patient to call before getting OOB, Teach patient to arise slowly    Elimination Interventions: Call light in reach              Problem: Pressure Injury - Risk of  Goal: *Prevention of pressure injury  Description: Document Jack Scale and appropriate interventions in the flowsheet.   Outcome: Progressing Towards Goal  Note: Pressure Injury Interventions:  Sensory Interventions: Assess changes in LOC, Check visual cues for pain    Moisture Interventions: Absorbent underpads, Internal/External urinary devices    Activity Interventions: Increase time out of bed    Mobility Interventions: HOB 30 degrees or less    Nutrition Interventions: Document food/fluid/supplement intake, Offer support with meals,snacks and hydration

## 2021-09-07 NOTE — PROGRESS NOTES
End of Shift Note    Bedside shift change report given to Ce Rice (oncoming nurse) by Karina Rene (offgoing nurse). Report included the following information SBAR, Kardex and MAR    Shift worked:  Day shift     Shift summary and any significant changes:     Patient tolerated care fairly throughout [facility]. Hourly rounding completed. Medications given and education provided regarding all meds. Patient mondragon irrigated as needed. PRN pain meds given x1 (see PRN MAR). [facility]. Concerns for physician to address:       Zone phone for oncoming shift:          Activity:  Activity Level: Up with Assistance  Number times ambulated in hallways past shift: 0  Number of times OOB to chair past shift: 0    Cardiac:   Cardiac Monitoring: Yes      Cardiac Rhythm: Sinus Rhythm    Access:   Current line(s): PIV     Genitourinary:   Urinary status: voiding and mondragon    Respiratory:   O2 Device: None (Room air)  Chronic home O2 use?: NO  Incentive spirometer at bedside: N/A     GI:  Last Bowel Movement Date: 09/06/21  Current diet:  ADULT DIET Clear Liquid  Passing flatus: YES  Tolerating current diet: NO       Pain Management:   Patient states pain is manageable on current regimen: YES    Skin:  Jack Score: 18  Interventions: turn team, float heels, increase time out of bed and internal/external urinary devices    Patient Safety:  Fall Score:  Total Score: 3  Interventions: bed/chair alarm, gripper socks and pt to call before getting OOB  High Fall Risk: Yes    Length of Stay:  Expected LOS: 2d 7h  Actual LOS: [address]

## 2021-09-07 NOTE — PROGRESS NOTES
I reviewed pertinent labs and imaging, and discussed /agreed on the plan of care with Dr. Rigo Ashton. Hospitalist Progress Note    NAME: Diana Holguin   :  1965   MRN:  [id number]       Assessment / Plan:  SBO    History of adenocarcinoma of rectum status post neoadjuvant chemoradiation and L AR with ileostomy, ileostomy was removed  - Abd CT showed   IMPRESSION  1. Mechanical small bowel obstruction, with worsened small bowel dilatation  since the prior study, and a gradual transition from mid distal to distal small  bowel but no obstructing mass or sharp transition is identified. 2. Bladder wall thickening with Mondragon balloon catheter intraluminal.  3. Mild bilateral hydronephrosis. 4. Pyelonephritis is suggested on the right. Correlate with urinalysis. 5. Stable changes of chronic pancreatitis with a pancreatic tail cyst.  6. Stable right hydrocele. 7. Other incidental and postoperative changes stable. - pt tolerating CLD , N/V resolved   - Abd  to touch   - appreciate Surgery input - ok to DC if able to tolerate advanced diet   - advanced to GI lite   - Pt passing flatus and had BM   - anticipate DC in 2 days    Gross hematuria  B/L Hydronephrosis and hydroureter - improved  Acute cystitis/right pyelonephritis  Acute Kidney Injury - resolved  BPH  - appreciate Urology input - placed mondragon , gentle irrigation q shift for  Hematuria   monitor output . Cont Tamsulosin   - pt admits to straining to void and having difficulty  initiating stream   - Ucx + Aerococcus - cont vanc and Cefepime x 7 days  Last dose 9/10  - hgb stable at 10 despite cherry colored urine will DC serial H/H   - will need OP urology follow up for gross hematuria / retention      Acute on chronic pancreatitis - resolved     Alcohol Abuse   - CIWA protocol      / Body mass index is 18.22 kg/m².     Estimated discharge date:   Barriers: Recurrence of N/V     Code status: Full  Prophylaxis: SCD's  Recommended Disposition: Home w/Family     Subjective:     Chief Complaint / Reason for Physician Visit  \"I am  \". Discussed with RN events overnight. Pt seen and discussed POC   Possible DC if able to tolerate diet   Review of Systems:  Symptom Y/N Comments  Symptom Y/N Comments   Fever/Chills nn   Chest Pain n    Poor Appetite n   Edema n    Cough n   Abdominal Pain y    Sputum n   Joint Pain n    SOB/SZYMANSKI n   Pruritis/Rash n    Nausea/vomit n   Tolerating PT/OT n    Diarrhea n   Tolerating Diet n    Constipation    Other       Could NOT obtain due to:      Objective:     VITALS:   Last 24hrs VS reviewed since prior progress note. Most recent are:  Patient Vitals for the past 24 hrs:   Temp Pulse Resp BP SpO2   09/07/21 0802 98.3 °F (36.8 °C) 78 16 (!) 140/85 100 %   09/07/21 0258 98.3 °F (36.8 °C) 88 18 (!) 151/93 100 %   09/06/21 2243 98.2 °F (36.8 °C) 79 16 135/76 100 %   09/06/21 1928 98.4 °F (36.9 °C) 86 17 (!) 141/71 100 %   09/06/21 1528 98.3 °F (36.8 °C) 81 16 (!) 145/94 100 %   09/06/21 1134 98.1 °F (36.7 °C) 91 16 (!) 155/91 100 %       Intake/Output Summary (Last 24 hours) at 9/7/2021 0836  Last data filed at 9/7/2021 0755  Gross per 24 hour   Intake --   Output 1700 ml   Net -1700 ml        I had a face to face encounter and independently examined this patient on 9/7/2021, as outlined below:  PHYSICAL EXAM:  General: Thin . Alert, cooperative, conversational   EENT:  EOMI. Anicteric sclerae. MMM dental caries   Resp:  , no wheezing or rales. No accessory muscle use  CV:  Regular  rhythm,  No edema  GI:  Soft, Non distended, Non tender. +Bowel sounds BM   Neurologic:  Alert and oriented X 3, normal speech,   Psych:   . Not anxious nor agitated  Skin:  No rashes.   No jaundice    Reviewed most current lab test results and cultures  YES  Reviewed most current radiology test results   YES  Review and summation of old records today    NO  Reviewed bea's current orders and [facility] YES  PMH/SH reviewed - no change compared to H&P  ________________________________________________________________________  Care Plan discussed with:    Comments   Patient x    Family      RN x    Care Manager     Consultant                        Multidiciplinary team rounds were held today with , nursing, pharmacist and clinical coordinator. Patient's plan of care was discussed; medications were reviewed and discharge planning was addressed. ________________________________________________________________________  Total NON critical care TIME:  30   Minutes    Total CRITICAL CARE TIME Spent:   Minutes non procedure based      Comments   >50% of visit spent in counseling and coordination of care     ________________________________________________________________________  Javid Portillo. Zack Job, NP     Procedures: see electronic medical records for all procedures/Xrays and details which were not copied into this note but were reviewed prior to creation of Plan. LABS:  I reviewed today's most current labs and imaging studies. Pertinent labs include:  Recent Labs     09/07/21 0308 09/06/21 0423   WBC 14.2* 17.0*   HGB 10.0*  10.0* 9.3*   HCT 29.0*  29.0* 27.4*   * 374     Recent Labs     09/07/21 0308 09/06/21 0426 09/06/21 0423   *  --  140   K 4.6  --  3.9     --  106   CO2 25  --  28   GLU 91  --  107*   BUN 2*  --  3*   CREA 0.62*  0.61* 0.52* 0.56*   CA 8.2*  --  8.2*       Signed: Lyubov Garcia, NP

## 2021-09-08 VITALS
OXYGEN SATURATION: 96 % | WEIGHT: 119.8 LBS | DIASTOLIC BLOOD PRESSURE: 82 MMHG | SYSTOLIC BLOOD PRESSURE: 151 MMHG | TEMPERATURE: 97.9 F | RESPIRATION RATE: 18 BRPM | BODY MASS INDEX: 18.16 KG/M2 | HEART RATE: 95 BPM | HEIGHT: 68 IN

## 2021-09-08 LAB
ANION GAP SERPL CALC-SCNC: 4 MMOL/L (ref 5–15)
ATRIAL RATE: 80 BPM
BASOPHILS # BLD: 0.1 K/UL (ref 0–0.1)
BASOPHILS NFR BLD: 0 % (ref 0–1)
BUN SERPL-MCNC: 3 MG/DL (ref 6–20)
BUN/CREAT SERPL: 5 (ref 12–20)
CALCIUM SERPL-MCNC: 8.6 MG/DL (ref 8.5–10.1)
CALCULATED P AXIS, ECG09: 82 DEGREES
CALCULATED R AXIS, ECG10: 70 DEGREES
CALCULATED T AXIS, ECG11: 69 DEGREES
CHLORIDE SERPL-SCNC: 105 MMOL/L (ref 97–108)
CO2 SERPL-SCNC: 28 MMOL/L (ref 21–32)
COMMENT, HOLDF: NORMAL
CREAT SERPL-MCNC: 0.61 MG/DL (ref 0.7–1.3)
CREAT SERPL-MCNC: 0.61 MG/DL (ref 0.7–1.3)
DATE LAST DOSE: ABNORMAL
DIAGNOSIS, 93000: NORMAL
DIFFERENTIAL METHOD BLD: ABNORMAL
EOSINOPHIL # BLD: 0.1 K/UL (ref 0–0.4)
EOSINOPHIL NFR BLD: 1 % (ref 0–7)
ERYTHROCYTE [DISTWIDTH] IN BLOOD BY AUTOMATED COUNT: 14.3 % (ref 11.5–14.5)
GLUCOSE SERPL-MCNC: 85 MG/DL (ref 65–100)
HCT VFR BLD AUTO: 27.8 % (ref 36.6–50.3)
HGB BLD-MCNC: 9.6 G/DL (ref 12.1–17)
IMM GRANULOCYTES # BLD AUTO: 0.2 K/UL (ref 0–0.04)
IMM GRANULOCYTES NFR BLD AUTO: 2 % (ref 0–0.5)
LYMPHOCYTES # BLD: 1.5 K/UL (ref 0.8–3.5)
LYMPHOCYTES NFR BLD: 13 % (ref 12–49)
MCH RBC QN AUTO: 33.6 PG (ref 26–34)
MCHC RBC AUTO-ENTMCNC: 34.5 G/DL (ref 30–36.5)
MCV RBC AUTO: 97.2 FL (ref 80–99)
MONOCYTES # BLD: 1.2 K/UL (ref 0–1)
MONOCYTES NFR BLD: 10 % (ref 5–13)
NEUTS SEG # BLD: 8.3 K/UL (ref 1.8–8)
NEUTS SEG NFR BLD: 74 % (ref 32–75)
NRBC # BLD: 0 K/UL (ref 0–0.01)
NRBC BLD-RTO: 0 PER 100 WBC
P-R INTERVAL, ECG05: 132 MS
PLATELET # BLD AUTO: 370 K/UL (ref 150–400)
PMV BLD AUTO: 9.9 FL (ref 8.9–12.9)
POTASSIUM SERPL-SCNC: 4.2 MMOL/L (ref 3.5–5.1)
Q-T INTERVAL, ECG07: 384 MS
QRS DURATION, ECG06: 72 MS
QTC CALCULATION (BEZET), ECG08: 442 MS
RBC # BLD AUTO: 2.86 M/UL (ref 4.1–5.7)
REPORTED DOSE,DOSE: ABNORMAL UNITS
REPORTED DOSE/TIME,TMG: 1600
SAMPLES BEING HELD,HOLD: NORMAL
SODIUM SERPL-SCNC: 137 MMOL/L (ref 136–145)
VANCOMYCIN TROUGH SERPL-MCNC: 12.2 UG/ML (ref 5–10)
VENTRICULAR RATE, ECG03: 80 BPM
WBC # BLD AUTO: 11.3 K/UL (ref 4.1–11.1)

## 2021-09-08 PROCEDURE — 97165 OT EVAL LOW COMPLEX 30 MIN: CPT

## 2021-09-08 PROCEDURE — 85025 COMPLETE CBC W/AUTO DIFF WBC: CPT

## 2021-09-08 PROCEDURE — 74011000258 HC RX REV CODE- 258: Performed by: GENERAL ACUTE CARE HOSPITAL

## 2021-09-08 PROCEDURE — 74011250636 HC RX REV CODE- 250/636: Performed by: NURSE PRACTITIONER

## 2021-09-08 PROCEDURE — 51798 US URINE CAPACITY MEASURE: CPT

## 2021-09-08 PROCEDURE — 74011250637 HC RX REV CODE- 250/637: Performed by: GENERAL ACUTE CARE HOSPITAL

## 2021-09-08 PROCEDURE — C9113 INJ PANTOPRAZOLE SODIUM, VIA: HCPCS | Performed by: NURSE PRACTITIONER

## 2021-09-08 PROCEDURE — 80202 ASSAY OF VANCOMYCIN: CPT

## 2021-09-08 PROCEDURE — 80048 BASIC METABOLIC PNL TOTAL CA: CPT

## 2021-09-08 PROCEDURE — 74011250637 HC RX REV CODE- 250/637: Performed by: NURSE PRACTITIONER

## 2021-09-08 PROCEDURE — 36415 COLL VENOUS BLD VENIPUNCTURE: CPT

## 2021-09-08 PROCEDURE — 74011250636 HC RX REV CODE- 250/636: Performed by: GENERAL ACUTE CARE HOSPITAL

## 2021-09-08 PROCEDURE — 74011000250 HC RX REV CODE- 250: Performed by: NURSE PRACTITIONER

## 2021-09-08 PROCEDURE — 97535 SELF CARE MNGMENT TRAINING: CPT

## 2021-09-08 RX ORDER — AMOXICILLIN AND CLAVULANATE POTASSIUM 875; 125 MG/1; MG/1
1 TABLET, FILM COATED ORAL 2 TIMES DAILY
Qty: 8 TABLET | Refills: 0 | Status: SHIPPED | OUTPATIENT
Start: 2021-09-08 | End: 2021-09-12

## 2021-09-08 RX ORDER — AMOXICILLIN AND CLAVULANATE POTASSIUM 875; 125 MG/1; MG/1
1 TABLET, FILM COATED ORAL 2 TIMES DAILY
Qty: 6 TABLET | Refills: 0 | Status: SHIPPED | OUTPATIENT
Start: 2021-09-08 | End: 2021-09-08 | Stop reason: SDUPTHER

## 2021-09-08 RX ORDER — TAMSULOSIN HYDROCHLORIDE 0.4 MG/1
0.4 CAPSULE ORAL DAILY
Qty: 30 CAPSULE | Refills: 1 | Status: SHIPPED | OUTPATIENT
Start: 2021-09-09

## 2021-09-08 RX ORDER — ASPIRIN 325 MG/1
100 TABLET, FILM COATED ORAL DAILY
Qty: 30 TABLET | Refills: 1 | Status: SHIPPED | OUTPATIENT
Start: 2021-09-09

## 2021-09-08 RX ORDER — FOLIC ACID 1 MG/1
1 TABLET ORAL DAILY
Qty: 30 TABLET | Refills: 1 | Status: SHIPPED | OUTPATIENT
Start: 2021-09-09

## 2021-09-08 RX ORDER — OXYCODONE AND ACETAMINOPHEN 5; 325 MG/1; MG/1
1 TABLET ORAL
Qty: 9 TABLET | Refills: 0 | Status: SHIPPED | OUTPATIENT
Start: 2021-09-08 | End: 2021-09-11

## 2021-09-08 RX ADMIN — TAMSULOSIN HYDROCHLORIDE 0.4 MG: 0.4 CAPSULE ORAL at 10:49

## 2021-09-08 RX ADMIN — SODIUM CHLORIDE 40 MG: 9 INJECTION INTRAMUSCULAR; INTRAVENOUS; SUBCUTANEOUS at 10:48

## 2021-09-08 RX ADMIN — FOLIC ACID 1 MG: 1 TABLET ORAL at 10:48

## 2021-09-08 RX ADMIN — OXYCODONE 10 MG: 5 TABLET ORAL at 07:59

## 2021-09-08 RX ADMIN — OXYCODONE 10 MG: 5 TABLET ORAL at 12:39

## 2021-09-08 RX ADMIN — Medication 10 ML: at 06:00

## 2021-09-08 RX ADMIN — CEFEPIME HYDROCHLORIDE 2 G: 2 INJECTION, POWDER, FOR SOLUTION INTRAVENOUS at 10:53

## 2021-09-08 RX ADMIN — VANCOMYCIN HYDROCHLORIDE 1000 MG: 1 INJECTION, POWDER, LYOPHILIZED, FOR SOLUTION INTRAVENOUS at 05:10

## 2021-09-08 RX ADMIN — Medication 100 MG: at 10:49

## 2021-09-08 NOTE — PROGRESS NOTES
End of Shift Note    Bedside shift change report given to Tenzin (oncoming nurse) by Chioma Miller (offgoing nurse). Report included the following information SBAR, Kardex, ED Summary, Intake/Output, MAR, Accordion and Recent Results    Shift worked:  night     Shift summary and any significant changes:    Pt stable throughout shift. Scheduled meds given and Lab drawn. Mondragon draining and patent   Concerns for physician to address:    Zone phone for oncoming shift:       Activity:  Activity Level: Up with Assistance  Number times ambulated in hallways past shift: 0  Number of times OOB to chair past shift: 0    Cardiac:   Cardiac Monitoring: Yes      Cardiac Rhythm: Sinus Rhythm    Access:   Current line(s): PIV     Genitourinary:   Urinary status: mondragon    Respiratory:   O2 Device: None (Room air)  Chronic home O2 use?: NO  Incentive spirometer at bedside: NO     GI:  Last Bowel Movement Date: 09/06/21  Current diet:  ADULT DIET Regular; GI [place] (GERD/Peptic Ulcer)  Passing flatus: YES  Tolerating current diet: YES       Pain Management:   Patient states pain is manageable on current regimen: YES    Skin:  Jack Score: 18  Interventions: increase time out of bed    Patient Safety:  Fall Score:  Total Score: 3  Interventions: bed/chair alarm, gripper socks and pt to call before getting OOB  High Fall Risk: Yes    Length of Stay:  Expected LOS: 2d 7h  Actual LOS: [address]

## 2021-09-08 NOTE — PROGRESS NOTES
Patient: Evelyn White MRN: [id number]  SSN: [id number]    YOB: 1965  Age: 60 y.o. Sex: male        ADMITTED: 2021 to David Sifuentes MD by Wade Valle MD for Hematuria [R31.9]    F/u for gross hematuria. Multiple potential sources and causes of hematuria at this time including catheter trauma both from placement as well as traumatic removal.  14 [place] coudé tip catheter placed by Dr. Dami Womack on 9/3 after prior attempts by staff. Patient with notable history of radiation for rectal cancer and CT showed thickened bladder wall with inflammation likely the bladder wall and hydronephrosis on original CT imaging. Follow-up ultrasound showed mild hydronephrosis only. Marrero in place draining yellow UA. Per staff, it has been draining well and has not required further irrigation x 3 days. He denies voiding difficulties prior to this admission. He is AF, VSS. WBC improving daily, 11.3. Hgb 9.6. Cr 0.61. UA +large blood, trace leuks, 5-10 WBC, >100 RBC. UCx  + aerococcus. +Maxipime, Vanc. +flomax. Vitals: Temp (24hrs), Av.2 °F (36.8 °C), Min:97.9 °F (36.6 °C), Max:98.4 °F (36.9 °C)    Blood pressure 138/86, pulse 82, temperature 97.9 °F (36.6 °C), resp. rate 18, height 5' 8\" (1.727 m), weight 54.3 kg (119 lb 12.8 oz), SpO2 98 %. Intake and Output:   1901 -  0700  In: -   Out: 1700 [Urine:1700]  No intake/output data recorded. FELICITAS Output lats 24 hrs: No data found. FELICITAS Output last 8 hrs: No data found.   BM over last 24 hrs:   Patient Vitals for the past 24 hrs:   Stool Occurrence(s)   21 2131 1     Physical Exam  General: NAD, pleasant  Respiratory: no distress, breathing easily, room air  Abdomen: soft, no distention   : no CVA tenderness, Marrero, clear yellow UA  Neuro: Appropriate, no focal neurological deficits  Skin: warm, dry  Extremities: moves all, full ROM    Labs:  CBC:   Lab Results   Component Value Date/Time    WBC 11.3 (H) 2021 05:06 AM    HCT 27.8 (L) 09/08/2021 05:06 AM    PLATELET 061 84/49/1427 05:06 AM     BMP:   Lab Results   Component Value Date/Time    Glucose 85 09/08/2021 05:06 AM    Sodium 137 09/08/2021 05:06 AM    Potassium 4.2 09/08/2021 05:06 AM    Chloride 105 09/08/2021 05:06 AM    CO2 28 09/08/2021 05:06 AM    BUN 3 (L) 09/08/2021 05:06 AM    Creatinine 0.61 (L) 09/08/2021 05:06 AM    Creatinine 0.61 (L) 09/08/2021 05:06 AM    Calcium 8.6 09/08/2021 05:06 AM     Assessment/Plan:   · Gross hematuria, resolved   · Urinary retention with difficult mondragon placement  · B/l hydro, improved with mondragon placement to mild pelviectasis  · Aerococcus uti on maxipime, vanc     -dc mondragon with VT. Continue flomax.   -continue culture specific abx.   -outpatient follow up after discharge for hematuria workup. Discussed with patient, he understands the importance of f/u. Will arrange.      Supervising Dr. Omer FARFAN By: Malka Ko, DEANNA - September 8, 2021

## 2021-09-08 NOTE — PROGRESS NOTES
Pharmacy Automatic Renal Dosing Protocol - Antimicrobials    Indication for Antimicrobials: UTI (Hydronephrosis with bladder wall thickening and inflammation, hematuria)     Current Regimen of Each Antimicrobial:  Cefepime 2 grams Q12H  (Start Date ; Day 7)  Vancomycin 1000 mg Q12H (Start ; day 7)    Previous Antimicrobial Therapy:      Goal Level: VANCOMYCIN TROUGH GOA: 15 mcg/ml (AUC: 400 - 600 mg/hr/Liter/day)     Date Dose & Interval Measured (mcg/mL) Extrapolated (mcg/mL)   9/3 750 mg q12h 11.6 --    1000 mg q12h 12.8 13.8;     1000 mg q12h 12.2 14.2;      Date & time of next level:     Significant Cultures:    Urine: aerococcus urinae >100k. final    Radiology / Imaging results: (X-ray, CT scan or MRI):     Paralysis, amputations, malnutrition:     Labs:  Recent Labs     21  0506 21  0308 21  0426 21  0423 21  0423   CREA 0.61*  0.61* 0.62*  0.61* 0.52*   < > 0.56*   BUN 3* 2*  --   --  3*   WBC 11.3* 14.2*  --   --  17.0*    < > = values in this interval not displayed. Temp (24hrs), Av.2 °F (36.8 °C), Min:97.9 °F (36.6 °C), Max:98.4 °F (36.9 °C)      Is the Patient on Dialysis? no    Creatinine Clearance (mL/min):   CrCl (Actual Body Weight): 88.4  CrCl (Adjusted Body Weight): 102.1  CrCl (Ideal Body Weight): 111.3    Impression/Plan:   Urology and Surgery on board  Vancomycin trough of 12.2 is therapeutic. Continue with vanc 1000 mg q12h. Continue Cefepime 2g IV q12h  Plan to switch to PO abx to finish 10 day course  BMP daily. Antimicrobial stop date : 10 days     Pharmacy will follow daily and adjust medications as appropriate for renal function and/or serum levels. Thank you,  [facility] TEA Simpson    Recommended duration of therapy  [email or web address] docx    Renal Dosing  [email or web address]/[email or web address]/Renal%[email or web address]. pdf

## 2021-09-08 NOTE — DISCHARGE INSTRUCTIONS
Patient Education        Bowel Blockage (Intestinal Obstruction): Care Instructions  Your Care Instructions  A bowel blockage, also called an intestinal obstruction, can prevent gas, fluids, or solids from moving through the intestines normally. It can cause constipation and, rarely, diarrhea. You may have pain, nausea, vomiting, and cramping. Most of the time, complete blockages require a stay in the hospital and possibly surgery. But if your bowel is only partly blocked, your doctor may tell you to wait until it clears on its own and you are able to pass gas and stool. If so, there are things you can do at home to help make you feel better. If you have had surgery for a bowel blockage, there are things you can do at home to make sure you heal well. You can also make some changes to keep your bowel from becoming blocked again. Follow-up care is a key part of your treatment and safety. Be sure to make and go to all appointments, and call your doctor if you are having problems. It's also a good idea to know your test results and keep a list of the medicines you take. How can you care for yourself at home? If your doctor has told you to wait at home for a blockage to clear on its own:  · Follow your doctor's instructions. These may include eating a liquid diet to avoid complete blockage. · Be safe with medicines. Take your medicines exactly as prescribed. Call your doctor if you think you are having a problem with your medicine. · Put a heating pad set on low on your belly to relieve mild cramps and pain. To prevent another blockage  · Try to eat smaller amounts of food more often. For example, have 5 or 6 small meals throughout the day instead of 2 or 3 large meals. · Chew your food very well. Try to chew each bite about 20 times or until it is liquid. · Avoid high-fiber foods and raw fruits and vegetables with skins, husks, strings, or seeds.  These can form a ball of undigested material that can cause a blockage if a part of your bowel is scarred or narrowed. · Check with your doctor before you eat whole-grain products or use a fiber supplement such as Citrucel or Metamucil. · To help you have regular bowel movements, eat at regular times, do not strain during a bowel movement, and drink plenty of water. If you have kidney, heart, or liver disease and have to limit fluids, talk with your doctor before you increase the amount of fluids you drink. · Drink high-calorie liquid formulas if your doctor says to. Severe symptoms may make it hard for your body to take in vitamins and minerals. · Get regular exercise. It helps you digest your food better. Get at least 30 minutes of physical activity on most days of the week. Walking is a good choice. When should you call for help? Call your doctor now or seek immediate medical care if:    · You have a fever.     · You are vomiting.     · You have new or worse belly pain.     · You cannot pass stools or gas. Watch closely for changes in your health, and be sure to contact your doctor if you have any problems. Where can you learn more? Go to http://www.gray.com/  Enter B082 in the search box to learn more about \"Bowel Blockage (Intestinal Obstruction): Care Instructions. \"  Current as of: April 15, 2020               Content Version: 12.8  © 2006-2021 [employer]. Care instructions adapted under license by [employer] (which disclaims liability or warranty for this information). If you have questions about a medical condition or this instruction, always ask your healthcare professional. [place] [address] any warranty or liability for your use of this information. Patient Education        Blood in the Urine: Care Instructions  Your Care Instructions     Blood in the urine, or hematuria, may make the urine look red, brown, or pink.  There may be blood every time you urinate or just from time to time. You cannot always see blood in the urine, but it will show up in a urine test.  Blood in the urine may be serious. It should always be checked by a doctor. Your doctor may recommend more tests, including an X-ray, a CT scan, or a cystoscopy (which lets a doctor look inside the urethra and bladder). Blood in the urine can be a sign of another problem. Common causes are bladder infections and kidney stones. An injury to your groin or your genital area can also cause bleeding in the urinary tract. Very hard exercise--such as running a marathon--can cause blood in the urine. Blood in the urine can also be a sign of kidney disease or cancer in the bladder or kidney. Many cases of blood in the urine are caused by a harmless condition that runs in families. This is called benign familial hematuria. It does not need any treatment. Sometimes your urine may look red or brown even though it does not contain blood. For example, not getting enough fluids (dehydration), taking certain medicines, or having a liver problem can change the color of your urine. Eating foods such as beets, rhubarb, or blackberries or foods with red food coloring can make your urine look red or pink. Follow-up care is a key part of your treatment and safety. Be sure to make and go to all appointments, and call your doctor if you are having problems. It's also a good idea to know your test results and keep a list of the medicines you take. When should you call for help? Call your doctor now or seek immediate medical care if:    · You have symptoms of a urinary infection. For example:  ? You have pus in your urine. ? You have pain in your back just below your rib cage. This is called flank pain. ? You have a fever, chills, or body aches. ? It hurts to urinate. ? You have groin or belly pain.     · You have more blood in your urine.    Watch closely for changes in your health, and be sure to contact your doctor if:    · You have new urination problems.     · You do not get better as expected. Where can you learn more? Go to http://www.gray.com/  Enter W755 in the search box to learn more about \"Blood in the Urine: Care Instructions. \"  Current as of: June 29, 2020               Content Version: 12.8  © 2006-2021 [employer]. Care instructions adapted under license by [employer] (which disclaims liability or warranty for this information). If you have questions about a medical condition or this instruction, always ask your healthcare professional. [place] [address] any warranty or liability for your use of this information. Patient Education        Learning About Hydronephrosis  What is hydronephrosis? Hydronephrosis is swelling of the kidneys. It is caused by a buildup of urine. This condition can happen if a tube that drains urine from your kidneys is blocked. The blockage can come from within the urinary tract or from pressure outside of the tract. Pregnancy is an example of an outside (external) cause. This condition is often caused by a blockage such as a kidney stone, tumor, or blood clot. It also can be caused by a problem in your urinary system that you were born with (congenital problem). What are the symptoms? Some of the common symptoms are:  · Pain in one or both sides. · Stomach pain. · Blood in your urine. Some people have no symptoms. How is it diagnosed? Your doctor will do an ultrasound to look for a blockage in your urinary system. An ultrasound allows your doctor to see a picture of the organs and other structures in your belly (abdomen). You also may need blood and urine tests. How is it treated? Your treatment depends on the cause of the swelling. If it is caused by a blockage, your treatment will depend on the type of blockage you have. If the blockage is caused by a kidney stone, you may wait for the stone to pass.  If hydronephrosis happens during pregnancy, it usually clears up on its own. You may need to have urine drained from your bladder or kidneys. A urinary catheter is a small, flexible tube that can be inserted through the urethra and into the bladder, allowing urine to drain. A nephrostomy catheter is a thin tube placed into your kidney to drain urine. Sometimes surgery is needed to clear the blockage. If you have a blockage, you should begin to feel better after the blockage is gone. Many people recover and have no long-term problems. But some may have kidney damage. If hydronephrosis was left untreated for a long time, the damage can be severe. Severe damage will require further treatment. Follow-up care is a key part of your treatment and safety. Be sure to make and go to all appointments, and call your doctor if you are having problems. It's also a good idea to know your test results and keep a list of the medicines you take. Where can you learn more? Go to http://www.gray.com/  Enter S386 in the search box to learn more about \"Learning About Hydronephrosis. \"  Current as of: 2020               Content Version: 12.8  © 3673-4750 [employer]. Care instructions adapted under license by [employer] (which disclaims liability or warranty for this information). If you have questions about a medical condition or this instruction, always ask your healthcare professional. [place] [address] any warranty or liability for your use of this information.                HOSPITALIST DISCHARGE INSTRUCTIONS    NAME: Amelia Pastor   :  1965   MRN:  [id number]     Date/Time:  2021 11:17 AM    ADMIT DATE: 2021   DISCHARGE DATE: 2021     Attending Physician: Brandie Grover NP    DISCHARGE DIAGNOSIS:    Small Bowel Obstruction   History of adenocarcinoma of rectum status post neoadjuvant chemoradiation and L AR with ileostomy, ileostomy was removed  Abd CT showed  Gross hematuria  B/L Hydronephrosis and hydroureter  Acute cystitis/right pyelonephritis  Acute Kidney Injury  BPH  Acute on chronic pancreatitis  Alcohol Abuse     Medications: Per above medication reconciliation. Pain Management: per above medications    Recommended diet: Regular Diet    Recommended activity: Activity as tolerated    Wound care: None    Indwelling devices:  None    Supplemental Oxygen: None    Required Lab work: none    Glucose management:  None    Code status: Full        Outside physician follow up: Follow-up Information     Follow up With Specialties Details [facility]   Follow up on 9/15/21 at 1:30 PM with Dr. Boogie Harry at the [address] [facility]. [phone] [address] [facility], [address]    Mariella Nageotte, MD Internal Medicine In 6 days  [address] [facility] [address] [phone]                 Skilled nursing facility/ SNF MD responsible for above on discharge. Information obtained by :  I understand that if any problems occur once I am at home I am to contact my physician. I understand and acknowledge receipt of the instructions indicated above.                                                                                                                                            Physician's or R.N.'s Signature                                                                  Date/Time                                                                                                                                              Patient or Repres

## 2021-09-08 NOTE — PROGRESS NOTES
I have reviewed discharge instructions with the patient. The patient verbalized understanding. Discharge medications reviewed with patient and appropriate educational materials and side effects teaching were provided. Follow-up appointments reviewed with patient. Opportunity for questions or concerns given. Venous access removed without difficulty, pt tolerated well. Patients belongings gathered and sent with patient. Patient is ready for discharge.       IV removed, mondragon removed earlier in shift and pt voided with 75 mL bladder scan post void , scheduled visits after discharge discussed, meds to pickup from pharmacy discussed, care plan and education resolved, discharge instructions given

## 2021-09-08 NOTE — PROGRESS NOTES
Problem: Falls - Risk of  Goal: *Absence of Falls  Description: Document Bina Jaime Fall Risk and appropriate interventions in the flowsheet. Outcome: Progressing Towards Goal  Note: Fall Risk Interventions:  Mobility Interventions: Bed/chair exit alarm, Communicate number of staff needed for ambulation/transfer, Patient to call before getting OOB         Medication Interventions: Bed/chair exit alarm, Teach patient to arise slowly, Patient to call before getting OOB    Elimination Interventions: Call light in reach, Bed/chair exit alarm              Problem: Pressure Injury - Risk of  Goal: *Prevention of pressure injury  Description: Document Jack Scale and appropriate interventions in the flowsheet.   Outcome: Progressing Towards Goal  Note: Pressure Injury Interventions:  Sensory Interventions: Assess changes in LOC, Check visual cues for pain, Float heels    Moisture Interventions: Absorbent underpads    Activity Interventions: Increase time out of bed    Mobility Interventions: HOB 30 degrees or less    Nutrition Interventions: Document food/fluid/supplement intake, Offer support with meals,snacks and hydration    Friction and Shear Interventions: Apply protective barrier, creams and emollients, HOB 30 degrees or less, Minimize layers

## 2021-09-08 NOTE — PROGRESS NOTES
Problem: Falls - Risk of  Goal: *Absence of Falls  Description: Document Sheela Vazquez Fall Risk and appropriate interventions in the flowsheet. Outcome: Resolved/Met     Problem: Patient Education: Go to Patient Education Activity  Goal: Patient/Family Education  Outcome: Resolved/Met     Problem: Pressure Injury - Risk of  Goal: *Prevention of pressure injury  Description: Document Jack Scale and appropriate interventions in the flowsheet.   Outcome: Resolved/Met     Problem: Patient Education: Go to Patient Education Activity  Goal: Patient/Family Education  Outcome: Resolved/Met

## 2021-09-08 NOTE — PROGRESS NOTES
Admit Date: 2021    Subjective:     Patient tolerating diet and having additional bowel function. No distention. Objective:     Blood pressure 138/86, pulse 82, temperature 97.9 °F (36.6 °C), resp. rate 18, height 5' 8\" (1.727 m), weight 54.3 kg (119 lb 12.8 oz), SpO2 98 %. Temp (24hrs), Av.2 °F (36.8 °C), Min:97.9 °F (36.6 °C), Max:98.4 °F (36.9 °C)      Physical Exam:  GENERAL: alert, cooperative, no distress, appears stated age, LUNG: clear to auscultation bilaterally, HEART: regular rate and rhythm, ABDOMEN: soft, non-tender. Bowel sounds normal. No masses,  no organomegaly, EXTREMITIES:  extremities normal, atraumatic, no cyanosis or edema    Labs:   Recent Results (from the past 24 hour(s))   CREATININE    Collection Time: 21  5:06 AM   Result Value Ref Range    Creatinine 0.61 (L) 0.70 - 1.30 MG/DL    GFR est AA >60 >60 ml/min/1.73m2    GFR est non-AA >60 >60 ml/min/1.73m2   VANCOMYCIN, TROUGH    Collection Time: 21  5:06 AM   Result Value Ref Range    Vancomycin,trough 12.2 (H) 5.0 - 10.0 ug/mL    Reported dose date 2021      Reported dose time: 1600      Reported dose: 1000 MG UNITS   CBC WITH AUTOMATED DIFF    Collection Time: 21  5:06 AM   Result Value Ref Range    WBC 11.3 (H) 4.1 - 11.1 K/uL    RBC 2.86 (L) 4.10 - 5.70 M/uL    HGB 9.6 (L) 12.1 - 17.0 g/dL    HCT 27.8 (L) 36.6 - 50.3 %    MCV 97.2 80.0 - 99.0 FL    MCH 33.6 26.0 - 34.0 PG    MCHC 34.5 30.0 - 36.5 g/dL    RDW 14.3 11.5 - 14.5 %    PLATELET 426 462 - 936 K/uL    MPV 9.9 8.9 - 12.9 FL    NRBC 0.0 0  WBC    ABSOLUTE NRBC 0.00 0.00 - 0.01 K/uL    NEUTROPHILS 74 32 - 75 %    LYMPHOCYTES 13 12 - 49 %    MONOCYTES 10 5 - 13 %    EOSINOPHILS 1 0 - 7 %    BASOPHILS 0 0 - 1 %    IMMATURE GRANULOCYTES 2 (H) 0.0 - 0.5 %    ABS. NEUTROPHILS 8.3 (H) 1.8 - 8.0 K/UL    ABS. LYMPHOCYTES 1.5 0.8 - 3.5 K/UL    ABS. MONOCYTES 1.2 (H) 0.0 - 1.0 K/UL    ABS. EOSINOPHILS 0.1 0.0 - 0.4 K/UL    ABS.  BASOPHILS 0.1 0.0 - 0.1 K/UL    ABS. IMM. GRANS. 0.2 (H) 0.00 - 0.04 K/UL    DF AUTOMATED     METABOLIC PANEL, BASIC    Collection Time: 09/08/21  5:06 AM   Result Value Ref Range    Sodium 137 136 - 145 mmol/L    Potassium 4.2 3.5 - 5.1 mmol/L    Chloride 105 97 - 108 mmol/L    CO2 28 21 - 32 mmol/L    Anion gap 4 (L) 5 - 15 mmol/L    Glucose 85 65 - 100 mg/dL    BUN 3 (L) 6 - 20 MG/DL    Creatinine 0.61 (L) 0.70 - 1.30 MG/DL    BUN/Creatinine ratio 5 (L) 12 - 20      GFR est AA >60 >60 ml/min/1.73m2    GFR est non-AA >60 >60 ml/min/1.73m2    Calcium 8.6 8.5 - 10.1 MG/DL   SAMPLES BEING HELD    Collection Time: 09/08/21  5:06 AM   Result Value Ref Range    SAMPLES BEING HELD  LAV     COMMENT        Add-on orders for these samples will be processed based on acceptable specimen integrity and analyte stability, which may vary by analyte. Data Review images and reports reviewed    Assessment:     Active Problems:    Hematuria (9/1/2021)        Plan/Recommendations/Medical Decision Making:     Continue present treatment   Continue diet as ordered. Patient should be mobilizing. Surgery will sign off. No surgical intervention needed at this time.      Maggie De La Torre NP  [facility]

## 2021-09-08 NOTE — PROGRESS NOTES
Physical Therapy Note    PT orders received and acknowledged. Chart reviewed. Patient up with OT and RN staff with [facility]. Patient has RW at home. Recommend HH PT with use of RW for safety.      Beryle Moh DPT

## 2021-09-08 NOTE — PROGRESS NOTES
OCCUPATIONAL THERAPY EVALUATION /DISCHARGE  Patient: Nga Ng (37 y.o. male)  Date: 9/8/2021  Primary Diagnosis: Hematuria [R31.9]       Precautions:   Seizure, Fall (seizure 3 yrs ago) BMI 18    ASSESSMENT  Based on the objective data described below, the patient presents with general debility and \"wobbly walking\"/furniture walking for functional mobility; has RW at home for days when he feels weak but he has not been trained with it as he bought it for himself due to periodic  weakness. Patient notified of pending discharge during this session; he reports his daughter or sister can stay with him initially; he also reports \"meals on wheels\" was supposed to start. Current Level of Function Impacting Discharge (ADLs/self-care): stand by assist to CGA self care and functional mobility with decreased endurance; mondragon pulled during this session; awaiting spontaneous void; he reports he is unable to know when he has to void PTA post chemo so wears pullups PTA for management of bladder    Functional Outcome Measure: The patient scored Total: 40/100 on the Barthel Index outcome measure which is indicative of 60% impaired ability to care for basic self needs/dependency on others; inferred 60% dependency on others for instrumental ADLs. Other factors to consider for discharge: he reports he drives; family to stay with him briefly upon discharge     Patient will benefit from skilled therapy intervention to address the above noted impairments.        PLAN :  Recommendations and Planned Interventions: discharge to home w/HH      Recommendation for discharge: (in order for the patient to meet his/her long term goals)  Occupational therapy at least 2 days/week in the home AND ensure assist and/or supervision for safety with bathing, meals driving    This discharge recommendation:  Has been made in collaboration with the attending provider and/or case management    IF patient discharges home will need the following DME: has RW and shower chair       SUBJECTIVE:   Patient stated I am so wobbly.     OBJECTIVE DATA SUMMARY:   HISTORY:   Past Medical History:   Diagnosis Date    Cancer [facility])     rectal    Gastrointestinal disorder     Gastric Ulcers; Rectal CA    Hematuria 9/1/2021    Hypertension    No past surgical history on file. Expanded or extensive additional review of patient history:     Home Situation  Home Environment: Apartment  # Steps to Enter: 0  One/Two Story Residence: One story  Living Alone: Yes  Support Systems: Other Family Member(s), Child(bonifacio) (sister; Laura Arnoldo can assist)  Patient Expects to be Discharged to[de-identified] Apartment  Current DME Used/Available at Home: Radha [place], rolling, Grab bars (used with fatigue \"when I'm wobbly\"; wears pull ups)  Tub or Shower Type: Tub/Shower combination  Has shower chair    Hand dominance: Right    EXAMINATION OF PERFORMANCE DEFICITS:  Cognitive/Behavioral Status:  Neurologic State: Alert; Appropriate for age  Orientation Level: Oriented X4  Cognition: Follows commands; Appropriate for age attention/concentration; Appropriate decision making (not formally assessed; appears [facility]; drives PTA)  Perception: Appears intact  Perseveration: No perseveration noted  Safety/Judgement: Fall prevention; Awareness of environment;Home safety (recommend no driving until strength back, reaction time btr)    Skin: intact    Edema: none    Hearing: Auditory  Auditory Impairment: None    Vision/Perceptual:                           Acuity: Impaired near vision; Impaired far vision (wears glasses; WFL with glasses)    Corrective Lenses: Glasses    Range of Motion:  B UE  AROM: Within functional limits  PROM: Within functional limits                      Strength:  B UE  Strength: Generally decreased, functional                Coordination:  Coordination: Generally decreased, functional  Fine Motor Skills-Upper: Left Intact; Right Intact (declines with fatigue)    Gross Motor Skills-Upper: Left Intact; Right Intact (declines with fatigue; \"I'm wobbly\")    Tone & Sensation:    Tone: Normal                         Balance:  Sitting: Intact  Standing: Impaired; Without support  Standing - Static: Fair  Standing - Dynamic : Fair;Constant support    Functional Mobility and Transfers for ADLs:  Bed Mobility:  Rolling: Modified independent  Supine to Sit: Modified independent  Sit to Supine: Modified independent  Scooting: Modified independent    Transfers:  Sit to Stand: Supervision;Contact guard assistance  Stand to Sit: Supervision;Contact guard assistance  Bed to Chair: Supervision;Contact guard assistance  Toilet Transfer : Supervision;Contact guard assistance  Tub Transfer:  (recommend he wait till family or OT/HH present)    ADL Assessment:  Feeding: Modified independent (decreased appetite; BMI 18.2)    Oral Facial Hygiene/Grooming: Modified Independent (CGA/SBA in standing)    Bathing: Supervision;Contact guard assistance    Upper Body Dressing: Stand-by assistance    Lower Body Dressing: Stand-by assistance;Contact guard assistance    Toileting: Stand by assistance;Contact guard assistance (\"I'm a bit wobbly\")                ADL Intervention and task modifications:       Initiated training of pain management, fall prevention, pain management, energy conservation, pacing/activity management; benefits of HH which he reports he feels will be beneficial.   Cognitive Retraining  Safety/Judgement: Fall prevention; Awareness of environment;Home safety (recommend no driving until strength back, reaction time btr)      Functional Measure:  Barthel Index:    Bathin  Bladder: 0  Bowels: 5  Groomin  Dressin  Feeding: 10  Mobility: 0  Stairs: 0  Toilet Use: 5  Transfer (Bed to Chair and Back): 10  Total: 40/100        The Barthel ADL Index: Guidelines  1. The index should be used as a record of what a patient does, not as a record of what a patient could do.   2. The main aim is to establish degree of independence from any help, physical or verbal, however minor and for whatever reason. 3. The need for supervision renders the patient not independent. 4. A patient's performance should be established using the best available evidence. Asking the patient, friends/relatives and nurses are the usual sources, but direct observation and common sense are also important. However direct testing is not needed. 5. Usually the patient's performance over the preceding 24-48 hours is important, but occasionally longer periods will be relevant. 6. Middle categories imply that the patient supplies over 50 per cent of the effort. 7. Use of aids to be independent is allowed. Sharif Arenas., Barthel, D.W. (4310). Functional evaluation: the Barthel Index. [facility] (14)2. [employer] JAMES Arroyo, Vernell Booth., Mavis Chanel., Maria Fernanda, [address] (1999). Measuring the change indisability after inpatient rehabilitation; comparison of the responsiveness of the Barthel Index and Functional [place] Measure. Journal of Neurology, Neurosurgery, and Psychiatry, 66(4), 271-151. Eric Tran, N.J.A, CARLENE Veliz, & Mckenna Polo MPaoloA. (2004.) Assessment of post-stroke quality of life in cost-effectiveness studies: The usefulness of the Barthel Index and the EuroQoL-5D. Quality of Life Research, 15, 359-68         Occupational Therapy Evaluation Charge Determination   History Examination Decision-Making   LOW Complexity : Brief history review  MEDIUM Complexity : 3-5 performance deficits relating to physical, cognitive , or psychosocial skils that result in activity limitations and / or participation restrictions MEDIUM Complexity : Patient may present with comorbidities that affect occupational performnce.  Miniml to moderate modification of tasks or assistance (eg, physical or verbal ) with assesment(s) is necessary to enable patient to complete evaluation       Based on the above components, the patient evaluation is determined to be of the following complexity level: LOW   Pain Ratin/10, bladder region; patient reports this has been ongoing with mondragon    Activity Tolerance:   Fair, SpO2 stable on RA, requires rest breaks and observed SOB with activity    After treatment patient left in no apparent distress:    Call bell within reach and seated edge of bedpacking, as he has been told he will d/c    COMMUNICATION/EDUCATION:   The patients plan of care was discussed with: Physical therapist, Registered nurse and Case management. Home safety education was provided and the patient/caregiver indicated understanding., Patient/family have participated as able in goal setting and plan of care. and Patient/family agree to work toward stated goals and plan of care. This patients plan of care is appropriate for delegation to [facility].     Thank you for this referral.  Jonathan Barrientos OTR/L  Time Calculation: 35 mins

## 2021-09-08 NOTE — PROGRESS NOTES
Transition of Care Plan:     RUR: 23% - moderate risk  Disposition: home with [facility]   Follow up appointments: PCP, specialists  DME needed: No needs identified. Transportation at Discharge: sister to transport home   Leala Friar or means to access home:   yes     IM Medicare Letter: reviewed on 9/8/21  Is patient a BCPI-A Bundle: No                     If yes, was Bundle Letter given?: N/A    Caregiver Contact: Pat Martinez, spouse, [phone]  Discharge Caregiver contacted prior to discharge? No    CM aware of d/c order. Met with pt at bedside to finalize and review d/c plan. Pt agreeable to [facility] at d/c. [facility] has accepted referral via [employer]. CM placed call to pt after d/c and left VM to notify of acceptance. Medicare pt has received, reviewed, and signed 2nd IM letter informing them of their right to appeal the discharge. Signed copy has been placed on pt bedside chart. Pt was provided with copy of letter to keep. Sister to provide transport home. CM attempted to schedule PCP appointment, however office was closed for lunch. Pt has urology appointment already made. No further CM needs identified at this time. Pt is ready for d/c from a CM standpoint. Assigned RN informed. Care Management Interventions  PCP Verified by CM: Yes  Palliative Care Criteria Met (RRAT>21 & CHF Dx)?: No  Mode of Transport at Discharge:  Other (see comment) ()  Transition of Care Consult (CM Consult): Discharge Planning, [address]: No  Reason Outside [place]: Unable to staff case  Discharge Durable Medical Equipment: No  Physical Therapy Consult: Yes  Occupational Therapy Consult: Yes  Speech Therapy Consult: No  Support Systems: Other Family Member(s), Friend/Neighbor  Read Only, Retired: Current Support Network: Lives Alone  Confirm Follow Up Transport: Family  The Plan for Transition of Care is Related to the Following Treatment Goals : [facility] AT [facility]  The Patient and/or Patient Representative was Provided with a Choice of Provider and Agrees with the Discharge Plan?: Yes  Freedom of Choice List was Provided with Basic Dialogue that Supports the Patient's Individualized Plan of Care/Goals, Treatment Preferences and Shares the Quality Data Associated with the Providers?: No   Resource Information Provided?: No  Discharge Location  Discharge Placement: Home with home health [facility])    Shena Berg, [address], [address] [facility]

## 2021-09-08 NOTE — DISCHARGE SUMMARY
Hospitalist Discharge Summary     Patient ID:  Laura Seay  [id number]  60 y.o.  1965  9/2/2021    PCP on record: Lev Guzman MD    Admit date: 9/2/2021  Discharge date and time: 9/8/2021    DISCHARGE DIAGNOSIS:    Small Bowel Obstruction   History of adenocarcinoma of rectum status post neoadjuvant chemoradiation and L AR with ileostomy, ileostomy was removed  Abd CT showed  Gross hematuria  B/L Hydronephrosis and hydroureter  Acute cystitis/right pyelonephritis  Acute Kidney Injury  BPH  Acute on chronic pancreatitis  Alcohol Abuse     CONSULTATIONS:  IP CONSULT TO UROLOGY  IP CONSULT TO GENERAL SURGERY    Excerpted HPI from H&P of Everette Samaniego MD:  Laura Seay is a 60 y.o.  male who presents with CC listed above. Pt is a poor historian. He denies any previous history of hematuria. He does not know why he was transferred to [facility]. As per EMR review, it is for Urology eval. He initially presented to [facility] for \"urinary pain\" and \"decreased appetite\". He was noted to be in TATE with severe b/l hyrdronephrosis and hydroureter - both of which hae now resolved. He w as also treated for acute cystitis.     We were asked to admit for work up and evaluation of the above problems. ______________________________________________________________________  DISCHARGE SUMMARY/HOSPITAL COURSE:  for full details see H&P, daily progress notes, labs, consult notes. SBO    History of adenocarcinoma of rectum status post neoadjuvant chemoradiation and L AR with ileostomy, ileostomy was removed  -Abd CT showed       1. Mechanical small bowel obstruction, with worsened small bowel dilatation  since the prior study, and a gradual transition from mid distal to distal small  bowel but no obstructing mass or sharp transition is identified. 2. Bladder wall thickening with Marrero balloon catheter intraluminal.       3. Mild bilateral hydronephrosis.        4. Pyelonephritis is suggested on the right. Correlate with urinalysis. 5. Stable changes of chronic pancreatitis with a pancreatic tail cyst.       6. Stable right hydrocele. 7. Other incidental and postoperative changes stable. - Abd  to touch   - Appreciate Surgery input - OK to DC   - Tolerating GI lite diet    - Pt passing flatus and had BM   Gross hematuria  B/L Hydronephrosis and hydroureter - improved  Acute cystitis/right pyelonephritis  Acute Kidney Injury - resolved  BPH  - Appreciate Urology input - Discontinue Marrero prior to discharge today, cont Tamsulosin    - Continue Proscar   - Urine culture + Aerococcus - Completed cefepime and vanc. Cultures sensitive to amoxicillin - will convert to PO and continue to complete 10 days abx     - Hgb stable   Acute on chronic pancreatitis - resolved   Alcohol Abuse   - [facility] protocol     Patient seen at bedside. Patient's plan of care has been reviewed with them. Patient and/or family have verbally conveyed their understanding and agreement of the patient's signs, symptoms, diagnosis, treatment and prognosis and additionally agree to follow up as recommended or return to [facility] should their condition change prior to follow-up. Discharge instructions have also been provided to the patient with some educational information regarding their diagnosis as well a list of reasons why they would want to return to the office prior to their follow-up appointment should their condition change.    _______________________________________________________________________  Patient seen and examined by me on discharge day. Pertinent Findings:  Gen:    Not in distress  Chest: Clear lungs  CVS:   Regular rhythm.   No edema  Abd:  Soft, not distended, not tender  Neuro:  Alert and oriented x3   _______________________________________________________________________  DISCHARGE MEDICATIONS:   Current Discharge Medication List      START taking these medications    Details   tamsulosin (FLOMAX) 0.4 mg capsule Take 1 Capsule by mouth daily. Qty: 30 Capsule, Refills: 1  Start date: 9/9/2021      thiamine mononitrate (B-1) 100 mg tablet Take 1 Tablet by mouth daily. Qty: 30 Tablet, Refills: 1  Start date: 9/9/2021      oxyCODONE-acetaminophen (Percocet) 5-325 mg per tablet Take 1 Tablet by mouth every eight (8) hours as needed for Pain for up to 3 days. Max Daily Amount: 3 Tablets. Qty: 9 Tablet, Refills: 0  Start date: 9/8/2021, End date: 9/11/2021    Associated Diagnoses: Hydronephrosis, bilateral      folic acid (FOLVITE) 1 mg tablet Take 1 Tablet by mouth daily. Qty: 30 Tablet, Refills: 1  Start date: 9/9/2021      amoxicillin-clavulanate (Augmentin) 875-125 mg per tablet Take 1 Tablet by mouth two (2) times a day for 3 days. Qty: 6 Tablet, Refills: 0  Start date: 9/8/2021, End date: 9/11/2021         CONTINUE these medications which have NOT CHANGED    Details   finasteride (PROSCAR) 5 mg tablet Take 5 mg by mouth daily. mirtazapine (REMERON) 15 mg tablet Take 15 mg by mouth nightly. famotidine (Pepcid) 20 mg tablet Take 20 mg by mouth nightly as needed for Heartburn. metoprolol tartrate (LOPRESSOR) 25 mg tablet Take 12.5 mg by mouth two (2) times a day. Patient Follow Up Instructions: Activity: Activity as tolerated  Diet: Regular Diet  Wound Care: None needed  Follow-up Information     Follow up With Specialties Details [facility]   Follow up on 9/15/21 at 1:30 PM with Dr. Daisy Knight at the [address] [facility].  [phone] [address] [place] [address], [address]    Sandre Galeazzi, MD Internal Medicine In 6 days  [address] [facility]  [phone]          ________________________________________________________________    Risk of deterioration: Low    Condition at Discharge: Stable  __________________________________________________________________    Disposition  Home with family, no needs    ____________________________________________________________________    Code Status: Full Code  ___________________________________________________________________      Total time in minutes spent coordinating this discharge (includes going over instructions, follow-up, prescriptions, and preparing report for sign off to her PCP) :  >30 minutes    Signed:  Jadon Johnson NP

## 2021-09-15 ENCOUNTER — TRANSCRIBE ORDER (OUTPATIENT)
Dept: SCHEDULING | Age: 56
End: 2021-09-15

## 2021-09-15 DIAGNOSIS — R31.0 GROSS HEMATURIA: Primary | ICD-10-CM

## 2021-09-22 NOTE — PROGRESS NOTES
Physician Progress Note      PATIENT:               Brendon Jerez  CSN #:                  [id number]  :                       1965  ADMIT DATE:       2021 6:23 PM  [address] [place] DATE:        2021 1:22 PM  RESPONDING  PROVIDER #:        Ramesh Shirley NP          QUERY TEXT:    Pt admitted with Gross Hematuria. Patient noted to have a mondragon catheter in place. If possible, please clarify the relationship, if any, between the gross hematuria and the mondragon catheter. The medical record reflects the following:    Risk Factors: Mondragon    Clinical Indicators:  Patient transferred from [facility] due to gross hematuria    Urology Consults   Multiple potential sources and causes of hematuria at this time including catheter trauma both from placement as well as traumatic removal.    Urology Progress Notes by Stan Cardoso NP on 21 [id number]  F/u for gross hematuria. Multiple potential sources and causes of hematuria at this time including catheter trauma both from placement as well as traumatic removal.  14 [address] coud? tip catheter placed by Dr. Maverick Tripathi on 9/3 after prior attempts by staff. Treatment: Urology consult and monitoring. Options provided:  -- Gross hematuria due to Mondragon trauma  -- Gross hematuria unrelated to Mondragon trauma  -- Other - I will add my own diagnosis  -- Disagree - Not applicable / Not valid  -- Disagree - Clinically unable to determine / Unknown  -- Refer to Clinical Documentation Reviewer    PROVIDER RESPONSE TEXT:    This patient has Gross hematuria, unrelated to Mondragon trauma.     Query created by: Verito Garcia on 2021 9:02 AM      Electronically signed by:  Ramesh Shirley NP 2021 7:16 AM

## 2021-09-30 ENCOUNTER — HOSPITAL ENCOUNTER (OUTPATIENT)
Dept: CT IMAGING | Age: 56
Discharge: HOME | End: 2021-09-30
Attending: STUDENT IN AN ORGANIZED HEALTH CARE EDUCATION/TRAINING PROGRAM

## 2021-09-30 DIAGNOSIS — R31.0 GROSS HEMATURIA: ICD-10-CM

## 2021-09-30 PROCEDURE — 74178 CT ABD&PLV WO CNTR FLWD CNTR: CPT

## 2021-09-30 PROCEDURE — 74011000636 HC RX REV CODE- 636: Performed by: STUDENT IN AN ORGANIZED HEALTH CARE EDUCATION/TRAINING PROGRAM

## 2021-09-30 RX ADMIN — IOPAMIDOL 100 ML: 755 INJECTION, SOLUTION INTRAVENOUS at 09:29

## 2022-01-26 ENCOUNTER — HOSPITAL ENCOUNTER (EMERGENCY)
Age: 57
Discharge: HOME | End: 2022-01-26
Attending: EMERGENCY MEDICINE
Payer: MEDICARE

## 2022-01-26 ENCOUNTER — APPOINTMENT (OUTPATIENT)
Dept: CT IMAGING | Age: 57
End: 2022-01-26
Attending: PHYSICIAN ASSISTANT
Payer: MEDICARE

## 2022-01-26 VITALS
DIASTOLIC BLOOD PRESSURE: 79 MMHG | SYSTOLIC BLOOD PRESSURE: 169 MMHG | HEART RATE: 64 BPM | BODY MASS INDEX: 16.06 KG/M2 | RESPIRATION RATE: 16 BRPM | TEMPERATURE: 97.7 F | OXYGEN SATURATION: 95 % | WEIGHT: 106 LBS | HEIGHT: 68 IN

## 2022-01-26 DIAGNOSIS — N30.01 ACUTE CYSTITIS WITH HEMATURIA: ICD-10-CM

## 2022-01-26 DIAGNOSIS — Q62.11 HYDRONEPHROSIS WITH URETEROPELVIC JUNCTION (UPJ) OBSTRUCTION: Primary | ICD-10-CM

## 2022-01-26 LAB
ALBUMIN SERPL-MCNC: 2.2 G/DL (ref 3.5–5)
ALBUMIN/GLOB SERPL: 0.4 {RATIO} (ref 1.1–2.2)
ALP SERPL-CCNC: 216 U/L (ref 45–117)
ALT SERPL-CCNC: 19 U/L (ref 12–78)
ANION GAP SERPL CALC-SCNC: 9 MMOL/L (ref 5–15)
APPEARANCE UR: ABNORMAL
AST SERPL-CCNC: 14 U/L (ref 15–37)
BACTERIA URNS QL MICRO: NEGATIVE /HPF
BASOPHILS # BLD: 0 K/UL (ref 0–0.1)
BASOPHILS NFR BLD: 0 % (ref 0–1)
BILIRUB SERPL-MCNC: 0.7 MG/DL (ref 0.2–1)
BILIRUB UR QL: NEGATIVE
BUN SERPL-MCNC: 11 MG/DL (ref 6–20)
BUN/CREAT SERPL: 10 (ref 12–20)
CALCIUM SERPL-MCNC: 9 MG/DL (ref 8.5–10.1)
CHLORIDE SERPL-SCNC: 103 MMOL/L (ref 97–108)
CO2 SERPL-SCNC: 28 MMOL/L (ref 21–32)
COLOR UR: ABNORMAL
CREAT SERPL-MCNC: 1.05 MG/DL (ref 0.7–1.3)
DIFFERENTIAL METHOD BLD: ABNORMAL
EOSINOPHIL # BLD: 0.1 K/UL (ref 0–0.4)
EOSINOPHIL NFR BLD: 0 % (ref 0–7)
EPITH CASTS URNS QL MICRO: ABNORMAL /LPF
ERYTHROCYTE [DISTWIDTH] IN BLOOD BY AUTOMATED COUNT: 15.2 % (ref 11.5–14.5)
GLOBULIN SER CALC-MCNC: 5 G/DL (ref 2–4)
GLUCOSE SERPL-MCNC: 95 MG/DL (ref 65–100)
GLUCOSE UR STRIP.AUTO-MCNC: NEGATIVE MG/DL
HCT VFR BLD AUTO: 29 % (ref 36.6–50.3)
HGB BLD-MCNC: 10.1 G/DL (ref 12.1–17)
HGB UR QL STRIP: ABNORMAL
IMM GRANULOCYTES # BLD AUTO: 0.4 K/UL (ref 0–0.04)
IMM GRANULOCYTES NFR BLD AUTO: 2 % (ref 0–0.5)
KETONES UR QL STRIP.AUTO: NEGATIVE MG/DL
LEUKOCYTE ESTERASE UR QL STRIP.AUTO: ABNORMAL
LIPASE SERPL-CCNC: 106 U/L (ref 73–393)
LYMPHOCYTES # BLD: 1.5 K/UL (ref 0.8–3.5)
LYMPHOCYTES NFR BLD: 7 % (ref 12–49)
MCH RBC QN AUTO: 32.2 PG (ref 26–34)
MCHC RBC AUTO-ENTMCNC: 34.8 G/DL (ref 30–36.5)
MCV RBC AUTO: 92.4 FL (ref 80–99)
MONOCYTES # BLD: 1.6 K/UL (ref 0–1)
MONOCYTES NFR BLD: 8 % (ref 5–13)
NEUTS SEG # BLD: 16.5 K/UL (ref 1.8–8)
NEUTS SEG NFR BLD: 83 % (ref 32–75)
NITRITE UR QL STRIP.AUTO: NEGATIVE
NRBC # BLD: 0 K/UL (ref 0–0.01)
NRBC BLD-RTO: 0 PER 100 WBC
PH UR STRIP: 6 [PH] (ref 5–8)
PLATELET # BLD AUTO: 463 K/UL (ref 150–400)
PMV BLD AUTO: 9.7 FL (ref 8.9–12.9)
POTASSIUM SERPL-SCNC: 3.8 MMOL/L (ref 3.5–5.1)
PROT SERPL-MCNC: 7.2 G/DL (ref 6.4–8.2)
PROT UR STRIP-MCNC: 30 MG/DL
RBC # BLD AUTO: 3.14 M/UL (ref 4.1–5.7)
RBC #/AREA URNS HPF: ABNORMAL /HPF (ref 0–5)
SODIUM SERPL-SCNC: 140 MMOL/L (ref 136–145)
SP GR UR REFRACTOMETRY: 1.02 (ref 1–1.03)
UA: UC IF INDICATED,UAUC: ABNORMAL
UROBILINOGEN UR QL STRIP.AUTO: 0.2 EU/DL (ref 0.2–1)
WBC # BLD AUTO: 20 K/UL (ref 4.1–11.1)
WBC URNS QL MICRO: ABNORMAL /HPF (ref 0–4)

## 2022-01-26 PROCEDURE — 83690 ASSAY OF LIPASE: CPT

## 2022-01-26 PROCEDURE — 80053 COMPREHEN METABOLIC PANEL: CPT

## 2022-01-26 PROCEDURE — 96375 TX/PRO/DX INJ NEW DRUG ADDON: CPT

## 2022-01-26 PROCEDURE — 96372 THER/PROPH/DIAG INJ SC/IM: CPT

## 2022-01-26 PROCEDURE — 85025 COMPLETE CBC W/AUTO DIFF WBC: CPT

## 2022-01-26 PROCEDURE — 36415 COLL VENOUS BLD VENIPUNCTURE: CPT

## 2022-01-26 PROCEDURE — 74011000636 HC RX REV CODE- 636: Performed by: EMERGENCY MEDICINE

## 2022-01-26 PROCEDURE — 99283 EMERGENCY DEPT VISIT LOW MDM: CPT

## 2022-01-26 PROCEDURE — 96374 THER/PROPH/DIAG INJ IV PUSH: CPT

## 2022-01-26 PROCEDURE — 74011250636 HC RX REV CODE- 250/636: Performed by: PHYSICIAN ASSISTANT

## 2022-01-26 PROCEDURE — 81001 URINALYSIS AUTO W/SCOPE: CPT

## 2022-01-26 PROCEDURE — 87086 URINE CULTURE/COLONY COUNT: CPT

## 2022-01-26 PROCEDURE — 74177 CT ABD & PELVIS W/CONTRAST: CPT

## 2022-01-26 PROCEDURE — 74011000250 HC RX REV CODE- 250: Performed by: PHYSICIAN ASSISTANT

## 2022-01-26 RX ORDER — HEPARIN 100 UNIT/ML
300 SYRINGE INTRAVENOUS
Status: COMPLETED | OUTPATIENT
Start: 2022-01-26 | End: 2022-01-26

## 2022-01-26 RX ORDER — CEPHALEXIN 500 MG/1
500 CAPSULE ORAL 2 TIMES DAILY
Qty: 14 CAPSULE | Refills: 0 | Status: SHIPPED | OUTPATIENT
Start: 2022-01-26 | End: 2022-02-05

## 2022-01-26 RX ORDER — TRAMADOL HYDROCHLORIDE 50 MG/1
50 TABLET ORAL
Qty: 10 TABLET | Refills: 0 | Status: SHIPPED | OUTPATIENT
Start: 2022-01-26 | End: 2022-01-29

## 2022-01-26 RX ORDER — DICYCLOMINE HYDROCHLORIDE 10 MG/ML
20 INJECTION INTRAMUSCULAR
Status: COMPLETED | OUTPATIENT
Start: 2022-01-26 | End: 2022-01-26

## 2022-01-26 RX ORDER — KETOROLAC TROMETHAMINE 30 MG/ML
15 INJECTION, SOLUTION INTRAMUSCULAR; INTRAVENOUS ONCE
Status: COMPLETED | OUTPATIENT
Start: 2022-01-26 | End: 2022-01-26

## 2022-01-26 RX ADMIN — KETOROLAC TROMETHAMINE 15 MG: 30 INJECTION, SOLUTION INTRAMUSCULAR; INTRAVENOUS at 12:24

## 2022-01-26 RX ADMIN — SODIUM CHLORIDE 1000 ML: 9 INJECTION, SOLUTION INTRAVENOUS at 12:23

## 2022-01-26 RX ADMIN — IOPAMIDOL 100 ML: 755 INJECTION, SOLUTION INTRAVENOUS at 12:00

## 2022-01-26 RX ADMIN — HEPARIN 300 UNITS: 100 SYRINGE at 14:00

## 2022-01-26 RX ADMIN — WATER 1 G: 1 INJECTION, SOLUTION INTRAMUSCULAR; INTRAVENOUS; SUBCUTANEOUS at 12:28

## 2022-01-26 RX ADMIN — DICYCLOMINE HYDROCHLORIDE 20 MG: 20 INJECTION INTRAMUSCULAR at 09:53

## 2022-01-26 NOTE — ED NOTES
Accessed device marly cath to pt's right upper chest. Power loc 20G x 1in safety infusion set. Maintained  Sterile  Precautions.  Good blood return noted, flushed with 10mL NS.

## 2022-01-26 NOTE — PROGRESS NOTES
CM receive referral patient will need follow-up with urology. Follow-up appointment scheduled for 2/2/22 with Albert NEVAREZ @ [facility].      Anabell Gipson CM

## 2022-01-26 NOTE — ED NOTES
Emergency Department Nursing Plan of Care       The Nursing Plan of Care is developed from the Nursing assessment and Emergency Department Attending provider initial evaluation. The plan of care may be reviewed in the ED Provider note.     The Plan of Care was developed with the following considerations:   Patient / Family readiness to learn indicated by:verbalized understanding  Persons(s) to be included in education: patient  Barriers to Learning/Limitations:No    Signed     Steve Pike RN    1/26/2022   11:16 AM

## 2022-01-27 LAB
BACTERIA SPEC CULT: NORMAL
SERVICE CMNT-IMP: NORMAL

## 2022-02-02 ENCOUNTER — HOSPITAL ENCOUNTER (EMERGENCY)
Age: 57
Discharge: OTHER INSTITUTION | End: 2022-02-02
Attending: EMERGENCY MEDICINE
Payer: MEDICARE

## 2022-02-02 ENCOUNTER — HOSPITAL ENCOUNTER (INPATIENT)
Age: 57
LOS: 3 days | Discharge: HOME | DRG: 682 | End: 2022-02-05
Attending: FAMILY MEDICINE | Admitting: STUDENT IN AN ORGANIZED HEALTH CARE EDUCATION/TRAINING PROGRAM
Payer: MEDICARE

## 2022-02-02 ENCOUNTER — APPOINTMENT (OUTPATIENT)
Dept: CT IMAGING | Age: 57
End: 2022-02-02
Attending: NURSE PRACTITIONER
Payer: MEDICARE

## 2022-02-02 VITALS
BODY MASS INDEX: 16.22 KG/M2 | SYSTOLIC BLOOD PRESSURE: 106 MMHG | TEMPERATURE: 98 F | DIASTOLIC BLOOD PRESSURE: 82 MMHG | HEIGHT: 68 IN | WEIGHT: 107 LBS | HEART RATE: 74 BPM | OXYGEN SATURATION: 100 % | RESPIRATION RATE: 16 BRPM

## 2022-02-02 DIAGNOSIS — N39.0 COMPLICATED UTI (URINARY TRACT INFECTION): Primary | ICD-10-CM

## 2022-02-02 DIAGNOSIS — N13.30 HYDRONEPHROSIS, BILATERAL: Primary | ICD-10-CM

## 2022-02-02 DIAGNOSIS — C20 RECTAL CANCER (HCC): ICD-10-CM

## 2022-02-02 DIAGNOSIS — N13.30 HYDRONEPHROSIS, UNSPECIFIED HYDRONEPHROSIS TYPE: ICD-10-CM

## 2022-02-02 DIAGNOSIS — N17.9 ACUTE RENAL FAILURE, UNSPECIFIED ACUTE RENAL FAILURE TYPE (HCC): ICD-10-CM

## 2022-02-02 LAB
ALBUMIN SERPL-MCNC: 2.1 G/DL (ref 3.5–5)
ALBUMIN/GLOB SERPL: 0.4 {RATIO} (ref 1.1–2.2)
ALP SERPL-CCNC: 230 U/L (ref 45–117)
ALT SERPL-CCNC: 42 U/L (ref 12–78)
ANION GAP SERPL CALC-SCNC: 13 MMOL/L (ref 5–15)
ANION GAP SERPL CALC-SCNC: 7 MMOL/L (ref 5–15)
APPEARANCE UR: ABNORMAL
AST SERPL-CCNC: 25 U/L (ref 15–37)
BACTERIA URNS QL MICRO: ABNORMAL /HPF
BASOPHILS # BLD: 0.2 K/UL (ref 0–0.1)
BASOPHILS NFR BLD: 1 % (ref 0–1)
BILIRUB SERPL-MCNC: 0.8 MG/DL (ref 0.2–1)
BILIRUB UR QL CFM: NEGATIVE
BUN SERPL-MCNC: 34 MG/DL (ref 6–20)
BUN SERPL-MCNC: 34 MG/DL (ref 6–20)
BUN/CREAT SERPL: 7 (ref 12–20)
BUN/CREAT SERPL: 8 (ref 12–20)
CALCIUM SERPL-MCNC: 8.3 MG/DL (ref 8.5–10.1)
CALCIUM SERPL-MCNC: 8.9 MG/DL (ref 8.5–10.1)
CHLORIDE SERPL-SCNC: 101 MMOL/L (ref 97–108)
CHLORIDE SERPL-SCNC: 108 MMOL/L (ref 97–108)
CO2 SERPL-SCNC: 18 MMOL/L (ref 21–32)
CO2 SERPL-SCNC: 18 MMOL/L (ref 21–32)
COLOR UR: ABNORMAL
CREAT SERPL-MCNC: 4.32 MG/DL (ref 0.7–1.3)
CREAT SERPL-MCNC: 4.83 MG/DL (ref 0.7–1.3)
DIFFERENTIAL METHOD BLD: ABNORMAL
EOSINOPHIL # BLD: 0.3 K/UL (ref 0–0.4)
EOSINOPHIL NFR BLD: 2 % (ref 0–7)
EPITH CASTS URNS QL MICRO: ABNORMAL /LPF
ERYTHROCYTE [DISTWIDTH] IN BLOOD BY AUTOMATED COUNT: 15.2 % (ref 11.5–14.5)
FLUAV RNA SPEC QL NAA+PROBE: NOT DETECTED
FLUBV RNA SPEC QL NAA+PROBE: NOT DETECTED
GLOBULIN SER CALC-MCNC: 4.9 G/DL (ref 2–4)
GLUCOSE SERPL-MCNC: 106 MG/DL (ref 65–100)
GLUCOSE SERPL-MCNC: 121 MG/DL (ref 65–100)
GLUCOSE UR STRIP.AUTO-MCNC: NEGATIVE MG/DL
HCT VFR BLD AUTO: 28.6 % (ref 36.6–50.3)
HGB BLD-MCNC: 9.9 G/DL (ref 12.1–17)
HGB UR QL STRIP: ABNORMAL
IMM GRANULOCYTES # BLD AUTO: 0.3 K/UL (ref 0–0.04)
IMM GRANULOCYTES NFR BLD AUTO: 2 % (ref 0–0.5)
KETONES UR QL STRIP.AUTO: NEGATIVE MG/DL
LEUKOCYTE ESTERASE UR QL STRIP.AUTO: ABNORMAL
LYMPHOCYTES # BLD: 2 K/UL (ref 0.8–3.5)
LYMPHOCYTES NFR BLD: 13 % (ref 12–49)
MCH RBC QN AUTO: 32.2 PG (ref 26–34)
MCHC RBC AUTO-ENTMCNC: 34.6 G/DL (ref 30–36.5)
MCV RBC AUTO: 93.2 FL (ref 80–99)
MONOCYTES # BLD: 1.4 K/UL (ref 0–1)
MONOCYTES NFR BLD: 9 % (ref 5–13)
NEUTS SEG # BLD: 11.2 K/UL (ref 1.8–8)
NEUTS SEG NFR BLD: 73 % (ref 32–75)
NITRITE UR QL STRIP.AUTO: NEGATIVE
NRBC # BLD: 0 K/UL (ref 0–0.01)
NRBC BLD-RTO: 0 PER 100 WBC
PH UR STRIP: 5.5 [PH] (ref 5–8)
PLATELET # BLD AUTO: 692 K/UL (ref 150–400)
PLATELET COMMENTS,PCOM: ABNORMAL
PMV BLD AUTO: 10 FL (ref 8.9–12.9)
POTASSIUM SERPL-SCNC: 4.5 MMOL/L (ref 3.5–5.1)
POTASSIUM SERPL-SCNC: 4.6 MMOL/L (ref 3.5–5.1)
PROT SERPL-MCNC: 7 G/DL (ref 6.4–8.2)
PROT UR STRIP-MCNC: NEGATIVE MG/DL
RBC # BLD AUTO: 3.07 M/UL (ref 4.1–5.7)
RBC #/AREA URNS HPF: ABNORMAL /HPF (ref 0–5)
RBC MORPH BLD: ABNORMAL
SARS-COV-2, COV2: NOT DETECTED
SODIUM SERPL-SCNC: 132 MMOL/L (ref 136–145)
SODIUM SERPL-SCNC: 133 MMOL/L (ref 136–145)
SP GR UR REFRACTOMETRY: 1.02 (ref 1–1.03)
TROPONIN-HIGH SENSITIVITY: 5 NG/L (ref 0–76)
UA: UC IF INDICATED,UAUC: ABNORMAL
UROBILINOGEN UR QL STRIP.AUTO: 0.2 EU/DL (ref 0.2–1)
WBC # BLD AUTO: 15.4 K/UL (ref 4.1–11.1)
WBC URNS QL MICRO: ABNORMAL /HPF (ref 0–4)

## 2022-02-02 PROCEDURE — 36415 COLL VENOUS BLD VENIPUNCTURE: CPT

## 2022-02-02 PROCEDURE — 65270000029 HC RM PRIVATE

## 2022-02-02 PROCEDURE — 80048 BASIC METABOLIC PNL TOTAL CA: CPT

## 2022-02-02 PROCEDURE — 74011250637 HC RX REV CODE- 250/637: Performed by: STUDENT IN AN ORGANIZED HEALTH CARE EDUCATION/TRAINING PROGRAM

## 2022-02-02 PROCEDURE — 87636 SARSCOV2 & INF A&B AMP PRB: CPT

## 2022-02-02 PROCEDURE — 96375 TX/PRO/DX INJ NEW DRUG ADDON: CPT

## 2022-02-02 PROCEDURE — 74176 CT ABD & PELVIS W/O CONTRAST: CPT

## 2022-02-02 PROCEDURE — 87086 URINE CULTURE/COLONY COUNT: CPT

## 2022-02-02 PROCEDURE — 74011000250 HC RX REV CODE- 250: Performed by: PHYSICIAN ASSISTANT

## 2022-02-02 PROCEDURE — 85025 COMPLETE CBC W/AUTO DIFF WBC: CPT

## 2022-02-02 PROCEDURE — 74011250636 HC RX REV CODE- 250/636: Performed by: PHYSICIAN ASSISTANT

## 2022-02-02 PROCEDURE — 74011250636 HC RX REV CODE- 250/636: Performed by: STUDENT IN AN ORGANIZED HEALTH CARE EDUCATION/TRAINING PROGRAM

## 2022-02-02 PROCEDURE — 80053 COMPREHEN METABOLIC PANEL: CPT

## 2022-02-02 PROCEDURE — 99283 EMERGENCY DEPT VISIT LOW MDM: CPT

## 2022-02-02 PROCEDURE — 51702 INSERT TEMP BLADDER CATH: CPT

## 2022-02-02 PROCEDURE — 96365 THER/PROPH/DIAG IV INF INIT: CPT

## 2022-02-02 PROCEDURE — 81001 URINALYSIS AUTO W/SCOPE: CPT

## 2022-02-02 PROCEDURE — 84484 ASSAY OF TROPONIN QUANT: CPT

## 2022-02-02 PROCEDURE — 74011250636 HC RX REV CODE- 250/636: Performed by: NURSE PRACTITIONER

## 2022-02-02 PROCEDURE — 74011000250 HC RX REV CODE- 250: Performed by: STUDENT IN AN ORGANIZED HEALTH CARE EDUCATION/TRAINING PROGRAM

## 2022-02-02 PROCEDURE — 93005 ELECTROCARDIOGRAM TRACING: CPT

## 2022-02-02 RX ORDER — POLYETHYLENE GLYCOL 3350 17 G/17G
17 POWDER, FOR SOLUTION ORAL DAILY PRN
Status: DISCONTINUED | OUTPATIENT
Start: 2022-02-02 | End: 2022-02-05 | Stop reason: HOSPADM

## 2022-02-02 RX ORDER — ONDANSETRON 2 MG/ML
4 INJECTION INTRAMUSCULAR; INTRAVENOUS ONCE
Status: COMPLETED | OUTPATIENT
Start: 2022-02-02 | End: 2022-02-02

## 2022-02-02 RX ORDER — METOPROLOL TARTRATE 25 MG/1
12.5 TABLET, FILM COATED ORAL 2 TIMES DAILY
Status: DISCONTINUED | OUTPATIENT
Start: 2022-02-03 | End: 2022-02-05 | Stop reason: HOSPADM

## 2022-02-02 RX ORDER — LIDOCAINE HYDROCHLORIDE 20 MG/ML
JELLY TOPICAL
Status: COMPLETED | OUTPATIENT
Start: 2022-02-02 | End: 2022-02-02

## 2022-02-02 RX ORDER — SODIUM CHLORIDE 0.9 % (FLUSH) 0.9 %
5-40 SYRINGE (ML) INJECTION AS NEEDED
Status: DISCONTINUED | OUTPATIENT
Start: 2022-02-02 | End: 2022-02-05 | Stop reason: HOSPADM

## 2022-02-02 RX ORDER — LEVOFLOXACIN 5 MG/ML
750 INJECTION, SOLUTION INTRAVENOUS ONCE
Status: COMPLETED | OUTPATIENT
Start: 2022-02-02 | End: 2022-02-02

## 2022-02-02 RX ORDER — FINASTERIDE 5 MG/1
5 TABLET, FILM COATED ORAL DAILY
Status: DISCONTINUED | OUTPATIENT
Start: 2022-02-03 | End: 2022-02-05 | Stop reason: HOSPADM

## 2022-02-02 RX ORDER — ONDANSETRON 4 MG/1
4 TABLET, ORALLY DISINTEGRATING ORAL
Status: DISCONTINUED | OUTPATIENT
Start: 2022-02-02 | End: 2022-02-05 | Stop reason: HOSPADM

## 2022-02-02 RX ORDER — LANOLIN ALCOHOL/MO/W.PET/CERES
100 CREAM (GRAM) TOPICAL DAILY
Status: DISCONTINUED | OUTPATIENT
Start: 2022-02-03 | End: 2022-02-05 | Stop reason: HOSPADM

## 2022-02-02 RX ORDER — ACETAMINOPHEN 325 MG/1
650 TABLET ORAL
Status: DISCONTINUED | OUTPATIENT
Start: 2022-02-02 | End: 2022-02-05 | Stop reason: HOSPADM

## 2022-02-02 RX ORDER — KETOROLAC TROMETHAMINE 30 MG/ML
15 INJECTION, SOLUTION INTRAMUSCULAR; INTRAVENOUS
Status: COMPLETED | OUTPATIENT
Start: 2022-02-02 | End: 2022-02-02

## 2022-02-02 RX ORDER — MIRTAZAPINE 15 MG/1
15 TABLET, FILM COATED ORAL
Status: DISCONTINUED | OUTPATIENT
Start: 2022-02-02 | End: 2022-02-05 | Stop reason: HOSPADM

## 2022-02-02 RX ORDER — TAMSULOSIN HYDROCHLORIDE 0.4 MG/1
0.4 CAPSULE ORAL DAILY
Status: DISCONTINUED | OUTPATIENT
Start: 2022-02-03 | End: 2022-02-05 | Stop reason: HOSPADM

## 2022-02-02 RX ORDER — ACETAMINOPHEN 650 MG/1
650 SUPPOSITORY RECTAL
Status: DISCONTINUED | OUTPATIENT
Start: 2022-02-02 | End: 2022-02-05 | Stop reason: HOSPADM

## 2022-02-02 RX ORDER — FAMOTIDINE 20 MG/1
20 TABLET, FILM COATED ORAL
Status: DISCONTINUED | OUTPATIENT
Start: 2022-02-02 | End: 2022-02-05 | Stop reason: HOSPADM

## 2022-02-02 RX ORDER — MORPHINE SULFATE 2 MG/ML
1-2 INJECTION, SOLUTION INTRAMUSCULAR; INTRAVENOUS
Status: DISCONTINUED | OUTPATIENT
Start: 2022-02-02 | End: 2022-02-03

## 2022-02-02 RX ORDER — SODIUM CHLORIDE 9 MG/ML
75 INJECTION, SOLUTION INTRAVENOUS CONTINUOUS
Status: DISCONTINUED | OUTPATIENT
Start: 2022-02-02 | End: 2022-02-05 | Stop reason: HOSPADM

## 2022-02-02 RX ORDER — FOLIC ACID 1 MG/1
1 TABLET ORAL DAILY
Status: DISCONTINUED | OUTPATIENT
Start: 2022-02-03 | End: 2022-02-05 | Stop reason: HOSPADM

## 2022-02-02 RX ORDER — SODIUM CHLORIDE 0.9 % (FLUSH) 0.9 %
5-40 SYRINGE (ML) INJECTION EVERY 8 HOURS
Status: DISCONTINUED | OUTPATIENT
Start: 2022-02-02 | End: 2022-02-05 | Stop reason: HOSPADM

## 2022-02-02 RX ORDER — ONDANSETRON 2 MG/ML
4 INJECTION INTRAMUSCULAR; INTRAVENOUS
Status: DISCONTINUED | OUTPATIENT
Start: 2022-02-02 | End: 2022-02-05 | Stop reason: HOSPADM

## 2022-02-02 RX ADMIN — ONDANSETRON 4 MG: 2 INJECTION INTRAMUSCULAR; INTRAVENOUS at 15:48

## 2022-02-02 RX ADMIN — LEVOFLOXACIN 750 MG: 5 INJECTION, SOLUTION INTRAVENOUS at 17:33

## 2022-02-02 RX ADMIN — SODIUM CHLORIDE 75 ML/HR: 9 INJECTION, SOLUTION INTRAVENOUS at 22:32

## 2022-02-02 RX ADMIN — CEFEPIME HYDROCHLORIDE 1 G: 1 INJECTION, POWDER, FOR SOLUTION INTRAMUSCULAR; INTRAVENOUS at 22:28

## 2022-02-02 RX ADMIN — SODIUM CHLORIDE 1000 ML: 9 INJECTION, SOLUTION INTRAVENOUS at 18:48

## 2022-02-02 RX ADMIN — SODIUM CHLORIDE, PRESERVATIVE FREE 10 ML: 5 INJECTION INTRAVENOUS at 22:33

## 2022-02-02 RX ADMIN — LIDOCAINE HYDROCHLORIDE: 20 JELLY TOPICAL at 18:49

## 2022-02-02 RX ADMIN — MIRTAZAPINE 15 MG: 15 TABLET ORAL at 22:32

## 2022-02-02 RX ADMIN — KETOROLAC TROMETHAMINE 15 MG: 30 INJECTION, SOLUTION INTRAMUSCULAR; INTRAVENOUS at 15:48

## 2022-02-02 NOTE — ED TRIAGE NOTES
Reports sharp abd pain, unable to have BM, has not been eating. Nausea but no vomiting. Urinating only small amounts.       States seen recently for same issues

## 2022-02-02 NOTE — ED NOTES
TRANSFER - OUT REPORT:    Verbal report given to [facility], RN on Candy Cavazos  being transferred to [facility] for progression of care. Report consisted of patients Situation, Background, Assessment and   Recommendations(SBAR). Information from the following report(s) SBAR was reviewed with the receiving nurse. Lines:   Venous Access Device Alma Cath (Active)       Peripheral IV 02/02/22 Right Antecubital (Active)   Site Assessment Clean, dry, & intact 02/02/22 1545   Phlebitis Assessment 0 02/02/22 1545   Infiltration Assessment 0 02/02/22 1545   Dressing Status Clean, dry, & intact 02/02/22 1545        Opportunity for questions and clarification was provided.       Patient transported with:   LESTER

## 2022-02-02 NOTE — ED PROVIDER NOTES
EMERGENCY DEPARTMENT HISTORY AND PHYSICAL EXAM      Date: 2/2/2022  Patient Name: Olu Cho    History of Presenting Illness     Chief Complaint   Patient presents with    Abdominal Pain       History Provided By: Patient    Additional History (Context): Olu Cho is a 60 y.o. male with HTN and rectal CA.  who presents with abd pain. Onset 1/26/22 which is approximately 1 week ago. He reports being seen in the ER for similar sx at the onset and was diagnosed with UTI. Rx keflex which he states he took. He also had hydronephorisis with no obstruction and was advised to follow up with urology which was scheduled today. He reports being unable to make the appointment due to feeling worst. Reports nausea vomiting, severe abd pain and dizziness. Denies exposure to sick contacts or those with COVID. Denies fever or chills. PCP: Maite Parry MD    Current Outpatient Medications   Medication Sig Dispense Refill    metoprolol tartrate (LOPRESSOR) 25 mg tablet Take 0.5 Tablets by mouth two (2) times a day. 60 Tablet 0    HYDROcodone-acetaminophen (Norco) 5-325 mg per tablet Take 1 Tablet by mouth every six (6) hours as needed for Pain for up to 2 days. Max Daily Amount: 4 Tablets. 5 Tablet 0    tamsulosin (FLOMAX) 0.4 mg capsule Take 1 Capsule by mouth daily. 30 Capsule 1    thiamine mononitrate (B-1) 100 mg tablet Take 1 Tablet by mouth daily. 30 Tablet 1    folic acid (FOLVITE) 1 mg tablet Take 1 Tablet by mouth daily. 30 Tablet 1    finasteride (PROSCAR) 5 mg tablet Take 5 mg by mouth daily.  mirtazapine (REMERON) 15 mg tablet Take 15 mg by mouth nightly.  famotidine (Pepcid) 20 mg tablet Take 20 mg by mouth nightly as needed for Heartburn. Past History     Past Medical History:  Past Medical History:   Diagnosis Date    Cancer [facility])     rectal    Gastrointestinal disorder     Gastric Ulcers;  Rectal CA    Hematuria 9/1/2021    Hypertension        Past Surgical History:  Past Surgical History:   Procedure Laterality Date    HX GI         Family History:  Family History   Problem Relation Age of Onset    Cancer Brother        Social History:  Social History     Tobacco Use    Smoking status: Current Every Day Smoker     Packs/day: 0.25    Smokeless tobacco: Never Used   Substance Use Topics    Alcohol use: Yes     Comment: 2-3 beers daily    Drug use: No       Allergies:  No Known Allergies      Review of Systems   Review of Systems   Constitutional: Positive for fatigue. Negative for chills and fever. HENT: Negative for congestion, ear discharge, ear pain, rhinorrhea and sore throat. Respiratory: Negative for cough and shortness of breath. Cardiovascular: Negative for chest pain and leg swelling. Gastrointestinal: Positive for abdominal pain, constipation, nausea and vomiting. Negative for blood in stool, diarrhea and rectal pain. Genitourinary: Positive for difficulty urinating and flank pain. Negative for decreased urine volume, dysuria, frequency, hematuria, penile discharge, penile pain and urgency. Musculoskeletal: Negative for back pain, gait problem and neck pain. Skin: Negative for wound. Neurological: Negative for dizziness, numbness and headaches. All other systems reviewed and are negative. Physical Exam     Vitals:    02/02/22 1500 02/02/22 1816   BP: 126/72 106/82   Pulse: 85 74   Resp: 16 16   Temp: 98 °F (36.7 °C)    SpO2: 100% 100%   Weight: 48.5 kg (107 lb)    Height: 5' 8\" (1.727 m)      Physical Exam  Vitals and nursing note reviewed. Constitutional:       General: He is in acute distress (pain). Appearance: He is well-developed. He is ill-appearing. He is not toxic-appearing or diaphoretic. HENT:      Head: Normocephalic and atraumatic.       Right Ear: Tympanic membrane, ear canal and external ear normal.      Left Ear: Tympanic membrane, ear canal and external ear normal.      Nose: Nose normal. Mouth/Throat:      Mouth: Mucous membranes are moist.      Pharynx: Oropharynx is clear. No oropharyngeal exudate or posterior oropharyngeal erythema. Eyes:      Extraocular Movements: Extraocular movements intact. Conjunctiva/sclera: Conjunctivae normal.      Pupils: Pupils are equal, round, and reactive to light. Cardiovascular:      Rate and Rhythm: Normal rate and regular rhythm. Pulses: Normal pulses. Heart sounds: Normal heart sounds. Pulmonary:      Effort: Pulmonary effort is normal.      Breath sounds: Normal breath sounds. Abdominal:      General: Bowel sounds are normal. There is no distension. Palpations: Abdomen is soft. There is no mass. Tenderness: There is abdominal tenderness. There is right CVA tenderness, left CVA tenderness and guarding (rigid). There is no rebound. Musculoskeletal:      Cervical back: Normal range of motion and neck supple. Lymphadenopathy:      Cervical: No cervical adenopathy. Skin:     General: Skin is warm and dry. Neurological:      Mental Status: He is alert and oriented to person, place, and time. GCS: GCS eye subscore is 4. GCS verbal subscore is 5. GCS motor subscore is 6. Cranial Nerves: No cranial nerve deficit. Psychiatric:         Thought Content: Thought content normal.           Diagnostic Study Results     Labs -   No results found for this or any previous visit (from the past 12 hour(s)). Radiologic Studies -   CT ABD PELV WO CONT   Final Result      1. Right-sided hydronephrosis and diffuse right hydroureter has worsened. Left-sided hydronephrosis and diffuse left hydroureter has developed since the   prior study. 2. Persistent diffuse bladder wall thickening. 3. As stated previously, this may reflect sequela of radiation therapy or UTI,   but underlying neoplastic lesion cannot be excluded. 4. Chronic partial small bowel obstruction.         CT Results  (Last 48 hours)    None        CXR Results (Last 48 hours)    None            Medical Decision Making   I am the first provider for this patient. I reviewed the vital signs, available nursing notes, past medical history, past surgical history, family history and social history. Vital Signs-Reviewed the patient's vital signs. Records Reviewed: Nursing Notes, Old Medical Records, Previous Radiology Studies and Previous Laboratory Studies     61 yo M presents with abd pain exhibiting tenderness, guarding and rigidity of abd. Vitals are within normal limits with no fever noted. Chart review shows CT exhibiting pancreatic cyst and hydronephrosis without obstruction in the presence of cystitis. Treated with keflex but appears to be worsening. Plan to repeat labs in addition will add lactic acid. Repeat CT will be obtained due to worsening exam.     DDX: pyelonephritis, UTI, kidney stone, BPH, ARF, Dehydration    ED Course:   ED Course as of 02/07/22 1259   Wed Feb 02, 2022   1651 Progress Note:   CT remarkable for worsening hydronephrosis in the presence of ARF . Leukocytosis remain but improved at 15.4. There is elevation in platelet count but pt is a heme/onc patient under Dr Huel Cockayne services. Discussed H&P, results and imaging studies with attending Dr Edna Christina and he agrees with transfer for urosepsis and ARF. [facility] hospitalist consulted [NA]   [phone] Progress Note:   [facility] hospitalist Dr Ariane Diaz accepts pt for admission pending urology consult. [NA]   1293 I spoke with Dr. Dai James and informed him that unfortunately Urology will not consult on pt's at [facility]. He endorses understanding and accepts the pt to [facility]. [SM]   1707 Pt has been unable to void since arriving in ED. Bladder scan shows 337 ccs retained. Will insert mondragon catheter at this time. Pt is currently resting comfortably in room in NAD.   [SM]      ED Course User Index  [NA] Jg Daniels NP  [SM] Aurelio Ching PA-C         Disposition:  Transfer       Follow-up Information    None Discharge Medication List as of 2/2/2022  7:05 PM          Provider Notes (Medical Decision Making):     Procedures:  Procedures    Diagnosis     Clinical Impression:   1. Complicated UTI (urinary tract infection)    2. Hydronephrosis, unspecified hydronephrosis type    3. Acute renal failure, unspecified acute renal failure type ([facility] [address]    4.  Rectal cancer ([facility] [address]

## 2022-02-02 NOTE — PROGRESS NOTES
1836hrs:  TRANSFER - IN REPORT:  Verbal report received from Martin Stearns (name) on Samm Connolly  being received from [facility] (unit) for routine progression of care  Report consisted of patients Situation, Background, Assessment and Recommendations(SBAR). Information from the following report(s) SBAR, Intake/Output, Recent Results, Med Rec Status and Cardiac Rhythm NOT ON TELEMETRY was reviewed with the receiving nurse. Opportunity for questions and clarification was provided. Assessment completed upon patients arrival to unit and care assumed. Patient unknown time of transport. 1938hrs:  Patient arrived via ambulance. Weight obtained, patient standby assist. Marrero in place--urine is dark yellow with heavy red flecks/sediment, brief in place. Patient reports lateral abdominal pain 8/10. Skin is intact. NSR with slight ST elevation noted on monitor. EKG performed. [place] paged doctor to notify MD of patient's arrival as well as concern over EKG. Patient notified RN that he has a history of heart attack in 2015. Admitting notified of patient's arrival to pull into system. Awaiting orders. 2037hrs:  MD paged again for orders. 2040hrs:  Dr. Wilman Ford on [facility], provided with updates on patient.

## 2022-02-02 NOTE — ED NOTES
Emergency Department Nursing Plan of Care       The Nursing Plan of Care is developed from the Nursing assessment and Emergency Department Attending provider initial evaluation. The plan of care may be reviewed in the ED Provider note.     The Plan of Care was developed with the following considerations:   Patient / Family readiness to learn indicated by:verbalized understanding  Persons(s) to be included in education: patient  Barriers to Learning/Limitations:No    Signed     Kiah Bateman RN    2/2/2022   3:14 PM

## 2022-02-03 LAB
AMPHET UR QL SCN: NEGATIVE
ANION GAP SERPL CALC-SCNC: 7 MMOL/L (ref 5–15)
BACTERIA SPEC CULT: NORMAL
BARBITURATES UR QL SCN: NEGATIVE
BENZODIAZ UR QL: NEGATIVE
BUN SERPL-MCNC: 29 MG/DL (ref 6–20)
BUN/CREAT SERPL: 9 (ref 12–20)
CALCIUM SERPL-MCNC: 8.8 MG/DL (ref 8.5–10.1)
CANNABINOIDS UR QL SCN: NEGATIVE
CHLORIDE SERPL-SCNC: 109 MMOL/L (ref 97–108)
CO2 SERPL-SCNC: 18 MMOL/L (ref 21–32)
COCAINE UR QL SCN: POSITIVE
CREAT SERPL-MCNC: 3.28 MG/DL (ref 0.7–1.3)
DRUG SCRN COMMENT,DRGCM: ABNORMAL
ETHANOL SERPL-MCNC: <10 MG/DL
GLUCOSE SERPL-MCNC: 101 MG/DL (ref 65–100)
METHADONE UR QL: NEGATIVE
OPIATES UR QL: POSITIVE
PCP UR QL: NEGATIVE
POTASSIUM SERPL-SCNC: 4.5 MMOL/L (ref 3.5–5.1)
SERVICE CMNT-IMP: NORMAL
SODIUM SERPL-SCNC: 134 MMOL/L (ref 136–145)

## 2022-02-03 PROCEDURE — 80048 BASIC METABOLIC PNL TOTAL CA: CPT

## 2022-02-03 PROCEDURE — 74011250637 HC RX REV CODE- 250/637: Performed by: STUDENT IN AN ORGANIZED HEALTH CARE EDUCATION/TRAINING PROGRAM

## 2022-02-03 PROCEDURE — 74011250636 HC RX REV CODE- 250/636: Performed by: STUDENT IN AN ORGANIZED HEALTH CARE EDUCATION/TRAINING PROGRAM

## 2022-02-03 PROCEDURE — 65270000029 HC RM PRIVATE

## 2022-02-03 PROCEDURE — 36415 COLL VENOUS BLD VENIPUNCTURE: CPT

## 2022-02-03 PROCEDURE — 97161 PT EVAL LOW COMPLEX 20 MIN: CPT

## 2022-02-03 PROCEDURE — 74011250637 HC RX REV CODE- 250/637: Performed by: INTERNAL MEDICINE

## 2022-02-03 PROCEDURE — 74011250636 HC RX REV CODE- 250/636: Performed by: NURSE PRACTITIONER

## 2022-02-03 PROCEDURE — 80307 DRUG TEST PRSMV CHEM ANLYZR: CPT

## 2022-02-03 PROCEDURE — 74011000250 HC RX REV CODE- 250: Performed by: STUDENT IN AN ORGANIZED HEALTH CARE EDUCATION/TRAINING PROGRAM

## 2022-02-03 PROCEDURE — 82077 ASSAY SPEC XCP UR&BREATH IA: CPT

## 2022-02-03 PROCEDURE — 97165 OT EVAL LOW COMPLEX 30 MIN: CPT

## 2022-02-03 RX ORDER — HYDROMORPHONE HYDROCHLORIDE 1 MG/ML
1 INJECTION, SOLUTION INTRAMUSCULAR; INTRAVENOUS; SUBCUTANEOUS
Status: DISCONTINUED | OUTPATIENT
Start: 2022-02-03 | End: 2022-02-05 | Stop reason: HOSPADM

## 2022-02-03 RX ORDER — SODIUM BICARBONATE 650 MG/1
650 TABLET ORAL 2 TIMES DAILY
Status: DISCONTINUED | OUTPATIENT
Start: 2022-02-03 | End: 2022-02-05 | Stop reason: HOSPADM

## 2022-02-03 RX ADMIN — POLYETHYLENE GLYCOL 3350 17 G: 17 POWDER, FOR SOLUTION ORAL at 10:00

## 2022-02-03 RX ADMIN — SODIUM CHLORIDE 75 ML/HR: 9 INJECTION, SOLUTION INTRAVENOUS at 11:14

## 2022-02-03 RX ADMIN — ONDANSETRON HYDROCHLORIDE 4 MG: 2 SOLUTION INTRAMUSCULAR; INTRAVENOUS at 16:06

## 2022-02-03 RX ADMIN — HYDROMORPHONE HYDROCHLORIDE 1 MG: 1 INJECTION, SOLUTION INTRAMUSCULAR; INTRAVENOUS; SUBCUTANEOUS at 11:15

## 2022-02-03 RX ADMIN — SODIUM CHLORIDE, PRESERVATIVE FREE 10 ML: 5 INJECTION INTRAVENOUS at 22:37

## 2022-02-03 RX ADMIN — METOPROLOL TARTRATE 12.5 MG: 25 TABLET, FILM COATED ORAL at 10:05

## 2022-02-03 RX ADMIN — MORPHINE SULFATE 1 MG: 2 INJECTION, SOLUTION INTRAMUSCULAR; INTRAVENOUS at 07:14

## 2022-02-03 RX ADMIN — CEFEPIME HYDROCHLORIDE 1 G: 1 INJECTION, POWDER, FOR SOLUTION INTRAMUSCULAR; INTRAVENOUS at 22:34

## 2022-02-03 RX ADMIN — SODIUM BICARBONATE 650 MG: 650 TABLET ORAL at 19:16

## 2022-02-03 RX ADMIN — HYDROMORPHONE HYDROCHLORIDE 1 MG: 1 INJECTION, SOLUTION INTRAMUSCULAR; INTRAVENOUS; SUBCUTANEOUS at 15:59

## 2022-02-03 RX ADMIN — SODIUM CHLORIDE, PRESERVATIVE FREE 10 ML: 5 INJECTION INTRAVENOUS at 15:59

## 2022-02-03 RX ADMIN — HYDROMORPHONE HYDROCHLORIDE 1 MG: 1 INJECTION, SOLUTION INTRAMUSCULAR; INTRAVENOUS; SUBCUTANEOUS at 22:36

## 2022-02-03 RX ADMIN — ONDANSETRON HYDROCHLORIDE 4 MG: 2 SOLUTION INTRAMUSCULAR; INTRAVENOUS at 09:59

## 2022-02-03 RX ADMIN — TAMSULOSIN HYDROCHLORIDE 0.4 MG: 0.4 CAPSULE ORAL at 10:03

## 2022-02-03 RX ADMIN — MIRTAZAPINE 15 MG: 15 TABLET ORAL at 22:41

## 2022-02-03 RX ADMIN — METOPROLOL TARTRATE 12.5 MG: 25 TABLET, FILM COATED ORAL at 19:16

## 2022-02-03 RX ADMIN — FOLIC ACID 1 MG: 1 TABLET ORAL at 10:03

## 2022-02-03 RX ADMIN — SODIUM CHLORIDE 75 ML/HR: 9 INJECTION, SOLUTION INTRAVENOUS at 22:42

## 2022-02-03 RX ADMIN — ACETAMINOPHEN 650 MG: 325 TABLET ORAL at 14:39

## 2022-02-03 RX ADMIN — FINASTERIDE 5 MG: 5 TABLET, FILM COATED ORAL at 10:03

## 2022-02-03 NOTE — PROGRESS NOTES
OCCUPATIONAL THERAPY EVALUATION/DISCHARGE  Patient: Samm Connolly (54 y.o. male)  Date: 2/3/2022  Primary Diagnosis: ARF (acute renal failure) ([facility] [address] [N17.9]       Precautions:   Fall    ASSESSMENT  Based on the objective data described below, the patient presents with generalized weakness and decreased higher level balance s/p admission for ARF, abdominal pain and B hydronephrosis. Pt agreeable to therapy assessment, demonstrating mod I for bed mobility and SBA for functional mobility iwhtout use of DME. Pt with functional rech to distal LEs to remove EKG leads off legs. VSS post activity despite complaints of dizziness during prolonged upright activity. Do not anticipate pt requiring additional OT at discharge. Will sign off at this time. Current Level of Function (ADLs/self-care): supervision to SBA    Other factors to consider for discharge: from home, lives alone     PLAN :  Recommend with staff: Gaston Poole to chair TID, bathroom ADLs    Recommendation for discharge: (in order for the patient to meet his/her long term goals)  No skilled occupational therapy/ follow up rehabilitation needs identified at this time. This discharge recommendation:  Has not yet been discussed the attending provider and/or case management    IF patient discharges home will need the following DME: none       SUBJECTIVE:   Patient stated I feel dizzy.     OBJECTIVE DATA SUMMARY:   HISTORY:   Past Medical History:   Diagnosis Date    Cancer [facility])     rectal    Gastrointestinal disorder     Gastric Ulcers; Rectal CA    Hematuria 9/1/2021    Hypertension      Past Surgical History:   Procedure Laterality Date    HX GI         Prior Level of Function/Environment/Context: pt lives alone, driving, independent with ADLs and mobility without DME. Pt denied falls.    Expanded or extensive additional review of patient history:   Home Situation  Home Environment: Apartment  One/Two Story Residence: Two story  Living Alone: Yes  Support Systems: Other Family Member(s) (sister)  Patient Expects to be Discharged to[de-identified] Home  Current DME Used/Available at Home: Viviane Quintero, rolling,Grab bars    Hand dominance: Right    EXAMINATION OF PERFORMANCE DEFICITS:  Cognitive/Behavioral Status:                      Skin: intact    Edema: none noted    Hearing: Auditory  Auditory Impairment: None    Vision/Perceptual:                           Acuity: Within Defined Limits         Range of Motion:    AROM: Generally decreased, functional                         Strength:    Strength: Generally decreased, functional                Coordination:  Coordination: Generally decreased, functional  Fine Motor Skills-Upper: Left Intact; Right Intact    Gross Motor Skills-Upper: Left Intact; Right Intact    Tone & Sensation:    Tone: Normal  Sensation: Intact                      Balance:  Sitting: Intact; Without support  Standing: Impaired; With support  Standing - Static: Good  Standing - Dynamic : Fair    Functional Mobility and Transfers for ADLs:  Bed Mobility:  Supine to Sit: Modified independent  Scooting: Modified independent    Transfers:  Sit to Stand: Stand-by assistance  Stand to Sit: Stand-by assistance    ADL Assessment:  Feeding: Independent    Oral Facial Hygiene/Grooming: Supervision    Bathing: Supervision    Upper Body Dressing: Modified independent    Lower Body Dressing: Supervision    Toileting: Supervision                ADL Intervention and task modifications:                                Functional Measure:    Barthel Index:  Bathin  Bladder: 0  Bowels: 10  Groomin  Dressing: 10  Feeding: 10  Mobility: 10  Stairs: 0  Toilet Use: 10  Transfer (Bed to Chair and Back): 10  Total: 65/100      The Barthel ADL Index: Guidelines  1. The index should be used as a record of what a patient does, not as a record of what a patient could do.   2. The main aim is to establish degree of independence from any help, physical or verbal, however minor and for whatever reason. 3. The need for supervision renders the patient not independent. 4. A patient's performance should be established using the best available evidence. Asking the patient, friends/relatives and nurses are the usual sources, but direct observation and common sense are also important. However direct testing is not needed. 5. Usually the patient's performance over the preceding 24-48 hours is important, but occasionally longer periods will be relevant. 6. Middle categories imply that the patient supplies over 50 per cent of the effort. 7. Use of aids to be independent is allowed. Score Interpretation (from [address] [facility])    Independent   60-79 Minimally independent   40-59 Partially dependent   20-39 Very dependent   <20 Totally dependent     -Daphnie Guadalupe., Barthel, DPaoloW. (1965). Functional evaluation: the Barthel Index. [facility] ([address] (1997). The Barthel activities of daily living index: self-reporting versus actual performance in the old (> or = 75 years). Journal [address] 45(7), [facility], Paolo.., [facility]., [facility]. (1999). Measuring the change in disability after inpatient rehabilitation; comparison of the responsiveness of the Barthel Index and Functional [place] Measure. Journal of Neurology, Neurosurgery, and Psychiatry, 66(4), 935-687. Tutu Barillas, [place].LORI, CARLENE Veliz, & Cecile Colmenares M.A. (2004) Assessment of post-stroke quality of life in cost-effectiveness studies: The usefulness of the Barthel Index and the EuroQoL-5D.  Quality of Life Research, 15, 144-88         Occupational Therapy Evaluation Charge Determination   History Examination Decision-Making   LOW Complexity : Brief history review  LOW Complexity : 1-3 performance deficits relating to physical, cognitive , or psychosocial skils that result in activity limitations and / or participation restrictions  LOW Complexity : No comorbidities that affect functional and no verbal or physical assistance needed to complete eval tasks       Based on the above components, the patient evaluation is determined to be of the following complexity level: LOW   Pain Rating:  Denied abdominal pain at time of assessment    Activity Tolerance: WNL    After treatment patient left in no apparent distress:    Sitting in chair and Call bell within reach    COMMUNICATION/EDUCATION:   The patients plan of care was discussed with: Physical therapist and Registered nurse.      Thank you for this referral.  Tejal Coleman OT  Time Calculation: 15 mins

## 2022-02-03 NOTE — PROGRESS NOTES
0800 Bedside shift change report given to   [facility] Rn(oncoming nurse) by oGlden Aleman (offgoing nurse).  Report included the following information SBAR, Kardex, ED Summary, OR Summary, Procedure Summary, Intake/Output, MAR, Accordion, Recent Results, Med Rec Status, and Cardiac Rhythm  SR

## 2022-02-03 NOTE — CONSULTS
[address]    Name:  Ruben Hyde  MR#:  [id number]  :  1965  ACCOUNT #:  [de-identified]  DATE OF SERVICE:  2022    IN-HOSPITAL NEPHROLOGY CONSULTATION    REFERRING PHYSICIAN:  Mireille James MD    REASON FOR CONSULTATION:  Acute kidney injury. HISTORY OF PRESENT ILLNESS:  The patient is a 17-year-old black man who has extensive past medical history including BPH, alcohol abuse, chronic pancreatitis, rectal adenocarcinoma, status post neoadjuvant chemoradiation and ileostomy. The patient presented to the emergency room with complaints of abdominal pain. He had a similar presentation on . The patient was diagnosed with acute cystitis and discharged with Keflex at that time with recommendation for Urology followup. He returned to [facility] with complaints of worsening symptoms. He has not had a followup with Urology and stated that his appointment was on the date of his ER visit. The patient currently denies any chills or fever. He was found to have hydronephrosis and hydroureter and diffuse bladder wall thickening. He was given Toradol, saline, Zofran, Levaquin, and he had a placement of a Marrero catheter. The patient is not aware of having abnormal renal function, although in the beginning his creatinine was above 4, now it is down to 3.2. The patient is a somewhat poor historian. ALLERGIES:  NOT KNOWN MEDICAL ALLERGIES. PAST MEDICAL HISTORY:  As outlined in history of present illness. PAST SURGICAL HISTORY:  As outlined in history of present illness. MEDICATIONS:  List consists of:  1. Keflex. 2.  Flomax. 3.  Multivitamins. 4.  Proscar. 5.  Remeron. 6.  Pepcid. 7.  Lopressor. FAMILY HISTORY:  Brother with cancer. SOCIAL HISTORY:  The patient lives independently. He denies smoking, but he has history of heavy alcohol abuse. He is a [employer].     REVIEW OF SYSTEMS:  It was difficult to obtain complete review of systems, the patient is an unwilling historian. PHYSICAL EXAMINATION:  GENERAL:  A middle-aged black man who looks older than his stated age. The patient is sitting up in a chair. He does not appear to be in acute distress. The patient is thin and malnourished. VITAL SIGNS:  Blood pressure is 152/85, heart rate is 107, temperature is 98. 6. HEENT:  Head is normocephalic. Eyes with anicteric sclera. NECK:  Supple with no increased JVP, carotid bruit, or thyromegaly. LUNGS:  Clear to auscultation. HEART:  With S1 and S2 with regular rate and rhythm. ABDOMEN:  Soft, diffusely tender. Bowel sounds are present. EXTREMITIES:  With no edema. Pulses are present and symmetrical.  SKIN:  With no rashes. NEUROLOGIC:  Exam is nonfocal.    LABORATORY DATA:  Today sodium is 134, potassium is 4.5, CO2 is 18, BUN is 29, creatinine is down to 3.2. Hemoglobin is 9.9, white blood count is 15.9. IMPRESSION:  1. Acute kidney injury, likely multifactorial.  As positive culprits, the following risk factors are noted:  - Postobstructive uropathy. - Use of Toradol and nonsteroidal anti-inflammatory medications. - Prerenal azotemia. The patient's renal function is already improving. 2.  Metabolic acidosis from acute kidney injury. 3.  Mild anemia. 4.  Benign prostatic hyperplasia with obstruction. RECOMMENDATIONS:  1. Continue monitoring renal function, the patient's GFR is already improving. We will follow up recommendations from Urology. 2.  Start sodium bicarbonate to address acidosis. 3.  Assess iron profile and start supplementation if necessary. 4.  Avoid nephrotoxic agents when possible. Thank you very much for the opportunity to be part of this patient's care. Our service will monitor and follow up with you closely.       Charles Cota MD      [id number]  D:  02/03/2022 14:04  T:  02/03/2022 17:17  JOB #:  [id number]  CC:  Ok Lovett MD

## 2022-02-03 NOTE — PROGRESS NOTES
Problem: Mobility Impaired (Adult and Pediatric)  Goal: *Acute Goals and Plan of Care (Insert Text)  Description: FUNCTIONAL STATUS PRIOR TO ADMISSION: Patient was independent and active without use of DME.    HOME SUPPORT PRIOR TO ADMISSION: The patient lived alone with sister as support when needed    Physical Therapy Goals  Initiated 2/3/2022  1. Patient will move from supine to sit and sit to supine, scoot up and down, and roll side to side in bed with modified independence within 7 day(s). 2.  Patient will transfer from bed to chair and chair to bed with modified independence using the least restrictive device within 7 day(s). 3.  Patient will perform sit to stand with modified independence within 7 day(s). 4.  Patient will ambulate with modified independence for 150 feet with the least restrictive device within 7 day(s). Outcome: Progressing Towards Goal   PHYSICAL THERAPY EVALUATION  Patient: Scott Mackenzie (58 y.o. male)  Date: 2/3/2022  [facility] [N17.9]        Precautions:   Fall      ASSESSMENT  Based on the objective data described below, the patient presents with impaired balance, generalized weakness, decreased endurance, and overall decline from baseline following admission for acute renal failure and abdominal pain. Received supine in bed, agreeable to therapy after eating lunch. He was able to participate in bed mobility, gait training, and transfers. Overall ambulated with a wide stance and high guard. Took 2 standing rest breaks due to dizziness although VSS after return to room and sitting. Left seated up in the chair. Anticipate he will be safe for return home with HHPT vs no follow up pending progress with acute PT. Current Level of Function Impacting Discharge (mobility/balance): CGA (very slight min A during abrupt stop due to dizziness)    Functional Outcome Measure:   The patient scored Total Score: 18/28 on the Tinetti outcome measure which is indicative of high fall risk. Other factors to consider for discharge: lives alone     Patient will benefit from skilled therapy intervention to address the above noted impairments. PLAN :  Recommendations and Planned Interventions: bed mobility training, transfer training, gait training, therapeutic exercises, neuromuscular re-education, patient and family training/education and therapeutic activities      Frequency/Duration: Patient will be followed by physical therapy:  3 times a week to address goals. Recommendation for discharge: (in order for the patient to meet his/her long term goals)  To be determined: HHPT vs none    This discharge recommendation:  Has not yet been discussed the attending provider and/or case management    IF patient discharges home will need the following DME: to be determined (TBD)         SUBJECTIVE:   Patient stated I'm dizzy.     OBJECTIVE DATA SUMMARY:   HISTORY:    Past Medical History:   Diagnosis Date    Cancer [facility])     rectal    Gastrointestinal disorder     Gastric Ulcers; Rectal CA    Hematuria 9/1/2021    Hypertension      Past Surgical History:   Procedure Laterality Date    HX GI         Personal factors and/or comorbidities impacting plan of care: PMH    Home Situation  Home Environment: Apartment  One/Two Story Residence: Two story  Living Alone: Yes  Support Systems: Other Family Member(s) (sister)  Patient Expects to be Discharged to[de-identified] Home  Current DME Used/Available at Home: [employer], [employer]    EXAMINATION/PRESENTATION/DECISION MAKING:   Hearing:   Auditory  Auditory Impairment: None  Range Of Motion:  AROM: Generally decreased, functional  Strength:    Strength: Generally decreased, functional  Tone & Sensation:   Tone: Normal  Sensation: Intact  Coordination:  Coordination: Generally decreased, functional  Functional Mobility:  Bed Mobility:  Supine to Sit: Modified independent  Scooting: Modified independent  Transfers:  Sit to Stand: Stand-by assistance  Stand to Sit: Stand-by assistance  Balance:   Sitting: Intact; Without support  Standing: Impaired; With support  Standing - Static: Good  Standing - Dynamic : Fair  Ambulation/Gait Training:  Distance (ft): 130 Feet (ft)  Ambulation - Level of Assistance: Contact guard assistance;Minimal assistance  Gait Abnormalities: Decreased step clearance; Path deviations  Base of Support: Widened  Speed/Denise: Pace decreased (<100 feet/min); Shuffled  Step Length: Right shortened;Left shortened    Functional Measure:  Tinetti test:    Sitting Balance: 1  Arises: 1  Attempts to Rise: 2  Immediate Standing Balance: 2  Standing Balance: 1  Nudged: 1  Eyes Closed: 0  Turn 360 Degrees - Continuous/Discontinuous: 0  Turn 360 Degrees - Steady/Unsteady: 0  Sitting Down: 1  Balance Score: 9 Balance total score  Indication of Gait: 1  R Step Length/Height: 1  L Step Length/Height: 1  R Foot Clearance: 1  L Foot Clearance: 1  Step Symmetry: 1  Step Continuity: 1  Path: 1  Trunk: 1  Walking Time: 0  Gait Score: 9 Gait total score  Total Score: 18/28 Overall total score         Tinetti Tool Score Risk of Falls  <19 = High Fall Risk  19-24 = Moderate Fall Risk  25-28 = Low Fall Risk  Tinetti ME. Performance-Oriented Assessment of Mobility Problems in Elderly Patients. Millan 66; [id number].  (Scoring Description: PT Bulletin Feb. 10, 1993)    Older adults: Pedro Tayolr et al, 2009; n = 1000 [facility] elderly evaluated with ABC, BRANDI, ADL, and IADL)  · Mean BRANDI score for males aged 69-68 years = 26.21(3.40)  · Mean BRANDI score for females age 69-68 years = 25.16(4.30)  · Mean BRANDI score for males over 80 years = 23.29(6.02)  · Mean BRANDI score for females over 80 years = 17.20(8.32)        Physical Therapy Evaluation Charge Determination   History Examination Presentation Decision-Making   HIGH Complexity :3+ comorbidities / personal factors will impact the outcome/ POC  HIGH Complexity : 4+ Standardized tests and measures addressing body structure, function, activity limitation and / or participation in recreation  LOW Complexity : Stable, uncomplicated  Other outcome measures Tinetti 18/28  HIGH       Based on the above components, the patient evaluation is determined to be of the following complexity level: LOW     Pain Rating:  Improved from this AM    Activity Tolerance:   Good, SpO2 stable on RA and requires rest breaks      After treatment patient left in no apparent distress:   Sitting in chair, Heels elevated for pressure relief and Call bell within reach    COMMUNICATION/EDUCATION:   The patients plan of care was discussed with: Occupational therapist and Registered nurse. Fall prevention education was provided and the patient/caregiver indicated understanding., Patient/family have participated as able in goal setting and plan of care. and Patient/family agree to work toward stated goals and plan of care.     Thank you for this referral.  Shakira Wills, PT, DPT   Time Calculation: 13 mins

## 2022-02-03 NOTE — PROGRESS NOTES
Day #1 of Cefepime  Indication:  UTI  Current regimen:  2G IV q12h  Abx regimen: Cefepime  Recent Labs     22  1531   WBC  --  15.4*   CREA 4.32* 4.83*   BUN 34* 34*     Est CrCl: 12.9 ml/min; UO: -- ml/kg/hr  Temp (24hrs), Av °F (36.7 °C), Min:98 °F (36.7 °C), Max:98 °F (36.7 °C)    Cultures: --    Plan: Change to 1G IV q24h

## 2022-02-03 NOTE — PROGRESS NOTES
1048hrJorge Hodge Urology NP at bedside. Plan for patient to re-image in a few days and ultimately plan to discharge home with mondragon to follow up outpatient. [phone] Vianca Solo for patient to eat--provider to add orders for diet and pain management.

## 2022-02-03 NOTE — PROGRESS NOTES
Physical Therapy  2/3/2022    Order received, chart reviewed. Patient currently NPO and c/o weakness and feeling hungry. Also c/o 10/10 abdominal pain therefore he deferred mobilizing for PT evaluation at this time. Will  F/u for evaluation. Thank you.     Shila Connolly, PT, DPT

## 2022-02-03 NOTE — PROGRESS NOTES
Comprehensive Nutrition Assessment    Type and Reason for Visit: Initial,Positive nutrition screen (Low BMI)    Nutrition Recommendations/Plan:      1. Recommend advancing diet order to Regular as medically appropriate. 2. RD will order chocolate Glucerna BID when diet advances. 3. Recommend ordering MVI along with thiamine and folic acid for possible ETOH withdrawal.      Nutrition Assessment:    61 yo male admitted for ARF, UTI. PMhx: rectal CA s/p chemo and ileostomy, ETOH abuse, chronic pancreatitis, HTN. CT of ABD and pelvis showed chronic partial SBO, new left hydronephrosis and worsening right hydronephrosis. Drug screen on admission shows positive for cocaine. Underweight per BMI. Weight hx in EMR indicates weight loss of 11% over last 5 months - this is significant for time frame. Pt confirms weight loss. States he has always been a small eater but his appetite has significantly decreased over last 2 weeks to the point where he was only eating 2 chips at a time, a few bites total throughout the day. Denied any GI complaints, just had not desire to eat. Currently NPO. States he likes Ensure shakes, requesting chocolate flavor. Due to shortage of chocolate Ensure here, will order Glucerna shakes. States he is having some work done to his dentures but denies difficulty chewing most foods. BUN/Cr elevated. Malnutrition Assessment:  Malnutrition Status:  Severe malnutrition    Context:  Chronic illness     Findings of the 6 clinical characteristics of malnutrition:   Energy Intake:  7 - 75% or less est energy requirements for 1 month or longer  Weight Loss:  7.00 - Greater than 10% over 6 months     Body Fat Loss:  7 - Severe body fat loss, Triceps   Muscle Mass Loss:  7 - Severe muscle mass loss, Clavicles (pectoralis &deltoids)  Fluid Accumulation:  No significant fluid accumulation,     Strength:  Not performed         Nutritionally Significant Medications: Remeron;  NaCl at 75ml/hr    Estimated Daily Nutrient Needs:  Energy (kcal): 5564-2345 (35-40 kcals/kg); Weight Used for Energy Requirements: Current  Protein (g): 74-83 (1.5-1.7 gm/kg); Weight Used for Protein Requirements: Current  Fluid (ml/day): 5901-5525; Method Used for Fluid Requirements: 1 ml/kcal    Nutrition Related Findings:       BM: 1/31  Edema: none  Wounds:  None       Current Nutrition Therapies:   Diet: NPO  Supplements: none currently ordered  Additional Caloric Sources: none    Meal intake: Patient Vitals for the past 168 hrs:   % Diet Eaten   02/03/22 0952 0%   02/03/22 0910 0%     Supplement Intake: No data found.     Anthropometric Measures:  · Height:  5' 8\" (172.7 cm)  · Current Body Wt:  48.8 kg (107 lb 9.4 oz)   · Admission Body Wt:       · Usual Body Wt:        · Ideal Body Wt:  154:  69.9 %   · Adjusted Body Weight:   ; Weight Adjustment for: No adjustment   · Adjusted BMI:       · BMI Categories:  Underweight (BMI less than 18.5)     Wt Readings from Last 10 Encounters:   02/02/22 48.8 kg (107 lb 9.4 oz)   02/02/22 48.5 kg (107 lb)   01/26/22 48.1 kg (106 lb)   09/06/21 54.3 kg (119 lb 12.8 oz)   09/02/21 55.1 kg (121 lb 8 oz)   06/14/21 54.9 kg (121 lb)   05/20/21 53.7 kg (118 lb 4.8 oz)   02/04/21 55.8 kg (123 lb)   01/25/21 55.5 kg (122 lb 6.4 oz)   12/20/20 52.6 kg (116 lb)       Nutrition Diagnosis:   · Severe malnutrition related to inadequate protein-energy intake as evidenced by weight loss greater than or equal to 10% in 6 months,NPO or clear liquid status due to medical condition,severe muscle loss,severe loss of subcutaneous fat      Nutrition Interventions:   Food and/or Nutrient Delivery: Start oral diet,Start oral nutrition supplement  Nutrition Education and Counseling: No recommendations at this time  Coordination of Nutrition Care: Continue to monitor while inpatient    Goals:  Advance PO diet order to Regular with PO intakes > 50% meals + 2 ONS each day within next 5-7 days Nutrition Monitoring and Evaluation:   Behavioral-Environmental Outcomes: None identified  Food/Nutrient Intake Outcomes: Diet advancement/tolerance,Food and nutrient intake,Supplement intake  Physical Signs/Symptoms Outcomes: Biochemical data,GI status,Weight    Discharge Planning:     Too soon to determine     Emery Almeida RD  Contact via [employer]

## 2022-02-03 NOTE — CONSULTS
Urology Consult    Patient: Scott Mackenzie MRN: [id number]  SSN: [id number]    YOB: 1965  Age: 60 y.o. Sex: male          Date of Consultation:  February 3, 2022  Requesting Physician: Glenn Gonzalez, *  Reason for Consultation:  Evangelist Hydronephrosis         History of Present Illness:  Scott Mackenzie is a 60 y.o. male admitted 2/2/2022 to the hospital for ARF (acute renal failure) ([facility] [address] [N17.9]. He is a 60 y.o. male with history of BPH, alcohol abuse, chronic pancreatitis, history of rectal adenocarcinoma status post neoadjuvant chemoradiation and ileostomy, hypertension who presented to ER with complaints of abdominal pain. Chart review shows patient had presented to hospital on January 26 for similar concerns, who was diagnosed with acute cystitis and discharged with Keflex and recommendations for urology follow-up. He returned to [facility] with complaints of worsening symptoms. Had not follow-up with urology and stated his appointment was on day he returned to ER. The patient currently denies any fever, chills, chest pain, nausea, vomiting, cough, congestion, recent illness, palpitations, or dysuria.     Remarkable vitals on ER Presentations: Vital signs stable  Labs Remarkable for: Sodium 132, creatinine 4.83, albumin 2.1, WBC 15.4, platelets 680, hemoglobin 9.9, urinalysis with 2+ bacteria, small blood, large leukocyte esterase  ER Images: Noncontrast CT of abdomen pelvis showed chronic partial small bowel obstruction, new left-sided hydronephrosis and hydroureter as well as worsening chronic right-sided hydronephrosis and hydroureter and diffuse bladder wall thickening. . Urology has been consulted for evangelist hydronephrosis . Wbc 15.4 ( 20.0)   Cr 3.29 ( 4.32)  Uop 350 ? 1800 ml currently in bag   CT showed  . Right-sided hydronephrosis and diffuse right hydroureter has worsened.   Left-sided hydronephrosis and diffuse left hydroureter has developed since the  prior study. 2. Persistent diffuse bladder wall thickening. Patient known to [facility] followed by Dr. Brandan Walters for BPH elevated PSA , microscopic hematuria with negative work-up. Patient admitted and noted to have bilateral hydronephrosis which is a new finding compared to CT on 1/26/2022. Pt states dina back pain unrelieved by pain medication . he denies difficulty voiding but states flow has decreased . Explained need for mondragon to decompress bladder and relief renal congestion . Explained to pt will need mondragon till renal function improves and he follow up with Dr. Cari Tracy . Pt seemed to understand treatment plan and  Willing to participate . OFFICe VISIT 10/21/21  marck Smith is a 60year old male who presents today for \"CT results\". He returns for follow-up. Patient here for completion of his hematuria work-up. In the interim he has undergone a CT scan IVP. He also had a cytology sent last visit which returned normal.  He has had no more episodes of hematuria feels like he is emptying well. He does have a history of radiation for rectal cancer. There was some question about some hydronephrosis on some imaging obtained in the hospital.  During his hospitalization he did have a catheter placed that was pulled out by him which initiated his hematuria while inpatient. He did report that he potentially had some hematuria prior to this hospitalization. He was emptying well at last visit. PAST MEDICAL HISTORY:    Allergies: No known allergies. DENIES: Latex, Shellfish, X-Ray Dye, Iodine.      Medications: folic acid 1 mg tablet (folic acid)   metoprolol tartrate 25 mg tablet (metoprolol tartrate)   finasteride 5 mg tablet (finasteride)   oxycodone-acetaminophen 5-325 mg tablet (oxycodone-acetaminophen)   Flomax 0.4 mg capsule (tamsulosin) 1 capsule by mouth every night   sildenafil (pulm.hypertension) 20 mg tablet (sildenafil (pulm.hypertension)) 5 tablet by mouth as needed     Problems: Gross hematuria (ICD-599.71) (BDW85-K80.0)  Elevated prostate specific antigen [PSA] (XUP12-N16.25)  607.84 Erectile dysfunction, organic (WUL-391.21) (VEG70-Z99.9)  Other microscopic hematuria (DUS47-V88.29)  Urgency (DIR-452.82) (DQR77-H35.15)  Bladder mass (YDT-072.68) (VLG22-Y18.71)    Illnesses: Cancer. DENIES: Heart Disease, Pacemaker/Defibrillator, Lung Disease, Diabetes, High Blood Pressure, Bowel Problems, Stroke/Seizure, Kidney Problems, Bleeding Problems, HIV, or Hepatitis. Surgeries: Other Abdominal Surgery. Family History: Kidney Disease and Kidney Stones. DENIES: Prostate cancer, Kidney cancer. Social History: Unemployed. . Smoking status: current every day smoker. Drinks alcohol 2 to 3 times a week. System Review: Admits to: Difficulty Walking, Lower Extremity Weakness, and Unexplained Weight Loss. DENIES: Dry Eyes, Dry Mouth, Leg Swelling, Shortness of Breath, Constipation, Involuntary Urine Loss, Dry Skin, Psychiatric Problems, Impaired Sex Drive, Easy Bleeding, Rash. EXAMINATION: Appearance: well-developed NAD Respiratory Effort: breathing easily Lower Extremity: no edema Abdomen/Flank: soft non-tender without masses; no CVA tenderness Liver/Spleen: no organomegaly Hernia: no ventral hernia     DIAGNOSTIC STUDIES:  I personally reviewed his CT scan. Did have some abnormalities in the small and large bowel. The radiologist also reported some runs of pancreatitis. I did not see any abnormalities in the  system. This bladder looked normal as well as his kidneys. No stones or masses    URINALYSIS  Urine Micro not done    CYSTOSCOPY: I performed office cystoscopy today with a flexible cystoscope. He was prepped and draped in standard fashion. We used some numbing lidocaine jelly. I then advanced the scope into his penis. He had a normal penile urethra. No significant abnormalities posterior urethra.   He did have some lateral lobe hypertrophy of his prostate. No significant median lobe. His bladder appeared normal with no masses or signs of bleeding. He had bilateral ureteral orifices in anatomic position. There were effluxing clear urine. There was a slight abnormality in the mucosa that is likely related to his prior radiation however there was nothing suspicious for a cancer. I did perform retroflexion and there was no concerning lesion at the bladder neck. IMPRESSION:    1. GROSS HEMATURIA (CJB45-F48.0): His work-up was completed today and was negative. His cytology from last visit was negative. I had a long discussion with him about his CT findings related to his small and large bowel signs of enteritis. As well as the signs of pancreatitis. He is a drinker and we discussed cessation of that in a safe manner. Currently he is emptying his bladder not having any urinary tract infections or signs of hematuria. However there is a question of some hydronephrosis on some imaging from his hospitalization. Given his history of radiation I would like to obtain upper tract imaging in a few months just to ensure that it they are still draining well. And that he has not developed a hostile bladder. Upper tract imaging at that time is normal we could see him as needed. cc: Orquidea Anguiano MD  Today's Services  Cysto, E&M Service    Upcoming Orders  Return Office Visit - with Hermelinda Oliver MD in 6 months  , US Renal      Past Medical History:   Diagnosis Date    Cancer [facility])     rectal    Gastrointestinal disorder     Gastric Ulcers; Rectal CA    Hematuria 9/1/2021    Hypertension         Past Surgical History:   Procedure Laterality Date    HX GI         No Known Allergies     Prior to Admission medications    Medication Sig Start Date End Date Taking? Authorizing Provider   cephALEXin (Keflex) 500 mg capsule Take 1 Capsule by mouth two (2) times a day for 7 days.  1/26/22 2/2/22  Jade Ko PA-C tamsulosin (FLOMAX) 0.4 mg capsule Take 1 Capsule by mouth daily. 21   Scarlet Marquez NP   thiamine mononitrate (B-1) 100 mg tablet Take 1 Tablet by mouth daily. 21   Scarlet Marquez NP   folic acid (FOLVITE) 1 mg tablet Take 1 Tablet by mouth daily. 21   Scarlet Marquez NP   finasteride (PROSCAR) 5 mg tablet Take 5 mg by mouth daily. Provider, Historical   mirtazapine (REMERON) 15 mg tablet Take 15 mg by mouth nightly. Provider, Historical   famotidine (Pepcid) 20 mg tablet Take 20 mg by mouth nightly as needed for Heartburn. Provider, Historical   metoprolol tartrate (LOPRESSOR) 25 mg tablet Take 12.5 mg by mouth two (2) times a day. Provider, Historical       Social History     Tobacco Use    Smoking status: Current Every Day Smoker     Packs/day: 0.25    Smokeless tobacco: Never Used   Substance Use Topics    Alcohol use: Yes     Comment: 2-3 beers daily        Family History   Problem Relation Age of Onset    Cancer Brother         ROS:  Admission ROS by Vera Zapien MD from 2022 was reviewed and changes (other than per HPI) include: none. Physical Exam:    Vital Signs:  Temp (24hrs), Av.9 °F (36.6 °C), Min:97.6 °F (36.4 °C), Max:98.2 °F (36.8 °C)   Blood pressure 126/73, pulse 88, temperature 98.2 °F (36.8 °C), resp. rate 16, weight 48.8 kg (107 lb 9.4 oz), SpO2 100 %. I&O's:  No intake/output data recorded. Appearance: well-developed NAD , pleasant  Conjunctiva/Lids: conjunctivae and lids normal   Pupil/Iris: pupils equal, round   External Ears/Nose: normal no lesions or deformities   Hearing: grossly intact   Neck: supple   Thyroid: no palpable enlargement   Respiratory Effort: breathing easily   Abd.  Aorta: no palpable enlargement   Lower Extremity: no edema   Abdomen/Flank: soft non-tender without masses; no CVA tenderness , dina flank pain   Liver/Spleen: no organomegaly   Hernia: no ventral hernia   Lymph: no adenopathy   Gait/Station: deferred  Digits/Nails: no clubbing cyanosis petechiae   Skin Inspection: warm and dry   Skin Palpation: no SQ nodularity   Sensation: no sensory deficits   Judgment/Insight: intact   Mood/Affect: normal      Lab Review:     CBC   Recent Labs     02/02/22  1531   WBC 15.4*   HGB 9.9*   HCT 28.6*   *     CMP   Recent Labs     02/03/22  0221 02/02/22 2039 02/02/22  1531   * 133* 132*   K 4.5 4.5 4.6   * 108 101   CO2 18* 18* 18*   * 121* 106*   BUN 29* 34* 34*   CREA 3.28* 4.32* 4.83*   CA 8.8 8.3* 8.9   ALB  --   --  2.1*   TBILI  --   --  0.8   ALT  --   --  42       Other No results for input(s): INR, PSA, INREXT in the last 72 hours. No lab exists for component: PTT    UA:   Lab Results   Component Value Date/Time    Color YELLOW/STRAW 02/02/2022 06:24 PM    Appearance CLOUDY (A) 02/02/2022 06:24 PM    Specific gravity 1.020 02/02/2022 06:24 PM    Specific gravity 1.010 09/03/2021 10:01 PM    pH (UA) 5.5 02/02/2022 06:24 PM    Protein Negative 02/02/2022 06:24 PM    Glucose Negative 02/02/2022 06:24 PM    Ketone Negative 02/02/2022 06:24 PM    Bilirubin Negative 01/26/2022 09:39 AM    Urobilinogen 0.2 02/02/2022 06:24 PM    Nitrites Negative 02/02/2022 06:24 PM    Leukocyte Esterase LARGE (A) 02/02/2022 06:24 PM    Epithelial cells FEW 02/02/2022 06:24 PM    Bacteria 2+ (A) 02/02/2022 06:24 PM    WBC  02/02/2022 06:24 PM    RBC 5-10 02/02/2022 06:24 PM       Cultures:  Ucx pending     Imaging:      CT: Results for orders placed during the hospital encounter of 02/02/22    CT ABD PELV WO CONT    Narrative  EXAM: CT ABD PELV WO CONT    INDICATION: abd pain with hx of cancer and kidney stone    COMPARISON: CT January 26, 2022    IV CONTRAST: None. ORAL CONTRAST: Not given    TECHNIQUE:  Thin axial images were obtained through the abdomen and pelvis. Coronal and  sagittal reformats were generated.  CT dose reduction was achieved through use of  a standardized protocol tailored for this examination and automatic exposure  control for dose modulation. The absence of intravenous contrast material reduces the sensitivity for  evaluation of the vasculature and solid organs. FINDINGS:  LOWER THORAX: Coronary artery calcifications are noted  LIVER: No mass. BILIARY TREE: Status post cholecystectomy. CBD is not dilated. SPLEEN: within normal limits. PANCREAS: Sequela of chronic pancreatitis, with cystic lesion in the tail, not  significantly changed. ADRENALS: Unremarkable. KIDNEYS/URETERS: Bilateral hydronephrosis and diffuse bilateral hydroureter,  which has worsened since the prior study on the right, and developed since the  prior study on the left. STOMACH: Unremarkable. SMALL BOWEL: Generalized small bowel dilatation is chronic in the right mid  abdomen, up to 5 cm, adjacent to surgical clips, concerning for a chronic  partial obstruction. COLON: No dilatation or wall thickening. APPENDIX: Not visualized  PERITONEUM: No ascites or pneumoperitoneum. RETROPERITONEUM: No lymphadenopathy or aortic aneurysm. REPRODUCTIVE ORGANS: Prostate partially visualized  URINARY BLADDER: Persistent diffuse bladder wall thickening  BONES: No destructive bone lesion. ABDOMINAL WALL: No mass or hernia. ADDITIONAL COMMENTS: N/A    Impression  1. Right-sided hydronephrosis and diffuse right hydroureter has worsened. Left-sided hydronephrosis and diffuse left hydroureter has developed since the  prior study. 2. Persistent diffuse bladder wall thickening. 3. As stated previously, this may reflect sequela of radiation therapy or UTI,  but underlying neoplastic lesion cannot be excluded. 4. Chronic partial small bowel obstruction. Assessment:     Active Problems:    ARF (acute renal failure) ([facility] [address] (2/2/2022)          Plan:     1. Evangelist hydronephrosis in the setting of UTI - cont mondragon , re image on Saturday for resolution . Pt will be DC home with mondragon until seen by VU .  No urgent surgical intervention at this time . 2. UTI - follow culture then cx driven Abx   3 ; hx of radiation for rectal cancer     Office will arrange follow up   Signed By: Milena Higgins.  Clara Moura NP  - February 3, 2022

## 2022-02-03 NOTE — PROGRESS NOTES
Spiritual Care Assessment/Progress Note  [facility]      NAME: Gallito Parry      MRN: [id number]  AGE: 60 y.o. SEX: male  Faith Affiliation: Islam   Language: English     2/3/2022     Total Time (in minutes): 22     Spiritual Assessment begun in [facility] [place] [facility] CV SERVICES UNIT through conversation with:         [x]Patient        [] Family    [] Friend(s)        Reason for Consult: Initial/Spiritual assessment, patient floor     Spiritual beliefs: (Please include comment if needed)     [x] Identifies with a yessy tradition:  Islam       [] Supported by a yessy community:            [] Claims no spiritual orientation:           [] Seeking spiritual identity:                [] Adheres to an individual form of spirituality:           [] Not able to assess:                           Identified resources for coping:      [] Prayer                               [] Music                  [] Guided Imagery     [x] Family/friends                 [] Pet visits     [] Devotional reading                         [] Unknown     [] Other:                                               Interventions offered during this visit: (See comments for more details)    Patient Interventions: Initial/Spiritual assessment, patient floor           Plan of Care:     [x] Support spiritual and/or cultural needs    [] Support AMD and/or advance care planning process      [] Support grieving process   [] Coordinate Rites and/or Rituals    [] Coordination with community clergy   [] No spiritual needs identified at this time   [] Detailed Plan of Care below (See Comments)  [] Make referral to Music Therapy  [] Make referral to Pet Therapy     [] Make referral to Addiction services  [] Make referral to [facility]  [] Make referral to Spiritual Care Partner  [] No future visits requested        [] Contact Spiritual Care for further referrals     Comments: Visit for spiritual assessment in Room 457.   Patient was lying in bed with the light off watching TV alone. He shared that he is  and has 4 children. He is a member of [employer] in [address]. No spiritual needs noted. Provided ministry of presence and empathic listening, explored spiritual or emotional needs. Advised of  availability. Visited by: Lizzie Patten.  Lian Gr, [address] [place] [address] paging Service [phone])

## 2022-02-03 NOTE — PROGRESS NOTES
[facility]  Hospitalist Group                                                                                          Hospitalist Progress Note  Cheri Engel MD  Answering service: [phone] OR [phone] from in house phone        Date of Service:  2/3/2022  NAME:  Reinier Arellano  :  1965  MRN:  [id number]      Admission Summary:   Reinier Arellano is a 60 y.o. male with history of BPH, alcohol abuse, chronic pancreatitis, history of rectal adenocarcinoma status post neoadjuvant chemoradiation and ileostomy, hypertension who presented to ER with complaints of abdominal pain. Chart review shows patient had presented to hospital on  for similar concerns, who was diagnosed with acute cystitis and discharged with Keflex and recommendations for urology follow-up. He returned to [facility] with complaints of worsening symptoms. Had not follow-up with urology and stated his appointment was on day he returned to ER. The patient currently denies any fever, chills, chest pain, nausea, vomiting, cough, congestion, recent illness, palpitations, or dysuria.     Remarkable vitals on ER Presentations: Vital signs stable  Labs Remarkable for: Sodium 132, creatinine 4.83, albumin 2.1, WBC 15.4, platelets 297, hemoglobin 9.9, urinalysis with 2+ bacteria, small blood, large leukocyte esterase  ER Images: Noncontrast CT of abdomen pelvis showed chronic partial small bowel obstruction, new left-sided hydronephrosis and hydroureter as well as worsening chronic right-sided hydronephrosis and hydroureter and diffuse bladder wall thickening. Patient was treated with Toradol, 1 L normal saline bolus, Zofran and Levaquin. Interval history / Subjective:   Patient seen for follow-up of cystitis and bilateral hydronephrosis with hydroureter. Patient seen and examined earlier today by me. Patient reports some pain and is asked for pain medicine.   Patient denies any other symptoms. Marrero catheter is in place. Assessment & Plan:     Acute cystitis /Pyelonephritis  Bilateral hydronephrosis and hydroureter  Acute renal failure  -Admit to monitor on telemetry  -Continue twice daily cefepime  -Follow blood and urine cultures  -Keep n.p.o.  -Marrero catheter  Urology has seen  Marrero catheter continues in place and renal function has improved with Marrero  Continue antibiotics     BPH  -Continue home Flomax and Proscar  Will follow up with urology     Hypertension  -Home metoprolol     Underweight  -Home mirtazapine and multivitamins    Cocaine use disorder    Code status: fulll  DVT prophylaxis: scd    Care Plan discussed with: Patient/Family and Nurse  Anticipated Disposition: Home w/Family  Anticipated Discharge: 24 hours to 48 hours     Hospital Problems  Date Reviewed: 9/1/2021          Codes Class Noted POA    ARF (acute renal failure) ([facility] [address] ICD-10-CM: N17.9  ICD-9-CM: 584.9  2/2/2022 Unknown                Review of Systems:   A comprehensive review of systems was negative except for that written in the HPI. Vital Signs:    Last 24hrs VS reviewed since prior progress note. Most recent are:  Visit Vitals  /75 (BP 1 Location: Left upper arm, BP Patient Position: Sitting)   Pulse 74   Temp 98.9 °F (37.2 °C)   Resp 18   Ht 5' 8\" (1.727 m)   Wt 48.8 kg (107 lb 9.4 oz)   SpO2 100%   BMI 16.36 kg/m²         Intake/Output Summary (Last 24 hours) at 2/3/2022 1522  Last data filed at 2/3/2022 1105  Gross per 24 hour   Intake 970 ml   Output 1575 ml   Net -605 ml        Physical Examination:     I had a face to face encounter with this patient and independently examined them on 2/3/2022 as outlined below:          Constitutional:  No acute distress, cooperative, pleasant. Very thin. ENT:  Oral mucosa moist, oropharynx benign. Resp:  CTA bilaterally. No wheezing/rhonchi/rales.  No accessory muscle use   CV:  Regular rhythm, normal rate, no murmurs, gallops, rubs    GI: Soft, non distended, non tender. normoactive bowel sounds, no hepatosplenomegaly     Musculoskeletal:  No edema, warm, 2+ pulses throughout    Neurologic:  Moves all extremities. AAOx3, CN II-XII reviewed            Data Review:    Review and/or order of clinical lab test  Review and/or order of tests in the radiology section of [facility]  Review and/or order of tests in the medicine section of [facility]      Labs:     Recent Labs     02/02/22  1531   WBC 15.4*   HGB 9.9*   HCT 28.6*   *     Recent Labs     02/03/22  0221 02/02/22 2039 02/02/22  1531   * 133* 132*   K 4.5 4.5 4.6   * 108 101   CO2 18* 18* 18*   BUN 29* 34* 34*   CREA 3.28* 4.32* 4.83*   * 121* 106*   CA 8.8 8.3* 8.9     Recent Labs     02/02/22  1531   ALT 42   *   TBILI 0.8   TP 7.0   ALB 2.1*   GLOB 4.9*     No results for input(s): INR, PTP, APTT, INREXT in the last 72 hours. No results for input(s): FE, TIBC, PSAT, FERR in the last 72 hours. Lab Results   Component Value Date/Time    Folate 22.4 (H) 05/22/2021 03:23 AM      No results for input(s): PH, PCO2, PO2 in the last 72 hours. No results for input(s): CPK, CKNDX, TROIQ in the last 72 hours.     No lab exists for component: CPKMB  Lab Results   Component Value Date/Time    Cholesterol, total 147 01/02/2019 04:00 AM    HDL Cholesterol 66 01/02/2019 04:00 AM    LDL, calculated 65.6 01/02/2019 04:00 AM    Triglyceride 77 01/02/2019 04:00 AM    CHOL/HDL Ratio 2.2 01/02/2019 04:00 AM     No results found for: [facility]  Lab Results   Component Value Date/Time    Color YELLOW/STRAW 02/02/2022 06:24 PM    Appearance CLOUDY (A) 02/02/2022 06:24 PM    Specific gravity 1.020 02/02/2022 06:24 PM    Specific gravity 1.010 09/03/2021 10:01 PM    pH (UA) 5.5 02/02/2022 06:24 PM    Protein Negative 02/02/2022 06:24 PM    Glucose Negative 02/02/2022 06:24 PM    Ketone Negative 02/02/2022 06:24 PM    Bilirubin Negative 01/26/2022 09:39 AM    Urobilinogen 0.2 02/02/2022 06:24 PM Nitrites Negative 02/02/2022 06:24 PM    Leukocyte Esterase LARGE (A) 02/02/2022 06:24 PM    Epithelial cells FEW 02/02/2022 06:24 PM    Bacteria 2+ (A) 02/02/2022 06:24 PM    WBC  02/02/2022 06:24 PM    RBC 5-10 02/02/2022 06:24 PM         Medications Reviewed:     Current Facility-Administered Medications   Medication Dose Route Frequency    HYDROmorphone (DILAUDID) injection 1 mg  1 mg IntraVENous Q4H PRN    sodium bicarbonate tablet 650 mg  650 mg Oral BID    famotidine (PEPCID) tablet 20 mg  20 mg Oral QHS PRN    finasteride (PROSCAR) tablet 5 mg  5 mg Oral DAILY    folic acid (FOLVITE) tablet 1 mg  1 mg Oral DAILY    metoprolol tartrate (LOPRESSOR) tablet 12.5 mg  12.5 mg Oral BID    mirtazapine (REMERON) tablet 15 mg  15 mg Oral QHS    tamsulosin (FLOMAX) capsule 0.4 mg  0.4 mg Oral DAILY    thiamine HCL (B-1) tablet 100 mg  100 mg Oral DAILY    cefepime (MAXIPIME) 1 g in 0.9% sodium chloride 10 mL IV syringe  1 g IntraVENous Q24H    sodium chloride (NS) flush 5-40 mL  5-40 mL IntraVENous Q8H    sodium chloride (NS) flush 5-40 mL  5-40 mL IntraVENous PRN    acetaminophen (TYLENOL) tablet 650 mg  650 mg Oral Q6H PRN    Or    acetaminophen (TYLENOL) suppository 650 mg  650 mg Rectal Q6H PRN    polyethylene glycol (MIRALAX) packet 17 g  17 g Oral DAILY PRN    ondansetron (ZOFRAN ODT) tablet 4 mg  4 mg Oral Q8H PRN    Or    ondansetron (ZOFRAN) injection 4 mg  4 mg IntraVENous Q6H PRN    0.9% sodium chloride infusion  75 mL/hr IntraVENous CONTINUOUS     ______________________________________________________________________  EXPECTED LENGTH OF STAY: 2d 21h  ACTUAL LENGTH OF STAY:          1                 Raina De La Cruz MD

## 2022-02-03 NOTE — H&P
History & Physical    Primary Care Provider: Apryl Spring MD  Source of Information: Patient and chart review    History of Presenting Illness:   Ok Ivory is a 60 y.o. male with history of BPH, alcohol abuse, chronic pancreatitis, history of rectal adenocarcinoma status post neoadjuvant chemoradiation and ileostomy, hypertension who presented to ER with complaints of abdominal pain. Chart review shows patient had presented to hospital on January 26 for similar concerns, who was diagnosed with acute cystitis and discharged with Keflex and recommendations for urology follow-up. He returned to [facility] with complaints of worsening symptoms. Had not follow-up with urology and stated his appointment was on day he returned to ER. The patient currently denies any fever, chills, chest pain, nausea, vomiting, cough, congestion, recent illness, palpitations, or dysuria. Remarkable vitals on ER Presentations: Vital signs stable  Labs Remarkable for: Sodium 132, creatinine 4.83, albumin 2.1, WBC 15.4, platelets 167, hemoglobin 9.9, urinalysis with 2+ bacteria, small blood, large leukocyte esterase  ER Images: Noncontrast CT of abdomen pelvis showed chronic partial small bowel obstruction, new left-sided hydronephrosis and hydroureter as well as worsening chronic right-sided hydronephrosis and hydroureter and diffuse bladder wall thickening. Patient was treated with Toradol, 1 L normal saline bolus, Zofran and Levaquin. Review of Systems:  A comprehensive review of systems was negative except for that written in the History of Present Illness. Past Medical History:   Diagnosis Date    Cancer [facility])     rectal    Gastrointestinal disorder     Gastric Ulcers;  Rectal CA    Hematuria 9/1/2021    Hypertension       Past Surgical History:   Procedure Laterality Date    HX GI       Prior to Admission medications    Medication Sig Start Date End Date Taking? Authorizing Provider   cephALEXin (Keflex) 500 mg capsule Take 1 Capsule by mouth two (2) times a day for 7 days. 1/26/22 2/2/22  Vincent ZURITA PA-C   tamsulosin (FLOMAX) 0.4 mg capsule Take 1 Capsule by mouth daily. 9/9/21   Yessy Breanna, NP   thiamine mononitrate (B-1) 100 mg tablet Take 1 Tablet by mouth daily. 9/9/21   Yessy Sill, NP   folic acid (FOLVITE) 1 mg tablet Take 1 Tablet by mouth daily. 9/9/21   Yessy Junel, NP   finasteride (PROSCAR) 5 mg tablet Take 5 mg by mouth daily. Provider, Historical   mirtazapine (REMERON) 15 mg tablet Take 15 mg by mouth nightly. Provider, Historical   famotidine (Pepcid) 20 mg tablet Take 20 mg by mouth nightly as needed for Heartburn. Provider, Historical   metoprolol tartrate (LOPRESSOR) 25 mg tablet Take 12.5 mg by mouth two (2) times a day. Provider, Historical     No Known Allergies   Family History   Problem Relation Age of Onset    Cancer Brother         SOCIAL HISTORY:  Patient resides:  Independently x   Assisted Living    SNF    With family care       Smoking history:   None x   Former    Chronic      Alcohol history:   None x   Social    Chronic      Ambulates:   Independently x   w/cane    w/walker    w/wc    CODE STATUS:  DNR    Full x   Other      Objective:     Physical Exam:     Visit Vitals  /71 (BP 1 Location: Left upper arm, BP Patient Position: At rest)   Pulse 66   Temp 98 °F (36.7 °C)   Resp 12   Wt 48.8 kg (107 lb 9.4 oz)   SpO2 100%   BMI 16.36 kg/m²      O2 Device: None (Room air)    General:  Alert, cooperative, no distress, appears stated age. Cachectic   Head:  Normocephalic, without obvious abnormality, atraumatic. Eyes:  Conjunctivae/corneas clear. PERRL, EOMs intact. Nose: Nares normal. Septum midline. Mucosa normal.        Neck: Supple, symmetrical, trachea midline       Lungs:   Clear to auscultation bilaterally. Chest wall:  No tenderness or deformity.    Heart:  Regular rate and rhythm, S1, S2 normal, no murmur, click, rub or gallop. Abdomen:   Soft, diffusely tender. No guarding or rebound. . Bowel sounds normal. No masses,  No organomegaly. Extremities: Extremities normal, atraumatic, no cyanosis or edema. Pulses: 2+ and symmetric all extremities. Skin: Skin color, texture, turgor normal. No rashes or lesions   Neurologic: CNII-XII intact. EKG: Normal sinus rhythm. Early repolarization. Data Review:     Recent Days:  Recent Labs     02/02/22  1531   WBC 15.4*   HGB 9.9*   HCT 28.6*   *     Recent Labs     02/02/22  2039 02/02/22  1531   * 132*   K 4.5 4.6    101   CO2 18* 18*   * 106*   BUN 34* 34*   CREA 4.32* 4.83*   CA 8.3* 8.9   ALB  --  2.1*   ALT  --  42     No results for input(s): PH, PCO2, PO2, HCO3, FIO2 in the last 72 hours. 24 Hour Results:  Recent Results (from the past 24 hour(s))   METABOLIC PANEL, COMPREHENSIVE    Collection Time: 02/02/22  3:31 PM   Result Value Ref Range    Sodium 132 (L) 136 - 145 mmol/L    Potassium 4.6 3.5 - 5.1 mmol/L    Chloride 101 97 - 108 mmol/L    CO2 18 (L) 21 - 32 mmol/L    Anion gap 13 5 - 15 mmol/L    Glucose 106 (H) 65 - 100 mg/dL    BUN 34 (H) 6 - 20 MG/DL    Creatinine 4.83 (H) 0.70 - 1.30 MG/DL    BUN/Creatinine ratio 7 (L) 12 - 20      GFR est AA 15 (L) >60 ml/min/1.73m2    GFR est non-AA 12 (L) >60 ml/min/1.73m2    Calcium 8.9 8.5 - 10.1 MG/DL    Bilirubin, total 0.8 0.2 - 1.0 MG/DL    ALT (SGPT) 42 12 - 78 U/L    AST (SGOT) 25 15 - 37 U/L    Alk.  phosphatase 230 (H) 45 - 117 U/L    Protein, total 7.0 6.4 - 8.2 g/dL    Albumin 2.1 (L) 3.5 - 5.0 g/dL    Globulin 4.9 (H) 2.0 - 4.0 g/dL    A-G Ratio 0.4 (L) 1.1 - 2.2     CBC WITH AUTOMATED DIFF    Collection Time: 02/02/22  3:31 PM   Result Value Ref Range    WBC 15.4 (H) 4.1 - 11.1 K/uL    RBC 3.07 (L) 4.10 - 5.70 M/uL    HGB 9.9 (L) 12.1 - 17.0 g/dL    HCT 28.6 (L) 36.6 - 50.3 %    MCV 93.2 80.0 - 99.0 FL    MCH 32.2 26.0 - 34.0 PG    MCHC 34.6 30.0 - 36.5 g/dL    RDW 15.2 (H) 11.5 - 14.5 %    PLATELET 419 (HH) 004 - 400 K/uL    MPV 10.0 8.9 - 12.9 FL    NRBC 0.0 0  WBC    ABSOLUTE NRBC 0.00 0.00 - 0.01 K/uL    NEUTROPHILS 73 32 - 75 %    LYMPHOCYTES 13 12 - 49 %    MONOCYTES 9 5 - 13 %    EOSINOPHILS 2 0 - 7 %    BASOPHILS 1 0 - 1 %    IMMATURE GRANULOCYTES 2 (H) 0.0 - 0.5 %    ABS. NEUTROPHILS 11.2 (H) 1.8 - 8.0 K/UL    ABS. LYMPHOCYTES 2.0 0.8 - 3.5 K/UL    ABS. MONOCYTES 1.4 (H) 0.0 - 1.0 K/UL    ABS. EOSINOPHILS 0.3 0.0 - 0.4 K/UL    ABS. BASOPHILS 0.2 (H) 0.0 - 0.1 K/UL    ABS. IMM.  GRANS. 0.3 (H) 0.00 - 0.04 K/UL    DF SMEAR SCANNED      PLATELET COMMENTS MARKEDLY INCREASED      RBC COMMENTS MICROCYTOSIS  1+       COVID-19 WITH INFLUENZA A/B    Collection Time: 02/02/22  3:32 PM   Result Value Ref Range    SARS-CoV-2 Not detected NOTD      Influenza A by PCR Not detected      Influenza B by PCR Not detected     URINALYSIS W/ REFLEX CULTURE    Collection Time: 02/02/22  6:24 PM    Specimen: Miscellaneous sample; Urine    Urine specimen   Result Value Ref Range    Color YELLOW/STRAW      Appearance CLOUDY (A) CLEAR      Specific gravity 1.020 1.003 - 1.030      pH (UA) 5.5 5.0 - 8.0      Protein Negative NEG mg/dL    Glucose Negative NEG mg/dL    Ketone Negative NEG mg/dL    Blood SMALL (A) NEG      Urobilinogen 0.2 0.2 - 1.0 EU/dL    Nitrites Negative NEG      Leukocyte Esterase LARGE (A) NEG      WBC  0 - 4 /hpf    RBC 5-10 0 - 5 /hpf    Epithelial cells FEW FEW /lpf    Bacteria 2+ (A) NEG /hpf    UA:UC IF INDICATED URINE CULTURE ORDERED (A) CNI     BILIRUBIN, CONFIRM    Collection Time: 02/02/22  6:24 PM   Result Value Ref Range    Bilirubin UA, confirm Negative NEG     METABOLIC PANEL, BASIC    Collection Time: 02/02/22  8:39 PM   Result Value Ref Range    Sodium 133 (L) 136 - 145 mmol/L    Potassium 4.5 3.5 - 5.1 mmol/L    Chloride 108 97 - 108 mmol/L    CO2 18 (L) 21 - 32 mmol/L    Anion gap 7 5 - 15 mmol/L    Glucose 121 (H) 65 - 100 mg/dL    BUN 34 (H) 6 - 20 MG/DL    Creatinine 4.32 (H) 0.70 - 1.30 MG/DL    BUN/Creatinine ratio 8 (L) 12 - 20      GFR est AA 17 (L) >60 ml/min/1.73m2    GFR est non-AA 14 (L) >60 ml/min/1.73m2    Calcium 8.3 (L) 8.5 - 10.1 MG/DL   TROPONIN-HIGH SENSITIVITY    Collection Time: 02/02/22  8:39 PM   Result Value Ref Range    Troponin-High Sensitivity 5 0 - 76 ng/L         Imaging:     Assessment:     Scott Mackenzie is a 60 y.o. male with history of BPH, alcohol abuse, chronic pancreatitis, history of rectal adenocarcinoma status post neoadjuvant chemoradiation and ileostomy, hypertension who is admitted for acute renal failure in setting of bilateral hydronephrosis and hydroureter. Plan:       Acute cystitis /Pyelonephritis  Bilateral hydronephrosis and hydroureter  Acute renal failure  -Admit to monitor on telemetry  -Continue twice daily cefepime  -Follow blood and urine cultures  -Keep n.p.o.  -Marrero catheter  -Urology and nephrology consult.     BPH  -Continue home Flomax and Proscar    Hypertension  -Home metoprolol    Underweight  -Home mirtazapine and multivitamins            FEN/GI -normal saline at 75 mils per hour  Activity -as tolerated  DVT prophylaxis -SCDs  GI prophylaxis -not indicated  Disposition -home    CODE STATUS: Full code       Signed By: Iris Silver MD     February 2, 2022

## 2022-02-04 LAB
ANION GAP SERPL CALC-SCNC: 5 MMOL/L (ref 5–15)
ATRIAL RATE: 64 BPM
BASOPHILS # BLD: 0.1 K/UL (ref 0–0.1)
BASOPHILS NFR BLD: 1 % (ref 0–1)
BUN SERPL-MCNC: 14 MG/DL (ref 6–20)
BUN/CREAT SERPL: 10 (ref 12–20)
CALCIUM SERPL-MCNC: 9 MG/DL (ref 8.5–10.1)
CALCULATED P AXIS, ECG09: 80 DEGREES
CALCULATED R AXIS, ECG10: 70 DEGREES
CALCULATED T AXIS, ECG11: 40 DEGREES
CHLORIDE SERPL-SCNC: 113 MMOL/L (ref 97–108)
CO2 SERPL-SCNC: 21 MMOL/L (ref 21–32)
CREAT SERPL-MCNC: 1.37 MG/DL (ref 0.7–1.3)
DIAGNOSIS, 93000: NORMAL
DIFFERENTIAL METHOD BLD: ABNORMAL
EOSINOPHIL # BLD: 0.3 K/UL (ref 0–0.4)
EOSINOPHIL NFR BLD: 2 % (ref 0–7)
ERYTHROCYTE [DISTWIDTH] IN BLOOD BY AUTOMATED COUNT: 15.6 % (ref 11.5–14.5)
GLUCOSE SERPL-MCNC: 100 MG/DL (ref 65–100)
HCT VFR BLD AUTO: 29.4 % (ref 36.6–50.3)
HGB BLD-MCNC: 9.9 G/DL (ref 12.1–17)
IMM GRANULOCYTES # BLD AUTO: 0 K/UL
IMM GRANULOCYTES NFR BLD AUTO: 0 %
LYMPHOCYTES # BLD: 2 K/UL (ref 0.8–3.5)
LYMPHOCYTES NFR BLD: 16 % (ref 12–49)
MCH RBC QN AUTO: 32.4 PG (ref 26–34)
MCHC RBC AUTO-ENTMCNC: 33.7 G/DL (ref 30–36.5)
MCV RBC AUTO: 96.1 FL (ref 80–99)
METAMYELOCYTES NFR BLD MANUAL: 1 %
MONOCYTES # BLD: 1 K/UL (ref 0–1)
MONOCYTES NFR BLD: 8 % (ref 5–13)
NEUTS BAND NFR BLD MANUAL: 2 % (ref 0–6)
NEUTS SEG # BLD: 9.2 K/UL (ref 1.8–8)
NEUTS SEG NFR BLD: 70 % (ref 32–75)
NRBC # BLD: 0 K/UL (ref 0–0.01)
NRBC BLD-RTO: 0 PER 100 WBC
P-R INTERVAL, ECG05: 146 MS
PLATELET # BLD AUTO: 590 K/UL (ref 150–400)
PLATELET COMMENTS,PCOM: ABNORMAL
PMV BLD AUTO: 10.4 FL (ref 8.9–12.9)
POTASSIUM SERPL-SCNC: 4.7 MMOL/L (ref 3.5–5.1)
Q-T INTERVAL, ECG07: 420 MS
QRS DURATION, ECG06: 84 MS
QTC CALCULATION (BEZET), ECG08: 433 MS
RBC # BLD AUTO: 3.06 M/UL (ref 4.1–5.7)
RBC MORPH BLD: ABNORMAL
SODIUM SERPL-SCNC: 139 MMOL/L (ref 136–145)
VENTRICULAR RATE, ECG03: 64 BPM
WBC # BLD AUTO: 12.8 K/UL (ref 4.1–11.1)

## 2022-02-04 PROCEDURE — 74011250636 HC RX REV CODE- 250/636: Performed by: STUDENT IN AN ORGANIZED HEALTH CARE EDUCATION/TRAINING PROGRAM

## 2022-02-04 PROCEDURE — 85025 COMPLETE CBC W/AUTO DIFF WBC: CPT

## 2022-02-04 PROCEDURE — 80048 BASIC METABOLIC PNL TOTAL CA: CPT

## 2022-02-04 PROCEDURE — 74011250637 HC RX REV CODE- 250/637: Performed by: STUDENT IN AN ORGANIZED HEALTH CARE EDUCATION/TRAINING PROGRAM

## 2022-02-04 PROCEDURE — 74011250636 HC RX REV CODE- 250/636: Performed by: NURSE PRACTITIONER

## 2022-02-04 PROCEDURE — 74011250637 HC RX REV CODE- 250/637: Performed by: INTERNAL MEDICINE

## 2022-02-04 PROCEDURE — 74011250637 HC RX REV CODE- 250/637: Performed by: NURSE PRACTITIONER

## 2022-02-04 PROCEDURE — 74011000250 HC RX REV CODE- 250: Performed by: STUDENT IN AN ORGANIZED HEALTH CARE EDUCATION/TRAINING PROGRAM

## 2022-02-04 PROCEDURE — 65660000000 HC RM CCU STEPDOWN

## 2022-02-04 PROCEDURE — 36415 COLL VENOUS BLD VENIPUNCTURE: CPT

## 2022-02-04 RX ORDER — OXYBUTYNIN CHLORIDE 5 MG/1
5 TABLET, EXTENDED RELEASE ORAL DAILY
Status: DISCONTINUED | OUTPATIENT
Start: 2022-02-05 | End: 2022-02-05 | Stop reason: HOSPADM

## 2022-02-04 RX ORDER — METHENAMINE HIPPURATE 1000 MG/1
1 TABLET ORAL 2 TIMES DAILY
Status: DISCONTINUED | OUTPATIENT
Start: 2022-02-04 | End: 2022-02-05 | Stop reason: HOSPADM

## 2022-02-04 RX ADMIN — HYDROMORPHONE HYDROCHLORIDE 1 MG: 1 INJECTION, SOLUTION INTRAMUSCULAR; INTRAVENOUS; SUBCUTANEOUS at 18:11

## 2022-02-04 RX ADMIN — ACETAMINOPHEN 650 MG: 325 TABLET ORAL at 21:23

## 2022-02-04 RX ADMIN — SODIUM CHLORIDE, PRESERVATIVE FREE 10 ML: 5 INJECTION INTRAVENOUS at 03:38

## 2022-02-04 RX ADMIN — METOPROLOL TARTRATE 12.5 MG: 25 TABLET, FILM COATED ORAL at 08:34

## 2022-02-04 RX ADMIN — TAMSULOSIN HYDROCHLORIDE 0.4 MG: 0.4 CAPSULE ORAL at 08:34

## 2022-02-04 RX ADMIN — MIRTAZAPINE 15 MG: 15 TABLET ORAL at 21:23

## 2022-02-04 RX ADMIN — FOLIC ACID 1 MG: 1 TABLET ORAL at 08:34

## 2022-02-04 RX ADMIN — METOPROLOL TARTRATE 12.5 MG: 25 TABLET, FILM COATED ORAL at 18:10

## 2022-02-04 RX ADMIN — HYDROMORPHONE HYDROCHLORIDE 1 MG: 1 INJECTION, SOLUTION INTRAMUSCULAR; INTRAVENOUS; SUBCUTANEOUS at 08:34

## 2022-02-04 RX ADMIN — POLYETHYLENE GLYCOL 3350 17 G: 17 POWDER, FOR SOLUTION ORAL at 18:10

## 2022-02-04 RX ADMIN — METHENAMINE HIPPURATE 1 G: 1 TABLET ORAL at 19:22

## 2022-02-04 RX ADMIN — HYDROMORPHONE HYDROCHLORIDE 1 MG: 1 INJECTION, SOLUTION INTRAMUSCULAR; INTRAVENOUS; SUBCUTANEOUS at 13:25

## 2022-02-04 RX ADMIN — SODIUM CHLORIDE 75 ML/HR: 9 INJECTION, SOLUTION INTRAVENOUS at 13:31

## 2022-02-04 RX ADMIN — SODIUM CHLORIDE, PRESERVATIVE FREE 10 ML: 5 INJECTION INTRAVENOUS at 14:17

## 2022-02-04 RX ADMIN — FINASTERIDE 5 MG: 5 TABLET, FILM COATED ORAL at 08:34

## 2022-02-04 RX ADMIN — HYDROMORPHONE HYDROCHLORIDE 1 MG: 1 INJECTION, SOLUTION INTRAMUSCULAR; INTRAVENOUS; SUBCUTANEOUS at 03:37

## 2022-02-04 RX ADMIN — SODIUM BICARBONATE 650 MG: 650 TABLET ORAL at 08:34

## 2022-02-04 RX ADMIN — HYDROMORPHONE HYDROCHLORIDE 1 MG: 1 INJECTION, SOLUTION INTRAMUSCULAR; INTRAVENOUS; SUBCUTANEOUS at 22:26

## 2022-02-04 RX ADMIN — SODIUM BICARBONATE 650 MG: 650 TABLET ORAL at 18:10

## 2022-02-04 RX ADMIN — ONDANSETRON HYDROCHLORIDE 4 MG: 2 SOLUTION INTRAMUSCULAR; INTRAVENOUS at 17:32

## 2022-02-04 RX ADMIN — CEFEPIME 2 G: 2 INJECTION, POWDER, FOR SOLUTION INTRAVENOUS at 22:26

## 2022-02-04 RX ADMIN — Medication 100 MG: at 10:39

## 2022-02-04 NOTE — PROGRESS NOTES
Name: Jame Beltran MRN: [id number]   : 1965 Hospital: [facility]   Date: 2022        IMPRESSION:   · TATE from post obstructive uropathy. Scr is significantly improved after placement of Marrero. The baseline GFR is not known yet. · Metabolic acidosis from TATE - improved with Na bicarb. PLAN:   · Keep Marrero in and f/u urology recommendations  · Renal panel in AM     Subjective/Interval History:   I have reviewed the flowsheet and previous days notes. ROS:A comprehensive review of systems was negative. Objective:   Vital Signs:    Visit Vitals  BP (!) 107/53 (BP 1 Location: Right upper arm, BP Patient Position: At rest)   Pulse 69   Temp 98.5 °F (36.9 °C)   Resp 18   Ht 5' 8\" (1.727 m)   Wt 52 kg (114 lb 9.6 oz)   SpO2 100%   BMI 17.42 kg/m²       O2 Device: None (Room air)       Temp (24hrs), Av.1 °F (36.7 °C), Min:97.8 °F (36.6 °C), Max:98.5 °F (36.9 °C)       Intake/Output:   Last shift:       07 -  1900  In: 112 [P.O.:300; I.V.:415]  Out: 275 [Urine:275]  Last 3 shifts:  190 -  0700  In: 970 [P.O.:120; I.V.:850]  Out: 2475 [Urine:2475]    Intake/Output Summary (Last 24 hours) at 2022 1310  Last data filed at 2022 1120  Gross per 24 hour   Intake 715 ml   Output 1175 ml   Net -460 ml        Physical Exam:  General:    Alert, cooperative, no distress, appears stated age. Head:   Normocephalic, without obvious abnormality, atraumatic. Eyes:   Conjunctivae/corneas clear. Neck:  Supple, symmetrical,     no carotid bruit and no JVD. Lungs:   Clear to auscultation bilaterally. No Wheezing or Rhonchi. No rales. Chest wall:  No tenderness or deformity. No Accessory muscle use. Heart:   Regular rate and rhythm,  no murmur, rub or gallop. Abdomen:   Soft, non-tender. Not distended. Bowel sounds normal. No masses  Extremities: Extremities normal, atraumatic, No cyanosis. No edema.  No clubbing  Skin:     Texture, turgor normal. No rashes or lesions. Not Jaundiced  Psych:  fair insight. Not depressed. Not anxious or agitated. Neurologic: Normal strength, Alert and oriented X 3. DATA:  Labs:  Recent Labs     02/04/22  0402 02/03/22 0221 02/02/22 2039 02/02/22  1531 02/02/22  1531    134* 133*   < > 132*   K 4.7 4.5 4.5   < > 4.6   * 109* 108   < > 101   CO2 21 18* 18*   < > 18*   BUN 14 29* 34*   < > 34*   CREA 1.37* 3.28* 4.32*   < > 4.83*   CA 9.0 8.8 8.3*   < > 8.9   ALB  --   --   --   --  2.1*    < > = values in this interval not displayed. Recent Labs     02/04/22 0402 02/02/22  1531   WBC 12.8* 15.4*   HGB 9.9* 9.9*   HCT 29.4* 28.6*   * 692*     No results for input(s): TESHA, KU, CLU, CREAU in the last 72 hours.     No lab exists for component: PROU    Total time spent with patient:  35 minutes    [] Critical Care Provided    Care Plan discussed with:   Staff, Medical Team    Ramone Yung MD

## 2022-02-04 NOTE — PROGRESS NOTES
Physician Progress Note      PATIENT:               Stephanie Sharma  CSN #:                  [id number]  :                       1965  ADMIT DATE:       2022 8:14 PM  [address] DATE:  RESPONDING  PROVIDER #:        Rickie Pennington MD        QUERY TEXT:    Type of Anemia: Please provide further specificity, if known. Clinical indicators include: cancer, hemoglobin, mild anemia, iron  Options provided:  -- Anemia due to acute blood loss  -- Anemia due to chronic blood loss  -- Anemia due to iron deficiency  -- Anemia due to postoperative blood loss  -- Anemia due to chronic disease  -- Other - I will add my own diagnosis  -- Disagree - Not applicable / Not valid  -- Disagree - Clinically Unable to determine / Unknown        PROVIDER RESPONSE TEXT:    The patient has anemia due to chronic disease. QUERY TEXT:    Patient admitted with abd pain. Per RD note, patient's weight hx in EMR indicates weight loss of 11% over last 5 months, patient has no desire to eat, and has fat and muscle loss. If possible, please document in progress notes and discharge summary if you are evaluating and /or treating any of the following: The medical record reflects the following:  Risk Factors: 57yo who tested positive for cocaine use and has no desire to eat    Clinical Indicators: RD note  Underweight per BMI. Weight hx in EMR indicates weight loss of 11% over last 5 months - this is significant for time frame. Pt confirms weight loss. States he has always been a small eater but his appetite has significantly decreased over last 2 weeks to the point where he was only eating 2 chips at a time, a few bites total throughout the day. Denied any GI complaints, just had not desire to eat.     Malnutrition Status:  Severe malnutrition  Energy Intake:  7 - 75% or less est energy requirements for 1 month or longer  Weight Loss:  7.00 - Greater than 10% over 6 months  Body Fat Loss:  7 - Severe body fat loss, Triceps  Muscle Mass Loss:  7 - Severe muscle mass loss, Clavicles (pectoralis &deltoids)    Severe malnutrition related to inadequate protein-energy intake as evidenced by weight loss greater than or equal to 10% in 6 months, NPO or clear liquid status due to medical condition, severe muscle loss, severe loss of subcutaneous fat    Treatment: RD following, nutritional supplements    ASPEN Criteria:  https://aspenjournals. onlinelibrary. huntley. com/doi/full/10.1177/9257696197409373    Thank you,  Nazanin Montano  [phone]  Options provided:  -- Protein calorie malnutrition severe  -- Underweight with BMI 16.36  -- Other - I will add my own diagnosis  -- Disagree - Not applicable / Not valid  -- Disagree - Clinically unable to determine / Unknown  -- Refer to Clinical Documentation Reviewer    PROVIDER RESPONSE TEXT:    This patient has severe protein calorie malnutrition.     Query created by: Devora Singh on 2/3/2022 1:38 PM      Electronically signed by:  Tete De Paz MD 2/4/2022 3:46 PM

## 2022-02-04 NOTE — PROGRESS NOTES
Consult Follow-up Note    Patient: Syl Oscar MRN: [id number]  SSN: [id number]    YOB: 1965  Age: 60 y.o. Sex: male              Assessment:     -  Evangelist hydronephrosis likely related to PAYTON , mondragon  Placed large output . Pain improved   - TATE  2/2 obstructive uropathy  Cr now normalize   - pt c/o dysuria - mondragon looks appropriately placed will add  hipprex   - oxybuytnin for bladder spasm     Active Problems:    [facility]        Plan:     - Re image tomorrow for resolution of hydro   - DC with mondragon will see Urology OP   - cont Tamsulosin at DC   - [facility]   -------------------------------------------------------------------------------------------------------------------    Syl Oscar was [facility] on 2022 to [facility], * by Tammy Mauro MD for ARF (acute renal failure) ([facility] [N17.9]. Pt seen in consult for TATE , dehydration and acute urinary retention . Cr on admission 4.83 today improved to 1.37 . Explained to pt will need to leave with mondragon and follow up OP with Urology  . Vitals: Temp (24hrs), Av.1 °F (36.7 °C), Min:97.8 °F (36.6 °C), Max:98.7 °F (37.1 °C)    Blood pressure 121/83, pulse 83, temperature 98.7 °F (37.1 °C), resp. rate 18, height 5' 8\" (1.727 m), weight 52 kg (114 lb 9.6 oz), SpO2 99 %. Intake and Output:  1901 - 700  In: 1 [P.O.:120; I.V.:850]  Out: 8630 [Urine:2475]  701 - 1900  In: 110 [P.O.:500;  I.V.:415]  Out: 475 [Urine:475]    Current Facility-Administered Medications   Medication Dose Route Frequency    cefepime (MAXIPIME) 2 g in 0.9% sodium chloride 10 mL IV syringe  2 g IntraVENous Q24H    HYDROmorphone (DILAUDID) injection 1 mg  1 mg IntraVENous Q4H PRN    sodium bicarbonate tablet 650 mg  650 mg Oral BID    famotidine (PEPCID) tablet 20 mg  20 mg Oral QHS PRN    finasteride (PROSCAR) tablet 5 mg  5 mg Oral DAILY    folic acid (FOLVITE) tablet 1 mg  1 mg Oral DAILY    metoprolol tartrate (LOPRESSOR) tablet 12.5 mg  12.5 mg Oral BID    mirtazapine (REMERON) tablet 15 mg  15 mg Oral QHS    tamsulosin (FLOMAX) capsule 0.4 mg  0.4 mg Oral DAILY    thiamine HCL (B-1) tablet 100 mg  100 mg Oral DAILY    sodium chloride (NS) flush 5-40 mL  5-40 mL IntraVENous Q8H    sodium chloride (NS) flush 5-40 mL  5-40 mL IntraVENous PRN    acetaminophen (TYLENOL) tablet 650 mg  650 mg Oral Q6H PRN    Or    acetaminophen (TYLENOL) suppository 650 mg  650 mg Rectal Q6H PRN    polyethylene glycol (MIRALAX) packet 17 g  17 g Oral DAILY PRN    ondansetron (ZOFRAN ODT) tablet 4 mg  4 mg Oral Q8H PRN    Or    ondansetron (ZOFRAN) injection 4 mg  4 mg IntraVENous Q6H PRN    0.9% sodium chloride infusion  75 mL/hr IntraVENous CONTINUOUS         Exam:   Abdominal: Soft, non-tender. Labs:  Recent Labs     02/04/22  0402 02/02/22  1531   WBC 12.8* 15.4*   HGB 9.9* 9.9*   HCT 29.4* 28.6*   * 692*     Recent Labs     02/04/22  0402 02/03/22  0221 02/02/22 2039    134* 133*   K 4.7 4.5 4.5   * 109* 108   CO2 21 18* 18*    101* 121*   BUN 14 29* 34*   CREA 1.37* 3.28* 4.32*   CA 9.0 8.8 8.3*       Signed By: Nguyen Dixon.  Abbie Faye, NP - February 4, 2022

## 2022-02-04 NOTE — CONSULTS
Nutrition Note    Consult received for malnutrition. Pt already being followed by this service, assessed and dx with malnutrition yesterday. Eating well today. Has ONS in place, but doesn't appear to be drinking the Glucerna. This was used d/t shortage of his preferred flavor chocolate Ensure, but we now have Ensure High Protein chocolate in place, so will adjust to this to see if more accepting. RD will continue to follow.     Current Nutrition Therapies:  Diet: regular  Supplements: Glucerna BID  Meal intake: Patient Vitals for the past 168 hrs:   % Diet Eaten   02/04/22 1120 76 - 100%   02/04/22 0828 76 - 100%   02/03/22 0952 0%   02/03/22 0910 0%     Supplement intake:   Patient Vitals for the past 168 hrs:   Supplement intake %   02/04/22 1501 0%   02/04/22 1120 0%   02/04/22 0828 0%       Estimated Daily Nutrient Needs:  Energy (kcal):  7371-2376 (35-40 kcals/kg)  Protein (g):  74-83 (1.5-1.7 gm/kg)       Fluid (ml/day):  3013-0018      Recent Labs     02/04/22  0402 02/03/22  0221 02/02/22  2039 02/02/22  1531    101* 121* 106*   BUN 14 29* 34* 34*   CREA 1.37* 3.28* 4.32* 4.83*    134* 133* 132*   K 4.7 4.5 4.5 4.6   * 109* 108 101   CO2 21 18* 18* 18*   CA 9.0 8.8 8.3* 8.9         Anna Kyle RD  Available via [employer]

## 2022-02-04 NOTE — PROGRESS NOTES
Renal Dosing/Monitoring  Medication: Cefepime   Current regimen:  1 gm IV  every 24 hr  Recent Labs     02/04/22  0402 02/03/22  0221 02/02/22 2039   CREA 1.37* 3.28* 4.32*   BUN 14 29* 34*     Estimated CrCl:  43 ml/min  Plan: Change to 2 gm IV q24 hours for crcl 30-60  per renal adjustment protocol.

## 2022-02-04 NOTE — PROGRESS NOTES
[address] [facility] Adult  Hospitalist Group                                                                                          Hospitalist Progress Note  Carmen Recinos MD  Answering service: [phone] OR [phone] from in house phone        Date of Service:  2022  NAME:  Yanelis Machuca  :  1965  MRN:  [id number]      Admission Summary:   Yanelis Machuca is a 60 y.o. male with history of BPH, alcohol abuse, chronic pancreatitis, history of rectal adenocarcinoma status post neoadjuvant chemoradiation and ileostomy, hypertension who presented to ER with complaints of abdominal pain. Chart review shows patient had presented to hospital on  for similar concerns, who was diagnosed with acute cystitis and discharged with Keflex and recommendations for urology follow-up. He returned to [facility] with complaints of worsening symptoms. Had not follow-up with urology and stated his appointment was on day he returned to ER. The patient currently denies any fever, chills, chest pain, nausea, vomiting, cough, congestion, recent illness, palpitations, or dysuria.     Remarkable vitals on ER Presentations: Vital signs stable  Labs Remarkable for: Sodium 132, creatinine 4.83, albumin 2.1, WBC 15.4, platelets 950, hemoglobin 9.9, urinalysis with 2+ bacteria, small blood, large leukocyte esterase  ER Images: Noncontrast CT of abdomen pelvis showed chronic partial small bowel obstruction, new left-sided hydronephrosis and hydroureter as well as worsening chronic right-sided hydronephrosis and hydroureter and diffuse bladder wall thickening. Patient was treated with Toradol, 1 L normal saline bolus, Zofran and Levaquin. Interval history / Subjective:   Patient seen for follow-up of cystitis and bilateral hydronephrosis with hydroureter. Patient seen and examined earlier today by me. Patient reports some pain and is asked for pain medicine.   Patient denies any other symptoms. Marerro catheter is in place. Assessment & Plan:     Acute cystitis /Pyelonephritis  Bilateral hydronephrosis and hydroureter  Acute renal failure  -Admit to monitor on telemetry  -Continue twice daily cefepime  -Follow blood and urine cultures  -Keep n.p.o.  -Marrero catheter  Urology has seen  Marrero catheter continues in place and renal function has improved with Marrero  Continue antibiotics  Pending ultrasound tomorrow to monitor hydronephrosis     BPH  -Continue home Flomax and Proscar  Will follow up with urology  Urology is arranging for an appointment outpatient next week  He will discharge with Marrero     Hypertension  -Home metoprolol     Malnutrition  -Home mirtazapine and multivitamins  Diet are as follows    Cocaine use disorder    Plan is to discharge tomorrow as long as kidney function allows. Code status: fulll  DVT prophylaxis: scd    Care Plan discussed with: Patient/Family and Nurse  Anticipated Disposition: Home w/Family  Anticipated Discharge: Less than 24 hours     Hospital Problems  Date Reviewed: 9/1/2021          Codes Class Noted POA    [facility] ICD-10-CM: N17.9  ICD-9-CM: 584.9  2/2/2022 Unknown                Review of Systems:   A comprehensive review of systems was negative except for that written in the HPI. Vital Signs:    Last 24hrs VS reviewed since prior progress note.  Most recent are:  Visit Vitals  /83 (BP 1 Location: Right upper arm, BP Patient Position: At rest)   Pulse 83   Temp 98.7 °F (37.1 °C)   Resp 18   Ht 5' 8\" (1.727 m)   Wt 52 kg (114 lb 9.6 oz)   SpO2 99%   BMI 17.42 kg/m²         Intake/Output Summary (Last 24 hours) at 2/4/2022 1610  Last data filed at 2/4/2022 1501  Gross per 24 hour   Intake 1491.25 ml   Output 1025 ml   Net 466.25 ml        Physical Examination:     I had a face to face encounter with this patient and independently examined them on 2/4/2022 as outlined below:          Constitutional:  No acute distress, cooperative, pleasant. Very thin. ENT:  Oral mucosa moist, oropharynx benign. Resp:  CTA bilaterally. No wheezing/rhonchi/rales. No accessory muscle use   CV:  Regular rhythm, normal rate, no murmurs, gallops, rubs    GI:  Soft, non distended, non tender. normoactive bowel sounds, no hepatosplenomegaly     Musculoskeletal:  No edema, warm, 2+ pulses throughout    Neurologic:  Moves all extremities. AAOx3, CN II-XII reviewed            Data Review:    Review and/or order of clinical lab test  Review and/or order of tests in the radiology section of CPT  Review and/or order of tests in the medicine section of CPT      Labs:     Recent Labs     02/04/22  0402 02/02/22  1531   WBC 12.8* 15.4*   HGB 9.9* 9.9*   HCT 29.4* 28.6*   * 692*     Recent Labs     02/04/22  0402 02/03/22  0221 02/02/22 2039    134* 133*   K 4.7 4.5 4.5   * 109* 108   CO2 21 18* 18*   BUN 14 29* 34*   CREA 1.37* 3.28* 4.32*    101* 121*   CA 9.0 8.8 8.3*     Recent Labs     02/02/22  1531   ALT 42   *   TBILI 0.8   TP 7.0   ALB 2.1*   GLOB 4.9*     No results for input(s): INR, PTP, APTT, INREXT, INREXT in the last 72 hours. No results for input(s): FE, TIBC, PSAT, FERR in the last 72 hours. Lab Results   Component Value Date/Time    Folate 22.4 (H) 05/22/2021 03:23 AM      No results for input(s): PH, PCO2, PO2 in the last 72 hours. No results for input(s): CPK, CKNDX, TROIQ in the last 72 hours.     No lab exists for component: CPKMB  Lab Results   Component Value Date/Time    Cholesterol, total 147 01/02/2019 04:00 AM    HDL Cholesterol 66 01/02/2019 04:00 AM    LDL, calculated 65.6 01/02/2019 04:00 AM    Triglyceride 77 01/02/2019 04:00 AM    CHOL/HDL Ratio 2.2 01/02/2019 04:00 AM     No results found for: [facility]  Lab Results   Component Value Date/Time    Color YELLOW/STRAW 02/02/2022 06:24 PM    Appearance CLOUDY (A) 02/02/2022 06:24 PM    Specific gravity 1.020 02/02/2022 06:24 PM Specific gravity 1.010 09/03/2021 10:01 PM    pH (UA) 5.5 02/02/2022 06:24 PM    Protein Negative 02/02/2022 06:24 PM    Glucose Negative 02/02/2022 06:24 PM    Ketone Negative 02/02/2022 06:24 PM    Bilirubin Negative 01/26/2022 09:39 AM    Urobilinogen 0.2 02/02/2022 06:24 PM    Nitrites Negative 02/02/2022 06:24 PM    Leukocyte Esterase LARGE (A) 02/02/2022 06:24 PM    Epithelial cells FEW 02/02/2022 06:24 PM    Bacteria 2+ (A) 02/02/2022 06:24 PM    WBC  02/02/2022 06:24 PM    RBC 5-10 02/02/2022 06:24 PM         Medications Reviewed:     Current Facility-Administered Medications   Medication Dose Route Frequency    cefepime (MAXIPIME) 2 g in 0.9% sodium chloride 10 mL IV syringe  2 g IntraVENous Q24H    HYDROmorphone (DILAUDID) injection 1 mg  1 mg IntraVENous Q4H PRN    sodium bicarbonate tablet 650 mg  650 mg Oral BID    famotidine (PEPCID) tablet 20 mg  20 mg Oral QHS PRN    finasteride (PROSCAR) tablet 5 mg  5 mg Oral DAILY    folic acid (FOLVITE) tablet 1 mg  1 mg Oral DAILY    metoprolol tartrate (LOPRESSOR) tablet 12.5 mg  12.5 mg Oral BID    mirtazapine (REMERON) tablet 15 mg  15 mg Oral QHS    tamsulosin (FLOMAX) capsule 0.4 mg  0.4 mg Oral DAILY    thiamine HCL (B-1) tablet 100 mg  100 mg Oral DAILY    sodium chloride (NS) flush 5-40 mL  5-40 mL IntraVENous Q8H    sodium chloride (NS) flush 5-40 mL  5-40 mL IntraVENous PRN    acetaminophen (TYLENOL) tablet 650 mg  650 mg Oral Q6H PRN    Or    acetaminophen (TYLENOL) suppository 650 mg  650 mg Rectal Q6H PRN    polyethylene glycol (MIRALAX) packet 17 g  17 g Oral DAILY PRN    ondansetron (ZOFRAN ODT) tablet 4 mg  4 mg Oral Q8H PRN    Or    ondansetron (ZOFRAN) injection 4 mg  4 mg IntraVENous Q6H PRN    0.9% sodium chloride infusion  75 mL/hr IntraVENous CONTINUOUS     ______________________________________________________________________  EXPECTED LENGTH OF STAY: 3d 19h  ACTUAL LENGTH OF STAY:          2                 Naldo SANDOVAL Anette Washington MD

## 2022-02-04 NOTE — PROGRESS NOTES
Transition of Care Plan   RUR- Medium 18%     DISPOSITION: The disposition plan is home; Pending medical progression    F/U with PCP/Specialist     Transport: Family vs. Roundtrip     Reason for Admission:   ARF (acute renal failure) ([facility]           Plan for utilizing home health:      Pending PT/OT evaluation     PCP: First and Last name:  Rory Marc MD     Name of Practice:    Are you a current patient: Yes/No: Yes    Approximate date of last visit: 3 Months ago; next appt. On the 16th of Feb.    Can you participate in a virtual visit with your PCP: No                     Current Advanced Directive/Advance Care Plan: Full Code      Healthcare Decision Maker:   Click here to complete [address] including selection of the Healthcare Decision Maker Relationship (ie \"Primary\")             Primary Decision Maker: Sushil Andrade - Sister - [phone]    Secondary Decision Maker: Sade Cuellar - Sister - [phone]                  Transition of Care Plan: This cm conducted the initial evaluation via Phone. The pt presented as aox4. The pt confirmed his demographics and insurance provider. He reported that his preferred pharmacy is [facility] on [address]. The pt reported that he is a resident a [facility]; will elevator access to his apartment. The pt reported that he does not utilize DME at baseline. The pt reported that he is independent with ADLs and IADLs, which includes driving. He reported that his daughter assist with shopping if he requires supports and he transports himself to and from appointments. This cm will continue to follow for KENTRELL needs. Care Management Interventions  PCP Verified by CM: Yes  Mode of Transport at Discharge:  Other (see comment) (Roundtrip or Family)  Transition of Care Consult (CM Consult): Discharge Planning  MyChart Signup: No  Discharge Durable Medical Equipment: No  Physical Therapy Consult: Yes  Occupational Therapy Consult: Yes  Speech Therapy Consult: No  Support Systems: Child(bonifacio),Other Family Member(s)  Confirm Follow Up Transport: Self  The Patient and/or Patient Representative was Provided with a Choice of Provider and Agrees with the Discharge Plan?: Yes  Freedom of Choice List was Provided with Basic Dialogue that Supports the Patient's Individualized Plan of Care/Goals, Treatment Preferences and Shares the Quality Data Associated with the Providers?: Yes  Discharge Location  Patient Expects to be Discharged to[de-identified] Home  CM: [address] [facility],   [phone]

## 2022-02-04 NOTE — DISCHARGE INSTRUCTIONS
DISCHARGE INFORMATION    Patient: Gallito Parry MRN: [id number]  SSN: [id number]    YOB: 1965  Age: 60 y.o. Sex: male          Followup Plan:    [address] [facility] Urology / Dr. Marycarmen Jurado: Val Fowler   Nurse: Vilma Atwood   Office: [facility] [phone]               Urinary Catheter (Marrero)  · You are being discharged with a urinary catheter. It should continuously drain any urine from the bladder and you will not urinate. The larger bag (overnight bag) allows you to drain it less often, but is less mobile. A smaller bag (leg bag) can be attached to your leg for increased mobility, and concealment. · Keep the urinary drainage bag below the level of the bladder. Make sure that the urinary drainage bag does not drag and pull on the catheter. Drain it before the bag becomes full. · The catheter can irritate the urethral opening, and you can place topical antibiotic (Neoporin, Double/Triple antibiotic ointment) to facilitate lubrication. · The catheter can be irritating to the bladder, and it is not uncommon to have a false sense of the urge to urinate. This is worse when the catheter/tubing is not supported on the leg with some slack. If you are experience sudden onset of pain with the urge to urinate then you re having bladder spasms. Sometimes urine can leak around the catheter as the spasms pinches it off. Medications can be helpful if this is occuring frequently. · Small amounts of blood are not uncommon as the catheter rubs. Note that this amount of blood can appear significant but is in reality very little. Hydration allows for dilution of the blood in the urine and avoids problems. If your catheter/tubing is becoming blocked by blood clots, or your catheter is not draining you should call your doctor immediately.

## 2022-02-04 NOTE — PROGRESS NOTES
0730: Bedside and Verbal shift change report given to Rox Wagner RN (oncoming nurse) by [facility], RN (offgoing nurse). Report included the following information SBAR, Kardex, Intake/Output, MAR, Recent Results, and Cardiac Rhythm Sinus Rhythm with chronic ST- Elevation . 1532: Report called to Maru Garcia RN on Chan Alvin: TRANSFER - OUT REPORT:    Verbal report given to Maru Garcia RN (name) on Dariela Hoang  being transferred to [address] [place] [address] (unit) for routine progression of care       Report consisted of patients Situation, Background, Assessment and   Recommendations(SBAR). Information from the following report(s) SBAR, Kardex, Intake/Output, MAR, Recent Results and Cardiac Rhythm Sinus Rhythm with Chronic ST- Elevation was reviewed with the receiving nurse. Lines:   Venous Access Device Alma Cath (Active)       Peripheral IV 02/02/22 Right Antecubital (Active)   Site Assessment Clean, dry, & intact 02/04/22 0828   Phlebitis Assessment 0 02/04/22 0828   Infiltration Assessment 0 02/04/22 0828   Dressing Status Clean, dry, & intact 02/04/22 0828   Dressing Type Tape;Transparent 02/04/22 0828   Hub Color/Line Status Pink;Flushed; Infusing 02/04/22 6487   Action Taken Open ports on tubing capped 02/04/22 0828   Alcohol Cap Used Yes 02/04/22 8660        Opportunity for questions and clarification was provided. Patient transported with:   Tech   Monitor  RN  ([place] [address] RN updated about patient having arrived prior to leaving patient in room)     Care Plans:   Problem: Falls - Risk of  Goal: *Absence of Falls  Description: Document John Dominguez Fall Risk and appropriate interventions in the flowsheet.   Outcome: Progressing Towards Goal  Note: Fall Risk Interventions:  Mobility Interventions: Communicate number of staff needed for ambulation/transfer,Patient to call before getting OOB         Medication Interventions: Assess postural VS orthostatic hypotension,Patient to call before getting OOB,Teach patient to arise slowly                   Problem: Patient Education: Go to Patient Education Activity  Goal: Patient/Family Education  Outcome: Progressing Towards Goal     Problem: General Medical Care Plan  Goal: *Vital signs within specified parameters  Outcome: Progressing Towards Goal  Goal: *Labs within defined limits  Outcome: Progressing Towards Goal  Goal: *Absence of infection signs and symptoms  Outcome: Progressing Towards Goal  Goal: *Optimal pain control at patient's stated goal  Outcome: Progressing Towards Goal  Goal: *Skin integrity maintained  Outcome: Progressing Towards Goal  Goal: *Fluid volume balance  Outcome: Progressing Towards Goal  Goal: *Optimize nutritional status  Outcome: Progressing Towards Goal  Goal: *Anxiety reduced or absent  Outcome: Progressing Towards Goal  Goal: *Progressive mobility and function (eg: ADL's)  Outcome: Progressing Towards Goal     Problem: Patient Education: Go to Patient Education Activity  Goal: Patient/Family Education  Outcome: Progressing Towards Goal     Problem: Patient Education: Go to Patient Education Activity  Goal: Patient/Family Education  Outcome: Progressing Towards Goal

## 2022-02-05 ENCOUNTER — APPOINTMENT (OUTPATIENT)
Dept: ULTRASOUND IMAGING | Age: 57
DRG: 682 | End: 2022-02-05
Attending: NURSE PRACTITIONER
Payer: MEDICARE

## 2022-02-05 VITALS
WEIGHT: 114.6 LBS | TEMPERATURE: 98.2 F | SYSTOLIC BLOOD PRESSURE: 133 MMHG | DIASTOLIC BLOOD PRESSURE: 85 MMHG | OXYGEN SATURATION: 100 % | HEIGHT: 68 IN | HEART RATE: 87 BPM | RESPIRATION RATE: 18 BRPM | BODY MASS INDEX: 17.37 KG/M2

## 2022-02-05 PROBLEM — N17.9 ARF (ACUTE RENAL FAILURE) (HCC): Status: RESOLVED | Noted: 2022-02-02 | Resolved: 2022-02-05

## 2022-02-05 LAB
ANION GAP SERPL CALC-SCNC: 5 MMOL/L (ref 5–15)
BASOPHILS # BLD: 0 K/UL (ref 0–0.1)
BASOPHILS NFR BLD: 0 % (ref 0–1)
BUN SERPL-MCNC: 8 MG/DL (ref 6–20)
BUN/CREAT SERPL: 8 (ref 12–20)
CALCIUM SERPL-MCNC: 9.1 MG/DL (ref 8.5–10.1)
CHLORIDE SERPL-SCNC: 113 MMOL/L (ref 97–108)
CO2 SERPL-SCNC: 18 MMOL/L (ref 21–32)
CREAT SERPL-MCNC: 1.01 MG/DL (ref 0.7–1.3)
DIFFERENTIAL METHOD BLD: ABNORMAL
EOSINOPHIL # BLD: 0.2 K/UL (ref 0–0.4)
EOSINOPHIL NFR BLD: 1 % (ref 0–7)
ERYTHROCYTE [DISTWIDTH] IN BLOOD BY AUTOMATED COUNT: 15.8 % (ref 11.5–14.5)
GLUCOSE SERPL-MCNC: 98 MG/DL (ref 65–100)
HCT VFR BLD AUTO: 29.7 % (ref 36.6–50.3)
HGB BLD-MCNC: 10.2 G/DL (ref 12.1–17)
IMM GRANULOCYTES # BLD AUTO: 0.3 K/UL (ref 0–0.04)
IMM GRANULOCYTES NFR BLD AUTO: 2 % (ref 0–0.5)
LYMPHOCYTES # BLD: 1.7 K/UL (ref 0.8–3.5)
LYMPHOCYTES NFR BLD: 11 % (ref 12–49)
MCH RBC QN AUTO: 32.4 PG (ref 26–34)
MCHC RBC AUTO-ENTMCNC: 34.3 G/DL (ref 30–36.5)
MCV RBC AUTO: 94.3 FL (ref 80–99)
MONOCYTES # BLD: 0.9 K/UL (ref 0–1)
MONOCYTES NFR BLD: 6 % (ref 5–13)
NEUTS SEG # BLD: 11.9 K/UL (ref 1.8–8)
NEUTS SEG NFR BLD: 80 % (ref 32–75)
NRBC # BLD: 0 K/UL (ref 0–0.01)
NRBC BLD-RTO: 0 PER 100 WBC
PLATELET # BLD AUTO: 593 K/UL (ref 150–400)
PMV BLD AUTO: 10.1 FL (ref 8.9–12.9)
POTASSIUM SERPL-SCNC: 4.5 MMOL/L (ref 3.5–5.1)
RBC # BLD AUTO: 3.15 M/UL (ref 4.1–5.7)
SODIUM SERPL-SCNC: 136 MMOL/L (ref 136–145)
WBC # BLD AUTO: 15.1 K/UL (ref 4.1–11.1)

## 2022-02-05 PROCEDURE — 74011250637 HC RX REV CODE- 250/637: Performed by: INTERNAL MEDICINE

## 2022-02-05 PROCEDURE — 36415 COLL VENOUS BLD VENIPUNCTURE: CPT

## 2022-02-05 PROCEDURE — 74011250637 HC RX REV CODE- 250/637: Performed by: NURSE PRACTITIONER

## 2022-02-05 PROCEDURE — 74011250637 HC RX REV CODE- 250/637: Performed by: STUDENT IN AN ORGANIZED HEALTH CARE EDUCATION/TRAINING PROGRAM

## 2022-02-05 PROCEDURE — 85025 COMPLETE CBC W/AUTO DIFF WBC: CPT

## 2022-02-05 PROCEDURE — 80048 BASIC METABOLIC PNL TOTAL CA: CPT

## 2022-02-05 PROCEDURE — 74011250636 HC RX REV CODE- 250/636: Performed by: NURSE PRACTITIONER

## 2022-02-05 PROCEDURE — 76770 US EXAM ABDO BACK WALL COMP: CPT

## 2022-02-05 RX ORDER — HYDROCODONE BITARTRATE AND ACETAMINOPHEN 5; 325 MG/1; MG/1
1 TABLET ORAL
Qty: 5 TABLET | Refills: 0 | Status: SHIPPED | OUTPATIENT
Start: 2022-02-05 | End: 2022-02-07

## 2022-02-05 RX ORDER — METOPROLOL TARTRATE 25 MG/1
12.5 TABLET, FILM COATED ORAL 2 TIMES DAILY
Qty: 60 TABLET | Refills: 0 | Status: SHIPPED | OUTPATIENT
Start: 2022-02-05

## 2022-02-05 RX ADMIN — METHENAMINE HIPPURATE 1 G: 1 TABLET ORAL at 08:23

## 2022-02-05 RX ADMIN — Medication 100 MG: at 08:24

## 2022-02-05 RX ADMIN — POLYETHYLENE GLYCOL 3350 17 G: 17 POWDER, FOR SOLUTION ORAL at 08:25

## 2022-02-05 RX ADMIN — METOPROLOL TARTRATE 12.5 MG: 25 TABLET, FILM COATED ORAL at 08:24

## 2022-02-05 RX ADMIN — HYDROMORPHONE HYDROCHLORIDE 1 MG: 1 INJECTION, SOLUTION INTRAMUSCULAR; INTRAVENOUS; SUBCUTANEOUS at 10:57

## 2022-02-05 RX ADMIN — OXYBUTYNIN CHLORIDE 5 MG: 5 TABLET, EXTENDED RELEASE ORAL at 08:24

## 2022-02-05 RX ADMIN — HYDROMORPHONE HYDROCHLORIDE 1 MG: 1 INJECTION, SOLUTION INTRAMUSCULAR; INTRAVENOUS; SUBCUTANEOUS at 06:25

## 2022-02-05 RX ADMIN — TAMSULOSIN HYDROCHLORIDE 0.4 MG: 0.4 CAPSULE ORAL at 08:24

## 2022-02-05 RX ADMIN — FOLIC ACID 1 MG: 1 TABLET ORAL at 08:23

## 2022-02-05 RX ADMIN — HYDROMORPHONE HYDROCHLORIDE 1 MG: 1 INJECTION, SOLUTION INTRAMUSCULAR; INTRAVENOUS; SUBCUTANEOUS at 02:27

## 2022-02-05 RX ADMIN — FINASTERIDE 5 MG: 5 TABLET, FILM COATED ORAL at 08:24

## 2022-02-05 RX ADMIN — SODIUM BICARBONATE 650 MG: 650 TABLET ORAL at 08:23

## 2022-02-05 NOTE — DISCHARGE SUMMARY
Discharge Summary       PATIENT ID: Yael Mcgraw  MRN: [id number]   YOB: 1965    DATE OF ADMISSION: 2/2/2022  8:14 PM    DATE OF DISCHARGE: 2/5/22   PRIMARY CARE PROVIDER: Caridad Luna MD     ATTENDING PHYSICIAN: Livier Morales MD  DISCHARGING PROVIDER: Livier Morales MD    To contact this individual call [phone] and ask the  to page. If unavailable ask to be transferred the Adult Hospitalist Department. CONSULTATIONS: IP CONSULT TO UROLOGY  IP CONSULT TO NEPHROLOGY    PROCEDURES/SURGERIES: * No surgery found *    ADMITTING [address] [place] [address] COURSE:   Yael Mcgraw is a 60 y.o. male with history of BPH, alcohol abuse, chronic pancreatitis, history of rectal adenocarcinoma status post neoadjuvant chemoradiation and ileostomy, hypertension who presented to ER with complaints of abdominal pain. Chart review shows patient had presented to hospital on January 26 for similar concerns, who was diagnosed with acute cystitis and discharged with Keflex and recommendations for urology follow-up. He returned to [facility] with complaints of worsening symptoms. Had not follow-up with urology and stated his appointment was on day he returned to ER. The patient currently denies any fever, chills, chest pain, nausea, vomiting, cough, congestion, recent illness, palpitations, or dysuria.     Remarkable vitals on ER Presentations: Vital signs stable  Labs Remarkable for: Sodium 132, creatinine 4.83, albumin 2.1, WBC 15.4, platelets 554, hemoglobin 9.9, urinalysis with 2+ bacteria, small blood, large leukocyte esterase  ER Images: Noncontrast CT of abdomen pelvis showed chronic partial small bowel obstruction, new left-sided hydronephrosis and hydroureter as well as worsening chronic right-sided hydronephrosis and hydroureter and diffuse bladder wall thickening.   Patient was treated with Toradol, 1 L normal saline bolus, Zofran and Levaquin. Acute cystitis /Pyelonephritis  Bilateral hydronephrosis and hydroureter  Acute renal failure  -Admit to monitor on telemetry  -Continue twice daily cefepime  -Follow blood and urine cultures  -Keep n.p.o.  -Marrero catheter  -Urology and nephrology consult.     BPH  -Continue home Flomax and Proscar     Hypertension  -Home metoprolol     Underweight  -Home mirtazapine and multivitamins    Hospital course  Patient was admitted and urology and nephrology were consulted. Patient had Marrero placed by urology and renal function improved greatly. Patient was started on antibiotics for treatment of his UTI. Cultures were negative. The patient's renal function improved with the use of Marrero. Urology scheduled the patient for follow-up appointment for BPH and Marrero care on Tuesday. I discussed with the patient that he is following up with urology and will be taking the Marrero off. Actually patient was particularly happy about this, but he did accept today. Patient was discharged stable to follow-up with urology. DISCHARGE DIAGNOSES / PLAN:      1. Acute cystitis/pyelonephritis. Ruled out. Recently treated. 2. Bilateral hydronephrosis and hydroureter. Improved. Discharge with Marrero. 3. BPH. Stable. Discharge with Marrero and follow-up with urology. 4. Hypertension. Stable. 5. Malnutrition. Nutrition consult was placed. Patient was educated as to proper diet. 6. Cocaine use disorder.        PENDING TEST RESULTS:   At the time of discharge the following test results are still pending: none    FOLLOW UP APPOINTMENTS:    Follow-up Information     Follow up With Specialties Details Why [facility] on 2/8/2022 Dr Robb Kumar  at 9:20 am  [address] [facility]    Karyna Espinoza MD Internal Medicine In 1 week [facility] follow up [address] [facility]  [phone]             ADDITIONAL CARE RECOMMENDATIONS: none    DIET: Regular Diet    ACTIVITY: Activity as tolerated    WOUND CARE: none    EQUIPMENT needed: none      DISCHARGE MEDICATIONS:  Discharge Medication List as of 2/5/2022 11:40 AM      START taking these medications    Details   HYDROcodone-acetaminophen (Norco) 5-325 mg per tablet Take 1 Tablet by mouth every six (6) hours as needed for Pain for up to 2 days. Max Daily Amount: 4 Tablets., Normal, Disp-5 Tablet, R-0         CONTINUE these medications which have CHANGED    Details   metoprolol tartrate (LOPRESSOR) 25 mg tablet Take 0.5 Tablets by mouth two (2) times a day., Normal, Disp-60 Tablet, R-0         CONTINUE these medications which have NOT CHANGED    Details   tamsulosin (FLOMAX) 0.4 mg capsule Take 1 Capsule by mouth daily. , Normal, Disp-30 Capsule, R-1      thiamine mononitrate (B-1) 100 mg tablet Take 1 Tablet by mouth daily. , Normal, Disp-30 Tablet, R-1      folic acid (FOLVITE) 1 mg tablet Take 1 Tablet by mouth daily. , Normal, Disp-30 Tablet, R-1      finasteride (PROSCAR) 5 mg tablet Take 5 mg by mouth daily. , Historical Med      mirtazapine (REMERON) 15 mg tablet Take 15 mg by mouth nightly., Historical Med      famotidine (Pepcid) 20 mg tablet Take 20 mg by mouth nightly as needed for Heartburn., Historical Med         STOP taking these medications       cephALEXin (Keflex) 500 mg capsule Comments:   Reason for Stopping:                 NOTIFY YOUR PHYSICIAN FOR ANY OF THE FOLLOWING:   Fever over 101 degrees for 24 hours. Chest pain, shortness of breath, fever, chills, nausea, vomiting, diarrhea, change in mentation, falling, weakness, bleeding. Severe pain or pain not relieved by medications. Or, any other signs or symptoms that you may have questions about.     DISPOSITION:  X  Home With:   OT  PT  HH  RN       Long term SNF/Inpatient Rehab    Independent/assisted living    Hospice    Other:       PATIENT CONDITION AT DISCHARGE:     Functional status    Poor    X Deconditioned     Independent Cognition   X  Lucid     Forgetful     Dementia      Catheters/lines (plus indication)   X Marrero - BPH with bladder outlet obstruction    PICC     PEG     None      Code status    X Full code     DNR      PHYSICAL EXAMINATION AT DISCHARGE:  Constitutional:  No acute distress, cooperative, pleasant. Very thin. ENT:  Oral mucosa moist, oropharynx benign. Resp:  CTA bilaterally. No wheezing/rhonchi/rales. No accessory muscle use   CV:  Regular rhythm, normal rate, no murmurs, gallops, rubs    GI:  Soft, non distended, non tender. normoactive bowel sounds, no hepatosplenomegaly     Musculoskeletal:  No edema, warm, 2+ pulses throughout    Neurologic:  Moves all extremities.   AAOx3, CN II-XII reviewed         CHRONIC MEDICAL DIAGNOSES:  Problem List as of 2/5/2022 Date Reviewed: 9/1/2021          Codes Class Noted - Resolved    Hematuria ICD-10-CM: R31.9  ICD-9-CM: 599.70  9/1/2021 - Present        Hydronephrosis, bilateral ICD-10-CM: N13.30  ICD-9-CM: 141  8/31/2021 - Present        Pancreatitis ICD-10-CM: K85.90  ICD-9-CM: 578.5  12/20/2020 - Present        Acute pancreatitis ICD-10-CM: K85.90  ICD-9-CM: 185.4  12/20/2020 - Present        Seizure ([facility] ICD-10-CM: R56.9  ICD-9-CM: 780.39  12/31/2018 - Present        Alcohol abuse ICD-10-CM: F10.10  ICD-9-CM: 305.00  12/31/2018 - Present        Drug abuse ([facility] ICD-10-CM: F19.10  ICD-9-CM: 305.90  12/31/2018 - Present        Rectal cancer ([facility] ICD-10-CM: C20  ICD-9-CM: 154.1  12/31/2018 - Present        RESOLVED: ARF (acute renal failure) ([facility] ICD-10-CM: N17.9  ICD-9-CM: 584.9  2/2/2022 - 2/5/2022              Greater than 35 minutes were spent with the patient on counseling and coordination of care    Signed:   Wilbert Gambino MD  2/5/2022  4:20 PM

## 2022-02-05 NOTE — PROGRESS NOTES
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 Scr is now normal after placement of Marrero. It does not seem that patient has CKD  · Metabolic acidosis from TATE - improved with Na bicarb. PLAN:   · Keep Marrero in and f/u urology recommendations  · Renal panel in AM     Subjective/Interval History:   I have reviewed the flowsheet and previous days notes. ROS:A comprehensive review of systems was negative. Objective:   Vital Signs:    Visit Vitals  /85   Pulse 87   Temp 98.2 °F (36.8 °C)   Resp 18   Ht 5' 8\" (1.727 m)   Wt 52 kg (114 lb 9.6 oz)   SpO2 100%   BMI 17.42 kg/m²       O2 Device: None (Room air)       Temp (24hrs), Av.3 °F (36.8 °C), Min:97.8 °F (36.6 °C), Max:98.7 °F (37.1 °C)       Intake/Output:   Last shift:      No intake/output data recorded. Last 3 shifts:  1901 -  0700  In: 1491.3 [P.O.:800; I.V.:691.3]  Out: 1025 [Urine:1025]    Intake/Output Summary (Last 24 hours) at 2022 0941  Last data filed at 2022 1501  Gross per 24 hour   Intake 991.25 ml   Output 350 ml   Net 641.25 ml        Physical Exam:  General:    Alert, cooperative, no distress, appears stated age. Head:   Normocephalic, without obvious abnormality, atraumatic. Eyes:   Conjunctivae/corneas clear. Neck:  Supple, symmetrical,     no carotid bruit and no JVD. Lungs:   Clear to auscultation bilaterally. No Wheezing or Rhonchi. No rales. Chest wall:  No tenderness or deformity. No Accessory muscle use. Heart:   Regular rate and rhythm,  no murmur, rub or gallop. Abdomen:   Soft, non-tender. Not distended. Bowel sounds normal. No masses  Extremities: Extremities normal, atraumatic, No cyanosis. No edema. No clubbing  Skin:     Texture, turgor normal. No rashes or lesions. Not Jaundiced  Psych:  fair insight. Not depressed.   Not anxious or agitated. Neurologic: Normal strength, Alert and oriented X 3. DATA:  Labs:  Recent Labs     02/05/22 0254 02/04/22 0402 02/03/22 0221 02/02/22 2039 02/02/22  1531    139 134*   < > 132*   K 4.5 4.7 4.5   < > 4.6   * 113* 109*   < > 101   CO2 18* 21 18*   < > 18*   BUN 8 14 29*   < > 34*   CREA 1.01 1.37* 3.28*   < > 4.83*   CA 9.1 9.0 8.8   < > 8.9   ALB  --   --   --   --  2.1*    < > = values in this interval not displayed. Recent Labs     02/05/22 0254 02/04/22 0402 02/02/22  1531   WBC 15.1* 12.8* 15.4*   HGB 10.2* 9.9* 9.9*   HCT 29.7* 29.4* 28.6*   * 590* 692*     No results for input(s): TESHA, KU, CLU, CREAU in the last 72 hours.     No lab exists for component: PROU    Total time spent with patient:  35 minutes    [] Critical Care Provided    Care Plan discussed with:   Staff, Medical Team    Tomi Reyes MD

## 2022-02-05 NOTE — PROGRESS NOTES
Dc instructions reviewed with patient had no questions or concerns. Marrero supplies provided and education completed, answered questions as needed. Transport set up 1430.  piv removed

## 2022-02-05 NOTE — PROGRESS NOTES
Transition of Care Plan   RUR- medium   DISPOSITION: Home   pending medical progression   F/U with PCP/Specialist     Transport: Round Trip            Round Trip was completed to get patient home safely.       Jimena Mortensen  12:04 PM

## 2022-02-05 NOTE — PROGRESS NOTES
Bedside and Verbal shift change report given to Pam Fraire (oncoming nurse) by Dawson Peck (offgoing nurse). Report included the following information SBAR, Kardex, Intake/Output, MAR and Recent Results.

## 2022-03-19 PROBLEM — N13.30 HYDRONEPHROSIS, BILATERAL: Status: ACTIVE | Noted: 2021-08-31

## 2022-03-19 PROBLEM — K85.90 ACUTE PANCREATITIS: Status: ACTIVE | Noted: 2020-12-20

## 2022-03-19 PROBLEM — R56.9 SEIZURE (HCC): Status: ACTIVE | Noted: 2018-12-31

## 2022-03-19 PROBLEM — R31.9 HEMATURIA: Status: ACTIVE | Noted: 2021-09-01

## 2022-03-19 PROBLEM — F10.10 ALCOHOL ABUSE: Status: ACTIVE | Noted: 2018-12-31

## 2022-03-19 PROBLEM — K85.90 PANCREATITIS: Status: ACTIVE | Noted: 2020-12-20

## 2022-03-19 PROBLEM — F19.10 DRUG ABUSE (HCC): Status: ACTIVE | Noted: 2018-12-31

## 2022-03-19 PROBLEM — C20 RECTAL CANCER (HCC): Status: ACTIVE | Noted: 2018-12-31

## 2022-06-03 ENCOUNTER — HOSPITAL ENCOUNTER (EMERGENCY)
Age: 57
Discharge: HOME | End: 2022-06-03
Attending: EMERGENCY MEDICINE
Payer: MEDICARE

## 2022-06-03 ENCOUNTER — APPOINTMENT (OUTPATIENT)
Dept: CT IMAGING | Age: 57
End: 2022-06-03
Attending: EMERGENCY MEDICINE
Payer: MEDICARE

## 2022-06-03 VITALS
DIASTOLIC BLOOD PRESSURE: 78 MMHG | TEMPERATURE: 97.6 F | SYSTOLIC BLOOD PRESSURE: 139 MMHG | WEIGHT: 120 LBS | HEART RATE: 75 BPM | BODY MASS INDEX: 18.19 KG/M2 | OXYGEN SATURATION: 100 % | RESPIRATION RATE: 16 BRPM | HEIGHT: 68 IN

## 2022-06-03 DIAGNOSIS — T83.511A URINARY TRACT INFECTION ASSOCIATED WITH INDWELLING URETHRAL CATHETER, INITIAL ENCOUNTER (HCC): Primary | ICD-10-CM

## 2022-06-03 DIAGNOSIS — E83.51 HYPOCALCEMIA: ICD-10-CM

## 2022-06-03 DIAGNOSIS — R19.7 DIARRHEA, UNSPECIFIED TYPE: ICD-10-CM

## 2022-06-03 DIAGNOSIS — R10.32 ABDOMINAL PAIN, LLQ (LEFT LOWER QUADRANT): ICD-10-CM

## 2022-06-03 DIAGNOSIS — E83.42 HYPOMAGNESEMIA: ICD-10-CM

## 2022-06-03 DIAGNOSIS — N39.0 URINARY TRACT INFECTION ASSOCIATED WITH INDWELLING URETHRAL CATHETER, INITIAL ENCOUNTER (HCC): Primary | ICD-10-CM

## 2022-06-03 LAB
ALBUMIN SERPL-MCNC: 2.6 G/DL (ref 3.5–5)
ALBUMIN/GLOB SERPL: 0.7 {RATIO} (ref 1.1–2.2)
ALP SERPL-CCNC: 68 U/L (ref 45–117)
ALT SERPL-CCNC: 17 U/L (ref 12–78)
ANION GAP SERPL CALC-SCNC: 7 MMOL/L (ref 5–15)
APPEARANCE UR: ABNORMAL
AST SERPL-CCNC: 16 U/L (ref 15–37)
BACTERIA URNS QL MICRO: ABNORMAL /HPF
BASOPHILS # BLD: 0 K/UL (ref 0–0.1)
BASOPHILS NFR BLD: 1 % (ref 0–1)
BILIRUB SERPL-MCNC: 0.3 MG/DL (ref 0.2–1)
BILIRUB UR QL: NEGATIVE
BUN SERPL-MCNC: 12 MG/DL (ref 6–20)
BUN/CREAT SERPL: 13 (ref 12–20)
CALCIUM SERPL-MCNC: 6.9 MG/DL (ref 8.5–10.1)
CHLORIDE SERPL-SCNC: 106 MMOL/L (ref 97–108)
CO2 SERPL-SCNC: 27 MMOL/L (ref 21–32)
COLOR UR: ABNORMAL
CREAT SERPL-MCNC: 0.89 MG/DL (ref 0.7–1.3)
DIFFERENTIAL METHOD BLD: ABNORMAL
EOSINOPHIL # BLD: 0.2 K/UL (ref 0–0.4)
EOSINOPHIL NFR BLD: 2 % (ref 0–7)
EPITH CASTS URNS QL MICRO: ABNORMAL /LPF
ERYTHROCYTE [DISTWIDTH] IN BLOOD BY AUTOMATED COUNT: 15.9 % (ref 11.5–14.5)
GLOBULIN SER CALC-MCNC: 4 G/DL (ref 2–4)
GLUCOSE SERPL-MCNC: 96 MG/DL (ref 65–100)
GLUCOSE UR STRIP.AUTO-MCNC: NEGATIVE MG/DL
HCT VFR BLD AUTO: 28 % (ref 36.6–50.3)
HGB BLD-MCNC: 9.3 G/DL (ref 12.1–17)
HGB UR QL STRIP: ABNORMAL
IMM GRANULOCYTES # BLD AUTO: 0 K/UL (ref 0–0.04)
IMM GRANULOCYTES NFR BLD AUTO: 0 % (ref 0–0.5)
KETONES UR QL STRIP.AUTO: NEGATIVE MG/DL
LEUKOCYTE ESTERASE UR QL STRIP.AUTO: ABNORMAL
LYMPHOCYTES # BLD: 1.5 K/UL (ref 0.8–3.5)
LYMPHOCYTES NFR BLD: 18 % (ref 12–49)
MAGNESIUM SERPL-MCNC: 1.4 MG/DL (ref 1.6–2.4)
MCH RBC QN AUTO: 30.2 PG (ref 26–34)
MCHC RBC AUTO-ENTMCNC: 33.2 G/DL (ref 30–36.5)
MCV RBC AUTO: 90.9 FL (ref 80–99)
MONOCYTES # BLD: 0.9 K/UL (ref 0–1)
MONOCYTES NFR BLD: 11 % (ref 5–13)
NEUTS SEG # BLD: 5.5 K/UL (ref 1.8–8)
NEUTS SEG NFR BLD: 68 % (ref 32–75)
NITRITE UR QL STRIP.AUTO: POSITIVE
NRBC # BLD: 0 K/UL (ref 0–0.01)
NRBC BLD-RTO: 0 PER 100 WBC
PH UR STRIP: 6 [PH] (ref 5–8)
PLATELET # BLD AUTO: 190 K/UL (ref 150–400)
PMV BLD AUTO: 10.3 FL (ref 8.9–12.9)
POTASSIUM SERPL-SCNC: 4.3 MMOL/L (ref 3.5–5.1)
PROT SERPL-MCNC: 6.6 G/DL (ref 6.4–8.2)
PROT UR STRIP-MCNC: 30 MG/DL
RBC # BLD AUTO: 3.08 M/UL (ref 4.1–5.7)
RBC #/AREA URNS HPF: ABNORMAL /HPF (ref 0–5)
SODIUM SERPL-SCNC: 140 MMOL/L (ref 136–145)
SP GR UR REFRACTOMETRY: 1.01
UROBILINOGEN UR QL STRIP.AUTO: 1 EU/DL (ref 0.2–1)
WBC # BLD AUTO: 8.1 K/UL (ref 4.1–11.1)
WBC URNS QL MICRO: ABNORMAL /HPF (ref 0–4)
YEAST URNS QL MICRO: PRESENT

## 2022-06-03 PROCEDURE — 96375 TX/PRO/DX INJ NEW DRUG ADDON: CPT

## 2022-06-03 PROCEDURE — 96376 TX/PRO/DX INJ SAME DRUG ADON: CPT

## 2022-06-03 PROCEDURE — 74011000250 HC RX REV CODE- 250: Performed by: EMERGENCY MEDICINE

## 2022-06-03 PROCEDURE — 85025 COMPLETE CBC W/AUTO DIFF WBC: CPT

## 2022-06-03 PROCEDURE — 96366 THER/PROPH/DIAG IV INF ADDON: CPT

## 2022-06-03 PROCEDURE — 74011250637 HC RX REV CODE- 250/637: Performed by: EMERGENCY MEDICINE

## 2022-06-03 PROCEDURE — 96360 HYDRATION IV INFUSION INIT: CPT

## 2022-06-03 PROCEDURE — 96361 HYDRATE IV INFUSION ADD-ON: CPT

## 2022-06-03 PROCEDURE — 81001 URINALYSIS AUTO W/SCOPE: CPT

## 2022-06-03 PROCEDURE — 99285 EMERGENCY DEPT VISIT HI MDM: CPT

## 2022-06-03 PROCEDURE — 87086 URINE CULTURE/COLONY COUNT: CPT

## 2022-06-03 PROCEDURE — 51702 INSERT TEMP BLADDER CATH: CPT

## 2022-06-03 PROCEDURE — 83735 ASSAY OF MAGNESIUM: CPT

## 2022-06-03 PROCEDURE — 87077 CULTURE AEROBIC IDENTIFY: CPT

## 2022-06-03 PROCEDURE — 74011250636 HC RX REV CODE- 250/636: Performed by: EMERGENCY MEDICINE

## 2022-06-03 PROCEDURE — 74011250636 HC RX REV CODE- 250/636: Performed by: STUDENT IN AN ORGANIZED HEALTH CARE EDUCATION/TRAINING PROGRAM

## 2022-06-03 PROCEDURE — 74177 CT ABD & PELVIS W/CONTRAST: CPT

## 2022-06-03 PROCEDURE — 96365 THER/PROPH/DIAG IV INF INIT: CPT

## 2022-06-03 PROCEDURE — 80053 COMPREHEN METABOLIC PANEL: CPT

## 2022-06-03 PROCEDURE — 74011000636 HC RX REV CODE- 636: Performed by: EMERGENCY MEDICINE

## 2022-06-03 PROCEDURE — 87186 SC STD MICRODIL/AGAR DIL: CPT

## 2022-06-03 RX ORDER — CALCIUM CARBONATE 500(1250)
500 TABLET ORAL
Status: COMPLETED | OUTPATIENT
Start: 2022-06-03 | End: 2022-06-03

## 2022-06-03 RX ORDER — NAPROXEN 500 MG/1
500 TABLET ORAL 2 TIMES DAILY WITH MEALS
Qty: 20 TABLET | Refills: 0 | Status: SHIPPED | OUTPATIENT
Start: 2022-06-03 | End: 2022-06-13

## 2022-06-03 RX ORDER — MORPHINE SULFATE 4 MG/ML
4 INJECTION INTRAVENOUS
Status: COMPLETED | OUTPATIENT
Start: 2022-06-03 | End: 2022-06-03

## 2022-06-03 RX ORDER — MAGNESIUM SULFATE HEPTAHYDRATE 40 MG/ML
2 INJECTION, SOLUTION INTRAVENOUS
Status: COMPLETED | OUTPATIENT
Start: 2022-06-03 | End: 2022-06-03

## 2022-06-03 RX ORDER — CEPHALEXIN 500 MG/1
500 CAPSULE ORAL 4 TIMES DAILY
Qty: 40 CAPSULE | Refills: 0 | Status: SHIPPED | OUTPATIENT
Start: 2022-06-03 | End: 2022-06-05 | Stop reason: ALTCHOICE

## 2022-06-03 RX ORDER — FAMOTIDINE 20 MG/1
20 TABLET, FILM COATED ORAL
Qty: 20 TABLET | Refills: 0 | Status: SHIPPED | OUTPATIENT
Start: 2022-06-03 | End: 2022-06-23

## 2022-06-03 RX ORDER — MORPHINE SULFATE 4 MG/ML
4 INJECTION INTRAVENOUS ONCE
Status: COMPLETED | OUTPATIENT
Start: 2022-06-03 | End: 2022-06-03

## 2022-06-03 RX ORDER — LOPERAMIDE HCL 2 MG
2 TABLET ORAL
Qty: 16 TABLET | Refills: 0 | Status: SHIPPED | OUTPATIENT
Start: 2022-06-03 | End: 2022-06-13

## 2022-06-03 RX ADMIN — MAGNESIUM SULFATE HEPTAHYDRATE 2 G: 2 INJECTION, SOLUTION INTRAVENOUS at 06:09

## 2022-06-03 RX ADMIN — WATER 1 G: 1 INJECTION INTRAMUSCULAR; INTRAVENOUS; SUBCUTANEOUS at 06:09

## 2022-06-03 RX ADMIN — SODIUM CHLORIDE 1000 ML: 9 INJECTION, SOLUTION INTRAVENOUS at 04:49

## 2022-06-03 RX ADMIN — MORPHINE SULFATE 4 MG: 4 INJECTION INTRAVENOUS at 08:29

## 2022-06-03 RX ADMIN — MORPHINE SULFATE 4 MG: 4 INJECTION INTRAVENOUS at 05:46

## 2022-06-03 RX ADMIN — IOPAMIDOL 100 ML: 755 INJECTION, SOLUTION INTRAVENOUS at 07:51

## 2022-06-03 RX ADMIN — CALCIUM 500 MG: 500 TABLET ORAL at 07:06

## 2022-06-03 NOTE — ED NOTES
Bedside shift change report given to AERIAL RN  (oncoming nurse) by [facility] LIZZ BENITEZ RN  (offgoing nurse). Report included the following information SBAR, ED Summary, Intake/Output, MAR and Recent Results. Chaperone at bedside for placement of new mondragon catheter at this time. Patient c/o continued pain. Patient states that he has appointment with urologist at 08:30 this morning, states he doesn't think he'll make it to the appointment today. Patient c/o continued pains. intact

## 2022-06-03 NOTE — ED NOTES
Patient is AOx4. Respirations are even and unlabored. Skin is warm and dry. Patient to ED for diarrhea and stomach cramping since yesterday. Patient also complaining of rectal pain from wiping so frequently. Patient states he feels very week, and had a similar episode a week ago that lasted x3days. Pt has an indwelling mondragon he states has been in x3 months, was supposed to have changed this morning. Patient reports his urine appears very cloudy lately. Hx of rectal CA. Bed in lowest locked position, call bell within reach. Assessment complete, patient update on plan of care. Emergency Department Nursing Plan of Care       The Nursing Plan of Care is developed from the Nursing assessment and Emergency Department Attending provider initial evaluation. The plan of care may be reviewed in the ED Provider note.     The Plan of Care was developed with the following considerations:   Patient / Family readiness to learn indicated by:verbalized understanding  Persons(s) to be included in education: patient  Barriers to Learning/Limitations:No    Signed     Bennie Echevarria RN    6/3/2022   3:54 AM

## 2022-06-03 NOTE — ED NOTES
Verbal shift change report given to EHSAN QUINTEROS (oncoming nurse) by Teddi Dandy, RN (offgoing nurse). Report included the following information SBAR, ED Summary, MAR and Recent Results.

## 2022-06-03 NOTE — ED NOTES
Patient complaining of Magnesium burning in IV. This RN verified IV was not infiltrated, spoke w/ Brody García in [place] he advised I could slow down rate. Brody García will also look into the [facility] to see if it's somewhere else in [facility].

## 2022-06-03 NOTE — ED NOTES
This RN and [employer], RN removed patient's mondragon of 3 months, and placed a new mondragon. Will convert to leg bag on discharge.

## 2022-06-03 NOTE — ED TRIAGE NOTES
Diarrhea and abdominal pain x 2 days. Pt reports he has had this issue previously causing significant weight loss. Pt is able to eat and drink. Denies N/V/F. Pt does not having a urinary catheter in that is to be changed today with urology.  Reports his urine has been cloudy

## 2022-06-03 NOTE — LETTER
[facility] EMERGENCY DEPT  [address] [address] [phone]    Work/School Note    Date: 6/3/2022    To Whom It May concern:    Deanna Murry was seen and treated today in the emergency room by the following provider(s):  Attending Provider: Loreli Runner, MD.      Deanna Murry may return to work on 6/5/2022.     Sincerely,                  Aerial RN

## 2022-06-03 NOTE — ED NOTES
Bibiana Gaytan from lab stated we needed to redraw the lactic blood work. IV does not pull back and patient has been stuck multiple times. MD stated it was okay to discontinue lactic order.

## 2022-06-03 NOTE — ED NOTES
Patient (s)  given copy of dc instructions and 0 paper script(s) and 7 electronic scripts. Patient (s)  verbalized understanding of instructions and script (s). Patient given a current medication reconciliation form and verbalized understanding of their medications. Patient (s) verbalized understanding of the importance of discussing medications with  his or her physician or clinic they will be following up with. Patient alert and oriented and in no acute distress. Patient offered wheelchair from treatment area to hospital entrance, patient declines wheelchair.

## 2022-06-05 LAB
BACTERIA SPEC CULT: ABNORMAL
CC UR VC: ABNORMAL
SERVICE CMNT-IMP: ABNORMAL

## 2022-06-05 RX ORDER — NITROFURANTOIN 25; 75 MG/1; MG/1
100 CAPSULE ORAL 2 TIMES DAILY
Qty: 10 CAPSULE | Refills: 0 | Status: SHIPPED | OUTPATIENT
Start: 2022-06-05 | End: 2022-06-10

## 2022-09-09 ENCOUNTER — APPOINTMENT (OUTPATIENT)
Dept: GENERAL RADIOLOGY | Age: 57
DRG: 389 | End: 2022-09-09
Attending: INTERNAL MEDICINE
Payer: MEDICARE

## 2022-09-09 ENCOUNTER — HOSPITAL ENCOUNTER (INPATIENT)
Age: 57
LOS: 4 days | Discharge: HOME | DRG: 389 | End: 2022-09-13
Attending: FAMILY MEDICINE | Admitting: INTERNAL MEDICINE
Payer: MEDICARE

## 2022-09-09 ENCOUNTER — HOSPITAL ENCOUNTER (EMERGENCY)
Age: 57
Discharge: OTHER INSTITUTION | End: 2022-09-09
Attending: STUDENT IN AN ORGANIZED HEALTH CARE EDUCATION/TRAINING PROGRAM
Payer: MEDICARE

## 2022-09-09 ENCOUNTER — APPOINTMENT (OUTPATIENT)
Dept: CT IMAGING | Age: 57
End: 2022-09-09
Attending: NURSE PRACTITIONER
Payer: MEDICARE

## 2022-09-09 VITALS
HEIGHT: 68 IN | HEART RATE: 85 BPM | OXYGEN SATURATION: 100 % | WEIGHT: 120 LBS | SYSTOLIC BLOOD PRESSURE: 152 MMHG | RESPIRATION RATE: 16 BRPM | TEMPERATURE: 98 F | BODY MASS INDEX: 18.19 KG/M2 | DIASTOLIC BLOOD PRESSURE: 82 MMHG

## 2022-09-09 DIAGNOSIS — K56.609 SMALL BOWEL OBSTRUCTION (HCC): Primary | ICD-10-CM

## 2022-09-09 LAB
ALBUMIN SERPL-MCNC: 3.2 G/DL (ref 3.5–5)
ALBUMIN/GLOB SERPL: 0.8 {RATIO} (ref 1.1–2.2)
ALP SERPL-CCNC: 92 U/L (ref 45–117)
ALT SERPL-CCNC: 24 U/L (ref 12–78)
ANION GAP SERPL CALC-SCNC: 7 MMOL/L (ref 5–15)
APPEARANCE UR: ABNORMAL
AST SERPL-CCNC: 24 U/L (ref 15–37)
BACTERIA URNS QL MICRO: ABNORMAL /HPF
BASOPHILS # BLD: 0 K/UL (ref 0–0.1)
BASOPHILS NFR BLD: 0 % (ref 0–1)
BILIRUB SERPL-MCNC: 0.4 MG/DL (ref 0.2–1)
BILIRUB UR QL CFM: NEGATIVE
BUN SERPL-MCNC: 11 MG/DL (ref 6–20)
BUN/CREAT SERPL: 10 (ref 12–20)
CALCIUM SERPL-MCNC: 8.8 MG/DL (ref 8.5–10.1)
CHLORIDE SERPL-SCNC: 102 MMOL/L (ref 97–108)
CO2 SERPL-SCNC: 30 MMOL/L (ref 21–32)
COLOR UR: ABNORMAL
CREAT SERPL-MCNC: 1.05 MG/DL (ref 0.7–1.3)
DIFFERENTIAL METHOD BLD: ABNORMAL
EOSINOPHIL # BLD: 0.1 K/UL (ref 0–0.4)
EOSINOPHIL NFR BLD: 1 % (ref 0–7)
EPITH CASTS URNS QL MICRO: ABNORMAL /LPF
ERYTHROCYTE [DISTWIDTH] IN BLOOD BY AUTOMATED COUNT: 14.4 % (ref 11.5–14.5)
GLOBULIN SER CALC-MCNC: 3.9 G/DL (ref 2–4)
GLUCOSE SERPL-MCNC: 132 MG/DL (ref 65–100)
GLUCOSE UR STRIP.AUTO-MCNC: NEGATIVE MG/DL
HCT VFR BLD AUTO: 32.8 % (ref 36.6–50.3)
HGB BLD-MCNC: 10.4 G/DL (ref 12.1–17)
HGB UR QL STRIP: ABNORMAL
IMM GRANULOCYTES # BLD AUTO: 0 K/UL (ref 0–0.04)
IMM GRANULOCYTES NFR BLD AUTO: 0 % (ref 0–0.5)
KETONES UR QL STRIP.AUTO: ABNORMAL MG/DL
LEUKOCYTE ESTERASE UR QL STRIP.AUTO: ABNORMAL
LIPASE SERPL-CCNC: 455 U/L (ref 73–393)
LYMPHOCYTES # BLD: 2.1 K/UL (ref 0.8–3.5)
LYMPHOCYTES NFR BLD: 24 % (ref 12–49)
MCH RBC QN AUTO: 28.7 PG (ref 26–34)
MCHC RBC AUTO-ENTMCNC: 31.7 G/DL (ref 30–36.5)
MCV RBC AUTO: 90.4 FL (ref 80–99)
MONOCYTES # BLD: 0.7 K/UL (ref 0–1)
MONOCYTES NFR BLD: 7 % (ref 5–13)
NEUTS SEG # BLD: 6 K/UL (ref 1.8–8)
NEUTS SEG NFR BLD: 68 % (ref 32–75)
NITRITE UR QL STRIP.AUTO: POSITIVE
NRBC # BLD: 0 K/UL (ref 0–0.01)
NRBC BLD-RTO: 0 PER 100 WBC
PH UR STRIP: 6 [PH] (ref 5–8)
PLATELET # BLD AUTO: 226 K/UL (ref 150–400)
PMV BLD AUTO: 10.3 FL (ref 8.9–12.9)
POTASSIUM SERPL-SCNC: 3.7 MMOL/L (ref 3.5–5.1)
PROT SERPL-MCNC: 7.1 G/DL (ref 6.4–8.2)
PROT UR STRIP-MCNC: 300 MG/DL
RBC # BLD AUTO: 3.63 M/UL (ref 4.1–5.7)
RBC #/AREA URNS HPF: >100 /HPF (ref 0–5)
SODIUM SERPL-SCNC: 139 MMOL/L (ref 136–145)
SP GR UR REFRACTOMETRY: 1.02
UA: UC IF INDICATED,UAUC: ABNORMAL
UROBILINOGEN UR QL STRIP.AUTO: 1 EU/DL (ref 0.2–1)
WBC # BLD AUTO: 8.9 K/UL (ref 4.1–11.1)
WBC URNS QL MICRO: >100 /HPF (ref 0–4)

## 2022-09-09 PROCEDURE — 81001 URINALYSIS AUTO W/SCOPE: CPT

## 2022-09-09 PROCEDURE — 74011250636 HC RX REV CODE- 250/636: Performed by: INTERNAL MEDICINE

## 2022-09-09 PROCEDURE — 36415 COLL VENOUS BLD VENIPUNCTURE: CPT

## 2022-09-09 PROCEDURE — 87186 SC STD MICRODIL/AGAR DIL: CPT

## 2022-09-09 PROCEDURE — 85025 COMPLETE CBC W/AUTO DIFF WBC: CPT

## 2022-09-09 PROCEDURE — 71045 X-RAY EXAM CHEST 1 VIEW: CPT

## 2022-09-09 PROCEDURE — 65270000029 HC RM PRIVATE

## 2022-09-09 PROCEDURE — 96375 TX/PRO/DX INJ NEW DRUG ADDON: CPT

## 2022-09-09 PROCEDURE — 74011250636 HC RX REV CODE- 250/636: Performed by: NURSE PRACTITIONER

## 2022-09-09 PROCEDURE — 87086 URINE CULTURE/COLONY COUNT: CPT

## 2022-09-09 PROCEDURE — 74011000250 HC RX REV CODE- 250: Performed by: INTERNAL MEDICINE

## 2022-09-09 PROCEDURE — 83690 ASSAY OF LIPASE: CPT

## 2022-09-09 PROCEDURE — C9113 INJ PANTOPRAZOLE SODIUM, VIA: HCPCS | Performed by: INTERNAL MEDICINE

## 2022-09-09 PROCEDURE — 74176 CT ABD & PELVIS W/O CONTRAST: CPT

## 2022-09-09 PROCEDURE — 96374 THER/PROPH/DIAG INJ IV PUSH: CPT

## 2022-09-09 PROCEDURE — 74011000250 HC RX REV CODE- 250: Performed by: NURSE PRACTITIONER

## 2022-09-09 PROCEDURE — 99285 EMERGENCY DEPT VISIT HI MDM: CPT

## 2022-09-09 PROCEDURE — 80053 COMPREHEN METABOLIC PANEL: CPT

## 2022-09-09 PROCEDURE — 87077 CULTURE AEROBIC IDENTIFY: CPT

## 2022-09-09 RX ORDER — ONDANSETRON 2 MG/ML
4 INJECTION INTRAMUSCULAR; INTRAVENOUS
Status: DISCONTINUED | OUTPATIENT
Start: 2022-09-09 | End: 2022-09-13 | Stop reason: HOSPADM

## 2022-09-09 RX ORDER — ACETAMINOPHEN 325 MG/1
650 TABLET ORAL
Status: DISCONTINUED | OUTPATIENT
Start: 2022-09-09 | End: 2022-09-13 | Stop reason: HOSPADM

## 2022-09-09 RX ORDER — IBUPROFEN 200 MG
1 TABLET ORAL DAILY
Status: DISCONTINUED | OUTPATIENT
Start: 2022-09-10 | End: 2022-09-13 | Stop reason: HOSPADM

## 2022-09-09 RX ORDER — SODIUM CHLORIDE 9 MG/ML
500 INJECTION, SOLUTION INTRAVENOUS ONCE
Status: COMPLETED | OUTPATIENT
Start: 2022-09-09 | End: 2022-09-09

## 2022-09-09 RX ORDER — SODIUM CHLORIDE, SODIUM LACTATE, POTASSIUM CHLORIDE, CALCIUM CHLORIDE 600; 310; 30; 20 MG/100ML; MG/100ML; MG/100ML; MG/100ML
150 INJECTION, SOLUTION INTRAVENOUS
Status: DISCONTINUED | OUTPATIENT
Start: 2022-09-09 | End: 2022-09-09 | Stop reason: HOSPADM

## 2022-09-09 RX ORDER — SODIUM CHLORIDE 0.9 % (FLUSH) 0.9 %
5-40 SYRINGE (ML) INJECTION AS NEEDED
Status: DISCONTINUED | OUTPATIENT
Start: 2022-09-09 | End: 2022-09-13 | Stop reason: HOSPADM

## 2022-09-09 RX ORDER — ACETAMINOPHEN 650 MG/1
650 SUPPOSITORY RECTAL
Status: DISCONTINUED | OUTPATIENT
Start: 2022-09-09 | End: 2022-09-13 | Stop reason: HOSPADM

## 2022-09-09 RX ORDER — ONDANSETRON 2 MG/ML
4 INJECTION INTRAMUSCULAR; INTRAVENOUS
Status: COMPLETED | OUTPATIENT
Start: 2022-09-09 | End: 2022-09-09

## 2022-09-09 RX ORDER — KETOROLAC TROMETHAMINE 30 MG/ML
30 INJECTION, SOLUTION INTRAMUSCULAR; INTRAVENOUS
Status: COMPLETED | OUTPATIENT
Start: 2022-09-09 | End: 2022-09-09

## 2022-09-09 RX ORDER — ENOXAPARIN SODIUM 100 MG/ML
40 INJECTION SUBCUTANEOUS DAILY
Status: DISCONTINUED | OUTPATIENT
Start: 2022-09-10 | End: 2022-09-13 | Stop reason: HOSPADM

## 2022-09-09 RX ORDER — SODIUM CHLORIDE, SODIUM LACTATE, POTASSIUM CHLORIDE, CALCIUM CHLORIDE 600; 310; 30; 20 MG/100ML; MG/100ML; MG/100ML; MG/100ML
150 INJECTION, SOLUTION INTRAVENOUS CONTINUOUS
Status: DISCONTINUED | OUTPATIENT
Start: 2022-09-09 | End: 2022-09-11

## 2022-09-09 RX ORDER — HYDROMORPHONE HYDROCHLORIDE 1 MG/ML
1 INJECTION, SOLUTION INTRAMUSCULAR; INTRAVENOUS; SUBCUTANEOUS
Status: DISCONTINUED | OUTPATIENT
Start: 2022-09-09 | End: 2022-09-13 | Stop reason: HOSPADM

## 2022-09-09 RX ORDER — MORPHINE SULFATE 2 MG/ML
2 INJECTION, SOLUTION INTRAMUSCULAR; INTRAVENOUS
Status: COMPLETED | OUTPATIENT
Start: 2022-09-09 | End: 2022-09-09

## 2022-09-09 RX ORDER — ONDANSETRON 4 MG/1
4 TABLET, ORALLY DISINTEGRATING ORAL
Status: DISCONTINUED | OUTPATIENT
Start: 2022-09-09 | End: 2022-09-13 | Stop reason: HOSPADM

## 2022-09-09 RX ORDER — SODIUM CHLORIDE 0.9 % (FLUSH) 0.9 %
5-40 SYRINGE (ML) INJECTION EVERY 8 HOURS
Status: DISCONTINUED | OUTPATIENT
Start: 2022-09-09 | End: 2022-09-13 | Stop reason: HOSPADM

## 2022-09-09 RX ORDER — HYDRALAZINE HYDROCHLORIDE 20 MG/ML
10 INJECTION INTRAMUSCULAR; INTRAVENOUS
Status: DISCONTINUED | OUTPATIENT
Start: 2022-09-09 | End: 2022-09-13 | Stop reason: HOSPADM

## 2022-09-09 RX ADMIN — SODIUM CHLORIDE, POTASSIUM CHLORIDE, SODIUM LACTATE AND CALCIUM CHLORIDE 150 ML/HR: 600; 310; 30; 20 INJECTION, SOLUTION INTRAVENOUS at 22:29

## 2022-09-09 RX ADMIN — MORPHINE SULFATE 2 MG: 2 INJECTION, SOLUTION INTRAMUSCULAR; INTRAVENOUS at 15:36

## 2022-09-09 RX ADMIN — HYDROMORPHONE HYDROCHLORIDE 1 MG: 1 INJECTION, SOLUTION INTRAMUSCULAR; INTRAVENOUS; SUBCUTANEOUS at 22:29

## 2022-09-09 RX ADMIN — KETOROLAC TROMETHAMINE 30 MG: 30 INJECTION, SOLUTION INTRAMUSCULAR; INTRAVENOUS at 14:26

## 2022-09-09 RX ADMIN — ONDANSETRON 4 MG: 2 INJECTION INTRAMUSCULAR; INTRAVENOUS at 15:35

## 2022-09-09 RX ADMIN — SODIUM CHLORIDE 500 ML: 9 INJECTION, SOLUTION INTRAVENOUS at 15:36

## 2022-09-09 RX ADMIN — SODIUM CHLORIDE, PRESERVATIVE FREE 10 ML: 5 INJECTION INTRAVENOUS at 22:28

## 2022-09-09 RX ADMIN — SODIUM CHLORIDE, PRESERVATIVE FREE 1 G: 5 INJECTION INTRAVENOUS at 22:27

## 2022-09-09 RX ADMIN — ONDANSETRON 4 MG: 2 INJECTION INTRAMUSCULAR; INTRAVENOUS at 14:16

## 2022-09-09 RX ADMIN — CEFTRIAXONE SODIUM 1 G: 1 INJECTION, POWDER, FOR SOLUTION INTRAMUSCULAR; INTRAVENOUS at 14:28

## 2022-09-09 RX ADMIN — SODIUM CHLORIDE, PRESERVATIVE FREE 40 MG: 5 INJECTION INTRAVENOUS at 22:26

## 2022-09-09 NOTE — ED NOTES
RN attempted to call reports to [facility]. RN unable to receive report a this time, requested to return call. ED charge RN notified. Will wait call back. 17:20 - TRANSFER - OUT REPORT:    Verbal report given to [facility]) on Portia Steen  being transferred to Telemetry Unit Room 442(unit) for routine progression of care       Report consisted of patients Situation, Background, Assessment and   Recommendations(SBAR). Information from the following report(s) SBAR, Kardex, ED Summary, Intake/Output, MAR, Recent Results, and Cardiac Rhythm SR  was reviewed with the receiving nurse. Lines:   Venous Access Device Alma Cath (Active)       Venous Access Device Venous Access Device 09/09/22 Upper chest (subclavicular area, right (Active)   Access Time (Medial Site) 1240 09/09/22 1240   Access Needle Size (Site #1) 20 G 09/09/22 1240   Access Needle Length (Medial Site) 1 inch 09/09/22 1240   Positive Blood Return (Medial Site) Yes 09/09/22 1240   Action Taken (Medial Site) Blood drawn;Flushed 09/09/22 1240   Alcohol Cap Used Yes 09/09/22 1240        Opportunity for questions and clarification was provided. Patient transported with:   [facility] here for pt pickup. Pt is now leaving ED in stable condition. Kaden Berg at [facility] notified.

## 2022-09-09 NOTE — ED PROVIDER NOTES
EMERGENCY DEPARTMENT HISTORY AND PHYSICAL EXAM          Date: 9/9/2022  Patient Name: Maral Fajardo    History of Presenting Illness     Chief Complaint   Patient presents with    Abdominal Pain    Catheter Change         History Provided By: Patient    Chief Complaint: abdominal pain  Duration: onset 3 days ago  Timing:  Acute  Location: RUQ  Quality: Aching  Severity: 10 out of 10  Modifying Factors: none  Associated Symptoms:  vomiting diarrhea      HPI: Maral Fajardo is a 59 y.o. male with a PMH of  rectal cancer gastric ulcers  who presents with abdominal pain acute onset 3 days ago. Reports vomiting diarrhea. Also requests mondragon catheter change. ,states he gets his catheter changed once a month. PCP: Tyrese Bernard MD    Current Outpatient Medications   Medication Sig Dispense Refill    calcium carbonate 500 mg calcium (1,250 mg) capsule Take 1,250 mg by mouth two (2) times daily (with meals). 6 Capsule 0    lactobacillus combination no.4 3 billion cell cap Take 1 Capsule by mouth daily. 10 Capsule 0    metoprolol tartrate (LOPRESSOR) 25 mg tablet Take 0.5 Tablets by mouth two (2) times a day. 60 Tablet 0    tamsulosin (FLOMAX) 0.4 mg capsule Take 1 Capsule by mouth daily. 30 Capsule 1    thiamine mononitrate (B-1) 100 mg tablet Take 1 Tablet by mouth daily. 30 Tablet 1    folic acid (FOLVITE) 1 mg tablet Take 1 Tablet by mouth daily. 30 Tablet 1    finasteride (PROSCAR) 5 mg tablet Take 5 mg by mouth daily. mirtazapine (REMERON) 15 mg tablet Take 15 mg by mouth nightly. famotidine (Pepcid) 20 mg tablet Take 20 mg by mouth nightly as needed for Heartburn. Past History     Past Medical History:  Past Medical History:   Diagnosis Date    Cancer ([address]     rectal    Gastrointestinal disorder     Gastric Ulcers;  Rectal CA    Hematuria 9/1/2021    Hypertension        Past Surgical History:  Past Surgical History:   Procedure Laterality Date    HX GI         Family History:  Family History   Problem Relation Age of Onset    Cancer Brother        Social History:  Social History     Tobacco Use    Smoking status: Every Day     Packs/day: 0.25     Types: Cigarettes    Smokeless tobacco: Never    Tobacco comments:     \"1 pack last me three days\"   Substance Use Topics    Alcohol use: Not Currently     Comment: hasn't drank x3mo (6/3/22)    Drug use: Yes     Types: Marijuana     Comment: Occasional for appetite       Allergies:  No Known Allergies      Review of Systems   Review of Systems   Constitutional:  Positive for appetite change. Negative for chills, fatigue and fever. HENT:  Negative for congestion and sore throat. Eyes:  Negative for redness. Respiratory:  Negative for cough, chest tightness and wheezing. Cardiovascular:  Negative for chest pain. Gastrointestinal:  Positive for abdominal pain. Musculoskeletal:  Negative for arthralgias, back pain, myalgias, neck pain and neck stiffness. Skin:  Negative for rash. Neurological:  Negative for dizziness, syncope, weakness, light-headedness, numbness and headaches. Hematological:  Negative for adenopathy. Psychiatric/Behavioral:  Negative for agitation and behavioral problems. All other systems reviewed and are negative. Physical Exam     Vitals:    09/09/22 1550 09/09/22 1600 09/09/22 1615 09/09/22 1630   BP: (!) 149/78 138/76 126/73 112/76   Pulse:       Resp: 16 14 14 14   Temp:       SpO2: 100% 100% 100% 100%   Weight:       Height:         Physical Exam  Vitals and nursing note reviewed. Constitutional:       Comments: Thin frail appearing 59year old  male   HENT:      Head: Normocephalic and atraumatic. Right Ear: External ear normal.      Mouth/Throat:      Mouth: Mucous membranes are moist.   Eyes:      General:         Right eye: No discharge. Left eye: No discharge.       Conjunctiva/sclera: Conjunctivae normal.   Cardiovascular:      Rate and Rhythm: Normal rate and regular rhythm. Heart sounds: Normal heart sounds. Pulmonary:      Effort: Pulmonary effort is normal. No respiratory distress. Breath sounds: Normal breath sounds. No wheezing. Abdominal:      General: Bowel sounds are normal.      Palpations: Abdomen is soft. Tenderness: There is abdominal tenderness in the right upper quadrant. Genitourinary:     Comments: Marrero catheter  Musculoskeletal:         General: Normal range of motion. Cervical back: Normal range of motion and neck supple. Lymphadenopathy:      Cervical: No cervical adenopathy. Skin:     General: Skin is warm and dry. Neurological:      Mental Status: He is alert and oriented to person, place, and time. Cranial Nerves: No cranial nerve deficit. Psychiatric:         Behavior: Behavior normal.         Thought Content: Thought content normal.         Judgment: Judgment normal.         Diagnostic Study Results     Labs -     Recent Results (from the past 12 hour(s))   METABOLIC PANEL, COMPREHENSIVE    Collection Time: 09/09/22 11:56 AM   Result Value Ref Range    Sodium 139 136 - 145 mmol/L    Potassium 3.7 3.5 - 5.1 mmol/L    Chloride 102 97 - 108 mmol/L    CO2 30 21 - 32 mmol/L    Anion gap 7 5 - 15 mmol/L    Glucose 132 (H) 65 - 100 mg/dL    BUN 11 6 - 20 MG/DL    Creatinine 1.05 0.70 - 1.30 MG/DL    BUN/Creatinine ratio 10 (L) 12 - 20      GFR est AA >60 >60 ml/min/1.73m2    GFR est non-AA >60 >60 ml/min/1.73m2    Calcium 8.8 8.5 - 10.1 MG/DL    Bilirubin, total 0.4 0.2 - 1.0 MG/DL    ALT (SGPT) 24 12 - 78 U/L    AST (SGOT) 24 15 - 37 U/L    Alk.  phosphatase 92 45 - 117 U/L    Protein, total 7.1 6.4 - 8.2 g/dL    Albumin 3.2 (L) 3.5 - 5.0 g/dL    Globulin 3.9 2.0 - 4.0 g/dL    A-G Ratio 0.8 (L) 1.1 - 2.2     CBC WITH AUTOMATED DIFF    Collection Time: 09/09/22 11:56 AM   Result Value Ref Range    WBC 8.9 4.1 - 11.1 K/uL    RBC 3.63 (L) 4.10 - 5.70 M/uL    HGB 10.4 (L) 12.1 - 17.0 g/dL    HCT 32.8 (L) 36.6 - 50.3 %    MCV 90.4 80.0 - 99.0 FL    MCH 28.7 26.0 - 34.0 PG    MCHC 31.7 30.0 - 36.5 g/dL    RDW 14.4 11.5 - 14.5 %    PLATELET 611 827 - 474 K/uL    MPV 10.3 8.9 - 12.9 FL    NRBC 0.0 0  WBC    ABSOLUTE NRBC 0.00 0.00 - 0.01 K/uL    NEUTROPHILS 68 32 - 75 %    LYMPHOCYTES 24 12 - 49 %    MONOCYTES 7 5 - 13 %    EOSINOPHILS 1 0 - 7 %    BASOPHILS 0 0 - 1 %    IMMATURE GRANULOCYTES 0 0.0 - 0.5 %    ABS. NEUTROPHILS 6.0 1.8 - 8.0 K/UL    ABS. LYMPHOCYTES 2.1 0.8 - 3.5 K/UL    ABS. MONOCYTES 0.7 0.0 - 1.0 K/UL    ABS. EOSINOPHILS 0.1 0.0 - 0.4 K/UL    ABS. BASOPHILS 0.0 0.0 - 0.1 K/UL    ABS. IMM. GRANS. 0.0 0.00 - 0.04 K/UL    DF AUTOMATED     LIPASE    Collection Time: 09/09/22 11:56 AM   Result Value Ref Range    Lipase 455 (H) 73 - 393 U/L   URINALYSIS W/ REFLEX CULTURE    Collection Time: 09/09/22 12:45 PM    Specimen: Miscellaneous sample    Urine specimen   Result Value Ref Range    Color DARK YELLOW      Appearance TURBID (A) CLEAR      Specific gravity 1.025      pH (UA) 6.0 5.0 - 8.0      Protein 300 (A) NEG mg/dL    Glucose Negative NEG mg/dL    Ketone TRACE (A) NEG mg/dL    Blood LARGE (A) NEG      Urobilinogen 1.0 0.2 - 1.0 EU/dL    Nitrites Positive (A) NEG      Leukocyte Esterase LARGE (A) NEG      WBC >100 (H) 0 - 4 /hpf    RBC >100 (H) 0 - 5 /hpf    Epithelial cells FEW FEW /lpf    Bacteria 2+ (A) NEG /hpf    UA:UC IF INDICATED URINE CULTURE ORDERED (A) CNI     BILIRUBIN, CONFIRM    Collection Time: 09/09/22 12:45 PM   Result Value Ref Range    Bilirubin UA, confirm Negative NEG         Radiologic Studies -   CT ABD PELV WO CONT   Final Result      Patient is status post rectal resection and has a history of pelvic radiation. Increasing small bowel distention compared to the previous examination. Small   bowel obstruction with transition point in the deep pelvis. Transition point at   the level of the anastomosis in the deep pelvis.       Presacral soft tissue thickening is similar to the prior study and most likely   post therapy changes. Extensive pancreatic calcifications related to episodes of chronic pancreatitis. No evidence of significant hydronephrosis. There is a Marrero catheter in a   partially decompressed urinary bladder. Residual contrast, calculi or high   density material layering in the urinary bladder. .           CT Results  (Last 48 hours)                 09/09/22 1234  CT ABD PELV WO CONT Final result    Impression:      Patient is status post rectal resection and has a history of pelvic radiation. Increasing small bowel distention compared to the previous examination. Small   bowel obstruction with transition point in the deep pelvis. Transition point at   the level of the anastomosis in the deep pelvis. Presacral soft tissue thickening is similar to the prior study and most likely   post therapy changes. Extensive pancreatic calcifications related to episodes of chronic pancreatitis. No evidence of significant hydronephrosis. There is a Marrero catheter in a   partially decompressed urinary bladder. Residual contrast, calculi or high   density material layering in the urinary bladder. .           Narrative:  CLINICAL HISTORY: hx hydronephrosis    INDICATION: History of hydronephrosis   COMPARISON: 6/3/2022   CONTRAST:  None. TECHNIQUE:    Thin axial images were obtained through the abdomen and pelvis. Coronal and   sagittal reformats were generated. Oral contrast was not administered. CT dose   reduction was achieved through use of a standardized protocol tailored for this   examination and automatic exposure control for dose modulation.         The absence of intravenous contrast material reduces the sensitivity for   evaluation of visceral organs and vasculature including presence of small mass   lesions, hemodynamically significant stenoses, dissections, mucosal   abnormalities etc.       FINDINGS:    LOWER THORAX: No significant abnormality in the incidentally imaged lower chest.   LIVER: No mass. BILIARY TREE: Patient is status post cholecystectomy. SPLEEN: within normal   limits. Splenic vein is narrowed and there are collaterals in the left upper   quadrant   PANCREAS: There is numerous calcifications throughout the pancreas with   dilatation of the pancreatic duct ADRENALS: Unremarkable. KIDNEYS: No mass, calculus, or hydronephrosis. There has been resolution of   previously noted bilateral hydronephrosis. There is a Marrero catheter in a   decompressed urinary bladder. Low-density in the right kidney is most consistent   with a cyst similar to the prior study and no additional follow-up is necessary. STOMACH: Unremarkable. SMALL BOWEL: Increased small bowel distention/obstruction in the left upper   quadrant. There are also increasingly dilated loops of small bowel in the pelvis   with air-fluid levels. . There is fecal stasis in small bowel loops in the pelvis   with a change in caliber in the pelvis at the level of the anastomosis. There   are surgical clips in the right lower quadrant   COLON: Postoperative changes are noted from rectal resection. There is presacral   soft tissue thickening similar to the prior study. APPENDIX: Not identified   PERITONEUM: There is a tiny amount of ascites. There is no pneumoperitoneum. RETROPERITONEUM: No lymphadenopathy or aortic aneurysm. REPRODUCTIVE ORGANS: Prostate is within normal limits. There is a right   hydrocele. URINARY BLADDER: There is a Marrero catheter in a decompressed urinary bladder. There is high density posterior urinary bladder and slight high density distal   ureters. Residual contrast or calcifications. Enhancement   BONES: No destructive bone lesion. ABDOMINAL WALL: No mass or hernia. ADDITIONAL COMMENTS: N/A                 CXR Results  (Last 48 hours)      None              Medical Decision Making   I am the first provider for this patient.     I reviewed the vital signs, available nursing notes, past medical history, past surgical history, family history and social history. Vital Signs-Reviewed the patient's vital signs. Patient Vitals for the past 12 hrs:   Temp Pulse Resp BP SpO2   09/09/22 1630 -- -- 14 112/76 100 %   09/09/22 1615 -- -- 14 126/73 100 %   09/09/22 1600 -- -- 14 138/76 100 %   09/09/22 1550 -- -- 16 (!) 149/78 100 %   09/09/22 1535 -- -- -- (!) 147/83 100 %   09/09/22 1520 -- -- -- (!) 149/94 --   09/09/22 1330 -- 81 16 123/84 100 %   09/09/22 1230 -- 85 16 120/78 100 %   09/09/22 1120 98.3 °F (36.8 °C) 87 18 122/86 100 %      Records Reviewed: Nursing Notes and Old Medical Records    Provider Notes (Medical Decision Making):     ED Course as of 09/09/22 1645   Fri Sep 09, 2022   1432 I have discussed available findings with nurse practitioner Karishma. She states patient will be accepted by Dr. Brandi Park. Per Dr. Lucinda Barker advised to admit patient to hospitalist with Dr. Irineo Tijerina consult. Candido Brown      ED Course User Index  [AN] Ren Rivera NP   Patient is being transferred to [facility] , transfer accepted by Dr Patsy Santillan. The reasons for their transfer have been discussed with them and available family. They convey agreement and understanding for the need to be transferred as explained to them by this provider. Procedures:  Procedures    Please note that this dictation was completed with Dragon, computer voice recognition software. Quite often unanticipated grammatical, syntax, homophones, and other interpretive errors are inadvertently transcribed by the computer software. Please disregard these errors. Additionally, please excuse any errors that have escaped final proofreading. Diagnosis     Clinical Impression:   1.  Small bowel obstruction ([address]

## 2022-09-09 NOTE — ED NOTES
Pt arrives to the ED AAOX4 with a c/c of abd pain associated with n/v/d onset three days ago. Pt has a Marrero that was last changed one month ago, requesting to change it during this ED visit. Pt is noted in stable condition, now in ED room with side rail up, bed to lowest position, and call light within reach. Will continue to monitor and wait for ED provider evaluation. Emergency Department Nursing Plan of Care       The Nursing Plan of Care is developed from the Nursing assessment and Emergency Department Attending provider initial evaluation. The plan of care may be reviewed in the ED Provider note.     The Plan of Care was developed with the following considerations:   Patient / Family readiness to learn indicated by:verbalized understanding  Persons(s) to be included in education: patient  Barriers to Learning/Limitations:No    Signed     Leigha Tolbert    9/9/2022   11:57 AM

## 2022-09-09 NOTE — ED TRIAGE NOTES
Also reports he has a mondragon catheter attached to bag and urine is dark. Also asking to replace his mondragon and bag today. States both changed about one month ago.

## 2022-09-10 ENCOUNTER — DOCUMENTATION ONLY (OUTPATIENT)
Dept: ONCOLOGY | Age: 57
End: 2022-09-10

## 2022-09-10 ENCOUNTER — APPOINTMENT (OUTPATIENT)
Dept: GENERAL RADIOLOGY | Age: 57
DRG: 389 | End: 2022-09-10
Attending: INTERNAL MEDICINE
Payer: MEDICARE

## 2022-09-10 ENCOUNTER — APPOINTMENT (OUTPATIENT)
Dept: GENERAL RADIOLOGY | Age: 57
DRG: 389 | End: 2022-09-10
Attending: SURGERY
Payer: MEDICARE

## 2022-09-10 LAB
ALBUMIN SERPL-MCNC: 2.7 G/DL (ref 3.5–5)
ALBUMIN/GLOB SERPL: 0.8 {RATIO} (ref 1.1–2.2)
ALP SERPL-CCNC: 83 U/L (ref 45–117)
ALT SERPL-CCNC: 27 U/L (ref 12–78)
AMPHET UR QL SCN: NEGATIVE
ANION GAP SERPL CALC-SCNC: 6 MMOL/L (ref 5–15)
AST SERPL-CCNC: 35 U/L (ref 15–37)
BARBITURATES UR QL SCN: NEGATIVE
BASOPHILS # BLD: 0 K/UL (ref 0–0.1)
BASOPHILS NFR BLD: 0 % (ref 0–1)
BENZODIAZ UR QL: NEGATIVE
BILIRUB SERPL-MCNC: 0.3 MG/DL (ref 0.2–1)
BUN SERPL-MCNC: 12 MG/DL (ref 6–20)
BUN/CREAT SERPL: 13 (ref 12–20)
CALCIUM SERPL-MCNC: 8.8 MG/DL (ref 8.5–10.1)
CANNABINOIDS UR QL SCN: POSITIVE
CEA SERPL-MCNC: 2.3 NG/ML
CHLORIDE SERPL-SCNC: 107 MMOL/L (ref 97–108)
CO2 SERPL-SCNC: 27 MMOL/L (ref 21–32)
COCAINE UR QL SCN: POSITIVE
CREAT SERPL-MCNC: 0.95 MG/DL (ref 0.7–1.3)
DIFFERENTIAL METHOD BLD: ABNORMAL
DRUG SCRN COMMENT,DRGCM: ABNORMAL
EOSINOPHIL # BLD: 0.1 K/UL (ref 0–0.4)
EOSINOPHIL NFR BLD: 1 % (ref 0–7)
ERYTHROCYTE [DISTWIDTH] IN BLOOD BY AUTOMATED COUNT: 14.4 % (ref 11.5–14.5)
EST. AVERAGE GLUCOSE BLD GHB EST-MCNC: 117 MG/DL
FERRITIN SERPL-MCNC: 84 NG/ML (ref 26–388)
FOLATE SERPL-MCNC: 45.8 NG/ML (ref 5–21)
GLOBULIN SER CALC-MCNC: 3.3 G/DL (ref 2–4)
GLUCOSE SERPL-MCNC: 92 MG/DL (ref 65–100)
HBA1C MFR BLD: 5.7 % (ref 4–5.6)
HCT VFR BLD AUTO: 30 % (ref 36.6–50.3)
HEMOCCULT STL QL: NEGATIVE
HGB BLD-MCNC: 10 G/DL (ref 12.1–17)
IMM GRANULOCYTES # BLD AUTO: 0 K/UL (ref 0–0.04)
IMM GRANULOCYTES NFR BLD AUTO: 0 % (ref 0–0.5)
IRON SATN MFR SERPL: 19 % (ref 20–50)
IRON SERPL-MCNC: 41 UG/DL (ref 35–150)
LIPASE SERPL-CCNC: 288 U/L (ref 73–393)
LYMPHOCYTES # BLD: 1.8 K/UL (ref 0.8–3.5)
LYMPHOCYTES NFR BLD: 17 % (ref 12–49)
MAGNESIUM SERPL-MCNC: 1.9 MG/DL (ref 1.6–2.4)
MCH RBC QN AUTO: 29.9 PG (ref 26–34)
MCHC RBC AUTO-ENTMCNC: 33.3 G/DL (ref 30–36.5)
MCV RBC AUTO: 89.6 FL (ref 80–99)
METHADONE UR QL: NEGATIVE
MONOCYTES # BLD: 0.8 K/UL (ref 0–1)
MONOCYTES NFR BLD: 8 % (ref 5–13)
NEUTS SEG # BLD: 7.6 K/UL (ref 1.8–8)
NEUTS SEG NFR BLD: 74 % (ref 32–75)
NRBC # BLD: 0 K/UL (ref 0–0.01)
NRBC BLD-RTO: 0 PER 100 WBC
OPIATES UR QL: POSITIVE
PCP UR QL: NEGATIVE
PHOSPHATE SERPL-MCNC: 3.4 MG/DL (ref 2.6–4.7)
PLATELET # BLD AUTO: 191 K/UL (ref 150–400)
PMV BLD AUTO: 10.2 FL (ref 8.9–12.9)
POTASSIUM SERPL-SCNC: 3.9 MMOL/L (ref 3.5–5.1)
PROT SERPL-MCNC: 6 G/DL (ref 6.4–8.2)
RBC # BLD AUTO: 3.35 M/UL (ref 4.1–5.7)
SODIUM SERPL-SCNC: 140 MMOL/L (ref 136–145)
TIBC SERPL-MCNC: 217 UG/DL (ref 250–450)
TSH SERPL DL<=0.05 MIU/L-ACNC: 1.76 UIU/ML (ref 0.36–3.74)
VIT B12 SERPL-MCNC: 370 PG/ML (ref 193–986)
WBC # BLD AUTO: 10.4 K/UL (ref 4.1–11.1)

## 2022-09-10 PROCEDURE — 82607 VITAMIN B-12: CPT

## 2022-09-10 PROCEDURE — 74011250637 HC RX REV CODE- 250/637: Performed by: SURGERY

## 2022-09-10 PROCEDURE — 83690 ASSAY OF LIPASE: CPT

## 2022-09-10 PROCEDURE — C9113 INJ PANTOPRAZOLE SODIUM, VIA: HCPCS | Performed by: INTERNAL MEDICINE

## 2022-09-10 PROCEDURE — 74011000250 HC RX REV CODE- 250: Performed by: INTERNAL MEDICINE

## 2022-09-10 PROCEDURE — 65270000029 HC RM PRIVATE

## 2022-09-10 PROCEDURE — 82272 OCCULT BLD FECES 1-3 TESTS: CPT

## 2022-09-10 PROCEDURE — 94760 N-INVAS EAR/PLS OXIMETRY 1: CPT

## 2022-09-10 PROCEDURE — 74011250637 HC RX REV CODE- 250/637: Performed by: INTERNAL MEDICINE

## 2022-09-10 PROCEDURE — 80053 COMPREHEN METABOLIC PANEL: CPT

## 2022-09-10 PROCEDURE — 84443 ASSAY THYROID STIM HORMONE: CPT

## 2022-09-10 PROCEDURE — 82728 ASSAY OF FERRITIN: CPT

## 2022-09-10 PROCEDURE — 36591 DRAW BLOOD OFF VENOUS DEVICE: CPT

## 2022-09-10 PROCEDURE — 82378 CARCINOEMBRYONIC ANTIGEN: CPT

## 2022-09-10 PROCEDURE — 85025 COMPLETE CBC W/AUTO DIFF WBC: CPT

## 2022-09-10 PROCEDURE — 99221 1ST HOSP IP/OBS SF/LOW 40: CPT | Performed by: SURGERY

## 2022-09-10 PROCEDURE — 83036 HEMOGLOBIN GLYCOSYLATED A1C: CPT

## 2022-09-10 PROCEDURE — 74011250636 HC RX REV CODE- 250/636: Performed by: INTERNAL MEDICINE

## 2022-09-10 PROCEDURE — 74018 RADEX ABDOMEN 1 VIEW: CPT

## 2022-09-10 PROCEDURE — 74019 RADEX ABDOMEN 2 VIEWS: CPT

## 2022-09-10 PROCEDURE — 80307 DRUG TEST PRSMV CHEM ANLYZR: CPT

## 2022-09-10 PROCEDURE — 83735 ASSAY OF MAGNESIUM: CPT

## 2022-09-10 PROCEDURE — 84100 ASSAY OF PHOSPHORUS: CPT

## 2022-09-10 PROCEDURE — 83540 ASSAY OF IRON: CPT

## 2022-09-10 PROCEDURE — 82746 ASSAY OF FOLIC ACID SERUM: CPT

## 2022-09-10 PROCEDURE — 0D9670Z DRAINAGE OF STOMACH WITH DRAINAGE DEVICE, VIA NATURAL OR ARTIFICIAL OPENING: ICD-10-PCS | Performed by: HOSPITALIST

## 2022-09-10 RX ADMIN — SODIUM CHLORIDE, PRESERVATIVE FREE 10 ML: 5 INJECTION INTRAVENOUS at 13:54

## 2022-09-10 RX ADMIN — SODIUM CHLORIDE, PRESERVATIVE FREE 10 ML: 5 INJECTION INTRAVENOUS at 08:21

## 2022-09-10 RX ADMIN — HYDROMORPHONE HYDROCHLORIDE 1 MG: 1 INJECTION, SOLUTION INTRAMUSCULAR; INTRAVENOUS; SUBCUTANEOUS at 04:20

## 2022-09-10 RX ADMIN — SODIUM CHLORIDE, PRESERVATIVE FREE 40 MG: 5 INJECTION INTRAVENOUS at 08:17

## 2022-09-10 RX ADMIN — PHENOL 1 SPRAY: 1.5 LIQUID ORAL at 19:12

## 2022-09-10 RX ADMIN — SODIUM CHLORIDE, PRESERVATIVE FREE 10 ML: 5 INJECTION INTRAVENOUS at 13:39

## 2022-09-10 RX ADMIN — SODIUM CHLORIDE, POTASSIUM CHLORIDE, SODIUM LACTATE AND CALCIUM CHLORIDE 150 ML/HR: 600; 310; 30; 20 INJECTION, SOLUTION INTRAVENOUS at 08:22

## 2022-09-10 RX ADMIN — HYDROMORPHONE HYDROCHLORIDE 1 MG: 1 INJECTION, SOLUTION INTRAMUSCULAR; INTRAVENOUS; SUBCUTANEOUS at 18:00

## 2022-09-10 RX ADMIN — SODIUM CHLORIDE, PRESERVATIVE FREE 10 ML: 5 INJECTION INTRAVENOUS at 21:11

## 2022-09-10 RX ADMIN — SODIUM CHLORIDE, PRESERVATIVE FREE 1 G: 5 INJECTION INTRAVENOUS at 21:11

## 2022-09-10 RX ADMIN — ONDANSETRON 4 MG: 2 INJECTION INTRAMUSCULAR; INTRAVENOUS at 13:51

## 2022-09-10 RX ADMIN — PHENOL 1 SPRAY: 1.5 LIQUID ORAL at 16:28

## 2022-09-10 RX ADMIN — SODIUM CHLORIDE, POTASSIUM CHLORIDE, SODIUM LACTATE AND CALCIUM CHLORIDE 150 ML/HR: 600; 310; 30; 20 INJECTION, SOLUTION INTRAVENOUS at 15:21

## 2022-09-10 RX ADMIN — HYDROMORPHONE HYDROCHLORIDE 1 MG: 1 INJECTION, SOLUTION INTRAMUSCULAR; INTRAVENOUS; SUBCUTANEOUS at 08:37

## 2022-09-10 RX ADMIN — ENOXAPARIN SODIUM 40 MG: 100 INJECTION SUBCUTANEOUS at 08:17

## 2022-09-10 RX ADMIN — SODIUM CHLORIDE, PRESERVATIVE FREE 40 MG: 5 INJECTION INTRAVENOUS at 21:11

## 2022-09-10 RX ADMIN — HYDROMORPHONE HYDROCHLORIDE 1 MG: 1 INJECTION, SOLUTION INTRAMUSCULAR; INTRAVENOUS; SUBCUTANEOUS at 22:02

## 2022-09-10 RX ADMIN — HYDROMORPHONE HYDROCHLORIDE 1 MG: 1 INJECTION, SOLUTION INTRAMUSCULAR; INTRAVENOUS; SUBCUTANEOUS at 13:39

## 2022-09-10 NOTE — PROGRESS NOTES
1130: TRANSFER - OUT REPORT:    Verbal report given to Crystal MOSER(name) on Maral Fajardo  being transferred to [facility](unit) for routine progression of care       Report consisted of patients Situation, Background, Assessment and   Recommendations(SBAR). Information from the following report(s) SBAR, Kardex, ED Summary, Intake/Output, MAR, and Cardiac Rhythm NSR  was reviewed with the receiving nurse. Lines:   Venous Access Device Alma Cath (Active)       Venous Access Device Venous Access Device 09/09/22 Upper chest (subclavicular area, right (Active)   Central Line Being Utilized Yes 09/09/22 1959   Criteria for Appropriate Use Limited/no vessel suitable for conventional peripheral access 09/09/22 1959   Site Assessment Clean, dry, & intact 09/09/22 1959   Dressing Status Clean, dry, & intact 09/09/22 1959   Dressing Type Transparent 09/09/22 1959   Action Taken Open ports on tubing capped 09/09/22 1959   Access Time (Medial Site) 1240 09/09/22 1240   Access Needle Size (Site #1) 20 G 09/09/22 1240   Access Needle Length (Medial Site) 1 inch 09/09/22 1240   Positive Blood Return (Medial Site) Yes 09/09/22 1240   Action Taken (Medial Site) Infusing 09/09/22 1959   Alcohol Cap Used Yes 09/09/22 1240        Opportunity for questions and clarification was provided.       Patient transported with:   [employer]

## 2022-09-10 NOTE — PROGRESS NOTES
[address] [facility]  Hospitalist Group                                                                                          Hospitalist Progress Note  Kris Shepherd MD  Answering service: [phone] OR [phone] from in house phone        Date of Service:  9/10/2022  NAME:  Adry Goss  :  1965  MRN:  [id number]      Admission Summary: This is a 64-year-old man with a past medical history significant for rectal adenocarcinoma, hypertension, benign prostatic hyperplasia, alcohol, tobacco, and substance abuse, who presented at the emergency room at [facility] with abdominal pain. Interval history / Subjective:   Pt without any sign of bowel function, no flatulance or BM  Abdominal pain ongoing, initially no vomiting but later in the day began vomiting. Assessment & Plan:     SBO   In the setting of prior rectal cancer  - NGT palcement today given vomiting  - Bowel rest, NPO. May use swabs for mouth  - PRN pain medications as needed  - IV PPI BID   - Oncology consulted      UTI - UA suggestive, abdominal pain   BPH  - Continue ceftriaxone  - FU urine culture  - Continue flomax when able to take PO     Chronic pancreatitis   - On IV dilaudid for now, but no sign of pancreatitis     HTN - hydralazine PRN   Substance abuse - UDS positive for cocaine, narcotics, THC. Counseled on cessation   Acute on chronic anemia -  transfuse for hemoglobin less than 7, Heme following  Tobacco cessation - nicotine patch      Code status: FULL  Prophylaxis: SCDs  Care Plan discussed with: pt, RN  Anticipated Disposition: Home, greater than 48 hours      Hospital Problems  Date Reviewed: 2022            Codes Class Noted POA    Small bowel obstruction ([facility] [address] ICD-10-CM: L65.421  ICD-9-CM: 560.9  2022 Unknown             Review of Systems:   A comprehensive review of systems was negative except for that written in the HPI.        Vital Signs:    Last 24hrs VS reviewed since prior progress note. Most recent are:  Visit Vitals  /67 (BP 1 Location: Right upper arm, BP Patient Position: At rest)   Pulse (!) 53   Temp 97.8 °F (36.6 °C)   Resp 17   SpO2 97%         Intake/Output Summary (Last 24 hours) at 9/10/2022 1522  Last data filed at 9/10/2022 1447  Gross per 24 hour   Intake --   Output 730 ml   Net -730 ml        Physical Examination:     I had a face to face encounter with this patient and independently examined them on 9/10/2022 as outlined below:          Constitutional:  No acute distress, cooperative, pleasant    ENT:  Oral mucosa moist, oropharynx benign. Resp:  CTA bilaterally. No wheezing/rhonchi/rales. No accessory muscle use. CV:  Regular rhythm, normal rate, no murmurs, gallops, rubs    GI:  Soft, + tender to palpation, reduced bowel sounds in the lower quadrants. Musculoskeletal:  No edema, warm, 2+ pulses throughout    Neurologic:  Moves all extremities. AAOx3, CN II-XII reviewed            Data Review:    Review and/or order of clinical lab test  Review and/or order of tests in the radiology section of CPT  Review and/or order of tests in the medicine section of CPT      Labs:     Recent Labs     09/10/22  0038 09/09/22  1156   WBC 10.4 8.9   HGB 10.0* 10.4*   HCT 30.0* 32.8*    226     Recent Labs     09/10/22  0038 09/09/22  1156    139   K 3.9 3.7    102   CO2 27 30   BUN 12 11   CREA 0.95 1.05   GLU 92 132*   CA 8.8 8.8   MG 1.9  --    PHOS 3.4  --      Recent Labs     09/10/22  0038 09/09/22  1156   ALT 27 24   AP 83 92   TBILI 0.3 0.4   TP 6.0* 7.1   ALB 2.7* 3.2*   GLOB 3.3 3.9   LPSE 288 455*     No results for input(s): INR, PTP, APTT, INREXT in the last 72 hours. Recent Labs     09/10/22  0038   TIBC 217*   PSAT 19*   FERR 84      Lab Results   Component Value Date/Time    Folate 45.8 (H) 09/10/2022 12:38 AM      No results for input(s): PH, PCO2, PO2 in the last 72 hours.   No results for input(s): CPK, CKNDX, TROIQ in the last 72 hours.     No lab exists for component: CPKMB  Lab Results   Component Value Date/Time    Cholesterol, total 147 01/02/2019 04:00 AM    HDL Cholesterol 66 01/02/2019 04:00 AM    LDL, calculated 65.6 01/02/2019 04:00 AM    Triglyceride 77 01/02/2019 04:00 AM    CHOL/HDL Ratio 2.2 01/02/2019 04:00 AM     No results found for: GLUCPOC  Lab Results   Component Value Date/Time    Color DARK YELLOW 09/09/2022 12:45 PM    Appearance TURBID (A) 09/09/2022 12:45 PM    Specific gravity 1.025 09/09/2022 12:45 PM    Specific gravity 1.010 09/03/2021 10:01 PM    pH (UA) 6.0 09/09/2022 12:45 PM    Protein 300 (A) 09/09/2022 12:45 PM    Glucose Negative 09/09/2022 12:45 PM    Ketone TRACE (A) 09/09/2022 12:45 PM    Bilirubin Negative 06/03/2022 04:57 AM    Urobilinogen 1.0 09/09/2022 12:45 PM    Nitrites Positive (A) 09/09/2022 12:45 PM    Leukocyte Esterase LARGE (A) 09/09/2022 12:45 PM    Epithelial cells FEW 09/09/2022 12:45 PM    Bacteria 2+ (A) 09/09/2022 12:45 PM    WBC >100 (H) 09/09/2022 12:45 PM    RBC >100 (H) 09/09/2022 12:45 PM         Medications Reviewed:     Current Facility-Administered Medications   Medication Dose Route Frequency    phenol throat spray (CHLORASEPTIC) 1 Spray  1 Spray Oral PRN    sodium chloride (NS) flush 5-40 mL  5-40 mL IntraVENous Q8H    sodium chloride (NS) flush 5-40 mL  5-40 mL IntraVENous PRN    acetaminophen (TYLENOL) tablet 650 mg  650 mg Oral Q6H PRN    Or    acetaminophen (TYLENOL) suppository 650 mg  650 mg Rectal Q6H PRN    ondansetron (ZOFRAN ODT) tablet 4 mg  4 mg Oral Q8H PRN    Or    ondansetron (ZOFRAN) injection 4 mg  4 mg IntraVENous Q6H PRN    enoxaparin (LOVENOX) injection 40 mg  40 mg SubCUTAneous DAILY    cefTRIAXone (ROCEPHIN) 1 g in 0.9% sodium chloride 10 mL IV syringe  1 g IntraVENous Q24H    pantoprazole (PROTONIX) 40 mg in 0.9% sodium chloride 10 mL injection  40 mg IntraVENous Q12H    lactated Ringers infusion  150 mL/hr IntraVENous CONTINUOUS    HYDROmorphone (DILAUDID) injection 1 mg  1 mg IntraVENous Q4H PRN    hydrALAZINE (APRESOLINE) 20 mg/mL injection 10 mg  10 mg IntraVENous Q6H PRN    nicotine (NICODERM CQ) 21 mg/24 hr patch 1 Patch  1 Patch TransDERmal DAILY     ______________________________________________________________________  EXPECTED LENGTH OF STAY: - - -  ACTUAL LENGTH OF STAY:          1                 Sen Mead MD

## 2022-09-10 NOTE — PROGRESS NOTES
Had continue vomiting and just had NG tube placed  No flatus yet  Abdominal x-rays had shown continued obstruction  Continue NG tube for now  Follow-up abdominal x-ray in a.m.

## 2022-09-10 NOTE — PROGRESS NOTES
Problem: Risk for Spread of Infection  Goal: Prevent transmission of infectious organism to others  Description: Prevent the transmission of infectious organisms to other patients, staff members, and visitors. Outcome: Progressing Towards Goal     Problem: Patient Education:  Go to Education Activity  Goal: Patient/Family Education  Outcome: Progressing Towards Goal     Problem: Falls - Risk of  Goal: *Absence of Falls  Description: Document Olivia Gordon Fall Risk and appropriate interventions in the flowsheet. Outcome: Progressing Towards Goal  Note: Fall Risk Interventions:  Mobility Interventions: Patient to call before getting OOB         Medication Interventions: Patient to call before getting OOB     Problem: Patient Education: Go to Patient Education Activity  Goal: Patient/Family Education  Outcome: Progressing Towards Goal     Problem: Pressure Injury - Risk of  Goal: *Prevention of pressure injury  Description: Document Jack Scale and appropriate interventions in the flowsheet. Outcome: Progressing Towards Goal  Note: Pressure Injury Interventions:             Activity Interventions: Increase time out of bed    Mobility Interventions: Float heels, HOB 30 degrees or less      Problem: Patient Education: Go to Patient Education Activity  Goal: Patient/Family Education  Outcome: Progressing Towards Goal     Problem: Infection - Risk of, Central Venous Catheter-Associated Bloodstream Infection  Goal: *Absence of infection signs and symptoms  Outcome: Progressing Towards Goal     Problem: Patient Education: Go to Patient Education Activity  Goal: Patient/Family Education  Outcome: Progressing Towards Goal     Problem: Fluid Volume - Risk of, Imbalanced  Goal: *Balanced intake and output  Outcome: Progressing Towards Goal     Problem: Patient Education: Go to Patient Education Activity  Goal: Patient/Family Education  Outcome: Progressing Towards Goal

## 2022-09-10 NOTE — PROGRESS NOTES
Transition of Care Plan  RUR 12%        Disposition   Home  pending medical and therapy progress and recommendations      Transportation   Family vs Roundtrip vs BLS pending medical progress    Home Health   Will arrange if ordered --serviced by [facility] in the past 9/2021    Medical follow up   PCP and specialist    Contact  Wife Katie Sharif  [phone]  MPOA  Sister  Roma Mendiola [phone]  Secondary MPOA Sister  Jasiel Burns [phone]    Reason for Admission:  SBO  Hx of rectal carcinoma, hypertension, gastric ulcers                   RUR Score:     11%                Plan for utilizing home health:     will arrange if ordered and patient agrees    PCP: First and Last name:  Norma Chavez MD     Name of Practice:    Are you a current patient: Yes/No: yes   Approximate date of last visit:  2 months ago   Can you participate in a virtual visit with your PCP: No                    Current Advanced Directive/Advance Care Plan: Full Code      Healthcare Decision Maker:   Click here to complete [address] including selection of the Healthcare Decision Maker Relationship (ie \"Primary\")             Primary Decision Maker: Siena Hower - Sister - [phone]    Secondary Decision Maker: Herminio Murphy - Sister - [phone]                  Transition of Care Plan:     Home pending medical and therapy progress and recommendations     Cm met with patient in his room to introduce self and explain role. Patient was alert and oriented and confirmed demographic, PCP and insurance. Patient secures medications   from [facility] on [address]    Prior to admission, patient was  self care and independent with ADL's and IADL's using noo DME  Hx of [address] 9/2021  Hx of rehab-- [facility]  Hx of DME-- none    Patient lives in an apartment alone-- He has an elevator to the 3rd floor. ([address])    He has good support from his 4 adult children, wife () and siblings-- sisters. They are available to assist as needed. Patient drives himself to appointments. CM will follow and assist with any transition of care needs that arise. Medicare pt has received, reviewed, and signed 1st IM letter informing them of their right to appeal the discharge. Signed copy has been placed on pt bedside chart. Care Management Interventions  PCP Verified by CM: Yes  Mode of Transport at Discharge: Other (see comment) (car-- )  Transition of Care Consult (CM Consult):  Other  Discharge Durable Medical Equipment: No  Physical Therapy Consult: No  Occupational Therapy Consult: No  Speech Therapy Consult: No  Support Systems: Child(bonifacio), Other Family Member(s)  Discharge Location  Patient Expects to be Discharged to[de-identified] Home (cm to follow for transition of care needs)

## 2022-09-10 NOTE — PROGRESS NOTES
Bedside shift change report given to Luanne Putnam (oncoming nurse) by Joe Bloom (offgoing nurse). Report included the following information SBAR, Kardex, Intake/Output, MAR, and Cardiac Rhythm NSR .

## 2022-09-10 NOTE — H&P
[address] [facility]  HISTORY AND PHYSICAL    Name:  Dylan Green  MR#:  [id number]  :  1965  ACCOUNT #:  [de-identified]  ADMIT DATE:  2022      The patient was seen, evaluated, and admitted by me on 2022. PRIMARY CARE PHYSICIAN:  Yvonne Suggs MD    SOURCE OF INFORMATION:  Patient and review of ED and old electronic medical record. CHIEF COMPLAINT:  Abdominal pain. HISTORY OF PRESENT ILLNESS:  This is a 57-year-old man with a past medical history significant for rectal adenocarcinoma, hypertension, benign prostatic hyperplasia, alcohol, tobacco, and substance abuse, who presented at the emergency room at [facility] with abdominal pain. The abdominal pain started 3 days ago. This is located at the right lower quadrant, 8/10 in severity, constant, dull ache, with no known aggravating or relieving factors. The abdominal pain is associated with few episodes of nausea and vomiting as well as diarrhea. No associated fever, rigors, or chills. When the patient arrived at the emergency room, CT scan of the abdomen and pelvis was obtained. The CT scan shows evidence of small-bowel obstruction as well as chronic pancreatitis. The patient's CT scan finding of bowel obstruction was discussed with the surgeon on call at [facility].  The hospitalist service was asked to directly admit the patient from [facility] emergency room to [facility] so that the patient can be evaluated by the surgeon for suspected bowel obstruction. The patient was last admitted to the hospital from 2022 to 2022. The patient was admitted and treated for acute cystitis as well as bilateral hydronephrosis and hydroureter. The patient has chronic indwelling Marrero catheter which is normally changed every month.     PAST MEDICAL HISTORY:  Chronic pancreatitis, rectal adenocarcinoma, hypertension, benign prostatic hyperplasia, alcohol, tobacco, and substance abuse. ALLERGIES:  NO KNOWN DRUG ALLERGIES. MEDICATIONS:  1. Calcium carbonate 1250 mg twice daily. 2.  Lopressor 25 mg twice daily. 3.  Flomax 0.4 mg daily. 4.  Folic acid 1 mg daily. 5.  Proscar 5 mg daily. 6.  Remeron 15 mg daily at bedtime. 7.  Pepcid 20 mg twice daily as needed. FAMILY HISTORY:  This was reviewed. His brother has cancer. PAST SURGICAL HISTORY:  This is significant for rectal cancer resection. SOCIAL HISTORY:  The patient smokes about a pack of cigarettes daily. Quit alcohol abuse a few weeks ago. The patient also admits to use of marijuana to stimulate his appetite. REVIEW OF SYSTEMS:  HEAD, EYES, EARS, NOSE, AND THROAT:  No headache, no dizziness, no blurring of vision, no photophobia. RESPIRATORY:  No cough, no shortness of breath, no hemoptysis. CARDIOVASCULAR:  No chest pain, no orthopnea, no palpitation. GASTROINTESTINAL:  This is positive for abdominal pain, nausea, vomiting, and diarrhea. No constipation. GENITOURINARY:  No dysuria, no urgency, and no frequency. All other systems are reviewed and they are negative. PHYSICAL EXAMINATION:  GENERAL APPEARANCE:  The patient appeared ill, in moderate distress. VITAL SIGNS:  On arrival at the emergency room, temperature 98.3, pulse 87, respiratory rate 18, blood pressure 122/80, and oxygen saturation 80%. HEAD:  Normocephalic, atraumatic. EYES:  Normal eye movement. No redness, no drainage, no discharge. EARS:  Normal external ears with no obvious drainage. NOSE:  No deformity, no drainage. MOUTH AND THROAT:  No visible oral lesion. Dry oral mucosa. NECK:  Neck is supple. No JVD, no thyromegaly. CHEST:  Clear breath sounds. No wheezing, no crackles. HEART:  Normal S1 and S2, regular. No clinically appreciable murmur. ABDOMEN:  Diffuse tenderness. No rebound tenderness. No guarding. Reduced bowel sounds. CENTRAL NERVOUS SYSTEM:  Alert and oriented x3.   No gross focal neurological deficit. EXTREMITIES:  No edema. Pulses 2+ bilaterally. MUSCULOSKELETAL:  No obvious joint deformity or swelling. SKIN:  No active skin lesions seen in the exposed parts of the body. PSYCHIATRIC:  Normal mood and affect. LYMPHATIC:  No cervical lymphadenopathy. DIAGNOSTIC DATA:  Chest x-ray, no acute cardiopulmonary process. CT of the abdomen and pelvis shows status post rectal resection and history of pelvic radiation, increasing small-bowel distention compared to previous examination, small-bowel obstruction with transition point in the deep pelvis. LABORATORY DATA:  Hematology, WBC 8.9, hemoglobin 10.4, hematocrit 32.8, platelets 839. Urinalysis, this is significant for large blood, positive nitrites, large leukocyte esterase, 2+ bacteria. Lipase level 455. Chemistry, sodium 139, potassium 3.7, chloride 102, CO2 of 30, glucose 132, BUN 11, creatinine 1.05, calcium 8.8. Total bilirubin 0.4, ALT 24, AST 24, alkaline phosphatase 92, total protein 7.1, albumin level 3.2, globulin 3.9. ASSESSMENT:  1.  Small-bowel obstruction. 2.  Chronic pancreatitis. 3.  Rectal adenocarcinoma, status post resection. 4.  Hypertension. 5.  Benign prostatic hyperplasia with urinary obstruction. 6.  Acute cystitis with hematuria. 7.  Hyperglycemia. 8.  Anemia. 9.  Tobacco abuse. PLAN:  1. Small-bowel obstruction. We will admit the patient for further evaluation and treatment. We will carry out conservative treatment which includes pain control, fluid therapy, bowel rest.  General Surgery consult has already been requested. We will await further recommendation from the surgeon. Since the patient is not actively vomiting, we will hold off on inserting NG tube. The patient also did not want NG tube insertion. 2.  Chronic pancreatitis. We will continue pain control and supportive treatment including IV fluid therapy. 3.  Rectal adenocarcinoma, status post resection.   The patient is also status post neoadjuvant chemoradiation. Oncology consult will be requested for continuation of care during hospitalization. 4.  Hypertension. We will place the patient on hydralazine as needed. We will resume oral medication once the patient is being cleared by the surgeon and the patient can take medication by mouth. 5.  Benign prostatic hyperplasia with urinary obstruction. We will continue with Marrero catheter care. We will resume Flomax once the patient is no longer n.p.o.  6.  Acute cystitis with hematuria. We will start the patient on Rocephin. We will await urine culture. 7.  Hyperglycemia. We will check A1c level. The patient has no history of diabetes. 8.  Anemia. This is most likely due to chronic disease. We will carry out anemia workup to evaluate the patient for other possible causes of anemia including checking a stool guaiac to rule out occult GI bleed. 9.  Tobacco abuse. The patient advised to quit smoking. We will place the patient on Nicoderm patch. We will check urine drug screen. 10.  Other issues:  Code status, the patient is a full code. We will place the patient on Lovenox for DVT prophylaxis. FUNCTIONAL STATUS PRIOR TO ADMISSION:  The patient came from home. The patient is ambulatory with no assistive device. COVID PRECAUTION:  The patient was wearing a face mask. I was wearing a face mask and gloves for this patient's encounter.         Mavis Diaz MD      [id number]  D:  09/09/2022 23:22  T:  09/10/2022 0:34  JOB #:  [id number]  CC:  Steve Zheng MD

## 2022-09-10 NOTE — CONSULTS
[address]    Name:  Chase Gipson  MR#:  [id number]  :  1965  ACCOUNT #:  [de-identified]  DATE OF SERVICE:  09/10/2022    REFERRING PHYSICIAN:  Dr. Ruba Lara. REASON FOR CONSULTATION:  History of rectal cancer. HISTORY OF PRESENT ILLNESS:  The patient is a 42-year-old gentleman who was treated by one of my partners, Dr. Cherie Gu back in  with a rectal cancer. He had a T3 NX tumor by endoscopic ultrasound. He received Xeloda and radiation therapy. He then had surgery. He had T3 disease residually and he got six cycles of CAPOX. He has not had any signs of recurrence. He last saw Dr. Cherie Gu back in . He comes in now with an abdominal pain, nausea and vomiting for the past 3 or 4 days. He has had about 30-pound weight loss. He does not remember the last time he had a colonoscopy. CT scan showed a small bowel obstruction. Surgery has seen him and wants to treat him conservatively, and he is feeling a little bit better this morning and we are asked to see him for further evaluation. PAST MEDICAL HISTORY:  Significant for,  1. Rectal cancer as above. 2.  Pancreatitis. 3.  Seizures. 4.  Alcohol abuse. CURRENT MEDICATIONS:  He is on,  1. Ceftriaxone. 2.  Lovenox prophylactic dose. 3.  Nicoderm patch. 4.  Protonix. SOCIAL HISTORY:  He is a smoker, currently is not drinking. REVIEW OF SYSTEMS:  Twelve-point systems done and negative except for as above. PHYSICAL EXAMINATION:  GENERAL:  Lying in bed, in no acute distress. VITAL SIGNS:  Temperature 97.7, pulse 53, blood pressure 131/76, respirations 16, and satting 97% on room air. NECK:  No cervical, supraclavicular, or axillary lymphadenopathy appreciated. RESPIRATORY:  No distress. ABDOMEN:  Soft, minimally distended. EXTREMITIES:  No edema. PSYCHIATRIC:  Normal.  NEUROLOGIC:  Normal.    LABORATORY VALUES:  White blood cell count 10.4, hemoglobin 10.0, and platelets 004. Chemistry:  Sodium 140, BUN 12, creatinine 0.95. Total bilirubin 0.3, ALT 27, AST 35, and alkaline phosphatase 83. IMPRESSION AND PLAN:  The patient is a 73-year-old gentleman with history of rectal cancer. He has not followed up with Dr. Ninfa Francis since 2020. CT scans now show a small bowel obstruction. Hopefully, this will resolve conservatively. Surgery is following him. Once he is able to be discharged, he will need to follow up with Dr. Ninfa Francis to continue surveillance, although it has been quite some time since he was treated. He will most likely need a colonoscopy at some point once this has resolved. We will continue to follow along while he is here. I will check a CEA on him while he is here. He is slightly anemic. His B12 and iron studies look okay. We will continue to follow along with you and make further recommendations as needed.         MD DEN [id number]  D:  09/10/2022 13:02  T:  09/10/2022 15:08  JOB #:  [id number]

## 2022-09-11 ENCOUNTER — APPOINTMENT (OUTPATIENT)
Dept: GENERAL RADIOLOGY | Age: 57
DRG: 389 | End: 2022-09-11
Attending: SURGERY
Payer: MEDICARE

## 2022-09-11 LAB
ANION GAP SERPL CALC-SCNC: 3 MMOL/L (ref 5–15)
BACTERIA SPEC CULT: ABNORMAL
BACTERIA SPEC CULT: ABNORMAL
BASOPHILS # BLD: 0 K/UL (ref 0–0.1)
BASOPHILS NFR BLD: 0 % (ref 0–1)
BUN SERPL-MCNC: 11 MG/DL (ref 6–20)
BUN/CREAT SERPL: 12 (ref 12–20)
CALCIUM SERPL-MCNC: 8.5 MG/DL (ref 8.5–10.1)
CC UR VC: ABNORMAL
CHLORIDE SERPL-SCNC: 107 MMOL/L (ref 97–108)
CO2 SERPL-SCNC: 30 MMOL/L (ref 21–32)
CREAT SERPL-MCNC: 0.91 MG/DL (ref 0.7–1.3)
DIFFERENTIAL METHOD BLD: ABNORMAL
EOSINOPHIL # BLD: 0.1 K/UL (ref 0–0.4)
EOSINOPHIL NFR BLD: 2 % (ref 0–7)
ERYTHROCYTE [DISTWIDTH] IN BLOOD BY AUTOMATED COUNT: 14.3 % (ref 11.5–14.5)
FOLATE SERPL-MCNC: 41.4 NG/ML (ref 5–21)
GLUCOSE SERPL-MCNC: 86 MG/DL (ref 65–100)
HCT VFR BLD AUTO: 26.3 % (ref 36.6–50.3)
HGB BLD-MCNC: 8.7 G/DL (ref 12.1–17)
IMM GRANULOCYTES # BLD AUTO: 0 K/UL (ref 0–0.04)
IMM GRANULOCYTES NFR BLD AUTO: 0 % (ref 0–0.5)
LDH SERPL L TO P-CCNC: 128 U/L (ref 85–241)
LYMPHOCYTES # BLD: 1.3 K/UL (ref 0.8–3.5)
LYMPHOCYTES NFR BLD: 19 % (ref 12–49)
MAGNESIUM SERPL-MCNC: 1.8 MG/DL (ref 1.6–2.4)
MCH RBC QN AUTO: 30.6 PG (ref 26–34)
MCHC RBC AUTO-ENTMCNC: 33.1 G/DL (ref 30–36.5)
MCV RBC AUTO: 92.6 FL (ref 80–99)
MONOCYTES # BLD: 0.6 K/UL (ref 0–1)
MONOCYTES NFR BLD: 9 % (ref 5–13)
NEUTS SEG # BLD: 4.7 K/UL (ref 1.8–8)
NEUTS SEG NFR BLD: 70 % (ref 32–75)
NRBC # BLD: 0 K/UL (ref 0–0.01)
NRBC BLD-RTO: 0 PER 100 WBC
PHOSPHATE SERPL-MCNC: 3.1 MG/DL (ref 2.6–4.7)
PLATELET # BLD AUTO: 142 K/UL (ref 150–400)
PMV BLD AUTO: 10.3 FL (ref 8.9–12.9)
POTASSIUM SERPL-SCNC: 3.6 MMOL/L (ref 3.5–5.1)
RBC # BLD AUTO: 2.84 M/UL (ref 4.1–5.7)
RETICS # AUTO: 0.03 M/UL (ref 0.03–0.1)
RETICS/RBC NFR AUTO: 0.8 % (ref 0.7–2.1)
SERVICE CMNT-IMP: ABNORMAL
SODIUM SERPL-SCNC: 140 MMOL/L (ref 136–145)
VIT B12 SERPL-MCNC: 464 PG/ML (ref 193–986)
WBC # BLD AUTO: 6.8 K/UL (ref 4.1–11.1)

## 2022-09-11 PROCEDURE — 82746 ASSAY OF FOLIC ACID SERUM: CPT

## 2022-09-11 PROCEDURE — 74019 RADEX ABDOMEN 2 VIEWS: CPT

## 2022-09-11 PROCEDURE — 74011250636 HC RX REV CODE- 250/636: Performed by: INTERNAL MEDICINE

## 2022-09-11 PROCEDURE — 36591 DRAW BLOOD OFF VENOUS DEVICE: CPT

## 2022-09-11 PROCEDURE — 85045 AUTOMATED RETICULOCYTE COUNT: CPT

## 2022-09-11 PROCEDURE — 74011000258 HC RX REV CODE- 258: Performed by: INTERNAL MEDICINE

## 2022-09-11 PROCEDURE — 85025 COMPLETE CBC W/AUTO DIFF WBC: CPT

## 2022-09-11 PROCEDURE — 83735 ASSAY OF MAGNESIUM: CPT

## 2022-09-11 PROCEDURE — C9113 INJ PANTOPRAZOLE SODIUM, VIA: HCPCS | Performed by: INTERNAL MEDICINE

## 2022-09-11 PROCEDURE — 74011250637 HC RX REV CODE- 250/637: Performed by: INTERNAL MEDICINE

## 2022-09-11 PROCEDURE — 82607 VITAMIN B-12: CPT

## 2022-09-11 PROCEDURE — 74011000250 HC RX REV CODE- 250: Performed by: INTERNAL MEDICINE

## 2022-09-11 PROCEDURE — 99232 SBSQ HOSP IP/OBS MODERATE 35: CPT | Performed by: SURGERY

## 2022-09-11 PROCEDURE — 83615 LACTATE (LD) (LDH) ENZYME: CPT

## 2022-09-11 PROCEDURE — 65270000029 HC RM PRIVATE

## 2022-09-11 PROCEDURE — 84100 ASSAY OF PHOSPHORUS: CPT

## 2022-09-11 PROCEDURE — 80048 BASIC METABOLIC PNL TOTAL CA: CPT

## 2022-09-11 RX ORDER — DEXTROSE MONOHYDRATE AND SODIUM CHLORIDE 5; .9 G/100ML; G/100ML
100 INJECTION, SOLUTION INTRAVENOUS CONTINUOUS
Status: DISCONTINUED | OUTPATIENT
Start: 2022-09-11 | End: 2022-09-13

## 2022-09-11 RX ADMIN — SODIUM CHLORIDE, POTASSIUM CHLORIDE, SODIUM LACTATE AND CALCIUM CHLORIDE 150 ML/HR: 600; 310; 30; 20 INJECTION, SOLUTION INTRAVENOUS at 05:27

## 2022-09-11 RX ADMIN — SODIUM CHLORIDE, PRESERVATIVE FREE 40 MG: 5 INJECTION INTRAVENOUS at 08:09

## 2022-09-11 RX ADMIN — PIPERACILLIN AND TAZOBACTAM 4.5 G: 4; .5 INJECTION, POWDER, FOR SOLUTION INTRAVENOUS at 10:50

## 2022-09-11 RX ADMIN — HYDROMORPHONE HYDROCHLORIDE 1 MG: 1 INJECTION, SOLUTION INTRAMUSCULAR; INTRAVENOUS; SUBCUTANEOUS at 16:06

## 2022-09-11 RX ADMIN — SODIUM CHLORIDE, PRESERVATIVE FREE 10 ML: 5 INJECTION INTRAVENOUS at 12:07

## 2022-09-11 RX ADMIN — HYDROMORPHONE HYDROCHLORIDE 1 MG: 1 INJECTION, SOLUTION INTRAMUSCULAR; INTRAVENOUS; SUBCUTANEOUS at 20:55

## 2022-09-11 RX ADMIN — PHENOL 1 SPRAY: 1.5 LIQUID ORAL at 10:52

## 2022-09-11 RX ADMIN — HYDROMORPHONE HYDROCHLORIDE 1 MG: 1 INJECTION, SOLUTION INTRAMUSCULAR; INTRAVENOUS; SUBCUTANEOUS at 02:57

## 2022-09-11 RX ADMIN — SODIUM CHLORIDE, PRESERVATIVE FREE 40 MG: 5 INJECTION INTRAVENOUS at 20:55

## 2022-09-11 RX ADMIN — HYDROMORPHONE HYDROCHLORIDE 1 MG: 1 INJECTION, SOLUTION INTRAMUSCULAR; INTRAVENOUS; SUBCUTANEOUS at 12:07

## 2022-09-11 RX ADMIN — DEXTROSE AND SODIUM CHLORIDE 100 ML/HR: 5; 900 INJECTION, SOLUTION INTRAVENOUS at 08:03

## 2022-09-11 RX ADMIN — ENOXAPARIN SODIUM 40 MG: 100 INJECTION SUBCUTANEOUS at 08:10

## 2022-09-11 RX ADMIN — PIPERACILLIN AND TAZOBACTAM 3.38 G: 3; .375 INJECTION, POWDER, FOR SOLUTION INTRAVENOUS at 16:06

## 2022-09-11 RX ADMIN — HYDROMORPHONE HYDROCHLORIDE 1 MG: 1 INJECTION, SOLUTION INTRAMUSCULAR; INTRAVENOUS; SUBCUTANEOUS at 08:08

## 2022-09-11 NOTE — PROGRESS NOTES
Problem: Patient Education: Go to Patient Education Activity  Goal: Patient/Family Education  Outcome: Progressing Towards Goal     Problem: Small Bowel Obstruction: Day 2  Goal: Off Pathway (Use only if patient is Off Pathway)  Outcome: Progressing Towards Goal  Goal: Activity/Safety  Outcome: Progressing Towards Goal  Goal: Consults, if ordered  Outcome: Progressing Towards Goal  Goal: Diagnostic Test/Procedures  Outcome: Progressing Towards Goal  Goal: Discharge Planning  Outcome: Progressing Towards Goal  Goal: Medications  Outcome: Progressing Towards Goal  Goal: Treatments/Interventions/Procedures  Outcome: Progressing Towards Goal  Goal: Psychosocial  Outcome: Progressing Towards Goal  Goal: *Optimal pain control at patient's stated goal  Outcome: Progressing Towards Goal  Goal: *Adequate urinary output (equal to or greater than 30 milliliters/hour)  Outcome: Progressing Towards Goal  Goal: *Hemodynamically stable  Outcome: Progressing Towards Goal  Goal: *Demonstrates progressive activity  Outcome: Progressing Towards Goal  Goal: *Absence of nausea/vomiting  Outcome: Progressing Towards Goal  Goal: *Return of normal bowel function  Outcome: Progressing Towards Goal

## 2022-09-11 NOTE — PROGRESS NOTES
Hematology Oncology Progress Note    Follow up for: h/o rectal Ca    Chart notes reviewed since last visit. Case discussed with following: .     Patient complains of the following: He feels better    Additional concerns noted by the staff:     Patient Vitals for the past 24 hrs:   BP Temp Pulse Resp SpO2   09/11/22 0814 131/78 98.9 °F (37.2 °C) 62 16 100 %   09/11/22 0251 113/65 98 °F (36.7 °C) (!) 58 16 99 %   09/10/22 2049 (!) 160/86 97.6 °F (36.4 °C) 88 18 100 %   09/10/22 1425 120/67 97.8 °F (36.6 °C) (!) 53 17 97 %       Review of Systems:  12 point ROS done and negative except as above    Physical Examination:  Gen NAD, has NG tube in  Resp no distress  Abd s/nt      Labs:  Recent Results (from the past 24 hour(s))   CEA    Collection Time: 09/10/22  2:25 PM   Result Value Ref Range    CEA 2.3 ng/mL   OCCULT BLOOD, STOOL    Collection Time: 09/10/22  8:07 PM   Result Value Ref Range    Occult blood, stool Negative NEG     MAGNESIUM    Collection Time: 09/11/22 12:40 AM   Result Value Ref Range    Magnesium 1.8 1.6 - 2.4 mg/dL   METABOLIC PANEL, BASIC    Collection Time: 09/11/22 12:40 AM   Result Value Ref Range    Sodium 140 136 - 145 mmol/L    Potassium 3.6 3.5 - 5.1 mmol/L    Chloride 107 97 - 108 mmol/L    CO2 30 21 - 32 mmol/L    Anion gap 3 (L) 5 - 15 mmol/L    Glucose 86 65 - 100 mg/dL    BUN 11 6 - 20 MG/DL    Creatinine 0.91 0.70 - 1.30 MG/DL    BUN/Creatinine ratio 12 12 - 20      GFR est AA >60 >60 ml/min/1.73m2    GFR est non-AA >60 >60 ml/min/1.73m2    Calcium 8.5 8.5 - 10.1 MG/DL   PHOSPHORUS    Collection Time: 09/11/22 12:40 AM   Result Value Ref Range    Phosphorus 3.1 2.6 - 4.7 MG/DL   CBC WITH AUTOMATED DIFF    Collection Time: 09/11/22 12:40 AM   Result Value Ref Range    WBC 6.8 4.1 - 11.1 K/uL    RBC 2.84 (L) 4.10 - 5.70 M/uL    HGB 8.7 (L) 12.1 - 17.0 g/dL    HCT 26.3 (L) 36.6 - 50.3 %    MCV 92.6 80.0 - 99.0 FL    MCH 30.6 26.0 - 34.0 PG    MCHC 33.1 30.0 - 36.5 g/dL    RDW 14.3 11.5 - 14.5 %    PLATELET 996 (L) 587 - 400 K/uL    MPV 10.3 8.9 - 12.9 FL    NRBC 0.0 0  WBC    ABSOLUTE NRBC 0.00 0.00 - 0.01 K/uL    NEUTROPHILS 70 32 - 75 %    LYMPHOCYTES 19 12 - 49 %    MONOCYTES 9 5 - 13 %    EOSINOPHILS 2 0 - 7 %    BASOPHILS 0 0 - 1 %    IMMATURE GRANULOCYTES 0 0.0 - 0.5 %    ABS. NEUTROPHILS 4.7 1.8 - 8.0 K/UL    ABS. LYMPHOCYTES 1.3 0.8 - 3.5 K/UL    ABS. MONOCYTES 0.6 0.0 - 1.0 K/UL    ABS. EOSINOPHILS 0.1 0.0 - 0.4 K/UL    ABS. BASOPHILS 0.0 0.0 - 0.1 K/UL    ABS. IMM.  GRANS. 0.0 0.00 - 0.04 K/UL    DF AUTOMATED           Assessment and Plan:   H/O rectal Ca  -was tx by Dr. Lisa Breaux back in 2016  -Has not seen him since 2020  -CEA normal, no clear sign of recurrence on CT  -will need colonoscopy once over SBO  -follow up with him    SBO  -plan per surgery  -Has NG tube in  -he feels better    Anemia  -HBG down today  -iron studies ok, check B12/folate, LDH, retic

## 2022-09-11 NOTE — PROGRESS NOTES
Day #1 of Zosyn  Indication:  ESBL UTI  Current regimen:  3.375g IV q6h    Recent Labs     22  0040 09/10/22  0038 22  1156   WBC 6.8 10.4 8.9   CREA 0.91 0.95 1.05   BUN 11 12 11     Est CrCl: 65-70 ml/min; UO: -- ml/kg/hr  Temp (24hrs), Av.1 °F (36.7 °C), Min:97.6 °F (36.4 °C), Max:98.9 °F (37.2 °C)    Cultures:    Urine: ESBL E.coli (S-Zosyn,merrem)    Plan: Change to 4.5g IV x1 followed by 3.375g IV q8h via extended infusion per protocol

## 2022-09-11 NOTE — PROGRESS NOTES
09/11/22 1408   Vital Signs   Temp 98.2 °F (36.8 °C)   Temp Source Oral   Pulse (Heart Rate) (!) 48   Heart Rate Source Other (comment)   Resp Rate 16   O2 Sat (%) 99 %   Level of Consciousness 0   /68   MAP (Calculated) 87   BP 1 Method Automatic   BP 1 Location Left upper arm   BP Patient Position At rest   MEWS Score 2   Patient Observation   Observations vitals   Activity In bed;Watching t.v.;Staff present     Patient requesting IV Dilaudid for abdominal pain. Informed MD of patient's HR; repeat HR at 1607 was 46. Patient denied nausea, dizziness/lightheadedness, numbness/tingling, and double vision; patient stated only seeing black spots/floaters. MD stated that patient is okay to receive dose since BP is stable. Will continue to monitor.

## 2022-09-11 NOTE — PROGRESS NOTES
Progress Note    Patient: Apryl Hoffmann MRN: [id number]  SSN: [id number]    YOB: 1965  Age: 59 y.o. Sex: male      Admit Date: 2022    * No surgery found *    Procedure:      Subjective:     Patient states he had a small bowel movement. Objective:     Visit Vitals  /78 (BP 1 Location: Left upper arm, BP Patient Position: At rest)   Pulse 62   Temp 98.9 °F (37.2 °C)   Resp 16   SpO2 100%       Temp (24hrs), Av.1 °F (36.7 °C), Min:97.6 °F (36.4 °C), Max:98.9 °F (37.2 °C)      Physical Exam:    General alert no acute distress  Lungs clear bilaterally  Heart regular rate rhythm  Abdomen soft distended  NG tube still bilious    Data Review: images and reports reviewed  AXR- IMPRESSION  Small bowel obstruction with unchanged small bowel distention. Enteric tube is  in the stomach. Lab Review: All lab results for the last 24 hours reviewed.   Recent Results (from the past 24 hour(s))   CEA    Collection Time: 09/10/22  2:25 PM   Result Value Ref Range    CEA 2.3 ng/mL   OCCULT BLOOD, STOOL    Collection Time: 09/10/22  8:07 PM   Result Value Ref Range    Occult blood, stool Negative NEG     MAGNESIUM    Collection Time: 22 12:40 AM   Result Value Ref Range    Magnesium 1.8 1.6 - 2.4 mg/dL   METABOLIC PANEL, BASIC    Collection Time: 22 12:40 AM   Result Value Ref Range    Sodium 140 136 - 145 mmol/L    Potassium 3.6 3.5 - 5.1 mmol/L    Chloride 107 97 - 108 mmol/L    CO2 30 21 - 32 mmol/L    Anion gap 3 (L) 5 - 15 mmol/L    Glucose 86 65 - 100 mg/dL    BUN 11 6 - 20 MG/DL    Creatinine 0.91 0.70 - 1.30 MG/DL    BUN/Creatinine ratio 12 12 - 20      GFR est AA >60 >60 ml/min/1.73m2    GFR est non-AA >60 >60 ml/min/1.73m2    Calcium 8.5 8.5 - 10.1 MG/DL   PHOSPHORUS    Collection Time: 22 12:40 AM   Result Value Ref Range    Phosphorus 3.1 2.6 - 4.7 MG/DL   CBC WITH AUTOMATED DIFF    Collection Time: 22 12:40 AM   Result Value Ref Range    WBC 6.8 4.1 - 11.1 K/uL RBC 2.84 (L) 4.10 - 5.70 M/uL    HGB 8.7 (L) 12.1 - 17.0 g/dL    HCT 26.3 (L) 36.6 - 50.3 %    MCV 92.6 80.0 - 99.0 FL    MCH 30.6 26.0 - 34.0 PG    MCHC 33.1 30.0 - 36.5 g/dL    RDW 14.3 11.5 - 14.5 %    PLATELET 312 (L) 825 - 400 K/uL    MPV 10.3 8.9 - 12.9 FL    NRBC 0.0 0  WBC    ABSOLUTE NRBC 0.00 0.00 - 0.01 K/uL    NEUTROPHILS 70 32 - 75 %    LYMPHOCYTES 19 12 - 49 %    MONOCYTES 9 5 - 13 %    EOSINOPHILS 2 0 - 7 %    BASOPHILS 0 0 - 1 %    IMMATURE GRANULOCYTES 0 0.0 - 0.5 %    ABS. NEUTROPHILS 4.7 1.8 - 8.0 K/UL    ABS. LYMPHOCYTES 1.3 0.8 - 3.5 K/UL    ABS. MONOCYTES 0.6 0.0 - 1.0 K/UL    ABS. EOSINOPHILS 0.1 0.0 - 0.4 K/UL    ABS. BASOPHILS 0.0 0.0 - 0.1 K/UL    ABS. IMM. GRANS. 0.0 0.00 - 0.04 K/UL    DF AUTOMATED     VITAMIN B12    Collection Time: 09/11/22 10:49 AM   Result Value Ref Range    Vitamin B12 464 193 - 986 pg/mL   FOLATE    Collection Time: 09/11/22 10:49 AM   Result Value Ref Range    Folate 41.4 (H) 5.0 - 21.0 ng/mL   LD    Collection Time: 09/11/22 10:49 AM   Result Value Ref Range     85 - 241 U/L   RETICULOCYTE COUNT    Collection Time: 09/11/22 10:49 AM   Result Value Ref Range    Reticulocyte count 0.8 0.7 - 2.1 %    Absolute Retic Cnt. 0.0263 0.0260 - 0.0950 M/ul       Assessment:     Hospital Problems  Date Reviewed: 9/9/2022            Codes Class Noted POA    Small bowel obstruction ([facility] [address] ICD-10-CM: B32.466  ICD-9-CM: 560.9  9/9/2022 Unknown           Plan/Recommendations/Medical Decision Making:   Clinically seems to be getting a little bit better but x-ray is unchanged. Continue NG tube for now. Repeat x-ray in AM.  If no better then will consider Gastrografin challenge and see how that goes.

## 2022-09-11 NOTE — PROGRESS NOTES
[facility]                                                                                          Hospitalist Progress Note  Vicente Chavis MD  Answering service: [phone] OR [phone] from in house phone        Date of Service:  2022  NAME:  Honorio Arcos  :  1965  MRN:  [id number]      Admission Summary: This is a 75-year-old man with a past medical history significant for rectal adenocarcinoma, hypertension, benign prostatic hyperplasia, alcohol, tobacco, and substance abuse, who presented at the emergency room at [facility] with abdominal pain. Interval history / Subjective: Had two solid BM this am. No flatulance. Continues to have biliary output from his NGT. Assessment & Plan:     SBO   In the setting of prior rectal cancer  - NGT palcement today given vomiting  - Bowel rest, NPO. May use swabs for mouth. Likely can tolerate ice chips. - PRN pain medications as needed  - IV PPI BID   - Oncology consulted      UTI - UA suggestive, abdominal pain   BPH  - Urine culture with ESBL E coli, switch CTX to zosyn today   - Continue flomax when able to take PO     Chronic pancreatitis   - On IV dilaudid for now, but no sign of pancreatitis     HTN - hydralazine PRN   Substance abuse - UDS positive for cocaine, narcotics, THC. Counseled on cessation   Acute on chronic anemia -  transfuse for hemoglobin less than 7, Heme following  Tobacco cessation - nicotine patch      Code status: FULL  Prophylaxis: SCDs  Care Plan discussed with: pt, RN  Anticipated Disposition: Home, greater than 48 hours      Hospital Problems  Date Reviewed: 2022            Codes Class Noted POA    Small bowel obstruction ([facility] [address] ICD-10-CM: Q48.306  ICD-9-CM: 560.9  2022 Unknown           Review of Systems:   A comprehensive review of systems was negative except for that written in the HPI.        Vital Signs:    Last 24hrs VS reviewed since prior progress note. Most recent are:  Visit Vitals  /78 (BP 1 Location: Left upper arm, BP Patient Position: At rest)   Pulse 62   Temp 98.9 °F (37.2 °C)   Resp 16   SpO2 100%         Intake/Output Summary (Last 24 hours) at 9/11/2022 1000  Last data filed at 9/11/2022 7234  Gross per 24 hour   Intake 2985.56 ml   Output 1475 ml   Net 1510.56 ml          Physical Examination:     I had a face to face encounter with this patient and independently examined them on 9/11/2022 as outlined below:          Constitutional:  No acute distress, cooperative, pleasant    ENT:  Oral mucosa moist, oropharynx benign. Resp:  CTA bilaterally. No wheezing/rhonchi/rales. No accessory muscle use. CV:  Regular rhythm, normal rate, no murmurs, gallops, rubs    GI:  Soft, + tender to palpation, reduced bowel sounds in the lower quadrants. Musculoskeletal:  No edema, warm, 2+ pulses throughout    Neurologic:  Moves all extremities. AAOx3, CN II-XII reviewed            Data Review:    Review and/or order of clinical lab test  Review and/or order of tests in the radiology section of CPT  Review and/or order of tests in the medicine section of CPT      Labs:     Recent Labs     09/11/22  0040 09/10/22  0038   WBC 6.8 10.4   HGB 8.7* 10.0*   HCT 26.3* 30.0*   * 191       Recent Labs     09/11/22  0040 09/10/22  0038 09/09/22  1156    140 139   K 3.6 3.9 3.7    107 102   CO2 30 27 30   BUN 11 12 11   CREA 0.91 0.95 1.05   GLU 86 92 132*   CA 8.5 8.8 8.8   MG 1.8 1.9  --    PHOS 3.1 3.4  --        Recent Labs     09/10/22  0038 09/09/22  1156   ALT 27 24   AP 83 92   TBILI 0.3 0.4   TP 6.0* 7.1   ALB 2.7* 3.2*   GLOB 3.3 3.9   LPSE 288 455*       No results for input(s): INR, PTP, APTT, INREXT, INREXT in the last 72 hours.    Recent Labs     09/10/22  0038   TIBC 217*   PSAT 19*   FERR 84        Lab Results   Component Value Date/Time    Folate 45.8 (H) 09/10/2022 12:38 AM        No results for input(s): PH, PCO2, PO2 in the last 72 hours. No results for input(s): CPK, CKNDX, TROIQ in the last 72 hours.     No lab exists for component: CPKMB  Lab Results   Component Value Date/Time    Cholesterol, total 147 01/02/2019 04:00 AM    HDL Cholesterol 66 01/02/2019 04:00 AM    LDL, calculated 65.6 01/02/2019 04:00 AM    Triglyceride 77 01/02/2019 04:00 AM    CHOL/HDL Ratio 2.2 01/02/2019 04:00 AM     No results found for: GLUCPOC  Lab Results   Component Value Date/Time    Color DARK YELLOW 09/09/2022 12:45 PM    Appearance TURBID (A) 09/09/2022 12:45 PM    Specific gravity 1.025 09/09/2022 12:45 PM    Specific gravity 1.010 09/03/2021 10:01 PM    pH (UA) 6.0 09/09/2022 12:45 PM    Protein 300 (A) 09/09/2022 12:45 PM    Glucose Negative 09/09/2022 12:45 PM    Ketone TRACE (A) 09/09/2022 12:45 PM    Bilirubin Negative 06/03/2022 04:57 AM    Urobilinogen 1.0 09/09/2022 12:45 PM    Nitrites Positive (A) 09/09/2022 12:45 PM    Leukocyte Esterase LARGE (A) 09/09/2022 12:45 PM    Epithelial cells FEW 09/09/2022 12:45 PM    Bacteria 2+ (A) 09/09/2022 12:45 PM    WBC >100 (H) 09/09/2022 12:45 PM    RBC >100 (H) 09/09/2022 12:45 PM         Medications Reviewed:     Current Facility-Administered Medications   Medication Dose Route Frequency    dextrose 5% and 0.9% NaCl infusion  100 mL/hr IntraVENous CONTINUOUS    phenol throat spray (CHLORASEPTIC) 1 Spray  1 Spray Oral PRN    sodium chloride (NS) flush 5-40 mL  5-40 mL IntraVENous Q8H    sodium chloride (NS) flush 5-40 mL  5-40 mL IntraVENous PRN    acetaminophen (TYLENOL) tablet 650 mg  650 mg Oral Q6H PRN    Or    acetaminophen (TYLENOL) suppository 650 mg  650 mg Rectal Q6H PRN    ondansetron (ZOFRAN ODT) tablet 4 mg  4 mg Oral Q8H PRN    Or    ondansetron (ZOFRAN) injection 4 mg  4 mg IntraVENous Q6H PRN    enoxaparin (LOVENOX) injection 40 mg  40 mg SubCUTAneous DAILY    cefTRIAXone (ROCEPHIN) 1 g in 0.9% sodium chloride 10 mL IV syringe  1 g IntraVENous Q24H    pantoprazole (PROTONIX) 40 mg in 0.9% sodium chloride 10 mL injection  40 mg IntraVENous Q12H    HYDROmorphone (DILAUDID) injection 1 mg  1 mg IntraVENous Q4H PRN    hydrALAZINE (APRESOLINE) 20 mg/mL injection 10 mg  10 mg IntraVENous Q6H PRN    nicotine (NICODERM CQ) 21 mg/24 hr patch 1 Patch  1 Patch TransDERmal DAILY     ______________________________________________________________________  EXPECTED LENGTH OF STAY: - - -  ACTUAL LENGTH OF STAY:          2                 Kris Shepherd MD

## 2022-09-12 ENCOUNTER — APPOINTMENT (OUTPATIENT)
Dept: GENERAL RADIOLOGY | Age: 57
DRG: 389 | End: 2022-09-12
Attending: SURGERY
Payer: MEDICARE

## 2022-09-12 LAB
ANION GAP SERPL CALC-SCNC: 3 MMOL/L (ref 5–15)
BASOPHILS # BLD: 0 K/UL (ref 0–0.1)
BASOPHILS NFR BLD: 0 % (ref 0–1)
BUN SERPL-MCNC: 8 MG/DL (ref 6–20)
BUN/CREAT SERPL: 8 (ref 12–20)
CALCIUM SERPL-MCNC: 8.3 MG/DL (ref 8.5–10.1)
CHLORIDE SERPL-SCNC: 107 MMOL/L (ref 97–108)
CO2 SERPL-SCNC: 30 MMOL/L (ref 21–32)
CREAT SERPL-MCNC: 1.02 MG/DL (ref 0.7–1.3)
DIFFERENTIAL METHOD BLD: ABNORMAL
EOSINOPHIL # BLD: 0.1 K/UL (ref 0–0.4)
EOSINOPHIL NFR BLD: 2 % (ref 0–7)
ERYTHROCYTE [DISTWIDTH] IN BLOOD BY AUTOMATED COUNT: 14.1 % (ref 11.5–14.5)
GLUCOSE SERPL-MCNC: 94 MG/DL (ref 65–100)
HCT VFR BLD AUTO: 25.7 % (ref 36.6–50.3)
HGB BLD-MCNC: 8.4 G/DL (ref 12.1–17)
IMM GRANULOCYTES # BLD AUTO: 0 K/UL (ref 0–0.04)
IMM GRANULOCYTES NFR BLD AUTO: 0 % (ref 0–0.5)
LYMPHOCYTES # BLD: 1.5 K/UL (ref 0.8–3.5)
LYMPHOCYTES NFR BLD: 27 % (ref 12–49)
MAGNESIUM SERPL-MCNC: 1.6 MG/DL (ref 1.6–2.4)
MCH RBC QN AUTO: 29.7 PG (ref 26–34)
MCHC RBC AUTO-ENTMCNC: 32.7 G/DL (ref 30–36.5)
MCV RBC AUTO: 90.8 FL (ref 80–99)
MONOCYTES # BLD: 0.5 K/UL (ref 0–1)
MONOCYTES NFR BLD: 9 % (ref 5–13)
NEUTS SEG # BLD: 3.3 K/UL (ref 1.8–8)
NEUTS SEG NFR BLD: 62 % (ref 32–75)
NRBC # BLD: 0 K/UL (ref 0–0.01)
NRBC BLD-RTO: 0 PER 100 WBC
PHOSPHATE SERPL-MCNC: 3.1 MG/DL (ref 2.6–4.7)
PLATELET # BLD AUTO: 135 K/UL (ref 150–400)
PMV BLD AUTO: 11.3 FL (ref 8.9–12.9)
POTASSIUM SERPL-SCNC: 3.3 MMOL/L (ref 3.5–5.1)
RBC # BLD AUTO: 2.83 M/UL (ref 4.1–5.7)
SODIUM SERPL-SCNC: 140 MMOL/L (ref 136–145)
WBC # BLD AUTO: 5.4 K/UL (ref 4.1–11.1)

## 2022-09-12 PROCEDURE — 74011000636 HC RX REV CODE- 636: Performed by: HOSPITALIST

## 2022-09-12 PROCEDURE — 74011250637 HC RX REV CODE- 250/637: Performed by: INTERNAL MEDICINE

## 2022-09-12 PROCEDURE — 74011000258 HC RX REV CODE- 258: Performed by: INTERNAL MEDICINE

## 2022-09-12 PROCEDURE — C9113 INJ PANTOPRAZOLE SODIUM, VIA: HCPCS | Performed by: INTERNAL MEDICINE

## 2022-09-12 PROCEDURE — 74011250636 HC RX REV CODE- 250/636: Performed by: SURGERY

## 2022-09-12 PROCEDURE — 80048 BASIC METABOLIC PNL TOTAL CA: CPT

## 2022-09-12 PROCEDURE — 84100 ASSAY OF PHOSPHORUS: CPT

## 2022-09-12 PROCEDURE — 74011250636 HC RX REV CODE- 250/636: Performed by: INTERNAL MEDICINE

## 2022-09-12 PROCEDURE — 85025 COMPLETE CBC W/AUTO DIFF WBC: CPT

## 2022-09-12 PROCEDURE — 74011000250 HC RX REV CODE- 250: Performed by: INTERNAL MEDICINE

## 2022-09-12 PROCEDURE — 83735 ASSAY OF MAGNESIUM: CPT

## 2022-09-12 PROCEDURE — 36415 COLL VENOUS BLD VENIPUNCTURE: CPT

## 2022-09-12 PROCEDURE — 65270000029 HC RM PRIVATE

## 2022-09-12 PROCEDURE — 99232 SBSQ HOSP IP/OBS MODERATE 35: CPT | Performed by: SURGERY

## 2022-09-12 PROCEDURE — 74019 RADEX ABDOMEN 2 VIEWS: CPT

## 2022-09-12 RX ORDER — POTASSIUM CHLORIDE 7.45 MG/ML
10 INJECTION INTRAVENOUS
Status: COMPLETED | OUTPATIENT
Start: 2022-09-12 | End: 2022-09-12

## 2022-09-12 RX ADMIN — HYDROMORPHONE HYDROCHLORIDE 1 MG: 1 INJECTION, SOLUTION INTRAMUSCULAR; INTRAVENOUS; SUBCUTANEOUS at 09:26

## 2022-09-12 RX ADMIN — SODIUM CHLORIDE, PRESERVATIVE FREE 40 MG: 5 INJECTION INTRAVENOUS at 09:23

## 2022-09-12 RX ADMIN — DIATRIZOATE MEGLUMINE AND DIATRIZOATE SODIUM 80 ML: 660; 100 LIQUID ORAL; RECTAL at 14:01

## 2022-09-12 RX ADMIN — SODIUM CHLORIDE, PRESERVATIVE FREE 40 MG: 5 INJECTION INTRAVENOUS at 21:49

## 2022-09-12 RX ADMIN — PIPERACILLIN AND TAZOBACTAM 3.38 G: 3; .375 INJECTION, POWDER, FOR SOLUTION INTRAVENOUS at 01:10

## 2022-09-12 RX ADMIN — POTASSIUM CHLORIDE 10 MEQ: 7.46 INJECTION, SOLUTION INTRAVENOUS at 14:55

## 2022-09-12 RX ADMIN — SODIUM CHLORIDE, PRESERVATIVE FREE 10 ML: 5 INJECTION INTRAVENOUS at 21:50

## 2022-09-12 RX ADMIN — HYDROMORPHONE HYDROCHLORIDE 1 MG: 1 INJECTION, SOLUTION INTRAMUSCULAR; INTRAVENOUS; SUBCUTANEOUS at 05:20

## 2022-09-12 RX ADMIN — POTASSIUM CHLORIDE 10 MEQ: 7.46 INJECTION, SOLUTION INTRAVENOUS at 13:48

## 2022-09-12 RX ADMIN — POTASSIUM CHLORIDE 10 MEQ: 7.46 INJECTION, SOLUTION INTRAVENOUS at 14:57

## 2022-09-12 RX ADMIN — PIPERACILLIN AND TAZOBACTAM 3.38 G: 3; .375 INJECTION, POWDER, FOR SOLUTION INTRAVENOUS at 09:23

## 2022-09-12 RX ADMIN — PIPERACILLIN AND TAZOBACTAM 3.38 G: 3; .375 INJECTION, POWDER, FOR SOLUTION INTRAVENOUS at 17:45

## 2022-09-12 RX ADMIN — HYDROMORPHONE HYDROCHLORIDE 1 MG: 1 INJECTION, SOLUTION INTRAMUSCULAR; INTRAVENOUS; SUBCUTANEOUS at 13:48

## 2022-09-12 RX ADMIN — HYDROMORPHONE HYDROCHLORIDE 1 MG: 1 INJECTION, SOLUTION INTRAMUSCULAR; INTRAVENOUS; SUBCUTANEOUS at 17:45

## 2022-09-12 RX ADMIN — SODIUM CHLORIDE, PRESERVATIVE FREE 10 ML: 5 INJECTION INTRAVENOUS at 13:48

## 2022-09-12 RX ADMIN — HYDROMORPHONE HYDROCHLORIDE 1 MG: 1 INJECTION, SOLUTION INTRAMUSCULAR; INTRAVENOUS; SUBCUTANEOUS at 22:05

## 2022-09-12 RX ADMIN — HYDROMORPHONE HYDROCHLORIDE 1 MG: 1 INJECTION, SOLUTION INTRAMUSCULAR; INTRAVENOUS; SUBCUTANEOUS at 01:10

## 2022-09-12 RX ADMIN — POTASSIUM CHLORIDE 10 MEQ: 7.46 INJECTION, SOLUTION INTRAVENOUS at 14:56

## 2022-09-12 RX ADMIN — ENOXAPARIN SODIUM 40 MG: 100 INJECTION SUBCUTANEOUS at 09:23

## 2022-09-12 NOTE — PROGRESS NOTES
Admit Date: 2022      POD * No surgery found *  * No surgery found *      Procedure:  * No surgery found *        HOSPITAL DAY:     ANTIBIOTICS:      HPI:  Patient is passing some flatus although his abdominal x-rays still suggesting intestinal obstruction. Small to moderate bilious output. Patient is insistent that he is improving and is not interested in any surgery whatsoever. In fact he wants his NG tube out and to start eating although I have a feeling he would be at high risk of failure at this point. She has history of rectal cancer many years ago still has a port. Calcium 3.3, denies any abdominal pain nausea or vomiting. Temp:  [97.4 °F (36.3 °C)-98.9 °F (37.2 °C)]   Pulse (Heart Rate):  [46-88]   BP: (105-160)/(65-94)   Resp Rate:  [11-18]   O2 Sat (%):  [97 %-100 %]   Weight: --      Intake and Output:  Current Shift: No intake/output data recorded. Last three shifts: 09/10 1901 -  0700  In: 921.3 [P.O.:40; I.V.:881.3]  Out: 1575 [Urine:825]     Blood pressure 129/67, pulse (!) 53, temperature 97.8 °F (36.6 °C), resp. rate 18, SpO2 100 %. Temp (24hrs), Av.2 °F (36.8 °C), Min:97.8 °F (36.6 °C), Max:98.7 °F (37.1 °C)        Review of Systems   Constitutional:  Positive for fatigue. Respiratory: Negative. Cardiovascular: Negative. Gastrointestinal: Negative. Negative for abdominal pain, nausea and vomiting. Psychiatric/Behavioral: Negative. All other systems reviewed and are negative. Physical Exam  Vitals and nursing note reviewed. Constitutional:       General: He is not in acute distress. Appearance: Normal appearance. He is not ill-appearing. HENT:      Head: Normocephalic and atraumatic. Nose: Nose normal.      Comments: NG tube in place     Mouth/Throat:      Pharynx: Oropharynx is clear. Eyes:      General: No scleral icterus. Cardiovascular:      Rate and Rhythm: Normal rate.    Pulmonary:      Effort: Pulmonary effort is normal.   Abdominal: Comments: Minimally distended, soft, nontender, no peritoneal signs, low midline incision. Skin:     General: Skin is warm and dry. Neurological:      General: No focal deficit present. Mental Status: He is alert and oriented to person, place, and time. Psychiatric:         Mood and Affect: Mood normal.         Behavior: Behavior normal.         Thought Content:  Thought content normal.         Judgment: Judgment normal.       Recent Results (from the past 48 hour(s))   CEA    Collection Time: 09/10/22  2:25 PM   Result Value Ref Range    CEA 2.3 ng/mL   OCCULT BLOOD, STOOL    Collection Time: 09/10/22  8:07 PM   Result Value Ref Range    Occult blood, stool Negative NEG     MAGNESIUM    Collection Time: 09/11/22 12:40 AM   Result Value Ref Range    Magnesium 1.8 1.6 - 2.4 mg/dL   METABOLIC PANEL, BASIC    Collection Time: 09/11/22 12:40 AM   Result Value Ref Range    Sodium 140 136 - 145 mmol/L    Potassium 3.6 3.5 - 5.1 mmol/L    Chloride 107 97 - 108 mmol/L    CO2 30 21 - 32 mmol/L    Anion gap 3 (L) 5 - 15 mmol/L    Glucose 86 65 - 100 mg/dL    BUN 11 6 - 20 MG/DL    Creatinine 0.91 0.70 - 1.30 MG/DL    BUN/Creatinine ratio 12 12 - 20      GFR est AA >60 >60 ml/min/1.73m2    GFR est non-AA >60 >60 ml/min/1.73m2    Calcium 8.5 8.5 - 10.1 MG/DL   PHOSPHORUS    Collection Time: 09/11/22 12:40 AM   Result Value Ref Range    Phosphorus 3.1 2.6 - 4.7 MG/DL   CBC WITH AUTOMATED DIFF    Collection Time: 09/11/22 12:40 AM   Result Value Ref Range    WBC 6.8 4.1 - 11.1 K/uL    RBC 2.84 (L) 4.10 - 5.70 M/uL    HGB 8.7 (L) 12.1 - 17.0 g/dL    HCT 26.3 (L) 36.6 - 50.3 %    MCV 92.6 80.0 - 99.0 FL    MCH 30.6 26.0 - 34.0 PG    MCHC 33.1 30.0 - 36.5 g/dL    RDW 14.3 11.5 - 14.5 %    PLATELET 952 (L) 594 - 400 K/uL    MPV 10.3 8.9 - 12.9 FL    NRBC 0.0 0  WBC    ABSOLUTE NRBC 0.00 0.00 - 0.01 K/uL    NEUTROPHILS 70 32 - 75 %    LYMPHOCYTES 19 12 - 49 %    MONOCYTES 9 5 - 13 %    EOSINOPHILS 2 0 - 7 % BASOPHILS 0 0 - 1 %    IMMATURE GRANULOCYTES 0 0.0 - 0.5 %    ABS. NEUTROPHILS 4.7 1.8 - 8.0 K/UL    ABS. LYMPHOCYTES 1.3 0.8 - 3.5 K/UL    ABS. MONOCYTES 0.6 0.0 - 1.0 K/UL    ABS. EOSINOPHILS 0.1 0.0 - 0.4 K/UL    ABS. BASOPHILS 0.0 0.0 - 0.1 K/UL    ABS. IMM.  GRANS. 0.0 0.00 - 0.04 K/UL    DF AUTOMATED     VITAMIN B12    Collection Time: 09/11/22 10:49 AM   Result Value Ref Range    Vitamin B12 464 193 - 986 pg/mL   FOLATE    Collection Time: 09/11/22 10:49 AM   Result Value Ref Range    Folate 41.4 (H) 5.0 - 21.0 ng/mL   LD    Collection Time: 09/11/22 10:49 AM   Result Value Ref Range     85 - 241 U/L   RETICULOCYTE COUNT    Collection Time: 09/11/22 10:49 AM   Result Value Ref Range    Reticulocyte count 0.8 0.7 - 2.1 %    Absolute Retic Cnt. 0.0263 0.0260 - 0.0950 M/ul   MAGNESIUM    Collection Time: 09/12/22  1:15 AM   Result Value Ref Range    Magnesium 1.6 1.6 - 2.4 mg/dL   METABOLIC PANEL, BASIC    Collection Time: 09/12/22  1:15 AM   Result Value Ref Range    Sodium 140 136 - 145 mmol/L    Potassium 3.3 (L) 3.5 - 5.1 mmol/L    Chloride 107 97 - 108 mmol/L    CO2 30 21 - 32 mmol/L    Anion gap 3 (L) 5 - 15 mmol/L    Glucose 94 65 - 100 mg/dL    BUN 8 6 - 20 MG/DL    Creatinine 1.02 0.70 - 1.30 MG/DL    BUN/Creatinine ratio 8 (L) 12 - 20      GFR est AA >60 >60 ml/min/1.73m2    GFR est non-AA >60 >60 ml/min/1.73m2    Calcium 8.3 (L) 8.5 - 10.1 MG/DL   PHOSPHORUS    Collection Time: 09/12/22  1:15 AM   Result Value Ref Range    Phosphorus 3.1 2.6 - 4.7 MG/DL   CBC WITH AUTOMATED DIFF    Collection Time: 09/12/22  1:15 AM   Result Value Ref Range    WBC 5.4 4.1 - 11.1 K/uL    RBC 2.83 (L) 4.10 - 5.70 M/uL    HGB 8.4 (L) 12.1 - 17.0 g/dL    HCT 25.7 (L) 36.6 - 50.3 %    MCV 90.8 80.0 - 99.0 FL    MCH 29.7 26.0 - 34.0 PG    MCHC 32.7 30.0 - 36.5 g/dL    RDW 14.1 11.5 - 14.5 %    PLATELET 462 (L) 714 - 400 K/uL    MPV 11.3 8.9 - 12.9 FL    NRBC 0.0 0  WBC    ABSOLUTE NRBC 0.00 0.00 - 0.01 K/uL NEUTROPHILS 62 32 - 75 %    LYMPHOCYTES 27 12 - 49 %    MONOCYTES 9 5 - 13 %    EOSINOPHILS 2 0 - 7 %    BASOPHILS 0 0 - 1 %    IMMATURE GRANULOCYTES 0 0.0 - 0.5 %    ABS. NEUTROPHILS 3.3 1.8 - 8.0 K/UL    ABS. LYMPHOCYTES 1.5 0.8 - 3.5 K/UL    ABS. MONOCYTES 0.5 0.0 - 1.0 K/UL    ABS. EOSINOPHILS 0.1 0.0 - 0.4 K/UL    ABS. BASOPHILS 0.0 0.0 - 0.1 K/UL    ABS. IMM. GRANS. 0.0 0.00 - 0.04 K/UL    DF AUTOMATED           XR Results (maximum last 3): Results from [place] encounter on 09/09/22    XR ABD FLAT/ ERECT    Impression  Persistent moderately dilated loops of small bowel. CT Results (maximum last 3): Results from [place] encounter on 09/09/22    CT ABD PELV WO CONT    Impression  Patient is status post rectal resection and has a history of pelvic radiation. Increasing small bowel distention compared to the previous examination. Small  bowel obstruction with transition point in the deep pelvis. Transition point at  the level of the anastomosis in the deep pelvis. Presacral soft tissue thickening is similar to the prior study and most likely  post therapy changes. Extensive pancreatic calcifications related to episodes of chronic pancreatitis. No evidence of significant hydronephrosis. There is a Marrero catheter in a  partially decompressed urinary bladder. Residual contrast, calculi or high  density material layering in the urinary bladder. Man Palacio MRI Results (maximum last 3): Results from [place] encounter on 12/31/18    MRI BRAIN W WO CONT    Impression  IMPRESSION:  Normal MRI of the brain      Nuclear Medicine Results (maximum last 3): No results found for this or any previous visit. US Results (maximum last 3):   Results from [place] encounter on 02/02/22    US RETROPERITONEUM COMP    Impression  Mild bilateral pelvocaliectasis  Bilateral nonobstructing renal calculi            Active Problems:    Small bowel obstruction (HCC) (9/9/2022)          ASSESSMENT/PLAN  Patient with history of rectal cancer surgery, history of radiation to the pelvis by his history many years ago. Hip appears to continue to have intestinal obstruction, I have recommended that the patient would try a Gastrografin challenge and leave the NG tube in place until we document his improvement but based on the duration of his symptoms and the clinical course I am more suspicious he will end up needing surgical intervention for his intestinal obstruction. Patient was not interested in any surgery at this point and in fact wanted to eat and he wanted his NG tube out. I explained to him that with the NG tube out that I thought he would be at high risk of nausea vomiting and worsening course. After reasoning with him, he excepted giving him some p.o. clear liquids and popsicles and for that he would leave the NG tube in place, we will clamp the NG tube for 6 hours and give Gastrografin challenge and follow-up with x-rays tomorrow but if not improving he probably will need surgical intervention hopefully if he is not improving he will agree to intervention rather than continued nonoperative care but at this point again he was not interested in surgery, he feels he is improved significantly. Gastrografin challenge, and follow-up x-rays tomorrow, finish IV potassium.       FACE TO FACE time including review of any indicated imaging, discussion with patient, and other providers, exam and discussion with patient:   26          minutes    END:

## 2022-09-12 NOTE — PROGRESS NOTES
Comprehensive Nutrition Assessment    Type and Reason for Visit: Initial    Nutrition Recommendations/Plan:   Continue CLD per surgery, added ensure clear berry per pt request. Once diet is advanced further he would like chocolate ensure    Please obtain updated weight    Given pt's status of malnutrition, recommend starting PPN if diet not advanced in the next day- gastrografin challenge results pending. If PPN is started, d/c IV D5.    PPN Recommendations goal rate: D10 AA4.25 @ 63 ml/hr + 250 ml 20% lipids daily  -Day 1: D10 AA4.25 @ 30 ml/hr  -Day 2: goal rate if lytes WNL       Malnutrition Assessment:  Malnutrition Status:  Severe malnutrition (09/12/22 1513)    Context:  Acute illness     Findings of the 6 clinical characteristics of malnutrition:   Energy Intake:  50% or less of est energy requirements for 5 or more days  Weight Loss:  Unable to assess     Body Fat Loss: Moderate body fat loss, Buccal region, Orbital   Muscle Mass Loss: Moderate muscle mass loss, Scapula (trapezius), Hand (interosseous), Temples (temporalis), Clavicles (pectoralis & deltoids)  Fluid Accumulation:  No significant fluid accumulation,     Strength:  Not performed        Nutrition Assessment:    PMHx: rectal cancer, HTN, etoh/tobacco/substance use, chronic pancreatitis, anemia    59 y.o. male admitted with SBO. Diet advanced to clear liquids today per surgery- pt eager to eat. Plan to leave suction NG in place, clamp it for a few hours today for gastrografin challenge, x-rays tomorrow. Visited pt at bedside where he stated he lost 20# in the last week because he was unable to eat. During this time he was able to tolerate water, koolaid, \"1 bite of chicken at a time\" d/t early satiety, abd pain, nausea. Stated #. He denied n/v today, tolerating clears fine. No bowel movement yet but did pass flatus this morning. NFPE revealed significant fat and muscle wasting. Wt history in EMR shows pt has chronic low body weight. Pt asked for ensure- told him I could provide ensure clear today but he'd have to wait on chocolate ensure for now. K 3.3 today    Given pts severe malnutrition, recommend starting PPN in the next day if diet is not advanced. Recommend continuous D10 AA4.25 @ 63 ml/hr + 250 ml 20% lipids daily. This will provide 1512 ml, 1271 kcals (83% kcal needs), 64 g pro (100% of needs). Recent Labs     09/12/22  0115 09/11/22  0040 09/10/22  0038   GLU 94 86 92   BUN 8 11 12   CREA 1.02 0.91 0.95    140 140   K 3.3* 3.6 3.9    107 107   CO2 30 30 27   CA 8.3* 8.5 8.8   PHOS 3.1 3.1 3.4   MG 1.6 1.8 1.9       Nutritionally Significant Medications:  Lovenox, protonix, zosyn, IV K, IVD5, dilaudid prn      Estimated Daily Nutrient Needs:  Energy Requirements Based On: Kcal/kg  Weight Used for Energy Requirements: Usual  Energy (kcal/day): 7830-0541 (30-35 kcal/kg UBW)  Weight Used for Protein Requirements: Usual  Protein (g/day): 61 (1.2 g/kg UBW)  Method Used for Fluid Requirements: 1 ml/kcal  Fluid (ml/day): 1800    Nutrition Related Findings:   Edema: No data recorded    Last BM: 09/10/22, Other (comment) (ribbon-like)    Wounds: None      Current Nutrition Therapies:  Diet: clear liquid  Supplements: none  Meal intake: No data found. Supplement intake: No data found. Nutrition Support: none      Anthropometric Measures:  Height: 5' 8\" (172.7 cm)  Ideal Body Weight (IBW): 154 lbs (70 kg)     Current Body Wt:  50.8 kg (112 lb), 72.7 % IBW.  Stated  Current BMI (kg/m2): 17  Usual Body Weight: 50.8 kg (112 lb)  % Weight Change (Calculated): 0        BMI Category: Underweight (BMI less than 18.5)    Wt Readings from Last 10 Encounters:   09/09/22 54.4 kg (120 lb)   06/03/22 54.4 kg (120 lb)   02/04/22 52 kg (114 lb 9.6 oz)   02/02/22 48.5 kg (107 lb)   01/26/22 48.1 kg (106 lb)   09/06/21 54.3 kg (119 lb 12.8 oz)   09/02/21 55.1 kg (121 lb 8 oz)   06/14/21 54.9 kg (121 lb)   05/20/21 53.7 kg (118 lb 4.8 oz) 02/04/21 55.8 kg (123 lb)           Nutrition Diagnosis:   Inadequate oral intake related to altered GI function, altered GI structure (SBO) as evidenced by NPO or clear liquid status due to medical condition, poor intake prior to admission, weight loss  Severe malnutrition, In context of acute illness or injury related to inadequate protein-energy intake as evidenced by Criteria as identified in malnutrition assessment, weight loss, poor intake prior to admission    Nutrition Interventions:   Food and/or Nutrient Delivery: Start parenteral nutrition, Continue current diet, Start oral nutrition supplement  Nutrition Education/Counseling: No recommendations at this time  Coordination of Nutrition Care: Continue to monitor while inpatient       Goals:     Goals:  Tolerate nutrition support at goal rate, PO intake 50% or greater, by next RD assessment       Nutrition Monitoring and Evaluation:   Behavioral-Environmental Outcomes: None identified  Food/Nutrient Intake Outcomes: Diet advancement/tolerance, Supplement intake, Parenteral nutrition intake/tolerance  Physical Signs/Symptoms Outcomes: Biochemical data, GI status, Constipation, Nausea/vomiting, Nutrition focused physical findings, Meal time behavior, Weight    Discharge Planning:    Continue oral nutrition supplement    Carmen Frank  Available via [employer]

## 2022-09-12 NOTE — ROUTINE PROCESS
Bedside and Verbal shift change report given to Duane Carton (oncoming nurse) by Ta Naidu (offgoing nurse). Report included the following information SBAR, Kardex, ED Summary, Intake/Output, MAR, and Recent Results.

## 2022-09-12 NOTE — PROGRESS NOTES
Physician Progress Note      PATIENT:               Dylan Green  CSN #:                  [id number]  :                       1965  ADMIT DATE:       2022 7:19 PM  [address] [place] DATE:  RESPONDING  PROVIDER #:        Maki John MD        QUERY TEXT:    Type of Anemia: Please provide further specificity, if known. Clinical indicators include: cancer, chronic anemia, transfuse, hemoglobin, hgb, hct, tibc, rbc  Options provided:  -- Anemia due to acute blood loss  -- Anemia due to chronic blood loss  -- Anemia due to iron deficiency  -- Anemia due to postoperative blood loss  -- Anemia due to chronic disease  -- Other - I will add my own diagnosis  -- Disagree - Not applicable / Not valid  -- Disagree - Clinically Unable to determine / Unknown        PROVIDER RESPONSE TEXT:    The patient has anemia due to chronic blood loss. QUERY TEXT:    Pt admitted with UTI. Pt noted to have chronic indwelling urinary catheter. If possible, please document in the progress notes and discharge summary if you are evaluating and/or treating any of the following: The medical record reflects the following:  Risk Factors: The patient was last admitted to the hospital from 2022 to 2022. The patient was admitted and treated for acute cystitis as well as bilateral hydronephrosis and hydroureter. The patient has chronic indwelling Mondragon catheter which is normally changed every month.  BPH    Clinical Indicators:    UTI - UA suggestive, abdominal pain  BPH/UPJ obstruction  -on mondragon since 2022  - Urine culture with ESBL E coli, switch CTX to zosyn   - Continue flomax when able to take PO  -Outpatient follow up with Urology    Treatment: Rocephin 1gm IV Q24hrs- switched to Zosyn 3.375gm IV Q8hrs, , Urology with OP F/u, LR 150ml/hr continuous IV- now discontinued, Possible mondragon exchange at [facility]?,      Thank you,  HARMONY GreenwoodN, RN, [facility]  [phone]  I am also available via [employer].  Options provided:  -- UTI due to chronic indwelling urinary catheter  -- UTI not due to indwelling urinary catheter  -- Other - I will add my own diagnosis  -- Disagree - Not applicable / Not valid  -- Disagree - Clinically unable to determine / Unknown  -- Refer to Clinical Documentation Reviewer    PROVIDER RESPONSE TEXT:    UTI is not due to the indwelling urinary catheter.     Query created by: Michelle Colmenares on 9/12/2022 2:55 PM      Electronically signed by:  Justina Ng MD 9/12/2022 3:39 PM

## 2022-09-12 NOTE — PROGRESS NOTES
[facility]  Hospitalist Group                                                                                          Hospitalist Progress Note  Man Cannon MD  Answering service: [phone] from in house phone        Date of Service:  2022  NAME:  Tessa Hewitt  :  1965  MRN:  [id number]      Admission Summary: This is a 20-year-old man with a past medical history significant for rectal adenocarcinoma, hypertension, benign prostatic hyperplasia, alcohol, tobacco, and substance abuse, who presented at the emergency room at [facility] with abdominal pain. Interval history / Subjective: Follow up SBO   NG tube in place    Assessment & Plan:     SBO   In the setting of prior rectal cancer  - NGT in place  - Bowel rest, NPO. May use swabs for mouth. Likely can tolerate ice chips. - PRN pain medications as needed  - IV PPI BID   - Oncology consulted   -Appreciate surgery, Gastrograffin today     UTI - UA suggestive, abdominal pain   BPH/UPJ obstruction  -on mondragon since 2022  - Urine culture with ESBL E coli, switch CTX to zosyn   - Continue flomax when able to take PO   -Outpatient follow up with Urology    Chronic pancreatitis   - On IV dilaudid for now, but no sign of pancreatitis     HTN - hydralazine PRN   Substance abuse - UDS positive for cocaine, narcotics, THC.  Counseled on cessation   Acute on chronic anemia -  transfuse for hemoglobin less than 7, Heme following  Tobacco cessation - nicotine patch      Code status: FULL  Prophylaxis: SCDs    Plan: Gastrograffin challenge today and then Laura Guzman 13 discussed with: pt, RN  Anticipated Disposition: Home, greater than 48 hours      Hospital Problems  Date Reviewed: 2022            Codes Class Noted POA    Small bowel obstruction ([facility] [address] ICD-10-CM: I54.764  ICD-9-CM: 560.9  2022 Unknown         Review of Systems:   A comprehensive review of systems was negative except for that written in the HPI. Vital Signs:    Last 24hrs VS reviewed since prior progress note. Most recent are:  Visit Vitals  /67 (BP 1 Location: Left upper arm, BP Patient Position: At rest)   Pulse (!) 53   Temp 97.8 °F (36.6 °C)   Resp 18   SpO2 100%         Intake/Output Summary (Last 24 hours) at 9/12/2022 1322  Last data filed at 9/12/2022 0543  Gross per 24 hour   Intake 627 ml   Output 600 ml   Net 27 ml          Physical Examination:     I had a face to face encounter with this patient and independently examined them on 9/12/2022 as outlined below:          Constitutional:  No acute distress, cooperative, pleasant    ENT:  Oral mucosa moist, oropharynx benign. Resp:  CTA bilaterally. No wheezing/rhonchi/rales. No accessory muscle use. CV:  Regular rhythm, normal rate, no murmurs, gallops, rubs    GI:  Soft, + tender to palpation, reduced bowel sounds in the lower quadrants. Musculoskeletal:  No edema, warm, 2+ pulses throughout    Neurologic:  Moves all extremities. AAOx3, CN II-XII reviewed            Data Review:    Review and/or order of clinical lab test  Review and/or order of tests in the radiology section of CPT  Review and/or order of tests in the medicine section of CPT      Labs:     Recent Labs     09/12/22 0115 09/11/22  0040   WBC 5.4 6.8   HGB 8.4* 8.7*   HCT 25.7* 26.3*   * 142*       Recent Labs     09/12/22 0115 09/11/22  0040 09/10/22  0038    140 140   K 3.3* 3.6 3.9    107 107   CO2 30 30 27   BUN 8 11 12   CREA 1.02 0.91 0.95   GLU 94 86 92   CA 8.3* 8.5 8.8   MG 1.6 1.8 1.9   PHOS 3.1 3.1 3.4       Recent Labs     09/10/22  0038   ALT 27   AP 83   TBILI 0.3   TP 6.0*   ALB 2.7*   GLOB 3.3   LPSE 288       No results for input(s): INR, PTP, APTT, INREXT, INREXT in the last 72 hours.    Recent Labs     09/10/22  0038   TIBC 217*   PSAT 19*   FERR 84        Lab Results   Component Value Date/Time    Folate 41.4 (H) 09/11/2022 10:49 AM        No results for input(s): PH, PCO2, PO2 in the last 72 hours. No results for input(s): CPK, CKNDX, TROIQ in the last 72 hours.     No lab exists for component: CPKMB  Lab Results   Component Value Date/Time    Cholesterol, total 147 01/02/2019 04:00 AM    HDL Cholesterol 66 01/02/2019 04:00 AM    LDL, calculated 65.6 01/02/2019 04:00 AM    Triglyceride 77 01/02/2019 04:00 AM    CHOL/HDL Ratio 2.2 01/02/2019 04:00 AM     No results found for: GLUCPOC  Lab Results   Component Value Date/Time    Color DARK YELLOW 09/09/2022 12:45 PM    Appearance TURBID (A) 09/09/2022 12:45 PM    Specific gravity 1.025 09/09/2022 12:45 PM    Specific gravity 1.010 09/03/2021 10:01 PM    pH (UA) 6.0 09/09/2022 12:45 PM    Protein 300 (A) 09/09/2022 12:45 PM    Glucose Negative 09/09/2022 12:45 PM    Ketone TRACE (A) 09/09/2022 12:45 PM    Bilirubin Negative 06/03/2022 04:57 AM    Urobilinogen 1.0 09/09/2022 12:45 PM    Nitrites Positive (A) 09/09/2022 12:45 PM    Leukocyte Esterase LARGE (A) 09/09/2022 12:45 PM    Epithelial cells FEW 09/09/2022 12:45 PM    Bacteria 2+ (A) 09/09/2022 12:45 PM    WBC >100 (H) 09/09/2022 12:45 PM    RBC >100 (H) 09/09/2022 12:45 PM         Medications Reviewed:     Current Facility-Administered Medications   Medication Dose Route Frequency    potassium chloride 10 mEq in 100 ml IVPB  10 mEq IntraVENous Q1H    diatrizoate ernestina-diatrizoat sod (MD-GASTROVIEW,GASTROGRAFIN) 66-10 % contrast solution 80 mL  80 mL Oral ONCE    dextrose 5% and 0.9% NaCl infusion  100 mL/hr IntraVENous CONTINUOUS    piperacillin-tazobactam (ZOSYN) 3.375 g in 0.9% sodium chloride (MBP/ADV) 100 mL MBP  3.375 g IntraVENous Q8H    phenol throat spray (CHLORASEPTIC) 1 Spray  1 Spray Oral PRN    sodium chloride (NS) flush 5-40 mL  5-40 mL IntraVENous Q8H    sodium chloride (NS) flush 5-40 mL  5-40 mL IntraVENous PRN    acetaminophen (TYLENOL) tablet 650 mg  650 mg Oral Q6H PRN    Or    acetaminophen (TYLENOL) suppository 650 mg  650 mg Rectal Q6H PRN    ondansetron (ZOFRAN ODT) tablet 4 mg  4 mg Oral Q8H PRN    Or    ondansetron (ZOFRAN) injection 4 mg  4 mg IntraVENous Q6H PRN    enoxaparin (LOVENOX) injection 40 mg  40 mg SubCUTAneous DAILY    pantoprazole (PROTONIX) 40 mg in 0.9% sodium chloride 10 mL injection  40 mg IntraVENous Q12H    HYDROmorphone (DILAUDID) injection 1 mg  1 mg IntraVENous Q4H PRN    hydrALAZINE (APRESOLINE) 20 mg/mL injection 10 mg  10 mg IntraVENous Q6H PRN    nicotine (NICODERM CQ) 21 mg/24 hr patch 1 Patch  1 Patch TransDERmal DAILY     ______________________________________________________________________  EXPECTED LENGTH OF STAY: - - -  ACTUAL LENGTH OF STAY:          3                 Jamarcus Carney MD

## 2022-09-13 ENCOUNTER — APPOINTMENT (OUTPATIENT)
Dept: GENERAL RADIOLOGY | Age: 57
DRG: 389 | End: 2022-09-13
Attending: SURGERY
Payer: MEDICARE

## 2022-09-13 VITALS
HEIGHT: 68 IN | DIASTOLIC BLOOD PRESSURE: 84 MMHG | TEMPERATURE: 98.1 F | HEART RATE: 51 BPM | SYSTOLIC BLOOD PRESSURE: 172 MMHG | OXYGEN SATURATION: 100 % | BODY MASS INDEX: 18.25 KG/M2 | RESPIRATION RATE: 18 BRPM

## 2022-09-13 LAB
ANION GAP SERPL CALC-SCNC: 5 MMOL/L (ref 5–15)
BUN SERPL-MCNC: 6 MG/DL (ref 6–20)
BUN/CREAT SERPL: 6 (ref 12–20)
CALCIUM SERPL-MCNC: 8.7 MG/DL (ref 8.5–10.1)
CHLORIDE SERPL-SCNC: 108 MMOL/L (ref 97–108)
CO2 SERPL-SCNC: 27 MMOL/L (ref 21–32)
CREAT SERPL-MCNC: 1.01 MG/DL (ref 0.7–1.3)
ERYTHROCYTE [DISTWIDTH] IN BLOOD BY AUTOMATED COUNT: 14.3 % (ref 11.5–14.5)
GLUCOSE SERPL-MCNC: 80 MG/DL (ref 65–100)
HCT VFR BLD AUTO: 28.7 % (ref 36.6–50.3)
HGB BLD-MCNC: 9.8 G/DL (ref 12.1–17)
MCH RBC QN AUTO: 29.9 PG (ref 26–34)
MCHC RBC AUTO-ENTMCNC: 34.1 G/DL (ref 30–36.5)
MCV RBC AUTO: 87.5 FL (ref 80–99)
NRBC # BLD: 0 K/UL (ref 0–0.01)
NRBC BLD-RTO: 0 PER 100 WBC
PLATELET # BLD AUTO: 165 K/UL (ref 150–400)
PMV BLD AUTO: 10.4 FL (ref 8.9–12.9)
POTASSIUM SERPL-SCNC: 3.5 MMOL/L (ref 3.5–5.1)
RBC # BLD AUTO: 3.28 M/UL (ref 4.1–5.7)
SODIUM SERPL-SCNC: 140 MMOL/L (ref 136–145)
WBC # BLD AUTO: 8.2 K/UL (ref 4.1–11.1)

## 2022-09-13 PROCEDURE — 74011250637 HC RX REV CODE- 250/637: Performed by: INTERNAL MEDICINE

## 2022-09-13 PROCEDURE — 74011250636 HC RX REV CODE- 250/636: Performed by: HOSPITALIST

## 2022-09-13 PROCEDURE — 36415 COLL VENOUS BLD VENIPUNCTURE: CPT

## 2022-09-13 PROCEDURE — 74019 RADEX ABDOMEN 2 VIEWS: CPT

## 2022-09-13 PROCEDURE — 74011000258 HC RX REV CODE- 258: Performed by: INTERNAL MEDICINE

## 2022-09-13 PROCEDURE — C9113 INJ PANTOPRAZOLE SODIUM, VIA: HCPCS | Performed by: INTERNAL MEDICINE

## 2022-09-13 PROCEDURE — 74011250636 HC RX REV CODE- 250/636: Performed by: INTERNAL MEDICINE

## 2022-09-13 PROCEDURE — 74011000250 HC RX REV CODE- 250: Performed by: INTERNAL MEDICINE

## 2022-09-13 PROCEDURE — 85027 COMPLETE CBC AUTOMATED: CPT

## 2022-09-13 PROCEDURE — 99232 SBSQ HOSP IP/OBS MODERATE 35: CPT | Performed by: SURGERY

## 2022-09-13 PROCEDURE — 80048 BASIC METABOLIC PNL TOTAL CA: CPT

## 2022-09-13 RX ORDER — HEPARIN 100 UNIT/ML
500 SYRINGE INTRAVENOUS AS NEEDED
Status: DISCONTINUED | OUTPATIENT
Start: 2022-09-13 | End: 2022-09-13 | Stop reason: HOSPADM

## 2022-09-13 RX ORDER — IBUPROFEN 200 MG
1 TABLET ORAL DAILY
Qty: 30 PATCH | Refills: 0 | Status: SHIPPED | OUTPATIENT
Start: 2022-09-14 | End: 2022-10-14

## 2022-09-13 RX ADMIN — DEXTROSE AND SODIUM CHLORIDE 100 ML/HR: 5; 900 INJECTION, SOLUTION INTRAVENOUS at 12:42

## 2022-09-13 RX ADMIN — PIPERACILLIN AND TAZOBACTAM 3.38 G: 3; .375 INJECTION, POWDER, FOR SOLUTION INTRAVENOUS at 08:51

## 2022-09-13 RX ADMIN — ENOXAPARIN SODIUM 40 MG: 100 INJECTION SUBCUTANEOUS at 08:51

## 2022-09-13 RX ADMIN — HYDROMORPHONE HYDROCHLORIDE 1 MG: 1 INJECTION, SOLUTION INTRAMUSCULAR; INTRAVENOUS; SUBCUTANEOUS at 08:50

## 2022-09-13 RX ADMIN — HYDROMORPHONE HYDROCHLORIDE 1 MG: 1 INJECTION, SOLUTION INTRAMUSCULAR; INTRAVENOUS; SUBCUTANEOUS at 02:32

## 2022-09-13 RX ADMIN — ACETAMINOPHEN 650 MG: 325 TABLET ORAL at 13:37

## 2022-09-13 RX ADMIN — HEPARIN 500 UNITS: 100 SYRINGE at 15:50

## 2022-09-13 RX ADMIN — PIPERACILLIN AND TAZOBACTAM 3.38 G: 3; .375 INJECTION, POWDER, FOR SOLUTION INTRAVENOUS at 00:14

## 2022-09-13 RX ADMIN — SODIUM CHLORIDE, PRESERVATIVE FREE 40 MG: 5 INJECTION INTRAVENOUS at 08:50

## 2022-09-13 NOTE — CONSULTS
Requesting Provider: Gen Wilkinson MD - Reason for Consultation: \"retention\"  Pre-existing [facility] Patient:   No                Patient: Isha Thomas MRN: [id number]  SSN: [id number]    YOB: 1965  Age: 59 y.o. Sex: male     Location: [facility]/       Code Status: Full Code   PCP: Gorge Lemus MD  - [phone]   Emergency Contact:  Primary Emergency Contact: Erika Metro, Home Phone: [phone]   Race/Religious/Language: Adiel Hearn / Bina Cord / Tayo Cordia: Payor: [employer] First / Plan: Sanket Duran / Product Type: Managed Care Medicare /    Prior Admission Data: 9/9/22 [facility] EMERGENCY DEPT     Hospitalized:  Hospital Day: 5 - Admitted 9/9/2022  7:19 PM     CONSULTANTS  IP CONSULT TO GENERAL SURGERY  IP CONSULT TO ONCOLOGY  IP CONSULT TO UROLOGY   ADMISSION DIAGNOSES  No diagnosis found. Assessment/Plan:       Urinary Retention - Managed with an indwelling mondragon catheter. The catheter was changed this admission. F/U for consideration of outlet procedure with his primary Urologist arranged. No acute urologic interventions this admission. Please call with questions. Supervising MD, Dr. Lovina Castleman      CC: No chief complaint on file. HPI: He is a 59 y.o. male with a past medical history significant for rectal adenocarcinoma, hypertension, benign prostatic hyperplasia, alcohol, tobacco, and substance abuse, who presented at the emergency room at [facility] with abdominal pain and was admitted to [facility] for a SBO. He is seen in consult by Urology per patient request. He is followed by Dr. Kiko Hill for hx of BPH with obstruction managed with a chronic indwelling Mondragon since February 2022. He is interested in undergoing surgical management. He has no urologic complaints. He mondragon catheter was changed at admission and is draining well. He denies fevers, chills, flank pain, suprapubic pain, catheter irritation, or hematuria.  He is HDS without fevers. Labs show a normal WBC and creatinine. A urine sample was taken which appeared infected and the culture grew ESBL e coli. He was treated with Zosyn. Prior hx of b/l hydronephrosis which resolved with the mondragon catheter. CT A/P this admission negative for hydro.     =====last OV note===  Kirk Oliver is a 59year old male who presents today for \"discuss outlet procedure\". He returns for follow-up. Patient has been managed with a catheter with monthly catheter exchanges history of radiation for rectal cancer that was over a year ago. He is on Flomax severe urgency with low volumes at last visit catheter was replaced at last visit he was pleased with his catheter management and wanted to undergo exchanges for now. Today he is thinking that he would not like to manage his bladder with a catheter long-term and is curious about other options. No blood in the urine no pain no flank pain no fevers. He is otherwise doing well today. He had a CT IVP had in the fall as well as an office cystoscopy some lateral lobe hypertrophy. PAST MEDICAL HISTORY:    Allergies: No known allergies. DENIES: Latex, Shellfish, X-Ray Dye, Iodine.      Medications: colestipol 1 gram tablet (colestipol)   famotidine 20 mg tablet (famotidine)   mirtazapine 15 mg tablet (mirtazapine)   folic acid 1 mg tablet (folic acid)   metoprolol tartrate 25 mg tablet (metoprolol tartrate)   Flomax 0.4 mg capsule (tamsulosin) 1 capsule by mouth every night     Problems: UTI (ICD-599.0) (JLT56-M13.0)  Hydronephrosis, bilateral (ICD-591) (KZJ20-D25.30)  Retention, urine (ICD-788.20) (WZI90-B20.9)  Calcium kidney stone (ICD-592.0) (WIZ06-Q84.0)  Gross hematuria (ICD-599.71) (QKO59-X94.0)  Elevated prostate specific antigen [PSA] (SFL25-J82.22)  607.84 Erectile dysfunction, organic (IRZ-176.85) (SFR25-W90.9)  Other microscopic hematuria (IDI37-R11.29)  Urgency (H-938.23) (SOP77-D82.15)  Bladder mass (JPP-542.47) (BGQ96-I68.66)    Illnesses: Cancer. DENIES: Heart Disease, Pacemaker/Defibrillator, Lung Disease, Diabetes, High Blood Pressure, Bowel Problems, Stroke/Seizure, Kidney Problems, Bleeding Problems, HIV, or Hepatitis. Surgeries: Other Abdominal Surgery. Family History: Kidney Disease and Kidney Stones. DENIES: Prostate cancer, Kidney cancer. Social History: Unemployed. . Smoking status: current every day smoker. Drinks alcohol 2 to 3 times a week. System Review: Admits to: Difficulty Walking, Lower Extremity Weakness, and Unexplained Weight Loss. DENIES: Dry Eyes, Dry Mouth, Leg Swelling, Shortness of Breath, Constipation, Involuntary Urine Loss, Dry Skin, Psychiatric Problems, Impaired Sex Drive, Easy Bleeding, Rash. EXAMINATION: Appearance: well-developed NAD Respiratory Effort: breathing easily Lower Extremity: no edema Abdomen/Flank: soft non-tender without masses; no CVA tenderness Liver/Spleen: no organomegaly Hernia: no ventral hernia     DIAGNOSTIC STUDIES:  I personally reviewed his CT scan. Did have some abnormalities in the small and large bowel. The radiologist also reported some runs of pancreatitis. I did not see any abnormalities in the  system. This bladder looked normal as well as his kidneys. No stones or masses    URINALYSIS  Urine Micro not done    IMPRESSION:    1. RETENTION - URINE (GDS43-S43.9) - Unchanged: Currently being managed with Marrero catheter. Appeared his [place] did resolve with placement of a Marrero. We will continue to monitor this. He is otherwise doing well he was satisfied with Marrero managed with monthly exchanges however today is curious about long-term plan is in with the Marrero. Is a history of radiation for rectal cancer. We did perform a cystoscopy a couple months ago for hematuria work-up lateral lobe hypertrophy no median lobe. We should be able to measure his prostate from the CT scan as well.   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 We will plan consideration of outlet procedure pending results. Catheter has been exchanged today. 2. GROSS HEMATURIA (KUC47-W16.0) - Improved: He did have a history of gross hematuria we did a work-up for that in October which included a CT IVP or urogram as well as an office cystoscopy. I do not think he needs a cystoscopy repeated for consideration of outlet procedure. Return to office in 2 weeks. Today's Services  E&M Service    Upcoming Orders  Return Office Visit - with Viraj Anderson MD in 2 weeks UDS in the next couple weeks f/u after  Urodynamics, UA        Electronically signed by Viraj Anderson MD on 2022 at 8:39 AM    ________________________________________________________________________      Temp (24hrs), Av °F (36.7 °C), Min:97.3 °F (36.3 °C), Max:98.5 °F (36.9 °C)    Urinary Status: Marrero, Draining  Creatinine   Date/Time Value Ref Range Status   2022 02:50 AM 1.01 0.70 - 1.30 MG/DL Final   2022 01:15 AM 1.02 0.70 - 1.30 MG/DL Final   2022 12:40 AM 0.91 0.70 - 1.30 MG/DL Final   09/10/2022 12:38 AM 0.95 0.70 - 1.30 MG/DL Final   2022 11:56 AM 1.05 0.70 - 1.30 MG/DL Final     Current Antimicrobial Therapy (168h ago, onward)       Ordered     Start Stop    22 1019  piperacillin-tazobactam (ZOSYN) 3.375 g in 0.9% sodium chloride (MBP/ADV) 100 mL MBP  3.375 g,   IntraVENous,   EVERY 8 HOURS        References:    [place]   See Hyperspace for full Linked Orders Report.    22 1618 22 1659                  Key Anti-Platelet Anticoagulant Meds       The patient is on no antiplatelet meds or anticoagulants.           Diet: ADULT DIET Full Liquid -       Labs     Lab Results   Component Value Date/Time    Lactic acid 1.1 2021 08:35 PM    Lactic acid 1.8 2021 11:57 AM    Lactic acid 0.8 2021 05:35 AM    WBC 8.2 2022 02:50 AM    HCT 28.7 (L) 09/13/2022 02:50 AM    PLATELET 742 23/90/1020 02:50 AM    Sodium 140 09/13/2022 02:50 AM    Potassium 3.5 09/13/2022 02:50 AM    Chloride 108 09/13/2022 02:50 AM    CO2 27 09/13/2022 02:50 AM    BUN 6 09/13/2022 02:50 AM    Creatinine 1.01 09/13/2022 02:50 AM    Glucose 80 09/13/2022 02:50 AM    Calcium 8.7 09/13/2022 02:50 AM    Magnesium 1.6 09/12/2022 01:15 AM    INR 1.1 12/21/2020 04:33 AM     UA:   Lab Results   Component Value Date/Time    Color DARK YELLOW 09/09/2022 12:45 PM    Appearance TURBID (A) 09/09/2022 12:45 PM    Specific gravity 1.025 09/09/2022 12:45 PM    Specific gravity 1.010 09/03/2021 10:01 PM    pH (UA) 6.0 09/09/2022 12:45 PM    Protein 300 (A) 09/09/2022 12:45 PM    Glucose Negative 09/09/2022 12:45 PM    Ketone TRACE (A) 09/09/2022 12:45 PM    Bilirubin Negative 06/03/2022 04:57 AM    Urobilinogen 1.0 09/09/2022 12:45 PM    Nitrites Positive (A) 09/09/2022 12:45 PM    Leukocyte Esterase LARGE (A) 09/09/2022 12:45 PM    Epithelial cells FEW 09/09/2022 12:45 PM    Bacteria 2+ (A) 09/09/2022 12:45 PM    WBC >100 (H) 09/09/2022 12:45 PM    RBC >100 (H) 09/09/2022 12:45 PM     Imaging     Results for orders placed during the hospital encounter of 09/09/22    CT ABD PELV WO CONT    Narrative  CLINICAL HISTORY: hx hydronephrosis  INDICATION: History of hydronephrosis  COMPARISON: 6/3/2022  CONTRAST:  None. TECHNIQUE:  Thin axial images were obtained through the abdomen and pelvis. Coronal and  sagittal reformats were generated. Oral contrast was not administered. CT dose  reduction was achieved through use of a standardized protocol tailored for this  examination and automatic exposure control for dose modulation.     The absence of intravenous contrast material reduces the sensitivity for  evaluation of visceral organs and vasculature including presence of small mass  lesions, hemodynamically significant stenoses, dissections, mucosal  abnormalities etc.    FINDINGS:  LOWER THORAX: No significant abnormality in the incidentally imaged lower chest.  LIVER: No mass. BILIARY TREE: Patient is status post cholecystectomy. SPLEEN: within normal  limits. Splenic vein is narrowed and there are collaterals in the left upper  quadrant  PANCREAS: There is numerous calcifications throughout the pancreas with  dilatation of the pancreatic duct ADRENALS: Unremarkable. KIDNEYS: No mass, calculus, or hydronephrosis. There has been resolution of  previously noted bilateral hydronephrosis. There is a Marrero catheter in a  decompressed urinary bladder. Low-density in the right kidney is most consistent  with a cyst similar to the prior study and no additional follow-up is necessary. STOMACH: Unremarkable. SMALL BOWEL: Increased small bowel distention/obstruction in the left upper  quadrant. There are also increasingly dilated loops of small bowel in the pelvis  with air-fluid levels. . There is fecal stasis in small bowel loops in the pelvis  with a change in caliber in the pelvis at the level of the anastomosis. There  are surgical clips in the right lower quadrant  COLON: Postoperative changes are noted from rectal resection. There is presacral  soft tissue thickening similar to the prior study. APPENDIX: Not identified  PERITONEUM: There is a tiny amount of ascites. There is no pneumoperitoneum. RETROPERITONEUM: No lymphadenopathy or aortic aneurysm. REPRODUCTIVE ORGANS: Prostate is within normal limits. There is a right  hydrocele. URINARY BLADDER: There is a Marrero catheter in a decompressed urinary bladder. There is high density posterior urinary bladder and slight high density distal  ureters. Residual contrast or calcifications. Enhancement  BONES: No destructive bone lesion. ABDOMINAL WALL: No mass or hernia. ADDITIONAL COMMENTS: N/A    Impression  Patient is status post rectal resection and has a history of pelvic radiation.   Increasing small bowel distention compared to the previous examination. Small  bowel obstruction with transition point in the deep pelvis. Transition point at  the level of the anastomosis in the deep pelvis. Presacral soft tissue thickening is similar to the prior study and most likely  post therapy changes. Extensive pancreatic calcifications related to episodes of chronic pancreatitis. No evidence of significant hydronephrosis. There is a Marrero catheter in a  partially decompressed urinary bladder. Residual contrast, calculi or high  density material layering in the urinary bladder. Eliza Brown US Results (most recent):  Results from [place] encounter on 02/02/22    US RETROPERITONEUM COMP    Narrative  EXAM:  US RETROPERITONEUM COMP    INDICATION:  Evaluate for hydronephrosis    COMPARISON: 2/2/2022    TECHNIQUE:  Real-time sonography of the kidneys, retroperitoneum and bladder was performed  with multiple static images obtained. FINDINGS:  The kidneys have normal echogenicity with no mass. There is a right upper renal  pole 1.0 x 0.9 x 0.8 cm nonobstructing calculus and a left upper renal pole 1.1  x 0.9 x 0.5 cm nonobstructing calculus. Mild pelvocaliectasis is noted  bilaterally. The right kidney measures 11.2 cm and the left kidney measures 9.4  cm in length. The proximal aorta tapers normally. The retroperitoneum is otherwise obscured by  bowel gas. The urinary bladder is decompressed with a Marrero catheter.     Impression  Mild bilateral pelvocaliectasis  Bilateral nonobstructing renal calculi      Cultures     All Micro Results       None             Past History: (Complete 2+/3 areas)   No Known Allergies   Current Facility-Administered Medications   Medication Dose Route Frequency    alteplase (CATHFLO) 1 mg in sterile water (preservative free) 1 mL injection  1 mg InterCATHeter PRN    heparin (porcine) pf 500 Units  500 Units InterCATHeter PRN    dextrose 5% and 0.9% NaCl infusion  100 mL/hr IntraVENous CONTINUOUS piperacillin-tazobactam (ZOSYN) 3.375 g in 0.9% sodium chloride (MBP/ADV) 100 mL MBP  3.375 g IntraVENous Q8H    phenol throat spray (CHLORASEPTIC) 1 Spray  1 Spray Oral PRN    sodium chloride (NS) flush 5-40 mL  5-40 mL IntraVENous Q8H    sodium chloride (NS) flush 5-40 mL  5-40 mL IntraVENous PRN    acetaminophen (TYLENOL) tablet 650 mg  650 mg Oral Q6H PRN    Or    acetaminophen (TYLENOL) suppository 650 mg  650 mg Rectal Q6H PRN    ondansetron (ZOFRAN ODT) tablet 4 mg  4 mg Oral Q8H PRN    Or    ondansetron (ZOFRAN) injection 4 mg  4 mg IntraVENous Q6H PRN    enoxaparin (LOVENOX) injection 40 mg  40 mg SubCUTAneous DAILY    pantoprazole (PROTONIX) 40 mg in 0.9% sodium chloride 10 mL injection  40 mg IntraVENous Q12H    HYDROmorphone (DILAUDID) injection 1 mg  1 mg IntraVENous Q4H PRN    hydrALAZINE (APRESOLINE) 20 mg/mL injection 10 mg  10 mg IntraVENous Q6H PRN    nicotine (NICODERM CQ) 21 mg/24 hr patch 1 Patch  1 Patch TransDERmal DAILY    Prior to Admission medications    Medication Sig Start Date End Date Taking? Authorizing Provider   calcium carbonate 500 mg calcium (1,250 mg) capsule Take 1,250 mg by mouth two (2) times daily (with meals). 6/3/22   James Rosario MD   lactobacillus combination no.4 3 billion cell cap Take 1 Capsule by mouth daily. 6/3/22   James Rosario MD   metoprolol tartrate (LOPRESSOR) 25 mg tablet Take 0.5 Tablets by mouth two (2) times a day. 2/5/22   Vernell Henderson MD   tamsulosin (FLOMAX) 0.4 mg capsule Take 1 Capsule by mouth daily. 9/9/21   Deondre Soto NP   thiamine mononitrate (B-1) 100 mg tablet Take 1 Tablet by mouth daily. 9/9/21   Deondre Soto NP   folic acid (FOLVITE) 1 mg tablet Take 1 Tablet by mouth daily. 9/9/21   Deondre Soto NP   finasteride (PROSCAR) 5 mg tablet Take 5 mg by mouth daily. Provider, Historical   mirtazapine (REMERON) 15 mg tablet Take 15 mg by mouth nightly.     Provider, Historical   famotidine (Pepcid) 20 mg tablet Take 20 mg by mouth nightly as needed for Heartburn. Provider, Historical        PMHx:  has a past medical history of Cancer [facility]), Gastrointestinal disorder, Hematuria (9/1/2021), and Hypertension. PSurgHx:  has a past surgical history that includes hx gi. PSocHx:  reports that he has been smoking. He has been smoking an average of .25 packs per day. He has never used smokeless tobacco. He reports that he does not currently use alcohol. He reports current drug use. Drug: Marijuana.    ROS:  (Complete - 10 systems) - DENIES: Weightloss (Constitutional), Dry mouth (ENMT), Chest pain (CV), SOB (Respiratory), Constipation (GI), Weakness (MS), Pallor (Skin), TIA Sx (Neuro), Confusion (Psych), Easy bruising (Heme)    Physical Exam: (Comprehesive - 8+ 1995 Systems)     (1) Constitutional:  NAD; pleasant  FIO2:   on SpO2: O2 Sat (%): 100 %  O2 Device: None (Room air)    Patient Vitals for the past 24 hrs:   BP Temp Pulse Resp SpO2 Height   09/13/22 0909 (!) 171/80 97.3 °F (36.3 °C) (!) 54 19 100 % --   09/13/22 0231 (!) 158/81 98.5 °F (36.9 °C) (!) 56 19 99 % --   09/12/22 2032 (!) 152/76 98.3 °F (36.8 °C) (!) 53 18 99 % --   09/12/22 1511 -- -- -- -- -- 5' 8\" (1.727 m)       Date 09/12/22 0700 - 09/13/22 0659 09/13/22 0700 - 09/14/22 0659   Shift 5213-4248 1747-6643 24 Hour Total 3295-4963 8219-7603 24 Hour Total   INTAKE   Shift Total         OUTPUT   Urine 400 1050 1450        Urine Output (mL) (Urinary Catheter 09/09/22) 400 1050 1450      Emesis/NG output 500 350 850        Output (ml) ([REMOVED] Nasogastric Tube 09/10/22) 500 350 850      Shift Total 900 1400 2300      NET -900 -1400 -2300      Weight (kg)            (2) ENMT:   moist mucous membranes, normal sinuses   (3) Respiratory:  breathing easily, no distress   (4) GI:  no abdominal masses, no tenderness   (5) :   Marrero, yellow UA, no CVA tenderness   (6) Lymphatic:  no adenopathy, neck supple   (7) Muscloskeletal:  no gross deformity, normal ROM   (8) Skin:  no rash, warm & dry   (9) Neuro:  Alert, no focal deficits, normal speech      Signed By: Latrice Gibbons NP  - September 13, 2022

## 2022-09-13 NOTE — DISCHARGE INSTRUCTIONS
Discharge Instructions       PATIENT ID: Fadia Rene  MRN: [id number]   YOB: 1965    DATE OF ADMISSION: [unfilled]    DATE OF DISCHARGE: 9/13/2022    PRIMARY CARE PROVIDER: @PCP@     ATTENDING PHYSICIAN: [unfilled]  DISCHARGING PROVIDER: Betsy Ames MD    To contact this individual call [phone] and ask the  to page. If unavailable ask to be transferred the Adult Hospitalist Department. DISCHARGE DIAGNOSES abdominal pain    CONSULTATIONS: Surgery    PROCEDURES/SURGERIES: * No surgery found *    PENDING TEST RESULTS:   At the time of discharge the following test results are still pending: none    FOLLOW UP APPOINTMENTS:   PCP  Urology  Surgery  GI    ADDITIONAL CARE RECOMMENDATIONS:   As above    DIET: Cardiac Diet    ACTIVITY: Activity as tolerated      DISCHARGE MEDICATIONS:   See Medication Reconciliation Form    It is important that you take the medication exactly as they are prescribed. Keep your medication in the bottles provided by the pharmacist and keep a list of the medication names, dosages, and times to be taken in your wallet. Do not take other medications without consulting your doctor. NOTIFY YOUR PHYSICIAN FOR ANY OF THE FOLLOWING:   Fever over 101 degrees for 24 hours. Chest pain, shortness of breath, fever, chills, nausea, vomiting, diarrhea, change in mentation, falling, weakness, bleeding. Severe pain or pain not relieved by medications. Or, any other signs or symptoms that you may have questions about.       DISPOSITION:   x Home With:   OT  PT  HH  RN       SNF/Inpatient Rehab/LTAC    Independent/assisted living    Hospice    Other:     CDMP Checked:   Yes x     PROBLEM LIST Updated:  Yes x       Signed:   Betsy Ames MD  9/13/2022  1:43 PM

## 2022-09-13 NOTE — PROGRESS NOTES
0800 Bedside shift change report given to One Brijesh Melvin  (oncoming nurse) by Kurtis Severin (offgoing nurse). Report included the following information SBAR, Kardex, ED Summary, Procedure Summary, Intake/Output, MAR, Accordion, Recent Results, Med Rec Status,.

## 2022-09-13 NOTE — DISCHARGE SUMMARY
Discharge Summary       PATIENT ID: Marques Kemp  MRN: [id number]   YOB: 1965    DATE OF ADMISSION: 9/9/2022  7:19 PM    DATE OF DISCHARGE: 9/13/2022   PRIMARY CARE PROVIDER: Puma Adams MD     ATTENDING PHYSICIAN: Dr Maricarmen Mchugh  DISCHARGING PROVIDER: Maricarmen Mchugh MD    To contact this individual call [phone] and ask the  to page. If unavailable ask to be transferred the Adult Hospitalist Department. CONSULTATIONS: IP CONSULT TO GENERAL SURGERY  IP CONSULT TO ONCOLOGY    PROCEDURES/SURGERIES: * No surgery found *    DISCHARGE DIAGNOSES:   SBO   In the setting of prior rectal cancer  - NGT out  -Now on full liquids  - PRN pain medications as needed  - IV PPI BID   -Xray abd slight improvement  - Appreciate discussion with Surgery  -Appreciate oncology  -Outpatient follow up with Dr Lian Noble and GI        Probable UTI - likely colonization  BPH/UPJ obstruction  -on mondragon since 6/2022  - Urine culture with ESBL E coli, likely colonization, was getting Zosyn 9/11--9/13  - Continue flomax when able to take PO   -Outpatient follow up with Urology but patient wants to see his urologist while he is here     Chronic pancreatitis   - On IV dilaudid for now, but no sign of pancreatitis   -Wants to get discharged home today and asking for prescription of narcotics, I politely declined     HTN - hydralazine PRN   Substance abuse - UDS positive for cocaine, narcotics, THC. Counseled on cessation   Acute on chronic anemia -  transfuse for hemoglobin less than 7, Hem following      ADDITIONAL CARE RECOMMENDATIONS:   Follow up with PMD, Surgery, Urology, GI     NOTIFY YOUR PHYSICIAN FOR ANY OF THE FOLLOWING:   Fever over 101 degrees for 24 hours. Chest pain, shortness of breath, fever, chills, nausea, vomiting, diarrhea, change in mentation, falling, weakness, bleeding.  Severe pain or pain not relieved by medications, as well as any other signs or symptoms that you may have questions about. FOLLOW UP APPOINTMENTS:    Follow-up Information       Follow up With Specialties Details Why Contact Info    Norma Chavez MD Internal Medicine Physician Follow up in 1 week(s)  [address] [facility] [address] [facility]      John Acharya MD General Surgery Follow up in 1 week(s)  [address] [facility]  [address]      [facility] Follow up in 1 week(s)  [address]   [facility] [address]    Krissy Chavez MD Gastroenterology Follow up in 1 week(s)  [facility]  [phone]                 DIET: Cardiac Diet    ACTIVITY: Activity as tolerated    DISCHARGE MEDICATIONS:  Current Discharge Medication List        START taking these medications    Details   nicotine (NICODERM CQ) 21 mg/24 hr 1 Patch by TransDERmal route daily for 30 days. Qty: 30 Patch, Refills: 0  Start date: 9/14/2022, End date: 10/14/2022           CONTINUE these medications which have NOT CHANGED    Details   calcium carbonate 500 mg calcium (1,250 mg) capsule Take 1,250 mg by mouth two (2) times daily (with meals). Qty: 6 Capsule, Refills: 0      lactobacillus combination no.4 3 billion cell cap Take 1 Capsule by mouth daily. Qty: 10 Capsule, Refills: 0      metoprolol tartrate (LOPRESSOR) 25 mg tablet Take 0.5 Tablets by mouth two (2) times a day. Qty: 60 Tablet, Refills: 0      tamsulosin (FLOMAX) 0.4 mg capsule Take 1 Capsule by mouth daily. Qty: 30 Capsule, Refills: 1      thiamine mononitrate (B-1) 100 mg tablet Take 1 Tablet by mouth daily. Qty: 30 Tablet, Refills: 1      folic acid (FOLVITE) 1 mg tablet Take 1 Tablet by mouth daily. Qty: 30 Tablet, Refills: 1      finasteride (PROSCAR) 5 mg tablet Take 5 mg by mouth daily. mirtazapine (REMERON) 15 mg tablet Take 15 mg by mouth nightly. famotidine (Pepcid) 20 mg tablet Take 20 mg by mouth nightly as needed for Heartburn. DISPOSITION:   x Home With:   OT  PT  HH  RN       Long term SNF/Inpatient Rehab    Independent/assisted living    Hospice    Other:       PATIENT CONDITION AT DISCHARGE:     Functional status    Poor     Deconditioned    x Independent      Cognition   x  Lucid     Forgetful     Dementia      Catheters/lines (plus indication)    Marrero     PICC     PEG    x None      Code status   x  Full code     DNR      PHYSICAL EXAMINATION AT DISCHARGE:  Please see progress note      CHRONIC MEDICAL DIAGNOSES:  Problem List as of 9/13/2022 Date Reviewed: 9/9/2022            Codes Class Noted - Resolved    Small bowel obstruction ([facility] ICD-10-CM: B27.776  ICD-9-CM: 560.9  9/9/2022 - Present        Hematuria ICD-10-CM: R31.9  ICD-9-CM: 599.70  9/1/2021 - Present        Hydronephrosis, bilateral ICD-10-CM: N13.30  ICD-9-CM: 309  8/31/2021 - Present        Pancreatitis ICD-10-CM: K85.90  ICD-9-CM: 851.7  12/20/2020 - Present        Acute pancreatitis ICD-10-CM: K85.90  ICD-9-CM: 596.9  12/20/2020 - Present        Seizure ([facility] ICD-10-CM: R56.9  ICD-9-CM: 780.39  12/31/2018 - Present        Alcohol abuse ICD-10-CM: F10.10  ICD-9-CM: 305.00  12/31/2018 - Present        Drug abuse ([facility] ICD-10-CM: F19.10  ICD-9-CM: 305.90  12/31/2018 - Present        Rectal cancer ([facility] ICD-10-CM: C20  ICD-9-CM: 154.1  12/31/2018 - Present        RESOLVED: ARF (acute renal failure) ([facility] ICD-10-CM: N17.9  ICD-9-CM: 584.9  2/2/2022 - 2/5/2022           Greater than 37 minutes were spent with the patient on counseling and coordination of care    Signed:   Ganesh Obrien MD  9/13/2022  1:44 PM    .

## 2022-09-13 NOTE — PROGRESS NOTES
Progress Note    Patient: Marc Goltz MRN: [id number]  SSN: [id number]    YOB: 1965  Age: 59 y.o. Sex: male      Admit Date: 2022    * No surgery found *    Procedure:      Subjective:     Patient had several bowel movements today. Feeling better. Objective:     Visit Vitals  BP (!) 171/80 (BP 1 Location: Right upper arm, BP Patient Position: Sitting)   Pulse (!) 54   Temp 97.3 °F (36.3 °C)   Resp 19   Ht 5' 8\" (1.727 m)   SpO2 100%   BMI 18.25 kg/m²       Temp (24hrs), Av °F (36.7 °C), Min:97.3 °F (36.3 °C), Max:98.5 °F (36.9 °C)      Physical Exam:    General alert no acute distress  Abdomen soft nontender nondistended    Data Review: images and reports reviewed  Reviewed abdominal x-ray and previous CT scans with radiology. Contrast on today's exam makes it into the colon still with air-fluid levels  Lab Review: All lab results for the last 24 hours reviewed.   Recent Results (from the past 24 hour(s))   METABOLIC PANEL, BASIC    Collection Time: 22  2:50 AM   Result Value Ref Range    Sodium 140 136 - 145 mmol/L    Potassium 3.5 3.5 - 5.1 mmol/L    Chloride 108 97 - 108 mmol/L    CO2 27 21 - 32 mmol/L    Anion gap 5 5 - 15 mmol/L    Glucose 80 65 - 100 mg/dL    BUN 6 6 - 20 MG/DL    Creatinine 1.01 0.70 - 1.30 MG/DL    BUN/Creatinine ratio 6 (L) 12 - 20      GFR est AA >60 >60 ml/min/1.73m2    GFR est non-AA >60 >60 ml/min/1.73m2    Calcium 8.7 8.5 - 10.1 MG/DL   CBC W/O DIFF    Collection Time: 22  2:50 AM   Result Value Ref Range    WBC 8.2 4.1 - 11.1 K/uL    RBC 3.28 (L) 4.10 - 5.70 M/uL    HGB 9.8 (L) 12.1 - 17.0 g/dL    HCT 28.7 (L) 36.6 - 50.3 %    MCV 87.5 80.0 - 99.0 FL    MCH 29.9 26.0 - 34.0 PG    MCHC 34.1 30.0 - 36.5 g/dL    RDW 14.3 11.5 - 14.5 %    PLATELET 948 132 - 073 K/uL    MPV 10.4 8.9 - 12.9 FL    NRBC 0.0 0  WBC    ABSOLUTE NRBC 0.00 0.00 - 0.01 K/uL       Assessment:     Hospital Problems  Date Reviewed: 2022            Codes Class Noted POA    Small bowel obstruction [facility]) ICD-10-CM: X59.243  ICD-9-CM: 560.9  9/9/2022 Unknown           Plan/Recommendations/Medical Decision Making:   Small bowel obstruction-  Reviewed films with radiology from last few admissions and current examinations. It appears as though the patient always has dilated loops of bowel. Concern is he may have some sort of stenosis near the terminal ileum. Would recommend GI evaluation at some point for possible colonoscopy and if stenosis is identified dilation. For now NG tube out. Start on full liquid diet. Is concern is for stenosis in the small intestine would recommend low fiber easily digestible foods going forward.

## 2022-09-13 NOTE — PROGRESS NOTES
Hematology Oncology Progress Note    Follow up for: h/o rectal Ca    Chart notes reviewed since last visit. Case discussed with following: .     Patient complains of the following: He feels better, had several loose stools this am    Additional concerns noted by the staff:     Patient Vitals for the past 24 hrs:   BP Temp Pulse Resp SpO2 Height   09/13/22 0909 (!) 171/80 97.3 °F (36.3 °C) (!) 54 19 100 % --   09/13/22 0231 (!) 158/81 98.5 °F (36.9 °C) (!) 56 19 99 % --   09/12/22 2032 (!) 152/76 98.3 °F (36.8 °C) (!) 53 18 99 % --   09/12/22 1511 -- -- -- -- -- 5' 8\" (1.727 m)           Physical Examination:  Gen NAD, has NG tube in  Resp no distress  Abd s/nt  Ext no edema      Labs:  Recent Results (from the past 24 hour(s))   METABOLIC PANEL, BASIC    Collection Time: 09/13/22  2:50 AM   Result Value Ref Range    Sodium 140 136 - 145 mmol/L    Potassium 3.5 3.5 - 5.1 mmol/L    Chloride 108 97 - 108 mmol/L    CO2 27 21 - 32 mmol/L    Anion gap 5 5 - 15 mmol/L    Glucose 80 65 - 100 mg/dL    BUN 6 6 - 20 MG/DL    Creatinine 1.01 0.70 - 1.30 MG/DL    BUN/Creatinine ratio 6 (L) 12 - 20      GFR est AA >60 >60 ml/min/1.73m2    GFR est non-AA >60 >60 ml/min/1.73m2    Calcium 8.7 8.5 - 10.1 MG/DL   CBC W/O DIFF    Collection Time: 09/13/22  2:50 AM   Result Value Ref Range    WBC 8.2 4.1 - 11.1 K/uL    RBC 3.28 (L) 4.10 - 5.70 M/uL    HGB 9.8 (L) 12.1 - 17.0 g/dL    HCT 28.7 (L) 36.6 - 50.3 %    MCV 87.5 80.0 - 99.0 FL    MCH 29.9 26.0 - 34.0 PG    MCHC 34.1 30.0 - 36.5 g/dL    RDW 14.3 11.5 - 14.5 %    PLATELET 608 670 - 311 K/uL    MPV 10.4 8.9 - 12.9 FL    NRBC 0.0 0  WBC    ABSOLUTE NRBC 0.00 0.00 - 0.01 K/uL         Assessment and Plan:   H/O rectal Ca  -was tx by Dr. Menendez Samples back in 2016  -Has not seen him since 2020  -CEA normal, no clear sign of recurrence on CT  -will need colonoscopy once over SBO  -follow up with Dr. Claudeen Bonds    SBO  -plan per surgery  -Has NG tube in  -he feels better/ having stools this am  kub done this am, not read yet    Anemia hgb 9.8    -iron studies ok, b12 normal    Merlyn Conway MD

## 2022-11-28 ENCOUNTER — APPOINTMENT (OUTPATIENT)
Dept: CT IMAGING | Age: 57
DRG: 388 | End: 2022-11-28
Attending: EMERGENCY MEDICINE
Payer: MEDICARE

## 2022-11-28 ENCOUNTER — HOSPITAL ENCOUNTER (INPATIENT)
Age: 57
LOS: 5 days | Discharge: HOME | DRG: 388 | End: 2022-12-04
Attending: EMERGENCY MEDICINE | Admitting: STUDENT IN AN ORGANIZED HEALTH CARE EDUCATION/TRAINING PROGRAM
Payer: MEDICARE

## 2022-11-28 ENCOUNTER — APPOINTMENT (OUTPATIENT)
Dept: GENERAL RADIOLOGY | Age: 57
DRG: 388 | End: 2022-11-28
Attending: EMERGENCY MEDICINE
Payer: MEDICARE

## 2022-11-28 DIAGNOSIS — E87.6 HYPOKALEMIA: ICD-10-CM

## 2022-11-28 DIAGNOSIS — K86.1 CHRONIC PANCREATITIS, UNSPECIFIED PANCREATITIS TYPE (HCC): ICD-10-CM

## 2022-11-28 DIAGNOSIS — K56.609 SBO (SMALL BOWEL OBSTRUCTION) (HCC): Primary | ICD-10-CM

## 2022-11-28 LAB
ALBUMIN SERPL-MCNC: 2.5 G/DL (ref 3.5–5)
ALBUMIN/GLOB SERPL: 0.6 {RATIO} (ref 1.1–2.2)
ALP SERPL-CCNC: 97 U/L (ref 45–117)
ALT SERPL-CCNC: 16 U/L (ref 12–78)
ANION GAP SERPL CALC-SCNC: 4 MMOL/L (ref 5–15)
AST SERPL-CCNC: 17 U/L (ref 15–37)
BASOPHILS # BLD: 0 K/UL (ref 0–0.1)
BASOPHILS NFR BLD: 0 % (ref 0–1)
BILIRUB SERPL-MCNC: 0.3 MG/DL (ref 0.2–1)
BUN SERPL-MCNC: 11 MG/DL (ref 6–20)
BUN/CREAT SERPL: 15 (ref 12–20)
CALCIUM SERPL-MCNC: 8.3 MG/DL (ref 8.5–10.1)
CHLORIDE SERPL-SCNC: 103 MMOL/L (ref 97–108)
CO2 SERPL-SCNC: 32 MMOL/L (ref 21–32)
CREAT SERPL-MCNC: 0.73 MG/DL (ref 0.7–1.3)
DIFFERENTIAL METHOD BLD: ABNORMAL
EOSINOPHIL # BLD: 0.1 K/UL (ref 0–0.4)
EOSINOPHIL NFR BLD: 1 % (ref 0–7)
ERYTHROCYTE [DISTWIDTH] IN BLOOD BY AUTOMATED COUNT: 15.4 % (ref 11.5–14.5)
GLOBULIN SER CALC-MCNC: 3.9 G/DL (ref 2–4)
GLUCOSE SERPL-MCNC: 101 MG/DL (ref 65–100)
HCT VFR BLD AUTO: 27.4 % (ref 36.6–50.3)
HGB BLD-MCNC: 9.3 G/DL (ref 12.1–17)
IMM GRANULOCYTES # BLD AUTO: 0 K/UL (ref 0–0.04)
IMM GRANULOCYTES NFR BLD AUTO: 0 % (ref 0–0.5)
LACTATE SERPL-SCNC: 1.3 MMOL/L (ref 0.4–2)
LIPASE SERPL-CCNC: 784 U/L (ref 73–393)
LYMPHOCYTES # BLD: 1.6 K/UL (ref 0.8–3.5)
LYMPHOCYTES NFR BLD: 24 % (ref 12–49)
MCH RBC QN AUTO: 29.9 PG (ref 26–34)
MCHC RBC AUTO-ENTMCNC: 33.9 G/DL (ref 30–36.5)
MCV RBC AUTO: 88.1 FL (ref 80–99)
MONOCYTES # BLD: 0.6 K/UL (ref 0–1)
MONOCYTES NFR BLD: 8 % (ref 5–13)
NEUTS SEG # BLD: 4.4 K/UL (ref 1.8–8)
NEUTS SEG NFR BLD: 67 % (ref 32–75)
NRBC # BLD: 0 K/UL (ref 0–0.01)
NRBC BLD-RTO: 0 PER 100 WBC
PLATELET # BLD AUTO: 239 K/UL (ref 150–400)
PMV BLD AUTO: 10.6 FL (ref 8.9–12.9)
POTASSIUM SERPL-SCNC: 2.6 MMOL/L (ref 3.5–5.1)
PROT SERPL-MCNC: 6.4 G/DL (ref 6.4–8.2)
RBC # BLD AUTO: 3.11 M/UL (ref 4.1–5.7)
SODIUM SERPL-SCNC: 139 MMOL/L (ref 136–145)
WBC # BLD AUTO: 6.8 K/UL (ref 4.1–11.1)

## 2022-11-28 PROCEDURE — 0D9670Z DRAINAGE OF STOMACH WITH DRAINAGE DEVICE, VIA NATURAL OR ARTIFICIAL OPENING: ICD-10-PCS | Performed by: RADIOLOGY

## 2022-11-28 PROCEDURE — 96375 TX/PRO/DX INJ NEW DRUG ADDON: CPT

## 2022-11-28 PROCEDURE — 96374 THER/PROPH/DIAG INJ IV PUSH: CPT

## 2022-11-28 PROCEDURE — 83605 ASSAY OF LACTIC ACID: CPT

## 2022-11-28 PROCEDURE — 83690 ASSAY OF LIPASE: CPT

## 2022-11-28 PROCEDURE — 74011250636 HC RX REV CODE- 250/636: Performed by: EMERGENCY MEDICINE

## 2022-11-28 PROCEDURE — 96365 THER/PROPH/DIAG IV INF INIT: CPT

## 2022-11-28 PROCEDURE — 99285 EMERGENCY DEPT VISIT HI MDM: CPT

## 2022-11-28 PROCEDURE — 36415 COLL VENOUS BLD VENIPUNCTURE: CPT

## 2022-11-28 PROCEDURE — 80053 COMPREHEN METABOLIC PANEL: CPT

## 2022-11-28 PROCEDURE — 74018 RADEX ABDOMEN 1 VIEW: CPT

## 2022-11-28 PROCEDURE — 74176 CT ABD & PELVIS W/O CONTRAST: CPT

## 2022-11-28 PROCEDURE — 74011000250 HC RX REV CODE- 250: Performed by: EMERGENCY MEDICINE

## 2022-11-28 PROCEDURE — 96366 THER/PROPH/DIAG IV INF ADDON: CPT

## 2022-11-28 PROCEDURE — 96376 TX/PRO/DX INJ SAME DRUG ADON: CPT

## 2022-11-28 PROCEDURE — 96361 HYDRATE IV INFUSION ADD-ON: CPT

## 2022-11-28 PROCEDURE — 85025 COMPLETE CBC W/AUTO DIFF WBC: CPT

## 2022-11-28 RX ORDER — POTASSIUM CHLORIDE 7.45 MG/ML
10 INJECTION INTRAVENOUS
Status: COMPLETED | OUTPATIENT
Start: 2022-11-28 | End: 2022-11-28

## 2022-11-28 RX ORDER — MORPHINE SULFATE 4 MG/ML
4 INJECTION INTRAVENOUS
Status: COMPLETED | OUTPATIENT
Start: 2022-11-28 | End: 2022-11-28

## 2022-11-28 RX ORDER — MORPHINE SULFATE 4 MG/ML
4 INJECTION INTRAVENOUS
Status: COMPLETED | OUTPATIENT
Start: 2022-11-28 | End: 2022-11-29

## 2022-11-28 RX ADMIN — MORPHINE SULFATE 4 MG: 4 INJECTION, SOLUTION INTRAMUSCULAR; INTRAVENOUS at 20:57

## 2022-11-28 RX ADMIN — MORPHINE SULFATE 4 MG: 4 INJECTION, SOLUTION INTRAMUSCULAR; INTRAVENOUS at 18:30

## 2022-11-28 RX ADMIN — POTASSIUM CHLORIDE 10 MEQ: 7.46 INJECTION, SOLUTION INTRAVENOUS at 16:21

## 2022-11-28 RX ADMIN — TOPICAL ANESTHETIC 1 SPRAY: 200 SPRAY DENTAL; PERIODONTAL at 21:31

## 2022-11-28 RX ADMIN — MORPHINE SULFATE 4 MG: 4 INJECTION INTRAVENOUS at 14:48

## 2022-11-28 RX ADMIN — SODIUM CHLORIDE 1000 ML: 9 INJECTION, SOLUTION INTRAVENOUS at 14:48

## 2022-11-28 NOTE — ED NOTES
Pt presents ambulatory to ED complaining of uncontrollable N/V/D for the past three days. Pt reports that this happens every month and he has to go to the hospital. Pt reports that he has a GI doctor he sees for this problem. Pt stated that he was told that nothing could be done to resolve the problem and states he has not been prescribed any medication . Pt is alert and oriented x 4, RR even and unlabored, skin is warm and dry. Assesment completed and pt updated on plan of care. Emergency Department Nursing Plan of Care       The Nursing Plan of Care is developed from the Nursing assessment and Emergency Department Attending provider initial evaluation. The plan of care may be reviewed in the ED Provider note.     The Plan of Care was developed with the following considerations:   Patient / Family readiness to learn indicated by:verbalized understanding  Persons(s) to be included in education: patient  Barriers to Learning/Limitations:No    Signed     Shelby Duarte RN    11/28/2022   1:24 PM

## 2022-11-28 NOTE — ED PROVIDER NOTES
EMERGENCY DEPARTMENT HISTORY AND PHYSICAL EXAM      Date: 11/28/2022  Patient Name: Ok Ivory    History of Presenting Illness     Chief Complaint   Patient presents with    Diarrhea     History Provided By: Patient    HPI: Ok Ivory, 59 y.o. male with past medical history significant for rectal cancer, chronic pancreatitis, and hypertension who presents via private vehicle to the ED with cc of lower abdominal pain, nausea, vomiting, and diarrhea for the past 2 to 3 days. Patient states he has frequent episodes of these symptoms every month or so and no one can tell him why. He tells me he had surgery for his rectal cancer and had radiation therapy which caused scarring. He denies any fevers, chills, chest pain, or shortness of breath. He states he has had 10-12 bowel movements a day for the past 3 days and yesterday started having nausea and vomiting. His pain is described as a dull/aching pain that does not radiate. He is followed by GI. Patient denies taking any medications for the symptoms. PMHx: Rectal cancer, chronic pancreatitis, and hypertension  Social Hx: Smokes 1/3 pack/day, denies alcohol use, denies current illegal drug use    PCP: Apryl Spring MD    There are no other complaints, changes, or physical findings at this time. No current facility-administered medications on file prior to encounter. Current Outpatient Medications on File Prior to Encounter   Medication Sig Dispense Refill    calcium carbonate 500 mg calcium (1,250 mg) capsule Take 1,250 mg by mouth two (2) times daily (with meals). 6 Capsule 0    lactobacillus combination no.4 3 billion cell cap Take 1 Capsule by mouth daily. 10 Capsule 0    metoprolol tartrate (LOPRESSOR) 25 mg tablet Take 0.5 Tablets by mouth two (2) times a day. 60 Tablet 0    tamsulosin (FLOMAX) 0.4 mg capsule Take 1 Capsule by mouth daily.  30 Capsule 1    thiamine mononitrate (B-1) 100 mg tablet Take 1 Tablet by mouth daily. 30 Tablet 1    folic acid (FOLVITE) 1 mg tablet Take 1 Tablet by mouth daily. 30 Tablet 1    finasteride (PROSCAR) 5 mg tablet Take 5 mg by mouth daily.  mirtazapine (REMERON) 15 mg tablet Take 15 mg by mouth nightly.  famotidine (Pepcid) 20 mg tablet Take 20 mg by mouth nightly as needed for Heartburn. Past History     Past Medical History:  Past Medical History:   Diagnosis Date    Cancer [facility])     rectal    Gastrointestinal disorder     Gastric Ulcers; Rectal CA    Hematuria 9/1/2021    Hypertension      Past Surgical History:  Past Surgical History:   Procedure Laterality Date    HX GI       Family History:  Family History   Problem Relation Age of Onset    Cancer Brother      Social History:  Social History     Tobacco Use    Smoking status: Every Day     Packs/day: 0.25     Types: Cigarettes    Smokeless tobacco: Never    Tobacco comments:     \"1 pack last me three days\"   Substance Use Topics    Alcohol use: Not Currently     Comment: hasn't drank x3mo (6/3/22)    Drug use: Yes     Types: Marijuana     Comment: Occasional for appetite     Allergies:  No Known Allergies  Review of Systems   Review of Systems   Constitutional:  Negative for chills and fever. HENT:  Negative for congestion, rhinorrhea, sneezing and sore throat. Eyes:  Negative for redness and visual disturbance. Respiratory:  Negative for shortness of breath. Cardiovascular:  Negative for chest pain and leg swelling. Gastrointestinal:  Positive for abdominal pain, diarrhea, nausea and vomiting. Genitourinary:  Negative for difficulty urinating and frequency. Musculoskeletal:  Negative for back pain, myalgias and neck stiffness. Skin:  Negative for rash. Neurological:  Negative for dizziness, syncope, weakness and headaches. Hematological:  Negative for adenopathy. All other systems reviewed and are negative. Physical Exam   Physical Exam  Vitals and nursing note reviewed. Constitutional:       Appearance: Normal appearance. He is well-developed. HENT:      Head: Normocephalic and atraumatic. Cardiovascular:      Rate and Rhythm: Normal rate and regular rhythm. Pulses: Normal pulses. Heart sounds: Normal heart sounds. Pulmonary:      Effort: Pulmonary effort is normal. No respiratory distress. Breath sounds: Normal breath sounds. Chest:      Chest wall: No tenderness. Abdominal:      General: Bowel sounds are normal. There is no distension. Palpations: Abdomen is soft. Tenderness: There is generalized abdominal tenderness. There is guarding. There is no rebound. Musculoskeletal:      Cervical back: Full passive range of motion without pain, normal range of motion and neck supple. Skin:     General: Skin is warm and dry. Findings: No erythema or rash. Neurological:      Mental Status: He is alert and oriented to person, place, and time. Psychiatric:         Speech: Speech normal.         Behavior: Behavior normal.         Thought Content: Thought content normal.         Judgment: Judgment normal.     Diagnostic Study Results   Labs -     Recent Results (from the past 12 hour(s))   CBC WITH AUTOMATED DIFF    Collection Time: 11/28/22  2:35 PM   Result Value Ref Range    WBC 6.8 4.1 - 11.1 K/uL    RBC 3.11 (L) 4.10 - 5.70 M/uL    HGB 9.3 (L) 12.1 - 17.0 g/dL    HCT 27.4 (L) 36.6 - 50.3 %    MCV 88.1 80.0 - 99.0 FL    MCH 29.9 26.0 - 34.0 PG    MCHC 33.9 30.0 - 36.5 g/dL    RDW 15.4 (H) 11.5 - 14.5 %    PLATELET 618 365 - 464 K/uL    MPV 10.6 8.9 - 12.9 FL    NRBC 0.0 0  WBC    ABSOLUTE NRBC 0.00 0.00 - 0.01 K/uL    NEUTROPHILS 67 32 - 75 %    LYMPHOCYTES 24 12 - 49 %    MONOCYTES 8 5 - 13 %    EOSINOPHILS 1 0 - 7 %    BASOPHILS 0 0 - 1 %    IMMATURE GRANULOCYTES 0 0.0 - 0.5 %    ABS. NEUTROPHILS 4.4 1.8 - 8.0 K/UL    ABS. LYMPHOCYTES 1.6 0.8 - 3.5 K/UL    ABS. MONOCYTES 0.6 0.0 - 1.0 K/UL    ABS.  EOSINOPHILS 0.1 0.0 - 0.4 K/UL ABS. BASOPHILS 0.0 0.0 - 0.1 K/UL    ABS. IMM. GRANS. 0.0 0.00 - 0.04 K/UL    DF AUTOMATED     METABOLIC PANEL, COMPREHENSIVE    Collection Time: 11/28/22  2:35 PM   Result Value Ref Range    Sodium 139 136 - 145 mmol/L    Potassium 2.6 (LL) 3.5 - 5.1 mmol/L    Chloride 103 97 - 108 mmol/L    CO2 32 21 - 32 mmol/L    Anion gap 4 (L) 5 - 15 mmol/L    Glucose 101 (H) 65 - 100 mg/dL    BUN 11 6 - 20 MG/DL    Creatinine 0.73 0.70 - 1.30 MG/DL    BUN/Creatinine ratio 15 12 - 20      eGFR >60 >60 ml/min/1.73m2    Calcium 8.3 (L) 8.5 - 10.1 MG/DL    Bilirubin, total 0.3 0.2 - 1.0 MG/DL    ALT (SGPT) 16 12 - 78 U/L    AST (SGOT) 17 15 - 37 U/L    Alk. phosphatase 97 45 - 117 U/L    Protein, total 6.4 6.4 - 8.2 g/dL    Albumin 2.5 (L) 3.5 - 5.0 g/dL    Globulin 3.9 2.0 - 4.0 g/dL    A-G Ratio 0.6 (L) 1.1 - 2.2     LACTIC ACID    Collection Time: 11/28/22  2:35 PM   Result Value Ref Range    Lactic acid 1.3 0.4 - 2.0 MMOL/L   LIPASE    Collection Time: 11/28/22  2:35 PM   Result Value Ref Range    Lipase 784 (H) 73 - 393 U/L       Radiologic Studies -   CT ABD PELV WO CONT   Final Result   Dilated fluid and air-filled small bowel loops consistent with small bowel   obstruction. Sequela of chronic pancreatitis. No evidence of large bowel obstruction status post rectosigmoid resection. CT ABD PELV WO CONT    Result Date: 11/28/2022  Dilated fluid and air-filled small bowel loops consistent with small bowel obstruction. Sequela of chronic pancreatitis. No evidence of large bowel obstruction status post rectosigmoid resection. Medical Decision Making   I am the first provider for this patient. I reviewed the vital signs, available nursing notes, past medical history, past surgical history, family history and social history. Vital Signs-Reviewed the patient's vital signs.   Patient Vitals for the past 24 hrs:   Temp Pulse Resp BP SpO2   11/28/22 1956 -- 64 9 -- 100 %   11/28/22 1639 -- 65 15 -- 100 %   11/28/22 1624 -- (!) 59 13 (!) 171/84 100 %   11/28/22 1609 -- (!) 59 14 (!) 178/89 100 %   11/28/22 1554 -- 63 12 (!) 187/98 100 %   11/28/22 1539 -- -- -- (!) 180/87 --   11/28/22 1509 -- 69 18 (!) 185/78 100 %   11/28/22 1454 -- 80 13 (!) 158/101 100 %   11/28/22 1244 98.5 °F (36.9 °C) 93 18 130/79 100 %     Pulse Oximetry Analysis - 100% on RA (normal)    Records Reviewed: Nursing Notes, Old Medical Records, Previous Radiology Studies, and Previous Laboratory Studies    Provider Notes (Medical Decision Making):   78-year-old male presents with nausea, vomiting, diarrhea, and generalized abdominal pain for the past 3 days. Differential includes small bowel obstruction, partial small bowel obstruction, adhesions, pancreatitis, fecal stasis, ileus, and electrolyte abnormality. Per chart review, he was seen for similar symptoms in September and diagnosed with a small bowel obstruction. He was managed conservatively with NG tube, fluids, and bowel rest.  Will check basic labs, treat his pain, and CT his abdomen and pelvis and reassess. ED Course:   Initial assessment performed. The patients presenting problems have been discussed, and they are in agreement with the care plan formulated and outlined with them. I have encouraged them to ask questions as they arise throughout their visit. Labs consistent with hypokalemia, will treat with IV Potassium. Pt also has an elevtaed lipase with a known history of chronic pancreatitis. Still awaiting CT. Spoke with Dr. Shelton Robles who is aware of the patient. If the CT does not reveal a surgical necessity and general surgery feels it can be managed here at [facility], he will admit. BEDSIDE SIGN OUT:  4:03 PM  Discussed pt's hx, disposition, and available diagnostic and imaging results with Dr. Benny Burroughs. Reviewed care plans. Both providers and patient are in agreement with care plan. Nasir Martin MD is transferring care at this time.  Nasir Martin will be the attending of record. ED Course as of 11/28/22 2036 Mon Nov 28, 2022 2035 CT scan shows a small bowel obstruction. I discussed with Dr. Abby Yun, general surgery, who states the patient is okay to stay original [facility] on hospital service. He will see them in consultation in the morning. I spoke with Dr. Alyssa Phillips, hospitalist, who will admit the patient. [RUBI]      ED Course User Index  Marcela Daniels MD     Progress Note:   Updated pt on all returned results and findings. Discussed the importance of proper follow up as referred below along with return precautions. Pt in agreement with the care plan and expresses agreement with and understanding of all items discussed. Disposition:  Admit    PLAN:  1. Admit    Diagnosis     Clinical Impression:   1. SBO (small bowel obstruction) ([address]    2. Hypokalemia            Please note that this dictation was completed with Dragon, computer voice recognition software. Quite often unanticipated grammatical, syntax, homophones, and other interpretive errors are inadvertently transcribed by the computer software. Please disregard these errors. Additionally, please excuse any errors that have escaped final proofreading.

## 2022-11-29 ENCOUNTER — APPOINTMENT (OUTPATIENT)
Dept: GENERAL RADIOLOGY | Age: 57
DRG: 388 | End: 2022-11-29
Attending: EMERGENCY MEDICINE
Payer: MEDICARE

## 2022-11-29 PROBLEM — K56.609 SBO (SMALL BOWEL OBSTRUCTION) (HCC): Status: ACTIVE | Noted: 2022-11-29

## 2022-11-29 LAB
ALBUMIN SERPL-MCNC: 2.7 G/DL (ref 3.5–5)
ALBUMIN/GLOB SERPL: 0.7 {RATIO} (ref 1.1–2.2)
ALP SERPL-CCNC: 109 U/L (ref 45–117)
ALT SERPL-CCNC: 15 U/L (ref 12–78)
ANION GAP SERPL CALC-SCNC: 5 MMOL/L (ref 5–15)
AST SERPL-CCNC: 20 U/L (ref 15–37)
ATRIAL RATE: 64 BPM
BILIRUB SERPL-MCNC: 0.5 MG/DL (ref 0.2–1)
BUN SERPL-MCNC: 8 MG/DL (ref 6–20)
BUN/CREAT SERPL: 13 (ref 12–20)
CALCIUM SERPL-MCNC: 8.3 MG/DL (ref 8.5–10.1)
CALCULATED P AXIS, ECG09: 89 DEGREES
CALCULATED R AXIS, ECG10: 52 DEGREES
CALCULATED T AXIS, ECG11: 55 DEGREES
CHLORIDE SERPL-SCNC: 102 MMOL/L (ref 97–108)
CO2 SERPL-SCNC: 32 MMOL/L (ref 21–32)
CREAT SERPL-MCNC: 0.6 MG/DL (ref 0.7–1.3)
DIAGNOSIS, 93000: NORMAL
ERYTHROCYTE [DISTWIDTH] IN BLOOD BY AUTOMATED COUNT: 15.2 % (ref 11.5–14.5)
GLOBULIN SER CALC-MCNC: 4 G/DL (ref 2–4)
GLUCOSE SERPL-MCNC: 85 MG/DL (ref 65–100)
HCT VFR BLD AUTO: 30.9 % (ref 36.6–50.3)
HGB BLD-MCNC: 10.5 G/DL (ref 12.1–17)
MAGNESIUM SERPL-MCNC: 1.6 MG/DL (ref 1.6–2.4)
MCH RBC QN AUTO: 29.9 PG (ref 26–34)
MCHC RBC AUTO-ENTMCNC: 34 G/DL (ref 30–36.5)
MCV RBC AUTO: 88 FL (ref 80–99)
NRBC # BLD: 0 K/UL (ref 0–0.01)
NRBC BLD-RTO: 0 PER 100 WBC
P-R INTERVAL, ECG05: 128 MS
PLATELET # BLD AUTO: 286 K/UL (ref 150–400)
PMV BLD AUTO: 10.4 FL (ref 8.9–12.9)
POTASSIUM SERPL-SCNC: 2.5 MMOL/L (ref 3.5–5.1)
PROT SERPL-MCNC: 6.7 G/DL (ref 6.4–8.2)
Q-T INTERVAL, ECG07: 418 MS
QRS DURATION, ECG06: 82 MS
QTC CALCULATION (BEZET), ECG08: 431 MS
RBC # BLD AUTO: 3.51 M/UL (ref 4.1–5.7)
SODIUM SERPL-SCNC: 139 MMOL/L (ref 136–145)
VENTRICULAR RATE, ECG03: 64 BPM
WBC # BLD AUTO: 12.1 K/UL (ref 4.1–11.1)

## 2022-11-29 PROCEDURE — 87077 CULTURE AEROBIC IDENTIFY: CPT

## 2022-11-29 PROCEDURE — 74011000250 HC RX REV CODE- 250: Performed by: STUDENT IN AN ORGANIZED HEALTH CARE EDUCATION/TRAINING PROGRAM

## 2022-11-29 PROCEDURE — 83735 ASSAY OF MAGNESIUM: CPT

## 2022-11-29 PROCEDURE — 74018 RADEX ABDOMEN 1 VIEW: CPT

## 2022-11-29 PROCEDURE — 87186 SC STD MICRODIL/AGAR DIL: CPT

## 2022-11-29 PROCEDURE — 65270000032 HC RM SEMIPRIVATE

## 2022-11-29 PROCEDURE — 74011250637 HC RX REV CODE- 250/637: Performed by: STUDENT IN AN ORGANIZED HEALTH CARE EDUCATION/TRAINING PROGRAM

## 2022-11-29 PROCEDURE — 74011250636 HC RX REV CODE- 250/636: Performed by: STUDENT IN AN ORGANIZED HEALTH CARE EDUCATION/TRAINING PROGRAM

## 2022-11-29 PROCEDURE — 85027 COMPLETE CBC AUTOMATED: CPT

## 2022-11-29 PROCEDURE — 2709999900 HC NON-CHARGEABLE SUPPLY

## 2022-11-29 PROCEDURE — 93005 ELECTROCARDIOGRAM TRACING: CPT

## 2022-11-29 PROCEDURE — 99222 1ST HOSP IP/OBS MODERATE 55: CPT | Performed by: SURGERY

## 2022-11-29 PROCEDURE — 87086 URINE CULTURE/COLONY COUNT: CPT

## 2022-11-29 PROCEDURE — 87040 BLOOD CULTURE FOR BACTERIA: CPT

## 2022-11-29 PROCEDURE — 74011250636 HC RX REV CODE- 250/636: Performed by: EMERGENCY MEDICINE

## 2022-11-29 PROCEDURE — 80053 COMPREHEN METABOLIC PANEL: CPT

## 2022-11-29 PROCEDURE — 36415 COLL VENOUS BLD VENIPUNCTURE: CPT

## 2022-11-29 PROCEDURE — 65270000029 HC RM PRIVATE

## 2022-11-29 PROCEDURE — 36600 WITHDRAWAL OF ARTERIAL BLOOD: CPT

## 2022-11-29 RX ORDER — ACETAMINOPHEN 500 MG
1000 TABLET ORAL
Status: DISCONTINUED | OUTPATIENT
Start: 2022-11-29 | End: 2022-12-04 | Stop reason: HOSPADM

## 2022-11-29 RX ORDER — LANOLIN ALCOHOL/MO/W.PET/CERES
100 CREAM (GRAM) TOPICAL DAILY
Status: DISCONTINUED | OUTPATIENT
Start: 2022-11-29 | End: 2022-12-04 | Stop reason: HOSPADM

## 2022-11-29 RX ORDER — MAGNESIUM SULFATE HEPTAHYDRATE 40 MG/ML
2 INJECTION, SOLUTION INTRAVENOUS ONCE
Status: COMPLETED | OUTPATIENT
Start: 2022-11-29 | End: 2022-11-29

## 2022-11-29 RX ORDER — FOLIC ACID 1 MG/1
1 TABLET ORAL DAILY
Status: DISCONTINUED | OUTPATIENT
Start: 2022-11-29 | End: 2022-12-04 | Stop reason: HOSPADM

## 2022-11-29 RX ORDER — METOPROLOL TARTRATE 25 MG/1
12.5 TABLET, FILM COATED ORAL 2 TIMES DAILY
Status: DISCONTINUED | OUTPATIENT
Start: 2022-11-29 | End: 2022-12-04 | Stop reason: HOSPADM

## 2022-11-29 RX ORDER — OXYCODONE HYDROCHLORIDE 5 MG/1
10 TABLET ORAL
Status: DISCONTINUED | OUTPATIENT
Start: 2022-11-29 | End: 2022-12-04 | Stop reason: HOSPADM

## 2022-11-29 RX ORDER — TAMSULOSIN HYDROCHLORIDE 0.4 MG/1
0.4 CAPSULE ORAL DAILY
Status: DISCONTINUED | OUTPATIENT
Start: 2022-11-29 | End: 2022-12-04 | Stop reason: HOSPADM

## 2022-11-29 RX ORDER — SODIUM CHLORIDE 0.9 % (FLUSH) 0.9 %
5-40 SYRINGE (ML) INJECTION AS NEEDED
Status: DISCONTINUED | OUTPATIENT
Start: 2022-11-29 | End: 2022-12-04 | Stop reason: HOSPADM

## 2022-11-29 RX ORDER — POTASSIUM CHLORIDE 7.45 MG/ML
10 INJECTION INTRAVENOUS
Status: DISCONTINUED | OUTPATIENT
Start: 2022-11-29 | End: 2022-11-29

## 2022-11-29 RX ORDER — POLYETHYLENE GLYCOL 3350 17 G/17G
17 POWDER, FOR SOLUTION ORAL DAILY PRN
Status: DISCONTINUED | OUTPATIENT
Start: 2022-11-29 | End: 2022-12-04 | Stop reason: HOSPADM

## 2022-11-29 RX ORDER — ACETAMINOPHEN 650 MG/1
650 SUPPOSITORY RECTAL
Status: DISCONTINUED | OUTPATIENT
Start: 2022-11-29 | End: 2022-11-29

## 2022-11-29 RX ORDER — ONDANSETRON 2 MG/ML
4 INJECTION INTRAMUSCULAR; INTRAVENOUS
Status: DISCONTINUED | OUTPATIENT
Start: 2022-11-29 | End: 2022-11-29

## 2022-11-29 RX ORDER — ONDANSETRON 4 MG/1
4 TABLET, ORALLY DISINTEGRATING ORAL
Status: DISCONTINUED | OUTPATIENT
Start: 2022-11-29 | End: 2022-11-30

## 2022-11-29 RX ORDER — SODIUM CHLORIDE 0.9 % (FLUSH) 0.9 %
5-40 SYRINGE (ML) INJECTION EVERY 8 HOURS
Status: DISCONTINUED | OUTPATIENT
Start: 2022-11-29 | End: 2022-12-04 | Stop reason: HOSPADM

## 2022-11-29 RX ORDER — DEXTROSE, SODIUM CHLORIDE, AND POTASSIUM CHLORIDE 5; .45; .15 G/100ML; G/100ML; G/100ML
75 INJECTION INTRAVENOUS CONTINUOUS
Status: DISCONTINUED | OUTPATIENT
Start: 2022-11-29 | End: 2022-11-30

## 2022-11-29 RX ORDER — FENTANYL CITRATE 50 UG/ML
50 INJECTION, SOLUTION INTRAMUSCULAR; INTRAVENOUS
Status: DISCONTINUED | OUTPATIENT
Start: 2022-11-29 | End: 2022-11-29

## 2022-11-29 RX ORDER — SODIUM CHLORIDE 9 MG/ML
150 INJECTION, SOLUTION INTRAVENOUS CONTINUOUS
Status: DISCONTINUED | OUTPATIENT
Start: 2022-11-29 | End: 2022-11-29

## 2022-11-29 RX ORDER — IBUPROFEN 200 MG
1 TABLET ORAL DAILY
Status: DISCONTINUED | OUTPATIENT
Start: 2022-11-29 | End: 2022-12-04 | Stop reason: HOSPADM

## 2022-11-29 RX ORDER — MORPHINE SULFATE 4 MG/ML
4 INJECTION INTRAVENOUS
Status: DISCONTINUED | OUTPATIENT
Start: 2022-11-29 | End: 2022-12-02

## 2022-11-29 RX ORDER — FINASTERIDE 5 MG/1
5 TABLET, FILM COATED ORAL DAILY
Status: DISCONTINUED | OUTPATIENT
Start: 2022-11-29 | End: 2022-12-04 | Stop reason: HOSPADM

## 2022-11-29 RX ORDER — ENOXAPARIN SODIUM 100 MG/ML
30 INJECTION SUBCUTANEOUS DAILY
Status: DISCONTINUED | OUTPATIENT
Start: 2022-11-29 | End: 2022-12-04 | Stop reason: HOSPADM

## 2022-11-29 RX ORDER — OXYCODONE HYDROCHLORIDE 5 MG/1
5 TABLET ORAL
Status: DISCONTINUED | OUTPATIENT
Start: 2022-11-29 | End: 2022-12-04 | Stop reason: HOSPADM

## 2022-11-29 RX ORDER — MIRTAZAPINE 15 MG/1
15 TABLET, FILM COATED ORAL
Status: DISCONTINUED | OUTPATIENT
Start: 2022-11-29 | End: 2022-12-04 | Stop reason: HOSPADM

## 2022-11-29 RX ORDER — ENOXAPARIN SODIUM 100 MG/ML
40 INJECTION SUBCUTANEOUS DAILY
Status: DISCONTINUED | OUTPATIENT
Start: 2022-11-29 | End: 2022-11-29

## 2022-11-29 RX ORDER — LABETALOL HCL 20 MG/4 ML
15 SYRINGE (ML) INTRAVENOUS
Status: DISCONTINUED | OUTPATIENT
Start: 2022-11-29 | End: 2022-12-04 | Stop reason: HOSPADM

## 2022-11-29 RX ORDER — ACETAMINOPHEN 325 MG/1
650 TABLET ORAL
Status: DISCONTINUED | OUTPATIENT
Start: 2022-11-29 | End: 2022-11-29

## 2022-11-29 RX ADMIN — DEXTROSE MONOHYDRATE, SODIUM CHLORIDE, AND POTASSIUM CHLORIDE 75 ML/HR: 50; 4.5; 1.49 INJECTION, SOLUTION INTRAVENOUS at 12:20

## 2022-11-29 RX ADMIN — MAGNESIUM SULFATE HEPTAHYDRATE 2 G: 40 INJECTION, SOLUTION INTRAVENOUS at 11:26

## 2022-11-29 RX ADMIN — SODIUM CHLORIDE 150 ML/HR: 9 INJECTION, SOLUTION INTRAVENOUS at 03:01

## 2022-11-29 RX ADMIN — FENTANYL CITRATE 50 MCG: 50 INJECTION, SOLUTION INTRAMUSCULAR; INTRAVENOUS at 02:13

## 2022-11-29 RX ADMIN — MORPHINE SULFATE 4 MG: 4 INJECTION, SOLUTION INTRAMUSCULAR; INTRAVENOUS at 09:27

## 2022-11-29 RX ADMIN — POTASSIUM CHLORIDE 10 MEQ: 7.46 INJECTION, SOLUTION INTRAVENOUS at 09:20

## 2022-11-29 RX ADMIN — MORPHINE SULFATE 4 MG: 4 INJECTION, SOLUTION INTRAMUSCULAR; INTRAVENOUS at 18:28

## 2022-11-29 RX ADMIN — Medication 1 LOZENGE: at 14:24

## 2022-11-29 RX ADMIN — ENOXAPARIN SODIUM 30 MG: 100 INJECTION SUBCUTANEOUS at 11:26

## 2022-11-29 RX ADMIN — POTASSIUM CHLORIDE 10 MEQ: 7.46 INJECTION, SOLUTION INTRAVENOUS at 07:06

## 2022-11-29 RX ADMIN — POTASSIUM CHLORIDE 10 MEQ: 7.46 INJECTION, SOLUTION INTRAVENOUS at 05:42

## 2022-11-29 RX ADMIN — FAMOTIDINE 20 MG: 10 INJECTION, SOLUTION INTRAVENOUS at 03:03

## 2022-11-29 RX ADMIN — FAMOTIDINE 20 MG: 10 INJECTION, SOLUTION INTRAVENOUS at 09:21

## 2022-11-29 RX ADMIN — SODIUM CHLORIDE, PRESERVATIVE FREE 10 ML: 5 INJECTION INTRAVENOUS at 23:49

## 2022-11-29 RX ADMIN — MORPHINE SULFATE 4 MG: 4 INJECTION, SOLUTION INTRAMUSCULAR; INTRAVENOUS at 14:36

## 2022-11-29 RX ADMIN — Medication 1 LOZENGE: at 18:33

## 2022-11-29 RX ADMIN — MORPHINE SULFATE 4 MG: 4 INJECTION, SOLUTION INTRAMUSCULAR; INTRAVENOUS at 23:49

## 2022-11-29 NOTE — PROGRESS NOTES
0741-Bedside and Verbal shift change report given to jaime (oncoming nurse) by Salinas Crow (offgoing nurse). Report included the following information SBAR, Kardex, ED Summary, Intake/Output, MAR, and Accordion. 65- notified MD trujillo patient does not have order for NG tube and PO pill need to be switch to IV. MD to review case    1020- attempted blood cultures. 1100- patient placed on suction. Mondragon bag exchanged per MD do not replace mondragon placed by urology. 46- paged MD trujillo for art stick after missing blood cultures twice via peripheral stick. 1114- per MD akins stop continuous suction. 1117- continuous suction stopped. 1935- Bedside and Verbal shift change report given to jennifer (oncoming nurse) by Zachary Gay (offgoing nurse). Report included the following information SBAR, Kardex, ED Summary, Intake/Output, MAR, Accordion, and Recent Results.

## 2022-11-29 NOTE — CONSULTS
Patient seen at request of Dr. Mat Whatley. Information obtained from patient and review of chart. Yanelis Machuca is an 59 y.o. male who was recently admitted with a small bowel obstruction. Mr. Christin Arenas is s/p resection of a rectal cancer approximately 5 years ago at which time he received neoadjuvant chemoradiation therapy and post-operative chemotherapy. (By report. Records not available. ) Mr. Christin Arenas tells me that he recently began experiencing abdominal pain and associated nausea and vomiting. No hematemesis or coffee ground emesis. No fevers or chills. Furthermore, Mr. Christin Arenas has been having problems with diarrhea for some time now. Last bowel movement was yesterday. No blood per rectum. He has otherwise been in his usual state of health. CT scan abdomen/pelvis without po/IV contrast - 11/28/2022 - Dilated fluid and air-filled small bowel loops consistent with small bowel obstruction. Sequela of chronic pancreatitis. No evidence of large bowel obstruction status post rectosigmoid resection. Of note, Mr. Christin Arenas was admitted in September, 2022 with a small bowel obstruction which was managed non-operatively. He was recently admitted to another hospital with a a recurrent small bowel obstruction which was again managed non-operatively. A colonoscopy was also performed. (Records not available.)    Allergies - Patient has no known allergies. Meds - Reviewed. PMH -   Past Medical History:   Diagnosis Date    Cancer [facility])     rectal    Gastrointestinal disorder     Gastric Ulcers;  Rectal CA    Hematuria 9/1/2021    Hypertension      PSH -   Past Surgical History:   Procedure Laterality Date    HX GI       Fam Hx -   Family History   Problem Relation Age of Onset    Cancer Brother      Soc Hx -   Social History     Tobacco Use    Smoking status: Every Day     Packs/day: 0.25     Types: Cigarettes    Smokeless tobacco: Never    Tobacco comments:     \"1 pack last me three days\" Substance Use Topics    Alcohol use: Not Currently     Comment: hasn't drank x3mo (6/3/22)     Patient is a thin man in no acute distress. Tm 98.5 Tc 97.6 HR: 63 BP: 170/95 Resp Rate: 19 100% sat on room air. HEENT: Anicteric. Neck: Right IJ portacath. Cor: RRR. Chest: Portacath site is clean. Lungs: Bilateral breath sounds. Clear to auscultation. Abd: Soft. Tender. No guarding or rebound. Slightly distended. Well healed midline incision. : Marrero in place. Ext: No edema. Neuro: Grossly Non focal.     Labs -   Recent Results (from the past 24 hour(s))   CBC WITH AUTOMATED DIFF    Collection Time: 11/28/22  2:35 PM   Result Value Ref Range    WBC 6.8 4.1 - 11.1 K/uL    RBC 3.11 (L) 4.10 - 5.70 M/uL    HGB 9.3 (L) 12.1 - 17.0 g/dL    HCT 27.4 (L) 36.6 - 50.3 %    MCV 88.1 80.0 - 99.0 FL    MCH 29.9 26.0 - 34.0 PG    MCHC 33.9 30.0 - 36.5 g/dL    RDW 15.4 (H) 11.5 - 14.5 %    PLATELET 902 291 - 774 K/uL    MPV 10.6 8.9 - 12.9 FL    NRBC 0.0 0  WBC    ABSOLUTE NRBC 0.00 0.00 - 0.01 K/uL    NEUTROPHILS 67 32 - 75 %    LYMPHOCYTES 24 12 - 49 %    MONOCYTES 8 5 - 13 %    EOSINOPHILS 1 0 - 7 %    BASOPHILS 0 0 - 1 %    IMMATURE GRANULOCYTES 0 0.0 - 0.5 %    ABS. NEUTROPHILS 4.4 1.8 - 8.0 K/UL    ABS. LYMPHOCYTES 1.6 0.8 - 3.5 K/UL    ABS. MONOCYTES 0.6 0.0 - 1.0 K/UL    ABS. EOSINOPHILS 0.1 0.0 - 0.4 K/UL    ABS. BASOPHILS 0.0 0.0 - 0.1 K/UL    ABS. IMM.  GRANS. 0.0 0.00 - 0.04 K/UL    DF AUTOMATED     METABOLIC PANEL, COMPREHENSIVE    Collection Time: 11/28/22  2:35 PM   Result Value Ref Range    Sodium 139 136 - 145 mmol/L    Potassium 2.6 (LL) 3.5 - 5.1 mmol/L    Chloride 103 97 - 108 mmol/L    CO2 32 21 - 32 mmol/L    Anion gap 4 (L) 5 - 15 mmol/L    Glucose 101 (H) 65 - 100 mg/dL    BUN 11 6 - 20 MG/DL    Creatinine 0.73 0.70 - 1.30 MG/DL    BUN/Creatinine ratio 15 12 - 20      eGFR >60 >60 ml/min/1.73m2    Calcium 8.3 (L) 8.5 - 10.1 MG/DL    Bilirubin, total 0.3 0.2 - 1.0 MG/DL    ALT (SGPT) 16 12 - 78 U/L    AST (SGOT) 17 15 - 37 U/L    Alk.  phosphatase 97 45 - 117 U/L    Protein, total 6.4 6.4 - 8.2 g/dL    Albumin 2.5 (L) 3.5 - 5.0 g/dL    Globulin 3.9 2.0 - 4.0 g/dL    A-G Ratio 0.6 (L) 1.1 - 2.2     LACTIC ACID    Collection Time: 11/28/22  2:35 PM   Result Value Ref Range    Lactic acid 1.3 0.4 - 2.0 MMOL/L   LIPASE    Collection Time: 11/28/22  2:35 PM   Result Value Ref Range    Lipase 784 (H) 73 - 393 U/L   EKG, 12 LEAD, INITIAL    Collection Time: 11/29/22  3:27 AM   Result Value Ref Range    Ventricular Rate 64 BPM    Atrial Rate 64 BPM    P-R Interval 128 ms    QRS Duration 82 ms    Q-T Interval 418 ms    QTC Calculation (Bezet) 431 ms    Calculated P Axis 89 degrees    Calculated R Axis 52 degrees    Calculated T Axis 55 degrees    Diagnosis       Normal sinus rhythm  When compared with ECG of 02-FEB-2022 19:50,  No significant change was found  Confirmed by Avery Mendieta M.D., [facility] ([id number]) on 11/29/2022 9:25:02 AM     CBC W/O DIFF    Collection Time: 11/29/22  4:28 AM   Result Value Ref Range    WBC 12.1 (H) 4.1 - 11.1 K/uL    RBC 3.51 (L) 4.10 - 5.70 M/uL    HGB 10.5 (L) 12.1 - 17.0 g/dL    HCT 30.9 (L) 36.6 - 50.3 %    MCV 88.0 80.0 - 99.0 FL    MCH 29.9 26.0 - 34.0 PG    MCHC 34.0 30.0 - 36.5 g/dL    RDW 15.2 (H) 11.5 - 14.5 %    PLATELET 663 387 - 637 K/uL    MPV 10.4 8.9 - 12.9 FL    NRBC 0.0 0  WBC    ABSOLUTE NRBC 0.00 0.00 - 4.19 K/uL   METABOLIC PANEL, COMPREHENSIVE    Collection Time: 11/29/22  4:28 AM   Result Value Ref Range    Sodium 139 136 - 145 mmol/L    Potassium 2.5 (LL) 3.5 - 5.1 mmol/L    Chloride 102 97 - 108 mmol/L    CO2 32 21 - 32 mmol/L    Anion gap 5 5 - 15 mmol/L    Glucose 85 65 - 100 mg/dL    BUN 8 6 - 20 MG/DL    Creatinine 0.60 (L) 0.70 - 1.30 MG/DL    BUN/Creatinine ratio 13 12 - 20      eGFR >60 >60 ml/min/1.73m2    Calcium 8.3 (L) 8.5 - 10.1 MG/DL    Bilirubin, total 0.5 0.2 - 1.0 MG/DL    ALT (SGPT) 15 12 - 78 U/L    AST (SGOT) 20 15 - 37 U/L    Alk. phosphatase 109 45 - 117 U/L    Protein, total 6.7 6.4 - 8.2 g/dL    Albumin 2.7 (L) 3.5 - 5.0 g/dL    Globulin 4.0 2.0 - 4.0 g/dL    A-G Ratio 0.7 (L) 1.1 - 2.2     MAGNESIUM    Collection Time: 11/29/22  4:28 AM   Result Value Ref Range    Magnesium 1.6 1.6 - 2.4 mg/dL     CT Scan - Reviewed. Imp: Mr. Kadi Leos is a 59 y.o. male with a recurrent small bowel obstruction. Plan: 1. At this point in time, do not believe that there is an indication for surgical intervention. 2. Continue NG decompression for now. 3. IVF at 75 ml/hour. 4. NPO except ice chips. 5. Marrero catheter for h/o bladder outlet obstruction. 6. Anti-emetics and pain medication as ordered. 7. Will try to obtain records from recent hospitalization. 8. Plans per Dr. Proctor Aid. Following.

## 2022-11-29 NOTE — PROGRESS NOTES
Called and left messageX2. No contact.     Reason for Disposition  • Second attempt to contact family AND no contact made.  Answering service notified.    Protocols used: NO CONTACT OR DUPLICATE CONTACT CALL-P-AH       Physical Therapy    Patient received supine in bed. Patient was agreeable to PT evaluation and was able to sit at edge of bed independently with additional time. Visit was postponed  to allow nursing staff to perform blood draw. Patient would likely benefit from rolling walker to improve ambulatory independence based on subjective reporting. PT will attempt full evaluation as able.     Stuart Hoover, PT

## 2022-11-29 NOTE — PROGRESS NOTES
Called back for admission at 8:30  Tried to connect to [employer] at , but [facility] ED was not available to me, initiated chat  [facility] ED popped up for me, I called [facility] ED ~ 9:28 - phone was answered, but no RN was available and I was not taken to the room  I PS messaged the RN at [facility] ED at 9:31 to let her know that I need to be taken to the room  I was unable to get ahold of anyone - dc'd around 9:34. RN Ps'd me back at 10:44, asked if I needed the teladoc in the room; she messaged me at ~11:04 that the teladoc was upstairs. I called via teladoc again at 11:30 - still no response. Reading the chart, patient seems stable, has NGT placed and being confirmed by XR; VS are stable as well as per chart. Called via teladoc at ~12:50A; no response  Called RN at ~12:55A; she stated teladoc machine is set up in room    Called via teladoc again - it seems like they had given me access to the wrong '[facility]'    Resolved around 1:10 via chat that I had access to the wrong [address] via teladoc again - this time it was the wrong robot, I have been given access to the neuro robot    Patient remains stable as per latest note. Vitals are stable right now, no acute issues.     Will need to call back shortly

## 2022-11-29 NOTE — H&P
Was not able to see the patient due to issues with [employer] software. Reviewed vitals, labs, nursing notes.     A/P:    SBO  - Surgery aware  - NPO  - Pain control with fentany PRN  - GI Cocktail, Pepcid  - NaCl @ 75/hr  - Vitals are stable, NGT inserted    HTN  - C/w home lopressor    BPH  - C/w home Flomax and Proscar    Insomnia  - C/w home Remeron    Cannot bill for this note - will leave full note and admssion for day provider

## 2022-11-29 NOTE — H&P
Hospitalist Admission Note    NAME: Keren Lynn   :  1965   MRN:  [id number]   Room Number: 953/34  @ [facility]     Date/Time:  2022 9:50 AM    Patient PCP: Vikki Dodd MD    Please note that this dictation was completed with BDA, the computer voice recognition software. Quite often unanticipated grammatical, syntax, homophones, and other interpretive errors are inadvertently transcribed by the computer software. Please disregard these errors. Please excuse any errors that have escaped final proofreading.  ______________________________________________________________________  Given the patient's current clinical presentation, I have a high level of concern for decompensation if discharged from the emergency department. Complex decision making was performed, which includes reviewing the patient's available past medical records, laboratory results, and x-ray films. My assessment of this patient's clinical condition and my plan of care is as follows. Assessment / Plan:  Anticipated discharge date :   Anticipated disposition :   Barriers to discharge : Active Problems:    SBO (small bowel obstruction) ([facility]) (2022)        #Nausea vomiting diarrhea POA  #Abdominal pain POA  #Recurrent small bowel obstruction POA  #History of rectal cancer s/p rectosigmoid resection  -CT abdomen pelvis reviewed independently with dilated fluid and air-filled small bowel loops consistent with small bowel obstruction. Sequela of chronic pancreatitis.   S/p rectosigmoid resection  -General surgery on board appreciate help      -Continue NG tube to suction  -Maintenance fluid  -Pain management  -Follow general surgery recommendations  -N.p.o.      #History of BPH with UPJ obstruction s/p Marrero  -Continue Marrero care        #History of hypertension  #History of BPH  #History of mood disorder  -Resume home regimen able tolerate p.o. diet    Body mass index is 15.25 kg/m². Code Status: Full   Surrogate Decision Maker:    DVT Prophylaxis: Lovenox  GI Prophylaxis: not indicated          Subjective:   CHIEF COMPLAINT: Nominal pain with nausea vomiting diarrhea    HISTORY OF PRESENT ILLNESS:     Joyce Ascencio is a 59 y.o.  male with PMH of above-mentioned problems who presents to ED with c/o abdominal pain with nausea vomiting diarrhea. Patient states symptoms are lasting for past 2 to 3 days. .  Patient states that he has frequent episodes of the symptoms every month or so and no one can tell him why. Patient had surgery for rectal cancer and had radiation therapy which caused scarring. Patient endorsed having 10-12 bowel movements a day for past 3 days yesterday started having nausea vomiting. Endorsed pain as dull and aching pain does not radiate. We were asked to admit for work up and evaluation of the above problems. Past Medical History:   Diagnosis Date    Cancer [facility])     rectal    Gastrointestinal disorder     Gastric Ulcers; Rectal CA    Hematuria 9/1/2021    Hypertension         Past Surgical History:   Procedure Laterality Date    HX GI         Social History     Tobacco Use    Smoking status: Every Day     Packs/day: 0.25     Types: Cigarettes    Smokeless tobacco: Never    Tobacco comments:     \"1 pack last me three days\"   Substance Use Topics    Alcohol use: Not Currently     Comment: hasn't drank x3mo (6/3/22)        Family History   Problem Relation Age of Onset    Cancer Brother      No Known Allergies     Prior to Admission medications    Medication Sig Start Date End Date Taking? Authorizing Provider   calcium carbonate 500 mg calcium (1,250 mg) capsule Take 1,250 mg by mouth two (2) times daily (with meals). 6/3/22   Aurelia Hoskins MD   lactobacillus combination no.4 3 billion cell cap Take 1 Capsule by mouth daily.  6/3/22   Aurelia Hoskins MD   metoprolol tartrate (LOPRESSOR) 25 mg tablet Take 0.5 Tablets by mouth two (2) times a day. 2/5/22   Stephanie Braxton MD   tamsulosin (FLOMAX) 0.4 mg capsule Take 1 Capsule by mouth daily. 9/9/21   Jennifer Martínez NP   thiamine mononitrate (B-1) 100 mg tablet Take 1 Tablet by mouth daily. 9/9/21   Jennifer Martínez NP   folic acid (FOLVITE) 1 mg tablet Take 1 Tablet by mouth daily. 9/9/21   Jennifer Martínez NP   finasteride (PROSCAR) 5 mg tablet Take 5 mg by mouth daily. Provider, Historical   mirtazapine (REMERON) 15 mg tablet Take 15 mg by mouth nightly. Provider, Historical   famotidine (Pepcid) 20 mg tablet Take 20 mg by mouth nightly as needed for Heartburn. Provider, Historical       REVIEW OF SYSTEMS:     I am not able to complete the review of systems because:    The patient is intubated and sedated    The patient has altered mental status due to his acute medical problems    The patient has baseline aphasia from prior stroke(s)    The patient has baseline dementia and is not reliable historian    The patient is in acute medical distress and unable to provide information           Total of 12 systems reviewed as follows:       POSITIVE= underlined text  Negative = text not underlined  General:  fever, chills, sweats, generalized weakness, weight loss/gain,      loss of appetite   Eyes:    blurred vision, eye pain, loss of vision, double vision  ENT:    rhinorrhea, pharyngitis   Respiratory:   cough, sputum production, SOB, SZYAMNSKI, wheezing, pleuritic pain   Cardiology:   chest pain, palpitations, orthopnea, PND, edema, syncope   Gastrointestinal:  abdominal pain , N/V, diarrhea, dysphagia, constipation, bleeding   Genitourinary:  frequency, urgency, dysuria, hematuria, incontinence   Muskuloskeletal :  arthralgia, myalgia, back pain  Hematology:  easy bruising, nose or gum bleeding, lymphadenopathy   Dermatological: rash, ulceration, pruritis, color change / jaundice  Endocrine:   hot flashes or polydipsia   Neurological:  headache, dizziness, confusion, focal weakness, paresthesia,     Speech difficulties, memory loss, gait difficulty  Psychological: Feelings of anxiety, depression, agitation    Objective:   VITALS:    Visit Vitals  BP (!) 170/95 (BP 1 Location: Left upper arm, BP Patient Position: At rest)   Pulse 63   Temp 97.6 °F (36.4 °C)   Resp 19   Ht 5' 8\" (1.727 m)   Wt 45.5 kg (100 lb 5 oz)   SpO2 100%   BMI 15.25 kg/m²       PHYSICAL EXAM:    General:    Alert, cooperative, no distress, appears stated age. HEENT: Atraumatic, anicteric sclerae, pink conjunctivae     No oral ulcers, mucosa moist, throat clear, dentition fair  Neck:  Supple, symmetrical,  thyroid: non tender  Lungs:   Clear to auscultation bilaterally. No Wheezing or Rhonchi. No rales. Chest wall:  No tenderness  No Accessory muscle use. Heart:   Regular  rhythm,  No  murmur   No edema  Abdomen:   Mild TTP. Bowel sounds normal  Extremities: No cyanosis. No clubbing,      Skin turgor normal, Capillary refill normal, Radial dial pulse 2+  Skin:     Not pale. Not Jaundiced  No rashes   Psych:  Good insight. Not depressed. Not anxious or agitated. Neurologic: EOMs intact. No facial asymmetry. No aphasia or slurred speech. Symmetrical strength, Sensation grossly intact.  Alert and oriented X 4.     ______________________________________________________________________    Care Plan discussed with:  Patient/Family    Expected  Disposition:  Home w/Family  ________________________________________________________________________  TOTAL TIME:  54 Minutes    Critical Care Provided     Minutes non procedure based      Comments    x Reviewed previous records   >50% of visit spent in counseling and coordination of care x Discussion with patient and/or family and questions answered       ________________________________________________________________________  Signed: Natalya De Jesus MD    Procedures: see electronic medical records for all procedures/Xrays and details which were not copied into this note but were reviewed prior to creation of Plan. LAB DATA REVIEWED:    Recent Results (from the past 24 hour(s))   CBC WITH AUTOMATED DIFF    Collection Time: 11/28/22  2:35 PM   Result Value Ref Range    WBC 6.8 4.1 - 11.1 K/uL    RBC 3.11 (L) 4.10 - 5.70 M/uL    HGB 9.3 (L) 12.1 - 17.0 g/dL    HCT 27.4 (L) 36.6 - 50.3 %    MCV 88.1 80.0 - 99.0 FL    MCH 29.9 26.0 - 34.0 PG    MCHC 33.9 30.0 - 36.5 g/dL    RDW 15.4 (H) 11.5 - 14.5 %    PLATELET 585 047 - 669 K/uL    MPV 10.6 8.9 - 12.9 FL    NRBC 0.0 0  WBC    ABSOLUTE NRBC 0.00 0.00 - 0.01 K/uL    NEUTROPHILS 67 32 - 75 %    LYMPHOCYTES 24 12 - 49 %    MONOCYTES 8 5 - 13 %    EOSINOPHILS 1 0 - 7 %    BASOPHILS 0 0 - 1 %    IMMATURE GRANULOCYTES 0 0.0 - 0.5 %    ABS. NEUTROPHILS 4.4 1.8 - 8.0 K/UL    ABS. LYMPHOCYTES 1.6 0.8 - 3.5 K/UL    ABS. MONOCYTES 0.6 0.0 - 1.0 K/UL    ABS. EOSINOPHILS 0.1 0.0 - 0.4 K/UL    ABS. BASOPHILS 0.0 0.0 - 0.1 K/UL    ABS. IMM. GRANS. 0.0 0.00 - 0.04 K/UL    DF AUTOMATED     METABOLIC PANEL, COMPREHENSIVE    Collection Time: 11/28/22  2:35 PM   Result Value Ref Range    Sodium 139 136 - 145 mmol/L    Potassium 2.6 (LL) 3.5 - 5.1 mmol/L    Chloride 103 97 - 108 mmol/L    CO2 32 21 - 32 mmol/L    Anion gap 4 (L) 5 - 15 mmol/L    Glucose 101 (H) 65 - 100 mg/dL    BUN 11 6 - 20 MG/DL    Creatinine 0.73 0.70 - 1.30 MG/DL    BUN/Creatinine ratio 15 12 - 20      eGFR >60 >60 ml/min/1.73m2    Calcium 8.3 (L) 8.5 - 10.1 MG/DL    Bilirubin, total 0.3 0.2 - 1.0 MG/DL    ALT (SGPT) 16 12 - 78 U/L    AST (SGOT) 17 15 - 37 U/L    Alk.  phosphatase 97 45 - 117 U/L    Protein, total 6.4 6.4 - 8.2 g/dL    Albumin 2.5 (L) 3.5 - 5.0 g/dL    Globulin 3.9 2.0 - 4.0 g/dL    A-G Ratio 0.6 (L) 1.1 - 2.2     LACTIC ACID    Collection Time: 11/28/22  2:35 PM   Result Value Ref Range    Lactic acid 1.3 0.4 - 2.0 MMOL/L   LIPASE    Collection Time: 11/28/22  2:35 PM   Result Value Ref Range    Lipase 784 (H) 73 - 393 U/L   EKG, 12 LEAD, INITIAL Collection Time: 11/29/22  3:27 AM   Result Value Ref Range    Ventricular Rate 64 BPM    Atrial Rate 64 BPM    P-R Interval 128 ms    QRS Duration 82 ms    Q-T Interval 418 ms    QTC Calculation (Bezet) 431 ms    Calculated P Axis 89 degrees    Calculated R Axis 52 degrees    Calculated T Axis 55 degrees    Diagnosis       Normal sinus rhythm  When compared with ECG of 02-FEB-2022 19:50,  No significant change was found  Confirmed by Cheryl Bray M.D., Jory Lucks ([id number]) on 11/29/2022 9:25:02 AM     CBC W/O DIFF    Collection Time: 11/29/22  4:28 AM   Result Value Ref Range    WBC 12.1 (H) 4.1 - 11.1 K/uL    RBC 3.51 (L) 4.10 - 5.70 M/uL    HGB 10.5 (L) 12.1 - 17.0 g/dL    HCT 30.9 (L) 36.6 - 50.3 %    MCV 88.0 80.0 - 99.0 FL    MCH 29.9 26.0 - 34.0 PG    MCHC 34.0 30.0 - 36.5 g/dL    RDW 15.2 (H) 11.5 - 14.5 %    PLATELET 001 205 - 310 K/uL    MPV 10.4 8.9 - 12.9 FL    NRBC 0.0 0  WBC    ABSOLUTE NRBC 0.00 0.00 - 1.45 K/uL   METABOLIC PANEL, COMPREHENSIVE    Collection Time: 11/29/22  4:28 AM   Result Value Ref Range    Sodium 139 136 - 145 mmol/L    Potassium 2.5 (LL) 3.5 - 5.1 mmol/L    Chloride 102 97 - 108 mmol/L    CO2 32 21 - 32 mmol/L    Anion gap 5 5 - 15 mmol/L    Glucose 85 65 - 100 mg/dL    BUN 8 6 - 20 MG/DL    Creatinine 0.60 (L) 0.70 - 1.30 MG/DL    BUN/Creatinine ratio 13 12 - 20      eGFR >60 >60 ml/min/1.73m2    Calcium 8.3 (L) 8.5 - 10.1 MG/DL    Bilirubin, total 0.5 0.2 - 1.0 MG/DL    ALT (SGPT) 15 12 - 78 U/L    AST (SGOT) 20 15 - 37 U/L    Alk.  phosphatase 109 45 - 117 U/L    Protein, total 6.7 6.4 - 8.2 g/dL    Albumin 2.7 (L) 3.5 - 5.0 g/dL    Globulin 4.0 2.0 - 4.0 g/dL    A-G Ratio 0.7 (L) 1.1 - 2.2

## 2022-11-29 NOTE — PROGRESS NOTES
KENTRELL   RUR 18 %     Review of chart   Work-up in progress  SBO   Inpatient-  First IMM done by Patient Access  CM will do Second IMM   Patient was inpatient back in Sept 2022 at [facility].      CM assessment in progress - detail note to follow     [address] [facility] 3082

## 2022-11-29 NOTE — PROGRESS NOTES
TRANSFER - IN REPORT:    Verbal report received from EHSAN Patricio(name) on Michael Bailon  being received from ED(unit) for routine progression of care      Report consisted of patients Situation, Background, Assessment and   Recommendations(SBAR). Information from the following report(s) SBAR, Kardex, Intake/Output, MAR, and Recent Results was reviewed with the receiving nurse. Opportunity for questions and clarification was provided. Assessment completed upon patients arrival to unit and care assumed.

## 2022-11-29 NOTE — PROGRESS NOTES
Message sent to Doctor: 0'\"   Hello,  has arrived and is ready for evaluation.  Need Callback: NO CALLBACK REQ MED SURG UNIT FYI  Unread

## 2022-11-29 NOTE — ED NOTES
Pt asleep on stretcher at this time, cardiac monitor remains in place. NAD noted. Pt up-to-date on plan of care.

## 2022-11-29 NOTE — ED NOTES
Pt resting quietly on stretcher at this time, cardiac monitor remains in place. Tele-monitor in room. NAD noted.

## 2022-11-29 NOTE — ED NOTES
TRANSFER - OUT REPORT:    Verbal report given to Vijaya Brand RN (name) on India Lucas  being transferred to [facility] [place] [id number] (unit) for routine progression of care       Report consisted of patients Situation, Background, Assessment and   Recommendations(SBAR). Information from the following report(s) SBAR, ED Summary, Intake/Output, MAR and Recent Results was reviewed with the receiving nurse. Lines:   Venous Access Device Alma Cath (Active)       Venous Access Device Venous Access Device 09/09/22 Upper chest (subclavicular area, right (Active)        Opportunity for questions and clarification was provided.       Patient transported with:   Registered Nurse

## 2022-11-29 NOTE — PROGRESS NOTES
[id number]  Message paged to MD via perfect serve:    Pt admitted with SBO. Admit process completed. Pt has NG tube currently clamped with trace green gastric fluid in tubing. 1. Please verify with orders how you would like nursing to care for NGT (suction continuous or intermittent, clamped or vented). 2. Critical results call received from lab for K+ level of 2.5 (previous level 2.6, pt given 1 IV k-runner in ER). Please review and advise. Thank you Dr. Villar Sayer!     MD response:  11/29/22 5:28 AM  thanks! i'm ok with NGT glamped right now    11/29/22 5:28 AM  will put in some IV K+

## 2022-11-29 NOTE — PROGRESS NOTES
[facility] Admission Pharmacy Medication Reconciliation     Information obtained from:pharmacy provider(Jorge)  RxQuery data available1:yes    Comments/recommendations:    Medication changes (since last review): Added  none  Removed  Calcium carbonate, famotidine,  Lactobacillus, tamsulosin, thiamine  Adjusted  none    The [place] Prescription Monitoring Program () was accessed to determine fill history of any controlled medications. no   1RxQuery pharmacy benefit data reflects medications filled and processed through the patient's insurance, however                this data does NOT capture whether the medication was picked up or is currently being taken by the patient. Patient allergies: Allergies as of 11/28/2022    (No Known Allergies)         Prior to Admission Medications   Prescriptions Last Dose Informant Patient Reported? Taking?   finasteride (PROSCAR) 5 mg tablet   Yes Yes   Sig: Take 5 mg by mouth daily. folic acid (FOLVITE) 1 mg tablet   No Yes   Sig: Take 1 Tablet by mouth daily. metoprolol tartrate (LOPRESSOR) 25 mg tablet   No Yes   Sig: Take 0.5 Tablets by mouth two (2) times a day. mirtazapine (REMERON) 15 mg tablet   Yes Yes   Sig: Take 15 mg by mouth nightly.       Facility-Administered Medications: None          Thank you,  TEA Nicole

## 2022-11-29 NOTE — PROGRESS NOTES
Problem: Risk for Spread of Infection  Goal: Prevent transmission of infectious organism to others  Description: Prevent the transmission of infectious organisms to other patients, staff members, and visitors.   Outcome: Progressing Towards Goal     Problem: Patient Education:  Go to Education Activity  Goal: Patient/Family Education  Outcome: Progressing Towards Goal     Problem: Pain  Goal: *Control of Pain  Outcome: Progressing Towards Goal     Problem: Patient Education: Go to Patient Education Activity  Goal: Patient/Family Education  Outcome: Progressing Towards Goal     Problem: Patient Education: Go to Patient Education Activity  Goal: Patient/Family Education  Outcome: Progressing Towards Goal     Problem: Small Bowel Obstruction: Day 1  Goal: Off Pathway (Use only if patient is Off Pathway)  Outcome: Progressing Towards Goal  Goal: Activity/Safety  Outcome: Progressing Towards Goal  Goal: Consults, if ordered  Outcome: Progressing Towards Goal  Goal: Diagnostic Test/Procedures  Outcome: Progressing Towards Goal  Goal: Nutrition/Diet  Outcome: Progressing Towards Goal  Goal: Medications  Outcome: Progressing Towards Goal  Goal: Respiratory  Outcome: Progressing Towards Goal  Goal: Treatments/Interventions/Procedures  Outcome: Progressing Towards Goal  Goal: Psychosocial  Outcome: Progressing Towards Goal  Goal: *Optimal pain control at patient's stated goal  Outcome: Progressing Towards Goal  Goal: *Adequate urinary output (equal to or greater than 30 milliliters/hour)  Outcome: Progressing Towards Goal  Goal: *Hemodynamically stable  Outcome: Progressing Towards Goal  Goal: *Demonstrates progressive activity  Outcome: Progressing Towards Goal  Goal: *Absence of nausea/vomiting  Outcome: Progressing Towards Goal     Problem: Small Bowel Obstruction: Day 2  Goal: Off Pathway (Use only if patient is Off Pathway)  Outcome: Progressing Towards Goal  Goal: Activity/Safety  Outcome: Progressing Towards Goal  Goal: Consults, if ordered  Outcome: Progressing Towards Goal  Goal: Diagnostic Test/Procedures  Outcome: Progressing Towards Goal  Goal: Nutrition/Diet  Outcome: Progressing Towards Goal  Goal: Discharge Planning  Outcome: Progressing Towards Goal  Goal: Medications  Outcome: Progressing Towards Goal  Goal: Respiratory  Outcome: Progressing Towards Goal  Goal: Treatments/Interventions/Procedures  Outcome: Progressing Towards Goal  Goal: Psychosocial  Outcome: Progressing Towards Goal  Goal: *Optimal pain control at patient's stated goal  Outcome: Progressing Towards Goal  Goal: *Adequate urinary output (equal to or greater than 30 milliliters/hour)  Outcome: Progressing Towards Goal  Goal: *Hemodynamically stable  Outcome: Progressing Towards Goal  Goal: *Demonstrates progressive activity  Outcome: Progressing Towards Goal  Goal: *Absence of nausea/vomiting  Outcome: Progressing Towards Goal  Goal: *Return of normal bowel function  Outcome: Progressing Towards Goal     Problem: Small Bowel Obstruction: Day 3  Goal: Off Pathway (Use only if patient is Off Pathway)  Outcome: Progressing Towards Goal  Goal: Activity/Safety  Outcome: Progressing Towards Goal  Goal: Consults, if ordered  Outcome: Progressing Towards Goal  Goal: Diagnostic Test/Procedures  Outcome: Progressing Towards Goal  Goal: Nutrition/Diet  Outcome: Progressing Towards Goal  Goal: Discharge Planning  Outcome: Progressing Towards Goal  Goal: Medications  Outcome: Progressing Towards Goal  Goal: Respiratory  Outcome: Progressing Towards Goal  Goal: Treatments/Interventions/Procedures  Outcome: Progressing Towards Goal  Goal: Psychosocial  Outcome: Progressing Towards Goal  Goal: *Optimal pain control at patient's stated goal  Outcome: Progressing Towards Goal  Goal: *Adequate urinary output (equal to or greater than 30 milliliters/hour)  Outcome: Progressing Towards Goal  Goal: *Hemodynamically stable  Outcome: Progressing Towards Goal  Goal: *Adequate nutrition  Outcome: Progressing Towards Goal  Goal: *Demonstrates progressive activity  Outcome: Progressing Towards Goal  Goal: *Participates in discharge planning  Outcome: Progressing Towards Goal     Problem: Small Bowel Obstruction - Non Surgical: Discharge Outcomes  Goal: *Hemodynamically stable  Outcome: Progressing Towards Goal  Goal: *Demonstrates independent activity or return to baseline  Outcome: Progressing Towards Goal  Goal: *Optimal pain control at patient's stated goal  Outcome: Progressing Towards Goal  Goal: *Verbalizes understanding and describes prescribed diet  Outcome: Progressing Towards Goal  Goal: *Tolerating diet  Outcome: Progressing Towards Goal  Goal: *Verbalizes name, dosage, time, side effects, and number of days to continue medications  Outcome: Progressing Towards Goal  Goal: *Anxiety reduced or absent  Outcome: Progressing Towards Goal  Goal: *Understands and describes signs and symptoms to report to providers(Stroke Metric)  Outcome: Progressing Towards Goal  Goal: *Describes follow-up/return visits to physicians  Outcome: Progressing Towards Goal  Goal: *Describes available resources and support systems  Outcome: Progressing Towards Goal  Goal: *Active bowel function  Outcome: Progressing Towards Goal

## 2022-11-29 NOTE — ED NOTES
NG tube placement incorrect, tube removed. MD made aware and instructed this RN to attempt again later but to soak tube in ice prior to insertion.

## 2022-11-29 NOTE — PROGRESS NOTES
0430) Admission assessment done. Patient is alert and oriented x 4. VS taken. Dual skin assessment done with Maria C Quick. Skin intact with multiple scars. Patient has right assessed marly Cath with IV 0.9% sodium chloride solution infusing at a rate of 150 ml/hr prior to arriving at this unit. 16F Mondragon in place with dirty mondragon bag and mayo urine color. Drainage bag replaced. Patient has NG tube inserted at the ED, verified with portable X-ray and trace of green gastric fluid in tubing. NG tube clamped. CHG bath given. Patient has upper and lower dentures on. Call light within reach. Bed in lowest position. 4950) Morning labs were collected and sent to the lab.    [address], [address]) Rula Merlos from lab called to report critical lab of potassium level of 2.5. Hospitalist notified. 0) New bag of Potassium chloride 10 mEq in 100 ml infusing.     0706) New bag of Potassium chloride 10 mEq in 100 ml infusing. 4564) Bedside verbal report to [facility] (oncoming nurse).

## 2022-11-29 NOTE — PROGRESS NOTES
Again tried to call via [employer] at 1:50A - not sure which machine it is connecting to. At this point, I am going to put in ADT orders, as patient seems stable. Will try calling again shortly.

## 2022-11-30 LAB
ALBUMIN SERPL-MCNC: 2.2 G/DL (ref 3.5–5)
ALBUMIN/GLOB SERPL: 0.7 {RATIO} (ref 1.1–2.2)
ALP SERPL-CCNC: 95 U/L (ref 45–117)
ALT SERPL-CCNC: 15 U/L (ref 12–78)
ANION GAP SERPL CALC-SCNC: 4 MMOL/L (ref 5–15)
AST SERPL-CCNC: 29 U/L (ref 15–37)
BILIRUB SERPL-MCNC: 0.4 MG/DL (ref 0.2–1)
BUN SERPL-MCNC: 6 MG/DL (ref 6–20)
BUN/CREAT SERPL: 9 (ref 12–20)
CALCIUM SERPL-MCNC: 7.9 MG/DL (ref 8.5–10.1)
CHLORIDE SERPL-SCNC: 103 MMOL/L (ref 97–108)
CO2 SERPL-SCNC: 29 MMOL/L (ref 21–32)
CREAT SERPL-MCNC: 0.64 MG/DL (ref 0.7–1.3)
ERYTHROCYTE [DISTWIDTH] IN BLOOD BY AUTOMATED COUNT: 15.3 % (ref 11.5–14.5)
GLOBULIN SER CALC-MCNC: 3.3 G/DL (ref 2–4)
GLUCOSE SERPL-MCNC: 105 MG/DL (ref 65–100)
HCT VFR BLD AUTO: 25.6 % (ref 36.6–50.3)
HGB BLD-MCNC: 8.7 G/DL (ref 12.1–17)
MCH RBC QN AUTO: 29.9 PG (ref 26–34)
MCHC RBC AUTO-ENTMCNC: 34 G/DL (ref 30–36.5)
MCV RBC AUTO: 88 FL (ref 80–99)
NRBC # BLD: 0 K/UL (ref 0–0.01)
NRBC BLD-RTO: 0 PER 100 WBC
PLATELET # BLD AUTO: 197 K/UL (ref 150–400)
PMV BLD AUTO: 10.1 FL (ref 8.9–12.9)
POTASSIUM SERPL-SCNC: 3.2 MMOL/L (ref 3.5–5.1)
PROT SERPL-MCNC: 5.5 G/DL (ref 6.4–8.2)
RBC # BLD AUTO: 2.91 M/UL (ref 4.1–5.7)
SODIUM SERPL-SCNC: 136 MMOL/L (ref 136–145)
WBC # BLD AUTO: 7.6 K/UL (ref 4.1–11.1)

## 2022-11-30 PROCEDURE — 74011250637 HC RX REV CODE- 250/637: Performed by: STUDENT IN AN ORGANIZED HEALTH CARE EDUCATION/TRAINING PROGRAM

## 2022-11-30 PROCEDURE — 65270000029 HC RM PRIVATE

## 2022-11-30 PROCEDURE — 97116 GAIT TRAINING THERAPY: CPT | Performed by: PHYSICAL THERAPIST

## 2022-11-30 PROCEDURE — 74011000250 HC RX REV CODE- 250: Performed by: STUDENT IN AN ORGANIZED HEALTH CARE EDUCATION/TRAINING PROGRAM

## 2022-11-30 PROCEDURE — 80053 COMPREHEN METABOLIC PANEL: CPT

## 2022-11-30 PROCEDURE — 97161 PT EVAL LOW COMPLEX 20 MIN: CPT | Performed by: PHYSICAL THERAPIST

## 2022-11-30 PROCEDURE — 97165 OT EVAL LOW COMPLEX 30 MIN: CPT

## 2022-11-30 PROCEDURE — 74011250636 HC RX REV CODE- 250/636: Performed by: SURGERY

## 2022-11-30 PROCEDURE — 74011250637 HC RX REV CODE- 250/637: Performed by: SURGERY

## 2022-11-30 PROCEDURE — 97535 SELF CARE MNGMENT TRAINING: CPT

## 2022-11-30 PROCEDURE — 74011250636 HC RX REV CODE- 250/636: Performed by: STUDENT IN AN ORGANIZED HEALTH CARE EDUCATION/TRAINING PROGRAM

## 2022-11-30 PROCEDURE — 36415 COLL VENOUS BLD VENIPUNCTURE: CPT

## 2022-11-30 PROCEDURE — 85027 COMPLETE CBC AUTOMATED: CPT

## 2022-11-30 PROCEDURE — 99232 SBSQ HOSP IP/OBS MODERATE 35: CPT | Performed by: SURGERY

## 2022-11-30 PROCEDURE — 65270000032 HC RM SEMIPRIVATE

## 2022-11-30 PROCEDURE — 2709999900 HC NON-CHARGEABLE SUPPLY

## 2022-11-30 RX ORDER — POTASSIUM CHLORIDE 750 MG/1
20 TABLET, FILM COATED, EXTENDED RELEASE ORAL 2 TIMES DAILY
Status: DISCONTINUED | OUTPATIENT
Start: 2022-11-30 | End: 2022-12-02

## 2022-11-30 RX ORDER — SODIUM CHLORIDE, SODIUM LACTATE, POTASSIUM CHLORIDE, CALCIUM CHLORIDE 600; 310; 30; 20 MG/100ML; MG/100ML; MG/100ML; MG/100ML
50 INJECTION, SOLUTION INTRAVENOUS CONTINUOUS
Status: DISCONTINUED | OUTPATIENT
Start: 2022-11-30 | End: 2022-12-04 | Stop reason: HOSPADM

## 2022-11-30 RX ORDER — PROCHLORPERAZINE EDISYLATE 5 MG/ML
5 INJECTION INTRAMUSCULAR; INTRAVENOUS
Status: DISCONTINUED | OUTPATIENT
Start: 2022-11-30 | End: 2022-12-04 | Stop reason: HOSPADM

## 2022-11-30 RX ORDER — ONDANSETRON 2 MG/ML
4 INJECTION INTRAMUSCULAR; INTRAVENOUS
Status: DISCONTINUED | OUTPATIENT
Start: 2022-11-30 | End: 2022-12-04 | Stop reason: HOSPADM

## 2022-11-30 RX ADMIN — Medication 1 LOZENGE: at 06:00

## 2022-11-30 RX ADMIN — DEXTROSE MONOHYDRATE, SODIUM CHLORIDE, AND POTASSIUM CHLORIDE 75 ML/HR: 50; 4.5; 1.49 INJECTION, SOLUTION INTRAVENOUS at 01:56

## 2022-11-30 RX ADMIN — ENOXAPARIN SODIUM 30 MG: 100 INJECTION SUBCUTANEOUS at 08:08

## 2022-11-30 RX ADMIN — ONDANSETRON 4 MG: 2 INJECTION INTRAMUSCULAR; INTRAVENOUS at 21:03

## 2022-11-30 RX ADMIN — MORPHINE SULFATE 4 MG: 4 INJECTION, SOLUTION INTRAMUSCULAR; INTRAVENOUS at 21:03

## 2022-11-30 RX ADMIN — POTASSIUM CHLORIDE 20 MEQ: 750 TABLET, EXTENDED RELEASE ORAL at 16:11

## 2022-11-30 RX ADMIN — MORPHINE SULFATE 4 MG: 4 INJECTION, SOLUTION INTRAMUSCULAR; INTRAVENOUS at 13:21

## 2022-11-30 RX ADMIN — MORPHINE SULFATE 4 MG: 4 INJECTION, SOLUTION INTRAMUSCULAR; INTRAVENOUS at 16:51

## 2022-11-30 RX ADMIN — Medication 1 LOZENGE: at 10:01

## 2022-11-30 RX ADMIN — SODIUM CHLORIDE, POTASSIUM CHLORIDE, SODIUM LACTATE AND CALCIUM CHLORIDE 75 ML/HR: 600; 310; 30; 20 INJECTION, SOLUTION INTRAVENOUS at 18:14

## 2022-11-30 RX ADMIN — PROCHLORPERAZINE EDISYLATE 5 MG: 5 INJECTION INTRAMUSCULAR; INTRAVENOUS at 18:12

## 2022-11-30 RX ADMIN — SODIUM CHLORIDE, PRESERVATIVE FREE 10 ML: 5 INJECTION INTRAVENOUS at 21:04

## 2022-11-30 RX ADMIN — ONDANSETRON 4 MG: 4 TABLET, ORALLY DISINTEGRATING ORAL at 16:51

## 2022-11-30 RX ADMIN — SODIUM CHLORIDE, PRESERVATIVE FREE 10 ML: 5 INJECTION INTRAVENOUS at 06:01

## 2022-11-30 RX ADMIN — MORPHINE SULFATE 4 MG: 4 INJECTION, SOLUTION INTRAMUSCULAR; INTRAVENOUS at 08:08

## 2022-11-30 NOTE — PROGRESS NOTES
PHYSICAL THERAPY EVALUATION/DISCHARGE  Patient: Olu Cho (89 y.o. male)  Date: 11/30/2022  Primary Diagnosis: SBO (small bowel obstruction) ([facility]) [K56.609]       Precautions:          ASSESSMENT  Based on the objective data described below, the patient presents with decreased activity tolerance and independent mobility. Patient lives alone in an apartment and is independent with household ambulation. Patient independent with bed mobility and transfers. He ambulated 100 feet with walker and supervision. No loss of balance noted. Patient will benefit from rollator walker to improve safety with community mobility. Further skilled acute physical therapy is not indicated at this time. PLAN :  Recommendation for discharge: (in order for the patient to meet his/her long term goals)  No skilled physical therapy/ follow up rehabilitation needs identified at this time. This discharge recommendation:  Has been made in collaboration with the attending provider and/or case management    IF patient discharges home will need the following DME: rollator       SUBJECTIVE:   Patient stated I'm okay.     OBJECTIVE DATA SUMMARY:   HISTORY:    Past Medical History:   Diagnosis Date    Cancer ([facility] [address]     rectal    Gastrointestinal disorder     Gastric Ulcers;  Rectal CA    Hematuria 9/1/2021    Hypertension      Past Surgical History:   Procedure Laterality Date    HX GI         Prior level of function: Independent with short distance ambulation    Home Situation  Home Environment: Apartment  # Steps to Enter: 0 (elevator)  One/Two Story Residence: One story  Living Alone: Yes  Support Systems: No Support Systems  Patient Expects to be Discharged to[de-identified] Home  Current DME Used/Available at Home: None  Tub or Shower Type: Tub/Shower combination    EXAMINATION/PRESENTATION/DECISION MAKING:   Critical Behavior:  Neurologic State: Alert  Orientation Level: Oriented X4  Cognition: Appropriate decision making, Follows commands     Hearing: Auditory  Auditory Impairment: None    Range Of Motion:  AROM: Within functional limits    Strength:    Strength: Within functional limits    Tone & Sensation:   Tone: Normal       Coordination:  Coordination: Within functional limits    Functional Mobility:  Bed Mobility:     Supine to Sit: Independent    Transfers:  Sit to Stand: Independent  Stand to Sit: Independent        Bed to Chair: Supervision    Balance:   Sitting: Intact; Without support  Standing: Impaired; Without support  Standing - Static: Good; Unsupported  Standing - Dynamic : Fair;Unsupported    Ambulation/Gait Training:  Distance (ft): 100 Feet (ft)  Assistive Device: Gait belt;Walker, rolling  Ambulation - Level of Assistance: Supervision     Gait Description (WDL): Exceptions to WDL  Gait Abnormalities: Decreased step clearance        Base of Support: Narrowed     Speed/Denise: Pace decreased (<100 feet/min)  Step Length: Left shortened;Right shortened       Based on the above components, the patient evaluation is determined to be of the following complexity level: LOW     Pain Rating:  No pain    Activity Tolerance:   Good      After treatment patient left in no apparent distress:   Sitting in chair and Call bell within reach    COMMUNICATION/EDUCATION:   The patients plan of care was discussed with: Occupational therapist, Registered nurse, Physician, and Case management. Fall prevention education was provided and the patient/caregiver indicated understanding., Patient/family have participated as able in goal setting and plan of care. , and Patient/family agree to work toward stated goals and plan of care.     Thank you for this referral.  Rosa M Phillips, PT   Time Calculation: 16 mins

## 2022-11-30 NOTE — PROGRESS NOTES
OCCUPATIONAL THERAPY EVALUATION/DISCHARGE  Patient: Iain Bales (70 y.o. male)  Date: 11/30/2022  Primary Diagnosis: SBO (small bowel obstruction) ([facility]) [K56.609]       Precautions:        ASSESSMENT  Based on the objective data described below, the patient presents with near baseline ADL performance with improved functional mobility using assistive device. See PT for AD recommendations. Patient demonstrates good safety awareness and insight into his deficits. Patient educated on benefits of and availability/average cost of shower chair, it is unlikely that his insurance covers shower chair. Additional OT services not indicated        Current Level of Function (ADLs/self-care): Mod I with use of RW/rollator for support during ADL    Functional Outcome Measure: The patient scored 80/100 on the Barthel Index outcome measure which is indicative of minimally dependent. Other factors to consider for discharge:      PLAN :  Recommend with staff: in chair daily, ambulate to bathroom     Recommendation for discharge: (in order for the patient to meet his/her long term goals)  No skilled occupational therapy/ follow up rehabilitation needs identified at this time. This discharge recommendation:  Has been made in collaboration with the attending provider and/or case management    IF patient discharges home will need the following DME: see PT for AD recommendations       SUBJECTIVE:   Patient pleasant and cooperative     OBJECTIVE DATA SUMMARY:   HISTORY:   Past Medical History:   Diagnosis Date    Cancer [facility])     rectal    Gastrointestinal disorder     Gastric Ulcers;  Rectal CA    Hematuria 9/1/2021    Hypertension      Past Surgical History:   Procedure Laterality Date    HX GI         Prior Level of Function/Environment/Context: independent   Expanded or extensive additional review of patient history:   Home Situation  Home Environment: Apartment  # Steps to Enter: 0 (elevator)  One/Two Story Residence: One story  Living Alone: Yes  Support Systems: No Support Systems  Patient Expects to be Discharged to[de-identified] Home  Current DME Used/Available at Home: None  Tub or Shower Type: Tub/Shower combination      EXAMINATION OF PERFORMANCE DEFICITS:  Cognitive/Behavioral Status:  Neurologic State: Alert  Orientation Level: Oriented X4  Cognition: Appropriate decision making; Follows commands           Hearing: Auditory  Auditory Impairment: None    Vision/Perceptual:                                     Range of Motion:    AROM: Within functional limits                         Strength:    Strength: Within functional limits                Coordination:  Coordination: Within functional limits  Fine Motor Skills-Upper: Left Intact; Right Intact    Gross Motor Skills-Upper: Left Intact; Right Intact    Tone & Sensation:    Tone: Normal                         Balance:  Sitting: Intact; Without support  Standing: Impaired; Without support  Standing - Static: Good; Unsupported  Standing - Dynamic : Fair;Unsupported    Functional Mobility and Transfers for ADLs:  Bed Mobility:  Supine to Sit: Independent    Transfers:  Sit to Stand: Independent  Stand to Sit: Independent  Bed to Chair: Supervision  Bathroom Mobility: Supervision/set up    ADL Assessment:  Feeding: Independent    Oral Facial Hygiene/Grooming: Independent    Bathing: Supervision         Upper Body Dressing: Setup    Lower Body Dressing: Setup    Toileting: Modified independent                ADL Intervention and task modifications:   Patient was educated on the benefits of maintaining activity tolerance, functional mobility, and independence with self care tasks during acute stay. Encouraged patient to be out of bed for all meals, perform daily ADLs (as approved by RN/MD regarding bathing etc), performing functional mobility to/from bathroom, and increasing time OOB daily.  Patient educated about the importance of maintaining activity tolerance to ensure safe return home and to baseline. Patient verbalized understanding of education. Patient educated on benefits of and availability/average cost of shower chair. Functional Measure:    Barthel Index:  Bathin  Bladder: 10  Bowels: 10  Groomin  Dressing: 10  Feeding: 10  Mobility: 10  Stairs: 5  Toilet Use: 10  Transfer (Bed to Chair and Back): 10  Total: 80/100      The Barthel ADL Index: Guidelines  1. The index should be used as a record of what a patient does, not as a record of what a patient could do. 2. The main aim is to establish degree of independence from any help, physical or verbal, however minor and for whatever reason. 3. The need for supervision renders the patient not independent. 4. A patient's performance should be established using the best available evidence. Asking the patient, friends/relatives and nurses are the usual sources, but direct observation and common sense are also important. However direct testing is not needed. 5. Usually the patient's performance over the preceding 24-48 hours is important, but occasionally longer periods will be relevant. 6. Middle categories imply that the patient supplies over 50 per cent of the effort. 7. Use of aids to be independent is allowed. Score Interpretation (from [address] [facility] [address])    Independent   60-79 Minimally independent   40-59 Partially dependent   20-39 Very dependent   <20 Totally dependent     -Daphnie Guadalupe., Barthel, D.W. (1965). Functional evaluation: the Barthel Index. [address] [facility] ([address] (1997). The Barthel activities of daily living index: self-reporting versus actual performance in the old (> or = 75 years). Journal of [address] [place] [address] 45(7), [address] [facility], JDEVEN, Jacob Brown., Jenny Bourgeois. (1999).  Measuring the change in disability after inpatient rehabilitation; comparison of the responsiveness of the Barthel Index and Functional [place] Measure. Journal of Neurology, Neurosurgery, and Psychiatry, 66(4), 234-430. KWAME Stoner, CARLENE Veliz, & Ronan Malik M.A. (2004) Assessment of post-stroke quality of life in cost-effectiveness studies: The usefulness of the Barthel Index and the EuroQoL-5D. Quality of Life Research, 15, 205-33         Occupational Therapy Evaluation Charge Determination   History Examination Decision-Making   LOW Complexity : Brief history review  LOW Complexity : 1-3 performance deficits relating to physical, cognitive , or psychosocial skils that result in activity limitations and / or participation restrictions  LOW Complexity : No comorbidities that affect functional and no verbal or physical assistance needed to complete eval tasks       Based on the above components, the patient evaluation is determined to be of the following complexity level: LOW   Pain Rating:  None     Activity Tolerance:   Good    After treatment patient left in no apparent distress:    Sitting in chair and Call bell within reach    COMMUNICATION/EDUCATION:   The patients plan of care was discussed with: Physical therapist and Registered nurse.      Thank you for this referral.  Justin Frias OT  Time Calculation: 12 mins

## 2022-11-30 NOTE — PROGRESS NOTES
Hospitalist Progress Note    NAME: Joyce Ascencio   :  1965   MRN:  [id number]   Room Number:  292/91  @ [facility]     Please note that this dictation was completed with Trish Manuel, the computer voice recognition software. Quite often unanticipated grammatical, syntax, homophones, and other interpretive errors are inadvertently transcribed by the computer software. Please disregard these errors. Please excuse any errors that have escaped final proofreading. Interim Hospital Summary: 59 y.o. male whom presented on 2022 with      Assessment / Plan:  Anticipated discharge date :   Anticipated disposition :   Barriers to discharge :       #Nausea vomiting diarrhea POA  #Abdominal pain POA  #Recurrent small bowel obstruction POA  #History of rectal cancer s/p rectosigmoid resection  -CT abdomen pelvis reviewed independently with dilated fluid and air-filled small bowel loops consistent with small bowel obstruction. Sequela of chronic pancreatitis. S/p rectosigmoid resection  -General surgery on board appreciate help        -Continue NG tube to suction  -Maintenance fluid  -Pain management  -Follow general surgery recommendations  -CLD        #History of BPH with UPJ obstruction s/p Marrero  -Continue Marrero care           #History of hypertension  #History of BPH  #History of mood disorder  -Resume home regimen able tolerate p.o. diet     Body mass index is 15.25 kg/m². Code Status: Full   Surrogate Decision Maker:     DVT Prophylaxis: Lovenox  GI Prophylaxis: not indicated           Subjective:     Chief Complaint / Reason for Physician Visit  \"Feeling better \". Discussed with RN events overnight. Review of Systems:  No fevers, chills, appetite change, cough, sputum production, shortness of breath, dyspnea on exertion, nausea, vomitting, diarrhea, constipation, chest pain, leg edema, abdominal pain, joint pain, rash, itching. Tolerating PT/OT. Tolerating diet. Objective:     VITALS:   Last 24hrs VS reviewed since prior progress note. Most recent are:  Patient Vitals for the past 24 hrs:   Temp Pulse Resp BP SpO2   11/30/22 0740 97.6 °F (36.4 °C) 62 18 (!) 158/91 100 %   11/30/22 0507 97.7 °F (36.5 °C) 63 18 (!) 159/77 100 %   11/29/22 2349 98 °F (36.7 °C) 68 18 (!) 160/89 100 %   11/29/22 2000 97.6 °F (36.4 °C) 63 18 (!) 148/77 100 %   11/29/22 1625 97.9 °F (36.6 °C) 63 18 (!) 155/83 100 %   11/29/22 1447 -- 61 18 (!) 170/86 --   11/29/22 1440 -- 61 18 (!) 167/86 99 %   11/29/22 1149 97.8 °F (36.6 °C) 69 18 (!) 170/80 100 %       Intake/Output Summary (Last 24 hours) at 11/30/2022 0918  Last data filed at 11/30/2022 0740  Gross per 24 hour   Intake --   Output 550 ml   Net -550 ml        PHYSICAL EXAM:  General: WD, WN. Alert, cooperative, no acute distress    EENT:  EOMI. Anicteric sclerae. MMM  Resp:  CTA bilaterally, no wheezing or rales. No accessory muscle use  CV:  Regular  rhythm,  normal S1/S2, no murmurs rubs gallops, No edema  GI:  Soft, Non distended, Non tender. +Bowel sounds  Neurologic:  Alert and oriented X 3, normal speech,   Psych:   Good insight. Not anxious nor agitated  Skin:  No rashes. No jaundice    Reviewed most current lab test results and cultures  YES  Reviewed most current radiology test results   YES  Review and summation of old records today    NO  Reviewed patient's current orders and MAR    YES  PMH/SH reviewed - no change compared to H&P  ________________________________________________________________________  Care Plan discussed with:    Comments   Patient x    Family      RN x    Care Manager x    Consultant  x GS                    x Multidiciplinary team rounds were held today with , nursing, pharmacist and clinical coordinator. Patient's plan of care was discussed; medications were reviewed and discharge planning was addressed.      ________________________________________________________________________  Total NON critical care TIME:  35   Minutes    Total CRITICAL CARE TIME Spent:   Minutes non procedure based      Comments   >50% of visit spent in counseling and coordination of care x    ________________________________________________________________________  Austin Bourgeois MD     Procedures: see electronic medical records for all procedures/Xrays and details which were not copied into this note but were reviewed prior to creation of Plan. LABS:  I reviewed today's most current labs and imaging studies.   Pertinent labs include:  Recent Labs     11/30/22 0154 11/29/22 0428 11/28/22  1435   WBC 7.6 12.1* 6.8   HGB 8.7* 10.5* 9.3*   HCT 25.6* 30.9* 27.4*    286 239     Recent Labs     11/30/22 0154 11/29/22 0428 11/28/22  1435    139 139   K 3.2* 2.5* 2.6*    102 103   CO2 29 32 32   * 85 101*   BUN 6 8 11   CREA 0.64* 0.60* 0.73   CA 7.9* 8.3* 8.3*   MG  --  1.6  --    ALB 2.2* 2.7* 2.5*   TBILI 0.4 0.5 0.3   ALT 15 15 16       Signed: Austin Bourgeois MD

## 2022-11-30 NOTE — PROGRESS NOTES
Mr. Rudy Grover is feeling better today. No nausea or vomiting. Passing flatus. Tolerating clear liquid diet. Tm 98 Tc 97.4 HR: 68 BP: 173/95 Resp Rate: 158 100% sat on room air. Intake/Output Summary (Last 24 hours) at 11/30/2022 1446  Last data filed at 11/30/2022 0740  Gross per 24 hour   Intake --   Output 550 ml   Net -550 ml   Exam: Cor: RRR. Lungs: Bilateral breath sounds. Clear to auscultation. Abd: Soft. Non distended. Non tender. No guarding or rebound. Labs: No results found for this or any previous visit (from the past 12 hour(s)). Mr. Rudy Grover is doing well. Clinically, his SBO seems to have improved. Remove NG tube. Will try regular diet as he is tolerating a clear liquid diet. Saline lock IVF. OOB, Ambulate. Anti-emetics and pain medication as ordered. DVT prophylaxis - Lovenox. Replace K 3.2. Labs in AM.  Plans per Dr. Armand Habermann.

## 2022-11-30 NOTE — PROGRESS NOTES
Notified Dr. Shelton Robles of pt not tolerating a regular diet. Patient has increased pain and some vomitting. Will medicate to support symptoms.

## 2022-11-30 NOTE — PROGRESS NOTES
Comprehensive Nutrition Assessment     Type and Reason for Visit: Initial, RN consult     Nutrition Recommendations/Plan:   As medically appropriate advance diet. Pt requests chocolate ensure when he is allowed to eat. Pt is HIGH refeeding risk, monitor lytes and replete as necessary. Check phosphorus. Given pt's status of severe malnutrition, recommend starting PPN if diet not advanced in the next day. Slowly titrate 2' pt's high refeeding risk. If PPN is started, d/c IV D5.     PPN Recommendations goal rate: D10 AA4.25 @ 63 ml/hr + 250 ml 20% lipids daily  -Day 1: D10 AA4.25 @ 30 ml/hr  -Day 2: goal rate if lytes WNL         Malnutrition Assessment:  Malnutrition Status:  Severe malnutrition (11/30/22)    Context:  Acute on chronic illness     Findings of the 6 clinical characteristics of malnutrition:   Energy Intake:  50% or less of est energy requirements for 5 or more days  Weight Loss:  Unable to assess     Body Fat Loss:  Severe body fat loss, Buccal region, Orbital   Muscle Mass Loss: Moderate muscle mass loss, Scapula (trapezius), Hand (interosseous), Temples (temporalis), Clavicles (pectoralis & deltoids)  Fluid Accumulation:  No significant fluid accumulation,     Strength:  Not performed          Nutrition Assessment:    PMHx: rectal cancer; s/p recto/sigmoid resection with chemoRx, HTN, etoh/tobacco/substance use, sequela of chronic pancreatitis, anemia    59 y.o. male admitted with SBO. Diet NPO x ixe chips today per surgery- pt eager to eat. Plan to leave suction NG in place,. In Sept pt stated he lost 20# in the last week because he was unable to eat. During this time he was able to tolerate water, koolaid, \"1 bite of chicken at a time\" d/t early satiety, abd pain, nausea. Stated #. Last bowel movement 11/28. NFPE revealed significant fat and muscle wasting. Wt history in EMR shows pt has chronic low body weight.  Pt asked for ensure- told him we can provide ensure clear when diet advances but he'd have to wait on chocolate ensure for now. K 3.2 today     Given pts severe malnutrition, recommend starting PPN in the next day if diet is not advanced. Recommend continuous D10 AA4.25 @ 63 ml/hr + 250 ml 20% lipids daily. START @ 30 mL/hr until refeeding risk is ruled out. At goal will provide 1512 ml, 1271 kcals (83% kcal needs), 64 g pro (100% of needs). Recent Labs     11/20/22  0115 09/11/22  0040 09/10/22  0038    86 92   BUN 6 11 12   CREA . 64 0.91 0.95    140 140   K 3.2* 3.6 3.9    107 107   CO2 29 30 27   CA 7.9* 8.5 8.8   PHOS  3.1 3.4   MG 1.6 1.8 1.9         Nutritionally Significant Medications:  Lovenox, protonix, zosyn, IV K, IVD5, dilaudid prn        Estimated Daily Nutrient Needs:  Energy Requirements Based On: Kcal/kg  Weight Used for Energy Requirements: Usual  Energy (kcal/day): 2104-1291 (30-35 kcal/kg UBW)  Weight Used for Protein Requirements: Usual  Protein (g/day): 61 (1.2 g/kg UBW)  Method Used for Fluid Requirements: 1 ml/kcal  Fluid (ml/day): 1800     Nutrition Related Findings:   Edema: No data recorded     Last BM: 11/30/22, Other (comment) (ribbon-like)     Wounds: surgical        Current Nutrition Therapies:  Diet: NPO x ice chips  Supplements: none  Meal intake: No data found. Supplement intake: No data found. Nutrition Support: none        Anthropometric Measures:  Height: 5' 8\" (172.7 cm)  Ideal Body Weight (IBW): 154 lbs (70 kg)  Current Body Wt:  45.5 kg (100 lb), 64.9 % IBW.  Stated  Current BMI (kg/m2): 15.25  Usual Body Weight: 50.8 kg (112 lb)  % Weight Change (Calculated): 11%  BMI Category: Underweight (BMI less than 18.5)         Wt Readings from Last 10 Encounters:   09/09/22 54.4 kg (120 lb)   06/03/22 54.4 kg (120 lb)   02/04/22 52 kg (114 lb 9.6 oz)   02/02/22 48.5 kg (107 lb)   01/26/22 48.1 kg (106 lb)   09/06/21 54.3 kg (119 lb 12.8 oz)   09/02/21 55.1 kg (121 lb 8 oz)   06/14/21 54.9 kg (121 lb)   05/20/21 53.7 kg (118 lb 4.8 oz)   02/04/21 55.8 kg (123 lb)               Nutrition Diagnosis:   Inadequate oral intake related to altered GI function, altered GI structure (SBO) as evidenced by NPO or clear liquid status due to medical condition, poor intake prior to admission, weight loss  Severe malnutrition, In context of acute illness or injury related to inadequate protein-energy intake as evidenced by Criteria as identified in malnutrition assessment, weight loss, poor intake prior to admission     Nutrition Interventions:   Food and/or Nutrient Delivery: Start parenteral nutrition, advance diet as medically appropriate, Start oral nutrition supplement  Nutrition Education/Counseling: No recommendations at this time  Coordination of Nutrition Care: Continue to monitor while inpatient     Goals:  Goals: Tolerate nutrition support at goal rate, PO intake 50% or greater, by next RD assessment     Nutrition Monitoring and Evaluation:   Behavioral-Environmental Outcomes: None identified  Food/Nutrient Intake Outcomes: Diet advancement/tolerance, Supplement intake, Parenteral nutrition intake/tolerance  Physical Signs/Symptoms Outcomes: Biochemical data, GI status, Constipation, Nausea/vomiting, Nutrition focused physical findings, Meal time behavior, Weight     Discharge Planning:     Too soon to determine    Louie Jones, PhD, RD, [address] , [facility]  11/30/22 7:30 AM  Pager [phone]

## 2022-11-30 NOTE — PROGRESS NOTES
Bedside and Verbal shift change report given to [address] (oncoming nurse) by Salinas Mathew (offgoing nurse). Report included the following information Kardex, Intake/Output, MAR, and Recent Results.

## 2022-11-30 NOTE — PROGRESS NOTES
2200 Mondragon cathter care performed , changed stat lock for mondragon. Applied protective foam dressing to back and sacrum. 2330 Medicated with morphine as per request.  0200 Pt resting in bed and watching Tv  0400 Pt resting, Labs drawn  0530 Mondragon emptied.

## 2022-12-01 LAB
ANION GAP SERPL CALC-SCNC: 2 MMOL/L (ref 5–15)
BACTERIA SPEC CULT: ABNORMAL
BACTERIA SPEC CULT: ABNORMAL
BUN SERPL-MCNC: 5 MG/DL (ref 6–20)
BUN/CREAT SERPL: 7 (ref 12–20)
CALCIUM SERPL-MCNC: 7.9 MG/DL (ref 8.5–10.1)
CC UR VC: ABNORMAL
CHLORIDE SERPL-SCNC: 103 MMOL/L (ref 97–108)
CO2 SERPL-SCNC: 33 MMOL/L (ref 21–32)
CREAT SERPL-MCNC: 0.67 MG/DL (ref 0.7–1.3)
GLUCOSE SERPL-MCNC: 77 MG/DL (ref 65–100)
POTASSIUM SERPL-SCNC: 3.3 MMOL/L (ref 3.5–5.1)
SERVICE CMNT-IMP: ABNORMAL
SODIUM SERPL-SCNC: 138 MMOL/L (ref 136–145)

## 2022-12-01 PROCEDURE — 80048 BASIC METABOLIC PNL TOTAL CA: CPT

## 2022-12-01 PROCEDURE — 74011250637 HC RX REV CODE- 250/637: Performed by: STUDENT IN AN ORGANIZED HEALTH CARE EDUCATION/TRAINING PROGRAM

## 2022-12-01 PROCEDURE — 74011250637 HC RX REV CODE- 250/637: Performed by: SURGERY

## 2022-12-01 PROCEDURE — 65270000029 HC RM PRIVATE

## 2022-12-01 PROCEDURE — 74011250636 HC RX REV CODE- 250/636: Performed by: STUDENT IN AN ORGANIZED HEALTH CARE EDUCATION/TRAINING PROGRAM

## 2022-12-01 PROCEDURE — 99232 SBSQ HOSP IP/OBS MODERATE 35: CPT | Performed by: SURGERY

## 2022-12-01 PROCEDURE — 74011000250 HC RX REV CODE- 250: Performed by: STUDENT IN AN ORGANIZED HEALTH CARE EDUCATION/TRAINING PROGRAM

## 2022-12-01 PROCEDURE — 36415 COLL VENOUS BLD VENIPUNCTURE: CPT

## 2022-12-01 PROCEDURE — 65270000032 HC RM SEMIPRIVATE

## 2022-12-01 RX ORDER — POTASSIUM CHLORIDE 7.45 MG/ML
10 INJECTION INTRAVENOUS
Status: COMPLETED | OUTPATIENT
Start: 2022-12-01 | End: 2022-12-02

## 2022-12-01 RX ORDER — POTASSIUM CHLORIDE 14.9 MG/ML
10 INJECTION INTRAVENOUS
Status: CANCELLED | OUTPATIENT
Start: 2022-12-01 | End: 2022-12-01

## 2022-12-01 RX ADMIN — MORPHINE SULFATE 4 MG: 4 INJECTION, SOLUTION INTRAMUSCULAR; INTRAVENOUS at 17:31

## 2022-12-01 RX ADMIN — TAMSULOSIN HYDROCHLORIDE 0.4 MG: 0.4 CAPSULE ORAL at 08:54

## 2022-12-01 RX ADMIN — METOPROLOL TARTRATE 12.5 MG: 25 TABLET, FILM COATED ORAL at 08:54

## 2022-12-01 RX ADMIN — Medication 1 CAPSULE: at 08:54

## 2022-12-01 RX ADMIN — POTASSIUM CHLORIDE 10 MEQ: 7.46 INJECTION, SOLUTION INTRAVENOUS at 23:15

## 2022-12-01 RX ADMIN — SODIUM CHLORIDE, POTASSIUM CHLORIDE, SODIUM LACTATE AND CALCIUM CHLORIDE 75 ML/HR: 600; 310; 30; 20 INJECTION, SOLUTION INTRAVENOUS at 21:25

## 2022-12-01 RX ADMIN — OXYCODONE HYDROCHLORIDE 10 MG: 5 TABLET ORAL at 08:53

## 2022-12-01 RX ADMIN — OXYCODONE HYDROCHLORIDE 10 MG: 5 TABLET ORAL at 21:25

## 2022-12-01 RX ADMIN — ONDANSETRON 4 MG: 2 INJECTION INTRAMUSCULAR; INTRAVENOUS at 21:30

## 2022-12-01 RX ADMIN — PROCHLORPERAZINE EDISYLATE 5 MG: 5 INJECTION INTRAMUSCULAR; INTRAVENOUS at 17:31

## 2022-12-01 RX ADMIN — POTASSIUM CHLORIDE 10 MEQ: 7.46 INJECTION, SOLUTION INTRAVENOUS at 21:45

## 2022-12-01 RX ADMIN — SODIUM CHLORIDE, POTASSIUM CHLORIDE, SODIUM LACTATE AND CALCIUM CHLORIDE 75 ML/HR: 600; 310; 30; 20 INJECTION, SOLUTION INTRAVENOUS at 04:17

## 2022-12-01 RX ADMIN — Medication 100 MG: at 08:54

## 2022-12-01 RX ADMIN — SODIUM CHLORIDE, PRESERVATIVE FREE 10 ML: 5 INJECTION INTRAVENOUS at 21:25

## 2022-12-01 RX ADMIN — PROCHLORPERAZINE EDISYLATE 5 MG: 5 INJECTION INTRAMUSCULAR; INTRAVENOUS at 09:53

## 2022-12-01 RX ADMIN — SODIUM CHLORIDE, PRESERVATIVE FREE 10 ML: 5 INJECTION INTRAVENOUS at 05:57

## 2022-12-01 RX ADMIN — POTASSIUM CHLORIDE 20 MEQ: 750 TABLET, EXTENDED RELEASE ORAL at 08:54

## 2022-12-01 RX ADMIN — ONDANSETRON 4 MG: 2 INJECTION INTRAMUSCULAR; INTRAVENOUS at 08:53

## 2022-12-01 RX ADMIN — MIRTAZAPINE 15 MG: 15 TABLET, FILM COATED ORAL at 21:30

## 2022-12-01 RX ADMIN — FOLIC ACID 1 MG: 1 TABLET ORAL at 08:54

## 2022-12-01 RX ADMIN — SODIUM CHLORIDE, PRESERVATIVE FREE 10 ML: 5 INJECTION INTRAVENOUS at 17:33

## 2022-12-01 RX ADMIN — METOPROLOL TARTRATE 12.5 MG: 25 TABLET, FILM COATED ORAL at 21:25

## 2022-12-01 RX ADMIN — FINASTERIDE 5 MG: 5 TABLET, FILM COATED ORAL at 08:54

## 2022-12-01 NOTE — PROGRESS NOTES
Hospitalist Progress Note    NAME: Allie Nichole   :  1965   MRN:  [id number]   Room Number:  204/50  @ [facility]     Please note that this dictation was completed with Merced Sepulveda, the computer voice recognition software. Quite often unanticipated grammatical, syntax, homophones, and other interpretive errors are inadvertently transcribed by the computer software. Please disregard these errors. Please excuse any errors that have escaped final proofreading. Interim Hospital Summary: 59 y.o. male whom presented on 2022 with      Assessment / Plan:  Anticipated discharge date :   Anticipated disposition :   Barriers to discharge :     Nausea vomiting diarrhea POA  Abdominal pain POA  Recurrent small bowel obstruction POA  History of rectal cancer s/p rectosigmoid resection  -CT abdomen pelvis reviewed independently with dilated fluid and air-filled small bowel loops consistent with small bowel obstruction. Sequela of chronic pancreatitis. S/p rectosigmoid resection  -General surgery on board appreciate help    -Continue NG tube to suction  -Maintenance fluid  -Pain management  -Follow general surgery recommendations  -Advance diet to full liquid   -General surgery consulted, no acute surgical intervention     History of BPH with UPJ obstruction s/p Marrero  -Continue Marrero care     History of hypertension  History of BPH  History of mood disorder  -Resume home regimen able tolerate p.o. diet     Body mass index is 15.25 kg/m². Code Status: Full   Surrogate Decision Maker:     DVT Prophylaxis: Lovenox  GI Prophylaxis: not indicated         Subjective:     Chief Complaint / Reason for Physician Visit  \"Pain in abdomen\"  Discussed with RN events overnight.      Review of Systems:  No fevers, chills, appetite change, cough, sputum production, shortness of breath, dyspnea on exertion, nausea, vomitting, diarrhea, constipation, chest pain, leg edema, abdominal pain, joint pain, rash, itching. Tolerating PT/OT. Tolerating diet. Objective:     VITALS:   Last 24hrs VS reviewed since prior progress note. Most recent are:  Patient Vitals for the past 24 hrs:   Temp Pulse Resp BP SpO2   12/01/22 0812 98.6 °F (37 °C) 69 16 136/80 100 %   12/01/22 0800 -- 72 -- -- --   12/01/22 0405 97.3 °F (36.3 °C) 72 16 117/69 100 %   12/01/22 0400 -- 72 -- -- --   11/30/22 2358 -- 77 -- -- --   11/30/22 2317 98 °F (36.7 °C) 77 16 117/63 100 %   11/30/22 2000 -- 87 -- -- --   11/30/22 1932 97.9 °F (36.6 °C) 87 18 (!) 153/93 100 %       Intake/Output Summary (Last 24 hours) at 12/1/2022 1720  Last data filed at 12/1/2022 8106  Gross per 24 hour   Intake 835 ml   Output 450 ml   Net 385 ml        PHYSICAL EXAM:  General: Alert, cooperative, no acute distress    EENT:  EOMI. Anicteric sclerae. MMM  Resp:  CTA bilaterally, no wheezing or rales. No accessory muscle use  CV:  Regular  rhythm,  normal S1/S2, no murmurs rubs gallops, No edema  GI:  Soft, Non distended, Non tender. +Bowel sounds  Neurologic:  Alert and oriented X 3, normal speech  Psych:   Good insight. Not anxious nor agitated  Skin:  No rashes. No jaundice    Reviewed most current lab test results and cultures  YES  Reviewed most current radiology test results   YES  Review and summation of old records today    NO  Reviewed patient's current orders and MAR    YES  PMH/SH reviewed - no change compared to H&P  ________________________________________________________________________  Care Plan discussed with:    Comments   Patient x    Family      RN x    Care Manager x    Consultant                       x Multidiciplinary team rounds were held today with , nursing, pharmacist and clinical coordinator. Patient's plan of care was discussed; medications were reviewed and discharge planning was addressed.      ________________________________________________________________________  Total NON critical care TIME:  25   Minutes    Total CRITICAL CARE TIME Spent:   Minutes non procedure based      Comments   >50% of visit spent in counseling and coordination of care x    ________________________________________________________________________  Lily Pettit MD     Procedures: see electronic medical records for all procedures/Xrays and details which were not copied into this note but were reviewed prior to creation of Plan. LABS:  I reviewed today's most current labs and imaging studies.   Pertinent labs include:  Recent Labs     11/30/22 0154 11/29/22 0428   WBC 7.6 12.1*   HGB 8.7* 10.5*   HCT 25.6* 30.9*    286     Recent Labs     12/01/22  0418 11/30/22 0154 11/29/22 0428    136 139   K 3.3* 3.2* 2.5*    103 102   CO2 33* 29 32   GLU 77 105* 85   BUN 5* 6 8   CREA 0.67* 0.64* 0.60*   CA 7.9* 7.9* 8.3*   MG  --   --  1.6   ALB  --  2.2* 2.7*   TBILI  --  0.4 0.5   ALT  --  15 15       Signed: Lily Pettit MD

## 2022-12-01 NOTE — PROGRESS NOTES
0700  Shift change report received from [facility] (Nurse). Report included review of SBAR, accordion report results review, orders, meds, ROS and POC. (things to be done) All questions answered. Transfer of care completed. 5189 Patient complains of pain and nausea. Patient brought medication. 4061 Patient complains of continuing nausea. Patient brought medication. 1100 Patient resting comfortably.

## 2022-12-01 NOTE — PROGRESS NOTES
KENTRELL    RUR- 18%      Plan  Disposition-home  PCP-On AVS  DME-Rollator  2nd IMM-  Transportation-      CM called PCP [facility] [place] office- Teresa Sanders [phone], answering system prompted that office is closed and to call during regular business hours. CM left HIPPA compliant VM for [employer] Rep Reina Borges [phone] to confirm if pt was accepted and DME, rollator to be delivered. Addendum 12:48 pm    CM spoke with Anabel with [facility] [phone], accepted pt and working on DME to be delivered. Addendum 2:12 pm    CM received call from [address] [place] [address] - will follow up on referral as well. Addendum 12/2/22 8:51 AM     CM spoke with PCP office and scheduled appt-On AVS.    CM spoke with Anabel with [facility] [phone], she informed CM that order was left for  yesterday and he did not return back to office-working on order now, stated for CM to check back in a few hours for update-no timeframe yet when rollator will be delivered.       [place], [address] [place] [address] Road

## 2022-12-01 NOTE — PROGRESS NOTES
Spiritual Care Assessment/Progress Note  [facility]      NAME: Sid Bocanegra      MRN: [id number]  AGE: 59 y.o. SEX: male  Yazdanism Affiliation: Lutheran   Language: English     12/1/2022     Total Time (in minutes): 5     Spiritual Assessment begun in [address] through conversation with:         [x]Patient        [] Family    [] Friend(s)        Reason for Consult: Initial/Spiritual assessment, patient floor     Spiritual beliefs: (Please include comment if needed)     [] Identifies with a yessy tradition:         [] Supported by a yessy community:            [] Claims no spiritual orientation:           [] Seeking spiritual identity:                [] Adheres to an individual form of spirituality:           [x] Not able to assess:                           Identified resources for coping:      [] Prayer                               [] Music                  [] Guided Imagery     [] Family/friends                 [] Pet visits     [] Devotional reading                         [x] Unknown     [] Other:                                                Interventions offered during this visit: (See comments for more details)    Patient Interventions: Initial visit           Plan of Care:     [] Support spiritual and/or cultural needs    [] Support AMD and/or advance care planning process      [] Support grieving process   [] Coordinate Rites and/or Rituals    [] Coordination with community clergy   [] No spiritual needs identified at this time   [] Detailed Plan of Care below (See Comments)  [] Make referral to Music Therapy  [] Make referral to Pet Therapy     [] Make referral to Addiction services  [] Make referral to [facility]  [] Make referral to Spiritual Care Partner  [] No future visits requested        [x] Contact Spiritual Care for further referrals     Comments:  Visit was for initial spiritual assessment in 226.  Patient declined visit when  introduced himself, stating he was not interested in a 's visit at this time.        Visited by: Estelle Hutchinson  To rasta [facility]: [phone])

## 2022-12-01 NOTE — PROGRESS NOTES
Problem: Risk for Spread of Infection  Goal: Prevent transmission of infectious organism to others  Description: Prevent the transmission of infectious organisms to other patients, staff members, and visitors. Outcome: Progressing Towards Goal     Problem: Risk for Spread of Infection  Goal: Prevent transmission of infectious organism to others  Description: Prevent the transmission of infectious organisms to other patients, staff members, and visitors.   Outcome: Progressing Towards Goal     Problem: Patient Education:  Go to Education Activity  Goal: Patient/Family Education  Outcome: Progressing Towards Goal     Problem: Pain  Goal: *Control of Pain  Outcome: Progressing Towards Goal

## 2022-12-01 NOTE — PROGRESS NOTES
1443-received report from [place], [facility]. All questions answered. 1630-pt had a large bowel movement. Pt stated this was his first BM in a few days. Stool was loose and dark grey. Pt used bedside commode. One person assist, pivot and sit maneuver tolerated well. 1715-pt complained of nausea and pain 7/10 in abdomen. 1731-pt was given 4mg of IV morphine and 5mg of compazine. 1800-pt was still having n/v when scheduled meds were due so they were postponed until 2030 tonight. Pt agreed. 1830-pt was resting quietly in room.

## 2022-12-01 NOTE — PROGRESS NOTES
1930 Report received from [facility], shift vitals and assessments done. 2100 Mondragon cathter care performed , changed stat lock for mondragon. Protective foam dressing applied to back and sacrum. Patient complained of pain and nausea, Morphine and Zofran given IV. Patient refused Remeron stating he is not able to swallow because of his nausea. 2200 Rounded on patient, patient stated that his pain feels a lot better at this time. , will continue to monitor. 0000 Vitals completed, heart monitor leads replaced, pt. Is resting quietly at this time. 0200 Patient resting quietly, states he is comfortable at this time. 0400 Rounded on patient, pt. States that his pain feels better, pt did not verbalized any other concerns at this time. Labs collected. 2481 Potassium 3.3 was 3.2, notified Dr. Esvin Neal. 0710 Change of shift report given to [facility] using SBAR, MAR, Kardex, Flowsheet and Labs.

## 2022-12-01 NOTE — PROGRESS NOTES
Care plan reviewed, patient progressing towards goals. Problem: Risk for Spread of Infection  Goal: Prevent transmission of infectious organism to others  Description: Prevent the transmission of infectious organisms to other patients, staff members, and visitors.   Outcome: Progressing Towards Goal     Problem: Patient Education:  Go to Education Activity  Goal: Patient/Family Education  Outcome: Progressing Towards Goal     Problem: Pain  Goal: *Control of Pain  Outcome: Progressing Towards Goal     Problem: Patient Education: Go to Patient Education Activity  Goal: Patient/Family Education  Outcome: Progressing Towards Goal     Problem: Patient Education: Go to Patient Education Activity  Goal: Patient/Family Education  Outcome: Progressing Towards Goal     Problem: Small Bowel Obstruction: Day 1  Goal: Off Pathway (Use only if patient is Off Pathway)  Outcome: Progressing Towards Goal  Goal: Activity/Safety  Outcome: Progressing Towards Goal  Goal: Consults, if ordered  Outcome: Progressing Towards Goal  Goal: Diagnostic Test/Procedures  Outcome: Progressing Towards Goal  Goal: Nutrition/Diet  Outcome: Progressing Towards Goal  Goal: Medications  Outcome: Progressing Towards Goal  Goal: Respiratory  Outcome: Progressing Towards Goal  Goal: Treatments/Interventions/Procedures  Outcome: Progressing Towards Goal  Goal: Psychosocial  Outcome: Progressing Towards Goal  Goal: *Optimal pain control at patient's stated goal  Outcome: Progressing Towards Goal  Goal: *Adequate urinary output (equal to or greater than 30 milliliters/hour)  Outcome: Progressing Towards Goal  Goal: *Hemodynamically stable  Outcome: Progressing Towards Goal  Goal: *Demonstrates progressive activity  Outcome: Progressing Towards Goal  Goal: *Absence of nausea/vomiting  Outcome: Progressing Towards Goal     Problem: Small Bowel Obstruction: Day 2  Goal: Off Pathway (Use only if patient is Off Pathway)  Outcome: Progressing Towards Goal  Goal: Activity/Safety  Outcome: Progressing Towards Goal  Goal: Consults, if ordered  Outcome: Progressing Towards Goal  Goal: Diagnostic Test/Procedures  Outcome: Progressing Towards Goal  Goal: Nutrition/Diet  Outcome: Progressing Towards Goal  Goal: Discharge Planning  Outcome: Progressing Towards Goal  Goal: Medications  Outcome: Progressing Towards Goal  Goal: Respiratory  Outcome: Progressing Towards Goal  Goal: Treatments/Interventions/Procedures  Outcome: Progressing Towards Goal  Goal: Psychosocial  Outcome: Progressing Towards Goal  Goal: *Optimal pain control at patient's stated goal  Outcome: Progressing Towards Goal  Goal: *Adequate urinary output (equal to or greater than 30 milliliters/hour)  Outcome: Progressing Towards Goal  Goal: *Hemodynamically stable  Outcome: Progressing Towards Goal  Goal: *Demonstrates progressive activity  Outcome: Progressing Towards Goal  Goal: *Absence of nausea/vomiting  Outcome: Progressing Towards Goal  Goal: *Return of normal bowel function  Outcome: Progressing Towards Goal     Problem: Small Bowel Obstruction: Day 3  Goal: Off Pathway (Use only if patient is Off Pathway)  Outcome: Progressing Towards Goal  Goal: Activity/Safety  Outcome: Progressing Towards Goal  Goal: Consults, if ordered  Outcome: Progressing Towards Goal  Goal: Diagnostic Test/Procedures  Outcome: Progressing Towards Goal  Goal: Nutrition/Diet  Outcome: Progressing Towards Goal  Goal: Discharge Planning  Outcome: Progressing Towards Goal  Goal: Medications  Outcome: Progressing Towards Goal  Goal: Respiratory  Outcome: Progressing Towards Goal  Goal: Treatments/Interventions/Procedures  Outcome: Progressing Towards Goal  Goal: Psychosocial  Outcome: Progressing Towards Goal  Goal: *Optimal pain control at patient's stated goal  Outcome: Progressing Towards Goal  Goal: *Adequate urinary output (equal to or greater than 30 milliliters/hour)  Outcome: Progressing Towards Goal  Goal: *Hemodynamically stable  Outcome: Progressing Towards Goal  Goal: *Adequate nutrition  Outcome: Progressing Towards Goal  Goal: *Demonstrates progressive activity  Outcome: Progressing Towards Goal  Goal: *Participates in discharge planning  Outcome: Progressing Towards Goal     Problem: Small Bowel Obstruction - Non Surgical: Discharge Outcomes  Goal: *Hemodynamically stable  Outcome: Progressing Towards Goal  Goal: *Demonstrates independent activity or return to baseline  Outcome: Progressing Towards Goal  Goal: *Optimal pain control at patient's stated goal  Outcome: Progressing Towards Goal  Goal: *Verbalizes understanding and describes prescribed diet  Outcome: Progressing Towards Goal  Goal: *Tolerating diet  Outcome: Progressing Towards Goal  Goal: *Verbalizes name, dosage, time, side effects, and number of days to continue medications  Outcome: Progressing Towards Goal  Goal: *Anxiety reduced or absent  Outcome: Progressing Towards Goal  Goal: *Understands and describes signs and symptoms to report to providers(Stroke Metric)  Outcome: Progressing Towards Goal  Goal: *Describes follow-up/return visits to physicians  Outcome: Progressing Towards Goal  Goal: *Describes available resources and support systems  Outcome: Progressing Towards Goal  Goal: *Active bowel function  Outcome: Progressing Towards Goal     Problem: Falls - Risk of  Goal: *Absence of Falls  Description: Document Bk Fall Risk and appropriate interventions in the flowsheet.   Outcome: Progressing Towards Goal  Note: Fall Risk Interventions:  Mobility Interventions: Assess mobility with egress test         Medication Interventions: Teach patient to arise slowly                   Problem: Patient Education: Go to Patient Education Activity  Goal: Patient/Family Education  Outcome: Progressing Towards Goal

## 2022-12-01 NOTE — PROGRESS NOTES
Mr. Tierra Glover is feeling better today. He reports nausea. Tolerating diet. Passing gas. Tm 98.6 HR: 69 BP: 136/80 Resp 16 100% sat on room air. Intake/Output Summary (Last 24 hours) at 12/1/2022 1150  Last data filed at 12/1/2022 0525  Gross per 24 hour   Intake 835 ml   Output 450 ml   Net 385 ml   Exam: Cor: RRR. Lungs: Bilateral breath sounds. Clear to auscultation. Abd: Soft. Non tender. No guarding or rebound. No distension. Labs:   Recent Results (from the past 12 hour(s))   METABOLIC PANEL, BASIC    Collection Time: 12/01/22  4:18 AM   Result Value Ref Range    Sodium 138 136 - 145 mmol/L    Potassium 3.3 (L) 3.5 - 5.1 mmol/L    Chloride 103 97 - 108 mmol/L    CO2 33 (H) 21 - 32 mmol/L    Anion gap 2 (L) 5 - 15 mmol/L    Glucose 77 65 - 100 mg/dL    BUN 5 (L) 6 - 20 MG/DL    Creatinine 0.67 (L) 0.70 - 1.30 MG/DL    BUN/Creatinine ratio 7 (L) 12 - 20      eGFR >60 >60 ml/min/1.73m2    Calcium 7.9 (L) 8.5 - 10.1 MG/DL   Mr. Tierra Glover is doing well. Clinically, his SBO has improved. Full liquid diet as tolerated for now. Saline lock IVF. OOB, Ambulate. Replace K 3.3. Labs in AM.  DVT prophylaxis - Lovenox. Plans per Dr. Abhijit Rdz.

## 2022-12-02 LAB
ALBUMIN SERPL-MCNC: 2.3 G/DL (ref 3.5–5)
ALBUMIN/GLOB SERPL: 0.6 {RATIO} (ref 1.1–2.2)
ALP SERPL-CCNC: 121 U/L (ref 45–117)
ALT SERPL-CCNC: 17 U/L (ref 12–78)
ANION GAP SERPL CALC-SCNC: 4 MMOL/L (ref 5–15)
AST SERPL-CCNC: 19 U/L (ref 15–37)
BASOPHILS # BLD: 0 K/UL (ref 0–0.1)
BASOPHILS NFR BLD: 0 % (ref 0–1)
BILIRUB DIRECT SERPL-MCNC: 0.1 MG/DL (ref 0–0.2)
BILIRUB SERPL-MCNC: 0.6 MG/DL (ref 0.2–1)
BUN SERPL-MCNC: 6 MG/DL (ref 6–20)
BUN/CREAT SERPL: 8 (ref 12–20)
CALCIUM SERPL-MCNC: 8.6 MG/DL (ref 8.5–10.1)
CHLORIDE SERPL-SCNC: 102 MMOL/L (ref 97–108)
CO2 SERPL-SCNC: 31 MMOL/L (ref 21–32)
CREAT SERPL-MCNC: 0.75 MG/DL (ref 0.7–1.3)
DIFFERENTIAL METHOD BLD: ABNORMAL
EOSINOPHIL # BLD: 0.1 K/UL (ref 0–0.4)
EOSINOPHIL NFR BLD: 1 % (ref 0–7)
ERYTHROCYTE [DISTWIDTH] IN BLOOD BY AUTOMATED COUNT: 15.5 % (ref 11.5–14.5)
GLOBULIN SER CALC-MCNC: 3.8 G/DL (ref 2–4)
GLUCOSE SERPL-MCNC: 65 MG/DL (ref 65–100)
HCT VFR BLD AUTO: 29.9 % (ref 36.6–50.3)
HGB BLD-MCNC: 10 G/DL (ref 12.1–17)
IMM GRANULOCYTES # BLD AUTO: 0 K/UL
IMM GRANULOCYTES NFR BLD AUTO: 0 %
LYMPHOCYTES # BLD: 1.4 K/UL (ref 0.8–3.5)
LYMPHOCYTES NFR BLD: 16 % (ref 12–49)
MAGNESIUM SERPL-MCNC: 1.6 MG/DL (ref 1.6–2.4)
MCH RBC QN AUTO: 29.7 PG (ref 26–34)
MCHC RBC AUTO-ENTMCNC: 33.4 G/DL (ref 30–36.5)
MCV RBC AUTO: 88.7 FL (ref 80–99)
MONOCYTES # BLD: 0.8 K/UL (ref 0–1)
MONOCYTES NFR BLD: 9 % (ref 5–13)
NEUTS SEG # BLD: 6.4 K/UL (ref 1.8–8)
NEUTS SEG NFR BLD: 74 % (ref 32–75)
NRBC # BLD: 0 K/UL (ref 0–0.01)
NRBC BLD-RTO: 0 PER 100 WBC
PHOSPHATE SERPL-MCNC: 3 MG/DL (ref 2.6–4.7)
PLATELET # BLD AUTO: 279 K/UL (ref 150–400)
PMV BLD AUTO: 11.3 FL (ref 8.9–12.9)
POTASSIUM SERPL-SCNC: 4.6 MMOL/L (ref 3.5–5.1)
PROT SERPL-MCNC: 6.1 G/DL (ref 6.4–8.2)
RBC # BLD AUTO: 3.37 M/UL (ref 4.1–5.7)
RBC MORPH BLD: ABNORMAL
SODIUM SERPL-SCNC: 137 MMOL/L (ref 136–145)
WBC # BLD AUTO: 8.7 K/UL (ref 4.1–11.1)

## 2022-12-02 PROCEDURE — 36415 COLL VENOUS BLD VENIPUNCTURE: CPT

## 2022-12-02 PROCEDURE — 99232 SBSQ HOSP IP/OBS MODERATE 35: CPT | Performed by: SURGERY

## 2022-12-02 PROCEDURE — 83735 ASSAY OF MAGNESIUM: CPT

## 2022-12-02 PROCEDURE — 74011250637 HC RX REV CODE- 250/637: Performed by: SURGERY

## 2022-12-02 PROCEDURE — 80048 BASIC METABOLIC PNL TOTAL CA: CPT

## 2022-12-02 PROCEDURE — 65270000032 HC RM SEMIPRIVATE

## 2022-12-02 PROCEDURE — 74011250636 HC RX REV CODE- 250/636: Performed by: STUDENT IN AN ORGANIZED HEALTH CARE EDUCATION/TRAINING PROGRAM

## 2022-12-02 PROCEDURE — 74011250637 HC RX REV CODE- 250/637: Performed by: STUDENT IN AN ORGANIZED HEALTH CARE EDUCATION/TRAINING PROGRAM

## 2022-12-02 PROCEDURE — 84100 ASSAY OF PHOSPHORUS: CPT

## 2022-12-02 PROCEDURE — 80076 HEPATIC FUNCTION PANEL: CPT

## 2022-12-02 PROCEDURE — 74011250636 HC RX REV CODE- 250/636: Performed by: INTERNAL MEDICINE

## 2022-12-02 PROCEDURE — 74011000250 HC RX REV CODE- 250: Performed by: STUDENT IN AN ORGANIZED HEALTH CARE EDUCATION/TRAINING PROGRAM

## 2022-12-02 PROCEDURE — 85025 COMPLETE CBC W/AUTO DIFF WBC: CPT

## 2022-12-02 RX ORDER — FAMOTIDINE 20 MG/1
20 TABLET, FILM COATED ORAL 2 TIMES DAILY
Status: DISCONTINUED | OUTPATIENT
Start: 2022-12-02 | End: 2022-12-04 | Stop reason: HOSPADM

## 2022-12-02 RX ORDER — MORPHINE SULFATE 2 MG/ML
2 INJECTION, SOLUTION INTRAMUSCULAR; INTRAVENOUS
Status: DISCONTINUED | OUTPATIENT
Start: 2022-12-02 | End: 2022-12-04 | Stop reason: HOSPADM

## 2022-12-02 RX ADMIN — MORPHINE SULFATE 4 MG: 4 INJECTION, SOLUTION INTRAMUSCULAR; INTRAVENOUS at 04:20

## 2022-12-02 RX ADMIN — OXYCODONE HYDROCHLORIDE 10 MG: 5 TABLET ORAL at 02:10

## 2022-12-02 RX ADMIN — METOPROLOL TARTRATE 12.5 MG: 25 TABLET, FILM COATED ORAL at 09:39

## 2022-12-02 RX ADMIN — MORPHINE SULFATE 2 MG: 2 INJECTION, SOLUTION INTRAMUSCULAR; INTRAVENOUS at 21:55

## 2022-12-02 RX ADMIN — METOPROLOL TARTRATE 12.5 MG: 25 TABLET, FILM COATED ORAL at 18:22

## 2022-12-02 RX ADMIN — TAMSULOSIN HYDROCHLORIDE 0.4 MG: 0.4 CAPSULE ORAL at 09:40

## 2022-12-02 RX ADMIN — FINASTERIDE 5 MG: 5 TABLET, FILM COATED ORAL at 09:40

## 2022-12-02 RX ADMIN — PROCHLORPERAZINE EDISYLATE 5 MG: 5 INJECTION INTRAMUSCULAR; INTRAVENOUS at 09:40

## 2022-12-02 RX ADMIN — POTASSIUM CHLORIDE 10 MEQ: 7.46 INJECTION, SOLUTION INTRAVENOUS at 00:40

## 2022-12-02 RX ADMIN — Medication 100 MG: at 09:39

## 2022-12-02 RX ADMIN — MORPHINE SULFATE 4 MG: 4 INJECTION, SOLUTION INTRAMUSCULAR; INTRAVENOUS at 09:41

## 2022-12-02 RX ADMIN — SODIUM CHLORIDE, PRESERVATIVE FREE 10 ML: 5 INJECTION INTRAVENOUS at 04:55

## 2022-12-02 RX ADMIN — ONDANSETRON 4 MG: 2 INJECTION INTRAMUSCULAR; INTRAVENOUS at 21:55

## 2022-12-02 RX ADMIN — PROCHLORPERAZINE EDISYLATE 5 MG: 5 INJECTION INTRAMUSCULAR; INTRAVENOUS at 13:09

## 2022-12-02 RX ADMIN — SODIUM CHLORIDE, POTASSIUM CHLORIDE, SODIUM LACTATE AND CALCIUM CHLORIDE 75 ML/HR: 600; 310; 30; 20 INJECTION, SOLUTION INTRAVENOUS at 13:10

## 2022-12-02 RX ADMIN — SODIUM CHLORIDE, PRESERVATIVE FREE 10 ML: 5 INJECTION INTRAVENOUS at 15:56

## 2022-12-02 RX ADMIN — FOLIC ACID 1 MG: 1 TABLET ORAL at 09:39

## 2022-12-02 RX ADMIN — ONDANSETRON 4 MG: 2 INJECTION INTRAMUSCULAR; INTRAVENOUS at 14:23

## 2022-12-02 RX ADMIN — SODIUM CHLORIDE, PRESERVATIVE FREE 10 ML: 5 INJECTION INTRAVENOUS at 21:55

## 2022-12-02 RX ADMIN — Medication 1 CAPSULE: at 09:40

## 2022-12-02 RX ADMIN — FAMOTIDINE 20 MG: 20 TABLET ORAL at 15:56

## 2022-12-02 RX ADMIN — MIRTAZAPINE 15 MG: 15 TABLET, FILM COATED ORAL at 21:55

## 2022-12-02 RX ADMIN — MORPHINE SULFATE 2 MG: 2 INJECTION, SOLUTION INTRAMUSCULAR; INTRAVENOUS at 15:56

## 2022-12-02 RX ADMIN — OXYCODONE HYDROCHLORIDE 10 MG: 5 TABLET ORAL at 06:50

## 2022-12-02 RX ADMIN — POTASSIUM CHLORIDE 10 MEQ: 7.46 INJECTION, SOLUTION INTRAVENOUS at 02:10

## 2022-12-02 NOTE — BH NOTES
TRANSITION OF CARE:    RUR: 18%  ELOS:  December 2, 2022  PCP Follow up:  on AVS  Disposition:  Home  Transportation: Pt stated that he has his car here, in parking lot and will drive himself home. DME:  rollator    CM met with patient at bedside, and discussed discharge plan with him. Reviewed 2nd IMM with patient who verbalized understanding and signed the form. Pt given the original and copy on chart to be scanned. PT recommended that patient have a rollator and CM discussed patient's choice for provider of DME. He did not have a choice and signed the [address] form. Rollator has been order and will be delivered. CM will confirm with physician if patient can drive himself home at discharge. No barriers to discharge were identified. Care Management Interventions  PCP Verified by CM:  Yes  Palliative Care Criteria Met (RRAT>21 & CHF Dx)?: No  Mode of Transport at Discharge: Self (Pt stated that he will drive himself home)  Transition of Care Consult (CM Consult): Discharge Planning, DME/Supply Assistance  Discharge Durable Medical Equipment: Yes (Rollator ordered)  Physical Therapy Consult: Yes  Occupational Therapy Consult: Yes  Speech Therapy Consult: Yes  Support Systems: Spouse/Significant Other  Confirm Follow Up Transport: Self  The Plan for Transition of Care is Related to the Following Treatment Goals : medical stability  The Patient and/or Patient Representative was Provided with a Choice of Provider and Agrees with the Discharge Plan?: Yes  Freedom of Choice List was Provided with Basic Dialogue that Supports the Patient's Individualized Plan of Care/Goals, Treatment Preferences and Shares the Quality Data Associated with the Providers?: Yes   Resource Information Provided?: No  Discharge Location  Patient Expects to be Discharged to[de-identified] Home with outpatient services

## 2022-12-02 NOTE — PROGRESS NOTES
Care plan reviewed. Problem: Risk for Spread of Infection  Goal: Prevent transmission of infectious organism to others  Description: Prevent the transmission of infectious organisms to other patients, staff members, and visitors. Outcome: Progressing Towards Goal     Problem: Pain  Goal: *Control of Pain  Outcome: Progressing Towards Goal     Problem: Patient Education: Go to Patient Education Activity  Goal: Patient/Family Education  Outcome: Progressing Towards Goal     Problem: Patient Education: Go to Patient Education Activity  Goal: Patient/Family Education  Outcome: Progressing Towards Goal     Problem: Small Bowel Obstruction: Day 3  Goal: Off Pathway (Use only if patient is Off Pathway)  Outcome: Progressing Towards Goal  Goal: Activity/Safety  Outcome: Progressing Towards Goal  Goal: Consults, if ordered  Outcome: Progressing Towards Goal  Goal: Diagnostic Test/Procedures  Outcome: Progressing Towards Goal  Goal: Nutrition/Diet  Outcome: Progressing Towards Goal  Goal: Discharge Planning  Outcome: Progressing Towards Goal  Goal: Medications  Outcome: Progressing Towards Goal  Goal: Respiratory  Outcome: Progressing Towards Goal  Goal: Treatments/Interventions/Procedures  Outcome: Progressing Towards Goal  Goal: Psychosocial  Outcome: Progressing Towards Goal  Goal: *Optimal pain control at patient's stated goal  Outcome: Progressing Towards Goal  Goal: *Adequate urinary output (equal to or greater than 30 milliliters/hour)  Outcome: Progressing Towards Goal  Goal: *Hemodynamically stable  Outcome: Progressing Towards Goal  Goal: *Adequate nutrition  Outcome: Progressing Towards Goal  Goal: *Demonstrates progressive activity  Outcome: Progressing Towards Goal  Goal: *Participates in discharge planning  Outcome: Progressing Towards Goal     Problem: Falls - Risk of  Goal: *Absence of Falls  Description: Document Bk Fall Risk and appropriate interventions in the flowsheet.   Outcome: Progressing Towards Goal  Note: Fall Risk Interventions:  Mobility Interventions: Assess mobility with egress test         Medication Interventions: Assess postural VS orthostatic hypotension                   Problem: Patient Education: Go to Patient Education Activity  Goal: Patient/Family Education  Outcome: Progressing Towards Goal

## 2022-12-02 NOTE — PROGRESS NOTES
TRANSITION OF CARE:    RUR: 18%  ELOS: 1 to 2 days  Disposition:  Home  Transportation:  Will drive himself home. PCP and Urology follow up on AVS    CM asked patient about home health care for physical therapy and he stated emphatically that he does not want it.

## 2022-12-02 NOTE — PROGRESS NOTES
[facility] tells me that he has moved his bowels. However, still c/o nausea and vomiting. Tm 98.6 Tc 98 HR: 58 BP: 157/90 Resp Rate: 18 100% sat on room air. Intake/Output Summary (Last 24 hours) at 12/2/2022 1521  Last data filed at 12/2/2022 0445  Gross per 24 hour   Intake 475 ml   Output 1675 ml   Net -1200 ml   Exam: Cor: RRR. Lungs: Bilateral breath sounds. Clear to auscultation. Abd: Soft. Tender. No associated guarding or rebound. Still somewhat distended. Labs:   Recent Results (from the past 12 hour(s))   CBC WITH AUTOMATED DIFF    Collection Time: 12/02/22  4:50 AM   Result Value Ref Range    WBC 8.7 4.1 - 11.1 K/uL    RBC 3.37 (L) 4.10 - 5.70 M/uL    HGB 10.0 (L) 12.1 - 17.0 g/dL    HCT 29.9 (L) 36.6 - 50.3 %    MCV 88.7 80.0 - 99.0 FL    MCH 29.7 26.0 - 34.0 PG    MCHC 33.4 30.0 - 36.5 g/dL    RDW 15.5 (H) 11.5 - 14.5 %    PLATELET 026 959 - 879 K/uL    MPV 11.3 8.9 - 12.9 FL    NRBC 0.0 0  WBC    ABSOLUTE NRBC 0.00 0.00 - 0.01 K/uL    NEUTROPHILS 74 32 - 75 %    LYMPHOCYTES 16 12 - 49 %    MONOCYTES 9 5 - 13 %    EOSINOPHILS 1 0 - 7 %    BASOPHILS 0 0 - 1 %    IMMATURE GRANULOCYTES 0 %    ABS. NEUTROPHILS 6.4 1.8 - 8.0 K/UL    ABS. LYMPHOCYTES 1.4 0.8 - 3.5 K/UL    ABS. MONOCYTES 0.8 0.0 - 1.0 K/UL    ABS. EOSINOPHILS 0.1 0.0 - 0.4 K/UL    ABS. BASOPHILS 0.0 0.0 - 0.1 K/UL    ABS. IMM.  GRANS. 0.0 K/UL    DF SMEAR SCANNED      RBC COMMENTS NORMOCYTIC, NORMOCHROMIC     METABOLIC PANEL, BASIC    Collection Time: 12/02/22  4:50 AM   Result Value Ref Range    Sodium 137 136 - 145 mmol/L    Potassium 4.6 3.5 - 5.1 mmol/L    Chloride 102 97 - 108 mmol/L    CO2 31 21 - 32 mmol/L    Anion gap 4 (L) 5 - 15 mmol/L    Glucose 65 65 - 100 mg/dL    BUN 6 6 - 20 MG/DL    Creatinine 0.75 0.70 - 1.30 MG/DL    BUN/Creatinine ratio 8 (L) 12 - 20      eGFR >60 >60 ml/min/1.73m2    Calcium 8.6 8.5 - 10.1 MG/DL   MAGNESIUM    Collection Time: 12/02/22  4:50 AM   Result Value Ref Range    Magnesium 1.6 1.6 - 2.4 mg/dL   PHOSPHORUS    Collection Time: 12/02/22  4:50 AM   Result Value Ref Range    Phosphorus 3.0 2.6 - 4.7 MG/DL   HEPATIC FUNCTION PANEL    Collection Time: 12/02/22  4:50 AM   Result Value Ref Range    Protein, total 6.1 (L) 6.4 - 8.2 g/dL    Albumin 2.3 (L) 3.5 - 5.0 g/dL    Globulin 3.8 2.0 - 4.0 g/dL    A-G Ratio 0.6 (L) 1.1 - 2.2      Bilirubin, total 0.6 0.2 - 1.0 MG/DL    Bilirubin, direct 0.1 0.0 - 0.2 MG/DL    Alk. phosphatase 121 (H) 45 - 117 U/L    AST (SGOT) 19 15 - 37 U/L    ALT (SGPT) 17 12 - 78 U/L   Full liquid diet as tolerated for now. Continue IVF - decrease rate to 50 ml/hour. Will check gastrograffin UGI.   OOB, Ambulate. Anti-emetics and pain medication as needed. Will add Pepcid 20 mg BID. DVT prophylaxis - Lovenox. Plans per Hospitalists.

## 2022-12-02 NOTE — PROGRESS NOTES
Patient complained of nausea after eating tomato based soup. Compazine given. About 1.5 hrs later, other RNS in room, reported that patient vomited on trashcan, IV Zofran was given at this time. Order was placed to dietary to refrain giving acid based food.

## 2022-12-02 NOTE — PROGRESS NOTES
1930  Shift change report received from Piotr Rodriguez. Report included review of SBAR, accordion report, results review, orders, meds, ROS, and POC. All questions answered. Transfer of care complete.

## 2022-12-02 NOTE — PROGRESS NOTES
TRANSITION OF CARE    RUR:  18%  ELOS:  12/2/2022  Disposition:  Home  Transportation:  Pt to drive self home  DME:  rollator to be delivered to hospital prior to discharge  PCP and Urology follow up on AVS.    CM verified with Danni Leon [phone]) at [facility] [employer] ([address]) that patient's rollator will be delivered today. Stated that nursing has already been contacted. CM informed Zuri Steen RN that rollator will be delivered prior to discharge.

## 2022-12-02 NOTE — PROGRESS NOTES
[facility]  Hospitalist Group                                                                                          Hospitalist Progress Note  Stefano Bonds MD  Answering service: [phone] OR [phone] from in house phone        Date of Service:  2022  NAME:  Zulay Pope  :  1965  MRN:  [id number]   Room Number:  699/34  @ [facility]         Admission Summary:   Zulay Pope is a 59 y.o.  male with PMH  who presents to ED with c/o abdominal pain with nausea vomiting diarrhea. Patient states symptoms are lasting for past 2 to 3 days. .  Patient states that he has frequent episodes of the symptoms every month or so and no one can tell him why. Patient had surgery for rectal cancer and had radiation therapy which caused scarring. Patient endorsed having 10-12 bowel movements a day for past 3 days yesterday started having nausea vomiting. Endorsed pain as dull and aching pain does not radiate. Interval history / Subjective:   NAD,  no acute event over night  + weak  + nausea and vomiting after meals  General surgery input appreciated     Assessment & Plan:     Anticipated discharge date : 24-48 hrs  Anticipated disposition : home with [facility]  Barriers to discharge : clinical improvement         Active Problems:    SBO (small bowel obstruction) ([address] (2022)           Nausea vomiting diarrhea POA, improved  Abdominal pain POA, subsided  Recurrent small bowel obstruction POA, improved, off NG,  History of rectal cancer s/p rectosigmoid resection  -CT abdomen pelvis reviewed independently with dilated fluid and air-filled small bowel loops consistent with small bowel obstruction. Sequela of chronic pancreatitis.   S/p rectosigmoid resection  -General surgery on board appreciate help  Off NG, diet was initiated, pt has nausea and vomiting after meals     -Maintenance fluid LR  -Pain management  -Follow general surgery recommendations        History of BPH with UPJ obstruction s/p Marrero  -Continue Marrero care  Pt will need to follow up urologist for Marrero cath change   Flomax           History of hypertension, controlled  Continue home medications BB, metoprolol      History of mood disorder  -continue home regimen Remeron        Body mass index is 15.25 kg/m². Code Status: Full   Surrogate Decision Maker:     DVT Prophylaxis: Lovenox  GI Prophylaxis: not indicated  Care Plan discussed with: patient and RN  Anticipated Disposition: home in 1-2 days if able to tolerate PO diet and N/V resolved, will need [address] Problems  Date Reviewed: 11/29/2022            Codes Class Noted POA    SBO (small bowel obstruction) ([facility] ICD-10-CM: O42.381  ICD-9-CM: 560.9  11/29/2022 Unknown             Review of Systems:   A comprehensive review of systems was negative except for that written in the HPI. Vital Signs:    Last 24hrs VS reviewed since prior progress note. Most recent are:  Visit Vitals  BP (!) 157/90 (BP 1 Location: Right upper arm, BP Patient Position: At rest)   Pulse (!) 58   Temp 98 °F (36.7 °C)   Resp 18   Ht 5' 8\" (1.727 m)   Wt 45.5 kg (100 lb 5 oz)   SpO2 100%   BMI 15.25 kg/m²         Intake/Output Summary (Last 24 hours) at 12/2/2022 1530  Last data filed at 12/2/2022 0445  Gross per 24 hour   Intake 475 ml   Output 1675 ml   Net -1200 ml        Physical Examination:     I had a face to face encounter with this patient and independently examined them on 12/2/2022 as outlined below:          Constitutional:  No acute distress, cooperative, pleasant, + nausea and vomiting   ENT:  Oral mucosa moist, oropharynx benign. Resp:  Coarse bilaterally. No wheezing/rhonchi/rales. No accessory muscle use.     CV:  Regular rhythm, normal rate, no murmurs, gallops, rubs    GI:  Soft, no distended, slight tender, NRT, normoactive bowel sounds,     Musculoskeletal:  No edema, warm, 2+ pulses throughout    Neurologic:  Moves all extremities. AAOx3, CN II-XII reviewed  Port-a-cath Right upper chest            Data Review:    Review and/or order of clinical lab test      Labs:     Recent Labs     12/02/22 0450 11/30/22 0154   WBC 8.7 7.6   HGB 10.0* 8.7*   HCT 29.9* 25.6*    197     Recent Labs     12/02/22 0450 12/01/22 0418 11/30/22 0154    138 136   K 4.6 3.3* 3.2*    103 103   CO2 31 33* 29   BUN 6 5* 6   CREA 0.75 0.67* 0.64*   GLU 65 77 105*   CA 8.6 7.9* 7.9*   MG 1.6  --   --    PHOS 3.0  --   --      Recent Labs     12/02/22 0450 11/30/22 0154   ALT 17 15   * 95   TBILI 0.6 0.4   TP 6.1* 5.5*   ALB 2.3* 2.2*   GLOB 3.8 3.3     No results for input(s): INR, PTP, APTT, INREXT in the last 72 hours. No results for input(s): FE, TIBC, PSAT, FERR in the last 72 hours. Lab Results   Component Value Date/Time    Folate 41.4 (H) 09/11/2022 10:49 AM      No results for input(s): PH, PCO2, PO2 in the last 72 hours. No results for input(s): CPK, CKNDX, TROIQ in the last 72 hours.     No lab exists for component: CPKMB  Lab Results   Component Value Date/Time    Cholesterol, total 147 01/02/2019 04:00 AM    HDL Cholesterol 66 01/02/2019 04:00 AM    LDL, calculated 65.6 01/02/2019 04:00 AM    Triglyceride 77 01/02/2019 04:00 AM    CHOL/HDL Ratio 2.2 01/02/2019 04:00 AM     No results found for: GLUCPOC  Lab Results   Component Value Date/Time    Color DARK YELLOW 09/09/2022 12:45 PM    Appearance TURBID (A) 09/09/2022 12:45 PM    Specific gravity 1.025 09/09/2022 12:45 PM    Specific gravity 1.010 09/03/2021 10:01 PM    pH (UA) 6.0 09/09/2022 12:45 PM    Protein 300 (A) 09/09/2022 12:45 PM    Glucose Negative 09/09/2022 12:45 PM    Ketone TRACE (A) 09/09/2022 12:45 PM    Bilirubin Negative 06/03/2022 04:57 AM    Urobilinogen 1.0 09/09/2022 12:45 PM    Nitrites Positive (A) 09/09/2022 12:45 PM    Leukocyte Esterase LARGE (A) 09/09/2022 12:45 PM    Epithelial cells FEW 09/09/2022 12:45 PM    Bacteria 2+ (A) 09/09/2022 12:45 PM    WBC >100 (H) 09/09/2022 12:45 PM    RBC >100 (H) 09/09/2022 12:45 PM         Medications Reviewed:     Current Facility-Administered Medications   Medication Dose Route Frequency    morphine injection 2 mg  2 mg IntraVENous Q4H PRN    famotidine (PEPCID) tablet 20 mg  20 mg Oral BID    phenol throat spray (CHLORASEPTIC) 1 Spray  1 Spray Oral PRN    lactated Ringers infusion  50 mL/hr IntraVENous CONTINUOUS    ondansetron (ZOFRAN) injection 4 mg  4 mg IntraVENous Q6H PRN    prochlorperazine (COMPAZINE) injection 5 mg  5 mg IntraVENous Q3H PRN    [Held by provider] mylanta/viscous lidocaine (GI COCKTAIL)  40 mL Oral BID    finasteride (PROSCAR) tablet 5 mg  5 mg Oral DAILY    mirtazapine (REMERON) tablet 15 mg  15 mg Oral QHS    tamsulosin (FLOMAX) capsule 0.4 mg  0.4 mg Oral DAILY    thiamine HCL (B-1) tablet 100 mg  100 mg Oral DAILY    folic acid (FOLVITE) tablet 1 mg  1 mg Oral DAILY    metoprolol tartrate (LOPRESSOR) tablet 12.5 mg  12.5 mg Oral BID    L.acidophilus-paracasei-S.thermophil-bifidobacter (RISAQUAD) 8 billion cell capsule  1 Capsule Oral DAILY    sodium chloride (NS) flush 5-40 mL  5-40 mL IntraVENous Q8H    sodium chloride (NS) flush 5-40 mL  5-40 mL IntraVENous PRN    polyethylene glycol (MIRALAX) packet 17 g  17 g Oral DAILY PRN    enoxaparin (LOVENOX) injection 30 mg  30 mg SubCUTAneous DAILY    labetaloL (NORMODYNE;TRANDATE) 20 mg/4 mL (5 mg/mL) injection 15 mg  15 mg IntraVENous Q3H PRN    nicotine (NICODERM CQ) 21 mg/24 hr patch 1 Patch  1 Patch TransDERmal DAILY    acetaminophen (TYLENOL) tablet 1,000 mg  1,000 mg Oral Q6H PRN    oxyCODONE IR (ROXICODONE) tablet 5 mg  5 mg Oral Q4H PRN    oxyCODONE IR (ROXICODONE) tablet 10 mg  10 mg Oral Q4H PRN     ______________________________________________________________________  EXPECTED LENGTH OF STAY: 3d 4h  ACTUAL LENGTH OF STAY:          3                 Nyra Pro, MD

## 2022-12-02 NOTE — PROGRESS NOTES
Physical therapy:     Chart reviewed as PT reconsulted. Patient seen by PT on 11/30; independent to supervision with all mobility and steady gait noted. Patient declining PT secondary to nauseated and vomiting today. Patient's rollator walker was delivered. Recommend ambulating with nursing and rollator.      Carmen Hare, PT

## 2022-12-03 PROCEDURE — 74011250636 HC RX REV CODE- 250/636: Performed by: INTERNAL MEDICINE

## 2022-12-03 PROCEDURE — 74011250637 HC RX REV CODE- 250/637: Performed by: SURGERY

## 2022-12-03 PROCEDURE — 74011250636 HC RX REV CODE- 250/636: Performed by: STUDENT IN AN ORGANIZED HEALTH CARE EDUCATION/TRAINING PROGRAM

## 2022-12-03 PROCEDURE — 65270000032 HC RM SEMIPRIVATE

## 2022-12-03 PROCEDURE — 74011250637 HC RX REV CODE- 250/637: Performed by: STUDENT IN AN ORGANIZED HEALTH CARE EDUCATION/TRAINING PROGRAM

## 2022-12-03 PROCEDURE — 74011000250 HC RX REV CODE- 250: Performed by: STUDENT IN AN ORGANIZED HEALTH CARE EDUCATION/TRAINING PROGRAM

## 2022-12-03 RX ADMIN — ENOXAPARIN SODIUM 30 MG: 100 INJECTION SUBCUTANEOUS at 08:08

## 2022-12-03 RX ADMIN — FAMOTIDINE 20 MG: 20 TABLET ORAL at 08:08

## 2022-12-03 RX ADMIN — SODIUM CHLORIDE, PRESERVATIVE FREE 10 ML: 5 INJECTION INTRAVENOUS at 22:04

## 2022-12-03 RX ADMIN — ONDANSETRON 4 MG: 2 INJECTION INTRAMUSCULAR; INTRAVENOUS at 13:25

## 2022-12-03 RX ADMIN — MORPHINE SULFATE 2 MG: 2 INJECTION, SOLUTION INTRAMUSCULAR; INTRAVENOUS at 17:46

## 2022-12-03 RX ADMIN — MORPHINE SULFATE 2 MG: 2 INJECTION, SOLUTION INTRAMUSCULAR; INTRAVENOUS at 08:08

## 2022-12-03 RX ADMIN — SODIUM CHLORIDE, PRESERVATIVE FREE 10 ML: 5 INJECTION INTRAVENOUS at 13:28

## 2022-12-03 RX ADMIN — OXYCODONE HYDROCHLORIDE 10 MG: 5 TABLET ORAL at 06:00

## 2022-12-03 RX ADMIN — FOLIC ACID 1 MG: 1 TABLET ORAL at 08:09

## 2022-12-03 RX ADMIN — FAMOTIDINE 20 MG: 20 TABLET ORAL at 17:04

## 2022-12-03 RX ADMIN — FINASTERIDE 5 MG: 5 TABLET, FILM COATED ORAL at 08:08

## 2022-12-03 RX ADMIN — SODIUM CHLORIDE, PRESERVATIVE FREE 10 ML: 5 INJECTION INTRAVENOUS at 06:00

## 2022-12-03 RX ADMIN — SODIUM CHLORIDE, PRESERVATIVE FREE 10 ML: 5 INJECTION INTRAVENOUS at 13:27

## 2022-12-03 RX ADMIN — OXYCODONE HYDROCHLORIDE 10 MG: 5 TABLET ORAL at 00:19

## 2022-12-03 RX ADMIN — ONDANSETRON 4 MG: 2 INJECTION INTRAMUSCULAR; INTRAVENOUS at 08:08

## 2022-12-03 RX ADMIN — Medication 100 MG: at 08:09

## 2022-12-03 RX ADMIN — TAMSULOSIN HYDROCHLORIDE 0.4 MG: 0.4 CAPSULE ORAL at 08:08

## 2022-12-03 RX ADMIN — Medication 1 CAPSULE: at 09:00

## 2022-12-03 RX ADMIN — OXYCODONE HYDROCHLORIDE 10 MG: 5 TABLET ORAL at 22:03

## 2022-12-03 RX ADMIN — MIRTAZAPINE 15 MG: 15 TABLET, FILM COATED ORAL at 22:04

## 2022-12-03 RX ADMIN — METOPROLOL TARTRATE 12.5 MG: 25 TABLET, FILM COATED ORAL at 08:09

## 2022-12-03 RX ADMIN — METOPROLOL TARTRATE 12.5 MG: 25 TABLET, FILM COATED ORAL at 17:04

## 2022-12-03 RX ADMIN — MORPHINE SULFATE 2 MG: 2 INJECTION, SOLUTION INTRAMUSCULAR; INTRAVENOUS at 13:25

## 2022-12-03 NOTE — PROGRESS NOTES
Problem: Risk for Spread of Infection  Goal: Prevent transmission of infectious organism to others  Description: Prevent the transmission of infectious organisms to other patients, staff members, and visitors.   Outcome: Progressing Towards Goal     Problem: Pain  Goal: *Control of Pain  Outcome: Progressing Towards Goal

## 2022-12-03 NOTE — PROGRESS NOTES
1950  pt called out for assistance, this RN in at bedside. Shift start VS and assessment completed, white board updated. Pt informed that this RN due for report from dayshift RN for updates, pt denies any additional needs. 2010  Shift change report received from [address]. Report included review of SBAR, accordion report, results review, orders, meds, ROS, and POC. All questions answered. Transfer of care complete.

## 2022-12-03 NOTE — PROGRESS NOTES
Bedside and Verbal shift change report given to Ruby Hull RN (oncoming nurse) by Sweetie Acosta RN (offgoing nurse). Report included the following information SBAR, Kardex, Intake/Output, MAR and Recent Results.

## 2022-12-03 NOTE — PROGRESS NOTES
Care plan reviewed  Problem: Risk for Spread of Infection  Goal: Prevent transmission of infectious organism to others  Description: Prevent the transmission of infectious organisms to other patients, staff members, and visitors. Outcome: Progressing Towards Goal     Problem: Pain  Goal: *Control of Pain  Outcome: Progressing Towards Goal     Problem: Patient Education: Go to Patient Education Activity  Goal: Patient/Family Education  Outcome: Progressing Towards Goal     Problem: Patient Education: Go to Patient Education Activity  Goal: Patient/Family Education  Outcome: Progressing Towards Goal     Problem: Small Bowel Obstruction: Day 3  Goal: Off Pathway (Use only if patient is Off Pathway)  Outcome: Progressing Towards Goal  Goal: Activity/Safety  Outcome: Progressing Towards Goal  Goal: Consults, if ordered  Outcome: Progressing Towards Goal  Goal: Diagnostic Test/Procedures  Outcome: Progressing Towards Goal  Goal: Nutrition/Diet  Outcome: Progressing Towards Goal  Goal: Discharge Planning  Outcome: Progressing Towards Goal  Goal: Medications  Outcome: Progressing Towards Goal  Goal: Respiratory  Outcome: Progressing Towards Goal  Goal: Treatments/Interventions/Procedures  Outcome: Progressing Towards Goal  Goal: Psychosocial  Outcome: Progressing Towards Goal  Goal: *Optimal pain control at patient's stated goal  Outcome: Progressing Towards Goal  Goal: *Adequate urinary output (equal to or greater than 30 milliliters/hour)  Outcome: Progressing Towards Goal  Goal: *Hemodynamically stable  Outcome: Progressing Towards Goal  Goal: *Adequate nutrition  Outcome: Progressing Towards Goal  Goal: *Demonstrates progressive activity  Outcome: Progressing Towards Goal  Goal: *Participates in discharge planning  Outcome: Progressing Towards Goal     Problem: Falls - Risk of  Goal: *Absence of Falls  Description: Document Bk Fall Risk and appropriate interventions in the flowsheet.   Outcome: Progressing Towards Goal  Note: Fall Risk Interventions:  Mobility Interventions: Communicate number of staff needed for ambulation/transfer         Medication Interventions: Evaluate medications/consider consulting pharmacy, Patient to call before getting OOB, Teach patient to arise slowly    Elimination Interventions: Call light in reach, Patient to call for help with toileting needs, Urinal in reach              Problem: Patient Education: Go to Patient Education Activity  Goal: Patient/Family Education  Outcome: Progressing Towards Goal

## 2022-12-03 NOTE — PROGRESS NOTES
6706  Patient medicated with 2mg morphine IV and 4mg zofran IV for abdominal pain and nausea. 0930  Patient out of bed to [facility] multiple times for liquid BM's. No vomiting noted. 1325  2mg morphine IV and 4mg zofran IV given for abdominal pain/nausea. No vomiting noted. Patient having multiple liquid BM's in [facility].    1450  Patient advanced to regular bland diet. No vomiting noted. 1746  2mg morphine IV given for abdominal pain. Multiple liquid BM's over the  course of the shift. No vomiting noted.

## 2022-12-03 NOTE — PROGRESS NOTES
[facility]  Hospitalist Group                                                                                          Hospitalist Progress Note  Medardo Medina MD  Answering service: [phone] or [phone] from in house phone        Date of Service:  12/3/2022  NAME:  Dariela Hoang  :  1965  MRN:  [id number]   Room Number:  206  @ [facility]         Admission Summary:   Dariela Hoang is a 59 y.o.  male with PMH  who presents to ED with c/o abdominal pain with nausea vomiting diarrhea. Patient states symptoms are lasting for past 2 to 3 days. .  Patient states that he has frequent episodes of the symptoms every month or so and no one can tell him why. Patient had surgery for rectal cancer and had radiation therapy which caused scarring. Patient endorsed having 10-12 bowel movements a day for past 3 days yesterday started having nausea vomiting. Endorsed pain as dull and aching pain does not radiate. Interval history / Subjective:   NAD,  no acute event over night  No nausea and vomiting since am,  General surgery input appreciated  Requested SBFT, but study can not be done over weekend  Able to tolerate PO, wants diet to be adjusted  Able to ambulate with FWW`  + BM     Assessment & Plan:     Anticipated discharge date : 24-48 hrs  Anticipated disposition : home with [facility]  Barriers to discharge : clinical improvement         Active Problems:    SBO (small bowel obstruction) ([address] (2022)        Nausea vomiting diarrhea POA,   Abdominal pain POA, subsided  Recurrent small bowel obstruction POA, improved, off NG,  History of rectal cancer s/p rectosigmoid resection  -CT abdomen pelvis reviewed independently with dilated fluid and air-filled small bowel loops consistent with small bowel obstruction. Sequela of chronic pancreatitis.   S/p rectosigmoid resection  -General surgery consulted, input appreciated  Off NG, diet was initiated, pt had nausea and vomiting after meals, resolved  Pt requested diet adjustment, will change to bland diet     -Maintenance fluid LR  -Pain management  -Follow general surgery recommendations        History of BPH with UPJ obstruction s/p Marrero  -Continue Marrero care  Pt will need to follow up urologist for Marrero cath change   Continue Flomax           History of hypertension, controlled  Continue home medications BB, metoprolol      History of mood disorder  -continue home regimen Remeron        Body mass index is 15.25 kg/m². Code Status: Full   Surrogate Decision Maker:     DVT Prophylaxis: Lovenox  GI Prophylaxis: not indicated  Care Plan discussed with: patient and RN  Anticipated Disposition: home tomorrow if able to tolerate PO diet and N/V resolved, will need [address] Problems  Date Reviewed: 11/29/2022            Codes Class Noted POA    SBO (small bowel obstruction) ([facility] ICD-10-CM: C05.897  ICD-9-CM: 560.9  11/29/2022 Unknown           Review of Systems:   A comprehensive review of systems was negative except for that written in the HPI. Vital Signs:    Last 24hrs VS reviewed since prior progress note. Most recent are:  Visit Vitals  /69 (BP 1 Location: Left upper arm, BP Patient Position: At rest)   Pulse 80   Temp 97.8 °F (36.6 °C)   Resp 20   Ht 5' 8\" (1.727 m)   Wt 45.5 kg (100 lb 5 oz)   SpO2 99%   BMI 15.25 kg/m²         Intake/Output Summary (Last 24 hours) at 12/3/2022 1644  Last data filed at 12/3/2022 0700  Gross per 24 hour   Intake 1570 ml   Output 800 ml   Net 770 ml          Physical Examination:     I had a face to face encounter with this patient and independently examined them on 12/3/2022 as outlined below:          Constitutional:  No acute distress, cooperative, pleasant, + nausea and vomiting   ENT:  Oral mucosa moist, oropharynx benign. Resp:  Coarse bilaterally. No wheezing/rhonchi/rales. No accessory muscle use. CV:  Regular rhythm, normal rate, no murmurs, gallops, rubs    GI:  Soft, no distended, slight tender, NRT, normoactive bowel sounds,     Musculoskeletal:  No edema, warm, 2+ pulses throughout    Neurologic:  Moves all extremities. AAOx3, CN II-XII reviewed  Port-a-cath Right upper chest            Data Review:    Review and/or order of clinical lab test      Labs:     Recent Labs     12/02/22 0450   WBC 8.7   HGB 10.0*   HCT 29.9*          Recent Labs     12/02/22 0450 12/01/22 0418    138   K 4.6 3.3*    103   CO2 31 33*   BUN 6 5*   CREA 0.75 0.67*   GLU 65 77   CA 8.6 7.9*   MG 1.6  --    PHOS 3.0  --        Recent Labs     12/02/22 0450   ALT 17   *   TBILI 0.6   TP 6.1*   ALB 2.3*   GLOB 3.8       No results for input(s): INR, PTP, APTT, INREXT, INREXT in the last 72 hours. No results for input(s): FE, TIBC, PSAT, FERR in the last 72 hours. Lab Results   Component Value Date/Time    Folate 41.4 (H) 09/11/2022 10:49 AM        No results for input(s): PH, PCO2, PO2 in the last 72 hours. No results for input(s): CPK, CKNDX, TROIQ in the last 72 hours.     No lab exists for component: CPKMB  Lab Results   Component Value Date/Time    Cholesterol, total 147 01/02/2019 04:00 AM    HDL Cholesterol 66 01/02/2019 04:00 AM    LDL, calculated 65.6 01/02/2019 04:00 AM    Triglyceride 77 01/02/2019 04:00 AM    CHOL/HDL Ratio 2.2 01/02/2019 04:00 AM     No results found for: GLUCPOC  Lab Results   Component Value Date/Time    Color DARK YELLOW 09/09/2022 12:45 PM    Appearance TURBID (A) 09/09/2022 12:45 PM    Specific gravity 1.025 09/09/2022 12:45 PM    Specific gravity 1.010 09/03/2021 10:01 PM    pH (UA) 6.0 09/09/2022 12:45 PM    Protein 300 (A) 09/09/2022 12:45 PM    Glucose Negative 09/09/2022 12:45 PM    Ketone TRACE (A) 09/09/2022 12:45 PM    Bilirubin Negative 06/03/2022 04:57 AM    Urobilinogen 1.0 09/09/2022 12:45 PM    Nitrites Positive (A) 09/09/2022 12:45 PM    Leukocyte Esterase LARGE (A) 09/09/2022 12:45 PM    Epithelial cells FEW 09/09/2022 12:45 PM    Bacteria 2+ (A) 09/09/2022 12:45 PM    WBC >100 (H) 09/09/2022 12:45 PM    RBC >100 (H) 09/09/2022 12:45 PM         Medications Reviewed:     Current Facility-Administered Medications   Medication Dose Route Frequency    morphine injection 2 mg  2 mg IntraVENous Q4H PRN    famotidine (PEPCID) tablet 20 mg  20 mg Oral BID    phenol throat spray (CHLORASEPTIC) 1 Spray  1 Spray Oral PRN    lactated Ringers infusion  50 mL/hr IntraVENous CONTINUOUS    ondansetron (ZOFRAN) injection 4 mg  4 mg IntraVENous Q6H PRN    prochlorperazine (COMPAZINE) injection 5 mg  5 mg IntraVENous Q3H PRN    [Held by provider] mylanta/viscous lidocaine (GI COCKTAIL)  40 mL Oral BID    finasteride (PROSCAR) tablet 5 mg  5 mg Oral DAILY    mirtazapine (REMERON) tablet 15 mg  15 mg Oral QHS    tamsulosin (FLOMAX) capsule 0.4 mg  0.4 mg Oral DAILY    thiamine HCL (B-1) tablet 100 mg  100 mg Oral DAILY    folic acid (FOLVITE) tablet 1 mg  1 mg Oral DAILY    metoprolol tartrate (LOPRESSOR) tablet 12.5 mg  12.5 mg Oral BID    L.acidophilus-paracasei-S.thermophil-bifidobacter (RISAQUAD) 8 billion cell capsule  1 Capsule Oral DAILY    sodium chloride (NS) flush 5-40 mL  5-40 mL IntraVENous Q8H    sodium chloride (NS) flush 5-40 mL  5-40 mL IntraVENous PRN    polyethylene glycol (MIRALAX) packet 17 g  17 g Oral DAILY PRN    enoxaparin (LOVENOX) injection 30 mg  30 mg SubCUTAneous DAILY    labetaloL (NORMODYNE;TRANDATE) 20 mg/4 mL (5 mg/mL) injection 15 mg  15 mg IntraVENous Q3H PRN    nicotine (NICODERM CQ) 21 mg/24 hr patch 1 Patch  1 Patch TransDERmal DAILY    acetaminophen (TYLENOL) tablet 1,000 mg  1,000 mg Oral Q6H PRN    oxyCODONE IR (ROXICODONE) tablet 5 mg  5 mg Oral Q4H PRN    oxyCODONE IR (ROXICODONE) tablet 10 mg  10 mg Oral Q4H PRN     ______________________________________________________________________  EXPECTED LENGTH OF STAY: 3d 4h  ACTUAL LENGTH OF STAY:          4                 Nancy Carpenter MD

## 2022-12-03 NOTE — PROGRESS NOTES
Problem: Risk for Spread of Infection  Goal: Prevent transmission of infectious organism to others  Description: Prevent the transmission of infectious organisms to other patients, staff members, and visitors.   Outcome: Progressing Towards Goal     Problem: Patient Education:  Go to Education Activity  Goal: Patient/Family Education  Outcome: Progressing Towards Goal     Problem: Pain  Goal: *Control of Pain  Outcome: Progressing Towards Goal     Problem: Patient Education: Go to Patient Education Activity  Goal: Patient/Family Education  Outcome: Progressing Towards Goal     Problem: Patient Education: Go to Patient Education Activity  Goal: Patient/Family Education  Outcome: Progressing Towards Goal     Problem: Small Bowel Obstruction: Day 1  Goal: Off Pathway (Use only if patient is Off Pathway)  Outcome: Progressing Towards Goal  Goal: Activity/Safety  Outcome: Progressing Towards Goal  Goal: Consults, if ordered  Outcome: Progressing Towards Goal  Goal: Diagnostic Test/Procedures  Outcome: Progressing Towards Goal  Goal: Nutrition/Diet  Outcome: Progressing Towards Goal  Goal: Medications  Outcome: Progressing Towards Goal  Goal: Respiratory  Outcome: Progressing Towards Goal  Goal: Treatments/Interventions/Procedures  Outcome: Progressing Towards Goal  Goal: Psychosocial  Outcome: Progressing Towards Goal  Goal: *Optimal pain control at patient's stated goal  Outcome: Progressing Towards Goal  Goal: *Adequate urinary output (equal to or greater than 30 milliliters/hour)  Outcome: Progressing Towards Goal  Goal: *Hemodynamically stable  Outcome: Progressing Towards Goal  Goal: *Demonstrates progressive activity  Outcome: Progressing Towards Goal  Goal: *Absence of nausea/vomiting  Outcome: Progressing Towards Goal     Problem: Small Bowel Obstruction: Day 2  Goal: Off Pathway (Use only if patient is Off Pathway)  Outcome: Progressing Towards Goal  Goal: Activity/Safety  Outcome: Progressing Towards Goal  Goal: Consults, if ordered  Outcome: Progressing Towards Goal  Goal: Diagnostic Test/Procedures  Outcome: Progressing Towards Goal  Goal: Nutrition/Diet  Outcome: Progressing Towards Goal  Goal: Discharge Planning  Outcome: Progressing Towards Goal  Goal: Medications  Outcome: Progressing Towards Goal  Goal: Respiratory  Outcome: Progressing Towards Goal  Goal: Treatments/Interventions/Procedures  Outcome: Progressing Towards Goal  Goal: Psychosocial  Outcome: Progressing Towards Goal  Goal: *Optimal pain control at patient's stated goal  Outcome: Progressing Towards Goal  Goal: *Adequate urinary output (equal to or greater than 30 milliliters/hour)  Outcome: Progressing Towards Goal  Goal: *Hemodynamically stable  Outcome: Progressing Towards Goal  Goal: *Demonstrates progressive activity  Outcome: Progressing Towards Goal  Goal: *Absence of nausea/vomiting  Outcome: Progressing Towards Goal  Goal: *Return of normal bowel function  Outcome: Progressing Towards Goal     Problem: Small Bowel Obstruction: Day 3  Goal: Off Pathway (Use only if patient is Off Pathway)  Outcome: Progressing Towards Goal  Goal: Activity/Safety  Outcome: Progressing Towards Goal  Goal: Consults, if ordered  Outcome: Progressing Towards Goal  Goal: Diagnostic Test/Procedures  Outcome: Progressing Towards Goal  Goal: Nutrition/Diet  Outcome: Progressing Towards Goal  Goal: Discharge Planning  Outcome: Progressing Towards Goal  Goal: Medications  Outcome: Progressing Towards Goal  Goal: Respiratory  Outcome: Progressing Towards Goal  Goal: Treatments/Interventions/Procedures  Outcome: Progressing Towards Goal  Goal: Psychosocial  Outcome: Progressing Towards Goal  Goal: *Optimal pain control at patient's stated goal  Outcome: Progressing Towards Goal  Goal: *Adequate urinary output (equal to or greater than 30 milliliters/hour)  Outcome: Progressing Towards Goal  Goal: *Hemodynamically stable  Outcome: Progressing Towards Goal  Goal: *Adequate nutrition  Outcome: Progressing Towards Goal  Goal: *Demonstrates progressive activity  Outcome: Progressing Towards Goal  Goal: *Participates in discharge planning  Outcome: Progressing Towards Goal     Problem: Small Bowel Obstruction - Non Surgical: Discharge Outcomes  Goal: *Hemodynamically stable  Outcome: Progressing Towards Goal  Goal: *Demonstrates independent activity or return to baseline  Outcome: Progressing Towards Goal  Goal: *Optimal pain control at patient's stated goal  Outcome: Progressing Towards Goal  Goal: *Verbalizes understanding and describes prescribed diet  Outcome: Progressing Towards Goal  Goal: *Tolerating diet  Outcome: Progressing Towards Goal  Goal: *Verbalizes name, dosage, time, side effects, and number of days to continue medications  Outcome: Progressing Towards Goal  Goal: *Anxiety reduced or absent  Outcome: Progressing Towards Goal  Goal: *Understands and describes signs and symptoms to report to providers(Stroke Metric)  Outcome: Progressing Towards Goal  Goal: *Describes follow-up/return visits to physicians  Outcome: Progressing Towards Goal  Goal: *Describes available resources and support systems  Outcome: Progressing Towards Goal  Goal: *Active bowel function  Outcome: Progressing Towards Goal     Problem: Falls - Risk of  Goal: *Absence of Falls  Description: Document Bk Fall Risk and appropriate interventions in the flowsheet.   Outcome: Progressing Towards Goal  Note: Fall Risk Interventions:  Mobility Interventions: Communicate number of staff needed for ambulation/transfer         Medication Interventions: Evaluate medications/consider consulting pharmacy, Patient to call before getting OOB, Teach patient to arise slowly    Elimination Interventions: Call light in reach, Patient to call for help with toileting needs, Urinal in reach              Problem: Patient Education: Go to Patient Education Activity  Goal: Patient/Family Education  Outcome: Progressing Towards Goal

## 2022-12-03 NOTE — PROGRESS NOTES
Physician Progress Note      PATIENT:               Eusebia Singleton  CSN #:                  [id number]  :                       1965  ADMIT DATE:       2022 12:47 PM  [address] [place] DATE:  RESPONDING  PROVIDER #:        Shantel Valenzuela MD [facility] MD          QUERY TEXT:    Dear attending,    Per the dietary assessment of 22 the patient has severe malnutrition. If possible, please document in progress notes and discharge summary if you are evaluating and /or treating any of the following: The medical record reflects the following:  Risk Factors: hx. rectal cancer, etoh/tobacco/substance use  Clinical Indicators: -Per dietary-  Malnutrition Assessment:  Malnutrition Status:  Severe malnutrition (22)  Context:  Acute on chronic illness  Findings of the 6 clinical characteristics of malnutrition:  Energy Intake:  50% or less of est energy requirements for 5 or more days  Weight Loss:  Unable to assess  Body Fat Loss:  Severe body fat loss, Buccal region, Orbital  Muscle Mass Loss: Moderate muscle mass loss, Scapula (trapezius), Hand (interosseous), Temples (temporalis), Clavicles (pectoralis & deltoids)  Fluid Accumulation:  No significant fluid accumulation,   Strength:  Not performed  Nutrition Diagnosis:  Inadequate oral intake related to altered GI function, altered GI structure (SBO) as evidenced by NPO or clear liquid status due to medical condition, poor intake prior to admission, weight loss  Severe malnutrition, In context of acute illness or injury related to inadequate protein-energy intake as evidenced by Criteria as identified in malnutrition assessment, weight loss, poor intake prior to admission  Treatment:Dietary consult, monitor weight, I&O    ASPEN Criteria:  https://aspenjournals. onlinelibrary. huntley. com/doi/full/10.1177/2009355532514213    Thank you,  Crystal Yu RN, BSN, [facility], [address]  Clinical Documentation Improvement  [phone] or via [facility] provided:  -- Severe malnutrition  -- malnutrition, please specify degree. -- Other - I will add my own diagnosis  -- Disagree - Not applicable / Not valid  -- Disagree - Clinically unable to determine / Unknown  -- Refer to Clinical Documentation Reviewer    PROVIDER RESPONSE TEXT:    This patient has severe malnutrition.     Query created by: Shante Cowart on 12/2/2022 1:38 PM      Electronically signed by:  Juan Blakely MD [facility] MD 12/3/2022 4:02 PM

## 2022-12-04 VITALS
HEART RATE: 64 BPM | RESPIRATION RATE: 18 BRPM | BODY MASS INDEX: 15.2 KG/M2 | WEIGHT: 100.31 LBS | TEMPERATURE: 98 F | DIASTOLIC BLOOD PRESSURE: 82 MMHG | HEIGHT: 68 IN | OXYGEN SATURATION: 100 % | SYSTOLIC BLOOD PRESSURE: 151 MMHG

## 2022-12-04 LAB
BACTERIA SPEC CULT: NORMAL
SERVICE CMNT-IMP: NORMAL

## 2022-12-04 PROCEDURE — 74011250637 HC RX REV CODE- 250/637: Performed by: STUDENT IN AN ORGANIZED HEALTH CARE EDUCATION/TRAINING PROGRAM

## 2022-12-04 PROCEDURE — 74011250637 HC RX REV CODE- 250/637: Performed by: SURGERY

## 2022-12-04 PROCEDURE — 74011250636 HC RX REV CODE- 250/636: Performed by: SURGERY

## 2022-12-04 PROCEDURE — 74011000250 HC RX REV CODE- 250: Performed by: STUDENT IN AN ORGANIZED HEALTH CARE EDUCATION/TRAINING PROGRAM

## 2022-12-04 PROCEDURE — 74011250636 HC RX REV CODE- 250/636: Performed by: STUDENT IN AN ORGANIZED HEALTH CARE EDUCATION/TRAINING PROGRAM

## 2022-12-04 RX ORDER — OXYCODONE HYDROCHLORIDE 5 MG/1
5 TABLET ORAL
Qty: 12 TABLET | Refills: 0 | Status: SHIPPED | OUTPATIENT
Start: 2022-12-04 | End: 2022-12-07

## 2022-12-04 RX ORDER — TAMSULOSIN HYDROCHLORIDE 0.4 MG/1
0.4 CAPSULE ORAL DAILY
Qty: 30 CAPSULE | Refills: 1 | Status: SHIPPED | OUTPATIENT
Start: 2022-12-04

## 2022-12-04 RX ORDER — ASPIRIN 325 MG/1
100 TABLET, FILM COATED ORAL DAILY
Qty: 30 TABLET | Refills: 1 | Status: SHIPPED | OUTPATIENT
Start: 2022-12-04

## 2022-12-04 RX ADMIN — Medication 100 MG: at 08:26

## 2022-12-04 RX ADMIN — SODIUM CHLORIDE, PRESERVATIVE FREE 10 ML: 5 INJECTION INTRAVENOUS at 05:34

## 2022-12-04 RX ADMIN — FOLIC ACID 1 MG: 1 TABLET ORAL at 08:27

## 2022-12-04 RX ADMIN — METOPROLOL TARTRATE 12.5 MG: 25 TABLET, FILM COATED ORAL at 08:26

## 2022-12-04 RX ADMIN — SODIUM CHLORIDE, POTASSIUM CHLORIDE, SODIUM LACTATE AND CALCIUM CHLORIDE 50 ML/HR: 600; 310; 30; 20 INJECTION, SOLUTION INTRAVENOUS at 03:04

## 2022-12-04 RX ADMIN — Medication 1 CAPSULE: at 09:00

## 2022-12-04 RX ADMIN — TAMSULOSIN HYDROCHLORIDE 0.4 MG: 0.4 CAPSULE ORAL at 08:27

## 2022-12-04 RX ADMIN — FINASTERIDE 5 MG: 5 TABLET, FILM COATED ORAL at 08:26

## 2022-12-04 RX ADMIN — ENOXAPARIN SODIUM 30 MG: 100 INJECTION SUBCUTANEOUS at 08:26

## 2022-12-04 RX ADMIN — OXYCODONE HYDROCHLORIDE 10 MG: 5 TABLET ORAL at 08:26

## 2022-12-04 RX ADMIN — OXYCODONE HYDROCHLORIDE 10 MG: 5 TABLET ORAL at 03:03

## 2022-12-04 RX ADMIN — FAMOTIDINE 20 MG: 20 TABLET ORAL at 08:26

## 2022-12-04 NOTE — PROGRESS NOTES
Problem: Risk for Spread of Infection  Goal: Prevent transmission of infectious organism to others  Description: Prevent the transmission of infectious organisms to other patients, staff members, and visitors.   Outcome: Progressing Towards Goal     Problem: Patient Education:  Go to Education Activity  Goal: Patient/Family Education  Outcome: Progressing Towards Goal     Problem: Pain  Goal: *Control of Pain  Outcome: Progressing Towards Goal     Problem: Patient Education: Go to Patient Education Activity  Goal: Patient/Family Education  Outcome: Progressing Towards Goal     Problem: Patient Education: Go to Patient Education Activity  Goal: Patient/Family Education  Outcome: Progressing Towards Goal     Problem: Small Bowel Obstruction: Day 1  Goal: Off Pathway (Use only if patient is Off Pathway)  Outcome: Progressing Towards Goal  Goal: Activity/Safety  Outcome: Progressing Towards Goal  Goal: Consults, if ordered  Outcome: Progressing Towards Goal  Goal: Diagnostic Test/Procedures  Outcome: Progressing Towards Goal  Goal: Nutrition/Diet  Outcome: Progressing Towards Goal  Goal: Medications  Outcome: Progressing Towards Goal  Goal: Respiratory  Outcome: Progressing Towards Goal  Goal: Treatments/Interventions/Procedures  Outcome: Progressing Towards Goal  Goal: Psychosocial  Outcome: Progressing Towards Goal  Goal: *Optimal pain control at patient's stated goal  Outcome: Progressing Towards Goal  Goal: *Adequate urinary output (equal to or greater than 30 milliliters/hour)  Outcome: Progressing Towards Goal  Goal: *Hemodynamically stable  Outcome: Progressing Towards Goal  Goal: *Demonstrates progressive activity  Outcome: Progressing Towards Goal  Goal: *Absence of nausea/vomiting  Outcome: Progressing Towards Goal     Problem: Small Bowel Obstruction: Day 2  Goal: Off Pathway (Use only if patient is Off Pathway)  Outcome: Progressing Towards Goal  Goal: Activity/Safety  Outcome: Progressing Towards Goal  Goal: Consults, if ordered  Outcome: Progressing Towards Goal  Goal: Diagnostic Test/Procedures  Outcome: Progressing Towards Goal  Goal: Nutrition/Diet  Outcome: Progressing Towards Goal  Goal: Discharge Planning  Outcome: Progressing Towards Goal  Goal: Medications  Outcome: Progressing Towards Goal  Goal: Respiratory  Outcome: Progressing Towards Goal  Goal: Treatments/Interventions/Procedures  Outcome: Progressing Towards Goal  Goal: Psychosocial  Outcome: Progressing Towards Goal  Goal: *Optimal pain control at patient's stated goal  Outcome: Progressing Towards Goal  Goal: *Adequate urinary output (equal to or greater than 30 milliliters/hour)  Outcome: Progressing Towards Goal  Goal: *Hemodynamically stable  Outcome: Progressing Towards Goal  Goal: *Demonstrates progressive activity  Outcome: Progressing Towards Goal  Goal: *Absence of nausea/vomiting  Outcome: Progressing Towards Goal  Goal: *Return of normal bowel function  Outcome: Progressing Towards Goal     Problem: Small Bowel Obstruction: Day 3  Goal: Off Pathway (Use only if patient is Off Pathway)  Outcome: Progressing Towards Goal  Goal: Activity/Safety  Outcome: Progressing Towards Goal  Goal: Consults, if ordered  Outcome: Progressing Towards Goal  Goal: Diagnostic Test/Procedures  Outcome: Progressing Towards Goal  Goal: Nutrition/Diet  Outcome: Progressing Towards Goal  Goal: Discharge Planning  Outcome: Progressing Towards Goal  Goal: Medications  Outcome: Progressing Towards Goal  Goal: Respiratory  Outcome: Progressing Towards Goal  Goal: Treatments/Interventions/Procedures  Outcome: Progressing Towards Goal  Goal: Psychosocial  Outcome: Progressing Towards Goal  Goal: *Optimal pain control at patient's stated goal  Outcome: Progressing Towards Goal  Goal: *Adequate urinary output (equal to or greater than 30 milliliters/hour)  Outcome: Progressing Towards Goal  Goal: *Hemodynamically stable  Outcome: Progressing Towards Goal  Goal: *Adequate nutrition  Outcome: Progressing Towards Goal  Goal: *Demonstrates progressive activity  Outcome: Progressing Towards Goal  Goal: *Participates in discharge planning  Outcome: Progressing Towards Goal     Problem: Small Bowel Obstruction - Non Surgical: Discharge Outcomes  Goal: *Hemodynamically stable  Outcome: Progressing Towards Goal  Goal: *Demonstrates independent activity or return to baseline  Outcome: Progressing Towards Goal  Goal: *Optimal pain control at patient's stated goal  Outcome: Progressing Towards Goal  Goal: *Verbalizes understanding and describes prescribed diet  Outcome: Progressing Towards Goal  Goal: *Tolerating diet  Outcome: Progressing Towards Goal  Goal: *Verbalizes name, dosage, time, side effects, and number of days to continue medications  Outcome: Progressing Towards Goal  Goal: *Anxiety reduced or absent  Outcome: Progressing Towards Goal  Goal: *Understands and describes signs and symptoms to report to providers(Stroke Metric)  Outcome: Progressing Towards Goal  Goal: *Describes follow-up/return visits to physicians  Outcome: Progressing Towards Goal  Goal: *Describes available resources and support systems  Outcome: Progressing Towards Goal  Goal: *Active bowel function  Outcome: Progressing Towards Goal     Problem: Falls - Risk of  Goal: *Absence of Falls  Description: Document Bk Fall Risk and appropriate interventions in the flowsheet.   Outcome: Progressing Towards Goal  Note: Fall Risk Interventions:  Mobility Interventions: Patient to call before getting OOB, Strengthening exercises (ROM-active/passive)         Medication Interventions: Evaluate medications/consider consulting pharmacy, Patient to call before getting OOB, Teach patient to arise slowly    Elimination Interventions: Call light in reach, Patient to call for help with toileting needs, Toilet paper/wipes in reach, Urinal in reach              Problem: Patient Education: Go to Patient Education Activity  Goal: Patient/Family Education  Outcome: Progressing Towards Goal

## 2022-12-04 NOTE — PROGRESS NOTES
1925- Bedside and Verbal shift change report given to Roger Lanes, RN   (oncoming nurse) by Odalis De La Cruz RN   (offgoing nurse). Report included the following information SBAR, Kardex, and MAR    2000- Pt is resting comfortably in bed at present. Pt continues to have liquid stool out on the [facility]. He was encouraged to call for assistance to the [facility]. Will cont to monitor. 2230- Pt continues to complain of lower abdomen pain rating it 6/10 and was medicated with Oxycodone 10 mg po with effectiveness noted. Will continue to monitor. 0500- Pt slept well overnight. Pt continues to complain of lower abdominal pain and was medicated with Oxycodone 10 mg po with relief noted. Will continue to monitor.

## 2022-12-04 NOTE — PROGRESS NOTES
Discharge instructions discussed with patient. Patient expressed understanding of the instructions provided. Patient was awake and alert when he was discharged from the unit. Minoxidil Counseling: Minoxidil is a topical medication which can increase blood flow where it is applied. It is uncertain how this medication increases hair growth. Side effects are uncommon and include stinging and allergic reactions.

## 2022-12-04 NOTE — PROGRESS NOTES
Physical therapy:     Chart reviewed. Patient observed ambulating independently in room. Patient educated on use of brakes with rollator. No PT needs at discharge; will complete PT order at this time.      Danny Valenzuela, PT

## 2022-12-04 NOTE — DISCHARGE SUMMARY
Physician Discharge Summary     Patient ID:    Allie Nichole  [id number]  [unfilled]  1965    Admit date: 11/28/2022    Discharge date and time: 12/4/2022    Hospital Diagnoses and Treatment Rendered:   Allie Nichole is a 59 y.o.  male with PMH  who presents to ED with c/o abdominal pain with nausea vomiting diarrhea. Patient states symptoms are lasting for past 2 to 3 days. .  Patient states that he has frequent episodes of the symptoms every month or so and no one can tell him why. Patient had surgery for rectal cancer and had radiation therapy which caused scarring. Patient endorsed having 10-12 bowel movements a day for past 3 days yesterday started having nausea vomiting. Endorsed pain as dull and aching pain does not radiate. The patient was admitted to medicine, IV fluids for hydration were initiated, antiemetics were provided, and NG was placed. General surgeon was consulted, recommended medical management. On current medical management condition of patient improved, nausea, vomiting, diarrhea resolved. NG tube was removed, diet was initiated and advanced. The patient experienced vomiting after liquid diet. Per general surgeon recommended small bowel follow-through which could not be obtained right away. Electrolytes were replaced, pain was controlled with oxycodone p.o. and morphine IV as needed. Condition of patient continued to improve, nausea, vomiting resolved, abdominal pain was controlled, patient requested to advance diet which was done, bland diet was initiated, and patient tolerated diet well. Rest of hospital stay patient remained hemodynamically stable, afebrile, was able to tolerate p.o. diet, ambulate with front wheel walker and he expressed wish to be discharged home with his sister. The patient has Marrero catheter and he will follow-up with urologist for Marrero catheter checkup and  exchange.   Urinalysis was positive for bacteria, patient was afebrile, white blood cells were within normal limits, most likely was colonization, patient did not require antibiotic treatment. SBO (small bowel obstruction) ([facility]) (11/29/2022)  Nausea vomiting diarrhea, resolved   Abdominal pain POA, controlled  Recurrent small bowel obstruction POA, resolved, off NG,  History of rectal cancer s/p rectosigmoid resection  -CT abdomen pelvis reviewed independently with dilated fluid and air-filled small bowel loops consistent with small bowel obstruction. Sequela of chronic pancreatitis. S/p rectosigmoid resection  -General surgery consulted, input appreciated  Off NG, diet was initiated, pt had nausea and vomiting after meals, resolved  Pt requested diet adjustment, will change to bland diet  Colonoscopy 2020 [facility]:  - Stricture in the rectum. - Patent end-to-end colo-colonic anastomosis, characterized by an intact appearance,        moderate stenosis and an intact staple line.      -Maintenance fluid LR  -Pain management  -General surgery recommendations appreciated  SBFT unable to obtain until Wednesday        History of BPH with UPJ obstruction s/p Marrero  Bacteriuria  -Continue Marrero care  Pt will need to follow up urologist for Marrero cath change   Continue Flomax  Doubt UTI, no fever, WBC WNL          History of hypertension, controlled  Continue home medications BB, metoprolol        History of mood disorder  -continue home regimen Remeron          Chronic Diagnoses:    Problem List as of 12/4/2022 Date Reviewed: 11/29/2022            Codes Class Noted - Resolved    SBO (small bowel obstruction) ([facility] ICD-10-CM: V80.008  ICD-9-CM: 560.9  11/29/2022 - Present        Small bowel obstruction ([facility] ICD-10-CM: U66.822  ICD-9-CM: 560.9  9/9/2022 - Present        Hematuria ICD-10-CM: R31.9  ICD-9-CM: 599.70  9/1/2021 - Present        Hydronephrosis, bilateral ICD-10-CM: N13.30  ICD-9-CM: 048  8/31/2021 - Present        Pancreatitis ICD-10-CM: K85.90  ICD-9-CM: 519.8  12/20/2020 - Present        Acute pancreatitis ICD-10-CM: K85.90  ICD-9-CM: 175.2  12/20/2020 - Present        Seizure ([facility] ICD-10-CM: R56.9  ICD-9-CM: 780.39  12/31/2018 - Present        Alcohol abuse ICD-10-CM: F10.10  ICD-9-CM: 305.00  12/31/2018 - Present        Drug abuse ([facility] ICD-10-CM: F19.10  ICD-9-CM: 305.90  12/31/2018 - Present        Rectal cancer ([facility] ICD-10-CM: C20  ICD-9-CM: 154.1  12/31/2018 - Present        RESOLVED: ARF (acute renal failure) ([facility]) ICD-10-CM: N17.9  ICD-9-CM: 584.9  2/2/2022 - 2/5/2022           Discharge Medications:   Current Discharge Medication List        START taking these medications    Details   oxyCODONE IR (ROXICODONE) 5 mg immediate release tablet Take 1 Tablet by mouth every six (6) hours as needed for Pain for up to 3 days. Max Daily Amount: 20 mg. Hold for sedation, do not drive car if you take oxycodone  Qty: 12 Tablet, Refills: 0  Start date: 12/4/2022, End date: 12/7/2022    Associated Diagnoses: Chronic pancreatitis, unspecified pancreatitis type ([facility]           CONTINUE these medications which have CHANGED    Details   tamsulosin (FLOMAX) 0.4 mg capsule Take 1 Capsule by mouth daily. Qty: 30 Capsule, Refills: 1  Start date: 12/4/2022      thiamine mononitrate (B-1) 100 mg tablet Take 1 Tablet by mouth daily. Qty: 30 Tablet, Refills: 1  Start date: 12/4/2022      lactobacillus combination no.4 3 billion cell cap Take 1 Capsule by mouth daily. Qty: 10 Capsule, Refills: 0  Start date: 12/4/2022           CONTINUE these medications which have NOT CHANGED    Details   metoprolol tartrate (LOPRESSOR) 25 mg tablet Take 0.5 Tablets by mouth two (2) times a day. Qty: 60 Tablet, Refills: 0      folic acid (FOLVITE) 1 mg tablet Take 1 Tablet by mouth daily. Qty: 30 Tablet, Refills: 1      finasteride (PROSCAR) 5 mg tablet Take 5 mg by mouth daily. mirtazapine (REMERON) 15 mg tablet Take 15 mg by mouth nightly.                Follow up Care:    1. Jessica Nino MD in 1-2 weeks. Please call to set up an appointment shortly after discharge. 2.  Follow-up urologist as scheduled for Marrero catheter check and exchange. 3.  Follow-up with general surgeon if symptoms reoccur. Diet:  Regular Diet    Disposition:  Home. Advanced Directive:   FULL X   DNR      Discharge Exam:  See today's note. CONSULTATIONS: General Surgery    Significant Diagnostic Studies:   11/28/2022: BUN 11 MG/DL (Ref range: 6 - 20 MG/DL); Calcium 8.3 MG/DL (L; Ref range: 8.5 - 10.1 MG/DL); CO2 32 mmol/L (Ref range: 21 - 32 mmol/L); Creatinine 0.73 MG/DL (Ref range: 0.70 - 1.30 MG/DL); Glucose 101 mg/dL (H; Ref range: 65 - 100 mg/dL); HCT 27.4 % (L; Ref range: 36.6 - 50.3 %); HGB 9.3 g/dL (L; Ref range: 12.1 - 17.0 g/dL); Potassium 2.6 mmol/L (LL; Ref range: 3.5 - 5.1 mmol/L); Sodium 139 mmol/L (Ref range: 136 - 145 mmol/L)  11/29/2022: BUN 8 MG/DL (Ref range: 6 - 20 MG/DL); Calcium 8.3 MG/DL (L; Ref range: 8.5 - 10.1 MG/DL); CO2 32 mmol/L (Ref range: 21 - 32 mmol/L); Creatinine 0.60 MG/DL (L; Ref range: 0.70 - 1.30 MG/DL); Glucose 85 mg/dL (Ref range: 65 - 100 mg/dL); HCT 30.9 % (L; Ref range: 36.6 - 50.3 %); HGB 10.5 g/dL (L; Ref range: 12.1 - 17.0 g/dL); Potassium 2.5 mmol/L (LL; Ref range: 3.5 - 5.1 mmol/L); Sodium 139 mmol/L (Ref range: 136 - 145 mmol/L)  Recent Labs     12/02/22  0450   WBC 8.7   HGB 10.0*   HCT 29.9*        Recent Labs     12/02/22  0450      K 4.6      CO2 31   BUN 6   CREA 0.75   GLU 65   CA 8.6   MG 1.6   PHOS 3.0     Recent Labs     12/02/22 0450   *   TP 6.1*   ALB 2.3*   GLOB 3.8     No results for input(s): INR, PTP, APTT, INREXT in the last 72 hours. No results for input(s): FE, TIBC, PSAT, FERR in the last 72 hours. No results for input(s): PH, PCO2, PO2 in the last 72 hours. No results for input(s): CPK, CKMB in the last 72 hours.     No lab exists for component: TROPONINI  No components found for: [place]    Total time to discharge patient was 31 minutes more than 50% of time was spent for counseling and coordination of care.       Signed:  Earl Beal MD  12/4/2022  9:40 AM

## 2022-12-04 NOTE — PROGRESS NOTES
[employer] [facility] Adult  Hospitalist Group                                                                                          Hospitalist Progress Note  Devora Dong MD  Answering service: [phone] OR [phone] from in house phone        Date of Service:  2022  NAME:  Jame Beltran  :  1965  MRN:  [id number]   Room Number:  468/57  @ [facility]         Admission Summary:   Jame Beltran is a 59 y.o.  male with PMH  who presents to ED with c/o abdominal pain with nausea vomiting diarrhea. Patient states symptoms are lasting for past 2 to 3 days. .  Patient states that he has frequent episodes of the symptoms every month or so and no one can tell him why. Patient had surgery for rectal cancer and had radiation therapy which caused scarring. Patient endorsed having 10-12 bowel movements a day for past 3 days yesterday started having nausea vomiting. Endorsed pain as dull and aching pain does not radiate. Interval history / Subjective:   NAD,  no acute event over night  No nausea and vomiting  Able to tolerate PO  Wants to go home  Had BM yesterday  Asking for pain medications to get home  Able to ambulate with FWW  General surgery input appreciated  Requested SBFT, but study can not be done over weekend         Assessment & Plan:     Anticipated discharge date : today  Anticipated disposition : home with [facility]  Barriers to discharge : clinical improvement         Active Problems:    SBO (small bowel obstruction) ([address] (2022)        Nausea vomiting diarrhea POA,   Abdominal pain POA, controlled  Recurrent small bowel obstruction POA, improved, off NG,  History of rectal cancer s/p rectosigmoid resection  -CT abdomen pelvis reviewed independently with dilated fluid and air-filled small bowel loops consistent with small bowel obstruction. Sequela of chronic pancreatitis.   S/p rectosigmoid resection  -General surgery consulted, input appreciated  Off NG, diet was initiated, pt had nausea and vomiting after meals, resolved  Pt requested diet adjustment, will change to bland diet     -Maintenance fluid LR  -Pain management  -General surgery recommendations appreciated  SBFT unable to obtain until Wednesday        History of BPH with UPJ obstruction s/p Marrero  Bacteriuria  -Continue Marrero care  Pt will need to follow up urologist for Marrero cath change   Continue Flomax  Doubt UTI, no fever, WBC WNL             History of hypertension, controlled  Continue home medications BB, metoprolol      History of mood disorder  -continue home regimen Remeron        Body mass index is 15.25 kg/m². Code Status: Full   DVT Prophylaxis: Lovenox  GI Prophylaxis: not indicated  Care Plan discussed with: patient and RN  Anticipated Disposition: home today, has [address] Problems  Date Reviewed: 11/29/2022            Codes Class Noted POA    SBO (small bowel obstruction) ([facility] ICD-10-CM: B26.530  ICD-9-CM: 560.9  11/29/2022 Unknown         Review of Systems:   A comprehensive review of systems was negative except for that written in the HPI. Vital Signs:    Last 24hrs VS reviewed since prior progress note. Most recent are:  Visit Vitals  BP (!) 151/82 (BP 1 Location: Left upper arm, BP Patient Position: At rest;Lying)   Pulse 64   Temp 98 °F (36.7 °C)   Resp 18   Ht 5' 8\" (1.727 m)   Wt 45.5 kg (100 lb 5 oz)   SpO2 100%   BMI 15.25 kg/m²         Intake/Output Summary (Last 24 hours) at 12/4/2022 4232  Last data filed at 12/4/2022 0400  Gross per 24 hour   Intake 480 ml   Output 300 ml   Net 180 ml          Physical Examination:     I had a face to face encounter with this patient and independently examined them on 12/4/2022 as outlined below:          Constitutional:  No acute distress, cooperative, pleasant, NAD   ENT:  Oral mucosa moist, oropharynx benign. Resp:  Coarse bilaterally.  No wheezing/rhonchi/rales. No accessory muscle use. CV:  Regular rhythm, normal rate, no murmurs, gallops, rubs    GI:  Soft, no distended, non tender, NRT, normoactive bowel sounds,     Musculoskeletal:  No edema, warm, 2+ pulses throughout    Neurologic:  Moves all extremities. AAOx3, CN II-XII reviewed  Port-a-cath Right upper chest            Data Review:    Review and/or order of clinical lab test      Labs:     Recent Labs     12/02/22 0450   WBC 8.7   HGB 10.0*   HCT 29.9*          Recent Labs     12/02/22 0450      K 4.6      CO2 31   BUN 6   CREA 0.75   GLU 65   CA 8.6   MG 1.6   PHOS 3.0       Recent Labs     12/02/22 0450   ALT 17   *   TBILI 0.6   TP 6.1*   ALB 2.3*   GLOB 3.8       No results for input(s): INR, PTP, APTT, INREXT, INREXT in the last 72 hours. No results for input(s): FE, TIBC, PSAT, FERR in the last 72 hours. Lab Results   Component Value Date/Time    Folate 41.4 (H) 09/11/2022 10:49 AM        No results for input(s): PH, PCO2, PO2 in the last 72 hours. No results for input(s): CPK, CKNDX, TROIQ in the last 72 hours.     No lab exists for component: CPKMB  Lab Results   Component Value Date/Time    Cholesterol, total 147 01/02/2019 04:00 AM    HDL Cholesterol 66 01/02/2019 04:00 AM    LDL, calculated 65.6 01/02/2019 04:00 AM    Triglyceride 77 01/02/2019 04:00 AM    CHOL/HDL Ratio 2.2 01/02/2019 04:00 AM     No results found for: GLUCPOC  Lab Results   Component Value Date/Time    Color DARK YELLOW 09/09/2022 12:45 PM    Appearance TURBID (A) 09/09/2022 12:45 PM    Specific gravity 1.025 09/09/2022 12:45 PM    Specific gravity 1.010 09/03/2021 10:01 PM    pH (UA) 6.0 09/09/2022 12:45 PM    Protein 300 (A) 09/09/2022 12:45 PM    Glucose Negative 09/09/2022 12:45 PM    Ketone TRACE (A) 09/09/2022 12:45 PM    Bilirubin Negative 06/03/2022 04:57 AM    Urobilinogen 1.0 09/09/2022 12:45 PM    Nitrites Positive (A) 09/09/2022 12:45 PM    Leukocyte Esterase LARGE (A) 09/09/2022 12:45 PM    Epithelial cells FEW 09/09/2022 12:45 PM    Bacteria 2+ (A) 09/09/2022 12:45 PM    WBC >100 (H) 09/09/2022 12:45 PM    RBC >100 (H) 09/09/2022 12:45 PM         Medications Reviewed:     Current Facility-Administered Medications   Medication Dose Route Frequency    morphine injection 2 mg  2 mg IntraVENous Q4H PRN    famotidine (PEPCID) tablet 20 mg  20 mg Oral BID    phenol throat spray (CHLORASEPTIC) 1 Spray  1 Spray Oral PRN    lactated Ringers infusion  50 mL/hr IntraVENous CONTINUOUS    ondansetron (ZOFRAN) injection 4 mg  4 mg IntraVENous Q6H PRN    prochlorperazine (COMPAZINE) injection 5 mg  5 mg IntraVENous Q3H PRN    [Held by provider] mylanta/viscous lidocaine (GI COCKTAIL)  40 mL Oral BID    finasteride (PROSCAR) tablet 5 mg  5 mg Oral DAILY    mirtazapine (REMERON) tablet 15 mg  15 mg Oral QHS    tamsulosin (FLOMAX) capsule 0.4 mg  0.4 mg Oral DAILY    thiamine HCL (B-1) tablet 100 mg  100 mg Oral DAILY    folic acid (FOLVITE) tablet 1 mg  1 mg Oral DAILY    metoprolol tartrate (LOPRESSOR) tablet 12.5 mg  12.5 mg Oral BID    L.acidophilus-paracasei-S.thermophil-bifidobacter (RISAQUAD) 8 billion cell capsule  1 Capsule Oral DAILY    sodium chloride (NS) flush 5-40 mL  5-40 mL IntraVENous Q8H    sodium chloride (NS) flush 5-40 mL  5-40 mL IntraVENous PRN    polyethylene glycol (MIRALAX) packet 17 g  17 g Oral DAILY PRN    enoxaparin (LOVENOX) injection 30 mg  30 mg SubCUTAneous DAILY    labetaloL (NORMODYNE;TRANDATE) 20 mg/4 mL (5 mg/mL) injection 15 mg  15 mg IntraVENous Q3H PRN    nicotine (NICODERM CQ) 21 mg/24 hr patch 1 Patch  1 Patch TransDERmal DAILY    acetaminophen (TYLENOL) tablet 1,000 mg  1,000 mg Oral Q6H PRN    oxyCODONE IR (ROXICODONE) tablet 5 mg  5 mg Oral Q4H PRN    oxyCODONE IR (ROXICODONE) tablet 10 mg  10 mg Oral Q4H PRN     ______________________________________________________________________  EXPECTED LENGTH OF STAY: 4d 16h  ACTUAL LENGTH OF STAY: [place] Graylon Desanctis, MD

## 2022-12-04 NOTE — PROGRESS NOTES
Physician Progress Note      PATIENT:               Whit Baires  CSN #:                  [id number]  :                       1965  ADMIT DATE:       2022 12:47 PM  [address] [place] DATE:  RESPONDING  PROVIDER #:        Cezar Crowell MD          QUERY TEXT:      Dear attending,    Pt noted to have + urine culture with E. Coli on 22 prior to Marrero insertion. If possible, please document in the progress notes and discharge summary if you are evaluating and/or treating any of the following: The medical record reflects the following:  Risk Factors: Hx. BPH  Clinical Indicators: -Urine cx- ESCHERICHIA COLI ** (EXTENDED SPECTRUM BETA LACTAMASE ) ** Abnormal  -Per surgery- Marrero catheter for h/o bladder outlet obstruction. 12/3-Per PN- History of BPH with UPJ obstruction s/p Marrero  Continue Marrero care  Pt will need to follow up urologist for Marrero cath change  Continue Flomax  Treatment: Monitor vital signs, labwork, I&O, imaging    Thank you,    Crystal Yu RN, BSN, CRCR, CCDS  Clinical Documentation Improvement  [phone] or via [employer]  Options provided:  -- Urinary Tract Infection (UTI)  -- Bacteriuria  -- Other - I will add my own diagnosis  -- Disagree - Not applicable / Not valid  -- Disagree - Clinically unable to determine / Unknown  -- Refer to Clinical Documentation Reviewer    PROVIDER RESPONSE TEXT:    This patient has bacteriuria.     Query created by: Dominga Cisneros on 2022 7:37 AM      Electronically signed by:  Cezar Crowell MD 2022 9:24 AM

## 2022-12-04 NOTE — DISCHARGE INSTRUCTIONS
You have been evaluated and treated for small bowel obstruction. General surgeon was consulted, medical management was recommended. Small bowel obstruction resolved. You should follow-up with his primary care provider in 1 week. Follow-up with general surgeon if symptoms continue. Return to hospital if you have severe abdominal pain, nausea, vomiting, unable to tolerate diet. Do not drive car if you take oxycodone. You should follow-up with urologist as outpatient as scheduled for Marrero catheter check and exchange.

## 2022-12-05 ENCOUNTER — TELEPHONE (OUTPATIENT)
Dept: CASE MANAGEMENT | Age: 57
End: 2022-12-05

## 2022-12-05 NOTE — TELEPHONE ENCOUNTER
CM called patient by telephone to perform post discharge assessment and for the purpose of follow up call from inpatient discharge to check on environmental challenges/medications/appointment follow up/and questions/concerns. unable to leave VM for patient to return call not a working number [phone]      This was CM last attempt will close case if patient or family call back will assist with questions or concerns.         [address] [facility], [address] [phone]

## 2022-12-08 ENCOUNTER — DOCUMENTATION ONLY (OUTPATIENT)
Dept: MEDSURG UNIT | Age: 57
End: 2022-12-08

## 2023-01-05 ENCOUNTER — APPOINTMENT (OUTPATIENT)
Dept: GENERAL RADIOLOGY | Age: 58
End: 2023-01-05
Attending: STUDENT IN AN ORGANIZED HEALTH CARE EDUCATION/TRAINING PROGRAM
Payer: MEDICARE

## 2023-01-05 ENCOUNTER — HOSPITAL ENCOUNTER (INPATIENT)
Age: 58
LOS: 8 days | Discharge: ANOTHER HOSPITAL | End: 2023-01-13
Attending: EMERGENCY MEDICINE | Admitting: STUDENT IN AN ORGANIZED HEALTH CARE EDUCATION/TRAINING PROGRAM
Payer: MEDICARE

## 2023-01-05 ENCOUNTER — APPOINTMENT (OUTPATIENT)
Dept: CT IMAGING | Age: 58
End: 2023-01-05
Attending: EMERGENCY MEDICINE
Payer: MEDICARE

## 2023-01-05 DIAGNOSIS — K86.1 CHRONIC PANCREATITIS, UNSPECIFIED PANCREATITIS TYPE (HCC): ICD-10-CM

## 2023-01-05 DIAGNOSIS — K56.609 SMALL BOWEL OBSTRUCTION (HCC): Primary | ICD-10-CM

## 2023-01-05 DIAGNOSIS — E87.6 HYPOKALEMIA: ICD-10-CM

## 2023-01-05 LAB
ALBUMIN SERPL-MCNC: 2.3 G/DL (ref 3.5–5)
ALBUMIN/GLOB SERPL: 0.7 (ref 1.1–2.2)
ALP SERPL-CCNC: 111 U/L (ref 45–117)
ALT SERPL-CCNC: 18 U/L (ref 12–78)
ANION GAP SERPL CALC-SCNC: 6 MMOL/L (ref 5–15)
AST SERPL-CCNC: 21 U/L (ref 15–37)
BASOPHILS # BLD: 0 K/UL (ref 0–0.1)
BASOPHILS NFR BLD: 0 % (ref 0–1)
BILIRUB SERPL-MCNC: 0.2 MG/DL (ref 0.2–1)
BUN SERPL-MCNC: 10 MG/DL (ref 6–20)
BUN/CREAT SERPL: 13 (ref 12–20)
CALCIUM SERPL-MCNC: 7.9 MG/DL (ref 8.5–10.1)
CHLORIDE SERPL-SCNC: 105 MMOL/L (ref 97–108)
CO2 SERPL-SCNC: 28 MMOL/L (ref 21–32)
CREAT SERPL-MCNC: 0.76 MG/DL (ref 0.7–1.3)
DIFFERENTIAL METHOD BLD: ABNORMAL
EOSINOPHIL # BLD: 0.1 K/UL (ref 0–0.4)
EOSINOPHIL NFR BLD: 1 % (ref 0–7)
ERYTHROCYTE [DISTWIDTH] IN BLOOD BY AUTOMATED COUNT: 15.5 % (ref 11.5–14.5)
GLOBULIN SER CALC-MCNC: 3.5 G/DL (ref 2–4)
GLUCOSE SERPL-MCNC: 123 MG/DL (ref 65–100)
HCT VFR BLD AUTO: 25.7 % (ref 36.6–50.3)
HGB BLD-MCNC: 9 G/DL (ref 12.1–17)
IMM GRANULOCYTES # BLD AUTO: 0 K/UL (ref 0–0.04)
IMM GRANULOCYTES NFR BLD AUTO: 1 % (ref 0–0.5)
LIPASE SERPL-CCNC: 536 U/L (ref 73–393)
LYMPHOCYTES # BLD: 1.3 K/UL (ref 0.8–3.5)
LYMPHOCYTES NFR BLD: 14 % (ref 12–49)
MCH RBC QN AUTO: 30.5 PG (ref 26–34)
MCHC RBC AUTO-ENTMCNC: 35 G/DL (ref 30–36.5)
MCV RBC AUTO: 87.1 FL (ref 80–99)
MONOCYTES # BLD: 0.7 K/UL (ref 0–1)
MONOCYTES NFR BLD: 8 % (ref 5–13)
NEUTS SEG # BLD: 6.6 K/UL (ref 1.8–8)
NEUTS SEG NFR BLD: 76 % (ref 32–75)
NRBC # BLD: 0 K/UL (ref 0–0.01)
NRBC BLD-RTO: 0 PER 100 WBC
PLATELET # BLD AUTO: 190 K/UL (ref 150–400)
PMV BLD AUTO: 9.8 FL (ref 8.9–12.9)
POTASSIUM SERPL-SCNC: 2.2 MMOL/L (ref 3.5–5.1)
PROT SERPL-MCNC: 5.8 G/DL (ref 6.4–8.2)
RBC # BLD AUTO: 2.95 M/UL (ref 4.1–5.7)
SODIUM SERPL-SCNC: 139 MMOL/L (ref 136–145)
WBC # BLD AUTO: 8.7 K/UL (ref 4.1–11.1)

## 2023-01-05 PROCEDURE — 36415 COLL VENOUS BLD VENIPUNCTURE: CPT

## 2023-01-05 PROCEDURE — 96375 TX/PRO/DX INJ NEW DRUG ADDON: CPT

## 2023-01-05 PROCEDURE — 65270000029 HC RM PRIVATE

## 2023-01-05 PROCEDURE — 85025 COMPLETE CBC W/AUTO DIFF WBC: CPT

## 2023-01-05 PROCEDURE — 99285 EMERGENCY DEPT VISIT HI MDM: CPT

## 2023-01-05 PROCEDURE — 71045 X-RAY EXAM CHEST 1 VIEW: CPT

## 2023-01-05 PROCEDURE — 74011000636 HC RX REV CODE- 636: Performed by: EMERGENCY MEDICINE

## 2023-01-05 PROCEDURE — 96376 TX/PRO/DX INJ SAME DRUG ADON: CPT

## 2023-01-05 PROCEDURE — 74177 CT ABD & PELVIS W/CONTRAST: CPT

## 2023-01-05 PROCEDURE — 74011250636 HC RX REV CODE- 250/636: Performed by: EMERGENCY MEDICINE

## 2023-01-05 PROCEDURE — 74011000250 HC RX REV CODE- 250: Performed by: STUDENT IN AN ORGANIZED HEALTH CARE EDUCATION/TRAINING PROGRAM

## 2023-01-05 PROCEDURE — 80053 COMPREHEN METABOLIC PANEL: CPT

## 2023-01-05 PROCEDURE — 96374 THER/PROPH/DIAG INJ IV PUSH: CPT

## 2023-01-05 PROCEDURE — 74011250636 HC RX REV CODE- 250/636: Performed by: STUDENT IN AN ORGANIZED HEALTH CARE EDUCATION/TRAINING PROGRAM

## 2023-01-05 PROCEDURE — 83690 ASSAY OF LIPASE: CPT

## 2023-01-05 PROCEDURE — 65270000046 HC RM TELEMETRY

## 2023-01-05 RX ORDER — ONDANSETRON 2 MG/ML
4 INJECTION INTRAMUSCULAR; INTRAVENOUS
Status: COMPLETED | OUTPATIENT
Start: 2023-01-05 | End: 2023-01-05

## 2023-01-05 RX ORDER — MORPHINE SULFATE 2 MG/ML
1 INJECTION, SOLUTION INTRAMUSCULAR; INTRAVENOUS
Status: DISCONTINUED | OUTPATIENT
Start: 2023-01-05 | End: 2023-01-08

## 2023-01-05 RX ORDER — ONDANSETRON 2 MG/ML
4 INJECTION INTRAMUSCULAR; INTRAVENOUS
Status: DISCONTINUED | OUTPATIENT
Start: 2023-01-05 | End: 2023-01-06

## 2023-01-05 RX ORDER — MORPHINE SULFATE 2 MG/ML
2 INJECTION, SOLUTION INTRAMUSCULAR; INTRAVENOUS
Status: DISCONTINUED | OUTPATIENT
Start: 2023-01-05 | End: 2023-01-08

## 2023-01-05 RX ORDER — MORPHINE SULFATE 2 MG/ML
1-2 INJECTION, SOLUTION INTRAMUSCULAR; INTRAVENOUS
Status: DISCONTINUED | OUTPATIENT
Start: 2023-01-05 | End: 2023-01-05

## 2023-01-05 RX ORDER — POTASSIUM CHLORIDE 7.45 MG/ML
10 INJECTION INTRAVENOUS
Status: DISPENSED | OUTPATIENT
Start: 2023-01-05 | End: 2023-01-05

## 2023-01-05 RX ORDER — LANOLIN ALCOHOL/MO/W.PET/CERES
100 CREAM (GRAM) TOPICAL DAILY
Status: DISCONTINUED | OUTPATIENT
Start: 2023-01-06 | End: 2023-01-13 | Stop reason: HOSPADM

## 2023-01-05 RX ORDER — POLYETHYLENE GLYCOL 3350 17 G/17G
17 POWDER, FOR SOLUTION ORAL DAILY PRN
Status: DISCONTINUED | OUTPATIENT
Start: 2023-01-05 | End: 2023-01-13 | Stop reason: HOSPADM

## 2023-01-05 RX ORDER — LIDOCAINE HYDROCHLORIDE 40 MG/ML
SOLUTION TOPICAL
Status: DISPENSED | OUTPATIENT
Start: 2023-01-05 | End: 2023-01-06

## 2023-01-05 RX ORDER — DEXTROSE, SODIUM CHLORIDE, AND POTASSIUM CHLORIDE 5; .45; .3 G/100ML; G/100ML; G/100ML
INJECTION INTRAVENOUS CONTINUOUS
Status: DISCONTINUED | OUTPATIENT
Start: 2023-01-05 | End: 2023-01-05

## 2023-01-05 RX ORDER — FINASTERIDE 5 MG/1
5 TABLET, FILM COATED ORAL DAILY
Status: DISCONTINUED | OUTPATIENT
Start: 2023-01-06 | End: 2023-01-13 | Stop reason: HOSPADM

## 2023-01-05 RX ORDER — MORPHINE SULFATE 2 MG/ML
2 INJECTION, SOLUTION INTRAMUSCULAR; INTRAVENOUS ONCE
Status: COMPLETED | OUTPATIENT
Start: 2023-01-05 | End: 2023-01-05

## 2023-01-05 RX ORDER — FOLIC ACID 1 MG/1
1 TABLET ORAL DAILY
Status: DISCONTINUED | OUTPATIENT
Start: 2023-01-06 | End: 2023-01-13 | Stop reason: HOSPADM

## 2023-01-05 RX ORDER — SODIUM CHLORIDE 0.9 % (FLUSH) 0.9 %
5-40 SYRINGE (ML) INJECTION AS NEEDED
Status: DISCONTINUED | OUTPATIENT
Start: 2023-01-05 | End: 2023-01-13 | Stop reason: HOSPADM

## 2023-01-05 RX ORDER — POTASSIUM CHLORIDE, DEXTROSE MONOHYDRATE AND SODIUM CHLORIDE 300; 5; 900 MG/100ML; G/100ML; MG/100ML
INJECTION, SOLUTION INTRAVENOUS CONTINUOUS
Status: DISCONTINUED | OUTPATIENT
Start: 2023-01-05 | End: 2023-01-07

## 2023-01-05 RX ORDER — ONDANSETRON 4 MG/1
4 TABLET, ORALLY DISINTEGRATING ORAL
Status: DISCONTINUED | OUTPATIENT
Start: 2023-01-05 | End: 2023-01-13 | Stop reason: HOSPADM

## 2023-01-05 RX ORDER — MIRTAZAPINE 15 MG/1
15 TABLET, FILM COATED ORAL
Status: DISCONTINUED | OUTPATIENT
Start: 2023-01-05 | End: 2023-01-13 | Stop reason: HOSPADM

## 2023-01-05 RX ORDER — ACETAMINOPHEN 325 MG/1
650 TABLET ORAL
Status: DISCONTINUED | OUTPATIENT
Start: 2023-01-05 | End: 2023-01-06

## 2023-01-05 RX ORDER — ACETAMINOPHEN 650 MG/1
650 SUPPOSITORY RECTAL
Status: DISCONTINUED | OUTPATIENT
Start: 2023-01-05 | End: 2023-01-06

## 2023-01-05 RX ORDER — TAMSULOSIN HYDROCHLORIDE 0.4 MG/1
0.4 CAPSULE ORAL DAILY
Status: DISCONTINUED | OUTPATIENT
Start: 2023-01-06 | End: 2023-01-13 | Stop reason: HOSPADM

## 2023-01-05 RX ORDER — POTASSIUM CHLORIDE 7.45 MG/ML
10 INJECTION INTRAVENOUS ONCE
Status: COMPLETED | OUTPATIENT
Start: 2023-01-05 | End: 2023-01-06

## 2023-01-05 RX ORDER — SODIUM CHLORIDE 0.9 % (FLUSH) 0.9 %
5-40 SYRINGE (ML) INJECTION EVERY 8 HOURS
Status: DISCONTINUED | OUTPATIENT
Start: 2023-01-05 | End: 2023-01-13 | Stop reason: HOSPADM

## 2023-01-05 RX ORDER — MORPHINE SULFATE 2 MG/ML
2 INJECTION, SOLUTION INTRAMUSCULAR; INTRAVENOUS
Status: COMPLETED | OUTPATIENT
Start: 2023-01-05 | End: 2023-01-05

## 2023-01-05 RX ORDER — METOPROLOL TARTRATE 25 MG/1
12.5 TABLET, FILM COATED ORAL 2 TIMES DAILY
Status: DISCONTINUED | OUTPATIENT
Start: 2023-01-05 | End: 2023-01-13 | Stop reason: HOSPADM

## 2023-01-05 RX ORDER — HYDRALAZINE HYDROCHLORIDE 20 MG/ML
20 INJECTION INTRAMUSCULAR; INTRAVENOUS
Status: DISCONTINUED | OUTPATIENT
Start: 2023-01-05 | End: 2023-01-13 | Stop reason: HOSPADM

## 2023-01-05 RX ADMIN — POTASSIUM CHLORIDE 10 MEQ: 7.46 INJECTION, SOLUTION INTRAVENOUS at 18:51

## 2023-01-05 RX ADMIN — MORPHINE SULFATE 2 MG: 2 INJECTION, SOLUTION INTRAMUSCULAR; INTRAVENOUS at 23:17

## 2023-01-05 RX ADMIN — POTASSIUM CHLORIDE 10 MEQ: 7.46 INJECTION, SOLUTION INTRAVENOUS at 17:14

## 2023-01-05 RX ADMIN — POTASSIUM CHLORIDE 10 MEQ: 7.46 INJECTION, SOLUTION INTRAVENOUS at 23:17

## 2023-01-05 RX ADMIN — MORPHINE SULFATE 2 MG: 2 INJECTION, SOLUTION INTRAMUSCULAR; INTRAVENOUS at 17:13

## 2023-01-05 RX ADMIN — POTASSIUM CHLORIDE 10 MEQ: 7.46 INJECTION, SOLUTION INTRAVENOUS at 20:32

## 2023-01-05 RX ADMIN — ONDANSETRON 4 MG: 2 INJECTION INTRAMUSCULAR; INTRAVENOUS at 15:53

## 2023-01-05 RX ADMIN — SODIUM CHLORIDE, PRESERVATIVE FREE 10 ML: 5 INJECTION INTRAVENOUS at 22:00

## 2023-01-05 RX ADMIN — POTASSIUM CHLORIDE, DEXTROSE MONOHYDRATE AND SODIUM CHLORIDE: 300; 5; 900 INJECTION, SOLUTION INTRAVENOUS at 20:32

## 2023-01-05 RX ADMIN — ONDANSETRON 4 MG: 4 TABLET, ORALLY DISINTEGRATING ORAL at 20:30

## 2023-01-05 RX ADMIN — MORPHINE SULFATE 2 MG: 2 INJECTION, SOLUTION INTRAMUSCULAR; INTRAVENOUS at 15:53

## 2023-01-05 RX ADMIN — IOPAMIDOL 100 ML: 755 INJECTION, SOLUTION INTRAVENOUS at 17:03

## 2023-01-05 RX ADMIN — MORPHINE SULFATE 2 MG: 2 INJECTION, SOLUTION INTRAMUSCULAR; INTRAVENOUS at 20:31

## 2023-01-05 RX ADMIN — HYDRALAZINE HYDROCHLORIDE 20 MG: 20 INJECTION, SOLUTION INTRAMUSCULAR; INTRAVENOUS at 21:59

## 2023-01-05 RX ADMIN — SODIUM CHLORIDE 1000 ML: 9 INJECTION, SOLUTION INTRAVENOUS at 15:49

## 2023-01-05 NOTE — ED PROVIDER NOTES
[address] [facility] EMERGENCY DEPT  EMERGENCY DEPARTMENT ENCOUNTER       Pt Name: Candy Cavazos  MRN: [id number]  [place] 1965  Date of evaluation: 1/5/2023  Provider: Yair Diaz MD   PCP: Alex Steel MD  Note Started: 3:09 PM 1/5/23     CHIEF COMPLAINT       Chief Complaint   Patient presents with    Abdominal Pain    Diarrhea     Patient presents to ED with c/o diarrhea and dizziness for 1 week. Patient ambulated to treatment area with steady gait. HISTORY OF PRESENT ILLNESS: 1 or more elements      History From: Patient  HPI Limitations : None     Candy Cavazos is a 59 y.o. male who presents with nausea, vomiting, diarrhea, generalized abdominal pain, and dizziness. Patient told the triage nurse has been going on for 1 week but told me it has been going on for the past 3 to 4 days. He has a GI doctor at [facility], but was too sick to get there. He has a history of recurrent small bowel obstructions and believes that is what is going on today. He taking any medications for the pain. He has a history of rectal cancer status post colectomy with reanastomosis 7-8 years ago and also received radiation therapy at that time. Patient is followed by Chel Dawn with GI.     Nursing Notes were all reviewed and agreed with or any disagreements were addressed in the HPI. PAST HISTORY     Past Medical History:  Past Medical History:   Diagnosis Date    Cancer ([address]     rectal    Gastrointestinal disorder     Gastric Ulcers;  Rectal CA    Hematuria 9/1/2021    Hypertension        Past Surgical History:  Past Surgical History:   Procedure Laterality Date    HX GI         Family History:  Family History   Problem Relation Age of Onset    Cancer Brother        Social History:  Social History     Tobacco Use    Smoking status: Every Day     Packs/day: 0.25     Types: Cigarettes    Smokeless tobacco: Never    Tobacco comments:     \"1 pack last me three days\"   Substance Use Topics Alcohol use: Not Currently     Comment: hasn't drank x3mo (6/3/22)    Drug use: Yes     Types: Marijuana     Comment: Occasional for appetite       Allergies:  No Known Allergies    CURRENT MEDICATIONS      Previous Medications    FINASTERIDE (PROSCAR) 5 MG TABLET    Take 5 mg by mouth daily. FOLIC ACID (FOLVITE) 1 MG TABLET    Take 1 Tablet by mouth daily. LACTOBACILLUS COMBINATION NO.4 3 BILLION CELL CAP    Take 1 Capsule by mouth daily. METOPROLOL TARTRATE (LOPRESSOR) 25 MG TABLET    Take 0.5 Tablets by mouth two (2) times a day. MIRTAZAPINE (REMERON) 15 MG TABLET    Take 15 mg by mouth nightly. TAMSULOSIN (FLOMAX) 0.4 MG CAPSULE    Take 1 Capsule by mouth daily. THIAMINE MONONITRATE (B-1) 100 MG TABLET    Take 1 Tablet by mouth daily. REVIEW OF SYSTEMS      Review of Systems   Constitutional:  Negative for chills and fever. HENT:  Negative for congestion, rhinorrhea, sneezing and sore throat. Eyes:  Negative for redness and visual disturbance. Respiratory:  Negative for shortness of breath. Cardiovascular:  Negative for chest pain and leg swelling. Gastrointestinal:  Positive for abdominal pain, diarrhea, nausea and vomiting. Genitourinary:  Negative for difficulty urinating and frequency. Musculoskeletal:  Negative for back pain, myalgias and neck stiffness. Skin:  Negative for rash. Neurological:  Positive for dizziness and weakness. Negative for syncope and headaches. Hematological:  Negative for adenopathy. All other systems reviewed and are negative. Positives and Pertinent negatives as per HPI. PHYSICAL EXAM      ED Triage Vitals [01/05/23 1357]   ED Encounter Vitals Group      BP (!) 154/97      Pulse (Heart Rate) 96      Resp Rate 18      Temp 98.1 °F (36.7 °C)      Temp src       O2 Sat (%) 100 %      Weight 96 lb      Height 5' 8\"        Physical Exam  Vitals and nursing note reviewed. Constitutional:       Appearance: Normal appearance.  He is well-developed and underweight. He is ill-appearing. HENT:      Head: Normocephalic and atraumatic. Cardiovascular:      Rate and Rhythm: Normal rate and regular rhythm. Pulses: Normal pulses. Heart sounds: Normal heart sounds. Pulmonary:      Effort: Pulmonary effort is normal. No respiratory distress. Breath sounds: Normal breath sounds. Chest:      Chest wall: No tenderness. Abdominal:      General: Bowel sounds are decreased. Palpations: Abdomen is soft. Tenderness: There is generalized abdominal tenderness. There is no guarding or rebound. Musculoskeletal:      Cervical back: Full passive range of motion without pain, normal range of motion and neck supple. Skin:     General: Skin is warm and dry. Findings: No erythema or rash. Neurological:      Mental Status: He is alert and oriented to person, place, and time. Psychiatric:         Speech: Speech normal.         Behavior: Behavior normal.         Thought Content: Thought content normal.         Judgment: Judgment normal.        DIAGNOSTIC RESULTS   LABS:     Recent Results (from the past 12 hour(s))   CBC WITH AUTOMATED DIFF    Collection Time: 01/05/23  3:36 PM   Result Value Ref Range    WBC 8.7 4.1 - 11.1 K/uL    RBC 2.95 (L) 4.10 - 5.70 M/uL    HGB 9.0 (L) 12.1 - 17.0 g/dL    HCT 25.7 (L) 36.6 - 50.3 %    MCV 87.1 80.0 - 99.0 FL    MCH 30.5 26.0 - 34.0 PG    MCHC 35.0 30.0 - 36.5 g/dL    RDW 15.5 (H) 11.5 - 14.5 %    PLATELET 973 654 - 006 K/uL    MPV 9.8 8.9 - 12.9 FL    NRBC 0.0 0  WBC    ABSOLUTE NRBC 0.00 0.00 - 0.01 K/uL    NEUTROPHILS 76 (H) 32 - 75 %    LYMPHOCYTES 14 12 - 49 %    MONOCYTES 8 5 - 13 %    EOSINOPHILS 1 0 - 7 %    BASOPHILS 0 0 - 1 %    IMMATURE GRANULOCYTES 1 (H) 0.0 - 0.5 %    ABS. NEUTROPHILS 6.6 1.8 - 8.0 K/UL    ABS. LYMPHOCYTES 1.3 0.8 - 3.5 K/UL    ABS. MONOCYTES 0.7 0.0 - 1.0 K/UL    ABS. EOSINOPHILS 0.1 0.0 - 0.4 K/UL    ABS. BASOPHILS 0.0 0.0 - 0.1 K/UL    ABS. IMM. GRANS. 0.0 0.00 - 0.04 K/UL    DF AUTOMATED     METABOLIC PANEL, COMPREHENSIVE    Collection Time: 01/05/23  3:36 PM   Result Value Ref Range    Sodium 139 136 - 145 mmol/L    Potassium 2.2 (LL) 3.5 - 5.1 mmol/L    Chloride 105 97 - 108 mmol/L    CO2 28 21 - 32 mmol/L    Anion gap 6 5 - 15 mmol/L    Glucose 123 (H) 65 - 100 mg/dL    BUN 10 6 - 20 MG/DL    Creatinine 0.76 0.70 - 1.30 MG/DL    BUN/Creatinine ratio 13 12 - 20      eGFR >60 >60 ml/min/1.73m2    Calcium 7.9 (L) 8.5 - 10.1 MG/DL    Bilirubin, total 0.2 0.2 - 1.0 MG/DL    ALT (SGPT) 18 12 - 78 U/L    AST (SGOT) 21 15 - 37 U/L    Alk. phosphatase 111 45 - 117 U/L    Protein, total 5.8 (L) 6.4 - 8.2 g/dL    Albumin 2.3 (L) 3.5 - 5.0 g/dL    Globulin 3.5 2.0 - 4.0 g/dL    A-G Ratio 0.7 (L) 1.1 - 2.2         RADIOLOGY:  Non-plain film images such as CT, Ultrasound and MRI are read by the radiologist. Plain radiographic images are visualized and preliminarily interpreted by the ED Provider with the below findings:      Interpretation per the Radiologist below, if available at the time of this note:     CT ABD PELV W CONT    Result Date: 1/5/2023  EXAM: CT ABD PELV W CONT INDICATION: Difffuse abdominal pain, N/V/D, history of rectal CA and SBO, adhesion vs SBO? COMPARISON: 11/28/2022 CONTRAST: 100 mL of Isovue-370. TECHNIQUE: Following the uneventful intravenous administration of contrast, thin axial images were obtained through the abdomen and pelvis. Coronal and sagittal reconstructions were generated. Oral contrast was not administered. CT dose reduction was achieved through use of a standardized protocol tailored for this examination and automatic exposure control for dose modulation. FINDINGS: LOWER THORAX: No significant abnormality in the incidentally imaged lower chest. LIVER: No mass. BILIARY TREE: Cholecystectomy. , But measures 5 mm and is borderline in size. SPLEEN: Hypodensity dissecting into the spleen from the pancreatic tail unchanged.  PANCREAS: Diffuse pancreatic calcifications consistent with chronic pancreatitis. Pancreatic ductal dilatation is again noted. There is a cystic lesion in the head of pancreas measuring 3.0 x 3.1 cm increased in size. Second lesion posteriorly measures 1.8 x 2.3 cm increase in size. There is a cystic lesion in the tail the pancreas measuring 2.9 x 1.9 cm also increased in size. Peripancreatic fluid is noted. ADRENALS: Unremarkable. KIDNEYS: Simple cyst in the right kidney too small to characterize. STOMACH: Unremarkable. SMALL BOWEL: Multiple dilated loops of small bowel beginning the left mid abdomen extending into the pelvis with multiple loops clustered together in the pelvis. This is similar to the prior study. COLON: Colonic rectal anastomosis. No evidence of colonic obstruction. There is moderate stool in the colon. APPENDIX: Not visualized PERITONEUM: Diffuse abdominal and pelvic ascites although no clear pocket for paracentesis is identified. RETROPERITONEUM: No retroperitoneal adenopathy. REPRODUCTIVE ORGANS: Unremarkable URINARY BLADDER: Marrero catheter in urinary bladder. High density material layering posteriorly urinary bladder may represent stones BONES: No destructive bone lesion. ABDOMINAL WALL: No mass or hernia. ADDITIONAL COMMENTS: N/A     1. Chronic pancreatitis. Superimposed acute exercise is not excluded. 2.  Increased size of cystic lesions in the pancreatic head and tail may represent pseudocysts although malignancy is difficult to exclude. 3.  Increase ascites in the abdomen and pelvis. 4.  Small bowel obstruction similar to the prior study.        PROCEDURES   Unless otherwise noted below, none  Procedures     CRITICAL CARE TIME   CRITICAL CARE NOTE :    6:59 PM    IMPENDING DETERIORATION -Metabolic    ASSOCIATED RISK FACTORS - Metabolic changes and Dehydration    MANAGEMENT- Bedside Assessment    INTERPRETATION -  CT Scan    INTERVENTIONS - Metobolic interventions and NG tube placement    CASE REVIEW - Hospitalist/Intensivist, Medical Sub-Specialist, and Nursing    TREATMENT RESPONSE -Improved    PERFORMED BY - Self    NOTES   :    I personally spent 65 minutes of critical care time with this patient. This is time spent at this critically ill patient's bedside actively involved in patient care as well as the coordination of care and discussions with the patient's family. This includes time involved in examination of patient, ordering and reviewing of laboratory and imaging studies, cardiac monitoring, pulse oximetry, re-evaluation of patient's condition, evaluation of patient's response to treatment, ordering and performing treatments and interventions, review of old charts, consultations with specialist, discussions with family regarding pertinent collateral history and plan of care, bedside attention and documentation. During this entire length of time I was immediately available to the patient. This does not include time spent on separately reported billable procedures. Critical Care: The reason for providing this level of medical care for this critically-ill patient was due to a critical illness that impaired one or more vital organ systems, such that there was a high probability of imminent or life-threatening deterioration in the patient's condition. This care involved the highest level of preparedness to intervene urgently. This care involved high complexity decision making to assess, manipulate, and support vital system functions, to treat this degree of vital organ system failure, and to prevent further life threatening deterioration of the patients condition requiring frequent assessments and interventions.     Caryle Lean, MD      EMERGENCY DEPARTMENT COURSE and DIFFERENTIAL DIAGNOSIS/MDM   Vitals:    Patient Vitals for the past 24 hrs:   Temp Pulse Resp BP SpO2   01/05/23 1627 -- 67 15 (!) 177/95 99 %   01/05/23 1612 -- 68 18 (!) 178/92 100 %   01/05/23 1557 -- 70 16 (!) 180/101 99 % 01/05/23 1547 -- -- -- (!) 179/91 --   01/05/23 1357 98.1 °F (36.7 °C) 96 18 (!) 154/97 100 %      Patient was given the following medications:  Medications   potassium chloride 10 mEq in 100 ml IVPB (10 mEq IntraVENous New Bag 1/5/23 1851)   lidocaine (XYLOCAINE) 4 % (40 mg/mL) topical solution (has no administration in time range)   sodium chloride 0.9 % bolus infusion 1,000 mL (1,000 mL IntraVENous New Bag 1/5/23 1549)   ondansetron (ZOFRAN) injection 4 mg (4 mg IntraVENous Given 1/5/23 1553)   morphine injection 2 mg (2 mg IntraVENous Given 1/5/23 1553)   iopamidoL (ISOVUE-370) 370 mg iodine /mL (76 %) injection 100 mL (100 mL IntraVENous Given 1/5/23 1703)   morphine injection 2 mg (2 mg IntraVENous Given 1/5/23 1713)       CONSULTS: (Who and What was discussed)  IP CONSULT TO GENERAL SURGERY    6:48 PM  Cisco Radford MD spoke with Dr. Catrachita Shelton, Consult for Surgery. Discussed HPI and PE, available diagnostic tests and clinical findings. He is in agreement with care plans as outlined. He recommends placing an NG tube, correcting his hypokalemia, keeping him n.p.o., and he can be admitted here to the hospitalist service. He will round on the patient tomorrow but feels this can be medically managed. Social Determinants affecting Dx or Tx: Patient lacks transportation. Records Reviewed (source and summary): Nursing Notes, Old Medical Records, Previous Radiology Studies, and Previous Laboratory Studies    CC/HPI Summary, DDx, ED Course, and Reassessment: 39-year-old male presents with generalized abdominal pain, diarrhea, decreased appetite, and dizziness for the past 3 to 4 days that has progressively worsened. Differential includes small bowel obstruction, electrolyte abnormality, dehydration, radiation proctitis, adhesions, colitis, diverticulitis, and low suspicion for mesenteric ischemia. Patient has a history of recurrent small bowel obstructions that are mostly managed medically.   Will order labs, imaging, treat with fluids and analgesics, and reassess. Progress Note  6:37 PM  I have re-evaluated pt and his CT shows a small bowel obstruction, chronic pancreatitis, and ascites. Will discuss with general surgery for disposition planning (transfer versus admission here). ED Course as of 01/09/23 1552   Thu Jan 05, 2023 1919 7:19 PM  Denver Camacho MD spoke with Dr. Kirstin Rivera, Consult for Hospitalist. Discussed HPI and PE, available diagnostic tests and clinical findings. He is in agreement with care plans as outlined. He agrees to admit the patient here and will see him on the monitor. [MS]      ED Course User Index  [MS] Belen Yu MD       Disposition Considerations (Tests not done, Shared Decision Making, Pt Expectation of Test or Tx.): Considered transferring patient due to his small bowel obstruction but general surgery feels the patient can be admitted here and manage medically. Patient was admitted here most recently 2 months ago for a similar presentation and did well. FINAL IMPRESSION     1. Small bowel obstruction ([address]    2. Hypokalemia    3. Chronic pancreatitis, unspecified pancreatitis type [facility])          DISPOSITION/PLAN   Decision to Admit     Admit Note: Pt is being admitted by Dr. Kirstin Rivera. The results of their tests and reason(s) for their admission have been discussed with pt and/or available family. They convey agreement and understanding for the need to be admitted and for the admission diagnosis. I am the Primary Clinician of Record. Demetrice Grier MD (electronically signed)    (Please note that parts of this dictation were completed with voice recognition software. Quite often unanticipated grammatical, syntax, homophones, and other interpretive errors are inadvertently transcribed by the computer software. Please disregards these errors.  Please excuse any errors that have escaped final proofreading.)

## 2023-01-05 NOTE — ED NOTES
Pt arrives to the ED AAOX4 with a c/c of mid abd pain associated with diarrhea onset 1 week ago. Pt also reports intermittent dizziness of same onset, denies at this time. Pt is noted in stable condition, now in ED room with side rail up, bed to lowest position, and call light within reach. Will continue to monitor and wait for ED provider evaluation. Emergency Department Nursing Plan of Care       The Nursing Plan of Care is developed from the Nursing assessment and Emergency Department Attending provider initial evaluation. The plan of care may be reviewed in the ED Provider note.     The Plan of Care was developed with the following considerations:   Patient / Family readiness to learn indicated by:verbalized understanding  Persons(s) to be included in education: patient  Barriers to Learning/Limitations:No    Signed     Michelle Moya    1/5/2023   3:02 PM

## 2023-01-05 NOTE — ED TRIAGE NOTES
Patient presents to ED with c/o diarrhea and dizziness for 1 week. Patient ambulated to treatment area with steady gait.

## 2023-01-06 LAB
AMPHET UR QL SCN: NEGATIVE
ANION GAP SERPL CALC-SCNC: 3 MMOL/L (ref 5–15)
BARBITURATES UR QL SCN: NEGATIVE
BENZODIAZ UR QL: NEGATIVE
BUN SERPL-MCNC: 8 MG/DL (ref 6–20)
BUN/CREAT SERPL: 14 (ref 12–20)
CALCIUM SERPL-MCNC: 7.7 MG/DL (ref 8.5–10.1)
CANNABINOIDS UR QL SCN: NEGATIVE
CHLORIDE SERPL-SCNC: 105 MMOL/L (ref 97–108)
CO2 SERPL-SCNC: 30 MMOL/L (ref 21–32)
COCAINE UR QL SCN: POSITIVE
CREAT SERPL-MCNC: 0.56 MG/DL (ref 0.7–1.3)
DRUG SCRN COMMENT,DRGCM: ABNORMAL
ETHANOL SERPL-MCNC: <10 MG/DL
GLUCOSE BLD STRIP.AUTO-MCNC: 202 MG/DL (ref 65–117)
GLUCOSE BLD STRIP.AUTO-MCNC: 54 MG/DL (ref 65–117)
GLUCOSE BLD STRIP.AUTO-MCNC: 63 MG/DL (ref 65–117)
GLUCOSE SERPL-MCNC: 101 MG/DL (ref 65–100)
MAGNESIUM SERPL-MCNC: 1.3 MG/DL (ref 1.6–2.4)
METHADONE UR QL: NEGATIVE
OPIATES UR QL: POSITIVE
PCP UR QL: NEGATIVE
POTASSIUM SERPL-SCNC: 2.8 MMOL/L (ref 3.5–5.1)
SERVICE CMNT-IMP: ABNORMAL
SODIUM SERPL-SCNC: 138 MMOL/L (ref 136–145)

## 2023-01-06 PROCEDURE — 82077 ASSAY SPEC XCP UR&BREATH IA: CPT

## 2023-01-06 PROCEDURE — 36415 COLL VENOUS BLD VENIPUNCTURE: CPT

## 2023-01-06 PROCEDURE — 83735 ASSAY OF MAGNESIUM: CPT

## 2023-01-06 PROCEDURE — 74011250637 HC RX REV CODE- 250/637: Performed by: INTERNAL MEDICINE

## 2023-01-06 PROCEDURE — 97161 PT EVAL LOW COMPLEX 20 MIN: CPT | Performed by: PHYSICAL THERAPIST

## 2023-01-06 PROCEDURE — 99222 1ST HOSP IP/OBS MODERATE 55: CPT | Performed by: SURGERY

## 2023-01-06 PROCEDURE — 74011250636 HC RX REV CODE- 250/636: Performed by: INTERNAL MEDICINE

## 2023-01-06 PROCEDURE — 86301 IMMUNOASSAY TUMOR CA 19-9: CPT

## 2023-01-06 PROCEDURE — 74011000250 HC RX REV CODE- 250: Performed by: STUDENT IN AN ORGANIZED HEALTH CARE EDUCATION/TRAINING PROGRAM

## 2023-01-06 PROCEDURE — 80307 DRUG TEST PRSMV CHEM ANLYZR: CPT

## 2023-01-06 PROCEDURE — 74011250636 HC RX REV CODE- 250/636: Performed by: STUDENT IN AN ORGANIZED HEALTH CARE EDUCATION/TRAINING PROGRAM

## 2023-01-06 PROCEDURE — 82962 GLUCOSE BLOOD TEST: CPT

## 2023-01-06 PROCEDURE — 65270000029 HC RM PRIVATE

## 2023-01-06 PROCEDURE — 80048 BASIC METABOLIC PNL TOTAL CA: CPT

## 2023-01-06 RX ORDER — MAGNESIUM SULFATE HEPTAHYDRATE 40 MG/ML
2 INJECTION, SOLUTION INTRAVENOUS ONCE
Status: COMPLETED | OUTPATIENT
Start: 2023-01-06 | End: 2023-01-06

## 2023-01-06 RX ORDER — OXYCODONE HYDROCHLORIDE 5 MG/1
5 TABLET ORAL
Status: DISCONTINUED | OUTPATIENT
Start: 2023-01-06 | End: 2023-01-13 | Stop reason: HOSPADM

## 2023-01-06 RX ORDER — OXYCODONE HYDROCHLORIDE 5 MG/1
5 TABLET ORAL
Status: ON HOLD | COMMUNITY

## 2023-01-06 RX ORDER — DEXTROSE MONOHYDRATE 100 MG/ML
0-250 INJECTION, SOLUTION INTRAVENOUS AS NEEDED
Status: DISCONTINUED | OUTPATIENT
Start: 2023-01-06 | End: 2023-01-13 | Stop reason: HOSPADM

## 2023-01-06 RX ORDER — POTASSIUM CHLORIDE 7.45 MG/ML
10 INJECTION INTRAVENOUS
Status: COMPLETED | OUTPATIENT
Start: 2023-01-06 | End: 2023-01-07

## 2023-01-06 RX ORDER — NALOXONE HYDROCHLORIDE 0.4 MG/ML
0.4 INJECTION, SOLUTION INTRAMUSCULAR; INTRAVENOUS; SUBCUTANEOUS
Status: DISCONTINUED | OUTPATIENT
Start: 2023-01-06 | End: 2023-01-13 | Stop reason: HOSPADM

## 2023-01-06 RX ORDER — OXYCODONE HYDROCHLORIDE 5 MG/1
10 TABLET ORAL
Status: DISCONTINUED | OUTPATIENT
Start: 2023-01-06 | End: 2023-01-13 | Stop reason: HOSPADM

## 2023-01-06 RX ORDER — ACETAMINOPHEN 500 MG
1000 TABLET ORAL
Status: DISCONTINUED | OUTPATIENT
Start: 2023-01-06 | End: 2023-01-13 | Stop reason: HOSPADM

## 2023-01-06 RX ORDER — IBUPROFEN 200 MG
4 TABLET ORAL AS NEEDED
Status: DISCONTINUED | OUTPATIENT
Start: 2023-01-06 | End: 2023-01-13 | Stop reason: HOSPADM

## 2023-01-06 RX ORDER — POTASSIUM CHLORIDE 7.45 MG/ML
10 INJECTION INTRAVENOUS
Status: DISCONTINUED | OUTPATIENT
Start: 2023-01-06 | End: 2023-01-06

## 2023-01-06 RX ORDER — DEXTROSE MONOHYDRATE 100 MG/ML
0-250 INJECTION, SOLUTION INTRAVENOUS AS NEEDED
Status: DISCONTINUED | OUTPATIENT
Start: 2023-01-06 | End: 2023-01-07

## 2023-01-06 RX ADMIN — PHENOL 1 SPRAY: 1.5 LIQUID ORAL at 13:15

## 2023-01-06 RX ADMIN — DEXTROSE MONOHYDRATE 250 ML: 100 INJECTION, SOLUTION INTRAVENOUS at 21:47

## 2023-01-06 RX ADMIN — MORPHINE SULFATE 2 MG: 2 INJECTION, SOLUTION INTRAMUSCULAR; INTRAVENOUS at 17:06

## 2023-01-06 RX ADMIN — POTASSIUM CHLORIDE, DEXTROSE MONOHYDRATE AND SODIUM CHLORIDE: 300; 5; 900 INJECTION, SOLUTION INTRAVENOUS at 06:34

## 2023-01-06 RX ADMIN — POTASSIUM CHLORIDE 10 MEQ: 7.46 INJECTION, SOLUTION INTRAVENOUS at 18:11

## 2023-01-06 RX ADMIN — MORPHINE SULFATE 2 MG: 2 INJECTION, SOLUTION INTRAMUSCULAR; INTRAVENOUS at 22:51

## 2023-01-06 RX ADMIN — POTASSIUM CHLORIDE 10 MEQ: 7.46 INJECTION, SOLUTION INTRAVENOUS at 23:03

## 2023-01-06 RX ADMIN — PHENOL 1 SPRAY: 1.5 LIQUID ORAL at 17:00

## 2023-01-06 RX ADMIN — POTASSIUM CHLORIDE, DEXTROSE MONOHYDRATE AND SODIUM CHLORIDE: 300; 5; 900 INJECTION, SOLUTION INTRAVENOUS at 22:58

## 2023-01-06 RX ADMIN — MAGNESIUM SULFATE HEPTAHYDRATE 2 G: 2 INJECTION, SOLUTION INTRAVENOUS at 20:51

## 2023-01-06 RX ADMIN — MORPHINE SULFATE 2 MG: 2 INJECTION, SOLUTION INTRAMUSCULAR; INTRAVENOUS at 09:58

## 2023-01-06 RX ADMIN — SODIUM CHLORIDE, PRESERVATIVE FREE 10 ML: 5 INJECTION INTRAVENOUS at 06:34

## 2023-01-06 RX ADMIN — MORPHINE SULFATE 2 MG: 2 INJECTION, SOLUTION INTRAMUSCULAR; INTRAVENOUS at 01:54

## 2023-01-06 NOTE — H&P
History & Physical    Primary Care Provider: Kristin Haynes MD  Source of Information: Patient and chart review    History of Presenting Illness:   Evelia Torres is a 59 y.o. male w/ hx of rectal cancer s/p resection, htn, gastric ulcers, bph, mood disorder who presents to ed with multiple complaints. Reports 3 months of generalized sharp abdominal pain. Followed by GI at [address] [place] [address] is scheduled for egd w/ endoscopic us. [facility] to [facility] of [facility]. Also reports about 25 loose stools daily. The patient denies any fever, chills, chest pain, cough, congestion, recent illness, palpitations, or dysuria. Remarkable vitals on ER Presentations: 180/101  Labs Remarkable for: hgb 9.0, k-2.2  ER Images: ct abd et pelvis: chronic pancreatitis. 1.  Chronic pancreatitis. Superimposed Increased size of cystic lesions in the pancreatic head and tail may  represent pseudocysts although malignancy is difficult to exclude. Increase ascites in the abdomen and pelvis. Small bowel obstruction      Review of Systems:  Pertinent items are noted in the History of Present Illness. Past Medical History:   Diagnosis Date    Cancer [facility])     rectal    Gastrointestinal disorder     Gastric Ulcers; Rectal CA    Hematuria 9/1/2021    Hypertension       Past Surgical History:   Procedure Laterality Date    HX GI       Prior to Admission medications    Medication Sig Start Date End Date Taking? Authorizing Provider   tamsulosin (FLOMAX) 0.4 mg capsule Take 1 Capsule by mouth daily. 12/4/22   Lorena Moore MD   thiamine mononitrate (B-1) 100 mg tablet Take 1 Tablet by mouth daily. 12/4/22   Lorena Moore MD   lactobacillus combination no.4 3 billion cell cap Take 1 Capsule by mouth daily.  12/4/22   Lorena Moore MD   metoprolol tartrate (LOPRESSOR) 25 mg tablet Take 0.5 Tablets by mouth two (2) times a day. 2/5/22   Darrion SANDOVAL MD   folic acid (FOLVITE) 1 mg tablet Take 1 Tablet by mouth daily. 9/9/21   Kayla Bobby NP   finasteride (PROSCAR) 5 mg tablet Take 5 mg by mouth daily. Provider, Historical   mirtazapine (REMERON) 15 mg tablet Take 15 mg by mouth nightly. Provider, Historical     No Known Allergies   Family History   Problem Relation Age of Onset    Cancer Brother         SOCIAL HISTORY:  Patient resides:  Independently x   Assisted Living    SNF    With family care       Smoking history:   None x   Former    Chronic      Alcohol history:   None x   Social    Chronic      Ambulates:   Independently x   w/cane    w/walker    w/wc    CODE STATUS:  DNR    Full x   Other      Objective:     Physical Exam:     Visit Vitals  BP (!) 177/95   Pulse 67   Temp 98.1 °F (36.7 °C)   Resp 15   Ht 5' 8\" (1.727 m)   Wt 43.5 kg (96 lb)   SpO2 99%   BMI 14.60 kg/m²      O2 Device: None (Room air)    General:  Alert, cooperative, no distress, appears stated age. Head:  Normocephalic, without obvious abnormality, atraumatic. Eyes:  Conjunctivae/corneas clear. PERRL, EOMs intact. Nose: Nares normal. Septum midline. Mucosa normal.        Neck: symmetrical, trachea midline       Lungs:   Clear to auscultation bilaterally. Chest wall:  No tenderness or deformity. Heart:  Regular rate and rhythm, S1, S2 normal   Abdomen:   Soft, rmq ttp. Bowel sounds normal. No masses,  No organomegaly. Extremities: Extremities normal, atraumatic, no cyanosis or edema. Pulses: 2+ and symmetric all extremities. Skin: Skin color, texture, turgor normal. No rashes or lesions   Neurologic: CNII-XII grossly intact.         Data Review:     Recent Days:  Recent Labs     01/05/23  1536   WBC 8.7   HGB 9.0*   HCT 25.7*        Recent Labs     01/05/23  1536      K 2.2*      CO2 28   *   BUN 10   CREA 0.76   CA 7.9*   ALB 2.3*   ALT 18     No results for input(s): PH, PCO2, PO2, HCO3, FIO2 in the last 72 hours. 24 Hour Results:  Recent Results (from the past 24 hour(s))   CBC WITH AUTOMATED DIFF    Collection Time: 01/05/23  3:36 PM   Result Value Ref Range    WBC 8.7 4.1 - 11.1 K/uL    RBC 2.95 (L) 4.10 - 5.70 M/uL    HGB 9.0 (L) 12.1 - 17.0 g/dL    HCT 25.7 (L) 36.6 - 50.3 %    MCV 87.1 80.0 - 99.0 FL    MCH 30.5 26.0 - 34.0 PG    MCHC 35.0 30.0 - 36.5 g/dL    RDW 15.5 (H) 11.5 - 14.5 %    PLATELET 379 255 - 287 K/uL    MPV 9.8 8.9 - 12.9 FL    NRBC 0.0 0  WBC    ABSOLUTE NRBC 0.00 0.00 - 0.01 K/uL    NEUTROPHILS 76 (H) 32 - 75 %    LYMPHOCYTES 14 12 - 49 %    MONOCYTES 8 5 - 13 %    EOSINOPHILS 1 0 - 7 %    BASOPHILS 0 0 - 1 %    IMMATURE GRANULOCYTES 1 (H) 0.0 - 0.5 %    ABS. NEUTROPHILS 6.6 1.8 - 8.0 K/UL    ABS. LYMPHOCYTES 1.3 0.8 - 3.5 K/UL    ABS. MONOCYTES 0.7 0.0 - 1.0 K/UL    ABS. EOSINOPHILS 0.1 0.0 - 0.4 K/UL    ABS. BASOPHILS 0.0 0.0 - 0.1 K/UL    ABS. IMM. GRANS. 0.0 0.00 - 0.04 K/UL    DF AUTOMATED     METABOLIC PANEL, COMPREHENSIVE    Collection Time: 01/05/23  3:36 PM   Result Value Ref Range    Sodium 139 136 - 145 mmol/L    Potassium 2.2 (LL) 3.5 - 5.1 mmol/L    Chloride 105 97 - 108 mmol/L    CO2 28 21 - 32 mmol/L    Anion gap 6 5 - 15 mmol/L    Glucose 123 (H) 65 - 100 mg/dL    BUN 10 6 - 20 MG/DL    Creatinine 0.76 0.70 - 1.30 MG/DL    BUN/Creatinine ratio 13 12 - 20      eGFR >60 >60 ml/min/1.73m2    Calcium 7.9 (L) 8.5 - 10.1 MG/DL    Bilirubin, total 0.2 0.2 - 1.0 MG/DL    ALT (SGPT) 18 12 - 78 U/L    AST (SGOT) 21 15 - 37 U/L    Alk. phosphatase 111 45 - 117 U/L    Protein, total 5.8 (L) 6.4 - 8.2 g/dL    Albumin 2.3 (L) 3.5 - 5.0 g/dL    Globulin 3.5 2.0 - 4.0 g/dL    A-G Ratio 0.7 (L) 1.1 - 2.2           Imaging:     Assessment:     Preet Stone is a 59 y.o. male w/ hx of rectal cancer s/p resection, htn, gastric ulcers, bph, mood disorder who is admitted for sbo.        Plan:       SBO  -NG, MIVF, GS consult in am  -Keep NPO    Acute on Chronic Pancreatitis  Pancreatic Anomaly  -scheduled for egd w/ EUS with GI  -cannot r/o pancreatic cancer with recent ct  -check lipase and ca19-9  -GI follow-up outpatient    Hypokalemia  -replete iv  -bmp in am    Htn / mood disorder / bph  -c/w home meds      FEN/GI -  d5/1/2nsw/k20  Activity - as tolerated  DVT prophylaxis - scds  GI prophylaxis -  ni  Disposition - home    CODE STATUS:  full code       Signed By: Milena Najera MD     January 5, 2023

## 2023-01-06 NOTE — ED NOTES
1905  Shift change report received from [facility]. Report included review of SBAR, accordion report, results review, orders, meds, ROS, and POC. All questions answered. Transfer of care complete.

## 2023-01-06 NOTE — PROGRESS NOTES
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

## 2023-01-06 NOTE — PROGRESS NOTES
[facility]: MED RECONCILIATION    Comments/Recommendations:   Med rec performed 11/29/22 by pharmacist. Patient was recently discharged on 12/4/22. Medication list reflects last discharge list.   [facility]  - patient last filled oxycodone 5 mg #12 on 12/5/22. Noting UDS + for opiates. Allergies: Patient has no known allergies. Prior to Admission Medications:       Prior to Admission Medications   Prescriptions Last Dose Informant Patient Reported? Taking?   finasteride (PROSCAR) 5 mg tablet  Other Yes No   Sig: Take 5 mg by mouth daily. folic acid (FOLVITE) 1 mg tablet  Other No No   Sig: Take 1 Tablet by mouth daily. lactobacillus combination no.4 3 billion cell cap  Other No No   Sig: Take 1 Capsule by mouth daily. metoprolol tartrate (LOPRESSOR) 25 mg tablet  Other No No   Sig: Take 0.5 Tablets by mouth two (2) times a day. mirtazapine (REMERON) 15 mg tablet  Other Yes No   Sig: Take 15 mg by mouth nightly. oxyCODONE IR (ROXICODONE) 5 mg immediate release tablet  Other Yes No   Sig: Take 5 mg by mouth every six (6) hours as needed for Pain.   tamsulosin (FLOMAX) 0.4 mg capsule  Other No No   Sig: Take 1 Capsule by mouth daily. thiamine mononitrate (B-1) 100 mg tablet  Other No No   Sig: Take 1 Tablet by mouth daily.       Facility-Administered Medications: None         TEA Garcia   Contact: [phone]

## 2023-01-06 NOTE — PROGRESS NOTES
0795-received report from Kushal jimenez RN which included SBAR, Kardex, and MAR    0663- shift assessment completed. Pt was pleasant and cooperative. Pt expressed abdominal pain 8/10.     0835-NG tube cannister 400; [place] 100    0945- LPN noticed that meds scheduled are PO but pt is NPO and has a NG tube w/ suction. LPN consulted Angelina Godfrey RN about matter for verification. LPN will mention discrepancy to MD at 10am rounds. 0958-pt received 2mg of Morphine for complaint of 8/10 abdominal pain. Tolerated well     1000-pt on bedside commode for BM     1020- Rounds: LPN expressed concern to MD abt the new PO meds ordered for NPO pt w/NG tube that was on continuous suction and if there was another route option for meds. MD and Pharmacist consulted abt medications and MD expressed D/c all PO meds and continuing IV morphine. 1038-MD held PO meds in [facility]     1100-pt mentioned that his throat was very sore and requested a spray for relief. MD verbally informed    1300-LPN received Chloraseptic spray and administered to pt     1500-NG tube cannister changed; pulled 75mL and flushed w/ 75mL of NS. , Kendell Fields RN, present for assistance. Due to difficulty with pulling residual, EHSAN Cummings assisted with changing out time clock. Pt tolerated well. New cannister and time clock. 1100mL measured before disposal.  and manager present. rapid response approx 1610. LPN participated and off the floor. Returned approx [phone]    1700-pt requested and received chloraseptic spray; LPN spoke with Dr. Korin Nelson [facility] care plan for pt. Dr. Korin Nelson requested NG tube be clamp and see how pt tolerates task. If it goes well, NG tube will be removed and pt will be started on a clear liquid diet. LPN explained to pt the consent for disclosure of information form that is being sent to [facility] to obtain operative report and colonoscopy report. Pt verbally state that he understood and signed form.  Signed form was given to MT to be sent off per MD's request.     1706-pt received 2mg of morphine for complaint of 8/10 abdominal pain. Tolerated well    1811-potassium chloride bag hung      1950-report given to oncoming nurse, Lobo Steel RN. Included SBAR, Kardex, and all updates/ changes during day shift. RN was informed of Dr. Mikey Schuster decision to clamp the NG tube.  Expressed to oncoming RN that pt has had an increase in BMs throughout the day and that the surgeon has requested the operative and colonoscopy report from [facility].

## 2023-01-06 NOTE — ED NOTES
TRANSFER - OUT REPORT:    Verbal report given to Saadia Hightower RN (name) on Allie Nichole  being transferred to 2nd floor med/surge/braulio(unit) for routine progression of care       Report consisted of patients Situation, Background, Assessment and   Recommendations(SBAR). Information from the following report(s) SBAR, ED Summary, Intake/Output, MAR, Accordion, Recent Results, and Med Rec Status was reviewed with the receiving nurse. Opportunity for questions and clarification was provided. Patient transported with:   Monitor by this RN.

## 2023-01-06 NOTE — CONSULTS
Patient seen at request of Dr. Josette Balderas. Information obtained from patient and review of chart. Ok Ivory is an 59 y.o. male who was recently admitted with a recurrent small bowel obstruction. Mr. Fadi Camacho tells me that he recently began experiencing abdominal pain as well as nausea and vomiting several days ago. No fevers or chills. On review of the chart, Mr. Fadi Camacho reports approximately twenty five loose stools daily. No melena or blood per rectum. Last bowel movement was today. No dysuria or hematuria. He has otherwise been in his usual state of health. CT scan abdomen/pelvis with IV contrast - 1/5/2023 -  Chronic pancreatitis. Superimposed acute pancreatitis is not excluded. Increased size of cystic lesions in the pancreatic head and tail may represent pseudocysts although malignancy is difficult to exclude. Increase ascites in the abdomen and pelvis. Small bowel obstruction similar to the prior study. Of note, Mr. Fadi Camacho was admitted in late November, 2022 with a small bowel obstruction which was managed non-operatively. Furthermore, Mr. Fadi Camacho had a colonoscopy in September, 2022. Allergies - Patient has no known allergies. Meds - Reviewed. PMH -   Past Medical History:   Diagnosis Date    Cancer [facility])     rectal    Gastrointestinal disorder     Gastric Ulcers; Rectal CA    Hematuria 9/1/2021    Hypertension      PSH -   Past Surgical History:   Procedure Laterality Date    HX GI       Fam Hx -   Family History   Problem Relation Age of Onset    Cancer Brother      Soc Hx -   Social History     Tobacco Use    Smoking status: Every Day     Packs/day: 0.25     Types: Cigarettes    Smokeless tobacco: Never    Tobacco comments:     \"1 pack last me three days\"   Substance Use Topics    Alcohol use: Not Currently     Comment: hasn't drank x3mo (6/3/22)     Patient is a thin man in no acute distress.   Tm 98.2 HR: 74 BP: 152/83 Resp Rate: 20 100% sat on room air.    HEENT: Anicteric. Neck: Supple without palpable lymphadenopathy. Cor: RRR. Chest: Right IJ marly cath. Lungs: Bilateral breath sounds. Clear to auscultation. Abd: Soft. Tender. No guarding or rebound. Somewhat distended. Ext: No edema. Neuro: Grossly Non focal.     Labs -   Recent Results (from the past 24 hour(s))   ETHYL ALCOHOL    Collection Time: 01/06/23  4:18 AM   Result Value Ref Range    ALCOHOL(ETHYL),SERUM <10 <10 MG/DL   DRUG SCREEN, URINE    Collection Time: 01/06/23  4:18 AM   Result Value Ref Range    AMPHETAMINES Negative NEG      BARBITURATES Negative NEG      BENZODIAZEPINES Negative NEG      COCAINE Positive (A) NEG      METHADONE Negative NEG      OPIATES Positive (A) NEG      PCP(PHENCYCLIDINE) Negative NEG      THC (TH-CANNABINOL) Negative NEG      Drug screen comment (NOTE)    METABOLIC PANEL, BASIC    Collection Time: 01/06/23  4:18 AM   Result Value Ref Range    Sodium 138 136 - 145 mmol/L    Potassium 2.8 (L) 3.5 - 5.1 mmol/L    Chloride 105 97 - 108 mmol/L    CO2 30 21 - 32 mmol/L    Anion gap 3 (L) 5 - 15 mmol/L    Glucose 101 (H) 65 - 100 mg/dL    BUN 8 6 - 20 MG/DL    Creatinine 0.56 (L) 0.70 - 1.30 MG/DL    BUN/Creatinine ratio 14 12 - 20      eGFR >60 >60 ml/min/1.73m2    Calcium 7.7 (L) 8.5 - 10.1 MG/DL   MAGNESIUM    Collection Time: 01/06/23  4:18 AM   Result Value Ref Range    Magnesium 1.3 (L) 1.6 - 2.4 mg/dL     CT Scan - Reviewed. Imp: Mr. Carmelo Leonard is a 59 y.o. male with a recurrent small bowel obstruction. Plan: 1. Will try clamping NG tube since he has moved his bowels. If tolerated, then will remove NG and start clear liquids. 2. NPO except ice chips for now. 3. IV hydration - will decrease rate to 50 ml/hour. 4. Will check gastrograffin UGI. 5. Anti-emetics and pain medication as needed. 6. Will try to obtain colonoscopy report. 7. Replace K 2.8 and Mg 1.3.    8. Plans per Dr. Santos Gonzalez.

## 2023-01-06 NOTE — ED NOTES
Called 2nd floor to give SBAR out report, receiving nurse is on lunch break as agreed upon with RNS prior to taking new admit. Receiving nurse, Lala Dangelo RN, to call ED and ask for this RN for report once lunch complete.

## 2023-01-06 NOTE — PROGRESS NOTES
[facility]  Hospitalist Group                                                                                          Hospitalist Progress Note  Michell Farr MD  Answering service: [phone] OR 36 from in house phone        Date of Service:  2023  NAME:  Ok Ivory  :  1965  MRN:  [id number]      Admission Summary:   From HPI: Ok Ivory is a 59 y.o. male w/ hx of rectal cancer s/p resection, htn, gastric ulcers, bph, mood disorder who presents to ed with multiple complaints. Reports 3 months of generalized sharp abdominal pain. Followed by GI at [facility] is scheduled for egd w/ endoscopic us. Was advised to present to [place] tonight but came to [facility] due to lack of transportation. Also reports about 25 loose stools daily. The patient denies any fever, chills, chest pain, cough, congestion, recent illness, palpitations, or dysuria. Remarkable vitals on ER Presentations: 180/101  Labs Remarkable for: hgb 9.0, k-2.2  ER Images: ct abd et pelvis: chronic pancreatitis. 1.  Chronic pancreatitis. Superimposed Increased size of cystic lesions in the pancreatic head and tail may  represent pseudocysts although malignancy is difficult to exclude. Increase ascites in the abdomen and pelvis.    Small bowel obstruction        Interval history / Subjective:   Admitted over night  + abd pain  Had small BM  NG in place  General surgeon evaluation is pending     Assessment & Plan:     SBO, recurrent  -continue NG, MIVF,   General Surgeon consulted, pending recommendations  CT abdomen was reviewed,   -Keep NPO  Replace electrolytes  Pain control, Morphine PRN     Acute on Chronic Pancreatitis  Pancreatic Anomaly  -scheduled for egd w/ EUS with GI as outpt  -cannot r/o pancreatic cancer with recent ct  -check lipase and ca19-9  -GI follow-up outpatient     Hypokalemia  -replete iv  -follow up bmp     Hypomagnesemia  replace     Htn / mood disorder / bph  -c/w home meds when pt able to tolerate PO  Hydralazine PRN while NPO     Polysubstance abuse  Recommended to quit     Code status: Full code  Prophylaxis: SCD  Care Plan discussed with: patient and RN  Anticipated Disposition: TBD, pending clinical improvement       Hospital Problems  Date Reviewed: 11/29/2022            Codes Class Noted POA    SBO (small bowel obstruction) ([facility] [address] ICD-10-CM: X24.144  ICD-9-CM: 560.9  11/29/2022 Unknown             Review of Systems:   A comprehensive review of systems was negative except for that written in the HPI. Vital Signs:    Last 24hrs VS reviewed since prior progress note. Most recent are:  Visit Vitals  BP (!) 152/83 (BP 1 Location: Right upper arm, BP Patient Position: At rest;Semi fowlers)   Pulse 74   Temp 98.2 °F (36.8 °C)   Resp 20   Ht 5' 8\" (1.727 m)   Wt 43.8 kg (96 lb 8 oz)   SpO2 100%   BMI 14.67 kg/m²         Intake/Output Summary (Last 24 hours) at 1/6/2023 1241  Last data filed at 1/6/2023 0654  Gross per 24 hour   Intake --   Output 395 ml   Net -395 ml        Physical Examination:     I had a face to face encounter with this patient and independently examined them on 1/6/2023 as outlined below:          Constitutional:  + abd pain, uncomfortable, cooperative, pleasant    ENT:  Oral mucosa moist, oropharynx benign. Resp:  Coarse bilaterally. No wheezing/rhonchi/rales. No accessory muscle use. CV:  Regular rhythm, normal rate, no gallops, rubs    GI:  + distended, + tender, hyperactive bowel sounds, no hepatosplenomegaly     Musculoskeletal:  No edema, warm, 2+ pulses throughout    Neurologic:  Moves all extremities. AAOx3, CN II-XII reviewed     NG in place       Data Review:    Review and/or order of clinical lab test    CT abd:  1. Chronic pancreatitis. Superimposed acute exercise is not excluded. 2.  Increased size of cystic lesions in the pancreatic head and tail may  represent pseudocysts although malignancy is difficult to exclude.    3.  Increase ascites in the abdomen and pelvis. 4.  Small bowel obstruction similar to the prior study. Labs:     Recent Labs     01/05/23  1536   WBC 8.7   HGB 9.0*   HCT 25.7*        Recent Labs     01/06/23  0418 01/05/23  1536    139   K 2.8* 2.2*    105   CO2 30 28   BUN 8 10   CREA 0.56* 0.76   * 123*   CA 7.7* 7.9*   MG 1.3*  --      Recent Labs     01/05/23  1536   ALT 18      TBILI 0.2   TP 5.8*   ALB 2.3*   GLOB 3.5   LPSE 536*     No results for input(s): INR, PTP, APTT, INREXT in the last 72 hours. No results for input(s): FE, TIBC, PSAT, FERR in the last 72 hours. Lab Results   Component Value Date/Time    Folate 41.4 (H) 09/11/2022 10:49 AM      No results for input(s): PH, PCO2, PO2 in the last 72 hours. No results for input(s): CPK, CKNDX, TROIQ in the last 72 hours.     No lab exists for component: CPKMB  Lab Results   Component Value Date/Time    Cholesterol, total 147 01/02/2019 04:00 AM    HDL Cholesterol 66 01/02/2019 04:00 AM    LDL, calculated 65.6 01/02/2019 04:00 AM    Triglyceride 77 01/02/2019 04:00 AM    CHOL/HDL Ratio 2.2 01/02/2019 04:00 AM     No results found for: GLUCPOC  Lab Results   Component Value Date/Time    Color DARK YELLOW 09/09/2022 12:45 PM    Appearance TURBID (A) 09/09/2022 12:45 PM    Specific gravity 1.025 09/09/2022 12:45 PM    Specific gravity 1.010 09/03/2021 10:01 PM    pH (UA) 6.0 09/09/2022 12:45 PM    Protein 300 (A) 09/09/2022 12:45 PM    Glucose Negative 09/09/2022 12:45 PM    Ketone TRACE (A) 09/09/2022 12:45 PM    Bilirubin Negative 06/03/2022 04:57 AM    Urobilinogen 1.0 09/09/2022 12:45 PM    Nitrites Positive (A) 09/09/2022 12:45 PM    Leukocyte Esterase LARGE (A) 09/09/2022 12:45 PM    Epithelial cells FEW 09/09/2022 12:45 PM    Bacteria 2+ (A) 09/09/2022 12:45 PM    WBC >100 (H) 09/09/2022 12:45 PM    RBC >100 (H) 09/09/2022 12:45 PM         Medications Reviewed:     Current Facility-Administered Medications   Medication Dose Route Frequency    potassium chloride 10 mEq in 100 ml IVPB  10 mEq IntraVENous Q2H    phenol throat spray (CHLORASEPTIC) 1 Spray  1 Spray Oral Q1H PRN    [Held by provider] sodium chloride (NS) flush 5-40 mL  5-40 mL IntraVENous Q8H    sodium chloride (NS) flush 5-40 mL  5-40 mL IntraVENous PRN    acetaminophen (TYLENOL) tablet 650 mg  650 mg Oral Q6H PRN    Or    acetaminophen (TYLENOL) suppository 650 mg  650 mg Rectal Q6H PRN    polyethylene glycol (MIRALAX) packet 17 g  17 g Oral DAILY PRN    ondansetron (ZOFRAN ODT) tablet 4 mg  4 mg Oral Q8H PRN    Or    ondansetron (ZOFRAN) injection 4 mg  4 mg IntraVENous Q6H PRN    hydrALAZINE (APRESOLINE) 20 mg/mL injection 20 mg  20 mg IntraVENous Q6H PRN    morphine injection 1 mg  1 mg IntraVENous Q4H PRN    Or    morphine injection 2 mg  2 mg IntraVENous Q4H PRN    [Held by provider] finasteride (PROSCAR) tablet 5 mg  5 mg Oral DAILY    [Held by provider] folic acid (FOLVITE) tablet 1 mg  1 mg Oral DAILY    [Held by provider] L.acidophilus-paracasei-S.thermophil-bifidobacter (RISAQUAD) 8 billion cell capsule  1 Capsule Oral DAILY    [Held by provider] metoprolol tartrate (LOPRESSOR) tablet 12.5 mg  12.5 mg Oral BID    [Held by provider] mirtazapine (REMERON) tablet 15 mg  15 mg Oral QHS    [Held by provider] tamsulosin (FLOMAX) capsule 0.4 mg  0.4 mg Oral DAILY    [Held by provider] thiamine HCL (B-1) tablet 100 mg  100 mg Oral DAILY    dextrose 5% - 0.9% NaCl with KCl 40 mEq/L infusion   IntraVENous CONTINUOUS     ______________________________________________________________________  EXPECTED LENGTH OF STAY: 4d 14h  ACTUAL LENGTH OF STAY:          1                 Medardo Medina MD

## 2023-01-06 NOTE — ED NOTES
Message pages to hospitalist via perfect serve:  Pt remains in ER awaiting placement on floor. Medicated with morphine at approx 2030. Difficult NG tube placement with attmept x4, nild trauma to nasopharynx with bleeding and increased pain. Pt requesting additional dose of pain meds. Can he please have a 1 time dose of Morphine? Please review and advise. Thank you Dr. Bud Garzon.

## 2023-01-06 NOTE — PROGRESS NOTES
Comprehensive Nutrition Assessment     Type and Reason for Visit: Initial, MD consult for oral supplements     Nutrition Recommendations/Plan:   As medically appropriate advance diet to Easy to Chew, 3-4 gm Na+. Pt requests chocolate ensure when he is allowed to eat. Pt is HIGH refeeding risk, monitor lytes and replete as necessary. Check phosphorus; replete potassium. Given pt's status of severe malnutrition, recommend starting PPN if diet not advanced in the next day. Slowly titrate 2' pt's high refeeding risk. If PPN is started, d/c IV D5.     PPN Recommendations goal rate: D10 AA4.25 @ 63 ml/hr + 250 ml 20% lipids daily  -Day 1: D10 AA4.25 @ 30 ml/hr  -Day 2: goal rate if lytes WNL  5. R/o c-diff given multiple loose stools. Malnutrition Assessment:  Malnutrition Status:  Severe malnutrition (11/30/22)    Context:  Acute on chronic illness     Findings of the 6 clinical characteristics of malnutrition:   Energy Intake:  50% or less of est energy requirements for 5 or more days  Weight Loss:  Greater than 5% over 1 month     Body Fat Loss:  Severe body fat loss, Buccal region, Orbital   Muscle Mass Loss: Moderate muscle mass loss, Scapula (trapezius), Hand (interosseous), Temples (temporalis), Clavicles (pectoralis & deltoids)  Fluid Accumulation:  No significant fluid accumulation,     Strength:  Not performed          Nutrition Assessment:    PMHx: rectal cancer; s/p recto/sigmoid resection with chemoRx, HTN, etoh/tobacco/substance use, sequela of chronic pancreatitis, anemia, gastric ulcers, ascites abdomen and pelvis    59 y.o. male admitted again with SBO (was admitted last Nov, 2022). Diet NPO x ice chips and meds with sips of clears- pt eager to eat. Plan to leave suction NG in place which was difficult to place (attempts x 4) in ED,. In Nov pt's weight was 112#. In Sept pt stated he lost 20# in the last week because he was unable to eat.  During this time he was able to tolerate water, koolaid, \"1 bite of chicken at a time\" d/t early satiety, abd pain, nausea. Stated #. Last bowel movement 1/5, pt stated he was having up to 25 loose stools per day. NFPE revealed significant fat and muscle wasting. Wt history in EMR shows pt has chronically low body weight. Pt asked for ensure- told him we can provide ensure clear when diet advances but he'd have to wait on chocolate ensure for now. K 2.8 today     Given pts severe malnutrition, recommend starting PPN in the next day if diet is not advanced. Recommend continuous D10 AA4.25 @ 63 ml/hr + 250 ml 20% lipids daily. START @ 30 mL/hr until refeeding risk is ruled out. At goal will provide 1512 ml, 1271 kcals (83% kcal needs), 64 g pro (100% of needs). Recent Labs     01/05/23  0115 09/11/22  0040 09/10/22  0038    86 92   BUN 6 11 12   CREA . 76 0.91 0.95    140 140   K 2.2* 3.6 3.9    107 107   CO2 29 30 27   CA 7.9* 8.5 8.8   PHOS   3.1 3.4   MG  1.8 1.9         Nutritionally Significant Medications:  folvite, thiamine, remeron, protonix, probiotic, IV K, IVD5, morphine/zofran, miralax prn        Estimated Daily Nutrient Needs:  Energy Requirements Based On: Kcal/kg  Weight Used for Energy Requirements: Usual  Energy (kcal/day): 5561-4018 (30-35 kcal/kg UBW)  Weight Used for Protein Requirements: Usual  Protein (g/day): 61 (1.2 g/kg UBW)  Method Used for Fluid Requirements: 1 ml/kcal  Fluid (ml/day): 1800     Nutrition Related Findings:   Edema: No data recorded     Last BM: 1/5/23, Other (comment) (multiple loose)     Wounds: surgical; stage III gluteal cleft        Current Nutrition Therapies:  Diet: NPO x ice chips  Supplements: none  Meal intake: No data found. Supplement intake: No data found. Nutrition Support: none        Anthropometric Measures:  Height: 5' 8\" (172.7 cm)  Ideal Body Weight (IBW): 154 lbs (70 kg)  Current Body Wt:  45.5 kg (96 lb 8 oz), 63% IBW.  Standing Scale  Current BMI (kg/m2): 14.67  Usual Body Weight: 50.8 kg (112 lb)  % Weight Change (Calculated): 14%  BMI Category: Underweight (BMI less than 18.5)           Wt Readings from Last 10 Encounters:   09/09/22 54.4 kg (120 lb)   06/03/22 54.4 kg (120 lb)   02/04/22 52 kg (114 lb 9.6 oz)   02/02/22 48.5 kg (107 lb)   01/26/22 48.1 kg (106 lb)   09/06/21 54.3 kg (119 lb 12.8 oz)   09/02/21 55.1 kg (121 lb 8 oz)   06/14/21 54.9 kg (121 lb)   05/20/21 53.7 kg (118 lb 4.8 oz)   02/04/21 55.8 kg (123 lb)               Nutrition Diagnosis:   Inadequate oral intake related to altered GI function, altered GI structure (SBO) as evidenced by NPO or clear liquid status due to medical condition, poor intake prior to admission, weight loss  Severe malnutrition, In context of acute illness or injury related to inadequate protein-energy intake as evidenced by Criteria as identified in malnutrition assessment, weight loss, poor intake prior to admission     Nutrition Interventions:   Food and/or Nutrient Delivery: Start parenteral nutrition, advance diet as medically appropriate, Start oral nutrition supplement  Nutrition Education/Counseling: No recommendations at this time  Coordination of Nutrition Care: Continue to monitor while inpatient     Goals:  Goals: Tolerate nutrition support at goal rate, PO intake 50% or greater, by next RD assessment     Nutrition Monitoring and Evaluation:   Behavioral-Environmental Outcomes: None identified  Food/Nutrient Intake Outcomes: Diet advancement/tolerance, Supplement intake, Parenteral nutrition intake/tolerance  Physical Signs/Symptoms Outcomes: Biochemical data, GI status, Constipation, Nausea/vomiting, Nutrition focused physical findings, Meal time behavior, Weight     Discharge Planning:     Too soon to determine     Jay Ng, PhD, [address] [place] [address], [address] [place] [address] , [facility]  01/06/23 7:42 AM  Pager [phone]

## 2023-01-06 NOTE — PROGRESS NOTES
PHYSICAL THERAPY EVALUATION/DISCHARGE  Patient: India Lucas (15 y.o. male)  Date: 1/6/2023  Primary Diagnosis: SBO (small bowel obstruction) ([facility] [address] [K56.609]         ASSESSMENT  Based on the objective data described below, the patient presents with decreased activity tolerance and functional independence. Patient appears to be at his baseline based on patient's subjective report and mobility during objective exam. Previously patient has been recommended rollator for mobility and states that he has been using rollator at home for most mobility tasks. Patient able to independently transfer from bed to chair and ambulate in room with use of rolling walker. Further skilled acute physical therapy is not indicated at this time. PLAN :  Recommendation for discharge: (in order for the patient to meet his/her long term goals)  No skilled physical therapy/ follow up rehabilitation needs identified at this time. This discharge recommendation:  Has been made in collaboration with the attending provider and/or case management    IF patient discharges home will need the following DME: patient owns DME required for discharge       SUBJECTIVE:   Patient stated I'm fine.     OBJECTIVE DATA SUMMARY:   HISTORY:    Past Medical History:   Diagnosis Date    Cancer ([facility] [address]     rectal    Gastrointestinal disorder     Gastric Ulcers;  Rectal CA    Hematuria 9/1/2021    Hypertension      Past Surgical History:   Procedure Laterality Date    HX GI       Personal factors and/or comorbidities impacting plan of care:     Home Situation  Home Environment: Apartment  # Steps to Enter: 0  One/Two Story Residence: Other (Comment) (elevator)  # of Interior Steps:  (elevator)  Height of Each Step (in):  (elevator)  Interior Rails:  (elevator)  Lift Chair Available: No  Living Alone: Yes  Support Systems: Spouse/Significant Other, Other Family Member(s)  Patient Expects to be Discharged to[de-identified] Home  Current DME Used/Available at Home: Cane, straight    EXAMINATION/PRESENTATION/DECISION MAKING:   Critical Behavior:  Neurologic State: Alert, Appropriate for age  Orientation Level: Oriented X4  Cognition: Follows commands  Range Of Motion:  AROM: Generally decreased, functional  Strength:    Strength: Generally decreased, functional  Functional Mobility:  Bed Mobility:  Rolling: Independent  Supine to Sit: Independent  Scooting: Modified independent; Additional time  Transfers:  Sit to Stand: Independent  Stand to Sit: Independent  Bed to Chair: Independent  Balance:   Sitting: Intact  Standing: Impaired; With support  Standing - Static: Good  Standing - Dynamic : Fair  Ambulation/Gait Training:  Distance (ft): 10 Feet (ft)  Assistive Device: Walker, rolling;Gait belt  Ambulation - Level of Assistance: Stand-by assistance  Gait Description (WDL): Exceptions to WDL  Gait Abnormalities: Decreased step clearance  Base of Support: Widened  Speed/Denise: Slow  Step Length: Right shortened;Left shortened    Based on the above components, the patient evaluation is determined to be of the following complexity level: LOW     Pain Rating  Significant abdominal pain; RN aware    Activity Tolerance:   Good      After treatment patient left in no apparent distress:   Supine in bed, Call bell within reach, Bed / chair alarm activated, and Side rails x 3    COMMUNICATION/EDUCATION:   The patients plan of care was discussed with: Physical therapist, Registered nurse, and Case management. Fall prevention education was provided and the patient/caregiver indicated understanding., Patient/family have participated as able in goal setting and plan of care. , and Patient/family agree to work toward stated goals and plan of care.     Thank you for this referral.  Melvina Pitts, PT   Time Calculation: 16 mins

## 2023-01-06 NOTE — ED NOTES
Attempted to place NG tube 3x with Genia Rogers RN. Pt tolerated procedure but was unsuccessful in placement. Pt reports his last NG tube placement was \"really hard. \" Will attempt to place a smaller NG tube on next attempt. Letting pt rest at this time.

## 2023-01-06 NOTE — PROGRESS NOTES
TRANSFER - IN REPORT:    Verbal report received from EHSAN Price(name) on Glorine Law  being received from ED(unit) for routine progression of care      Report consisted of patients Situation, Background, Assessment and   Recommendations(SBAR). Information from the following report(s) SBAR, Kardex, ED Summary, Procedure Summary, Intake/Output, MAR, and Recent Results was reviewed with the receiving nurse. Opportunity for questions and clarification was provided. Assessment completed upon patients arrival to unit and care assumed.

## 2023-01-07 LAB
ALBUMIN SERPL-MCNC: 2.1 G/DL (ref 3.5–5)
ALBUMIN/GLOB SERPL: 0.6 (ref 1.1–2.2)
ALP SERPL-CCNC: 98 U/L (ref 45–117)
ALT SERPL-CCNC: 19 U/L (ref 12–78)
ANION GAP SERPL CALC-SCNC: 3 MMOL/L (ref 5–15)
AST SERPL-CCNC: 18 U/L (ref 15–37)
BASOPHILS # BLD: 0 K/UL (ref 0–0.1)
BASOPHILS NFR BLD: 0 % (ref 0–1)
BILIRUB SERPL-MCNC: 0.4 MG/DL (ref 0.2–1)
BUN SERPL-MCNC: 6 MG/DL (ref 6–20)
BUN/CREAT SERPL: 10 (ref 12–20)
CALCIUM SERPL-MCNC: 7.7 MG/DL (ref 8.5–10.1)
CANCER AG19-9 SERPL-ACNC: 31 U/ML (ref 0–35)
CHLORIDE SERPL-SCNC: 109 MMOL/L (ref 97–108)
CO2 SERPL-SCNC: 36 MMOL/L (ref 21–32)
CREAT SERPL-MCNC: 0.6 MG/DL (ref 0.7–1.3)
DIFFERENTIAL METHOD BLD: ABNORMAL
EOSINOPHIL # BLD: 0.1 K/UL (ref 0–0.4)
EOSINOPHIL NFR BLD: 1 % (ref 0–7)
ERYTHROCYTE [DISTWIDTH] IN BLOOD BY AUTOMATED COUNT: 15.8 % (ref 11.5–14.5)
GLOBULIN SER CALC-MCNC: 3.7 G/DL (ref 2–4)
GLUCOSE BLD STRIP.AUTO-MCNC: 62 MG/DL (ref 65–117)
GLUCOSE BLD STRIP.AUTO-MCNC: 64 MG/DL (ref 65–117)
GLUCOSE BLD STRIP.AUTO-MCNC: 64 MG/DL (ref 65–117)
GLUCOSE BLD STRIP.AUTO-MCNC: 80 MG/DL (ref 65–117)
GLUCOSE SERPL-MCNC: 76 MG/DL (ref 65–100)
HCT VFR BLD AUTO: 27.2 % (ref 36.6–50.3)
HGB BLD-MCNC: 9.3 G/DL (ref 12.1–17)
IMM GRANULOCYTES # BLD AUTO: 0 K/UL (ref 0–0.04)
IMM GRANULOCYTES NFR BLD AUTO: 1 % (ref 0–0.5)
LYMPHOCYTES # BLD: 1.5 K/UL (ref 0.8–3.5)
LYMPHOCYTES NFR BLD: 19 % (ref 12–49)
MAGNESIUM SERPL-MCNC: 1.9 MG/DL (ref 1.6–2.4)
MCH RBC QN AUTO: 29.3 PG (ref 26–34)
MCHC RBC AUTO-ENTMCNC: 34.2 G/DL (ref 30–36.5)
MCV RBC AUTO: 85.8 FL (ref 80–99)
MONOCYTES # BLD: 0.7 K/UL (ref 0–1)
MONOCYTES NFR BLD: 8 % (ref 5–13)
NEUTS SEG # BLD: 5.6 K/UL (ref 1.8–8)
NEUTS SEG NFR BLD: 71 % (ref 32–75)
NRBC # BLD: 0 K/UL (ref 0–0.01)
NRBC BLD-RTO: 0 PER 100 WBC
PHOSPHATE SERPL-MCNC: 2 MG/DL (ref 2.6–4.7)
PLATELET # BLD AUTO: 168 K/UL (ref 150–400)
PMV BLD AUTO: 9.8 FL (ref 8.9–12.9)
POTASSIUM SERPL-SCNC: 3.1 MMOL/L (ref 3.5–5.1)
PROT SERPL-MCNC: 5.8 G/DL (ref 6.4–8.2)
RBC # BLD AUTO: 3.17 M/UL (ref 4.1–5.7)
SERVICE CMNT-IMP: ABNORMAL
SERVICE CMNT-IMP: NORMAL
SODIUM SERPL-SCNC: 148 MMOL/L (ref 136–145)
WBC # BLD AUTO: 7.9 K/UL (ref 4.1–11.1)

## 2023-01-07 PROCEDURE — 83735 ASSAY OF MAGNESIUM: CPT

## 2023-01-07 PROCEDURE — 80053 COMPREHEN METABOLIC PANEL: CPT

## 2023-01-07 PROCEDURE — 74011250637 HC RX REV CODE- 250/637: Performed by: STUDENT IN AN ORGANIZED HEALTH CARE EDUCATION/TRAINING PROGRAM

## 2023-01-07 PROCEDURE — 36415 COLL VENOUS BLD VENIPUNCTURE: CPT

## 2023-01-07 PROCEDURE — 93005 ELECTROCARDIOGRAM TRACING: CPT

## 2023-01-07 PROCEDURE — 65270000029 HC RM PRIVATE

## 2023-01-07 PROCEDURE — 74011250636 HC RX REV CODE- 250/636: Performed by: STUDENT IN AN ORGANIZED HEALTH CARE EDUCATION/TRAINING PROGRAM

## 2023-01-07 PROCEDURE — 74011000250 HC RX REV CODE- 250: Performed by: STUDENT IN AN ORGANIZED HEALTH CARE EDUCATION/TRAINING PROGRAM

## 2023-01-07 PROCEDURE — 99232 SBSQ HOSP IP/OBS MODERATE 35: CPT | Performed by: SURGERY

## 2023-01-07 PROCEDURE — 84100 ASSAY OF PHOSPHORUS: CPT

## 2023-01-07 PROCEDURE — 74011250636 HC RX REV CODE- 250/636: Performed by: SURGERY

## 2023-01-07 PROCEDURE — 85025 COMPLETE CBC W/AUTO DIFF WBC: CPT

## 2023-01-07 PROCEDURE — 82962 GLUCOSE BLOOD TEST: CPT

## 2023-01-07 PROCEDURE — 74011250636 HC RX REV CODE- 250/636: Performed by: INTERNAL MEDICINE

## 2023-01-07 RX ORDER — LANOLIN ALCOHOL/MO/W.PET/CERES
400 CREAM (GRAM) TOPICAL DAILY
Status: DISCONTINUED | OUTPATIENT
Start: 2023-01-08 | End: 2023-01-08

## 2023-01-07 RX ORDER — POTASSIUM CHLORIDE, DEXTROSE MONOHYDRATE 150; 5 MG/100ML; G/100ML
INJECTION, SOLUTION INTRAVENOUS CONTINUOUS
Status: DISCONTINUED | OUTPATIENT
Start: 2023-01-07 | End: 2023-01-07 | Stop reason: CLARIF

## 2023-01-07 RX ADMIN — SODIUM CHLORIDE, PRESERVATIVE FREE 10 ML: 5 INJECTION INTRAVENOUS at 20:38

## 2023-01-07 RX ADMIN — MORPHINE SULFATE 2 MG: 2 INJECTION, SOLUTION INTRAMUSCULAR; INTRAVENOUS at 12:55

## 2023-01-07 RX ADMIN — Medication 1 CAPSULE: at 11:52

## 2023-01-07 RX ADMIN — POTASSIUM CHLORIDE 10 MEQ: 7.46 INJECTION, SOLUTION INTRAVENOUS at 00:17

## 2023-01-07 RX ADMIN — METOPROLOL TARTRATE 12.5 MG: 25 TABLET, FILM COATED ORAL at 18:54

## 2023-01-07 RX ADMIN — Medication 100 MG: at 11:55

## 2023-01-07 RX ADMIN — FINASTERIDE 5 MG: 5 TABLET, FILM COATED ORAL at 11:53

## 2023-01-07 RX ADMIN — POTASSIUM BICARBONATE 40 MEQ: 782 TABLET, EFFERVESCENT ORAL at 18:54

## 2023-01-07 RX ADMIN — METOPROLOL TARTRATE 12.5 MG: 25 TABLET, FILM COATED ORAL at 11:53

## 2023-01-07 RX ADMIN — ONDANSETRON 4 MG: 4 TABLET, ORALLY DISINTEGRATING ORAL at 08:18

## 2023-01-07 RX ADMIN — SODIUM CHLORIDE, PRESERVATIVE FREE 10 ML: 5 INJECTION INTRAVENOUS at 14:00

## 2023-01-07 RX ADMIN — FOLIC ACID 1 MG: 1 TABLET ORAL at 11:52

## 2023-01-07 RX ADMIN — POTASSIUM CHLORIDE 20 MEQ: 149 INJECTION, SOLUTION, CONCENTRATE INTRAVENOUS at 11:32

## 2023-01-07 RX ADMIN — ONDANSETRON 4 MG: 4 TABLET, ORALLY DISINTEGRATING ORAL at 20:38

## 2023-01-07 RX ADMIN — MORPHINE SULFATE 2 MG: 2 INJECTION, SOLUTION INTRAMUSCULAR; INTRAVENOUS at 20:26

## 2023-01-07 RX ADMIN — TAMSULOSIN HYDROCHLORIDE 0.4 MG: 0.4 CAPSULE ORAL at 11:54

## 2023-01-07 RX ADMIN — MORPHINE SULFATE 2 MG: 2 INJECTION, SOLUTION INTRAMUSCULAR; INTRAVENOUS at 04:04

## 2023-01-07 RX ADMIN — MORPHINE SULFATE 2 MG: 2 INJECTION, SOLUTION INTRAMUSCULAR; INTRAVENOUS at 08:08

## 2023-01-07 NOTE — PROGRESS NOTES
0715 Bedside shift change report given to [address] (oncoming nurse) by Mami Chirinos (offgoing nurse). Report included the following information SBAR, Kardex, Intake/Output, and MAR.

## 2023-01-07 NOTE — PROGRESS NOTES
1/6/23 8:42 PM  [phone] Hospital or Facility: [facility] From: Amishaadán Dinh RE: Naima Almonte 1965 RM: [id number] Pt is 59 y.o male Full code admitted for small bowel obstruction. Pt was due o get 4 runs of K today. Pt only received 2 runs so far. I will be calling pharmacy to see if it cn be rescheduled. Pt also due to Mag sulfate 2g/ 50 ml earlier today that was not given. Nurse states she let the day MD know that the med administration was running behind. Patient has order for D5- 0.9% Nacl w/ KCl but does not have it infusing. Is the patient suppose to have this infusing. It states its continuous. The day nurse states it was not infusing when she came on today. Also is the patient NG tube supposed to be hooked to low intermittent suction? right now it just on contionus suction. Thanks Need Callback: NEEDS CALLBACK ER URGENT    Read 9:01 PM  1/6/23 9:17 PM  [phone]      MD called and was spoken to over the phone. Orders were adjusted.

## 2023-01-07 NOTE — PROGRESS NOTES
Problem: Risk for Spread of Infection  Goal: Prevent transmission of infectious organism to others  Description: Prevent the transmission of infectious organisms to other patients, staff members, and visitors. Outcome: Progressing Towards Goal     Problem: Pressure Injury - Risk of  Goal: *Prevention of pressure injury  Description: Document Jack Scale and appropriate interventions in the flowsheet.   Outcome: Progressing Towards Goal  Note: Pressure Injury Interventions:       Moisture Interventions: Maintain skin hydration (lotion/cream)    Activity Interventions: PT/OT evaluation, Pressure redistribution bed/mattress(bed type)    Mobility Interventions: PT/OT evaluation, Pressure redistribution bed/mattress (bed type)    Nutrition Interventions: Document food/fluid/supplement intake (pt NPO)    Friction and Shear Interventions: Apply protective barrier, creams and emollients, HOB 30 degrees or less

## 2023-01-07 NOTE — PROGRESS NOTES
KENTRELL     RUR 21 %   High-risk for Readmit due to his hx     IDR rounds this am with MD and Team at 10:00AM.    REZA 48-72 depending on stability   Surgical Consult - Dr. Jodi Fairchild to see. Patient is known to us from previous admit   Was last here  12-2-22   At that time when home with PCP  12-9-  Urology 12-21-   To follow-up with Dr. Jodi Fairchild if needed. Rollator through  [employer].   He refused HH at that time. Drove himself home. Inpatient-   Pharmacy [address]   PCP   Surgery -   First IMM letter   Second IMM Letter   Transportation       Wife Vickie Rangel  [phone]  MPOA  Sister  Sarath Reddy [phone]  Secondary MPOA Sister  Homer Bowers [phone]      Hx of [address] 9/2021  Hx of rehab-- [address] and Rehab       Patient lives in an apartment alone-- He has an elevator to the 3rd floor. ([address] [facility] [place])    He has good support from his 4 adult children, wife () and siblings-- sisters. They are available to assist as needed. Patient drives himself to appointments.        CM assessment in progress     [facility]   [phone]

## 2023-01-07 NOTE — PROGRESS NOTES
1/7/23 5:16 AM  I see that the patient has an order that was put in for X-ray upper GI/small bowel on 1/6/1700 @ 1700. I just spoke to the radiologist jonathan Dobbins and he stated that No radiologist MD was avail. after 3pm yesterday and that the radiologist only is here on Wednesdays and Fridays. The order was put in by Dr. Oscar White. What would you like done?     Read 5:31 AM      Still waiting for Md response

## 2023-01-07 NOTE — PROGRESS NOTES
Radiology exam ordered after radiologist was not onsite. Floor aware, will have to be done when radiologist is onsite.

## 2023-01-07 NOTE — PROGRESS NOTES
Hospitalist Progress Note    NAME: Michael Astorga   :  1965   MRN:  [id number]   Room Number:  452/66  @ [facility]     Please note that this dictation was completed with [employer]low, the computer voice recognition software. Quite often unanticipated grammatical, syntax, homophones, and other interpretive errors are inadvertently transcribed by the computer software. Please disregard these errors. Please excuse any errors that have escaped final proofreading. Interim Hospital Summary: 59 y.o. male whom presented on 2023 with      Assessment / Plan:  Anticipated discharge date :   Anticipated disposition :   Barriers to discharge :       #Recurrent small bowel obstruction POA resolving  #Abdominal pain due to above  -CT abdomen pelvis chest on admission with small bowel obstruction  -NG taken out per general surgery recommendation    -Clear liquid diet  -Maintenance fluid  -Pain management      Concern for atrial fibrillation per telemetry  -EKG ordered  -Keep K above 4 mag above 2    Acute on Chronic Pancreatitis  Pancreatic Anomaly  -scheduled for egd w/ EUS with GI as outpt  -cannot r/o pancreatic cancer with recent ct  -check lipase and ca19-9  -GI follow-up outpatient     Hypokalemia resolving   -replete iv       Hypomagnesemia resolved      Htn / mood disorder / bph  -c/w home meds when pt able to tolerate PO  Hydralazine PRN while NPO     Polysubstance abuse  Patient was counseled extensively on the need to abstain from drugs its addictive tendencies, its deleterious effects on the brain, cardiovascular system, lungs as well as its financial & social sequelae              Code status: Full  Prophylaxis: Lovenox  Recommended Disposition: Home w/Family     Subjective:     Chief Complaint / Reason for Physician Visit  \"Able to pass gas and had one BM \". Discussed with RN events overnight.      Review of Systems:  No fevers, chills, appetite change, cough, sputum production, shortness of breath, dyspnea on exertion, nausea, vomitting, diarrhea, constipation, chest pain, leg edema, abdominal pain, joint pain, rash, itching. Tolerating PT/OT. Tolerating diet. Objective:     VITALS:   Last 24hrs VS reviewed since prior progress note. Most recent are:  Patient Vitals for the past 24 hrs:   Temp Pulse Resp BP SpO2   01/07/23 0807 97.8 °F (36.6 °C) 72 20 (!) 174/86 100 %   01/07/23 0333 97.4 °F (36.3 °C) 69 -- (!) 166/94 100 %   01/06/23 2337 98.6 °F (37 °C) 72 20 (!) 154/85 100 %   01/06/23 2013 97.9 °F (36.6 °C) 76 20 (!) 155/89 100 %   01/06/23 1133 98.2 °F (36.8 °C) 74 20 (!) 152/83 100 %       Intake/Output Summary (Last 24 hours) at 1/7/2023 1001  Last data filed at 1/7/2023 0658  Gross per 24 hour   Intake 1000 ml   Output 2450 ml   Net -1450 ml        PHYSICAL EXAM:  General: WD, WN. Alert, cooperative, no acute distress    EENT:  EOMI. Anicteric sclerae. MMM  Resp:  CTA bilaterally, no wheezing or rales. No accessory muscle use  CV:  Regular  rhythm,  normal S1/S2, no murmurs rubs gallops, No edema  GI:  Soft, Non distended, Non tender. +Bowel sounds  Neurologic:  Alert and oriented X 3, normal speech,   Psych:   Good insight. Not anxious nor agitated  Skin:  No rashes. No jaundice    Reviewed most current lab test results and cultures  YES  Reviewed most current radiology test results   YES  Review and summation of old records today    NO  Reviewed patient's current orders and MAR    YES  PMH/SH reviewed - no change compared to H&P  ________________________________________________________________________  Care Plan discussed with:    Comments   Patient x    Family      RN x    Care Manager x    Consultant  x GS                     x Multidiciplinary team rounds were held today with , nursing, pharmacist and clinical coordinator. Patient's plan of care was discussed; medications were reviewed and discharge planning was addressed. ________________________________________________________________________  Total NON critical care TIME:  35   Minutes    Total CRITICAL CARE TIME Spent:   Minutes non procedure based      Comments   >50% of visit spent in counseling and coordination of care x    ________________________________________________________________________  Katherine Pierce MD     Procedures: see electronic medical records for all procedures/Xrays and details which were not copied into this note but were reviewed prior to creation of Plan. LABS:  I reviewed today's most current labs and imaging studies.   Pertinent labs include:  Recent Labs     01/07/23  0446 01/05/23  1536   WBC 7.9 8.7   HGB 9.3* 9.0*   HCT 27.2* 25.7*    190     Recent Labs     01/07/23  0446 01/06/23  0418 01/05/23  1536   * 138 139   K 3.1* 2.8* 2.2*   * 105 105   CO2 36* 30 28   GLU 76 101* 123*   BUN 6 8 10   CREA 0.60* 0.56* 0.76   CA 7.7* 7.7* 7.9*   MG 1.9 1.3*  --    PHOS 2.0*  --   --    ALB 2.1*  --  2.3*   TBILI 0.4  --  0.2   ALT 19  --  18       Signed: Katherine Pierce MD

## 2023-01-07 NOTE — PROGRESS NOTES
Due to patient not being on D5 0.9% NACL w/KCL 40 meq/L infusion all day MD wanted BS to be taken . It was reading BS 60 and then rechecked and BS-54. Pt is Alert and verbal , MD made aware and new orders to be put in for D10 infusion IV. NG tube hooked to low intermittent suction.

## 2023-01-07 NOTE — PROGRESS NOTES
Bedside and Verbal shift change report given to EHSAN Ingram (oncoming nurse) by Stevo Murphy RN (offgoing nurse). Report included the following information SBAR, Kardex, and MAR.

## 2023-01-07 NOTE — PROGRESS NOTES
1230 Removed NG tube as per MD order, patient tolerated procedure well, no complains of pain no vomiting no reports of , tube inspected for completeness. Patient is comfortable requested and received water, had sips of water. 1530 Patient noticed to be running A-fib, MD , perfect served and waiting for his reply. MD requested for EKG, same done and transmitted . 1920 Bedside shift change report given to WANDER Castillo (oncoming nurse) by Nasra Felix (offgoing nurse). Report included the following information SBAR, Kardex, Intake/Output, and MAR.

## 2023-01-07 NOTE — PROGRESS NOTES
0900 CM met with pt in his hospital room. CM reviewed 1st Medicare IMM [Important Message from Jessie 1466. Pt verbalized understanding and signed the form. Signed copy placed in pt chart and signed copy given to pt. Pt reported to CM his cell phone number is incorrect on his face sheet. The new correct cell number is: [phone]. Due to a change in his contact number, he reported he was not able to attended his scheduled PCP appointment on 12/9/22 nor his urology appointment on 12/21/22.

## 2023-01-07 NOTE — PROGRESS NOTES
Mr. Charles Logan reports discomfort from the NG tube. Moving bowels. He also reports taking in a lot of ice. Tm 98.6 Tc 97.8 HR: 72 BP: 174/86 Resp Rate: 20 100% sat on room air. Intake/Output Summary (Last 24 hours) at 1/7/2023 0850  Last data filed at 1/7/2023 0658  Gross per 24 hour   Intake 1000 ml   Output 2450 ml   Net -1450 ml   Exam: Cor: RRR. Lungs: Clear to auscultation bilaterally. Bilateral breath sounds. Abd: Soft. Somewhat distended. Tender. No associated guarding or rebound. : Marrero catheter in place. Labs:   Recent Results (from the past 12 hour(s))   GLUCOSE, POC    Collection Time: 01/06/23  9:24 PM   Result Value Ref Range    Glucose (POC) 63 (L) 65 - 117 mg/dL    Performed by [address] [place] [address], POC    Collection Time: 01/06/23  9:30 PM   Result Value Ref Range    Glucose (POC) 54 (L) 65 - 117 mg/dL    Performed by [address] [place] [address], POC    Collection Time: 01/06/23 10:21 PM   Result Value Ref Range    Glucose (POC) 202 (H) 65 - 117 mg/dL    Performed by Glenn Ha    CBC WITH AUTOMATED DIFF    Collection Time: 01/07/23  4:46 AM   Result Value Ref Range    WBC 7.9 4.1 - 11.1 K/uL    RBC 3.17 (L) 4.10 - 5.70 M/uL    HGB 9.3 (L) 12.1 - 17.0 g/dL    HCT 27.2 (L) 36.6 - 50.3 %    MCV 85.8 80.0 - 99.0 FL    MCH 29.3 26.0 - 34.0 PG    MCHC 34.2 30.0 - 36.5 g/dL    RDW 15.8 (H) 11.5 - 14.5 %    PLATELET 516 530 - 838 K/uL    MPV 9.8 8.9 - 12.9 FL    NRBC 0.0 0  WBC    ABSOLUTE NRBC 0.00 0.00 - 0.01 K/uL    NEUTROPHILS 71 32 - 75 %    LYMPHOCYTES 19 12 - 49 %    MONOCYTES 8 5 - 13 %    EOSINOPHILS 1 0 - 7 %    BASOPHILS 0 0 - 1 %    IMMATURE GRANULOCYTES 1 (H) 0.0 - 0.5 %    ABS. NEUTROPHILS 5.6 1.8 - 8.0 K/UL    ABS. LYMPHOCYTES 1.5 0.8 - 3.5 K/UL    ABS. MONOCYTES 0.7 0.0 - 1.0 K/UL    ABS. EOSINOPHILS 0.1 0.0 - 0.4 K/UL    ABS. BASOPHILS 0.0 0.0 - 0.1 K/UL    ABS. IMM.  GRANS. 0.0 0.00 - 0.04 K/UL    DF AUTOMATED     METABOLIC PANEL, COMPREHENSIVE    Collection Time: 01/07/23  4:46 AM   Result Value Ref Range    Sodium 148 (H) 136 - 145 mmol/L    Potassium 3.1 (L) 3.5 - 5.1 mmol/L    Chloride 109 (H) 97 - 108 mmol/L    CO2 36 (H) 21 - 32 mmol/L    Anion gap 3 (L) 5 - 15 mmol/L    Glucose 76 65 - 100 mg/dL    BUN 6 6 - 20 MG/DL    Creatinine 0.60 (L) 0.70 - 1.30 MG/DL    BUN/Creatinine ratio 10 (L) 12 - 20      eGFR >60 >60 ml/min/1.73m2    Calcium 7.7 (L) 8.5 - 10.1 MG/DL    Bilirubin, total 0.4 0.2 - 1.0 MG/DL    ALT (SGPT) 19 12 - 78 U/L    AST (SGOT) 18 15 - 37 U/L    Alk. phosphatase 98 45 - 117 U/L    Protein, total 5.8 (L) 6.4 - 8.2 g/dL    Albumin 2.1 (L) 3.5 - 5.0 g/dL    Globulin 3.7 2.0 - 4.0 g/dL    A-G Ratio 0.6 (L) 1.1 - 2.2     MAGNESIUM    Collection Time: 01/07/23  4:46 AM   Result Value Ref Range    Magnesium 1.9 1.6 - 2.4 mg/dL   PHOSPHORUS    Collection Time: 01/07/23  4:46 AM   Result Value Ref Range    Phosphorus 2.0 (L) 2.6 - 4.7 MG/DL   GLUCOSE, POC    Collection Time: 01/07/23  8:46 AM   Result Value Ref Range    Glucose (POC) 64 (L) 65 - 117 mg/dL    Performed by Aury Wei (CON)    Mr. Glenna Fields is doing well. His high NG output may be due to taking in a lot of ice. Will remove NG tube and try clear liquids. Change IVF to D5W with 20 mEq KCl at 50 ml/hour. Replace K 3.1. Labs in AM.   OOB, Ambulate. Gastrograffin UGI/SBFT is pending. Plans per Dr. Catrachita Drew.

## 2023-01-08 LAB
ANION GAP SERPL CALC-SCNC: 1 MMOL/L (ref 5–15)
BUN SERPL-MCNC: 8 MG/DL (ref 6–20)
BUN/CREAT SERPL: 11 (ref 12–20)
CALCIUM SERPL-MCNC: 8.1 MG/DL (ref 8.5–10.1)
CHLORIDE SERPL-SCNC: 105 MMOL/L (ref 97–108)
CO2 SERPL-SCNC: 36 MMOL/L (ref 21–32)
CREAT SERPL-MCNC: 0.73 MG/DL (ref 0.7–1.3)
GLUCOSE SERPL-MCNC: 99 MG/DL (ref 65–100)
MAGNESIUM SERPL-MCNC: 1.7 MG/DL (ref 1.6–2.4)
PHOSPHATE SERPL-MCNC: 2.4 MG/DL (ref 2.6–4.7)
POTASSIUM SERPL-SCNC: 4.1 MMOL/L (ref 3.5–5.1)
SODIUM SERPL-SCNC: 142 MMOL/L (ref 136–145)

## 2023-01-08 PROCEDURE — 74011250637 HC RX REV CODE- 250/637: Performed by: STUDENT IN AN ORGANIZED HEALTH CARE EDUCATION/TRAINING PROGRAM

## 2023-01-08 PROCEDURE — 74011250636 HC RX REV CODE- 250/636: Performed by: STUDENT IN AN ORGANIZED HEALTH CARE EDUCATION/TRAINING PROGRAM

## 2023-01-08 PROCEDURE — 74011000250 HC RX REV CODE- 250: Performed by: STUDENT IN AN ORGANIZED HEALTH CARE EDUCATION/TRAINING PROGRAM

## 2023-01-08 PROCEDURE — 80048 BASIC METABOLIC PNL TOTAL CA: CPT

## 2023-01-08 PROCEDURE — 65270000029 HC RM PRIVATE

## 2023-01-08 PROCEDURE — 83735 ASSAY OF MAGNESIUM: CPT

## 2023-01-08 PROCEDURE — 84100 ASSAY OF PHOSPHORUS: CPT

## 2023-01-08 PROCEDURE — 74011250637 HC RX REV CODE- 250/637: Performed by: SURGERY

## 2023-01-08 PROCEDURE — 36415 COLL VENOUS BLD VENIPUNCTURE: CPT

## 2023-01-08 RX ORDER — DICYCLOMINE HYDROCHLORIDE 10 MG/1
10 CAPSULE ORAL
Status: DISCONTINUED | OUTPATIENT
Start: 2023-01-08 | End: 2023-01-13 | Stop reason: HOSPADM

## 2023-01-08 RX ORDER — HEPARIN 100 UNIT/ML
300 SYRINGE INTRAVENOUS AS NEEDED
Status: DISCONTINUED | OUTPATIENT
Start: 2023-01-08 | End: 2023-01-08

## 2023-01-08 RX ORDER — NICOTINE 7MG/24HR
1 PATCH, TRANSDERMAL 24 HOURS TRANSDERMAL EVERY 24 HOURS
Status: DISCONTINUED | OUTPATIENT
Start: 2023-01-08 | End: 2023-01-13 | Stop reason: HOSPADM

## 2023-01-08 RX ORDER — HEPARIN 100 UNIT/ML
500 SYRINGE INTRAVENOUS AS NEEDED
Status: DISCONTINUED | OUTPATIENT
Start: 2023-01-08 | End: 2023-01-13 | Stop reason: HOSPADM

## 2023-01-08 RX ORDER — LANOLIN ALCOHOL/MO/W.PET/CERES
400 CREAM (GRAM) TOPICAL DAILY
Status: DISCONTINUED | OUTPATIENT
Start: 2023-01-09 | End: 2023-01-13 | Stop reason: HOSPADM

## 2023-01-08 RX ADMIN — TAMSULOSIN HYDROCHLORIDE 0.4 MG: 0.4 CAPSULE ORAL at 09:57

## 2023-01-08 RX ADMIN — SODIUM CHLORIDE, PRESERVATIVE FREE 10 ML: 5 INJECTION INTRAVENOUS at 20:36

## 2023-01-08 RX ADMIN — MORPHINE SULFATE 2 MG: 2 INJECTION, SOLUTION INTRAMUSCULAR; INTRAVENOUS at 05:46

## 2023-01-08 RX ADMIN — SODIUM CHLORIDE, PRESERVATIVE FREE 10 ML: 5 INJECTION INTRAVENOUS at 14:00

## 2023-01-08 RX ADMIN — OXYCODONE HYDROCHLORIDE 10 MG: 5 TABLET ORAL at 13:40

## 2023-01-08 RX ADMIN — OXYCODONE HYDROCHLORIDE 10 MG: 5 TABLET ORAL at 21:57

## 2023-01-08 RX ADMIN — ONDANSETRON 4 MG: 4 TABLET, ORALLY DISINTEGRATING ORAL at 15:37

## 2023-01-08 RX ADMIN — FOLIC ACID 1 MG: 1 TABLET ORAL at 09:57

## 2023-01-08 RX ADMIN — DIBASIC SODIUM PHOSPHATE, MONOBASIC POTASSIUM PHOSPHATE AND MONOBASIC SODIUM PHOSPHATE 1 TABLET: 852; 155; 130 TABLET ORAL at 18:01

## 2023-01-08 RX ADMIN — METOPROLOL TARTRATE 12.5 MG: 25 TABLET, FILM COATED ORAL at 18:54

## 2023-01-08 RX ADMIN — FINASTERIDE 5 MG: 5 TABLET, FILM COATED ORAL at 09:57

## 2023-01-08 RX ADMIN — MIRTAZAPINE 15 MG: 15 TABLET, FILM COATED ORAL at 00:47

## 2023-01-08 RX ADMIN — MIRTAZAPINE 15 MG: 15 TABLET, FILM COATED ORAL at 20:36

## 2023-01-08 RX ADMIN — MAGNESIUM OXIDE 400 MG (241.3 MG MAGNESIUM) TABLET 400 MG: TABLET at 09:56

## 2023-01-08 RX ADMIN — DICYCLOMINE HYDROCHLORIDE 10 MG: 10 CAPSULE ORAL at 18:54

## 2023-01-08 RX ADMIN — MORPHINE SULFATE 2 MG: 2 INJECTION, SOLUTION INTRAMUSCULAR; INTRAVENOUS at 09:58

## 2023-01-08 RX ADMIN — OXYCODONE HYDROCHLORIDE 10 MG: 5 TABLET ORAL at 18:01

## 2023-01-08 RX ADMIN — SODIUM CHLORIDE, PRESERVATIVE FREE 10 ML: 5 INJECTION INTRAVENOUS at 05:46

## 2023-01-08 RX ADMIN — Medication 100 MG: at 09:56

## 2023-01-08 RX ADMIN — Medication 1 CAPSULE: at 09:56

## 2023-01-08 RX ADMIN — METOPROLOL TARTRATE 12.5 MG: 25 TABLET, FILM COATED ORAL at 09:57

## 2023-01-08 RX ADMIN — HEPARIN 500 UNITS: 100 SYRINGE at 15:37

## 2023-01-08 NOTE — PROGRESS NOTES
0767) Bedside shift change report given to Deon Marquez RN (oncoming nurse) by Florence Gautam LPN (offgoing nurse). Report included the following information SBAR, Kardex, MAR, and Cardiac Rhythm SA .   0908) perfectserve Dr. Dinora Lemus with SBO is tolerating clear liquid diet. Pt had two bowel movements overnight. He's still having abdomen pain but wants to advance his diet. Thanks! 36) IDR with Dr. Liudmila Bateman (MD),  Maynor Mendez (nurse supervisor), and Deon Marquez (RN) to discuss plan of care including possible discharge today if tolerating lunch, labs need to be drawn, discontinue continuous fluids, discontinue Echo, change from IV morphine to oral kenia in preparation for discharge  1315) perfectsererwin Hinojosa sending blood work now! Pt had one small loose bowel movement before lunch. Pt has some pain after eating lunch. (ate less than 25%)  1355) Perfectsererwin Candelaria Bars I have a prn heparin flush --line a little stiff to flush (protocol is 500 units for portacath). Also is it okay to put an order in for blood draws from a central line? 80) Perfectsererwin Madison Hidden not on site until Wednesday and Friday--  needed for x-ray ordered. (Also did you want to change heparin to 500 units for portacath from 300 units ?)  1537) Heparinize portacath  1645) Blood return from portacath--flushing smoothly  1839) Perfectserve Dr. Ricky Navarro has had 6 to 7 loose bowel movements today. Stated kenai is not helping abdomen pain with cramping-- is there anything else he could have?  1901) Perfectjove Dr. Ricky Navarro here for SBO. Could he have a nicotine patch? (he smokes 1/2 pack a day). 1945) Bedside shift change report given to EHSAN Rojas (oncoming nurse) by Deon Marquez RN (offgoing nurse). Report included the following information SBAR, Kardex, and Accordion.

## 2023-01-08 NOTE — PROGRESS NOTES
Hospitalist Progress Note    NAME: Candy Cavazos   :  1965   MRN:  [id number]   Room Number:  265/94  @ [facility]     Please note that this dictation was completed with Cira Esparza, the computer voice recognition software. Quite often unanticipated grammatical, syntax, homophones, and other interpretive errors are inadvertently transcribed by the computer software. Please disregard these errors. Please excuse any errors that have escaped final proofreading. Interim Hospital Summary: 59 y.o. male whom presented on 2023 with      Assessment / Plan:  Anticipated discharge date :   Anticipated disposition :   Barriers to discharge :       #Recurrent small bowel obstruction POA resolved  #Abdominal pain due to above  -CT abdomen pelvis chest on admission with small bowel obstruction  -NG taken out per general surgery recommendation    -Advance diet   -Pain management      Concern for atrial fibrillation per telemetry  -EKG recurrent PVCs  -Keep K above 4 mag above 2  -TTE ordered    Acute on Chronic Pancreatitis  Pancreatic Anomaly  -scheduled for egd w/ EUS with GI as outpt  -cannot r/o pancreatic cancer with recent ct  -check lipase and ca19-9  -GI follow-up outpatient     Hypokalemia resolving   -replete iv       Hypomagnesemia resolved      Htn / mood disorder / bph  -c/w home meds when pt able to tolerate PO  Hydralazine PRN while NPO     Polysubstance abuse  Patient was counseled extensively on the need to abstain from drugs its addictive tendencies, its deleterious effects on the brain, cardiovascular system, lungs as well as its financial & social sequelae              Code status: Full  Prophylaxis: Lovenox  Recommended Disposition: Home w/Family     Subjective:     Chief Complaint / Reason for Physician Visit  \"2 bowel movements overnight\". Discussed with RN events overnight.      Review of Systems:  No fevers, chills, appetite change, cough, sputum production, shortness of breath, dyspnea on exertion, nausea, vomitting, diarrhea, constipation, chest pain, leg edema, abdominal pain, joint pain, rash, itching. Tolerating PT/OT. Tolerating diet. Objective:     VITALS:   Last 24hrs VS reviewed since prior progress note. Most recent are:  Patient Vitals for the past 24 hrs:   Temp Pulse Resp BP SpO2   01/08/23 0802 98.2 °F (36.8 °C) 68 -- (!) 154/86 100 %   01/07/23 2007 98.1 °F (36.7 °C) 66 16 (!) 142/92 100 %   01/07/23 1516 98.7 °F (37.1 °C) 60 20 (!) 164/88 100 %   01/07/23 1129 98.4 °F (36.9 °C) 68 22 (!) 177/90 100 %     No intake or output data in the 24 hours ending 01/08/23 0936       PHYSICAL EXAM:  General: WD, WN. Alert, cooperative, no acute distress    EENT:  EOMI. Anicteric sclerae. MMM  Resp:  CTA bilaterally, no wheezing or rales. No accessory muscle use  CV:  Regular  rhythm,  normal S1/S2, no murmurs rubs gallops, No edema  GI:  Soft, Non distended, Non tender. +Bowel sounds  Neurologic:  Alert and oriented X 3, normal speech,   Psych:   Good insight. Not anxious nor agitated  Skin:  No rashes. No jaundice    Reviewed most current lab test results and cultures  YES  Reviewed most current radiology test results   YES  Review and summation of old records today    NO  Reviewed patient's current orders and MAR    YES  PMH/ reviewed - no change compared to H&P  ________________________________________________________________________  Care Plan discussed with:    Comments   Patient x    Family      RN x    Care Manager x    Consultant  x GS                     x Multidiciplinary team rounds were held today with , nursing, pharmacist and clinical coordinator. Patient's plan of care was discussed; medications were reviewed and discharge planning was addressed.      ________________________________________________________________________  Total NON critical care TIME:  35   Minutes    Total CRITICAL CARE TIME Spent:   Minutes non procedure based Comments   >50% of visit spent in counseling and coordination of care x    ________________________________________________________________________  Pete Briones MD     Procedures: see electronic medical records for all procedures/Xrays and details which were not copied into this note but were reviewed prior to creation of Plan. LABS:  I reviewed today's most current labs and imaging studies.   Pertinent labs include:  Recent Labs     01/07/23  0446 01/05/23  1536   WBC 7.9 8.7   HGB 9.3* 9.0*   HCT 27.2* 25.7*    190     Recent Labs     01/07/23  0446 01/06/23  0418 01/05/23  1536   * 138 139   K 3.1* 2.8* 2.2*   * 105 105   CO2 36* 30 28   GLU 76 101* 123*   BUN 6 8 10   CREA 0.60* 0.56* 0.76   CA 7.7* 7.7* 7.9*   MG 1.9 1.3*  --    PHOS 2.0*  --   --    ALB 2.1*  --  2.3*   TBILI 0.4  --  0.2   ALT 19  --  18       Signed: Pete Briones MD

## 2023-01-08 NOTE — PROGRESS NOTES
1920-received report from [facility], [address] [place] [address] which included SBAR, Kardex, and MAR    2007-shift assessment completed. Pt calm and cooperative     2026-pt verbally expressed pain 8/10. Pt received 2mg of morphine IV. Line flushed w/10mL of NS. Potassium chloride reconnected and restarted. 2038-pt complained of nausea after morphine. Pt given Zofran. 2130-pt was sleeping     1/8/23 0048- pt requested snack and received Remeron. Pt stated that's feeling a lot better. 0200-pt was sleeping     0522-pt on beside commode. Pt had large, soft BM.pt also requested pain meds for abdomen pain rated 8/10.     0546-pt received 2mg of morphine IV. Line was flushed with 10mL before morphine and 10mL after morphine. Potassium chloride reconnected and restarted. Pt requested tea w/ sugar and crackers. 0720-report given to oncoming nurse Raya Moane, [address] [place] [address]. Included SBAR, Kardex, and MAR      Pt was pleasant and cooperative during shift.

## 2023-01-09 LAB
ATRIAL RATE: 64 BPM
CALCULATED P AXIS, ECG09: 78 DEGREES
CALCULATED R AXIS, ECG10: 67 DEGREES
CALCULATED T AXIS, ECG11: 62 DEGREES
DIAGNOSIS, 93000: NORMAL
MAGNESIUM SERPL-MCNC: 1.6 MG/DL (ref 1.6–2.4)
P-R INTERVAL, ECG05: 134 MS
PHOSPHATE SERPL-MCNC: 2.4 MG/DL (ref 2.6–4.7)
Q-T INTERVAL, ECG07: 400 MS
QRS DURATION, ECG06: 78 MS
QTC CALCULATION (BEZET), ECG08: 412 MS
VENTRICULAR RATE, ECG03: 64 BPM

## 2023-01-09 PROCEDURE — 36415 COLL VENOUS BLD VENIPUNCTURE: CPT

## 2023-01-09 PROCEDURE — 74011250637 HC RX REV CODE- 250/637: Performed by: STUDENT IN AN ORGANIZED HEALTH CARE EDUCATION/TRAINING PROGRAM

## 2023-01-09 PROCEDURE — 74011250636 HC RX REV CODE- 250/636: Performed by: STUDENT IN AN ORGANIZED HEALTH CARE EDUCATION/TRAINING PROGRAM

## 2023-01-09 PROCEDURE — 83735 ASSAY OF MAGNESIUM: CPT

## 2023-01-09 PROCEDURE — 84100 ASSAY OF PHOSPHORUS: CPT

## 2023-01-09 PROCEDURE — 74011250637 HC RX REV CODE- 250/637: Performed by: SURGERY

## 2023-01-09 PROCEDURE — 74011000250 HC RX REV CODE- 250: Performed by: STUDENT IN AN ORGANIZED HEALTH CARE EDUCATION/TRAINING PROGRAM

## 2023-01-09 PROCEDURE — 65270000029 HC RM PRIVATE

## 2023-01-09 RX ADMIN — FINASTERIDE 5 MG: 5 TABLET, FILM COATED ORAL at 08:43

## 2023-01-09 RX ADMIN — Medication 1 CAPSULE: at 08:43

## 2023-01-09 RX ADMIN — TAMSULOSIN HYDROCHLORIDE 0.4 MG: 0.4 CAPSULE ORAL at 08:43

## 2023-01-09 RX ADMIN — MAGNESIUM OXIDE 400 MG (241.3 MG MAGNESIUM) TABLET 400 MG: TABLET at 08:43

## 2023-01-09 RX ADMIN — OXYCODONE HYDROCHLORIDE 10 MG: 5 TABLET ORAL at 14:36

## 2023-01-09 RX ADMIN — OXYCODONE HYDROCHLORIDE 10 MG: 5 TABLET ORAL at 18:49

## 2023-01-09 RX ADMIN — OXYCODONE HYDROCHLORIDE 10 MG: 5 TABLET ORAL at 02:55

## 2023-01-09 RX ADMIN — DIBASIC SODIUM PHOSPHATE, MONOBASIC POTASSIUM PHOSPHATE AND MONOBASIC SODIUM PHOSPHATE 1 TABLET: 852; 155; 130 TABLET ORAL at 17:23

## 2023-01-09 RX ADMIN — FOLIC ACID 1 MG: 1 TABLET ORAL at 08:43

## 2023-01-09 RX ADMIN — OXYCODONE HYDROCHLORIDE 10 MG: 5 TABLET ORAL at 23:19

## 2023-01-09 RX ADMIN — METOPROLOL TARTRATE 12.5 MG: 25 TABLET, FILM COATED ORAL at 08:43

## 2023-01-09 RX ADMIN — METOPROLOL TARTRATE 12.5 MG: 25 TABLET, FILM COATED ORAL at 17:23

## 2023-01-09 RX ADMIN — OXYCODONE HYDROCHLORIDE 10 MG: 5 TABLET ORAL at 08:48

## 2023-01-09 RX ADMIN — MIRTAZAPINE 15 MG: 15 TABLET, FILM COATED ORAL at 21:13

## 2023-01-09 RX ADMIN — Medication 100 MG: at 08:43

## 2023-01-09 RX ADMIN — SODIUM CHLORIDE, PRESERVATIVE FREE 10 ML: 5 INJECTION INTRAVENOUS at 21:14

## 2023-01-09 RX ADMIN — ONDANSETRON 4 MG: 4 TABLET, ORALLY DISINTEGRATING ORAL at 08:42

## 2023-01-09 RX ADMIN — DIBASIC SODIUM PHOSPHATE, MONOBASIC POTASSIUM PHOSPHATE AND MONOBASIC SODIUM PHOSPHATE 1 TABLET: 852; 155; 130 TABLET ORAL at 08:43

## 2023-01-09 NOTE — PROGRESS NOTES
Hospitalist Progress Note    NAME: Zulay Pope   :  1965   MRN:  [id number]   Room Number:  827/96  @ [facility]     Please note that this dictation was completed with Angela Dowling, the computer voice recognition software. Quite often unanticipated grammatical, syntax, homophones, and other interpretive errors are inadvertently transcribed by the computer software. Please disregard these errors. Please excuse any errors that have escaped final proofreading.     Interim Hospital Summary: 59 y.o. male whom presented on 2023 with      Assessment / Plan:  Anticipated discharge date :   Anticipated disposition :   Barriers to discharge :       #Recurrent small bowel obstruction POA resolved  #Abdominal pain due to above  -CT abdomen pelvis chest on admission with small bowel obstruction  -NG taken out per general surgery recommendation    -Advance diet   -Pain management  -Discussed with on-call gastroenterologist regarding doing EGD/EUS while patient is admitted and given poor resources for outpatient follow-ups  -Both [facility] and [facility] [place] are on diversion, will try again tomorrow       PVC  -EKG recurrent PVCs  -Keep K above 4 mag above 2      Acute on Chronic Pancreatitis  Pancreatic Anomaly  -scheduled for egd w/ EUS with GI as outpt  -cannot r/o pancreatic cancer with recent ct  -check lipase and ca19-9  -GI follow-up outpatient     Hypokalemia resolved          Hypomagnesemia resolved      Htn / mood disorder / bph  -c/w home meds when pt able to tolerate PO  Hydralazine PRN while NPO     Polysubstance abuse  Patient was counseled extensively on the need to abstain from drugs its addictive tendencies, its deleterious effects on the brain, cardiovascular system, lungs as well as its financial & social sequelae              Code status: Full  Prophylaxis: Lovenox  Recommended Disposition: Home w/Family     Subjective:     Chief Complaint / Reason for Physician Visit  \"Nominal pain with nausea\". Discussed with RN events overnight. Review of Systems:  No fevers, chills, appetite change, cough, sputum production, shortness of breath, dyspnea on exertion,vomitting, diarrhea, constipation, chest pain, leg edema, joint pain, rash, itching. Tolerating PT/OT. Tolerating diet. Objective:     VITALS:   Last 24hrs VS reviewed since prior progress note. Most recent are:  Patient Vitals for the past 24 hrs:   Temp Pulse Resp BP SpO2   01/09/23 1202 98.3 °F (36.8 °C) 66 16 (!) 147/117 100 %   01/09/23 0825 97.8 °F (36.6 °C) (!) 58 -- (!) 174/95 100 %   01/09/23 0255 97.8 °F (36.6 °C) (!) 59 16 (!) 161/89 100 %   01/08/23 2009 97.6 °F (36.4 °C) (!) 52 16 119/69 100 %   01/08/23 1854 -- 69 -- (!) 144/89 --   01/08/23 1528 97.4 °F (36.3 °C) 63 16 (!) 141/77 100 %       Intake/Output Summary (Last 24 hours) at 1/9/2023 1411  Last data filed at 1/9/2023 0800  Gross per 24 hour   Intake 240 ml   Output 450 ml   Net -210 ml          PHYSICAL EXAM:  General: WD, WN. Alert, cooperative, no acute distress    EENT:  EOMI. Anicteric sclerae. MMM  Resp:  CTA bilaterally, no wheezing or rales. No accessory muscle use  CV:  Regular  rhythm,  normal S1/S2, no murmurs rubs gallops, No edema  GI:  Soft, Non distended, Non tender. +Bowel sounds  Neurologic:  Alert and oriented X 3, normal speech,   Psych:   Good insight. Not anxious nor agitated  Skin:  No rashes.   No jaundice    Reviewed most current lab test results and cultures  YES  Reviewed most current radiology test results   YES  Review and summation of old records today    NO  Reviewed patient's current orders and MAR    YES  PMH/SH reviewed - no change compared to H&P  ________________________________________________________________________  Care Plan discussed with:    Comments   Patient x    Family      RN x    Care Manager x    Consultant  x GI                     x Multidiciplinary team rounds were held today with case manager, nursing, pharmacist and clinical coordinator. Patient's plan of care was discussed; medications were reviewed and discharge planning was addressed. ________________________________________________________________________  Total NON critical care TIME:  35   Minutes    Total CRITICAL CARE TIME Spent:   Minutes non procedure based      Comments   >50% of visit spent in counseling and coordination of care x    ________________________________________________________________________  Pete Briones MD     Procedures: see electronic medical records for all procedures/Xrays and details which were not copied into this note but were reviewed prior to creation of Plan. LABS:  I reviewed today's most current labs and imaging studies.   Pertinent labs include:  Recent Labs     01/07/23  0446   WBC 7.9   HGB 9.3*   HCT 27.2*        Recent Labs     01/09/23  0306 01/08/23  1312 01/07/23  0446   NA  --  142 148*   K  --  4.1 3.1*   CL  --  105 109*   CO2  --  36* 36*   GLU  --  99 76   BUN  --  8 6   CREA  --  0.73 0.60*   CA  --  8.1* 7.7*   MG 1.6 1.7 1.9   PHOS 2.4* 2.4* 2.0*   ALB  --   --  2.1*   TBILI  --   --  0.4   ALT  --   --  19       Signed: Pete Briones MD

## 2023-01-09 NOTE — PROGRESS NOTES
KENTRELL     RUR  21%     IDR rounds again this am with MD and team   REZA  24-48 hours -     Met with patient in the room   Discussed transition    Patient address still the same. He did not keep his Urology appointment 12-21-23. -  to get his mondragon changed. He did not keep his other appointment. GI . PCP. Surgery. Asked about transportation again- last time he said he could drive himself- his car was in the parking lot. This time he said he needed help with transportation. Called Urology- they can see him 1-11-23- to arrange transportation. We discussed Medicaid- he has not applied- asked about his monthly income - he get about 1200.00 with the increase for this year- not counting the Medicare Part B. Would recommend he applied for Medicaid- he may meet the QMB part or spend-down-. Not full benefits. To asked Lalo to see if they can assist may not be able to since he is not seeking LTC. Addendum:  1:14PM     Talked to MD.  He called [facility]  for possible GI transfer-   Called [facility] was accepted for Transfer hopefully later today. Will cancel the Urology appointment for Wednesday. The Hospitalist to have Urology see patient there - to assess  and change the mondragon. Will send message for CM at [facility] to follow-up-. Pat BONE RN   [phone]       Addendum :  3:00PM     No beds available for transfer to [facility] today. MD to follow-up with patient.    Lalo Met with patient and will follow-up with Medicaid Application-

## 2023-01-09 NOTE — PROGRESS NOTES
OT order received, Attempted to see patient for OT evaluation today however he declined all activity stating abdominal pain.    Dina Simmons, OTR//LL

## 2023-01-09 NOTE — PROGRESS NOTES
Problem: Risk for Spread of Infection  Goal: Prevent transmission of infectious organism to others  Description: Prevent the transmission of infectious organisms to other patients, staff members, and visitors. Outcome: Progressing Towards Goal     Problem: Patient Education:  Go to Education Activity  Goal: Patient/Family Education  Outcome: Progressing Towards Goal     Problem: Pressure Injury - Risk of  Goal: *Prevention of pressure injury  Description: Document Jack Scale and appropriate interventions in the flowsheet.   Outcome: Progressing Towards Goal  Note: Pressure Injury Interventions:       Moisture Interventions: Absorbent underpads    Activity Interventions: Increase time out of bed    Mobility Interventions: Float heels    Nutrition Interventions: Discuss nutritional consult with provider    Friction and Shear Interventions: Apply protective barrier, creams and emollients

## 2023-01-10 LAB
AMORPH CRY URNS QL MICRO: ABNORMAL
APPEARANCE UR: ABNORMAL
BACTERIA URNS QL MICRO: ABNORMAL /HPF
BILIRUB UR QL: NEGATIVE
CAOX CRY URNS QL MICRO: ABNORMAL
COLOR UR: ABNORMAL
EPITH CASTS URNS QL MICRO: ABNORMAL /LPF
GLUCOSE UR STRIP.AUTO-MCNC: NEGATIVE MG/DL
HGB UR QL STRIP: ABNORMAL
KETONES UR QL STRIP.AUTO: NEGATIVE MG/DL
LEUKOCYTE ESTERASE UR QL STRIP.AUTO: ABNORMAL
NITRITE UR QL STRIP.AUTO: POSITIVE
PH UR STRIP: 8.5 (ref 5–8)
PROT UR STRIP-MCNC: 300 MG/DL
RBC #/AREA URNS HPF: ABNORMAL /HPF (ref 0–5)
SP GR UR REFRACTOMETRY: 1.02
UROBILINOGEN UR QL STRIP.AUTO: 1 EU/DL (ref 0.2–1)
WBC URNS QL MICRO: ABNORMAL /HPF (ref 0–4)
YEAST URNS QL MICRO: PRESENT

## 2023-01-10 PROCEDURE — 87186 SC STD MICRODIL/AGAR DIL: CPT

## 2023-01-10 PROCEDURE — 81001 URINALYSIS AUTO W/SCOPE: CPT

## 2023-01-10 PROCEDURE — 74011250636 HC RX REV CODE- 250/636: Performed by: STUDENT IN AN ORGANIZED HEALTH CARE EDUCATION/TRAINING PROGRAM

## 2023-01-10 PROCEDURE — 65270000029 HC RM PRIVATE

## 2023-01-10 PROCEDURE — 74011250637 HC RX REV CODE- 250/637: Performed by: SURGERY

## 2023-01-10 PROCEDURE — 87086 URINE CULTURE/COLONY COUNT: CPT

## 2023-01-10 PROCEDURE — 89055 LEUKOCYTE ASSESSMENT FECAL: CPT

## 2023-01-10 PROCEDURE — 87077 CULTURE AEROBIC IDENTIFY: CPT

## 2023-01-10 PROCEDURE — 74011250637 HC RX REV CODE- 250/637: Performed by: STUDENT IN AN ORGANIZED HEALTH CARE EDUCATION/TRAINING PROGRAM

## 2023-01-10 PROCEDURE — 87506 IADNA-DNA/RNA PROBE TQ 6-11: CPT

## 2023-01-10 PROCEDURE — 74011000258 HC RX REV CODE- 258: Performed by: STUDENT IN AN ORGANIZED HEALTH CARE EDUCATION/TRAINING PROGRAM

## 2023-01-10 PROCEDURE — 74011000250 HC RX REV CODE- 250: Performed by: STUDENT IN AN ORGANIZED HEALTH CARE EDUCATION/TRAINING PROGRAM

## 2023-01-10 RX ORDER — HEPARIN 100 UNIT/ML
500 SYRINGE INTRAVENOUS AS NEEDED
Status: CANCELLED | OUTPATIENT
Start: 2023-01-10

## 2023-01-10 RX ORDER — ACETAMINOPHEN 500 MG
1000 TABLET ORAL
Status: CANCELLED | OUTPATIENT
Start: 2023-01-10

## 2023-01-10 RX ORDER — DICYCLOMINE HYDROCHLORIDE 10 MG/1
10 CAPSULE ORAL
Status: CANCELLED | OUTPATIENT
Start: 2023-01-10

## 2023-01-10 RX ORDER — NICOTINE 7MG/24HR
1 PATCH, TRANSDERMAL 24 HOURS TRANSDERMAL EVERY 24 HOURS
Status: CANCELLED | OUTPATIENT
Start: 2023-01-10

## 2023-01-10 RX ORDER — FOLIC ACID 1 MG/1
1 TABLET ORAL DAILY
Status: CANCELLED | OUTPATIENT
Start: 2023-01-11

## 2023-01-10 RX ORDER — METOPROLOL TARTRATE 25 MG/1
12.5 TABLET, FILM COATED ORAL 2 TIMES DAILY
Status: CANCELLED | OUTPATIENT
Start: 2023-01-10

## 2023-01-10 RX ORDER — TAMSULOSIN HYDROCHLORIDE 0.4 MG/1
0.4 CAPSULE ORAL DAILY
Status: CANCELLED | OUTPATIENT
Start: 2023-01-11

## 2023-01-10 RX ORDER — MIRTAZAPINE 15 MG/1
15 TABLET, FILM COATED ORAL
Status: CANCELLED | OUTPATIENT
Start: 2023-01-10

## 2023-01-10 RX ORDER — ONDANSETRON 4 MG/1
4 TABLET, ORALLY DISINTEGRATING ORAL
Status: CANCELLED | OUTPATIENT
Start: 2023-01-10

## 2023-01-10 RX ORDER — NALOXONE HYDROCHLORIDE 0.4 MG/ML
0.4 INJECTION, SOLUTION INTRAMUSCULAR; INTRAVENOUS; SUBCUTANEOUS
Status: CANCELLED | OUTPATIENT
Start: 2023-01-10

## 2023-01-10 RX ORDER — POLYETHYLENE GLYCOL 3350 17 G/17G
17 POWDER, FOR SOLUTION ORAL DAILY PRN
Status: CANCELLED | OUTPATIENT
Start: 2023-01-10

## 2023-01-10 RX ORDER — LANOLIN ALCOHOL/MO/W.PET/CERES
100 CREAM (GRAM) TOPICAL DAILY
Status: CANCELLED | OUTPATIENT
Start: 2023-01-11

## 2023-01-10 RX ORDER — LANOLIN ALCOHOL/MO/W.PET/CERES
400 CREAM (GRAM) TOPICAL DAILY
Status: CANCELLED | OUTPATIENT
Start: 2023-01-11 | End: 2023-01-14

## 2023-01-10 RX ORDER — SODIUM CHLORIDE 0.9 % (FLUSH) 0.9 %
5-40 SYRINGE (ML) INJECTION AS NEEDED
Status: CANCELLED | OUTPATIENT
Start: 2023-01-10

## 2023-01-10 RX ORDER — SODIUM CHLORIDE 0.9 % (FLUSH) 0.9 %
5-40 SYRINGE (ML) INJECTION EVERY 8 HOURS
Status: CANCELLED | OUTPATIENT
Start: 2023-01-10

## 2023-01-10 RX ORDER — IBUPROFEN 200 MG
4 TABLET ORAL AS NEEDED
Status: CANCELLED | OUTPATIENT
Start: 2023-01-10

## 2023-01-10 RX ORDER — OXYCODONE HYDROCHLORIDE 5 MG/1
10 TABLET ORAL
Status: CANCELLED | OUTPATIENT
Start: 2023-01-10

## 2023-01-10 RX ORDER — DEXTROSE MONOHYDRATE 100 MG/ML
0-250 INJECTION, SOLUTION INTRAVENOUS AS NEEDED
Status: CANCELLED | OUTPATIENT
Start: 2023-01-10

## 2023-01-10 RX ORDER — FINASTERIDE 5 MG/1
5 TABLET, FILM COATED ORAL DAILY
Status: CANCELLED | OUTPATIENT
Start: 2023-01-11

## 2023-01-10 RX ADMIN — OXYCODONE HYDROCHLORIDE 10 MG: 5 TABLET ORAL at 13:02

## 2023-01-10 RX ADMIN — Medication 100 MG: at 08:58

## 2023-01-10 RX ADMIN — SODIUM CHLORIDE, PRESERVATIVE FREE 10 ML: 5 INJECTION INTRAVENOUS at 06:41

## 2023-01-10 RX ADMIN — ONDANSETRON 4 MG: 4 TABLET, ORALLY DISINTEGRATING ORAL at 09:06

## 2023-01-10 RX ADMIN — SODIUM CHLORIDE 500 ML: 9 INJECTION, SOLUTION INTRAVENOUS at 17:48

## 2023-01-10 RX ADMIN — FOLIC ACID 1 MG: 1 TABLET ORAL at 08:58

## 2023-01-10 RX ADMIN — METOPROLOL TARTRATE 12.5 MG: 25 TABLET, FILM COATED ORAL at 08:57

## 2023-01-10 RX ADMIN — MAGNESIUM OXIDE 400 MG (241.3 MG MAGNESIUM) TABLET 400 MG: TABLET at 08:58

## 2023-01-10 RX ADMIN — PANCRELIPASE LIPASE, PANCRELIPASE PROTEASE, PANCRELIPASE AMYLASE 1 CAPSULE: 20000; 63000; 84000 CAPSULE, DELAYED RELEASE ORAL at 12:59

## 2023-01-10 RX ADMIN — OXYCODONE HYDROCHLORIDE 10 MG: 5 TABLET ORAL at 21:58

## 2023-01-10 RX ADMIN — FINASTERIDE 5 MG: 5 TABLET, FILM COATED ORAL at 08:58

## 2023-01-10 RX ADMIN — TAMSULOSIN HYDROCHLORIDE 0.4 MG: 0.4 CAPSULE ORAL at 08:58

## 2023-01-10 RX ADMIN — PANCRELIPASE LIPASE, PANCRELIPASE PROTEASE, PANCRELIPASE AMYLASE 1 CAPSULE: 20000; 63000; 84000 CAPSULE, DELAYED RELEASE ORAL at 17:46

## 2023-01-10 RX ADMIN — OXYCODONE HYDROCHLORIDE 10 MG: 5 TABLET ORAL at 17:47

## 2023-01-10 RX ADMIN — METOPROLOL TARTRATE 12.5 MG: 25 TABLET, FILM COATED ORAL at 17:46

## 2023-01-10 RX ADMIN — HEPARIN 500 UNITS: 100 SYRINGE at 04:04

## 2023-01-10 RX ADMIN — DIBASIC SODIUM PHOSPHATE, MONOBASIC POTASSIUM PHOSPHATE AND MONOBASIC SODIUM PHOSPHATE 1 TABLET: 852; 155; 130 TABLET ORAL at 17:47

## 2023-01-10 RX ADMIN — MEROPENEM 1 G: 1 INJECTION, POWDER, FOR SOLUTION INTRAVENOUS at 18:27

## 2023-01-10 RX ADMIN — OXYCODONE HYDROCHLORIDE 10 MG: 5 TABLET ORAL at 03:43

## 2023-01-10 RX ADMIN — SODIUM CHLORIDE, PRESERVATIVE FREE 10 ML: 5 INJECTION INTRAVENOUS at 14:00

## 2023-01-10 RX ADMIN — OXYCODONE HYDROCHLORIDE 10 MG: 5 TABLET ORAL at 08:58

## 2023-01-10 RX ADMIN — Medication 1 CAPSULE: at 08:58

## 2023-01-10 RX ADMIN — MIRTAZAPINE 15 MG: 15 TABLET, FILM COATED ORAL at 21:58

## 2023-01-10 RX ADMIN — DIBASIC SODIUM PHOSPHATE, MONOBASIC POTASSIUM PHOSPHATE AND MONOBASIC SODIUM PHOSPHATE 1 TABLET: 852; 155; 130 TABLET ORAL at 08:57

## 2023-01-10 NOTE — PROGRESS NOTES
Pharmacist Discharge Medication Reconciliation    Discharge Provider:  Dr Shelton Robles        Discharge Medications:      My Medications        ASK your doctor about these medications        Instructions Each Dose to Equal Morning Noon Evening Bedtime   finasteride 5 mg tablet  Commonly known as: PROSCAR    Your last dose was: Your next dose is: Take 5 mg by mouth daily. 5 mg                 folic acid 1 mg tablet  Commonly known as: FOLVITE    Your last dose was: Your next dose is: Take 1 Tablet by mouth daily. 1 mg                 lactobacillus combination no.4 3 billion cell Cap    Your last dose was: Your next dose is: Take 1 Capsule by mouth daily. 1 Capsule                 metoprolol tartrate 25 mg tablet  Commonly known as: LOPRESSOR    Your last dose was: Your next dose is: Take 0.5 Tablets by mouth two (2) times a day. 12.5 mg                 mirtazapine 15 mg tablet  Commonly known as: REMERON    Your last dose was: Your next dose is: Take 15 mg by mouth nightly. 15 mg                 oxyCODONE IR 5 mg immediate release tablet  Commonly known as: ROXICODONE    Your last dose was: Your next dose is: Take 5 mg by mouth every six (6) hours as needed for Pain. 5 mg                 tamsulosin 0.4 mg capsule  Commonly known as: FLOMAX    Your last dose was: Your next dose is: Take 1 Capsule by mouth daily. 0.4 mg                 thiamine mononitrate 100 mg tablet  Commonly known as: B-1    Your last dose was: Your next dose is: Take 1 Tablet by mouth daily.    100 mg                           The patient's chart, MAR, and AVS were reviewed by   TEA Johnston,   Contact: [phone]

## 2023-01-10 NOTE — PROGRESS NOTES
Problem: Risk for Spread of Infection  Goal: Prevent transmission of infectious organism to others  Description: Prevent the transmission of infectious organisms to other patients, staff members, and visitors. Outcome: Progressing Towards Goal     Problem: Patient Education:  Go to Education Activity  Goal: Patient/Family Education  Outcome: Progressing Towards Goal     Problem: Pressure Injury - Risk of  Goal: *Prevention of pressure injury  Description: Document Jack Scale and appropriate interventions in the flowsheet.   Outcome: Progressing Towards Goal  Note: Pressure Injury Interventions:       Moisture Interventions: Moisture barrier, Limit adult briefs    Activity Interventions: Increase time out of bed    Mobility Interventions: PT/OT evaluation    Nutrition Interventions: Document food/fluid/supplement intake    Friction and Shear Interventions: Apply protective barrier, creams and emollients

## 2023-01-10 NOTE — PROGRESS NOTES
Problem: Risk for Spread of Infection  Goal: Prevent transmission of infectious organism to others  Description: Prevent the transmission of infectious organisms to other patients, staff members, and visitors. Outcome: Progressing Towards Goal     Problem: Pressure Injury - Risk of  Goal: *Prevention of pressure injury  Description: Document Jack Scale and appropriate interventions in the flowsheet.   Outcome: Progressing Towards Goal  Note: Pressure Injury Interventions:       Moisture Interventions: Maintain skin hydration (lotion/cream)    Activity Interventions: Pressure redistribution bed/mattress(bed type), PT/OT evaluation    Mobility Interventions: PT/OT evaluation    Nutrition Interventions: Document food/fluid/supplement intake    Friction and Shear Interventions: Apply protective barrier, creams and emollients

## 2023-01-10 NOTE — ADT AUTH CERT NOTES
Comments  Comment          Patient Demographics    Patient Name   Collette Hahn   [id number] Legal Sex   Male    1965 Address   [address] Phone   [phone] (Home)   [phone] (Mobile)     Patient Demographics    Patient Name   Collette Hahn   [id number] Legal Sex   Male    1965 Address   [address] [address] Phone   [phone] (Home)   [phone] (Mobile)     CSN:   [id number]     [facility] Date: [facility] Time Room Bed   2023  2:43 PM [phone]     Attending Providers    Provider Pager From To   Mavis Livingston MD  23   Shira Mukherjee MD  23   Jamie Grullon MD  23   Wilian Hooker MD  23      Patient Contacts    Name Relation Home Work Mobile   Lisseth hawley Spouse [phone]   Nicolas Reyes [phone]   [facility] Sister [phone]   Hunt, Edin [facility] Sister [phone]   Hunt, Rollo Sor Daughter [phone]     Utilization Reviews       Intestinal Obstruction - Care Day 6 (1/10/2023) by Jurgen Hutchison       Review Entered Review Status   1/10/2023 1409 Completed      Criteria Review      Care Day: 6 Care Date: 1/10/2023 Level of Care: Telemetry    Guideline Day 2    Level Of Care    (X) Floor    1/10/2023 14:09:00 EST by Jurgen Hutchison       telemetry    Clinical Status    (X) * Hypotension absent    1/10/2023 14:09:00 EST by Jurgen Hutchison      155/86 map  109    (X) * Nausea and vomiting absent or improved    1/10/2023 14:09:00 EST by Jurgen Hutchison      absent    ( ) * Pain absent or managed    1/10/2023 14:09:00 EST by Jurgen Hutchison      still with abdominal pain 8-9/10    (X) * Abdominal exam stable or improved    1/10/2023 14:09:00 EST by Jurgen Hutchison      Soft, not distended, not tender    Activity    (X) Activity as tolerated    1/10/2023 14:09:00 EST by Adam Beauchamp      activity as tolerated w/ assist    Routes    (X) Diet as tolerated    1/10/2023 14:09:00 EST by Adam Beauchamp      adult regualar diet    Interventions    (X) * NG tube absent    1/10/2023 14:09:00 EST by Adam Beauchamp      absent    * Milestone   Additional Notes    DATE: 01/10 Telemetry            Vitals:    97.7 °F (36.5 °C) 64 18 100 %  room air      Physical Exam:   Gen:    Not in distress   Chest:  Clear lungs   CVS:    Regular rhythm. No edema   Neuro:  Alert with good insight. Oriented to person, place, and time       MD Consults/Assessments & Plans:   Hospitalist notes: Active Problems:     SBO (small bowel obstruction)       Hospital Course:        #Recurrent small bowel obstruction POA resolved   #Abdominal pain due to above   #Intractable nausea   #Nonresolving diarrhea   #History of rectal cancer s/p resection/radiation   #History of gastric ulcer   #Concern for rectal stricture   -CT abdomen pelvis chest on admission with small bowel obstruction   -NG taken out per general surgery recommendation   - Flexoid sigmoidoscopy Kristyn Zepeda showed possible rectal stricture       -Advance diet    -Pain management   -Zenpep added   -Zofran and Compazine as needed   -Stool WBC and enteric bacteria PCR added       -Discussed with on-call gastroenterologist at [facility] [place] regarding doing EGD/EUS while patient is admitted and given poor resources for outpatient follow-ups. Gastroenterology agreed   - Noris Barkley doctors paged 1/9/2023 and 1/10/2023 on diversion for all beds   -Gastroenterology group which covers [place] Drs. Greene's are same and will be transferred to [facility] for further management of recurrent small bowel obstruction,  abdominal pain with intractable nausea and diarrhea by GI specialist.   -Patient excepted to medical service appreciate help       PVC   -EKG recurrent PVCs   -Keep K above 4 mag above 2   -On metoprolol 12.5 mg twice daily #Chronic urinary retention s/p Marrero   -Changed on 1/10/2022 at [facility] [address] finasteride and Flomax       Acute on Chronic Pancreatitis   Pancreatic Anomaly   -scheduled for egd w/ EUS with GI as outpt but unable to make this appointment   -cannot r/o pancreatic cancer with recent ct       -Aide Swan added       Htn / mood disorder    -Continue home regimen       Polysubstance abuse   Patient was counseled extensively on the need to abstain from drugs its addictive tendencies, its deleterious effects on the brain, cardiovascular system, lungs as well as its financial & social sequelae      Medications:   proscar 5mg tab daily po   folvite 1mg tab daily po   risaquad 8billion cell 1 cap daily po   zenpep 20,000 1 cap tid w/ meals po   mag-ox 400mg tab daily po   lopressor 12.5mg tab bid po   remeron 15mg tab hs po   nicotine (NICODERM CQ) 7 mg/24 hr patch 1 Patch q24h TD   zofran ODT 4mg tab q8h prn po x1   roxicodone 10mg tab q4h prn po x3   phosphorus 250mg tab 1tab bid po   flomax 0.4mg cap daily po   B-1 100mg tab daily po      Orders:   contact isolation, scd, float heels, skin assessment, turn and position q2h, Maintain HOB at the lowest elevation, cardiac/respiratory monitoring                 Intestinal Obstruction - Care Day 5 (1/9/2023) by Jurgen Hutchison       Review Entered Review Status   1/10/2023 1407 Completed      Criteria Review      Care Day: 5 Care Date: 1/9/2023 Level of Care: Telemetry    Guideline Day 2    Level Of Care    (X) Floor    1/10/2023 13:35:50 EST by Jurgen Hutchison      01/05 telemetry    Clinical Status    (X) * Hypotension absent    1/10/2023 13:35:50 EST by Jurgen Hutchison      174/95 map  121    (X) * Nausea and vomiting absent or improved    1/10/2023 13:35:50 EST by Jurgen Hutchison      abdominal pain with nausea    ( ) * Pain absent or managed    1/10/2023 13:35:50 EST by Jurgen Hutchison      still with abdominal pain 8-9/10    (X) * Abdominal exam stable or improved    1/10/2023 13:35:50 EST by Jeffrey Lerma      Soft, Non distended, Non tender.   +Bowel sounds    Activity    (X) Activity as tolerated    1/10/2023 13:35:50 EST by Jeffrey Lerma      activity as tolerated w/ assist    Routes    (X) Diet as tolerated    1/10/2023 13:35:50 EST by Jeffrey Lerma      ADULT DIET Regular    Interventions    (X) * NG tube absent    1/10/2023 13:35:50 EST by Jeffrey Lerma      absent    * Milestone   Additional Notes    DATE: 01/09 Telemetry            Vitals:   98.7 °F (37.1 °C) 58-71  16 100 % room air      Abnl/Pertinent Labs/Radiology/Diagnostic Studies:   1/9/23 03:06   Phosphorus: 2.4 (L)      Physical Exam:   No changes noted      MD Consults/Assessments & Plans:   Hospitalist notes:   Recurrent small bowel obstruction POA    #Abdominal pain due to above   -CT abdomen pelvis chest on admission with small bowel obstruction   -NG taken out per general surgery recommendation       -Advance diet    -Pain management   -Discussed with on-call gastroenterologist regarding doing EGD/EUS while patient is admitted and given poor resources for outpatient follow-ups   -Both [facility] and [facility] are on diversion, will try again tomorrow        PVC   -EKG recurrent PVCs   -Keep K above 4 mag above 2       Acute on Chronic Pancreatitis   Pancreatic Anomaly   -scheduled for egd w/ EUS with GI as outpt   -cannot r/o pancreatic cancer with recent ct   -check lipase and ca19-9   -GI follow-up outpatient       Hypokalemia        Hypomagnesemia        Htn / mood disorder / bph   -c/w home meds when pt able to tolerate PO   Hydralazine PRN while NPO       Polysubstance abuse   Patient was counseled extensively on the need to abstain from drugs its addictive tendencies, its deleterious effects on the      Medications:   proscar 5mg tab daily po   folvite 1mg tab daily po   risaquad 8billion cell 1cap daily po   lopressor 12.5mg tab bid po   remeron 15mg tab hs po   nicotine (NICODERM CQ) 7 mg/24 hr patch 1 Patch q24h TD   zofran ODT 4mg tab q8h prn po x1   roxicodone 10mg tab q4h prn po x5   phosphorus 250mg tab 1tab bid po   flomax 0.4mg cap daily po   B-1 100mg tab daily po   mag-ox 400mg tab daily po      Orders:   contact isolation, scd, float heels, skin assessment, turn and position q2h, Maintain HOB at the lowest elevation, cardiac/respiratory monitoring      OT notes: Attempted to see patient for OT evaluation today however he declined all activity stating abdominal pain. Intestinal Obstruction - Care Day 4 (1/8/2023) by [facility]       Review Entered Review Status   1/10/2023 1331 Completed      Criteria Review      Care Day: 4 Care Date: 1/8/2023 Level of Care: Telemetry    Guideline Day 2    Level Of Care    (X) Floor    1/10/2023 13:19:04 EST by [facility]      01/05 telemetry    Clinical Status    (X) * Hypotension absent    1/10/2023 13:19:04 EST by [facility]      160/82 map 108    (X) * Nausea and vomiting absent or improved    1/10/2023 13:19:04 EST by [facility]      absent    ( ) * Pain absent or managed    1/10/2023 13:19:04 EST by [facility]      abdomen pain with cramping 5-9/10    (X) * Abdominal exam stable or improved    1/10/2023 13:19:04 EST by [facility]      Soft, Non distended, Non tender. +Bowel sounds    Activity    (X) Activity as tolerated    1/10/2023 13:31:16 EST by [facility]      correction: activity as tolerated w/ assist    1/10/2023 13:19:04 EST by [facility]      activity as tolerated    Routes    (X) IV fluids    1/10/2023 13:19:04 EST by [facility]      potassium chloride 20 mEq in dextrose 5% 1,000 mL infusion @ 50ml/hr    (X) IV medications    1/10/2023 13:19:04 EST by [facility]      morphine 2mg iv q4h prn x2    ( ) Diet as tolerated    1/10/2023 13:19:04 EST by [facility]      ADULT DIET Regular - per RN Pt has some pain after eating lunch.   (ate less than 25%)    Interventions    (X) * NG tube absent    1/10/2023 13:19:04 EST by Nicholas Stubbs      absent    * Milestone   Additional Notes    DATE: 01/08  Telemetry         Pertinent Updates:   pt has had 6 to 7 loose bowel movements       Vitals:    98.3 °F (36.8 °C) 63 16 100 % room air       Abnl/Pertinent Labs/Radiology/Diagnostic Studies:   1/8/23 13:12   CO2: 36 (H)   Anion gap: 1 (L)   BUN/Creatinine ratio: 11 (L)   Calcium: 8.1 (L)   Phosphorus: 2.4 (L)      Physical Exam:   No changes      MD Consults/Assessments & Plans:   Hospitalist notes:   #Recurrent small bowel obstruction POA   #Abdominal pain due to above   -CT abdomen pelvis chest on admission with small bowel obstruction   -NG taken out per general surgery recommendation      -Advance diet    -Pain management       Concern for atrial fibrillation per telemetry   -EKG recurrent PVCs   -Keep K above 4 mag above 2   -TTE ordered       Acute on Chronic Pancreatitis   Pancreatic Anomaly   -scheduled for egd w/ EUS with GI as outpt   -cannot r/o pancreatic cancer with recent ct   -check lipase and ca19-9   -GI follow-up outpatient       Hypokalemia    -replete iv       Hypomagnesemia       Htn / mood disorder / bph   -c/w home meds when pt able to tolerate PO   Hydralazine PRN while NPO       Polysubstance abuse   Patient was counseled extensively on the need to abstain from drugs its addictive tendencies, its deleterious effects on the brain, cardiovascular system, lungs as well as its financial & social sequelae      Medications:   bentyl 10mg cap tid prn po x1   proscar 5mg tab daily po   folvite 1mg tab daily po   risaquad 8billion cell 1cap daily po   lopressor 12.5mg tab bid po   remeron 15mg tab hs po   nicotine (NICODERM CQ) 7 mg/24 hr patch 1 Patch q24h TD   zofran ODT 4mg tab q8h prn po x1   roxicodone 10mg tab q4h prn po x3   phosphorus 250mg tab 1tab bid po   flomax 0.4mg cap daily po   B-1 100mg tab daily po   mag-ox 400mg tab daily po      Orders:   contact isolation, scd, float heels, skin assessment, turn and position q2h, Maintain HOB at the lowest elevation, cardiac/respiratory monitoring

## 2023-01-10 NOTE — PROGRESS NOTES
Verbal shift change report given to Xiomara Flores (oncoming nurse) by Jeannie Cee RN (offgoing nurse). Report included the following information SBAR, Kardex, and MAR.

## 2023-01-10 NOTE — PROGRESS NOTES
Problem: Risk for Spread of Infection  Goal: Prevent transmission of infectious organism to others  Description: Prevent the transmission of infectious organisms to other patients, staff members, and visitors. 1/9/2023 2059 by Rod Cuenca RN  Outcome: Progressing Towards Goal  1/9/2023 2053 by Rod Cuenca RN  Outcome: Progressing Towards Goal     Problem: Pressure Injury - Risk of  Goal: *Prevention of pressure injury  Description: Document Jack Scale and appropriate interventions in the flowsheet.   1/9/2023 2059 by Rod Cuenca RN  Outcome: Progressing Towards Goal  Note: Pressure Injury Interventions:       Moisture Interventions: Maintain skin hydration (lotion/cream)    Activity Interventions: Pressure redistribution bed/mattress(bed type), PT/OT evaluation    Mobility Interventions: PT/OT evaluation    Nutrition Interventions: Document food/fluid/supplement intake    Friction and Shear Interventions: Apply protective barrier, creams and emollients             1/9/2023 2053 by Rod Cuenca RN  Outcome: Progressing Towards Goal  Note: Pressure Injury Interventions:       Moisture Interventions: Maintain skin hydration (lotion/cream)    Activity Interventions: Pressure redistribution bed/mattress(bed type), PT/OT evaluation    Mobility Interventions: PT/OT evaluation    Nutrition Interventions: Document food/fluid/supplement intake    Friction and Shear Interventions: Apply protective barrier, creams and emollients

## 2023-01-10 NOTE — PROGRESS NOTES
12 Faroese mondragon placed per MD orders. Urinary sample cup at bedside to get urine sample but not enough urine at this time. Will check back to get sample.

## 2023-01-10 NOTE — PROGRESS NOTES
Bedside and Verbal shift change report given to [address], RN (oncoming nurse) by Ewa Strauss RN (offgoing nurse). Report included the following information SBAR, Kardex, and MAR.

## 2023-01-10 NOTE — PROGRESS NOTES
Attempted to obtain blood smaple from [facility]. After multiple attempt to adjust position after no blood return , able to get very sluggish blood return of 1 ml , not able to obtain sample. Patient declines peripheral stick at this time. He states that no one from any hospital has luck getting the blood from a peripheral draw. Instilled Heparin into the line and will attempt in a few hours. [id number]: Attempted to get blood return again and worked with [facility] for about 20 minutes and unable to get blood return.  Will report off regarding unable to get blood work

## 2023-01-11 LAB
CAMPYLOBACTER SPECIES, DNA: NEGATIVE
ENTEROTOXIGEN E COLI, DNA: NEGATIVE
P SHIGELLOIDES DNA STL QL NAA+PROBE: NEGATIVE
SALMONELLA SPECIES, DNA: NEGATIVE
SHIGA TOXIN PRODUCING, DNA: NEGATIVE
SHIGELLA SP+EIEC IPAH STL QL NAA+PROBE: NEGATIVE
VIBRIO SPECIES, DNA: NEGATIVE
WBC #/AREA STL HPF: NORMAL /HPF (ref 0–4)
Y. ENTEROCOLITICA, DNA: NEGATIVE

## 2023-01-11 PROCEDURE — 65270000029 HC RM PRIVATE

## 2023-01-11 PROCEDURE — 74011250637 HC RX REV CODE- 250/637: Performed by: STUDENT IN AN ORGANIZED HEALTH CARE EDUCATION/TRAINING PROGRAM

## 2023-01-11 PROCEDURE — 74011250636 HC RX REV CODE- 250/636: Performed by: STUDENT IN AN ORGANIZED HEALTH CARE EDUCATION/TRAINING PROGRAM

## 2023-01-11 PROCEDURE — 74011000258 HC RX REV CODE- 258: Performed by: STUDENT IN AN ORGANIZED HEALTH CARE EDUCATION/TRAINING PROGRAM

## 2023-01-11 PROCEDURE — 74011000250 HC RX REV CODE- 250: Performed by: STUDENT IN AN ORGANIZED HEALTH CARE EDUCATION/TRAINING PROGRAM

## 2023-01-11 PROCEDURE — 74011250637 HC RX REV CODE- 250/637: Performed by: SURGERY

## 2023-01-11 RX ADMIN — FOLIC ACID 1 MG: 1 TABLET ORAL at 09:39

## 2023-01-11 RX ADMIN — SODIUM CHLORIDE, PRESERVATIVE FREE 10 ML: 5 INJECTION INTRAVENOUS at 02:50

## 2023-01-11 RX ADMIN — DIBASIC SODIUM PHOSPHATE, MONOBASIC POTASSIUM PHOSPHATE AND MONOBASIC SODIUM PHOSPHATE 1 TABLET: 852; 155; 130 TABLET ORAL at 17:06

## 2023-01-11 RX ADMIN — DICYCLOMINE HYDROCHLORIDE 10 MG: 10 CAPSULE ORAL at 19:57

## 2023-01-11 RX ADMIN — PANCRELIPASE LIPASE, PANCRELIPASE PROTEASE, PANCRELIPASE AMYLASE 1 CAPSULE: 20000; 63000; 84000 CAPSULE, DELAYED RELEASE ORAL at 17:06

## 2023-01-11 RX ADMIN — DICYCLOMINE HYDROCHLORIDE 10 MG: 10 CAPSULE ORAL at 06:27

## 2023-01-11 RX ADMIN — DIBASIC SODIUM PHOSPHATE, MONOBASIC POTASSIUM PHOSPHATE AND MONOBASIC SODIUM PHOSPHATE 1 TABLET: 852; 155; 130 TABLET ORAL at 09:39

## 2023-01-11 RX ADMIN — FINASTERIDE 5 MG: 5 TABLET, FILM COATED ORAL at 09:39

## 2023-01-11 RX ADMIN — METOPROLOL TARTRATE 12.5 MG: 25 TABLET, FILM COATED ORAL at 17:06

## 2023-01-11 RX ADMIN — Medication 100 MG: at 09:40

## 2023-01-11 RX ADMIN — MAGNESIUM OXIDE 400 MG (241.3 MG MAGNESIUM) TABLET 400 MG: TABLET at 09:39

## 2023-01-11 RX ADMIN — SODIUM CHLORIDE, PRESERVATIVE FREE 10 ML: 5 INJECTION INTRAVENOUS at 22:02

## 2023-01-11 RX ADMIN — TAMSULOSIN HYDROCHLORIDE 0.4 MG: 0.4 CAPSULE ORAL at 09:39

## 2023-01-11 RX ADMIN — ONDANSETRON 4 MG: 4 TABLET, ORALLY DISINTEGRATING ORAL at 18:20

## 2023-01-11 RX ADMIN — OXYCODONE HYDROCHLORIDE 10 MG: 5 TABLET ORAL at 02:52

## 2023-01-11 RX ADMIN — MEROPENEM 1 G: 1 INJECTION, POWDER, FOR SOLUTION INTRAVENOUS at 02:51

## 2023-01-11 RX ADMIN — PANCRELIPASE LIPASE, PANCRELIPASE PROTEASE, PANCRELIPASE AMYLASE 1 CAPSULE: 20000; 63000; 84000 CAPSULE, DELAYED RELEASE ORAL at 07:12

## 2023-01-11 RX ADMIN — OXYCODONE HYDROCHLORIDE 10 MG: 5 TABLET ORAL at 12:23

## 2023-01-11 RX ADMIN — MEROPENEM 1 G: 1 INJECTION, POWDER, FOR SOLUTION INTRAVENOUS at 09:38

## 2023-01-11 RX ADMIN — Medication 1 CAPSULE: at 09:39

## 2023-01-11 RX ADMIN — MIRTAZAPINE 15 MG: 15 TABLET, FILM COATED ORAL at 22:01

## 2023-01-11 RX ADMIN — MEROPENEM 1 G: 1 INJECTION, POWDER, FOR SOLUTION INTRAVENOUS at 17:06

## 2023-01-11 RX ADMIN — PANCRELIPASE LIPASE, PANCRELIPASE PROTEASE, PANCRELIPASE AMYLASE 1 CAPSULE: 20000; 63000; 84000 CAPSULE, DELAYED RELEASE ORAL at 12:23

## 2023-01-11 RX ADMIN — METOPROLOL TARTRATE 12.5 MG: 25 TABLET, FILM COATED ORAL at 09:39

## 2023-01-11 RX ADMIN — OXYCODONE HYDROCHLORIDE 10 MG: 5 TABLET ORAL at 07:12

## 2023-01-11 RX ADMIN — OXYCODONE HYDROCHLORIDE 10 MG: 5 TABLET ORAL at 20:00

## 2023-01-11 NOTE — PROGRESS NOTES
Occupational Therapy Note:  Chart reviewed and spoke with RN. RN stated pt up ad arnie in room and performing ADLs without assist.  Spoke with pt and educated pt on role of OT. Pt stated he is moving fine and performing all ADLs. Pt states he does not want or need OT services at this time. Will complete OT order. Thank you for the consult.   Chinyere Bhatia, OTR/L, CBIS

## 2023-01-11 NOTE — PROGRESS NOTES
Hospitalist Progress Note    NAME: Yael Mcgraw   :  1965   MRN:  [id number]   Room Number:  877/83  @ [facility]     Please note that this dictation was completed with Elizabeth Rdz, the computer voice recognition software. Quite often unanticipated grammatical, syntax, homophones, and other interpretive errors are inadvertently transcribed by the computer software. Please disregard these errors. Please excuse any errors that have escaped final proofreading. Interim Hospital Summary: 59 y.o. male whom presented on 2023 with      Assessment / Plan:  Anticipated discharge date : TBD  Anticipated disposition : [facility]  Barriers to discharge : bed         #Recurrent small bowel obstruction POA resolved  #Abdominal pain due to above  #Intractable nausea  #Nonresolving diarrhea  #History of rectal cancer s/p resection/radiation  #History of gastric ulcer  #Concern for rectal stricture  -CT abdomen pelvis chest on admission with small bowel obstruction  -NG taken out per general surgery recommendation  - Flexoid sigmoidoscopy Kristyn Haas showed possible rectal stricture     -Advance diet   -Pain management  -Zenpep added  -Zofran and Compazine as needed  -Stool WBC and enteric bacteria PCR added        -Discussed with on-call gastroenterologist at [facility] [place] regarding doing EGD/EUS while patient is admitted and given poor resources for outpatient follow-ups. Gastroenterology agreed  - Abdiel preston paged 2023 and 1/10/2023 on diversion for all beds  -Gastroenterology group which covers [place] Drs. Ehrenberg's are same and will be transferred to [facility] for further management of recurrent small bowel obstruction,  abdominal pain with intractable nausea and diarrhea by GI specialist.  -Patient excepted to medical service appreciate help     # Suspected ESBL UTI   - UA w/ bacteria, Nitrites and leukoesterase , wbc and RBC   - UCX pending   - Jennifer Heaton PVC  -EKG recurrent PVCs  -Keep K above 4 mag above 2  -On metoprolol 12.5 mg twice daily        #Chronic urinary retention s/p Marrero  -Changed on 1/10/2022 at [place] finasteride and Flomax        Acute on Chronic Pancreatitis  Pancreatic Anomaly  -scheduled for egd w/ EUS with GI as outpt but unable to make this appointment  -cannot r/o pancreatic cancer with recent ct     -Mallvitcor m Sill added        Htn / mood disorder   -Continue home regimen     Polysubstance abuse  Patient was counseled extensively on the need to abstain from drugs its addictive tendencies, its deleterious effects on the brain, cardiovascular system, lungs as well as its financial & social sequela            Code status: Full  Prophylaxis: Lovenox  Recommended Disposition: Home w/Family     Subjective:     Chief Complaint / Reason for Physician Visit  \"Abdominal  pain with nausea\". Discussed with RN events overnight. Review of Systems:  No fevers, chills, appetite change, cough, sputum production, shortness of breath, dyspnea on exertion,vomitting, diarrhea, constipation, chest pain, leg edema, joint pain, rash, itching. Tolerating PT/OT. Tolerating diet. Objective:     VITALS:   Last 24hrs VS reviewed since prior progress note. Most recent are:  Patient Vitals for the past 24 hrs:   Temp Pulse Resp BP SpO2   01/11/23 0252 97.4 °F (36.3 °C) 78 18 (!) 157/89 100 %   01/11/23 0011 97.9 °F (36.6 °C) 64 18 134/86 100 %   01/10/23 1951 97.7 °F (36.5 °C) 81 -- (!) 159/94 100 %   01/10/23 1214 97.1 °F (36.2 °C) (!) 59 18 (!) 142/74 100 %       Intake/Output Summary (Last 24 hours) at 1/11/2023 0165  Last data filed at 1/10/2023 1854  Gross per 24 hour   Intake --   Output 25 ml   Net -25 ml          PHYSICAL EXAM:  General: WD, WN. Alert, cooperative, no acute distress    EENT:  EOMI. Anicteric sclerae. MMM  Resp:  CTA bilaterally, no wheezing or rales.   No accessory muscle use  CV:  Regular  rhythm,  normal S1/S2, no murmurs rubs gallops, No edema  GI:  Soft, Non distended, Non tender. +Bowel sounds  Neurologic:  Alert and oriented X 3, normal speech,   Psych:   Good insight. Not anxious nor agitated  Skin:  No rashes. No jaundice    Reviewed most current lab test results and cultures  YES  Reviewed most current radiology test results   YES  Review and summation of old records today    NO  Reviewed patient's current orders and MAR    YES  PMH/SH reviewed - no change compared to H&P  ________________________________________________________________________  Care Plan discussed with:    Comments   Patient x    Family      RN x    Care Manager x    Consultant  x GI                     x Multidiciplinary team rounds were held today with , nursing, pharmacist and clinical coordinator. Patient's plan of care was discussed; medications were reviewed and discharge planning was addressed. ________________________________________________________________________  Total NON critical care TIME:  35   Minutes    Total CRITICAL CARE TIME Spent:   Minutes non procedure based      Comments   >50% of visit spent in counseling and coordination of care x    ________________________________________________________________________  Charity Us MD     Procedures: see electronic medical records for all procedures/Xrays and details which were not copied into this note but were reviewed prior to creation of Plan. LABS:  I reviewed today's most current labs and imaging studies. Pertinent labs include:  No results for input(s): WBC, HGB, HCT, PLT, HGBEXT, HCTEXT, PLTEXT, HGBEXT, HCTEXT, PLTEXT in the last 72 hours.     Recent Labs     01/09/23  0306 01/08/23  1312   NA  --  142   K  --  4.1   CL  --  105   CO2  --  36*   GLU  --  99   BUN  --  8   CREA  --  0.73   CA  --  8.1*   MG 1.6 1.7   PHOS 2.4* 2.4*       Signed: Charity Us MD

## 2023-01-11 NOTE — PROGRESS NOTES
Hospitalist Progress Note    NAME: Samm Connolly   :  1965   MRN:  [id number]   Room Number:  690/07  @ [address] [facility]     Please note that this dictation was completed with Rosi Ceballos, the computer voice recognition software. Quite often unanticipated grammatical, syntax, homophones, and other interpretive errors are inadvertently transcribed by the computer software. Please disregard these errors. Please excuse any errors that have escaped final proofreading. Interim Hospital Summary: 59 y.o. male whom presented on 2023 with      Assessment / Plan:  Anticipated discharge date : TBD  Anticipated disposition : [facility]  Barriers to discharge : bed         #Recurrent small bowel obstruction POA resolved  #Abdominal pain due to above  #Intractable nausea  #Nonresolving diarrhea  #History of rectal cancer s/p resection/radiation  #History of gastric ulcer  #Concern for rectal stricture  -CT abdomen pelvis chest on admission with small bowel obstruction  -NG taken out per general surgery recommendation  - Flexoid sigmoidoscopy Kristyn LOPEZ - SEAN showed possible rectal stricture     -Advance diet   -Pain management  -Zenpep added  -Zofran and Compazine as needed  -Stool WBC and enteric bacteria PCR added        -Discussed with on-call gastroenterologist at [facility] [place] regarding doing EGD/EUS while patient is admitted and given poor resources for outpatient follow-ups. Gastroenterology agreed  - [place] January doctors paged 2023 and 1/10/2023 on diversion for all beds  -Gastroenterology group which covers [facility] Drs. Ralls's are same and will be transferred to [facility] for further management of recurrent small bowel obstruction,  abdominal pain with intractable nausea and diarrhea by GI specialist.  -Patient excepted to medical service appreciate help     # Suspected ESBL UTI   - UA w/ bacteria, Nitrites and leukoesterase , wbc and RBC   - UCX pending   - Stacy Speak PVC  -EKG recurrent PVCs  -Keep K above 4 mag above 2  -On metoprolol 12.5 mg twice daily        #Chronic urinary retention s/p Marrero  -Changed on 1/10/2022 at [place] finasteride and Flomax        Acute on Chronic Pancreatitis  Pancreatic Anomaly  -scheduled for egd w/ EUS with GI as outpt but unable to make this appointment  -cannot r/o pancreatic cancer with recent ct     -Star Ramos added        Htn / mood disorder   -Continue home regimen     Polysubstance abuse  Patient was counseled extensively on the need to abstain from drugs its addictive tendencies, its deleterious effects on the brain, cardiovascular system, lungs as well as its financial & social sequela            Code status: Full  Prophylaxis: Lovenox  Recommended Disposition: Home w/Family     Subjective:     Chief Complaint / Reason for Physician Visit  \"Abdominal  pain with nausea\". Discussed with RN events overnight. Review of Systems:  No fevers, chills, appetite change, cough, sputum production, shortness of breath, dyspnea on exertion,vomitting, diarrhea, constipation, chest pain, leg edema, joint pain, rash, itching. Tolerating PT/OT. Tolerating diet. Objective:     VITALS:   Last 24hrs VS reviewed since prior progress note. Most recent are:  Patient Vitals for the past 24 hrs:   Temp Pulse Resp BP SpO2   01/11/23 0252 97.4 °F (36.3 °C) 78 18 (!) 157/89 100 %   01/11/23 0011 97.9 °F (36.6 °C) 64 18 134/86 100 %   01/10/23 1951 97.7 °F (36.5 °C) 81 -- (!) 159/94 100 %   01/10/23 1214 97.1 °F (36.2 °C) (!) 59 18 (!) 142/74 100 %       Intake/Output Summary (Last 24 hours) at 1/11/2023 0925  Last data filed at 1/10/2023 1854  Gross per 24 hour   Intake --   Output 25 ml   Net -25 ml          PHYSICAL EXAM:  General: WD, WN. Alert, cooperative, no acute distress    EENT:  EOMI. Anicteric sclerae. MMM  Resp:  CTA bilaterally, no wheezing or rales.   No accessory muscle use  CV:  Regular  rhythm,  normal S1/S2, no murmurs rubs gallops, No edema  GI:  Soft, Non distended, Non tender. +Bowel sounds  Neurologic:  Alert and oriented X 3, normal speech,   Psych:   Good insight. Not anxious nor agitated  Skin:  No rashes. No jaundice    Reviewed most current lab test results and cultures  YES  Reviewed most current radiology test results   YES  Review and summation of old records today    NO  Reviewed patient's current orders and MAR    YES  PMH/SH reviewed - no change compared to H&P  ________________________________________________________________________  Care Plan discussed with:    Comments   Patient x    Family      RN x    Care Manager x    Consultant  x GI                     x Multidiciplinary team rounds were held today with , nursing, pharmacist and clinical coordinator. Patient's plan of care was discussed; medications were reviewed and discharge planning was addressed. ________________________________________________________________________  Total NON critical care TIME:  35   Minutes    Total CRITICAL CARE TIME Spent:   Minutes non procedure based      Comments   >50% of visit spent in counseling and coordination of care x    ________________________________________________________________________  Antonella Oneill MD     Procedures: see electronic medical records for all procedures/Xrays and details which were not copied into this note but were reviewed prior to creation of Plan. LABS:  I reviewed today's most current labs and imaging studies. Pertinent labs include:  No results for input(s): WBC, HGB, HCT, PLT, HGBEXT, HCTEXT, PLTEXT, HGBEXT, HCTEXT, PLTEXT in the last 72 hours.     Recent Labs     01/09/23  0306 01/08/23  1312   NA  --  142   K  --  4.1   CL  --  105   CO2  --  36*   GLU  --  99   BUN  --  8   CREA  --  0.73   CA  --  8.1*   MG 1.6 1.7   PHOS 2.4* 2.4*       Signed: Antonella Oneill MD

## 2023-01-12 PROCEDURE — 74011250637 HC RX REV CODE- 250/637: Performed by: STUDENT IN AN ORGANIZED HEALTH CARE EDUCATION/TRAINING PROGRAM

## 2023-01-12 PROCEDURE — 74011250636 HC RX REV CODE- 250/636: Performed by: STUDENT IN AN ORGANIZED HEALTH CARE EDUCATION/TRAINING PROGRAM

## 2023-01-12 PROCEDURE — 65270000029 HC RM PRIVATE

## 2023-01-12 PROCEDURE — 74011250637 HC RX REV CODE- 250/637: Performed by: SURGERY

## 2023-01-12 PROCEDURE — 74011000250 HC RX REV CODE- 250: Performed by: STUDENT IN AN ORGANIZED HEALTH CARE EDUCATION/TRAINING PROGRAM

## 2023-01-12 PROCEDURE — 74011000258 HC RX REV CODE- 258: Performed by: STUDENT IN AN ORGANIZED HEALTH CARE EDUCATION/TRAINING PROGRAM

## 2023-01-12 RX ORDER — LOPERAMIDE HYDROCHLORIDE 2 MG/1
2 CAPSULE ORAL
Status: DISCONTINUED | OUTPATIENT
Start: 2023-01-12 | End: 2023-01-13 | Stop reason: HOSPADM

## 2023-01-12 RX ADMIN — Medication 100 MG: at 08:21

## 2023-01-12 RX ADMIN — PANCRELIPASE LIPASE, PANCRELIPASE PROTEASE, PANCRELIPASE AMYLASE 1 CAPSULE: 20000; 63000; 84000 CAPSULE, DELAYED RELEASE ORAL at 12:35

## 2023-01-12 RX ADMIN — MAGNESIUM OXIDE 400 MG (241.3 MG MAGNESIUM) TABLET 400 MG: TABLET at 08:21

## 2023-01-12 RX ADMIN — FINASTERIDE 5 MG: 5 TABLET, FILM COATED ORAL at 08:22

## 2023-01-12 RX ADMIN — Medication 1 CAPSULE: at 08:21

## 2023-01-12 RX ADMIN — PANCRELIPASE LIPASE, PANCRELIPASE PROTEASE, PANCRELIPASE AMYLASE 1 CAPSULE: 20000; 63000; 84000 CAPSULE, DELAYED RELEASE ORAL at 16:12

## 2023-01-12 RX ADMIN — LOPERAMIDE HYDROCHLORIDE 2 MG: 2 CAPSULE ORAL at 18:06

## 2023-01-12 RX ADMIN — METOPROLOL TARTRATE 12.5 MG: 25 TABLET, FILM COATED ORAL at 17:12

## 2023-01-12 RX ADMIN — FOLIC ACID 1 MG: 1 TABLET ORAL at 08:22

## 2023-01-12 RX ADMIN — MEROPENEM 1 G: 1 INJECTION, POWDER, FOR SOLUTION INTRAVENOUS at 10:30

## 2023-01-12 RX ADMIN — MIRTAZAPINE 15 MG: 15 TABLET, FILM COATED ORAL at 21:32

## 2023-01-12 RX ADMIN — ONDANSETRON 4 MG: 4 TABLET, ORALLY DISINTEGRATING ORAL at 16:19

## 2023-01-12 RX ADMIN — SODIUM CHLORIDE, PRESERVATIVE FREE 10 ML: 5 INJECTION INTRAVENOUS at 06:04

## 2023-01-12 RX ADMIN — OXYCODONE HYDROCHLORIDE 10 MG: 5 TABLET ORAL at 09:33

## 2023-01-12 RX ADMIN — DICYCLOMINE HYDROCHLORIDE 10 MG: 10 CAPSULE ORAL at 04:30

## 2023-01-12 RX ADMIN — MEROPENEM 1 G: 1 INJECTION, POWDER, FOR SOLUTION INTRAVENOUS at 02:54

## 2023-01-12 RX ADMIN — TAMSULOSIN HYDROCHLORIDE 0.4 MG: 0.4 CAPSULE ORAL at 08:22

## 2023-01-12 RX ADMIN — OXYCODONE HYDROCHLORIDE 10 MG: 5 TABLET ORAL at 16:12

## 2023-01-12 RX ADMIN — METOPROLOL TARTRATE 12.5 MG: 25 TABLET, FILM COATED ORAL at 08:23

## 2023-01-12 RX ADMIN — MEROPENEM 1 G: 1 INJECTION, POWDER, FOR SOLUTION INTRAVENOUS at 17:12

## 2023-01-12 RX ADMIN — DIBASIC SODIUM PHOSPHATE, MONOBASIC POTASSIUM PHOSPHATE AND MONOBASIC SODIUM PHOSPHATE 1 TABLET: 852; 155; 130 TABLET ORAL at 17:12

## 2023-01-12 RX ADMIN — OXYCODONE HYDROCHLORIDE 10 MG: 5 TABLET ORAL at 04:30

## 2023-01-12 RX ADMIN — PANCRELIPASE LIPASE, PANCRELIPASE PROTEASE, PANCRELIPASE AMYLASE 1 CAPSULE: 20000; 63000; 84000 CAPSULE, DELAYED RELEASE ORAL at 08:22

## 2023-01-12 RX ADMIN — OXYCODONE HYDROCHLORIDE 10 MG: 5 TABLET ORAL at 20:09

## 2023-01-12 RX ADMIN — SODIUM CHLORIDE, PRESERVATIVE FREE 10 ML: 5 INJECTION INTRAVENOUS at 21:32

## 2023-01-12 RX ADMIN — DICYCLOMINE HYDROCHLORIDE 10 MG: 10 CAPSULE ORAL at 16:12

## 2023-01-12 RX ADMIN — DIBASIC SODIUM PHOSPHATE, MONOBASIC POTASSIUM PHOSPHATE AND MONOBASIC SODIUM PHOSPHATE 1 TABLET: 852; 155; 130 TABLET ORAL at 08:21

## 2023-01-12 NOTE — PROGRESS NOTES
Hospitalist Progress Note    NAME: Joyce Ascencio   :  1965   MRN:  [id number]   Room Number:  181/66  @ [facility]     Please note that this dictation was completed with Trish Manuel, the computer voice recognition software. Quite often unanticipated grammatical, syntax, homophones, and other interpretive errors are inadvertently transcribed by the computer software. Please disregard these errors. Please excuse any errors that have escaped final proofreading. Interim Hospital Summary: 59 y.o. male whom presented on 2023 with      Assessment / Plan:  Anticipated discharge date : TBD  Anticipated disposition : [facility]  Barriers to discharge : bed         #Recurrent small bowel obstruction POA resolved  #Abdominal pain due to above  #Intractable nausea  #Nonresolving diarrhea  #History of rectal cancer s/p resection/radiation  #History of gastric ulcer  #Concern for rectal stricture  -CT abdomen pelvis chest on admission with small bowel obstruction  -NG taken out per general surgery recommendation  - Flexoid sigmoidoscopy Kristyn Campbell showed possible rectal stricture     -Advance diet   -Pain management  -Zenpep added  -Zofran and Compazine as needed  -Stool WBC 5-10 and enteric bacteria PCR negative        -Discussed with on-call gastroenterologist at [facility] [place] regarding doing EGD/EUS while patient is admitted and given poor resources for outpatient follow-ups. Gastroenterology agreed  - Lian preston paged 2023 and 1/10/2023 on diversion for all beds  -Gastroenterology group which covers [place] D[place]. [place]'s are same and will be transferred to [facility] for further management of recurrent small bowel obstruction,  abdominal pain with intractable nausea and diarrhea by GI specialist.  -Patient accepted to medical service appreciate help     # Suspected ESBL UTI   - UA w/ bacteria, Nitrites and leukoesterase , wbc and RBC   - UCX GNR  - Merrem PVC  -EKG recurrent PVCs  -Keep K above 4 mag above 2  -On metoprolol 12.5 mg twice daily        #Chronic urinary retention s/p Marrero  -Changed on 1/10/2022 at [place] finasteride and Flomax        Acute on Chronic Pancreatitis  Pancreatic Anomaly  -scheduled for egd w/ EUS with GI as outpt but unable to make this appointment  -cannot r/o pancreatic cancer with recent ct     -Osmani Velasquez added        Htn / mood disorder   -Continue home regimen     Polysubstance abuse  Patient was counseled extensively on the need to abstain from drugs its addictive tendencies, its deleterious effects on the brain, cardiovascular system, lungs as well as its financial & social sequela            Code status: Full  Prophylaxis: Lovenox  Recommended Disposition: Home w/Family     Subjective:     Chief Complaint / Reason for Physician Visit  \"Abdominal  pain with nausea\". Discussed with RN events overnight. Review of Systems:  No fevers, chills, appetite change, cough, sputum production, shortness of breath, dyspnea on exertion,vomitting, diarrhea, constipation, chest pain, leg edema, joint pain, rash, itching. Tolerating PT/OT. Tolerating diet. Objective:     VITALS:   Last 24hrs VS reviewed since prior progress note. Most recent are:  Patient Vitals for the past 24 hrs:   Temp Pulse Resp BP SpO2   01/12/23 0809 98 °F (36.7 °C) 73 16 135/83 100 %   01/11/23 1946 98.3 °F (36.8 °C) 89 16 132/82 100 %   01/11/23 1708 97.2 °F (36.2 °C) 82 16 (!) 141/98 100 %   01/11/23 1035 96.8 °F (36 °C) 67 15 (!) 152/92 100 %       Intake/Output Summary (Last 24 hours) at 1/12/2023 0915  Last data filed at 1/12/2023 0430  Gross per 24 hour   Intake --   Output 875 ml   Net -875 ml          PHYSICAL EXAM:  General: WD, WN. Alert, cooperative, no acute distress    EENT:  EOMI. Anicteric sclerae. MMM  Resp:  CTA bilaterally, no wheezing or rales.   No accessory muscle use  CV:  Regular  rhythm,  normal S1/S2, no murmurs rubs gallops, No edema  GI:  Soft, Non distended, Non tender. +Bowel sounds  Neurologic:  Alert and oriented X 3, normal speech,   Psych:   Good insight. Not anxious nor agitated  Skin:  No rashes. No jaundice    Reviewed most current lab test results and cultures  YES  Reviewed most current radiology test results   YES  Review and summation of old records today    NO  Reviewed patient's current orders and MAR    YES  PMH/SH reviewed - no change compared to H&P  ________________________________________________________________________  Care Plan discussed with:    Comments   Patient x    Family      RN x    Care Manager x    Consultant  x GI                     x Multidiciplinary team rounds were held today with , nursing, pharmacist and clinical coordinator. Patient's plan of care was discussed; medications were reviewed and discharge planning was addressed. ________________________________________________________________________  Total NON critical care TIME:  35   Minutes    Total CRITICAL CARE TIME Spent:   Minutes non procedure based      Comments   >50% of visit spent in counseling and coordination of care x    ________________________________________________________________________  Raya Salazar MD     Procedures: see electronic medical records for all procedures/Xrays and details which were not copied into this note but were reviewed prior to creation of Plan. LABS:  I reviewed today's most current labs and imaging studies. Pertinent labs include:  No results for input(s): WBC, HGB, HCT, PLT, HGBEXT, HCTEXT, PLTEXT, HGBEXT, HCTEXT, PLTEXT in the last 72 hours. No results for input(s): NA, K, CL, CO2, GLU, BUN, CREA, CA, MG, PHOS, ALB, TBIL, TBILI, ALT, INR, INREXT, INREXT in the last 72 hours.     No lab exists for component: SGOT      Signed: Raya Salazar MD

## 2023-01-12 NOTE — PROGRESS NOTES
1925- Bedside and Verbal shift change report given to Roger Lanes, RN   (oncoming nurse) by Linnette Trevino (offgoing nurse). Report included the following information SBAR, Kardex, Intake/Output, and MAR.     2100- Pt is resting in bed at present. He continues to complain of mid upper abdominal pain rating it 10/10 and was medicated with Bentyl and Oxycodone at 8 pm with relief noted. Pt had a large soft tan stool on the bedside commode. Pt denies having nausea at this time. Will continue to monitor. 0500- Pt has slept on and off tonight. He continues to complain of mid abdominal pain rating it 8/10 and was medicated with Bentyl and Oxycodone at 0430 with relief noted. Pt denies having nausea at this time. Will continue to monitor.

## 2023-01-12 NOTE — PROGRESS NOTES
1/12/2023 -   TRANSITIONS OF CARE PLAN:   RUR: 17%; Moderate  DISPOSITION: TBD - possible discharge to home  TRANSPORT: TBD - family vs BLS vs transport assist  DME: None Anticipated for discharge  NEEDS FOR DISCHARGE: Close Follow Up with GI and Uro  BARRIERS: Bed Availability at [facility], failure to keep follow up appts   ANTICIPATED FOLLOW UPS: GI, Uro, PCP  ONGOING INPATIENT NEEDS: EGD, Cultures Pending, ABX - pending transfer to [facility] or [address] [place] [address]     Anticipated Discharge is: 24 Hours       1000 -   CM participated in IDRs on patient. Patient's SBO has cleared. Cultures are pending, as patient now has an UTI. Pending cultures, patient may be cleared for discharge with plan for close follow with GI and Uro. Patient is at high risk to not complete follow up appts. CM Team to continue following for KENTRELL Needs.     CRM: Juan Rosario, MPH, Eber WEBER 18.: [phone]

## 2023-01-12 NOTE — PROGRESS NOTES
Spiritual Care Partner Volunteer Caitlin 783 Malina Farooq visited Mr. Omayra Be at [facility] in [address] on 1/12/2023   Documented by:  Laura Rivera

## 2023-01-12 NOTE — PROGRESS NOTES
Problem: Risk for Spread of Infection  Goal: Prevent transmission of infectious organism to others  Description: Prevent the transmission of infectious organisms to other patients, staff members, and visitors. Outcome: Progressing Towards Goal     Problem: Patient Education:  Go to Education Activity  Goal: Patient/Family Education  Outcome: Progressing Towards Goal     Problem: Pressure Injury - Risk of  Goal: *Prevention of pressure injury  Description: Document Jack Scale and appropriate interventions in the flowsheet. Outcome: Progressing Towards Goal  Note: Pressure Injury Interventions:       Moisture Interventions: Absorbent underpads, Apply protective barrier, creams and emollients, Check for incontinence Q2 hours and as needed, Minimize layers    Activity Interventions: Assess need for specialty bed, Chair cushion, Increase time out of bed, Pressure redistribution bed/mattress(bed type), PT/OT evaluation    Mobility Interventions: HOB 30 degrees or less, Float heels    Nutrition Interventions: Document food/fluid/supplement intake, Discuss nutritional consult with provider, Offer support with meals,snacks and hydration    Friction and Shear Interventions: Apply protective barrier, creams and emollients, Minimize layers                Problem: Patient Education: Go to Patient Education Activity  Goal: Patient/Family Education  Outcome: Progressing Towards Goal     Problem: Falls - Risk of  Goal: *Absence of Falls  Description: Document Bk Fall Risk and appropriate interventions in the flowsheet.   Outcome: Progressing Towards Goal  Note: Fall Risk Interventions:                                Problem: Patient Education: Go to Patient Education Activity  Goal: Patient/Family Education  Outcome: Progressing Towards Goal     Problem: Pain  Goal: *Control of Pain  Outcome: Progressing Towards Goal  Goal: *PALLIATIVE CARE:  Alleviation of Pain  Outcome: Progressing Towards Goal     Problem: Patient Education: Go to Patient Education Activity  Goal: Patient/Family Education  Outcome: Progressing Towards Goal     Problem: Nausea/Vomiting (Adult)  Goal: *Absence of nausea/vomiting  Outcome: Progressing Towards Goal  Goal: *Palliation of nausea/vomiting (Palliative Care)  Outcome: Progressing Towards Goal     Problem: Patient Education: Go to Patient Education Activity  Goal: Patient/Family Education  Outcome: Progressing Towards Goal

## 2023-01-12 NOTE — PROGRESS NOTES
TRANSFER - OUT REPORT:    Verbal report given to . Latrice Boone(name) on Samm Connolly  being transferred to  [address] [facility] [address] (unit) for routine progression of care       Report consisted of patients Situation, Background, Assessment and   Recommendations(SBAR). Information from the following report(s) SBAR, Kardex, ED Summary, Intake/Output, MAR, Accordion, and Recent Results was reviewed with the receiving nurse. Lines:   Venous Access Device Alma Cath (Active)       Venous Access Device Port-A-Cath 01/05/23 Upper chest (subclavicular area, right (Active)   Central Line Being Utilized Yes 01/12/23 1615   Criteria for Appropriate Use Limited/no vessel suitable for conventional peripheral access 01/12/23 1615   Site Assessment Clean, dry, & intact 01/12/23 1615   Date of Last Dressing Change 01/06/23 01/12/23 1615   Dressing Status Clean, dry, & intact 01/12/23 1615   Dressing Type Transparent 01/12/23 1615   Action Taken Open ports on tubing capped 01/12/23 1615   Date Accessed (Medial Site) 01/07/23 01/07/23 0807   Access Needle Size (Site #1) 20 G 01/07/23 0807   Access Needle Length (Medial Site) 0.75 inches 01/07/23 0807   Positive Blood Return (Medial Site) No 01/12/23 1615   Action Taken (Medial Site) Flushed 01/12/23 1615   Date Accessed (Lateral Site) 01/05/23 01/08/23 0802   Access Time (Lateral Site) 1540 01/05/23 1540   Access Needle Size (Lateral Site) 20 G 01/05/23 1540   Access Needle Length (Lateral Site) 0.75 inches 01/05/23 1540   Positive Blood Return (Lateral Site) No 01/12/23 1615   Action Taken (Lateral Site) Flushed 01/12/23 1615   Alcohol Cap Used Yes 01/12/23 1615        Opportunity for questions and clarification was provided. Patient transported with: belongings.

## 2023-01-12 NOTE — PROGRESS NOTES
Problem: Risk for Spread of Infection  Goal: Prevent transmission of infectious organism to others  Description: Prevent the transmission of infectious organisms to other patients, staff members, and visitors. Outcome: Progressing Towards Goal     Problem: Patient Education:  Go to Education Activity  Goal: Patient/Family Education  Outcome: Progressing Towards Goal     Problem: Pressure Injury - Risk of  Goal: *Prevention of pressure injury  Description: Document Jack Scale and appropriate interventions in the flowsheet. Outcome: Progressing Towards Goal  Note: Pressure Injury Interventions:       Moisture Interventions: Apply protective barrier, creams and emollients, Maintain skin hydration (lotion/cream), Minimize layers, Moisture barrier    Activity Interventions: Increase time out of bed    Mobility Interventions: HOB 30 degrees or less    Nutrition Interventions: Document food/fluid/supplement intake    Friction and Shear Interventions: Apply protective barrier, creams and emollients, Minimize layers                Problem: Patient Education: Go to Patient Education Activity  Goal: Patient/Family Education  Outcome: Progressing Towards Goal     Problem: Falls - Risk of  Goal: *Absence of Falls  Description: Document Bk Fall Risk and appropriate interventions in the flowsheet.   Outcome: Progressing Towards Goal  Note: Fall Risk Interventions:                                Problem: Patient Education: Go to Patient Education Activity  Goal: Patient/Family Education  Outcome: Progressing Towards Goal     Problem: Pain  Goal: *Control of Pain  Outcome: Progressing Towards Goal  Goal: *PALLIATIVE CARE:  Alleviation of Pain  Outcome: Progressing Towards Goal     Problem: Patient Education: Go to Patient Education Activity  Goal: Patient/Family Education  Outcome: Progressing Towards Goal     Problem: Nausea/Vomiting (Adult)  Goal: *Absence of nausea/vomiting  Outcome: Progressing Towards Goal  Goal: *Palliation of nausea/vomiting (Palliative Care)  Outcome: Progressing Towards Goal     Problem: Patient Education: Go to Patient Education Activity  Goal: Patient/Family Education  Outcome: Progressing Towards Goal

## 2023-01-12 NOTE — PROGRESS NOTES
TRANSFER - OUT REPORT:    Verbal report given to Casandra (name) on Penelope Quest  being transferred to [address] [facility] (unit) for routine progression of care       Report consisted of patients Situation, Background, Assessment and   Recommendations(SBAR). Information from the following report(s) SBAR, Kardex, ED Summary, Intake/Output, MAR, Accordion, and Recent Results was reviewed with the receiving nurse. Lines:   Venous Access Device Alma Cath (Active)       Venous Access Device Port-A-Cath 01/05/23 Upper chest (subclavicular area, right (Active)   Central Line Being Utilized Yes 01/12/23 1615   Criteria for Appropriate Use Limited/no vessel suitable for conventional peripheral access 01/12/23 1615   Site Assessment Clean, dry, & intact 01/12/23 1615   Date of Last Dressing Change 01/06/23 01/12/23 1615   Dressing Status Clean, dry, & intact 01/12/23 1615   Dressing Type Transparent 01/12/23 1615   Action Taken Open ports on tubing capped 01/12/23 1615   Date Accessed (Medial Site) 01/07/23 01/07/23 0807   Access Needle Size (Site #1) 20 G 01/07/23 0807   Access Needle Length (Medial Site) 0.75 inches 01/07/23 0807   Positive Blood Return (Medial Site) No 01/12/23 1615   Action Taken (Medial Site) Flushed 01/12/23 1615   Date Accessed (Lateral Site) 01/05/23 01/08/23 0802   Access Time (Lateral Site) 1540 01/05/23 1540   Access Needle Size (Lateral Site) 20 G 01/05/23 1540   Access Needle Length (Lateral Site) 0.75 inches 01/05/23 1540   Positive Blood Return (Lateral Site) No 01/12/23 1615   Action Taken (Lateral Site) Flushed 01/12/23 1615   Alcohol Cap Used Yes 01/12/23 1615        Opportunity for questions and clarification was provided.       Patient transported with: belongings

## 2023-01-12 NOTE — PROGRESS NOTES
Pt c/o discomfort associated with the Stat lock for mondragon on inside of R thigh. As pt is frequently up to [facility], decision made to switch bed bag for leg bag. Leg bag applied to leg leg. Abd and paper tape applied for comfort. New bag and padding labeled and dated. Note when Stat lock was being removed, pt reached down and very quickly removed the STAT lock causing a small skin teat 0.5cm in diameter. Primary RN informed.

## 2023-01-12 NOTE — PROGRESS NOTES
0730- Bedside and Verbal shift change report given to jaime (oncoming nurse) by Daniela Desouza (offgoing nurse). Report included the following information SBAR, Kardex, ED Summary, Intake/Output, MAR, Accordion, Recent Results, and Alarm Parameters . 5269- Patient seen and assessed. Patient c/o of mid abdominal pain 8/10 not due for pain medication at this time. Per patient can wait until oxycodone is due. No additional complaints at this time. Will continue to monitor, call light in reach     1000- reassessed pain. Well controlled at this time     1030- antibiotics hung no complaints    1100- patient resting in bed     1200- patient requesting a salad dietary called     1230- pt has no complaints at this time    1348- patient resting in bed. 1430- patient resting in bed    1530- patient resting in bed     1700- patient had an episode if uncontrolled incontinence. Linens changed and provided with new zinc cream     1723- patient had a 2nd episode of incontinence. Paged MD trujillo to see if patient can have imodium.  MD trujillo to order immodium

## 2023-01-13 ENCOUNTER — HOSPITAL ENCOUNTER (INPATIENT)
Age: 58
LOS: 15 days | Discharge: HOME | End: 2023-01-28
Attending: HOSPITALIST | Admitting: FAMILY MEDICINE
Payer: MEDICARE

## 2023-01-13 VITALS
WEIGHT: 96.5 LBS | DIASTOLIC BLOOD PRESSURE: 83 MMHG | OXYGEN SATURATION: 100 % | HEART RATE: 73 BPM | HEIGHT: 68 IN | TEMPERATURE: 98.1 F | SYSTOLIC BLOOD PRESSURE: 154 MMHG | BODY MASS INDEX: 14.62 KG/M2 | RESPIRATION RATE: 16 BRPM

## 2023-01-13 PROBLEM — B96.29 UTI DUE TO EXTENDED-SPECTRUM BETA LACTAMASE (ESBL) PRODUCING ESCHERICHIA COLI: Status: ACTIVE | Noted: 2023-01-13

## 2023-01-13 PROBLEM — Z16.12 UTI DUE TO EXTENDED-SPECTRUM BETA LACTAMASE (ESBL) PRODUCING ESCHERICHIA COLI: Status: ACTIVE | Noted: 2023-01-13

## 2023-01-13 PROBLEM — E43 SEVERE PROTEIN-CALORIE MALNUTRITION (HCC): Chronic | Status: ACTIVE | Noted: 2023-01-13

## 2023-01-13 PROBLEM — E43 SEVERE PROTEIN-CALORIE MALNUTRITION (HCC): Status: ACTIVE | Noted: 2023-01-13

## 2023-01-13 PROBLEM — N39.0 UTI DUE TO EXTENDED-SPECTRUM BETA LACTAMASE (ESBL) PRODUCING ESCHERICHIA COLI: Status: ACTIVE | Noted: 2023-01-13

## 2023-01-13 LAB
BACTERIA SPEC CULT: ABNORMAL
CC UR VC: ABNORMAL
SERVICE CMNT-IMP: ABNORMAL

## 2023-01-13 PROCEDURE — 65270000029 HC RM PRIVATE

## 2023-01-13 PROCEDURE — 74011000258 HC RX REV CODE- 258: Performed by: STUDENT IN AN ORGANIZED HEALTH CARE EDUCATION/TRAINING PROGRAM

## 2023-01-13 PROCEDURE — 74011000250 HC RX REV CODE- 250: Performed by: NURSE PRACTITIONER

## 2023-01-13 PROCEDURE — 74011250636 HC RX REV CODE- 250/636: Performed by: NURSE PRACTITIONER

## 2023-01-13 PROCEDURE — 74011250637 HC RX REV CODE- 250/637: Performed by: STUDENT IN AN ORGANIZED HEALTH CARE EDUCATION/TRAINING PROGRAM

## 2023-01-13 PROCEDURE — 74011250637 HC RX REV CODE- 250/637: Performed by: NURSE PRACTITIONER

## 2023-01-13 PROCEDURE — 74011000250 HC RX REV CODE- 250: Performed by: STUDENT IN AN ORGANIZED HEALTH CARE EDUCATION/TRAINING PROGRAM

## 2023-01-13 PROCEDURE — 74011250637 HC RX REV CODE- 250/637: Performed by: SURGERY

## 2023-01-13 PROCEDURE — 74011250636 HC RX REV CODE- 250/636: Performed by: STUDENT IN AN ORGANIZED HEALTH CARE EDUCATION/TRAINING PROGRAM

## 2023-01-13 RX ORDER — HEPARIN 100 UNIT/ML
500 SYRINGE INTRAVENOUS AS NEEDED
Status: DISCONTINUED | OUTPATIENT
Start: 2023-01-13 | End: 2023-01-28 | Stop reason: HOSPADM

## 2023-01-13 RX ORDER — FINASTERIDE 5 MG/1
5 TABLET, FILM COATED ORAL DAILY
Status: DISCONTINUED | OUTPATIENT
Start: 2023-01-13 | End: 2023-01-28 | Stop reason: HOSPADM

## 2023-01-13 RX ORDER — ASPIRIN 81 MG/1
81 TABLET ORAL DAILY
COMMUNITY
Start: 2022-11-03

## 2023-01-13 RX ORDER — OXYCODONE HYDROCHLORIDE 5 MG/1
10 TABLET ORAL
Status: DISCONTINUED | OUTPATIENT
Start: 2023-01-13 | End: 2023-01-28 | Stop reason: HOSPADM

## 2023-01-13 RX ORDER — LANOLIN ALCOHOL/MO/W.PET/CERES
100 CREAM (GRAM) TOPICAL DAILY
Status: DISCONTINUED | OUTPATIENT
Start: 2023-01-13 | End: 2023-01-28 | Stop reason: HOSPADM

## 2023-01-13 RX ORDER — IBUPROFEN 200 MG
1 TABLET ORAL
Status: DISCONTINUED | OUTPATIENT
Start: 2023-01-13 | End: 2023-01-28 | Stop reason: HOSPADM

## 2023-01-13 RX ORDER — FOLIC ACID 1 MG/1
1 TABLET ORAL DAILY
COMMUNITY
Start: 2022-11-03 | End: 2023-01-28

## 2023-01-13 RX ORDER — IBUPROFEN 200 MG
4 TABLET ORAL AS NEEDED
Status: DISCONTINUED | OUTPATIENT
Start: 2023-01-13 | End: 2023-01-28 | Stop reason: HOSPADM

## 2023-01-13 RX ORDER — ONDANSETRON 4 MG/1
4 TABLET, ORALLY DISINTEGRATING ORAL
Status: DISCONTINUED | OUTPATIENT
Start: 2023-01-13 | End: 2023-01-28 | Stop reason: HOSPADM

## 2023-01-13 RX ORDER — UREA 10 %
1 LOTION (ML) TOPICAL DAILY
COMMUNITY
Start: 2021-05-28

## 2023-01-13 RX ORDER — DEXTROSE MONOHYDRATE 100 MG/ML
0-250 INJECTION, SOLUTION INTRAVENOUS AS NEEDED
Status: DISCONTINUED | OUTPATIENT
Start: 2023-01-13 | End: 2023-01-28 | Stop reason: HOSPADM

## 2023-01-13 RX ORDER — NICOTINE 7MG/24HR
1 PATCH, TRANSDERMAL 24 HOURS TRANSDERMAL EVERY 24 HOURS
Status: DISCONTINUED | OUTPATIENT
Start: 2023-01-13 | End: 2023-01-13

## 2023-01-13 RX ORDER — FERROUS SULFATE 325(65) MG
325 TABLET, DELAYED RELEASE (ENTERIC COATED) ORAL DAILY
COMMUNITY
Start: 2022-11-03

## 2023-01-13 RX ORDER — POLYETHYLENE GLYCOL 3350 17 G/17G
17 POWDER, FOR SOLUTION ORAL DAILY PRN
Status: DISCONTINUED | OUTPATIENT
Start: 2023-01-13 | End: 2023-01-28 | Stop reason: HOSPADM

## 2023-01-13 RX ORDER — TAMSULOSIN HYDROCHLORIDE 0.4 MG/1
0.4 CAPSULE ORAL DAILY
Status: DISCONTINUED | OUTPATIENT
Start: 2023-01-13 | End: 2023-01-28 | Stop reason: HOSPADM

## 2023-01-13 RX ORDER — LANOLIN ALCOHOL/MO/W.PET/CERES
400 CREAM (GRAM) TOPICAL DAILY
Status: COMPLETED | OUTPATIENT
Start: 2023-01-13 | End: 2023-01-15

## 2023-01-13 RX ORDER — DICYCLOMINE HYDROCHLORIDE 10 MG/1
10 CAPSULE ORAL
Status: DISCONTINUED | OUTPATIENT
Start: 2023-01-13 | End: 2023-01-28 | Stop reason: HOSPADM

## 2023-01-13 RX ORDER — MIRTAZAPINE 15 MG/1
15 TABLET, FILM COATED ORAL
Status: DISCONTINUED | OUTPATIENT
Start: 2023-01-13 | End: 2023-01-28 | Stop reason: HOSPADM

## 2023-01-13 RX ORDER — SODIUM CHLORIDE 0.9 % (FLUSH) 0.9 %
5-40 SYRINGE (ML) INJECTION AS NEEDED
Status: DISCONTINUED | OUTPATIENT
Start: 2023-01-13 | End: 2023-01-28 | Stop reason: HOSPADM

## 2023-01-13 RX ORDER — METOPROLOL TARTRATE 25 MG/1
12.5 TABLET, FILM COATED ORAL 2 TIMES DAILY
Status: DISCONTINUED | OUTPATIENT
Start: 2023-01-13 | End: 2023-01-28 | Stop reason: HOSPADM

## 2023-01-13 RX ORDER — SODIUM CHLORIDE 0.9 % (FLUSH) 0.9 %
5-40 SYRINGE (ML) INJECTION EVERY 8 HOURS
Status: DISCONTINUED | OUTPATIENT
Start: 2023-01-13 | End: 2023-01-28 | Stop reason: HOSPADM

## 2023-01-13 RX ORDER — NALOXONE HYDROCHLORIDE 0.4 MG/ML
0.4 INJECTION, SOLUTION INTRAMUSCULAR; INTRAVENOUS; SUBCUTANEOUS
Status: DISCONTINUED | OUTPATIENT
Start: 2023-01-13 | End: 2023-01-28 | Stop reason: HOSPADM

## 2023-01-13 RX ORDER — ACETAMINOPHEN 500 MG
1000 TABLET ORAL
Status: DISCONTINUED | OUTPATIENT
Start: 2023-01-13 | End: 2023-01-28 | Stop reason: HOSPADM

## 2023-01-13 RX ORDER — LANOLIN ALCOHOL/MO/W.PET/CERES
1 CREAM (GRAM) TOPICAL DAILY
Status: DISCONTINUED | OUTPATIENT
Start: 2023-01-13 | End: 2023-01-28 | Stop reason: HOSPADM

## 2023-01-13 RX ADMIN — FINASTERIDE 5 MG: 5 TABLET, FILM COATED ORAL at 09:19

## 2023-01-13 RX ADMIN — MAGNESIUM OXIDE 400 MG (241.3 MG MAGNESIUM) TABLET 400 MG: TABLET at 09:19

## 2023-01-13 RX ADMIN — Medication 1 MG: at 09:19

## 2023-01-13 RX ADMIN — ONDANSETRON 4 MG: 4 TABLET, ORALLY DISINTEGRATING ORAL at 15:17

## 2023-01-13 RX ADMIN — TAMSULOSIN HYDROCHLORIDE 0.4 MG: 0.4 CAPSULE ORAL at 09:19

## 2023-01-13 RX ADMIN — PANCRELIPASE LIPASE, PANCRELIPASE PROTEASE, PANCRELIPASE AMYLASE 1 CAPSULE: 20000; 63000; 84000 CAPSULE, DELAYED RELEASE ORAL at 12:06

## 2023-01-13 RX ADMIN — DIBASIC SODIUM PHOSPHATE, MONOBASIC POTASSIUM PHOSPHATE AND MONOBASIC SODIUM PHOSPHATE 1 TABLET: 852; 155; 130 TABLET ORAL at 09:18

## 2023-01-13 RX ADMIN — METOPROLOL TARTRATE 12.5 MG: 25 TABLET, FILM COATED ORAL at 17:11

## 2023-01-13 RX ADMIN — OXYCODONE HYDROCHLORIDE 10 MG: 5 TABLET ORAL at 13:27

## 2023-01-13 RX ADMIN — ONDANSETRON 4 MG: 4 TABLET, ORALLY DISINTEGRATING ORAL at 23:17

## 2023-01-13 RX ADMIN — OXYCODONE HYDROCHLORIDE 10 MG: 5 TABLET ORAL at 01:15

## 2023-01-13 RX ADMIN — MIRTAZAPINE 15 MG: 15 TABLET, FILM COATED ORAL at 21:28

## 2023-01-13 RX ADMIN — MEROPENEM 1 G: 1 INJECTION, POWDER, FOR SOLUTION INTRAVENOUS at 17:13

## 2023-01-13 RX ADMIN — OXYCODONE HYDROCHLORIDE 10 MG: 5 TABLET ORAL at 04:58

## 2023-01-13 RX ADMIN — OXYCODONE HYDROCHLORIDE 10 MG: 5 TABLET ORAL at 20:07

## 2023-01-13 RX ADMIN — ALTEPLASE 1 MG: 2.2 INJECTION, POWDER, LYOPHILIZED, FOR SOLUTION INTRAVENOUS at 23:13

## 2023-01-13 RX ADMIN — MEROPENEM 1 G: 1 INJECTION, POWDER, FOR SOLUTION INTRAVENOUS at 01:15

## 2023-01-13 RX ADMIN — Medication 1 CAPSULE: at 09:19

## 2023-01-13 RX ADMIN — LOPERAMIDE HYDROCHLORIDE 2 MG: 2 CAPSULE ORAL at 01:15

## 2023-01-13 RX ADMIN — OXYCODONE HYDROCHLORIDE 10 MG: 5 TABLET ORAL at 09:19

## 2023-01-13 RX ADMIN — Medication 100 MG: at 09:19

## 2023-01-13 RX ADMIN — DICYCLOMINE HYDROCHLORIDE 10 MG: 10 CAPSULE ORAL at 01:15

## 2023-01-13 RX ADMIN — DIBASIC SODIUM PHOSPHATE, MONOBASIC POTASSIUM PHOSPHATE AND MONOBASIC SODIUM PHOSPHATE 1 TABLET: 852; 155; 130 TABLET ORAL at 17:11

## 2023-01-13 RX ADMIN — SODIUM CHLORIDE, PRESERVATIVE FREE 10 ML: 5 INJECTION INTRAVENOUS at 21:29

## 2023-01-13 RX ADMIN — PANCRELIPASE LIPASE, PANCRELIPASE PROTEASE, PANCRELIPASE AMYLASE 1 CAPSULE: 20000; 63000; 84000 CAPSULE, DELAYED RELEASE ORAL at 09:18

## 2023-01-13 RX ADMIN — PANCRELIPASE LIPASE, PANCRELIPASE PROTEASE, PANCRELIPASE AMYLASE 1 CAPSULE: 20000; 63000; 84000 CAPSULE, DELAYED RELEASE ORAL at 17:12

## 2023-01-13 RX ADMIN — METOPROLOL TARTRATE 12.5 MG: 25 TABLET, FILM COATED ORAL at 09:19

## 2023-01-13 RX ADMIN — MEROPENEM 1 G: 1 INJECTION, POWDER, FOR SOLUTION INTRAVENOUS at 09:18

## 2023-01-13 NOTE — PROGRESS NOTES
[facility]                                                                                          Hospitalist Progress Note  Randee Kraft NP  Answering service: [phone] from in house phone        Date of Service:  2023  NAME:  Isha Thomas  :  1965  MRN:  [id number]      Admission Summary:   Isha Thomas is a 59 y.o. male tree of rectal cancer status postresection, hypertension, peptic ulcer disease, BPH, and mood disorder who presents with hardik pain, and is being admitted for SBO. Note, he was from [facility] where he was admitted on  for recurrent SBO. Supple sigmoidoscopy there showed concern for rectal stricture. He is being transferred to [facility] for GI evaluation for upper GI. He has been hemodynamically stable. Interval history / Subjective:      VSS, sitting on commode  Tolerating diet  Pt will not be seen by surgery today as SBO is resolved and pt is eating. Discussed with GI, no plans as of now for EGD/colonoscopy.     Assessment & Plan:     #Recurrent small bowel obstruction - RESOLVED  #Abdominal pain due to above    #History of rectal cancer s/p resection/radiation  #History of gastric ulcer  #Concern for rectal stricture  -CT abdomen pelvis chest on admission with small bowel obstruction  - Flexoid sigmoidoscopy Kristyn Conn showed possible rectal stricture  -Advance diet   -Pain management  -Zenpep added  -Zofran and Compazine as needed  -Stool WBC and enteric bacteria PCR checked  -consult GI    Severe Malnutrition in the setting of chronic illness  - greater than 7.5% weigh loss in 3 months, severe body fat loss, severe muscle mass loss  - regular diet, ensure enlive TID, snacks BID     # Suspected ESBL UTI   - UA w/ bacteria, Nitrites and leukoesterase , wbc and RBC   - UCX pending   - Merrem         PVC  -EKG recurrent PVCs  -Keep K above 4 mag above 2  -On metoprolol 12.5 mg twice daily        #Chronic urinary retention s/p [place]  -Changed on 1/10/2022 at [place] finasteride and Flomax        Acute on Chronic Pancreatitis  Pancreatic Anomaly  -scheduled for egd w/ EUS with GI as outpt but unable to make this appointment  -cannot r/o pancreatic cancer with recent ct  -continue zenpep           Code status: Full  Prophylaxis: [address] discussed with: patient, nurse, CM, GI  Anticipated Disposition: home     Hospital Problems  Date Reviewed: 1/6/2023            Codes Class Noted POA    UTI due to extended-spectrum beta lactamase (ESBL) producing Escherichia coli ICD-10-CM: N39.0, B96.29, Z16.12  ICD-9-CM: 599.0, 041.49, V09.1  1/13/2023 Unknown        Severe protein-calorie malnutrition ([facility] [address] (Chronic) ICD-10-CM: A63  ICD-9-CM: 866  1/13/2023 Yes        Small bowel obstruction ([facility] [address] ICD-10-CM: H28.369  ICD-9-CM: 560.9  9/9/2022 Unknown             Review of Systems:   A comprehensive review of systems was negative except for that written in the HPI. Vital Signs:    Last 24hrs VS reviewed since prior progress note. Most recent are:  Visit Vitals  BP (!) 147/87   Pulse 64   Temp 98.1 °F (36.7 °C)   Resp 16   Ht 5' 8\" (1.727 m)   Wt 45.5 kg (100 lb 5 oz)   SpO2 100%   BMI 15.25 kg/m²       No intake or output data in the 24 hours ending 01/13/23 1426     Physical Examination:     I had a face to face encounter with this patient and independently examined them on 1/13/2023 as outlined below:          Constitutional:  No acute distress, cooperative, pleasant. Cachetic appearing   ENT:  Oral mucosa moist, oropharynx benign. Resp:  CTA bilaterally. No wheezing/rhonchi/rales. No accessory muscle use. CV:  Regular rhythm, normal rate, no murmurs, gallops, rubs    GI:  Soft, non distended, non tender.  normoactive bowel sounds, no hepatosplenomegaly     Musculoskeletal:    No edema, warm, 2+ pulses throughout    Neurologic:  Moves all extremities. AAOx3, CN II-XII reviewed            Data Review:    Review and/or order of clinical lab test  Review and/or order of tests in the radiology section of [facility]      Labs:   No results for input(s): WBC, HGB, HCT, PLT, HGBEXT, HCTEXT, PLTEXT in the last 72 hours. No results for input(s): NA, K, CL, CO2, BUN, CREA, GLU, CA, MG, PHOS, URICA in the last 72 hours. No results for input(s): ALT, AP, TBIL, TBILI, TP, ALB, GLOB, GGT, AML, LPSE in the last 72 hours. No lab exists for component: SGOT, GPT, AMYP, HLPSE  No results for input(s): INR, PTP, APTT, INREXT in the last 72 hours. No results for input(s): FE, TIBC, PSAT, FERR in the last 72 hours. Lab Results   Component Value Date/Time    Folate 41.4 (H) 09/11/2022 10:49 AM      No results for input(s): PH, PCO2, PO2 in the last 72 hours. No results for input(s): CPK, CKNDX, TROIQ in the last 72 hours.     No lab exists for component: CPKMB  Lab Results   Component Value Date/Time    Cholesterol, total 147 01/02/2019 04:00 AM    HDL Cholesterol 66 01/02/2019 04:00 AM    LDL, calculated 65.6 01/02/2019 04:00 AM    Triglyceride 77 01/02/2019 04:00 AM    CHOL/HDL Ratio 2.2 01/02/2019 04:00 AM     Lab Results   Component Value Date/Time    Glucose (POC) 80 01/07/2023 03:20 PM    Glucose (POC) 64 (L) 01/07/2023 11:26 AM    Glucose (POC) 62 (L) 01/07/2023 08:49 AM    Glucose (POC) 64 (L) 01/07/2023 08:46 AM    Glucose (POC) 202 (H) 01/06/2023 10:21 PM     Lab Results   Component Value Date/Time    Color DARK YELLOW 01/10/2023 03:52 PM    Appearance TURBID (A) 01/10/2023 03:52 PM    Specific gravity 1.025 01/10/2023 03:52 PM    Specific gravity 1.010 09/03/2021 10:01 PM    pH (UA) 8.5 (H) 01/10/2023 03:52 PM    Protein 300 (A) 01/10/2023 03:52 PM    Glucose Negative 01/10/2023 03:52 PM    Ketone Negative 01/10/2023 03:52 PM    Bilirubin Negative 01/10/2023 03:52 PM    Urobilinogen 1.0 01/10/2023 03:52 PM    Nitrites Positive (A) 01/10/2023 03:52 PM Leukocyte Esterase LARGE (A) 01/10/2023 03:52 PM    Epithelial cells FEW 01/10/2023 03:52 PM    Bacteria 2+ (A) 01/10/2023 03:52 PM    WBC 20-50 01/10/2023 03:52 PM    RBC 20-50 01/10/2023 03:52 PM         Medications Reviewed:     Current Facility-Administered Medications   Medication Dose Route Frequency    acetaminophen (TYLENOL) tablet 1,000 mg  1,000 mg Oral Q6H PRN    dextrose 10% infusion 0-250 mL  0-250 mL IntraVENous PRN    dicyclomine (BENTYL) capsule 10 mg  10 mg Oral TID PRN    finasteride (PROSCAR) tablet 5 mg  5 mg Oral DAILY    folic acid tablet 1 mg  1 mg Oral DAILY    glucagon (GLUCAGEN) injection 1 mg  1 mg IntraMUSCular PRN    glucose chewable tablet 16 g  4 Tablet Oral PRN    heparin (porcine) pf 500 Units  500 Units InterCATHeter PRN    L.acidophilus-paracasei-S.thermophil-bifidobacter (RISAQUAD) 8 billion cell capsule  1 Capsule Oral DAILY    lipase-protease-amylase (ZENPEP 20,000) capsule 1 Capsule  1 Capsule Oral TID WITH MEALS    magnesium oxide (MAG-OX) tablet 400 mg  400 mg Oral DAILY    metoprolol tartrate (LOPRESSOR) tablet 12.5 mg  12.5 mg Oral BID    mirtazapine (REMERON) tablet 15 mg  15 mg Oral QHS    naloxone (NARCAN) injection 0.4 mg  0.4 mg IntraVENous EVERY 2 MINUTES AS NEEDED    nicotine (NICODERM CQ) 7 mg/24 hr patch 1 Patch  1 Patch TransDERmal Q24H    ondansetron (ZOFRAN ODT) tablet 4 mg  4 mg Oral Q8H PRN    oxyCODONE IR (ROXICODONE) tablet 10 mg  10 mg Oral Q4H PRN    phenol throat spray (CHLORASEPTIC) 1 Spray  1 Spray Oral Q1H PRN    phosphorus (K PHOS NEUTRAL) 250 mg tablet 1 Tablet  1 Tablet Oral BID    polyethylene glycol (MIRALAX) packet 17 g  17 g Oral DAILY PRN    sodium chloride (NS) flush 5-40 mL  5-40 mL IntraVENous Q8H    sodium chloride (NS) flush 5-40 mL  5-40 mL IntraVENous PRN    tamsulosin (FLOMAX) capsule 0.4 mg  0.4 mg Oral DAILY    thiamine HCL (B-1) tablet 100 mg  100 mg Oral DAILY    meropenem (MERREM) 1 g in 0.9% sodium chloride (MBP/ADV) 50 mL MBP  1 g IntraVENous Q8H     ______________________________________________________________________  EXPECTED LENGTH OF STAY: 3d 12h  ACTUAL LENGTH OF STAY:          0                 Lilian MARTINEZ NP

## 2023-01-13 NOTE — CONSULTS
[address] [facility] NOTE  [facility]  [phone] NP in-hospital cell phone M-F until 4:30  After 5pm or on weekends, please call  for physician on call        NAME:  Katherin Funez   :   1965   MRN:   [id number]       Referring Physician: Kendy Leija Date: 2023 9:34 AM    Chief Complaint: recurrent SBO     History of Present Illness:  Patient is a 59 y.o. who is seen in consultation at the request of Dr. Pal Mcghee for recurrent SBO. Medical history as listed below includes history of rectal cancer (status post resection/radiation - loop ileostomy Dr. Shirin Denise). He was admitted to [facility] for recurrent SBO. He presetned for 3 months of sharp abdominal pain with diarrhea, noted to have hypokalemia, SBO, chronic pancreatitis, and cystic pancreatic lesions. He had flex sig at [facility] in October with possible rectal stricture. He reports chronic abdominal pain with fluctuating constipation/diarrhea. He reports 20 pound weight loss over the past 2 months. +NSAID's and tobacco. Denies alcohol, history of heavy alcohol use.   Flex sig 10/2022 Dr. Jessica Barfield: congested rectal mucosa, possible stricture, biopsy mild acute inflammation   - 20 colon > C-C anastomosis wide open but some congested tissue in surround, several polyps ([facility])  - 3/3/19 colon > very mild stenosis at coloanal anastomosis, only 15 cm into ileum, didn't reach site of loop ileostomy takedown ([facility])  - 5/3/16 ileostomy takedown, side to side ileo-ileal anastomosis Aislinn Llanes)  - Murillo/O'Eddie/Cuttino/Reg  - 16 Low anterior resection with coloanal anastomosis w/ diverting protective loop ileostomy Aislinn Llanes)  - on resection, was T3N0M0 rectal cancer 9 cm from verge (0/13 nodes)  - neoadjuvant chemo for presumed T3N1M0 rectal cancer    I have reviewed the emergency room note, hospital admission note, notes by all other clinicians who have seen the patient during this hospitalization to date. I have reviewed the problem list and the reason for this hospitalization. I have reviewed the allergies and the medications the patient was taking at home prior to this hospitalization. PMH:  Past Medical History:   Diagnosis Date    Cancer [facility])     rectal    Gastrointestinal disorder     Gastric Ulcers; Rectal CA    Hematuria 9/1/2021    Hypertension        PSH:  Past Surgical History:   Procedure Laterality Date    HX GI         Allergies:  No Known Allergies    Home Medications:  Prior to Admission Medications   Prescriptions Last Dose Informant Patient Reported? Taking?   aspirin delayed-release 81 mg tablet   Yes Yes   Sig: Take 81 mg by mouth daily. ferrous sulfate (IRON) 325 mg (65 mg iron) EC tablet   Yes Yes   Sig: Take 325 mg by mouth daily. finasteride (PROSCAR) 5 mg tablet  Other Yes Yes   Sig: Take 5 mg by mouth daily. folic acid (FOLVITE) 1 mg tablet  Other No No   Sig: Take 1 Tablet by mouth daily. folic acid (FOLVITE) 1 mg tablet   Yes Yes   Sig: Take 1 mg by mouth daily. lactobacillus combination no.4 3 billion cell cap  Other No Yes   Sig: Take 1 Capsule by mouth daily. melatonin 1 mg tablet   Yes Yes   Sig: Take 1 mg by mouth daily. metoprolol tartrate (LOPRESSOR) 25 mg tablet  Other No Yes   Sig: Take 0.5 Tablets by mouth two (2) times a day. mirtazapine (REMERON) 15 mg tablet  Other Yes Yes   Sig: Take 15 mg by mouth nightly. oxyCODONE IR (ROXICODONE) 5 mg immediate release tablet  Other Yes Yes   Sig: Take 5 mg by mouth every six (6) hours as needed for Pain.   tamsulosin (FLOMAX) 0.4 mg capsule  Other No Yes   Sig: Take 1 Capsule by mouth daily. thiamine mononitrate (B-1) 100 mg tablet  Other No No   Sig: Take 1 Tablet by mouth daily.       Facility-Administered Medications: None       Hospital Medications:  Current Facility-Administered Medications   Medication Dose Route Frequency    acetaminophen (TYLENOL) tablet 1,000 mg  1,000 mg Oral Q6H PRN    dextrose 10% infusion 0-250 mL  0-250 mL IntraVENous PRN    dicyclomine (BENTYL) capsule 10 mg  10 mg Oral TID PRN    finasteride (PROSCAR) tablet 5 mg  5 mg Oral DAILY    folic acid tablet 1 mg  1 mg Oral DAILY    glucagon (GLUCAGEN) injection 1 mg  1 mg IntraMUSCular PRN    glucose chewable tablet 16 g  4 Tablet Oral PRN    heparin (porcine) pf 500 Units  500 Units InterCATHeter PRN    L.acidophilus-paracasei-S.thermophil-bifidobacter (RISAQUAD) 8 billion cell capsule  1 Capsule Oral DAILY    lipase-protease-amylase (ZENPEP 20,000) capsule 1 Capsule  1 Capsule Oral TID WITH MEALS    magnesium oxide (MAG-OX) tablet 400 mg  400 mg Oral DAILY    metoprolol tartrate (LOPRESSOR) tablet 12.5 mg  12.5 mg Oral BID    mirtazapine (REMERON) tablet 15 mg  15 mg Oral QHS    naloxone (NARCAN) injection 0.4 mg  0.4 mg IntraVENous EVERY 2 MINUTES AS NEEDED    nicotine (NICODERM CQ) 7 mg/24 hr patch 1 Patch  1 Patch TransDERmal Q24H    ondansetron (ZOFRAN ODT) tablet 4 mg  4 mg Oral Q8H PRN    oxyCODONE IR (ROXICODONE) tablet 10 mg  10 mg Oral Q4H PRN    phenol throat spray (CHLORASEPTIC) 1 Spray  1 Spray Oral Q1H PRN    phosphorus (K PHOS NEUTRAL) 250 mg tablet 1 Tablet  1 Tablet Oral BID    polyethylene glycol (MIRALAX) packet 17 g  17 g Oral DAILY PRN    sodium chloride (NS) flush 5-40 mL  5-40 mL IntraVENous Q8H    sodium chloride (NS) flush 5-40 mL  5-40 mL IntraVENous PRN    tamsulosin (FLOMAX) capsule 0.4 mg  0.4 mg Oral DAILY    thiamine HCL (B-1) tablet 100 mg  100 mg Oral DAILY    meropenem (MERREM) 1 g in 0.9% sodium chloride (MBP/ADV) 50 mL MBP  1 g IntraVENous Q8H       Social History:  Social History     Tobacco Use    Smoking status: Every Day     Packs/day: 0.25     Types: Cigarettes    Smokeless tobacco: Never    Tobacco comments:     \"1 pack last me three days\"   Substance Use Topics    Alcohol use: Not Currently Comment: hasn't drank x3mo (6/3/22)       Family History:  Family History   Problem Relation Age of Onset    Cancer Brother        Review of Systems:  Constitutional: negative fever, negative chills, + weight loss  Eyes:   negative visual changes  ENT:   negative sore throat, tongue or lip swelling  Respiratory:  negative cough, + dyspnea  Cards:  negative for chest pain, palpitations, lower extremity edema  GI:   See HPI  :  negative for frequency, dysuria  Integument:  negative for rash and pruritus  Heme:  negative for easy bruising and gum/nose bleeding  Musculoskeletal:negative for myalgias, back pain and muscle weakness  Neuro:    negative for headaches, dizziness, vertigo  Psych: negative for feelings of anxiety, depression     Objective:   Patient Vitals for the past 8 hrs:   BP Temp Pulse Resp SpO2 Height Weight   01/13/23 0723 (!) 147/87 98.1 °F (36.7 °C) 64 16 100 % -- --   01/13/23 0445 -- -- -- -- -- 5' 8\" (1.727 m) 45.5 kg (100 lb 5 oz)   01/13/23 0433 (!) 151/81 97.3 °F (36.3 °C) 61 16 100 % -- --     No intake/output data recorded. No intake/output data recorded. EXAM:     CONST:  Pleasant male lying in bed, no acute distress   NEURO:  alert and oriented x 3   HEENT: EOMI, no scleral icterus   LUNGS: No increased WOB   CARD:   Regular rate   ABD:  soft, generalized tenderness, no rebound, no masses, non distended   EXT:  \warm   PSYCH: full, not anxious     Data Review     No results for input(s): WBC, HGB, HCT, PLT, HGBEXT, HCTEXT, PLTEXT in the last 72 hours. No results for input(s): NA, K, CL, CO2, BUN, CREA, GLU, PHOS, CA in the last 72 hours. No results for input(s): AP, TBIL, TP, ALB, GLOB, GGT, AML, LPSE in the last 72 hours. No lab exists for component: SGOT, GPT, AMYP, HLPSE  No results for input(s): INR, PTP, APTT, INREXT in the last 72 hours. IMPRESSION  1. Chronic pancreatitis. Superimposed acute pancreatitis is not excluded.   2.  Increased size of cystic lesions in the pancreatic head and tail may  represent pseudocysts although malignancy is difficult to exclude. 3.  Increase ascites in the abdomen and pelvis. 4.  Small bowel obstruction similar to the prior study. Assessment:     Chronic pancreatitis with cysts: CA 19-9 normal; 20# weight loss  Chronic abdominal pain   Constipation/diarrhea: stool WBC 5-10, enteric pathogen panel negative   EPI  ESBL UTI  Recurrent SBO - resolved (June, September, November, and January)     Patient Active Problem List   Diagnosis Code    Seizure ([facility] [address] R56.9    Alcohol abuse F10.10    Drug abuse ([facility] [address] F19.10    Rectal cancer ([facility] [address] C20    Pancreatitis K85.90    Acute pancreatitis K85.90    Hydronephrosis, bilateral N13.30    Hematuria R31.9    Small bowel obstruction ([facility] [address] K56.609    SBO (small bowel obstruction) ([facility] [address] K56.609    UTI due to extended-spectrum beta lactamase (ESBL) producing Escherichia coli N39.0, B96.29, Z16.12     Plan: On very low dose Remigio [facility] regular diet  Consider outpatient GI evaluation, will follow-up on Monday if still here   Patient discussed with Dr. Freedom Hernández  Thank you for allowing me to participate in care of Trina Lawrence     Signed By: Juan Salter NP     1/13/2023  9:34 AM       GI Attending: agree with plan as above. Continue pancreatic enzymes. Would recommend MRI/MRCP with contrast for further evaluation of the pancreas prior to EUS. Will request weekend team to check result if this is completed. Otherwise, we will see as needed this weekend and see again on Monday. Matty Hernández MD

## 2023-01-13 NOTE — PROGRESS NOTES
1910: Bedside and Verbal shift change report given to Thiago Frias RN (oncoming nurse) by Yashira Tabares RN (offgoing nurse). Report included the following information SBAR, Kardex, MAR, and Recent Results. 80: Updated Report given to Yashira Tabares RN on [address] at [place] [address]. [facility] here to  patient. 0345: [place] ambulance departed unit with patient to transfer to [place] [address].

## 2023-01-13 NOTE — PROGRESS NOTES
RUR: 20% High    KENTRELL: Anticipated discharge home. Patient will need ride assistance once medically stable. Follow-up with PCP/specialist.     Primary Contact: Ex-Wife, Ange Beasley, [phone]    Medicare pt has received, reviewed, and signed 1st IM letter informing them of their right to appeal the discharge. Signed copied has been placed on pt bedside chart. *Will need 2nd IM letter prior to DC. Care Management Interventions  PCP Verified by CM: Yes (Dr. Tylor Melo - seen less than 6 months ago )  Mode of Transport at Discharge: Other (see comment) (Will need ride assistance)  Transition of Care Consult (CM Consult): Discharge Planning  Discharge Durable Medical Equipment: No  Physical Therapy Consult: No  Occupational Therapy Consult: Yes  Speech Therapy Consult: No  Support Systems: Child(bonifacio)  Confirm Follow Up Transport: Family  The Plan for Transition of Care is Related to the Following Treatment Goals : Home  Discharge Location  Patient Expects to be Discharged to[de-identified] Home    Reason for Admission:  SBO                    RUR Score:  20% High                    Plan for utilizing home health:    Likely none      PCP: First and Last name:  Cinthia Thomas MD     Name of Practice:    Are you a current patient: Yes/No: Yes   Approximate date of last visit: Less than 6 months ago per patient    Can you participate in a virtual visit with your PCP: Yes                    Current Advanced Directive/Advance Care Plan: Full Code      Healthcare Decision Maker:   Click here to complete ipadio including selection of the Healthcare Decision Maker Relationship (ie \"Primary\")             Primary Decision Maker: Ankush Casey - [facility] - [phone]    Secondary Decision Maker: Lavern [facility] - [phone]                  Transition of Care Plan:    Home     CM met with patient at bedside to introduce self and explain role.  Patient lives alone in a 3rd floor apartment with elevator access. Patient was independent with ADL's, IADL's and ambulates with the use of a rollator. Patient admitted with a mondragon, which is he is competent in mondragon care. CM verified PCP, demographics and insurance. Preferred pharmacy is [facility] on [place] [address] in [place] with no barriers obtaining needed prescriptions. Patient will need ride assistance once medically stable for discharge.      Iris Grier, SMITHA   [phone]

## 2023-01-13 NOTE — H&P
[address] [facility] Adult  Hospitalist Group  History and Physical    Date of Service:  1/13/2023  Primary Care Provider: Deo Wills MD  Source of information: The patient and Chart review    Chief Complaint: No chief complaint on file. History of Presenting Illness:   Nguyen Acharya is a 59 y.o. male tree of rectal cancer status postresection, hypertension, peptic ulcer disease, BPH, and mood disorder who presents with hardik pain, and is being admitted for SBO. Note, he was from [facility] where he was admitted on January 5 for recurrent SBO. Supple sigmoidoscopy there showed concern for rectal stricture. He is being transferred to [facility] for GI evaluation for upper GI. He has been hemodynamically stable. REVIEW OF SYSTEMS:  A comprehensive review of systems was negative except for that written in the History of Present Illness. Past Medical History:   Diagnosis Date    Cancer [facility])     rectal    Gastrointestinal disorder     Gastric Ulcers; Rectal CA    Hematuria 9/1/2021    Hypertension       Past Surgical History:   Procedure Laterality Date    HX GI       Prior to Admission medications    Medication Sig Start Date End Date Taking? Authorizing Provider   folic acid (FOLVITE) 1 mg tablet Take 1 mg by mouth daily. 11/3/22  Yes Provider, Historical   aspirin delayed-release 81 mg tablet Take 81 mg by mouth daily. 11/3/22  Yes Provider, Historical   ferrous sulfate (IRON) 325 mg (65 mg iron) EC tablet Take 325 mg by mouth daily. 11/3/22  Yes Provider, Historical   melatonin 1 mg tablet Take 1 mg by mouth daily. 5/28/21  Yes Provider, Historical   oxyCODONE IR (ROXICODONE) 5 mg immediate release tablet Take 5 mg by mouth every six (6) hours as needed for Pain. Yes Provider, Historical   tamsulosin (FLOMAX) 0.4 mg capsule Take 1 Capsule by mouth daily.  12/4/22  Yes Dariela Suero MD   lactobacillus combination no.4 3 billion cell cap Take 1 Capsule by mouth daily. 12/4/22  Yes Theo Torres MD   metoprolol tartrate (LOPRESSOR) 25 mg tablet Take 0.5 Tablets by mouth two (2) times a day. 2/5/22  Yes Walter Escobar MD   finasteride (PROSCAR) 5 mg tablet Take 5 mg by mouth daily. Yes Provider, Historical   mirtazapine (REMERON) 15 mg tablet Take 15 mg by mouth nightly. Yes Provider, Historical   thiamine mononitrate (B-1) 100 mg tablet Take 1 Tablet by mouth daily. 12/4/22   Theo Torres MD   folic acid (FOLVITE) 1 mg tablet Take 1 Tablet by mouth daily. 9/9/21   Mariana Patel NP     No Known Allergies   Family History   Problem Relation Age of Onset    Cancer Brother       Social History:  reports that he has been smoking. He has been smoking an average of .25 packs per day. He has never used smokeless tobacco. He reports that he does not currently use alcohol. He reports current drug use. Drug: Marijuana. Social Determinants of Health     Tobacco Use: High Risk    Smoking Tobacco Use: Every Day    Smokeless Tobacco Use: Never    Passive Exposure: Not on file   Alcohol Use: Not on file   Financial Resource Strain: Not on file   Food Insecurity: Not on file   Transportation Needs: Not on file   Physical Activity: Not on file   Stress: Not on file   Social Connections: Not on file   Intimate Partner Violence: Not on file   Depression: Not on file   Housing Stability: Not on file        Family and social history were personally reviewed, all pertinent and relevant details are outlined as above.     Objective:   Visit Vitals  BP (!) 151/81 (BP 1 Location: Left upper arm, BP Patient Position: Lying)   Pulse 61   Temp 97.3 °F (36.3 °C)   Resp 16   Ht 5' 8\" (1.727 m)   Wt 45.5 kg (100 lb 5 oz)   SpO2 100%   BMI 15.25 kg/m²           PHYSICAL EXAM:   General: Alert x oriented x 3, awake, no acute distress, resting in bed, pleasant male, appears to be stated age  HEENT: PEERL, EOMI, moist mucus membranes  Neck: Supple, no JVD, no meningeal signs  Chest: Clear to auscultation bilaterally   CVS: RRR, S1 S2 heard, no murmurs/rubs/gallops  Abd: Soft, non-tender, distended, +bowel sounds   Ext: No clubbing, no cyanosis, no edema  Neuro/Psych: Pleasant mood and affect, CN 2-12 grossly intact, sensory grossly within normal limit, Strength 5/5 in all extremities  Cap refill: Brisk, less than 3 seconds  Pulses: 2+ radial pulses  Skin: Warm, dry, without rashes or lesions    Data Review: All diagnostic labs and studies have been reviewed. Abnormal Labs Reviewed - No abnormal labs to display    All Micro Results       Procedure Component Value Units Date/Time    CULTURE, URINE [[id number]]     Order Status: Sent Specimen: Urine from Clean catch     ENTERIC BACTERIA PANEL, DNA [[id number]]     Order Status: Sent Specimen: Stool             IMAGING:   No orders to display        ECG/ECHO:    Results for orders placed or performed during the hospital encounter of 01/05/23   EKG, 12 LEAD, INITIAL   Result Value Ref Range    Ventricular Rate 64 BPM    Atrial Rate 64 BPM    P-R Interval 134 ms    QRS Duration 78 ms    Q-T Interval 400 ms    QTC Calculation (Bezet) 412 ms    Calculated P Axis 78 degrees    Calculated R Axis 67 degrees    Calculated T Axis 62 degrees    Diagnosis       Sinus rhythm with premature atrial complexes    Confirmed by Mariya Francois M.D., Desire Lewis ([id number]) on 1/9/2023 9:43:31 AM          Assessment:   Given the patient's current clinical presentation, there is a high level of concern for decompensation if discharged from the emergency department. Complex decision making was performed, which includes reviewing the patient's available past medical records, laboratory results, and imaging studies. Active Problems:    * No active hospital problems.  *      Plan:     #Recurrent small bowel obstruction  #Abdominal pain due to above  #History of rectal cancer s/p resection/radiation  #History of gastric ulcer  #Concern for rectal stricture  -CT abdomen pelvis chest on admission with small bowel obstruction  - Flexoid sigmoidoscopy Kristyn [facility]OPE[facility] - [place] showed possible rectal stricture  -Advance diet   -Pain management  -Zenpep added  -Zofran and Compazine as needed  -Stool WBC and enteric bacteria PCR checked  -consult GI     # Suspected ESBL UTI   - UA w/ bacteria, Nitrites and leukoesterase , wbc and RBC   - UCX pending   - Merrem         PVC  -EKG recurrent PVCs  -Keep K above 4 mag above 2  -On metoprolol 12.5 mg twice daily        #Chronic urinary retention s/p Marrero  -Changed on 1/10/2022 at [place] finasteride and Flomax        Acute on Chronic Pancreatitis  Pancreatic Anomaly  -scheduled for egd w/ EUS with GI as outpt but unable to make this appointment  -cannot r/o pancreatic cancer with recent ct  -continue zenpep    DIET: ADULT DIET Regular  ADULT ORAL NUTRITION SUPPLEMENT Breakfast, Lunch, Dinner; Standard High Calorie/High Protein   ISOLATION PRECAUTIONS: Contact, Contact  CODE STATUS: Full Code   DVT PROPHYLAXIS: SCDs  ANTICIPATED DISCHARGE: Greater than 48 hours. ANTICIPATED DISPOSITION: Home  EMERGENCY CONTACT/SURROGATE DECISION MAKER: Wilmer Lopez (sister)    Signed By: Barndi Quigley MD     January 13, 2023         Please note that this dictation may have been completed with Dragon, the [employer] voice recognition software. Quite often unanticipated grammatical, syntax, homophones, and other interpretive errors are inadvertently transcribed by the computer software. Please disregard these errors. Please excuse any errors that have escaped final proofreading.

## 2023-01-13 NOTE — PROGRESS NOTES
Comprehensive Nutrition Assessment    Type and Reason for Visit: Initial, Positive nutrition screen    Nutrition Recommendations/Plan:   Continue with Regular diet  Continue Ensure Enlive TID  Will add snacks BID       Malnutrition Assessment:  Malnutrition Status:  Severe malnutrition (01/13/23 1134)    Context:  Chronic illness     Findings of the 6 clinical characteristics of malnutrition:   Energy Intake:  75% or less est energy requirements for 1 month or longer  Weight Loss:  Greater than 7.5% over 3 months     Body Fat Loss:  Severe body fat loss, Triceps   Muscle Mass Loss:  Severe muscle mass loss, Clavicles (pectoralis &deltoids), Hand (interosseous)  Fluid Accumulation:  No significant fluid accumulation,     Strength:  Not performed        Nutrition Assessment:    60 yo male admitted for SBO. PMHx: rectal CA s/p resection and radiation, HTN, PUD, ETOH and drug abuse, pancreatitis. MST received for weight loss and poor PO. Spoke with pt at bedside. He reports being a \"light eater\" which makes him feel better so he only eats part of each meal.  Breakfast tray observed, pt ate eggs only and a few sips of milk. States his appetite at home PTA was poor 2/2 no appetite but feels his appetite is improving since admission. Drinks 2 Ensure shakes/day at home. Likes chocolate and strawberry. Agreeable to receive scheduled snacks; will order PB crackers and 1/2 sandwich for AM/PM snacks. Low BMI of 15. Pt admits to recent weight loss. Weight hx in EMR indicates 16% over last 4 months which is significant for time frame. Severe muscle and fat wasting present. Pt c/o alternating between constipation and diarrhea at home.       Labs: phos 2.4 (being repleted), K+ WNL today but previously low        Nutritionally Significant Medications:  Kphos, thiamine, folic acid, Probiotic, Zenpep, Remeron      Estimated Daily Nutrient Needs:  Energy Requirements Based On: Kcal/kg  Weight Used for Energy Requirements: Current  Energy (kcal/day): 3123-5989 (35-40 kcals/kg)  Weight Used for Protein Requirements: Current  Protein (g/day): 82 (1.8 gm/kg (20%))  Method Used for Fluid Requirements: 1 ml/kcal  Fluid (ml/day): 1600    Nutrition Related Findings:   Edema: none  Last BM:  ,      Wounds: Skin tears      Current Nutrition Therapies:  Diet: Regular  Supplements: Ensure Enlive TID  Meal intake: No data found. Supplement intake: No data found. Nutrition Support: none      Anthropometric Measures:  Height: 5' 8\" (172.7 cm)  Ideal Body Weight (IBW): 154 lbs (70 kg)     Current Body Wt:  45.5 kg (100 lb 5 oz), 65.1 % IBW.  Not specified  Current BMI (kg/m2): 15.3        Weight Adjustment: No adjustment                 BMI Category: Underweight (BMI less than 18.5)    Wt Readings from Last 10 Encounters:   01/13/23 45.5 kg (100 lb 5 oz)   01/06/23 43.8 kg (96 lb 8 oz)   11/29/22 45.5 kg (100 lb 5 oz)   09/09/22 54.4 kg (120 lb)   06/03/22 54.4 kg (120 lb)   02/04/22 52 kg (114 lb 9.6 oz)   02/02/22 48.5 kg (107 lb)   01/26/22 48.1 kg (106 lb)   09/06/21 54.3 kg (119 lb 12.8 oz)   09/02/21 55.1 kg (121 lb 8 oz)           Nutrition Diagnosis:   Severe malnutrition related to inadequate protein-energy intake as evidenced by weight loss, severe muscle loss, severe loss of subcutaneous fat, intake 26-50%    Nutrition Interventions:   Food and/or Nutrient Delivery: Continue current diet, Start oral nutrition supplement  Nutrition Education/Counseling: No recommendations at this time  Coordination of Nutrition Care: Continue to monitor while inpatient       Goals:     Goals: other (specify)  Specify Other Goals: PO intakes > 75% meals + ONS over next 5-7 days    Nutrition Monitoring and Evaluation:   Behavioral-Environmental Outcomes: None identified  Food/Nutrient Intake Outcomes: Food and nutrient intake, Supplement intake  Physical Signs/Symptoms Outcomes: Biochemical data, Constipation, Diarrhea, GI status, Weight    Discharge Planning:    Continue current diet, Continue oral nutrition supplement    Miri Garcia RD  Available via [employer]

## 2023-01-13 NOTE — PROGRESS NOTES
Occupational Therapy Contact Note  01/13/23  Chart reviewed, spoke with patient and primary RN. Patient has been up ad arnie in room, walking to restroom without AD, and completing own mondragon care. Patient states no OT needs at this time. Will complete orders, please reconsult if change in functional status or further needs arise.      Thank you for this referral,  LORELEI Tamayo, OTR/L

## 2023-01-13 NOTE — DISCHARGE SUMMARY
Hospitalist Discharge Summary     Patient ID:  Samm Connolly  [id number]  88 y.o.  1965    PCP on record: Marcia Hairston MD    Admit date: 1/5/2023  Discharge date and time: 1/13/2023    Please note that this dictation was completed with zSoup, the [employer] voice recognition software. Quite often unanticipated grammatical, syntax, homophones, and other interpretive errors are inadvertently transcribed by the computer software. Please disregard these errors. Please excuse any errors that have escaped final proofreading. Admission Diagnoses: SBO (small bowel obstruction) ([facility] [address] [K56.609]    Discharge Diagnoses: Active Problems:    SBO (small bowel obstruction) ([facility] [address] (11/29/2022)         Hospital Course:     #Recurrent small bowel obstruction POA resolved  #Abdominal pain due to above  #Intractable nausea  #Nonresolving diarrhea  #History of rectal cancer s/p resection/radiation  #History of gastric ulcer  #Concern for rectal stricture  -CT abdomen pelvis chest on admission with small bowel obstruction  -NG taken out per general surgery recommendation  - Flexoid sigmoidoscopy Kristyn Cabrera Dy showed possible rectal stricture     -Advance diet   -Pain management  -Zenpep added  -Zofran and Compazine as needed  -Stool WBC and enteric bacteria PCR added      -Discussed with on-call gastroenterologist at [facility] [place] regarding doing EGD/EUS while patient is admitted and given poor resources for outpatient follow-ups. Gastroenterology agreed  - [place] doctors paged 1/9/2023 and 1/10/2023 on diversion for all beds  -Gastroenterology group which covers [place] Drs. Waller's are same and will be transferred to [facility] for further management of recurrent small bowel obstruction,  abdominal pain with intractable nausea and diarrhea by GI specialist.  -Patient excepted to medical service appreciate help    # Suspected ESBL UTI   - UA w/ bacteria, Nitrites and leukoesterase , wbc and RBC   - UCX ESBL E coli   - Merrem         PVC  -EKG recurrent PVCs  -Keep K above 4 mag above 2  -On metoprolol 12.5 mg twice daily      #Chronic urinary retention s/p Marrero  -Changed on 1/10/2022 at [facility]  -Continue finasteride and Flomax        Acute on Chronic Pancreatitis  Pancreatic Anomaly  -scheduled for egd w/ EUS with GI as outpt but unable to make this appointment  -cannot r/o pancreatic cancer with recent ct    -Bebeto Menjivar added        Htn / mood disorder   -Continue home regimen     Polysubstance abuse  Patient was counseled extensively on the need to abstain from drugs its addictive tendencies, its deleterious effects on the brain, cardiovascular system, lungs as well as its financial & social sequelae      CONSULTATIONS:  None    Excerpted HPI from H&P of Awa Myrick MD:  Yael Mcgraw is a 59 y.o. male w/ hx of rectal cancer s/p resection, htn, gastric ulcers, bph, mood disorder who presents to ed with multiple complaints. Reports 3 months of generalized sharp abdominal pain. Followed by GI at [facility] is scheduled for egd w/ endoscopic us. Was advised to present to [place] tonight but came to [facility] due to lack of transportation. Also reports about 25 loose stools daily. The patient denies any fever, chills, chest pain, cough, congestion, recent illness, palpitations, or dysuria. Remarkable vitals on ER Presentations: 180/101  Labs Remarkable for: hgb 9.0, k-2.2  ER Images: ct abd et pelvis: chronic pancreatitis. 1.  Chronic pancreatitis. Superimposed Increased size of cystic lesions in the pancreatic head and tail may  represent pseudocysts although malignancy is difficult to exclude. Increase ascites in the abdomen and pelvis. Small bowel obstruction     ______________________________________________________________________  DISCHARGE SUMMARY/HOSPITAL COURSE:  for full details see H&P, daily progress notes, labs, consult notes. _______________________________________________________________________  Patient seen and examined by me on discharge day. Pertinent Findings:  Gen:    Not in distress  Chest: Clear lungs  CVS:   Regular rhythm. No edema  Abd:  Soft, not distended, not tender  Neuro:  Alert with good insight. Oriented to person, place, and time   _______________________________________________________________________  DISCHARGE MEDICATIONS:   Discharge Medication List as of 1/13/2023  3:47 AM          My Recommended Diet, Activity, Wound Care, and follow-up labs are listed in the patient's Discharge Insturctions which I have personally completed and reviewed. _______________________________________________________________________  DISPOSITION:  [facility]   Home with Family:    Home with HH/PT/OT/RN:    SNF/LTC:    NAUN:    OTHER:        Condition at Discharge:  Stable  _______________________________________________________________________  Follow up with:   PCP : Archana Candelario MD  Follow-up Information       Follow up With Specialties Details [facility]  Follow up on 1/11/2023 Your have an appointment  1-11-3 @ 11:20AM  you must keep this appointment. take your ID, Insurance Card and discharge papers. very important- [address], MD General Surgery, Surgery General, Oncology Follow up on 1/26/2023 1-26-23  at [facility]   please follow-up with Surgeon   For repeated SBO.  Lacey Candelario MD Internal Medicine Physician Follow up in 1 week(s) Please call your PCP office to schedule follow-up post hospital  [facility]  [phone]                  Total time in minutes spent coordinating this discharge (includes going over instructions, follow-up, prescriptions, and preparing report for sign off to her PCP) :  55 minutes    Signed:  Valencia Foster Anabella Oglesby MD

## 2023-01-14 ENCOUNTER — APPOINTMENT (OUTPATIENT)
Dept: MRI IMAGING | Age: 58
End: 2023-01-14
Attending: NURSE PRACTITIONER
Payer: MEDICARE

## 2023-01-14 PROCEDURE — 74011250637 HC RX REV CODE- 250/637: Performed by: STUDENT IN AN ORGANIZED HEALTH CARE EDUCATION/TRAINING PROGRAM

## 2023-01-14 PROCEDURE — 74011000250 HC RX REV CODE- 250: Performed by: STUDENT IN AN ORGANIZED HEALTH CARE EDUCATION/TRAINING PROGRAM

## 2023-01-14 PROCEDURE — 65270000029 HC RM PRIVATE

## 2023-01-14 PROCEDURE — 74011250637 HC RX REV CODE- 250/637: Performed by: NURSE PRACTITIONER

## 2023-01-14 PROCEDURE — 74011000258 HC RX REV CODE- 258: Performed by: STUDENT IN AN ORGANIZED HEALTH CARE EDUCATION/TRAINING PROGRAM

## 2023-01-14 PROCEDURE — 74011250636 HC RX REV CODE- 250/636: Performed by: STUDENT IN AN ORGANIZED HEALTH CARE EDUCATION/TRAINING PROGRAM

## 2023-01-14 RX ADMIN — OXYCODONE HYDROCHLORIDE 10 MG: 5 TABLET ORAL at 13:25

## 2023-01-14 RX ADMIN — PANCRELIPASE LIPASE, PANCRELIPASE PROTEASE, PANCRELIPASE AMYLASE 1 CAPSULE: 20000; 63000; 84000 CAPSULE, DELAYED RELEASE ORAL at 13:21

## 2023-01-14 RX ADMIN — SODIUM CHLORIDE, PRESERVATIVE FREE 10 ML: 5 INJECTION INTRAVENOUS at 07:05

## 2023-01-14 RX ADMIN — OXYCODONE HYDROCHLORIDE 10 MG: 5 TABLET ORAL at 09:25

## 2023-01-14 RX ADMIN — PANCRELIPASE LIPASE, PANCRELIPASE PROTEASE, PANCRELIPASE AMYLASE 1 CAPSULE: 20000; 63000; 84000 CAPSULE, DELAYED RELEASE ORAL at 17:12

## 2023-01-14 RX ADMIN — TAMSULOSIN HYDROCHLORIDE 0.4 MG: 0.4 CAPSULE ORAL at 09:28

## 2023-01-14 RX ADMIN — MEROPENEM 1 G: 1 INJECTION, POWDER, FOR SOLUTION INTRAVENOUS at 01:36

## 2023-01-14 RX ADMIN — MIRTAZAPINE 15 MG: 15 TABLET, FILM COATED ORAL at 21:44

## 2023-01-14 RX ADMIN — Medication 100 MG: at 09:28

## 2023-01-14 RX ADMIN — FINASTERIDE 5 MG: 5 TABLET, FILM COATED ORAL at 09:29

## 2023-01-14 RX ADMIN — METOPROLOL TARTRATE 12.5 MG: 25 TABLET, FILM COATED ORAL at 17:13

## 2023-01-14 RX ADMIN — OXYCODONE HYDROCHLORIDE 10 MG: 5 TABLET ORAL at 05:20

## 2023-01-14 RX ADMIN — SODIUM CHLORIDE, PRESERVATIVE FREE 10 ML: 5 INJECTION INTRAVENOUS at 21:44

## 2023-01-14 RX ADMIN — MAGNESIUM OXIDE 400 MG (241.3 MG MAGNESIUM) TABLET 400 MG: TABLET at 09:29

## 2023-01-14 RX ADMIN — DIBASIC SODIUM PHOSPHATE, MONOBASIC POTASSIUM PHOSPHATE AND MONOBASIC SODIUM PHOSPHATE 1 TABLET: 852; 155; 130 TABLET ORAL at 09:28

## 2023-01-14 RX ADMIN — Medication 1 MG: at 09:26

## 2023-01-14 RX ADMIN — OXYCODONE HYDROCHLORIDE 10 MG: 5 TABLET ORAL at 19:22

## 2023-01-14 RX ADMIN — MEROPENEM 1 G: 1 INJECTION, POWDER, FOR SOLUTION INTRAVENOUS at 17:16

## 2023-01-14 RX ADMIN — DIBASIC SODIUM PHOSPHATE, MONOBASIC POTASSIUM PHOSPHATE AND MONOBASIC SODIUM PHOSPHATE 1 TABLET: 852; 155; 130 TABLET ORAL at 17:12

## 2023-01-14 RX ADMIN — METOPROLOL TARTRATE 12.5 MG: 25 TABLET, FILM COATED ORAL at 09:23

## 2023-01-14 RX ADMIN — OXYCODONE HYDROCHLORIDE 10 MG: 5 TABLET ORAL at 00:11

## 2023-01-14 RX ADMIN — SODIUM CHLORIDE, PRESERVATIVE FREE 10 ML: 5 INJECTION INTRAVENOUS at 13:29

## 2023-01-14 RX ADMIN — OXYCODONE HYDROCHLORIDE 10 MG: 5 TABLET ORAL at 23:33

## 2023-01-14 RX ADMIN — PANCRELIPASE LIPASE, PANCRELIPASE PROTEASE, PANCRELIPASE AMYLASE 1 CAPSULE: 20000; 63000; 84000 CAPSULE, DELAYED RELEASE ORAL at 07:05

## 2023-01-14 RX ADMIN — Medication 1 CAPSULE: at 09:28

## 2023-01-14 RX ADMIN — MEROPENEM 1 G: 1 INJECTION, POWDER, FOR SOLUTION INTRAVENOUS at 09:29

## 2023-01-14 NOTE — PROGRESS NOTES
[facility] Adult  Hospitalist Group                                                                                          Hospitalist Progress Note  Larisa Delarosa NP  Answering service: [phone] from in house phone        Date of Service:  2023  NAME:  Obdulio Covarrubias  :  1965  MRN:  [id number]      Admission Summary:   Obdulio Covarrubias is a 59 y.o. male tree of rectal cancer status postresection, hypertension, peptic ulcer disease, BPH, and mood disorder who presents with hardik pain, and is being admitted for SBO. Note, he was from [facility] where he was admitted on  for recurrent SBO. Supple sigmoidoscopy there showed concern for rectal stricture. He is being transferred to [facility] for GI evaluation for upper GI. He has been hemodynamically stable. Interval history / Subjective:      VSS  Pending MRCP  Reports pain to left of umbilicus after eating anything accompanied by nausea  Moving bowels  Pt wants to know from GI why they are not planning for EGD/Colonoscopy while he is here.  He can discuss this w/ GI    Assessment & Plan:     #Recurrent small bowel obstruction - RESOLVED  #Abdominal pain due to above    #History of rectal cancer s/p resection/radiation  #History of gastric ulcer  #Concern for rectal stricture  -CT abdomen pelvis chest on admission with small bowel obstruction  - Flexoid sigmoidoscopy [place] Dr. Linda Judd showed possible rectal stricture  -Advance diet   -Pain management  -Zenpep added  -Zofran and Compazine as needed  -Stool WBC and enteric bacteria PCR checked  -GI following    Severe Malnutrition in the setting of chronic illness  - greater than 7.5% weigh loss in 3 months, severe body fat loss, severe muscle mass loss  - regular diet, ensure enlive TID, snacks BID     # Suspected ESBL UTI   - UA w/ bacteria, Nitrites and leukoesterase , wbc and RBC   -   ecoli esbl and proteus  - cw Merrem (1/10 630pm day 1)     PVC  -EKG recurrent PVCs  -Keep K above 4 mag above 2  -On metoprolol 12.5 mg twice daily     #Chronic urinary retention s/p [place]  -[facility] [place] [facility]     Acute on Chronic Pancreatitis  Pancreatic Anomaly  -scheduled for egd w/ EUS with GI as outpt but unable to make this appointment  -cannot r/o pancreatic cancer with recent ct  -continue zenpep      Hx of Polysubstance abuse     Code status: Full  Prophylaxis: [address] discussed with: patient, nurse, CM, GI  Anticipated Disposition: home     Hospital Problems  Date Reviewed: 1/6/2023            Codes Class Noted POA    UTI due to extended-spectrum beta lactamase (ESBL) producing Escherichia coli ICD-10-CM: N39.0, B96.29, Z16.12  ICD-9-CM: 599.0, 041.49, V09.1  1/13/2023 Unknown        Severe protein-calorie malnutrition ([facility] [address] (Chronic) ICD-10-CM: W17  ICD-9-CM: 663  1/13/2023 Yes        Small bowel obstruction ([facility] [address] ICD-10-CM: F72.810  ICD-9-CM: 560.9  9/9/2022 Unknown           Review of Systems:   A comprehensive review of systems was negative except for that written in the HPI. Vital Signs:    Last 24hrs VS reviewed since prior progress note. Most recent are:  Visit Vitals  /78   Pulse 68   Temp 98.1 °F (36.7 °C)   Resp 17   Ht 5' 8\" (1.727 m)   Wt 45.5 kg (100 lb 5 oz)   SpO2 98%   BMI 15.25 kg/m²       No intake or output data in the 24 hours ending 01/14/23 0030     Physical Examination:     I had a face to face encounter with this patient and independently examined them on 1/14/2023 as outlined below:          Constitutional:  No acute distress, cooperative, pleasant. Cachetic appearing   ENT:  Oral mucosa moist, oropharynx benign. Resp:  CTA bilaterally. No wheezing/rhonchi/rales. No accessory muscle use. CV:  Regular rhythm, normal rate, no murmurs, gallops, rubs    GI:  Soft, non distended, non tender.  normoactive bowel sounds, no hepatosplenomegaly     Musculoskeletal:    No edema, warm, 2+ pulses throughout    Neurologic:  Moves all extremities. AAOx3, CN II-XII reviewed            Data Review:    Review and/or order of clinical lab test  Review and/or order of tests in the radiology section of [facility]      Labs:   No results for input(s): WBC, HGB, HCT, PLT, HGBEXT, HCTEXT, PLTEXT, HGBEXT, HCTEXT, PLTEXT in the last 72 hours. No results for input(s): NA, K, CL, CO2, BUN, CREA, GLU, CA, MG, PHOS, URICA in the last 72 hours. No results for input(s): ALT, AP, TBIL, TBILI, TP, ALB, GLOB, GGT, AML, LPSE in the last 72 hours. No lab exists for component: SGOT, GPT, AMYP, HLPSE  No results for input(s): INR, PTP, APTT, INREXT, INREXT in the last 72 hours. No results for input(s): FE, TIBC, PSAT, FERR in the last 72 hours. Lab Results   Component Value Date/Time    Folate 41.4 (H) 09/11/2022 10:49 AM      No results for input(s): PH, PCO2, PO2 in the last 72 hours. No results for input(s): CPK, CKNDX, TROIQ in the last 72 hours.     No lab exists for component: CPKMB  Lab Results   Component Value Date/Time    Cholesterol, total 147 01/02/2019 04:00 AM    HDL Cholesterol 66 01/02/2019 04:00 AM    LDL, calculated 65.6 01/02/2019 04:00 AM    Triglyceride 77 01/02/2019 04:00 AM    CHOL/HDL Ratio 2.2 01/02/2019 04:00 AM     Lab Results   Component Value Date/Time    Glucose (POC) 80 01/07/2023 03:20 PM    Glucose (POC) 64 (L) 01/07/2023 11:26 AM    Glucose (POC) 62 (L) 01/07/2023 08:49 AM    Glucose (POC) 64 (L) 01/07/2023 08:46 AM    Glucose (POC) 202 (H) 01/06/2023 10:21 PM     Lab Results   Component Value Date/Time    Color DARK YELLOW 01/10/2023 03:52 PM    Appearance TURBID (A) 01/10/2023 03:52 PM    Specific gravity 1.025 01/10/2023 03:52 PM    Specific gravity 1.010 09/03/2021 10:01 PM    pH (UA) 8.5 (H) 01/10/2023 03:52 PM    Protein 300 (A) 01/10/2023 03:52 PM    Glucose Negative 01/10/2023 03:52 PM    Ketone Negative 01/10/2023 03:52 PM    Bilirubin Negative 01/10/2023 03:52 PM    Urobilinogen 1.0 01/10/2023 03:52 PM    Nitrites Positive (A) 01/10/2023 03:52 PM    Leukocyte Esterase LARGE (A) 01/10/2023 03:52 PM    Epithelial cells FEW 01/10/2023 03:52 PM    Bacteria 2+ (A) 01/10/2023 03:52 PM    WBC 20-50 01/10/2023 03:52 PM    RBC 20-50 01/10/2023 03:52 PM         Medications Reviewed:     Current Facility-Administered Medications   Medication Dose Route Frequency    acetaminophen (TYLENOL) tablet 1,000 mg  1,000 mg Oral Q6H PRN    dextrose 10% infusion 0-250 mL  0-250 mL IntraVENous PRN    dicyclomine (BENTYL) capsule 10 mg  10 mg Oral TID PRN    finasteride (PROSCAR) tablet 5 mg  5 mg Oral DAILY    folic acid tablet 1 mg  1 mg Oral DAILY    glucagon (GLUCAGEN) injection 1 mg  1 mg IntraMUSCular PRN    glucose chewable tablet 16 g  4 Tablet Oral PRN    heparin (porcine) pf 500 Units  500 Units InterCATHeter PRN    L.acidophilus-paracasei-S.thermophil-bifidobacter (RISAQUAD) 8 billion cell capsule  1 Capsule Oral DAILY    lipase-protease-amylase (ZENPEP 20,000) capsule 1 Capsule  1 Capsule Oral TID WITH MEALS    magnesium oxide (MAG-OX) tablet 400 mg  400 mg Oral DAILY    metoprolol tartrate (LOPRESSOR) tablet 12.5 mg  12.5 mg Oral BID    mirtazapine (REMERON) tablet 15 mg  15 mg Oral QHS    naloxone (NARCAN) injection 0.4 mg  0.4 mg IntraVENous EVERY 2 MINUTES AS NEEDED    ondansetron (ZOFRAN ODT) tablet 4 mg  4 mg Oral Q8H PRN    oxyCODONE IR (ROXICODONE) tablet 10 mg  10 mg Oral Q4H PRN    phenol throat spray (CHLORASEPTIC) 1 Spray  1 Spray Oral Q1H PRN    phosphorus (K PHOS NEUTRAL) 250 mg tablet 1 Tablet  1 Tablet Oral BID    polyethylene glycol (MIRALAX) packet 17 g  17 g Oral DAILY PRN    sodium chloride (NS) flush 5-40 mL  5-40 mL IntraVENous Q8H    sodium chloride (NS) flush 5-40 mL  5-40 mL IntraVENous PRN    tamsulosin (FLOMAX) capsule 0.4 mg  0.4 mg Oral DAILY    thiamine HCL (B-1) tablet 100 mg  100 mg Oral DAILY    meropenem (MERREM) 1 g in 0.9% sodium chloride (MBP/ADV) 50 mL MBP  1 g IntraVENous Q8H    nicotine (NICODERM CQ) 14 mg/24 hr patch 1 Patch  1 Patch TransDERmal QHS    alteplase (CATHFLO) 1 mg in sterile water (preservative free) 1 mL injection  1 mg InterCATHeter PRN     ______________________________________________________________________  EXPECTED LENGTH OF STAY: 3d 12h  ACTUAL LENGTH OF STAY:          1                 Lilian Cha V, NP

## 2023-01-15 ENCOUNTER — APPOINTMENT (OUTPATIENT)
Dept: MRI IMAGING | Age: 58
End: 2023-01-15
Attending: NURSE PRACTITIONER
Payer: MEDICARE

## 2023-01-15 LAB
ALBUMIN SERPL-MCNC: 2.3 G/DL (ref 3.5–5)
ALBUMIN/GLOB SERPL: 0.6 (ref 1.1–2.2)
ALP SERPL-CCNC: 91 U/L (ref 45–117)
ALT SERPL-CCNC: 32 U/L (ref 12–78)
ANION GAP SERPL CALC-SCNC: 5 MMOL/L (ref 5–15)
AST SERPL-CCNC: 40 U/L (ref 15–37)
BASOPHILS # BLD: 0 K/UL (ref 0–0.1)
BASOPHILS NFR BLD: 0 % (ref 0–1)
BILIRUB SERPL-MCNC: 0.2 MG/DL (ref 0.2–1)
BUN SERPL-MCNC: 6 MG/DL (ref 6–20)
BUN/CREAT SERPL: 12 (ref 12–20)
CALCIUM SERPL-MCNC: 8.3 MG/DL (ref 8.5–10.1)
CHLORIDE SERPL-SCNC: 104 MMOL/L (ref 97–108)
CO2 SERPL-SCNC: 32 MMOL/L (ref 21–32)
COVID-19 RAPID TEST, COVR: NOT DETECTED
CREAT SERPL-MCNC: 0.51 MG/DL (ref 0.7–1.3)
DIFFERENTIAL METHOD BLD: ABNORMAL
EOSINOPHIL # BLD: 0.1 K/UL (ref 0–0.4)
EOSINOPHIL NFR BLD: 2 % (ref 0–7)
ERYTHROCYTE [DISTWIDTH] IN BLOOD BY AUTOMATED COUNT: 15.3 % (ref 11.5–14.5)
GLOBULIN SER CALC-MCNC: 4.1 G/DL (ref 2–4)
GLUCOSE SERPL-MCNC: 93 MG/DL (ref 65–100)
HCT VFR BLD AUTO: 29.3 % (ref 36.6–50.3)
HGB BLD-MCNC: 9.5 G/DL (ref 12.1–17)
IMM GRANULOCYTES # BLD AUTO: 0 K/UL (ref 0–0.04)
IMM GRANULOCYTES NFR BLD AUTO: 0 % (ref 0–0.5)
LYMPHOCYTES # BLD: 2.2 K/UL (ref 0.8–3.5)
LYMPHOCYTES NFR BLD: 31 % (ref 12–49)
MCH RBC QN AUTO: 29.1 PG (ref 26–34)
MCHC RBC AUTO-ENTMCNC: 32.4 G/DL (ref 30–36.5)
MCV RBC AUTO: 89.9 FL (ref 80–99)
MONOCYTES # BLD: 0.7 K/UL (ref 0–1)
MONOCYTES NFR BLD: 10 % (ref 5–13)
NEUTS SEG # BLD: 4.1 K/UL (ref 1.8–8)
NEUTS SEG NFR BLD: 57 % (ref 32–75)
NRBC # BLD: 0 K/UL (ref 0–0.01)
NRBC BLD-RTO: 0 PER 100 WBC
PHOSPHATE SERPL-MCNC: 2.5 MG/DL (ref 2.6–4.7)
PLATELET # BLD AUTO: 258 K/UL (ref 150–400)
PMV BLD AUTO: 10.5 FL (ref 8.9–12.9)
POTASSIUM SERPL-SCNC: 3.6 MMOL/L (ref 3.5–5.1)
PROT SERPL-MCNC: 6.4 G/DL (ref 6.4–8.2)
RBC # BLD AUTO: 3.26 M/UL (ref 4.1–5.7)
SODIUM SERPL-SCNC: 141 MMOL/L (ref 136–145)
SOURCE, COVRS: NORMAL
WBC # BLD AUTO: 7.2 K/UL (ref 4.1–11.1)

## 2023-01-15 PROCEDURE — 74011250637 HC RX REV CODE- 250/637: Performed by: NURSE PRACTITIONER

## 2023-01-15 PROCEDURE — 74011000250 HC RX REV CODE- 250: Performed by: NURSE PRACTITIONER

## 2023-01-15 PROCEDURE — 74011250636 HC RX REV CODE- 250/636: Performed by: NURSE PRACTITIONER

## 2023-01-15 PROCEDURE — 74011250636 HC RX REV CODE- 250/636: Performed by: STUDENT IN AN ORGANIZED HEALTH CARE EDUCATION/TRAINING PROGRAM

## 2023-01-15 PROCEDURE — 84100 ASSAY OF PHOSPHORUS: CPT

## 2023-01-15 PROCEDURE — 36415 COLL VENOUS BLD VENIPUNCTURE: CPT

## 2023-01-15 PROCEDURE — 80053 COMPREHEN METABOLIC PANEL: CPT

## 2023-01-15 PROCEDURE — A9576 INJ PROHANCE MULTIPACK: HCPCS | Performed by: NURSE PRACTITIONER

## 2023-01-15 PROCEDURE — 74011250637 HC RX REV CODE- 250/637: Performed by: STUDENT IN AN ORGANIZED HEALTH CARE EDUCATION/TRAINING PROGRAM

## 2023-01-15 PROCEDURE — 85025 COMPLETE CBC W/AUTO DIFF WBC: CPT

## 2023-01-15 PROCEDURE — 77030040831 HC BAG URINE DRNG MDII -A

## 2023-01-15 PROCEDURE — 74011000258 HC RX REV CODE- 258: Performed by: STUDENT IN AN ORGANIZED HEALTH CARE EDUCATION/TRAINING PROGRAM

## 2023-01-15 PROCEDURE — 65270000029 HC RM PRIVATE

## 2023-01-15 PROCEDURE — 87635 SARS-COV-2 COVID-19 AMP PRB: CPT

## 2023-01-15 PROCEDURE — 74011000258 HC RX REV CODE- 258: Performed by: NURSE PRACTITIONER

## 2023-01-15 PROCEDURE — 77030021566 MRI ABD W MRCP W WO CONT

## 2023-01-15 PROCEDURE — 74011000250 HC RX REV CODE- 250: Performed by: STUDENT IN AN ORGANIZED HEALTH CARE EDUCATION/TRAINING PROGRAM

## 2023-01-15 RX ORDER — SODIUM CHLORIDE 0.9 % (FLUSH) 0.9 %
10 SYRINGE (ML) INJECTION
Status: COMPLETED | OUTPATIENT
Start: 2023-01-15 | End: 2023-01-15

## 2023-01-15 RX ADMIN — OXYCODONE HYDROCHLORIDE 10 MG: 5 TABLET ORAL at 11:22

## 2023-01-15 RX ADMIN — METOPROLOL TARTRATE 12.5 MG: 25 TABLET, FILM COATED ORAL at 17:38

## 2023-01-15 RX ADMIN — DIBASIC SODIUM PHOSPHATE, MONOBASIC POTASSIUM PHOSPHATE AND MONOBASIC SODIUM PHOSPHATE 1 TABLET: 852; 155; 130 TABLET ORAL at 09:47

## 2023-01-15 RX ADMIN — OXYCODONE HYDROCHLORIDE 10 MG: 5 TABLET ORAL at 16:07

## 2023-01-15 RX ADMIN — SODIUM CHLORIDE, PRESERVATIVE FREE 10 ML: 5 INJECTION INTRAVENOUS at 06:09

## 2023-01-15 RX ADMIN — MEROPENEM 1 G: 1 INJECTION, POWDER, FOR SOLUTION INTRAVENOUS at 02:11

## 2023-01-15 RX ADMIN — PANCRELIPASE LIPASE, PANCRELIPASE PROTEASE, PANCRELIPASE AMYLASE 1 CAPSULE: 20000; 63000; 84000 CAPSULE, DELAYED RELEASE ORAL at 07:04

## 2023-01-15 RX ADMIN — Medication 1 MG: at 09:45

## 2023-01-15 RX ADMIN — MEROPENEM 1 G: 1 INJECTION, POWDER, FOR SOLUTION INTRAVENOUS at 09:49

## 2023-01-15 RX ADMIN — DIBASIC SODIUM PHOSPHATE, MONOBASIC POTASSIUM PHOSPHATE AND MONOBASIC SODIUM PHOSPHATE 1 TABLET: 852; 155; 130 TABLET ORAL at 17:38

## 2023-01-15 RX ADMIN — MAGNESIUM OXIDE 400 MG (241.3 MG MAGNESIUM) TABLET 400 MG: TABLET at 09:45

## 2023-01-15 RX ADMIN — SODIUM CHLORIDE, PRESERVATIVE FREE 10 ML: 5 INJECTION INTRAVENOUS at 21:23

## 2023-01-15 RX ADMIN — GADOTERIDOL 10 ML: 279.3 INJECTION, SOLUTION INTRAVENOUS at 10:00

## 2023-01-15 RX ADMIN — MEROPENEM 1 G: 1 INJECTION, POWDER, FOR SOLUTION INTRAVENOUS at 17:40

## 2023-01-15 RX ADMIN — MIRTAZAPINE 15 MG: 15 TABLET, FILM COATED ORAL at 21:23

## 2023-01-15 RX ADMIN — FINASTERIDE 5 MG: 5 TABLET, FILM COATED ORAL at 09:47

## 2023-01-15 RX ADMIN — SODIUM CHLORIDE, PRESERVATIVE FREE 10 ML: 5 INJECTION INTRAVENOUS at 12:23

## 2023-01-15 RX ADMIN — OXYCODONE HYDROCHLORIDE 10 MG: 5 TABLET ORAL at 06:10

## 2023-01-15 RX ADMIN — Medication 1 CAPSULE: at 09:44

## 2023-01-15 RX ADMIN — TAMSULOSIN HYDROCHLORIDE 0.4 MG: 0.4 CAPSULE ORAL at 09:46

## 2023-01-15 RX ADMIN — SODIUM CHLORIDE, PRESERVATIVE FREE 10 ML: 5 INJECTION INTRAVENOUS at 16:11

## 2023-01-15 RX ADMIN — SODIUM CHLORIDE 100 ML: 900 INJECTION, SOLUTION INTRAVENOUS at 10:37

## 2023-01-15 RX ADMIN — METOPROLOL TARTRATE 12.5 MG: 25 TABLET, FILM COATED ORAL at 09:47

## 2023-01-15 RX ADMIN — SODIUM CHLORIDE, PRESERVATIVE FREE 10 ML: 5 INJECTION INTRAVENOUS at 10:38

## 2023-01-15 RX ADMIN — OXYCODONE HYDROCHLORIDE 10 MG: 5 TABLET ORAL at 21:23

## 2023-01-15 RX ADMIN — Medication 100 MG: at 09:45

## 2023-01-15 RX ADMIN — PANCRELIPASE LIPASE, PANCRELIPASE PROTEASE, PANCRELIPASE AMYLASE 1 CAPSULE: 20000; 63000; 84000 CAPSULE, DELAYED RELEASE ORAL at 12:22

## 2023-01-15 NOTE — CONSULTS
Colon and Rectal Surgery History and Physical    Subjective:      Priscilla Rivera is a 59 y.o. male with a history of T3N0M0 rectal cancer s/p open LAR with coloanal anastomosis with DLI and subsequent DLI reversal in 2016. He received neoadjuvant therapy as well as adjuvant CapeOx. Admitted now since 1/5 initially at [facility] then transferred to [facility] for abd pain and PO intolerance. He has had several admissions over the last couple of months for recurrent SBO. He has been also found to have chronic alcoholic pancreatitis with enlarging cystic pancreas lesions. He was transferred from [facility] to [facility] for GI evaluation for workup of the pancreatic lesions. MRCP done for this reason today showed ongoing small bowel dilation. Last CEA 9/10/22 was 2.3. Last flex sig by Dr. Karthik Crespo at [facility] 10/28 with rectal narrowing and congested mucosa, but no obstruction (scope able to pass). Biopsies were taken and resulted mild inflammation without dysplasia or malignancy. Today, he reports some left lower quadrant pain. Intermittent nausea but no vomiting. He reports having multiple semi-formed Bms of which he is incontinent. Patient Active Problem List    Diagnosis Date Noted    UTI due to extended-spectrum beta lactamase (ESBL) producing Escherichia coli 01/13/2023    Severe protein-calorie malnutrition ([address] 01/13/2023    SBO (small bowel obstruction) ([address] 11/29/2022    Small bowel obstruction ([address] 09/09/2022    Hematuria 09/01/2021    Hydronephrosis, bilateral 08/31/2021    Pancreatitis 12/20/2020    Acute pancreatitis 12/20/2020    Seizure ([address] 12/31/2018    Alcohol abuse 12/31/2018    Drug abuse ([address] 12/31/2018    Rectal cancer ([address] 12/31/2018     Past Medical History:   Diagnosis Date    Cancer ([address]     rectal    Gastrointestinal disorder     Gastric Ulcers;  Rectal CA    Hematuria 9/1/2021    Hypertension       Past Surgical History:   Procedure Laterality Date    HX GI        Social History Tobacco Use    Smoking status: Every Day     Packs/day: 0.25     Types: Cigarettes    Smokeless tobacco: Never    Tobacco comments:     \"1 pack last me three days\"   Substance Use Topics    Alcohol use: Not Currently     Comment: hasn't drank x3mo (6/3/22)      Family History   Problem Relation Age of Onset    Cancer Brother       Prior to Admission medications    Medication Sig Start Date End Date Taking? Authorizing Provider   folic acid (FOLVITE) 1 mg tablet Take 1 mg by mouth daily. 11/3/22  Yes Provider, Historical   aspirin delayed-release 81 mg tablet Take 81 mg by mouth daily. 11/3/22  Yes Provider, Historical   ferrous sulfate (IRON) 325 mg (65 mg iron) EC tablet Take 325 mg by mouth daily. 11/3/22  Yes Provider, Historical   melatonin 1 mg tablet Take 1 mg by mouth daily. 5/28/21  Yes Provider, Historical   oxyCODONE IR (ROXICODONE) 5 mg immediate release tablet Take 5 mg by mouth every six (6) hours as needed for Pain. Yes Provider, Historical   tamsulosin (FLOMAX) 0.4 mg capsule Take 1 Capsule by mouth daily. 12/4/22  Yes Genoveva De La Cruz MD   lactobacillus combination no.4 3 billion cell cap Take 1 Capsule by mouth daily. 12/4/22  Yes Genoveva De La Cruz MD   metoprolol tartrate (LOPRESSOR) 25 mg tablet Take 0.5 Tablets by mouth two (2) times a day. 2/5/22  Yes Cesar Finley MD   finasteride (PROSCAR) 5 mg tablet Take 5 mg by mouth daily. Yes Provider, Historical   mirtazapine (REMERON) 15 mg tablet Take 15 mg by mouth nightly. Yes Provider, Historical   thiamine mononitrate (B-1) 100 mg tablet Take 1 Tablet by mouth daily. 12/4/22   Genoveva De La Cruz MD   folic acid (FOLVITE) 1 mg tablet Take 1 Tablet by mouth daily. 9/9/21   Autumn Silva NP     No Known Allergies     Review of Systems:    A comprehensive review of systems was negative except for that written in the History of Present Illness.     Objective:     Visit Vitals  BP (!) 156/77 (BP 1 Location: Left upper arm, BP Patient Position: At rest;Sitting)   Pulse 65   Temp 97.9 °F (36.6 °C)   Resp 18   Ht 5' 8\" (1.727 m)   Wt 45.5 kg (100 lb 5 oz)   SpO2 100%   BMI 15.25 kg/m²        Physical Exam:   Visit Vitals  BP (!) 145/77   Pulse 71   Temp 97.9 °F (36.6 °C)   Resp 18   Ht 5' 8\" (1.727 m)   Wt 45.5 kg (100 lb 5 oz)   SpO2 100%   BMI 15.25 kg/m²     General:  Alert, cooperative, no distress, cachectic. Head:  Normocephalic, without obvious abnormality, atraumatic. Eyes:  Conjunctivae/corneas clear. PERRL, EOMs intact. Throat: Lips, mucosa, and tongue normal. Teeth and gums normal.   Neck: Supple, symmetrical, trachea midline, no JVD. Back:   Symmetric, no curvature. ROM normal. No CVA tenderness. Lungs:   Breathing comfortably on RA   Chest wall:  No tenderness or deformity. Heart:  Regular rate and rhythm   Abdomen:   Distended but soft, mildly tender LLQ without rebound or guarding, well healed surgical incisions   Rectal:  refused   Extremities: Extremities normal, atraumatic, no cyanosis or edema. Pulses: 2+ and symmetric all extremities. Skin: Skin color, texture, turgor normal. No rashes or lesions   Neurologic: CNII-XII intact. Normal strength, sensation and reflexes throughout.        Imaging:  images and reports reviewed    Lab Review:    Recent Results (from the past 24 hour(s))   CBC WITH AUTOMATED DIFF    Collection Time: 01/15/23  2:11 AM   Result Value Ref Range    WBC 7.2 4.1 - 11.1 K/uL    RBC 3.26 (L) 4.10 - 5.70 M/uL    HGB 9.5 (L) 12.1 - 17.0 g/dL    HCT 29.3 (L) 36.6 - 50.3 %    MCV 89.9 80.0 - 99.0 FL    MCH 29.1 26.0 - 34.0 PG    MCHC 32.4 30.0 - 36.5 g/dL    RDW 15.3 (H) 11.5 - 14.5 %    PLATELET 429 164 - 900 K/uL    MPV 10.5 8.9 - 12.9 FL    NRBC 0.0 0  WBC    ABSOLUTE NRBC 0.00 0.00 - 0.01 K/uL    NEUTROPHILS 57 32 - 75 %    LYMPHOCYTES 31 12 - 49 %    MONOCYTES 10 5 - 13 %    EOSINOPHILS 2 0 - 7 %    BASOPHILS 0 0 - 1 %    IMMATURE GRANULOCYTES 0 0.0 - 0.5 %    ABS. NEUTROPHILS 4.1 1.8 - 8.0 K/UL    ABS. LYMPHOCYTES 2.2 0.8 - 3.5 K/UL    ABS. MONOCYTES 0.7 0.0 - 1.0 K/UL    ABS. EOSINOPHILS 0.1 0.0 - 0.4 K/UL    ABS. BASOPHILS 0.0 0.0 - 0.1 K/UL    ABS. IMM. GRANS. 0.0 0.00 - 0.04 K/UL    DF AUTOMATED     METABOLIC PANEL, COMPREHENSIVE    Collection Time: 01/15/23  2:11 AM   Result Value Ref Range    Sodium 141 136 - 145 mmol/L    Potassium 3.6 3.5 - 5.1 mmol/L    Chloride 104 97 - 108 mmol/L    CO2 32 21 - 32 mmol/L    Anion gap 5 5 - 15 mmol/L    Glucose 93 65 - 100 mg/dL    BUN 6 6 - 20 MG/DL    Creatinine 0.51 (L) 0.70 - 1.30 MG/DL    BUN/Creatinine ratio 12 12 - 20      eGFR >60 >60 ml/min/1.73m2    Calcium 8.3 (L) 8.5 - 10.1 MG/DL    Bilirubin, total 0.2 0.2 - 1.0 MG/DL    ALT (SGPT) 32 12 - 78 U/L    AST (SGOT) 40 (H) 15 - 37 U/L    Alk. phosphatase 91 45 - 117 U/L    Protein, total 6.4 6.4 - 8.2 g/dL    Albumin 2.3 (L) 3.5 - 5.0 g/dL    Globulin 4.1 (H) 2.0 - 4.0 g/dL    A-G Ratio 0.6 (L) 1.1 - 2.2     PHOSPHORUS    Collection Time: 01/15/23  2:11 AM   Result Value Ref Range    Phosphorus 2.5 (L) 2.6 - 4.7 MG/DL   COVID-19 RAPID TEST    Collection Time: 01/15/23  9:44 AM   Result Value Ref Range    Specimen source Nasopharyngeal      COVID-19 rapid test Not detected NOTD         Labs and radiology: images and reports reviewed      Assessment:     59 y.o. M with a hx of T3N0M0 rectal cancer s/p LAR in 7262, chronic alcoholic pancreatitis, recurrent bowel obstructions. Admitted now with abd pain and PO intolerance. Imaging with persistent small bowel dilation. Plan:       I have personally reviewed his most recent imaging and labs studies as well as imaging/labs/procedures from his admissions over the last several months. No evidence of large bowel dilation/obstruction on imaging, doubt malignant cause. Possibly adhesive disease versus ileus/dysmotility.  Recent CEA was normal and there is no evidence of recurrent rectal cancer on imaging, but will send another CEA with tomorrow's labs at this point given that the last one was several months ago. Conservative management of SBO for now with bowel rest and hydration. He tells me that he would not want further surgery. Workup of pancreatic lesions per GI.     Signed By: Janie Kraus MD     January 15, 2023

## 2023-01-15 NOTE — PROGRESS NOTES
[address] [facility] Adult  Hospitalist Group                                                                                          Hospitalist Progress Note  Christine Tidwell NP  Answering service: [phone] OR [phone] from in house phone        Date of Service:  1/15/2023  NAME:  Jean Collins  :  1965  MRN:  [id number]      Admission Summary:   Jean Collins is a 59 y.o. male tree of rectal cancer status postresection, hypertension, peptic ulcer disease, BPH, and mood disorder who presents with hardik pain, and is being admitted for SBO. Note, he was from [facility] where he was admitted on  for recurrent SBO. Supple sigmoidoscopy there showed concern for rectal stricture. He is being transferred to [facility] for GI evaluation for upper GI. He has been hemodynamically stable. Interval history / Subjective:      VSS  Pending MRCP  Pt now reporting \"no taste\" x 3 days along with loose stools, eating ~ 25% of meals  Reports pain to left of umbilicus after eating anything accompanied by nausea  Wants EGD/colonoscopy while he's admitted, advised to discuss this w/ GI    1400 ADDENDUM: COVID NEGATIVE  MRI demonstrating recurrent small bowel obstruction, consult surgery, seen previously by surgery at [facility]. NPO consult colorectal surgeon.      Assessment & Plan:     Abdominal pain 2/2 Recurrent small bowel obstruction   - 1/15 already evaluated by Surgery at [facility], consult colorectal surgeon    History of rectal cancer s/p resection/radiation  History of gastric ulcer  Concern for rectal stricture  -CT abdomen pelvis chest on admission with small bowel obstruction  - Flexoid sigmoidoscopy [facility] showed possible rectal stricture  -Stool WBC and enteric bacteria PCR checked  -GI following    Severe Malnutrition in the setting of chronic illness  - greater than 7.5% weigh loss in 3 months, severe body fat loss, severe muscle mass loss  - regular diet, ensure enlive TID, snacks BID     Suspected ESBL UTI   - UA w/ bacteria, Nitrites and leukoesterase , wbc and RBC   - 1/14  ecoli esbl and proteus  - cw Merrem (1/10 630pm day 1)     PVC  -EKG recurrent PVCs  -Keep K above 4 mag above 2  -On metoprolol 12.5 mg twice daily     Chronic urinary retention s/p [place]  -Changed on 1/10/2022 at [place] finasteride and Flomax     Acute on Chronic Pancreatitis  Pancreatic Anomaly  -scheduled for egd w/ EUS with GI as outpt but unable to make this appointment  -cannot r/o pancreatic cancer with recent ct  -continue zenpep      Hx of Polysubstance abuse     Code status: Full  Prophylaxis: [address] discussed with: patient, nurse, CM, GI  Anticipated Disposition: home     Hospital Problems  Date Reviewed: 1/6/2023            Codes Class Noted POA    UTI due to extended-spectrum beta lactamase (ESBL) producing Escherichia coli ICD-10-CM: N39.0, B96.29, Z16.12  ICD-9-CM: 599.0, 041.49, V09.1  1/13/2023 Unknown        Severe protein-calorie malnutrition ([address] (Chronic) ICD-10-CM: Q30  ICD-9-CM: 974  1/13/2023 Yes        Small bowel obstruction ([address] ICD-10-CM: Q05.060  ICD-9-CM: 560.9  9/9/2022 Unknown         Review of Systems:   A comprehensive review of systems was negative except for that written in the HPI. And in interval hx      Vital Signs:    Last 24hrs VS reviewed since prior progress note. Most recent are:  Visit Vitals  BP (!) 157/85   Pulse 71   Temp 97.4 °F (36.3 °C)   Resp 18   Ht 5' 8\" (1.727 m)   Wt 45.5 kg (100 lb 5 oz)   SpO2 100%   BMI 15.25 kg/m²       No intake or output data in the 24 hours ending 01/15/23 4680     Physical Examination:     I had a face to face encounter with this patient and independently examined them on 1/15/2023 as outlined below:          Constitutional:  No acute distress, cooperative, pleasant. Cachetic appearing   ENT:  Oral mucosa moist, oropharynx benign. Resp:  CTA bilaterally.  No wheezing/rhonchi/rales. No accessory muscle use. CV:  Regular rhythm, normal rate, no murmurs, gallops, rubs    GI:  Soft, non distended, non tender. normoactive bowel sounds, no hepatosplenomegaly     Musculoskeletal:    No edema, warm, 2+ pulses throughout    Neurologic:  Moves all extremities. AAOx3, CN II-XII reviewed            Data Review:    Review and/or order of clinical lab test  Review and/or order of tests in the radiology section of [facility]      Labs:     Recent Labs     01/15/23  0211   WBC 7.2   HGB 9.5*   HCT 29.3*        Recent Labs     01/15/23  0211      K 3.6      CO2 32   BUN 6   CREA 0.51*   GLU 93   CA 8.3*   PHOS 2.5*     Recent Labs     01/15/23  0211   ALT 32   AP 91   TBILI 0.2   TP 6.4   ALB 2.3*   GLOB 4.1*     No results for input(s): INR, PTP, APTT, INREXT, INREXT in the last 72 hours. No results for input(s): FE, TIBC, PSAT, FERR in the last 72 hours. Lab Results   Component Value Date/Time    Folate 41.4 (H) 09/11/2022 10:49 AM      No results for input(s): PH, PCO2, PO2 in the last 72 hours. No results for input(s): CPK, CKNDX, TROIQ in the last 72 hours.     No lab exists for component: CPKMB  Lab Results   Component Value Date/Time    Cholesterol, total 147 01/02/2019 04:00 AM    HDL Cholesterol 66 01/02/2019 04:00 AM    LDL, calculated 65.6 01/02/2019 04:00 AM    Triglyceride 77 01/02/2019 04:00 AM    CHOL/HDL Ratio 2.2 01/02/2019 04:00 AM     Lab Results   Component Value Date/Time    Glucose (POC) 80 01/07/2023 03:20 PM    Glucose (POC) 64 (L) 01/07/2023 11:26 AM    Glucose (POC) 62 (L) 01/07/2023 08:49 AM    Glucose (POC) 64 (L) 01/07/2023 08:46 AM    Glucose (POC) 202 (H) 01/06/2023 10:21 PM     Lab Results   Component Value Date/Time    Color DARK YELLOW 01/10/2023 03:52 PM    Appearance TURBID (A) 01/10/2023 03:52 PM    Specific gravity 1.025 01/10/2023 03:52 PM    Specific gravity 1.010 09/03/2021 10:01 PM    pH (UA) 8.5 (H) 01/10/2023 03:52 PM Protein 300 (A) 01/10/2023 03:52 PM    Glucose Negative 01/10/2023 03:52 PM    Ketone Negative 01/10/2023 03:52 PM    Bilirubin Negative 01/10/2023 03:52 PM    Urobilinogen 1.0 01/10/2023 03:52 PM    Nitrites Positive (A) 01/10/2023 03:52 PM    Leukocyte Esterase LARGE (A) 01/10/2023 03:52 PM    Epithelial cells FEW 01/10/2023 03:52 PM    Bacteria 2+ (A) 01/10/2023 03:52 PM    WBC 20-50 01/10/2023 03:52 PM    RBC 20-50 01/10/2023 03:52 PM         Medications Reviewed:     Current Facility-Administered Medications   Medication Dose Route Frequency    acetaminophen (TYLENOL) tablet 1,000 mg  1,000 mg Oral Q6H PRN    dextrose 10% infusion 0-250 mL  0-250 mL IntraVENous PRN    dicyclomine (BENTYL) capsule 10 mg  10 mg Oral TID PRN    finasteride (PROSCAR) tablet 5 mg  5 mg Oral DAILY    folic acid tablet 1 mg  1 mg Oral DAILY    glucagon (GLUCAGEN) injection 1 mg  1 mg IntraMUSCular PRN    glucose chewable tablet 16 g  4 Tablet Oral PRN    heparin (porcine) pf 500 Units  500 Units InterCATHeter PRN    L.acidophilus-paracasei-S.thermophil-bifidobacter (RISAQUAD) 8 billion cell capsule  1 Capsule Oral DAILY    lipase-protease-amylase (ZENPEP 20,000) capsule 1 Capsule  1 Capsule Oral TID WITH MEALS    magnesium oxide (MAG-OX) tablet 400 mg  400 mg Oral DAILY    metoprolol tartrate (LOPRESSOR) tablet 12.5 mg  12.5 mg Oral BID    mirtazapine (REMERON) tablet 15 mg  15 mg Oral QHS    naloxone (NARCAN) injection 0.4 mg  0.4 mg IntraVENous EVERY 2 MINUTES AS NEEDED    ondansetron (ZOFRAN ODT) tablet 4 mg  4 mg Oral Q8H PRN    oxyCODONE IR (ROXICODONE) tablet 10 mg  10 mg Oral Q4H PRN    phenol throat spray (CHLORASEPTIC) 1 Spray  1 Spray Oral Q1H PRN    phosphorus (K PHOS NEUTRAL) 250 mg tablet 1 Tablet  1 Tablet Oral BID    polyethylene glycol (MIRALAX) packet 17 g  17 g Oral DAILY PRN    sodium chloride (NS) flush 5-40 mL  5-40 mL IntraVENous Q8H    sodium chloride (NS) flush 5-40 mL  5-40 mL IntraVENous PRN    tamsulosin (FLOMAX) capsule 0.4 mg  0.4 mg Oral DAILY    thiamine HCL (B-1) tablet 100 mg  100 mg Oral DAILY    meropenem (MERREM) 1 g in 0.9% sodium chloride (MBP/ADV) 50 mL MBP  1 g IntraVENous Q8H    nicotine (NICODERM CQ) 14 mg/24 hr patch 1 Patch  1 Patch TransDERmal QHS    alteplase (CATHFLO) 1 mg in sterile water (preservative free) 1 mL injection  1 mg InterCATHeter PRN     ______________________________________________________________________  EXPECTED LENGTH OF STAY: 3d 12h  ACTUAL LENGTH OF STAY:          2                 Lilian Cha V, NP

## 2023-01-16 LAB
ANION GAP SERPL CALC-SCNC: 0 MMOL/L (ref 5–15)
BASOPHILS # BLD: 0 K/UL (ref 0–0.1)
BASOPHILS NFR BLD: 1 % (ref 0–1)
BUN SERPL-MCNC: 6 MG/DL (ref 6–20)
BUN/CREAT SERPL: 12 (ref 12–20)
CALCIUM SERPL-MCNC: 8.6 MG/DL (ref 8.5–10.1)
CEA SERPL-MCNC: 6 NG/ML
CHLORIDE SERPL-SCNC: 103 MMOL/L (ref 97–108)
CO2 SERPL-SCNC: 35 MMOL/L (ref 21–32)
CREAT SERPL-MCNC: 0.51 MG/DL (ref 0.7–1.3)
DIFFERENTIAL METHOD BLD: ABNORMAL
EOSINOPHIL # BLD: 0.1 K/UL (ref 0–0.4)
EOSINOPHIL NFR BLD: 2 % (ref 0–7)
ERYTHROCYTE [DISTWIDTH] IN BLOOD BY AUTOMATED COUNT: 15.5 % (ref 11.5–14.5)
GLUCOSE SERPL-MCNC: 91 MG/DL (ref 65–100)
HCT VFR BLD AUTO: 26.9 % (ref 36.6–50.3)
HGB BLD-MCNC: 8.9 G/DL (ref 12.1–17)
IMM GRANULOCYTES # BLD AUTO: 0 K/UL (ref 0–0.04)
IMM GRANULOCYTES NFR BLD AUTO: 1 % (ref 0–0.5)
LYMPHOCYTES # BLD: 1.7 K/UL (ref 0.8–3.5)
LYMPHOCYTES NFR BLD: 28 % (ref 12–49)
MCH RBC QN AUTO: 29.6 PG (ref 26–34)
MCHC RBC AUTO-ENTMCNC: 33.1 G/DL (ref 30–36.5)
MCV RBC AUTO: 89.4 FL (ref 80–99)
MONOCYTES # BLD: 0.7 K/UL (ref 0–1)
MONOCYTES NFR BLD: 11 % (ref 5–13)
NEUTS SEG # BLD: 3.4 K/UL (ref 1.8–8)
NEUTS SEG NFR BLD: 57 % (ref 32–75)
NRBC # BLD: 0 K/UL (ref 0–0.01)
NRBC BLD-RTO: 0 PER 100 WBC
PLATELET # BLD AUTO: 202 K/UL (ref 150–400)
PMV BLD AUTO: 10.6 FL (ref 8.9–12.9)
POTASSIUM SERPL-SCNC: 3.9 MMOL/L (ref 3.5–5.1)
RBC # BLD AUTO: 3.01 M/UL (ref 4.1–5.7)
SODIUM SERPL-SCNC: 138 MMOL/L (ref 136–145)
WBC # BLD AUTO: 5.9 K/UL (ref 4.1–11.1)

## 2023-01-16 PROCEDURE — 74011250637 HC RX REV CODE- 250/637: Performed by: NURSE PRACTITIONER

## 2023-01-16 PROCEDURE — 74011250636 HC RX REV CODE- 250/636: Performed by: NURSE PRACTITIONER

## 2023-01-16 PROCEDURE — 36415 COLL VENOUS BLD VENIPUNCTURE: CPT

## 2023-01-16 PROCEDURE — 74011250637 HC RX REV CODE- 250/637: Performed by: STUDENT IN AN ORGANIZED HEALTH CARE EDUCATION/TRAINING PROGRAM

## 2023-01-16 PROCEDURE — 80048 BASIC METABOLIC PNL TOTAL CA: CPT

## 2023-01-16 PROCEDURE — 85025 COMPLETE CBC W/AUTO DIFF WBC: CPT

## 2023-01-16 PROCEDURE — 74011250636 HC RX REV CODE- 250/636: Performed by: STUDENT IN AN ORGANIZED HEALTH CARE EDUCATION/TRAINING PROGRAM

## 2023-01-16 PROCEDURE — 82378 CARCINOEMBRYONIC ANTIGEN: CPT

## 2023-01-16 PROCEDURE — 74011000250 HC RX REV CODE- 250: Performed by: STUDENT IN AN ORGANIZED HEALTH CARE EDUCATION/TRAINING PROGRAM

## 2023-01-16 PROCEDURE — 74011000250 HC RX REV CODE- 250: Performed by: NURSE PRACTITIONER

## 2023-01-16 PROCEDURE — 99221 1ST HOSP IP/OBS SF/LOW 40: CPT | Performed by: NURSE PRACTITIONER

## 2023-01-16 PROCEDURE — 65270000029 HC RM PRIVATE

## 2023-01-16 PROCEDURE — 74011000258 HC RX REV CODE- 258: Performed by: NURSE PRACTITIONER

## 2023-01-16 PROCEDURE — 74011000258 HC RX REV CODE- 258: Performed by: STUDENT IN AN ORGANIZED HEALTH CARE EDUCATION/TRAINING PROGRAM

## 2023-01-16 RX ADMIN — Medication 100 MG: at 09:02

## 2023-01-16 RX ADMIN — MEROPENEM 1 G: 1 INJECTION, POWDER, FOR SOLUTION INTRAVENOUS at 17:35

## 2023-01-16 RX ADMIN — Medication 1 CAPSULE: at 09:02

## 2023-01-16 RX ADMIN — METOPROLOL TARTRATE 12.5 MG: 25 TABLET, FILM COATED ORAL at 09:02

## 2023-01-16 RX ADMIN — OXYCODONE HYDROCHLORIDE 10 MG: 5 TABLET ORAL at 01:50

## 2023-01-16 RX ADMIN — SODIUM CHLORIDE, PRESERVATIVE FREE 10 ML: 5 INJECTION INTRAVENOUS at 06:27

## 2023-01-16 RX ADMIN — OXYCODONE HYDROCHLORIDE 10 MG: 5 TABLET ORAL at 06:27

## 2023-01-16 RX ADMIN — OXYCODONE HYDROCHLORIDE 10 MG: 5 TABLET ORAL at 16:19

## 2023-01-16 RX ADMIN — FINASTERIDE 5 MG: 5 TABLET, FILM COATED ORAL at 09:02

## 2023-01-16 RX ADMIN — OXYCODONE HYDROCHLORIDE 10 MG: 5 TABLET ORAL at 20:49

## 2023-01-16 RX ADMIN — MIRTAZAPINE 15 MG: 15 TABLET, FILM COATED ORAL at 20:49

## 2023-01-16 RX ADMIN — MEROPENEM 1 G: 1 INJECTION, POWDER, FOR SOLUTION INTRAVENOUS at 09:04

## 2023-01-16 RX ADMIN — METOPROLOL TARTRATE 12.5 MG: 25 TABLET, FILM COATED ORAL at 17:36

## 2023-01-16 RX ADMIN — TAMSULOSIN HYDROCHLORIDE 0.4 MG: 0.4 CAPSULE ORAL at 09:02

## 2023-01-16 RX ADMIN — OXYCODONE HYDROCHLORIDE 10 MG: 5 TABLET ORAL at 10:19

## 2023-01-16 RX ADMIN — MEROPENEM 1 G: 1 INJECTION, POWDER, FOR SOLUTION INTRAVENOUS at 01:51

## 2023-01-16 RX ADMIN — ASCORBIC ACID, VITAMIN A PALMITATE, CHOLECALCIFEROL, THIAMINE HYDROCHLORIDE, RIBOFLAVIN-5 PHOSPHATE SODIUM, PYRIDOXINE HYDROCHLORIDE, NIACINAMIDE, DEXPANTHENOL, ALPHA-TOCOPHEROL ACETATE, VITAMIN K1, FOLIC ACID, BIOTIN, CYANOCOBALAMIN: 200; 3300; 200; 6; 3.6; 6; 40; 15; 10; 150; 600; 60; 5 INJECTION, SOLUTION INTRAVENOUS at 18:11

## 2023-01-16 RX ADMIN — I.V. FAT EMULSION 250 ML: 20 EMULSION INTRAVENOUS at 18:11

## 2023-01-16 RX ADMIN — Medication 1 MG: at 09:08

## 2023-01-16 NOTE — CONSULTS
Comprehensive Nutrition Assessment    Type and Reason for Visit: Reassess, Consult    Nutrition Recommendations/Plan:     Replete Phos and continue to check    TPN Rec's:   Continue as ordered: D15, 5%AA at goal rate of 83 ml/hr + 250 ml 20% lipids 3x/week         Malnutrition Assessment:  Malnutrition Status:  Severe malnutrition (01/13/23 1134)    Context:  Chronic illness     Findings of the 6 clinical characteristics of malnutrition:   Energy Intake:  75% or less est energy requirements for 1 month or longer  Weight Loss:  Greater than 7.5% over 3 months     Body Fat Loss:  Severe body fat loss, Triceps   Muscle Mass Loss:  Severe muscle mass loss, Clavicles (pectoralis &deltoids), Hand (interosseous)  Fluid Accumulation:  No significant fluid accumulation,     Strength:  Not performed        Nutrition Assessment:    60 yo male admitted for SBO. PMHx: rectal CA s/p resection and radiation, HTN, PUD, ETOH and drug abuse, pancreatitis. 1/16:  MD consult received for TPN Rec's. Pt has been c/o having no taste for last 3 days, intermittent nausea (with no vomiting), and constant ABD pain. MRI of ABD showed recurrent SBO and pancreatic cysts. Colorectal surgeon noted \"Hard to know if his pain is related to pancreatic cysts or dilated small bowel. He is passing some gas and stool. Hard to know if this is a true bowel obstruction or ileus due to pancreatic issues. \"  Recommends bowel rest and TPN. TPN ordered as D15, 5%AA at 83 ml/hr + 250 ml 20% lipids 3x/week. This is providing 9675-6127 ml, 1629 kcals (100% protein needs), 99 gm protein (100% protein needs), 298 gm CHO. Recommend continuing as ordered. Noted that pt was eating ~25% meals prior to being made NPO. Labs: phos 2.5      1/13: MST received for weight loss and poor PO. Spoke with pt at bedside.   He reports being a \"light eater\" which makes him feel better so he only eats part of each meal.  Breakfast tray observed, pt ate eggs only and a few sips of milk. States his appetite at home PTA was poor 2/2 no appetite but feels his appetite is improving since admission. Drinks 2 Ensure shakes/day at home. Likes chocolate and strawberry. Agreeable to receive scheduled snacks; will order PB crackers and 1/2 sandwich for AM/PM snacks. Low BMI of 15. Pt admits to recent weight loss. Weight hx in EMR indicates 16% over last 4 months which is significant for time frame. Severe muscle and fat wasting present. Pt c/o alternating between constipation and diarrhea at home. Labs: phos 2.4 (being repleted), K+ WNL today but previously low        Nutritionally Significant Medications:  Folic acid, Probiotic, Remeron, Thiamine      Estimated Daily Nutrient Needs:  Energy Requirements Based On: Kcal/kg  Weight Used for Energy Requirements: Current  Energy (kcal/day): 6988-4297 (35-40 kcals/kg)  Weight Used for Protein Requirements: Current  Protein (g/day): 82 (1.8 gm/kg (20%))  Method Used for Fluid Requirements: 1 ml/kcal  Fluid (ml/day): 1600    Nutrition Related Findings:   Edema: none  Last BM: 01/15/23, Soft    Wounds: Skin tears      Current Nutrition Therapies:  Diet: NPO  Supplements: none  Meal intake: Patient Vitals for the past 168 hrs:   % Diet Eaten   01/15/23 1400 1 - 25%   01/15/23 1000 26 - 50%   01/14/23 1800 26 - 50%   01/14/23 1000 1 - 25%     Supplement intake: No data found. Nutrition Support: TPN: D15, 5%AA at 83 ml/hr + 250 ml 20% lipids 3x/week      Anthropometric Measures:  Height: 5' 8\" (172.7 cm)  Ideal Body Weight (IBW): 154 lbs (70 kg)     Current Body Wt:  45.5 kg (100 lb 5 oz), 65.1 % IBW.  Not specified  Current BMI (kg/m2): 15.3        Weight Adjustment: No adjustment                 BMI Category: Underweight (BMI less than 18.5)    Wt Readings from Last 10 Encounters:   01/13/23 45.5 kg (100 lb 5 oz)   01/06/23 43.8 kg (96 lb 8 oz)   11/29/22 45.5 kg (100 lb 5 oz)   09/09/22 54.4 kg (120 lb)   06/03/22 54.4 kg (120 lb)   02/04/22 52 kg (114 lb 9.6 oz)   02/02/22 48.5 kg (107 lb)   01/26/22 48.1 kg (106 lb)   09/06/21 54.3 kg (119 lb 12.8 oz)   09/02/21 55.1 kg (121 lb 8 oz)           Nutrition Diagnosis:   Severe malnutrition related to inadequate protein-energy intake as evidenced by weight loss, severe muscle loss, severe loss of subcutaneous fat, intake 26-50%    Nutrition Interventions:   Food and/or Nutrient Delivery: Continue parenteral nutrition  Nutrition Education/Counseling: No recommendations at this time  Coordination of Nutrition Care: Continue to monitor while inpatient       Goals:     Goals: other (specify)  Specify Other Goals: Meet > 85% EEN's with PN until PO diet advanced with intakes > 50% over next 5-7 days    Nutrition Monitoring and Evaluation:   Behavioral-Environmental Outcomes: None identified  Food/Nutrient Intake Outcomes: Food and nutrient intake, Supplement intake  Physical Signs/Symptoms Outcomes: Biochemical data, Constipation, Diarrhea, GI status, Weight    Discharge Planning:    Continue current diet, Continue oral nutrition supplement    Maira Julian RD  Available via [employer]

## 2023-01-16 NOTE — PROGRESS NOTES
Patient with abdominal pain, dilated small bowel, constipation, pancreatic pseudocysts. His last ct was 10 days ago. Although he had an mri yesterday. Hard to know if his pain is related to pancreatic cysts or dilated small bowel. He is passing some gas and stool. Hard to know if this is a true bowel obstruction or ileus due to pancreatic issues. Either way,  would recommend bowel rest and TPN until this settles.

## 2023-01-16 NOTE — PROGRESS NOTES
Sarita Murray [address]  [address] [facility], [address]       GI PROGRESS NOTE  Will Nate Zepeda  [phone] office  [phone] NP/PA in-hospital cell phone M-F until 4:30PM  After 5PM or on weekends, please call  for physician on call      NAME: Cindi Anderson   :  1965   MRN:  [id number]       Subjective:   Patient complains of persistent abdominal pain. No nausea or vomiting. He reports passing flatus. He is NPO. Objective:     VITALS:   Last 24hrs VS reviewed since prior progress note. Most recent are:  Visit Vitals  BP (!) 145/88   Pulse 71   Temp 98.2 °F (36.8 °C)   Resp 18   Ht 5' 8\" (1.727 m)   Wt 45.5 kg (100 lb 5 oz)   SpO2 100%   BMI 15.25 kg/m²     PHYSICAL EXAM:  General: Cooperative, no acute distress    Neurologic:  Alert and oriented  HEENT: EOMI, no scleral icterus   Lungs:  No acute respiratory distress  Heart:  S1 S2  Abdomen: Soft, non-distended, mild generalized tenderness, no guarding, no rebound. + Bowel sounds.    Extremities: Warm  Psych:   Not anxious or agitated    Lab Data Reviewed:     Recent Results (from the past 24 hour(s))   METABOLIC PANEL, BASIC    Collection Time: 23  1:56 AM   Result Value Ref Range    Sodium 138 136 - 145 mmol/L    Potassium 3.9 3.5 - 5.1 mmol/L    Chloride 103 97 - 108 mmol/L    CO2 35 (H) 21 - 32 mmol/L    Anion gap 0 (L) 5 - 15 mmol/L    Glucose 91 65 - 100 mg/dL    BUN 6 6 - 20 MG/DL    Creatinine 0.51 (L) 0.70 - 1.30 MG/DL    BUN/Creatinine ratio 12 12 - 20      eGFR >60 >60 ml/min/1.73m2    Calcium 8.6 8.5 - 10.1 MG/DL   CBC WITH AUTOMATED DIFF    Collection Time: 23  1:56 AM   Result Value Ref Range    WBC 5.9 4.1 - 11.1 K/uL    RBC 3.01 (L) 4.10 - 5.70 M/uL    HGB 8.9 (L) 12.1 - 17.0 g/dL    HCT 26.9 (L) 36.6 - 50.3 %    MCV 89.4 80.0 - 99.0 FL    MCH 29.6 26.0 - 34.0 PG    MCHC 33.1 30.0 - 36.5 g/dL    RDW 15.5 (H) 11.5 - 14.5 %    PLATELET 515 124 - 687 K/uL    MPV 10.6 8.9 - 12.9 FL    NRBC 0.0 0  WBC    ABSOLUTE NRBC 0.00 0.00 - 0.01 K/uL    NEUTROPHILS 57 32 - 75 %    LYMPHOCYTES 28 12 - 49 %    MONOCYTES 11 5 - 13 %    EOSINOPHILS 2 0 - 7 %    BASOPHILS 1 0 - 1 %    IMMATURE GRANULOCYTES 1 (H) 0.0 - 0.5 %    ABS. NEUTROPHILS 3.4 1.8 - 8.0 K/UL    ABS. LYMPHOCYTES 1.7 0.8 - 3.5 K/UL    ABS. MONOCYTES 0.7 0.0 - 1.0 K/UL    ABS. EOSINOPHILS 0.1 0.0 - 0.4 K/UL    ABS. BASOPHILS 0.0 0.0 - 0.1 K/UL    ABS. IMM. GRANS. 0.0 0.00 - 0.04 K/UL    DF AUTOMATED     CEA    Collection Time: 01/16/23  1:56 AM   Result Value Ref Range    CEA 6.0 ng/mL     Assessment:     Chronic pancreatitis with cysts: CA 19-9 normal. MRI/MRCP (1/14/23): multiple cystic lesions of the pancreas likely reflecting pseudocysts with no MRI evidence of underlying malignancy, follow-up after clinical improvement is recommended; small bowel obstruction pattern redemonstrated. Chronic abdominal pain  Constipation/diarrhea: stool WBC 5-10, negative enteric bacteria panel. Exocrine pancreatic insufficiency   ESBL urinary tract infection  Recurrent small bowel obstruction: (June, September, November, and January). History of rectal cancer status post resection/radiation  Malnutrition and weight loss     Patient Active Problem List   Diagnosis Code    Seizure ([facility] R56.9    Alcohol abuse F10.10    Drug abuse ([facility] [address] F19.10    Rectal cancer ([facility] C20    Pancreatitis K85.90    Acute pancreatitis K85.90    Hydronephrosis, bilateral N13.30    Hematuria R31.9    Small bowel obstruction ([facility] K56.609    SBO (small bowel obstruction) ([facility] [address] [facility] K56.609    UTI due to extended-spectrum beta lactamase (ESBL) producing Escherichia coli N39.0, B96.29, Z16.12    Severe protein-calorie malnutrition ([facility] E43     Plan: On Zenpep  Supportive measures  Colorectal surgery following  Discussed with Dr. Leopold Commodore - MRI/MRCP with findings consistent with chronic pancreatitis and pseudocysts. No benefit for EUS at this time.  Plan for outpatient follow up/imaging with primary gastroenterologist, Dr. Maritza Yanes. We will sign off and be available again as needed. Please call us with any questions. Thank you.       Signed By: GERI Edmond     1/16/2023  11:45 AM       Agree with above  SBO management per surgery   Will follow as needed

## 2023-01-16 NOTE — CONSULTS
[facility]  Inpatient Consultation        Admit Date: 1/13/2023  Reason for Consultation: SBO    HPI:  Kelly Saleem is a 59 y.o. male w/ hx of rectal cancer, s/p resection and radiation, pancreatic pseudocyst whom we are asked to see in consultation by Theresa Alejandro NP for the above complaint. Pt was admitted to [address] [place] [address] w/ sb on 1/5. He was then transferred to [facility] on 1/10 for further management of pancreatic pseudocysts. His sbo had resolved when he got here and was tolerating a regular diet. Yesterday he started having some pain and distention. He had an MRI to better characterize the pancreatic cysts and recurrent SBO was seen. He has had recurrence of SBO many times this past year. He just had a small BM and is passing gas. He has some abd pain. No n/v. MRI 1/15  1. Multiple cystic lesions of the pancreas likely reflecting pseudocyst with no  MRI evidence of underlying malignancy. Follow-up after clinical improvement is  recommended. 2. Small bowel obstruction pattern redemonstrated. 3. Small abdominal free fluid. Patient Active Problem List    Diagnosis Date Noted    UTI due to extended-spectrum beta lactamase (ESBL) producing Escherichia coli 01/13/2023    Severe protein-calorie malnutrition ([address] 01/13/2023    SBO (small bowel obstruction) ([address] 11/29/2022    Small bowel obstruction ([address] 09/09/2022    Hematuria 09/01/2021    Hydronephrosis, bilateral 08/31/2021    Pancreatitis 12/20/2020    Acute pancreatitis 12/20/2020    Seizure ([address] 12/31/2018    Alcohol abuse 12/31/2018    Drug abuse ([address] 12/31/2018    Rectal cancer ([address] 12/31/2018     Past Medical History:   Diagnosis Date    Cancer ([address]     rectal    Gastrointestinal disorder     Gastric Ulcers;  Rectal CA    Hematuria 9/1/2021    Hypertension       Past Surgical History:   Procedure Laterality Date    HX GI        Social History     Tobacco Use    Smoking status: Every Day     Packs/day: 0.25 Types: Cigarettes    Smokeless tobacco: Never    Tobacco comments:     \"1 pack last me three days\"   Substance Use Topics    Alcohol use: Not Currently     Comment: hasn't drank x3mo (6/3/22)      Family History   Problem Relation Age of Onset    Cancer Brother       Prior to Admission medications    Medication Sig Start Date End Date Taking? Authorizing Provider   folic acid (FOLVITE) 1 mg tablet Take 1 mg by mouth daily. 11/3/22  Yes Provider, Historical   aspirin delayed-release 81 mg tablet Take 81 mg by mouth daily. 11/3/22  Yes Provider, Historical   ferrous sulfate (IRON) 325 mg (65 mg iron) EC tablet Take 325 mg by mouth daily. 11/3/22  Yes Provider, Historical   melatonin 1 mg tablet Take 1 mg by mouth daily. 5/28/21  Yes Provider, Historical   oxyCODONE IR (ROXICODONE) 5 mg immediate release tablet Take 5 mg by mouth every six (6) hours as needed for Pain. Yes Provider, Historical   tamsulosin (FLOMAX) 0.4 mg capsule Take 1 Capsule by mouth daily. 12/4/22  Yes Ion Marquez MD   lactobacillus combination no.4 3 billion cell cap Take 1 Capsule by mouth daily. 12/4/22  Yes Ion Marquez MD   metoprolol tartrate (LOPRESSOR) 25 mg tablet Take 0.5 Tablets by mouth two (2) times a day. 2/5/22  Yes Tres Ashby MD   finasteride (PROSCAR) 5 mg tablet Take 5 mg by mouth daily. Yes Provider, Historical   mirtazapine (REMERON) 15 mg tablet Take 15 mg by mouth nightly. Yes Provider, Historical   thiamine mononitrate (B-1) 100 mg tablet Take 1 Tablet by mouth daily. 12/4/22   Ion Marquez MD   folic acid (FOLVITE) 1 mg tablet Take 1 Tablet by mouth daily.  9/9/21   Twin Anthony NP     Current Facility-Administered Medications   Medication Dose Route Frequency    TPN ADULT - CENTRAL AA 5% D15% W/ ELECTROLYTES AND CA   IntraVENous CONTINUOUS    fat emulsion 20% (LIPOSYN, INTRAlipid) infusion 250 mL  250 mL IntraVENous Q MON, WED & FRI    acetaminophen (TYLENOL) tablet 1,000 mg  1,000 mg Oral Q6H PRN    dextrose 10% infusion 0-250 mL  0-250 mL IntraVENous PRN    dicyclomine (BENTYL) capsule 10 mg  10 mg Oral TID PRN    finasteride (PROSCAR) tablet 5 mg  5 mg Oral DAILY    folic acid tablet 1 mg  1 mg Oral DAILY    glucagon (GLUCAGEN) injection 1 mg  1 mg IntraMUSCular PRN    glucose chewable tablet 16 g  4 Tablet Oral PRN    heparin (porcine) pf 500 Units  500 Units InterCATHeter PRN    L.acidophilus-paracasei-S.thermophil-bifidobacter (RISAQUAD) 8 billion cell capsule  1 Capsule Oral DAILY    [Held by provider] lipase-protease-amylase (ZENPEP 20,000) capsule 1 Capsule  1 Capsule Oral TID WITH MEALS    metoprolol tartrate (LOPRESSOR) tablet 12.5 mg  12.5 mg Oral BID    mirtazapine (REMERON) tablet 15 mg  15 mg Oral QHS    naloxone (NARCAN) injection 0.4 mg  0.4 mg IntraVENous EVERY 2 MINUTES AS NEEDED    ondansetron (ZOFRAN ODT) tablet 4 mg  4 mg Oral Q8H PRN    oxyCODONE IR (ROXICODONE) tablet 10 mg  10 mg Oral Q4H PRN    phenol throat spray (CHLORASEPTIC) 1 Spray  1 Spray Oral Q1H PRN    polyethylene glycol (MIRALAX) packet 17 g  17 g Oral DAILY PRN    sodium chloride (NS) flush 5-40 mL  5-40 mL IntraVENous Q8H    sodium chloride (NS) flush 5-40 mL  5-40 mL IntraVENous PRN    tamsulosin (FLOMAX) capsule 0.4 mg  0.4 mg Oral DAILY    thiamine HCL (B-1) tablet 100 mg  100 mg Oral DAILY    meropenem (MERREM) 1 g in 0.9% sodium chloride (MBP/ADV) 50 mL MBP  1 g IntraVENous Q8H    nicotine (NICODERM CQ) 14 mg/24 hr patch 1 Patch  1 Patch TransDERmal QHS    alteplase (CATHFLO) 1 mg in sterile water (preservative free) 1 mL injection  1 mg InterCATHeter PRN     No Known Allergies       Subjective:     Review of Systems:    A comprehensive review of systems was negative except for that written in the History of Present Illness. Objective:     Blood pressure (!) 141/83, pulse 76, temperature 98 °F (36.7 °C), resp.  rate 16, height 5' 8\" (1.727 m), weight 100 lb 5 oz (45.5 kg), SpO2 100 %. Temp (24hrs), Av °F (36.7 °C), Min:97.4 °F (36.3 °C), Max:98.2 °F (36.8 °C)      Recent Labs     23  0156 01/15/23  0211   WBC 5.9 7.2   HGB 8.9* 9.5*   HCT 26.9* 29.3*    258     Recent Labs     23  0156 01/15/23  0211    141   K 3.9 3.6    104   CO2 35* 32   GLU 91 93   BUN 6 6   CREA 0.51* 0.51*   CA 8.6 8.3*   PHOS  --  2.5*   ALB  --  2.3*   TBILI  --  0.2   ALT  --  32     No results for input(s): AML, LPSE in the last 72 hours. Intake/Output Summary (Last 24 hours) at 2023  Last data filed at 2023  Gross per 24 hour   Intake 50 ml   Output 800 ml   Net -750 ml     Date 23 0700 - 23 0659   Shift 8164-8470 8946-1430 3927-8454 24 Hour Total   INTAKE   P.O. 50   50   Shift Total(mL/kg) 50(1.1)   50(1.1)   OUTPUT   Urine(mL/kg/hr)  800  800   Shift Total(mL/kg)  800(17.6)  800(17.6)   Weight (kg) 45.5 45.5 45.5 45.5     _____________________  Physical Exam:     General:  Alert, cooperative, no distress, appears stated age. Eyes:   Sclera clear. Throat: Lips, mucosa, and tongue normal.   Neck: Supple, symmetrical, trachea midline. Lungs:   Clear to auscultation bilaterally. Heart:  Regular rate and rhythm. Abdomen:   Normal BS, flat, Soft, +periumbilical tenderness, No masses,  No organomegaly. Extremities: Extremities normal, atraumatic, no cyanosis or edema. Skin: Skin color, texture, turgor normal. No rashes or lesions. Assessment:   Active Problems:    Small bowel obstruction ([address] (2022)      UTI due to extended-spectrum beta lactamase (ESBL) producing Escherichia coli (2023)      Severe protein-calorie malnutrition ([facility] (2023)          Plan:     Would allow sips of clears  Cont tpn  KUB tomorrow  If sbo sx cont to resolve would start a bowel regimen as he has lg amt stool in colon    Thank you for allowing us to participate in the care of this patient.      Total time spent with patient: 30 minutes. Signed By: Nikole Wolff MD     January 16, 2023        Attending addendum:  Patient seen and examined independently, agree with above note. Small bowel obstruction (HCC)  Complicated medical and surgical history. SBO, but appears to be resolving. Sips ok. KUB in AM to follow hopefully this will resolve as has happened previously. Once resolved, bowel regiment.

## 2023-01-16 NOTE — PROGRESS NOTES
[address] [facility]  Hospitalist Group                                                                                          Hospitalist Progress Note  Colette Bowers NP  Answering service: [phone] from in house phone        Date of Service:  2023  NAME:  Evette James  :  1965  MRN:  [id number]      Admission Summary:   Evette James is a 59 y.o. male tree of rectal cancer status postresection, hypertension, peptic ulcer disease, BPH, and mood disorder who presents with abdominal pain, and is being admitted for SBO. Note, he was from [facility] where he was admitted on  for recurrent SBO. Sigmoidoscopy there showed concern for rectal stricture. He is being transferred to [facility] for GI evaluation for upper GI. He has been hemodynamically stable. Interval history / Subjective:      Appreciate CRS rec's  Remains NPO, starting TPN/lipids  Abd pain is constant, nausea better today  GI to follow up, pt continues to ask about EGD, defer to GI    Assessment & Plan:     Abdominal pain 2/2 Recurrent small bowel obstruction   - 1/15 already evaluated by Surgery at [facility], consult colorectal surgeon  - 1/15 MRCP: SBO pattern redemonstrated, multiple cystic lesions of the pancreas likley reflecting pseudocyst w/ no MRI evidence of underlying malignancy.  Small abdominal free fluid    History of rectal cancer s/p resection/radiation  History of gastric ulcer  Concern for rectal stricture  -CT abdomen pelvis chest on admission with small bowel obstruction  - Flexoid sigmoidoscopy [place] Dr. Tatum Turner showed possible rectal stricture  -Stool WBC and enteric bacteria PCR checked  -GI following    Severe Malnutrition in the setting of chronic illness  - greater than 7.5% weigh loss in 3 months, severe body fat loss, severe muscle mass loss  - regular diet, ensure enlive TID, snacks BID  -  npo, start tpn/lipids     Suspected ESBL UTI   - UA w/ bacteria, Nitrites and leukoesterase , wbc and RBC   - 1/14  ecoli esbl and proteus  - cw Merrem (1/10 630pm day 1), stop date 1/17     PVC  -EKG recurrent PVCs  -Keep K above 4 mag above 2  -On metoprolol 12.5 mg twice daily     Chronic urinary retention s/p Marrero  -Changed on 1/10/2022 at [place] finasteride and Flomax     Acute on Chronic Pancreatitis  Pancreatic Anomaly  -scheduled for egd w/ EUS with GI as outpt but unable to make this appointment  -cannot r/o pancreatic cancer with recent ct  -continue zenpep      Hx of Polysubstance abuse     Code status: Full  Prophylaxis: [address] discussed with: patient, nurse, CM, GI  Anticipated Disposition: > 48 hours     Hospital Problems  Date Reviewed: 1/6/2023            Codes Class Noted POA    UTI due to extended-spectrum beta lactamase (ESBL) producing Escherichia coli ICD-10-CM: N39.0, B96.29, Z16.12  ICD-9-CM: 599.0, 041.49, V09.1  1/13/2023 Unknown        Severe protein-calorie malnutrition ([facility] (Chronic) ICD-10-CM: Y25  ICD-9-CM: 663  1/13/2023 Yes        Small bowel obstruction ([facility] ICD-10-CM: Y42.702  ICD-9-CM: 560.9  9/9/2022 Unknown         Review of Systems:   A comprehensive review of systems was negative except for that written in the HPI. And in interval hx      Vital Signs:    Last 24hrs VS reviewed since prior progress note. Most recent are:  Visit Vitals  BP (!) 145/88   Pulse 71   Temp 98.2 °F (36.8 °C)   Resp 18   Ht 5' 8\" (1.727 m)   Wt 45.5 kg (100 lb 5 oz)   SpO2 100%   BMI 15.25 kg/m²         Intake/Output Summary (Last 24 hours) at 1/16/2023 0809  Last data filed at 1/15/2023 1400  Gross per 24 hour   Intake 400 ml   Output --   Net 400 ml          Physical Examination:     I had a face to face encounter with this patient and independently examined them on 1/16/2023 as outlined below:          Constitutional:  No acute distress, cooperative, pleasant.  Cachetic, frail appearing   ENT:  Oral mucosa moist, oropharynx benign. Resp:  CTA bilaterally. No wheezing/rhonchi/rales. No accessory muscle use. CV:  Regular rhythm, normal rate, no murmurs, gallops, rubs    GI:  Soft, non distended, non tender. normoactive bowel sounds, no hepatosplenomegaly   : Marrero to gravity drainage    Musculoskeletal:    No edema, warm, 2+ pulses throughout    Neurologic:  Moves all extremities. AAOx3, CN II-XII reviewed            Data Review:    Review and/or order of clinical lab test  Review and/or order of tests in the radiology section of [facility]      Labs:     Recent Labs     01/16/23  0156 01/15/23  0211   WBC 5.9 7.2   HGB 8.9* 9.5*   HCT 26.9* 29.3*    258       Recent Labs     01/16/23  0156 01/15/23  0211    141   K 3.9 3.6    104   CO2 35* 32   BUN 6 6   CREA 0.51* 0.51*   GLU 91 93   CA 8.6 8.3*   PHOS  --  2.5*       Recent Labs     01/15/23  0211   ALT 32   AP 91   TBILI 0.2   TP 6.4   ALB 2.3*   GLOB 4.1*       No results for input(s): INR, PTP, APTT, INREXT, INREXT in the last 72 hours. No results for input(s): FE, TIBC, PSAT, FERR in the last 72 hours. Lab Results   Component Value Date/Time    Folate 41.4 (H) 09/11/2022 10:49 AM      No results for input(s): PH, PCO2, PO2 in the last 72 hours. No results for input(s): CPK, CKNDX, TROIQ in the last 72 hours.     No lab exists for component: CPKMB  Lab Results   Component Value Date/Time    Cholesterol, total 147 01/02/2019 04:00 AM    HDL Cholesterol 66 01/02/2019 04:00 AM    LDL, calculated 65.6 01/02/2019 04:00 AM    Triglyceride 77 01/02/2019 04:00 AM    CHOL/HDL Ratio 2.2 01/02/2019 04:00 AM     Lab Results   Component Value Date/Time    Glucose (POC) 80 01/07/2023 03:20 PM    Glucose (POC) 64 (L) 01/07/2023 11:26 AM    Glucose (POC) 62 (L) 01/07/2023 08:49 AM    Glucose (POC) 64 (L) 01/07/2023 08:46 AM    Glucose (POC) 202 (H) 01/06/2023 10:21 PM     Lab Results   Component Value Date/Time    Color DARK YELLOW 01/10/2023 03:52 PM    Appearance TURBID (A) 01/10/2023 03:52 PM    Specific gravity 1.025 01/10/2023 03:52 PM    Specific gravity 1.010 09/03/2021 10:01 PM    pH (UA) 8.5 (H) 01/10/2023 03:52 PM    Protein 300 (A) 01/10/2023 03:52 PM    Glucose Negative 01/10/2023 03:52 PM    Ketone Negative 01/10/2023 03:52 PM    Bilirubin Negative 01/10/2023 03:52 PM    Urobilinogen 1.0 01/10/2023 03:52 PM    Nitrites Positive (A) 01/10/2023 03:52 PM    Leukocyte Esterase LARGE (A) 01/10/2023 03:52 PM    Epithelial cells FEW 01/10/2023 03:52 PM    Bacteria 2+ (A) 01/10/2023 03:52 PM    WBC 20-50 01/10/2023 03:52 PM    RBC 20-50 01/10/2023 03:52 PM         Medications Reviewed:     Current Facility-Administered Medications   Medication Dose Route Frequency    TPN ADULT - CENTRAL AA 5% D15% W/ ELECTROLYTES AND CA   IntraVENous CONTINUOUS    fat emulsion 20% (LIPOSYN, INTRAlipid) infusion 250 mL  250 mL IntraVENous Q MON, WED & FRI    acetaminophen (TYLENOL) tablet 1,000 mg  1,000 mg Oral Q6H PRN    dextrose 10% infusion 0-250 mL  0-250 mL IntraVENous PRN    dicyclomine (BENTYL) capsule 10 mg  10 mg Oral TID PRN    finasteride (PROSCAR) tablet 5 mg  5 mg Oral DAILY    folic acid tablet 1 mg  1 mg Oral DAILY    glucagon (GLUCAGEN) injection 1 mg  1 mg IntraMUSCular PRN    glucose chewable tablet 16 g  4 Tablet Oral PRN    heparin (porcine) pf 500 Units  500 Units InterCATHeter PRN    L.acidophilus-paracasei-S.thermophil-bifidobacter (RISAQUAD) 8 billion cell capsule  1 Capsule Oral DAILY    [Held by provider] lipase-protease-amylase (ZENPEP 20,000) capsule 1 Capsule  1 Capsule Oral TID WITH MEALS    metoprolol tartrate (LOPRESSOR) tablet 12.5 mg  12.5 mg Oral BID    mirtazapine (REMERON) tablet 15 mg  15 mg Oral QHS    naloxone (NARCAN) injection 0.4 mg  0.4 mg IntraVENous EVERY 2 MINUTES AS NEEDED    ondansetron (ZOFRAN ODT) tablet 4 mg  4 mg Oral Q8H PRN    oxyCODONE IR (ROXICODONE) tablet 10 mg  10 mg Oral Q4H PRN    phenol throat spray (CHLORASEPTIC) 1 Spray  1 Spray Oral Q1H PRN    polyethylene glycol (MIRALAX) packet 17 g  17 g Oral DAILY PRN    sodium chloride (NS) flush 5-40 mL  5-40 mL IntraVENous Q8H    sodium chloride (NS) flush 5-40 mL  5-40 mL IntraVENous PRN    tamsulosin (FLOMAX) capsule 0.4 mg  0.4 mg Oral DAILY    thiamine HCL (B-1) tablet 100 mg  100 mg Oral DAILY    meropenem (MERREM) 1 g in 0.9% sodium chloride (MBP/ADV) 50 mL MBP  1 g IntraVENous Q8H    nicotine (NICODERM CQ) 14 mg/24 hr patch 1 Patch  1 Patch TransDERmal QHS    alteplase (CATHFLO) 1 mg in sterile water (preservative free) 1 mL injection  1 mg InterCATHeter PRN     ______________________________________________________________________  EXPECTED LENGTH OF STAY: 3d 12h  ACTUAL LENGTH OF STAY:          3                 Lilian Cha V, NP

## 2023-01-17 ENCOUNTER — APPOINTMENT (OUTPATIENT)
Dept: GENERAL RADIOLOGY | Age: 58
End: 2023-01-17
Attending: NURSE PRACTITIONER
Payer: MEDICARE

## 2023-01-17 LAB
ALBUMIN SERPL-MCNC: 2.1 G/DL (ref 3.5–5)
ALBUMIN/GLOB SERPL: 0.6 (ref 1.1–2.2)
ALP SERPL-CCNC: 97 U/L (ref 45–117)
ALT SERPL-CCNC: 39 U/L (ref 12–78)
ANION GAP SERPL CALC-SCNC: 4 MMOL/L (ref 5–15)
AST SERPL-CCNC: 40 U/L (ref 15–37)
BASOPHILS # BLD: 0 K/UL (ref 0–0.1)
BASOPHILS NFR BLD: 0 % (ref 0–1)
BILIRUB SERPL-MCNC: 0.2 MG/DL (ref 0.2–1)
BUN SERPL-MCNC: 13 MG/DL (ref 6–20)
BUN/CREAT SERPL: 23 (ref 12–20)
CALCIUM SERPL-MCNC: 8.1 MG/DL (ref 8.5–10.1)
CHLORIDE SERPL-SCNC: 102 MMOL/L (ref 97–108)
CO2 SERPL-SCNC: 31 MMOL/L (ref 21–32)
CREAT SERPL-MCNC: 0.57 MG/DL (ref 0.7–1.3)
DIFFERENTIAL METHOD BLD: ABNORMAL
EOSINOPHIL # BLD: 0.1 K/UL (ref 0–0.4)
EOSINOPHIL NFR BLD: 1 % (ref 0–7)
ERYTHROCYTE [DISTWIDTH] IN BLOOD BY AUTOMATED COUNT: 15.8 % (ref 11.5–14.5)
GLOBULIN SER CALC-MCNC: 3.6 G/DL (ref 2–4)
GLUCOSE BLD STRIP.AUTO-MCNC: 136 MG/DL (ref 65–117)
GLUCOSE BLD STRIP.AUTO-MCNC: 158 MG/DL (ref 65–117)
GLUCOSE SERPL-MCNC: 158 MG/DL (ref 65–100)
HCT VFR BLD AUTO: 28 % (ref 36.6–50.3)
HGB BLD-MCNC: 9.2 G/DL (ref 12.1–17)
IMM GRANULOCYTES # BLD AUTO: 0 K/UL (ref 0–0.04)
IMM GRANULOCYTES NFR BLD AUTO: 0 % (ref 0–0.5)
LYMPHOCYTES # BLD: 2.3 K/UL (ref 0.8–3.5)
LYMPHOCYTES NFR BLD: 28 % (ref 12–49)
MAGNESIUM SERPL-MCNC: 2.1 MG/DL (ref 1.6–2.4)
MCH RBC QN AUTO: 30.5 PG (ref 26–34)
MCHC RBC AUTO-ENTMCNC: 32.9 G/DL (ref 30–36.5)
MCV RBC AUTO: 92.7 FL (ref 80–99)
MONOCYTES # BLD: 0.7 K/UL (ref 0–1)
MONOCYTES NFR BLD: 9 % (ref 5–13)
NEUTS SEG # BLD: 5 K/UL (ref 1.8–8)
NEUTS SEG NFR BLD: 62 % (ref 32–75)
NRBC # BLD: 0 K/UL (ref 0–0.01)
NRBC BLD-RTO: 0 PER 100 WBC
PHOSPHATE SERPL-MCNC: 2.3 MG/DL (ref 2.6–4.7)
PLATELET # BLD AUTO: 248 K/UL (ref 150–400)
PMV BLD AUTO: 11.1 FL (ref 8.9–12.9)
POTASSIUM SERPL-SCNC: 4.3 MMOL/L (ref 3.5–5.1)
PROT SERPL-MCNC: 5.7 G/DL (ref 6.4–8.2)
RBC # BLD AUTO: 3.02 M/UL (ref 4.1–5.7)
SERVICE CMNT-IMP: ABNORMAL
SERVICE CMNT-IMP: ABNORMAL
SODIUM SERPL-SCNC: 137 MMOL/L (ref 136–145)
TRIGL SERPL-MCNC: 458 MG/DL (ref ?–150)
WBC # BLD AUTO: 8.2 K/UL (ref 4.1–11.1)

## 2023-01-17 PROCEDURE — 85025 COMPLETE CBC W/AUTO DIFF WBC: CPT

## 2023-01-17 PROCEDURE — 84100 ASSAY OF PHOSPHORUS: CPT

## 2023-01-17 PROCEDURE — 74011000258 HC RX REV CODE- 258: Performed by: NURSE PRACTITIONER

## 2023-01-17 PROCEDURE — 74011250637 HC RX REV CODE- 250/637: Performed by: STUDENT IN AN ORGANIZED HEALTH CARE EDUCATION/TRAINING PROGRAM

## 2023-01-17 PROCEDURE — 74018 RADEX ABDOMEN 1 VIEW: CPT

## 2023-01-17 PROCEDURE — 83735 ASSAY OF MAGNESIUM: CPT

## 2023-01-17 PROCEDURE — 65270000029 HC RM PRIVATE

## 2023-01-17 PROCEDURE — 74011250636 HC RX REV CODE- 250/636: Performed by: STUDENT IN AN ORGANIZED HEALTH CARE EDUCATION/TRAINING PROGRAM

## 2023-01-17 PROCEDURE — 84478 ASSAY OF TRIGLYCERIDES: CPT

## 2023-01-17 PROCEDURE — 74011250637 HC RX REV CODE- 250/637: Performed by: NURSE PRACTITIONER

## 2023-01-17 PROCEDURE — 74011250636 HC RX REV CODE- 250/636: Performed by: NURSE PRACTITIONER

## 2023-01-17 PROCEDURE — 82962 GLUCOSE BLOOD TEST: CPT

## 2023-01-17 PROCEDURE — 80053 COMPREHEN METABOLIC PANEL: CPT

## 2023-01-17 PROCEDURE — 74011000250 HC RX REV CODE- 250: Performed by: NURSE PRACTITIONER

## 2023-01-17 PROCEDURE — 99232 SBSQ HOSP IP/OBS MODERATE 35: CPT

## 2023-01-17 RX ORDER — MORPHINE SULFATE 2 MG/ML
1 INJECTION, SOLUTION INTRAMUSCULAR; INTRAVENOUS ONCE
Status: COMPLETED | OUTPATIENT
Start: 2023-01-17 | End: 2023-01-17

## 2023-01-17 RX ORDER — AMOXICILLIN 250 MG
1 CAPSULE ORAL DAILY
Status: DISCONTINUED | OUTPATIENT
Start: 2023-01-18 | End: 2023-01-28 | Stop reason: HOSPADM

## 2023-01-17 RX ORDER — POLYETHYLENE GLYCOL 3350 17 G/17G
17 POWDER, FOR SOLUTION ORAL DAILY
Status: DISCONTINUED | OUTPATIENT
Start: 2023-01-18 | End: 2023-01-28 | Stop reason: HOSPADM

## 2023-01-17 RX ORDER — GUAIFENESIN 600 MG/1
600 TABLET, EXTENDED RELEASE ORAL EVERY 12 HOURS
Status: DISPENSED | OUTPATIENT
Start: 2023-01-17 | End: 2023-01-22

## 2023-01-17 RX ADMIN — Medication 100 MG: at 08:54

## 2023-01-17 RX ADMIN — GUAIFENESIN 600 MG: 600 TABLET, EXTENDED RELEASE ORAL at 10:57

## 2023-01-17 RX ADMIN — Medication 1 MG: at 08:54

## 2023-01-17 RX ADMIN — METOPROLOL TARTRATE 12.5 MG: 25 TABLET, FILM COATED ORAL at 08:54

## 2023-01-17 RX ADMIN — TAMSULOSIN HYDROCHLORIDE 0.4 MG: 0.4 CAPSULE ORAL at 08:54

## 2023-01-17 RX ADMIN — MORPHINE SULFATE 1 MG: 2 INJECTION, SOLUTION INTRAMUSCULAR; INTRAVENOUS at 14:50

## 2023-01-17 RX ADMIN — OXYCODONE HYDROCHLORIDE 10 MG: 5 TABLET ORAL at 02:07

## 2023-01-17 RX ADMIN — Medication 1 CAPSULE: at 08:54

## 2023-01-17 RX ADMIN — FINASTERIDE 5 MG: 5 TABLET, FILM COATED ORAL at 08:54

## 2023-01-17 RX ADMIN — ONDANSETRON 4 MG: 4 TABLET, ORALLY DISINTEGRATING ORAL at 14:39

## 2023-01-17 RX ADMIN — OXYCODONE HYDROCHLORIDE 10 MG: 5 TABLET ORAL at 19:14

## 2023-01-17 RX ADMIN — MIRTAZAPINE 15 MG: 15 TABLET, FILM COATED ORAL at 22:11

## 2023-01-17 RX ADMIN — OXYCODONE HYDROCHLORIDE 10 MG: 5 TABLET ORAL at 10:11

## 2023-01-17 RX ADMIN — MEROPENEM 1 G: 1 INJECTION, POWDER, FOR SOLUTION INTRAVENOUS at 02:07

## 2023-01-17 RX ADMIN — MEROPENEM 1 G: 1 INJECTION, POWDER, FOR SOLUTION INTRAVENOUS at 10:11

## 2023-01-17 RX ADMIN — GUAIFENESIN 600 MG: 600 TABLET, EXTENDED RELEASE ORAL at 22:11

## 2023-01-17 RX ADMIN — MEROPENEM 1 G: 1 INJECTION, POWDER, FOR SOLUTION INTRAVENOUS at 19:15

## 2023-01-17 RX ADMIN — LEUCINE, PHENYLALANINE, LYSINE, METHIONINE, ISOLEUCINE, VALINE, HISTIDINE, THREONINE, TRYPTOPHAN, ALANINE, GLYCINE, ARGININE, PROLINE, SERINE, TYROSINE, SODIUM ACETATE, DIBASIC POTASSIUM PHOSPHATE, MAGNESIUM CHLORIDE, SODIUM CHLORIDE, CALCIUM CHLORIDE, DEXTROSE
365; 280; 290; 200; 300; 290; 240; 210; 90; 1035; 515; 575; 340; 250; 20; 340; 261; 51; 59; 33; 15 INJECTION INTRAVENOUS at 19:16

## 2023-01-17 RX ADMIN — OXYCODONE HYDROCHLORIDE 10 MG: 5 TABLET ORAL at 06:10

## 2023-01-17 RX ADMIN — METOPROLOL TARTRATE 12.5 MG: 25 TABLET, FILM COATED ORAL at 19:14

## 2023-01-17 NOTE — PROGRESS NOTES
Patient being followed by gen surg. Will defer to their management since they have been involved in this patient's care for a while.

## 2023-01-17 NOTE — PROGRESS NOTES
[address] [facility] Adult  Hospitalist Group                                                                                          Hospitalist Progress Note  Jovany Patrick NP  Answering service: [phone] OR 36 from in house phone        Date of Service:  2023  NAME:  Mónica Ba  :  1965  MRN:  [id number]      Admission Summary:   Mónica Ba is a 59 y.o. male tree of rectal cancer status postresection, hypertension, peptic ulcer disease, BPH, and mood disorder who presents with abdominal pain, and is being admitted for SBO. Note, he was from [facility] where he was admitted on  for recurrent SBO. Sigmoidoscopy there showed concern for rectal stricture. He is being transferred to [facility] for GI evaluation for upper GI. He has been hemodynamically stable. Interval history / Subjective:      Remains NPO, having sips of water  Renewed TPN  KUB w/ diffuse small bowel distension  Abd pain is somewhat improved  C/o chest congestion, requesting mucinex, brought some phlegm one time, unknown color    Assessment & Plan:     Abdominal pain 2/2 Recurrent small bowel obstruction   - 1/15 already evaluated by Surgery at [facility], consult colorectal surgeon  - 1/15 MRCP: SBO pattern redemonstrated, multiple cystic lesions of the pancreas likley reflecting pseudocyst w/ no MRI evidence of underlying malignancy.  Small abdominal free fluid    History of rectal cancer s/p resection/radiation  History of gastric ulcer  Concern for rectal stricture  -  CT abdomen pelvis chest on admission with small bowel obstruction  - Flexoid sigmoidoscopy [facility] showed possible rectal stricture  -Stool WBC and enteric bacteria PCR checked  -GI signed off, will need to follow up for EGD/colonoscopy    Severe Malnutrition in the setting of chronic illness  - greater than 7.5% weigh loss in 3 months, severe body fat loss, severe muscle mass loss  - regular diet, ensure enlive TID, snacks BID  - 1/16 npo, start tpn/lipids     ESBL UTI   - UA w/ bacteria, Nitrites and leukoesterase , wbc and RBC   - 1/14  ecoli esbl and proteus  - cw Merrem (1/10 630pm day 1), stop date 1/17     PVC  -EKG recurrent PVCs  -Keep K above 4 mag above 2  -On metoprolol 12.5 mg twice daily     Chronic urinary retention, s/p Marrero  -Changed on 1/10/2022 at [facility] and Flomax     Acute on Chronic Pancreatitis  Pancreatic Anomaly  -scheduled for egd w/ EUS with GI as outpt but unable to make this appointment  -cannot r/o pancreatic cancer with recent ct  -continue zenpep when eating again     Hx of Polysubstance abuse     Code status: Full  Prophylaxis: [address] discussed with: patient, nurse, CM, surgery  Anticipated Disposition: > 48 hours     Hospital Problems  Date Reviewed: 1/6/2023            Codes Class Noted POA    UTI due to extended-spectrum beta lactamase (ESBL) producing Escherichia coli ICD-10-CM: N39.0, B96.29, Z16.12  ICD-9-CM: 599.0, 041.49, V09.1  1/13/2023 Unknown        Severe protein-calorie malnutrition ([facility] [address] (Chronic) ICD-10-CM: O29  ICD-9-CM: 665  1/13/2023 Yes        Small bowel obstruction ([facility] [address] ICD-10-CM: J70.031  ICD-9-CM: 560.9  9/9/2022 Yes       Review of Systems:   A comprehensive review of systems was negative except for that written in the HPI. And in interval hx      Vital Signs:    Last 24hrs VS reviewed since prior progress note.  Most recent are:  Visit Vitals  BP (!) 154/91   Pulse 94   Temp 98.2 °F (36.8 °C)   Resp 16   Ht 5' 8\" (1.727 m)   Wt 45.5 kg (100 lb 5 oz)   SpO2 100%   BMI 15.25 kg/m²         Intake/Output Summary (Last 24 hours) at 1/17/2023 1005  Last data filed at 1/16/2023 2212  Gross per 24 hour   Intake --   Output 800 ml   Net -800 ml          Physical Examination:     I had a face to face encounter with this patient and independently examined them on 1/17/2023 as outlined below:          Constitutional:  No acute distress, cooperative, pleasant. Cachetic, frail appearing   ENT:  Oral mucosa moist, oropharynx benign. Resp:  CTA bilaterally. No wheezing/rhonchi/rales. No accessory muscle use. CV:  Regular rhythm, normal rate, no murmurs, gallops, rubs    GI:  Soft, non distended, non tender. normoactive bowel sounds, no hepatosplenomegaly   : Marrero to gravity drainage    Musculoskeletal:    No edema, warm, 2+ pulses throughout    Neurologic:  Moves all extremities. AAOx3, CN II-XII reviewed            Data Review:    Review and/or order of clinical lab test  Review and/or order of tests in the radiology section of [facility]      Labs:     Recent Labs     01/17/23 0211 01/16/23 0156   WBC 8.2 5.9   HGB 9.2* 8.9*   HCT 28.0* 26.9*    202       Recent Labs     01/17/23  0211 01/16/23  0156 01/15/23  0211    138 141   K 4.3 3.9 3.6    103 104   CO2 31 35* 32   BUN 13 6 6   CREA 0.57* 0.51* 0.51*   * 91 93   CA 8.1* 8.6 8.3*   MG 2.1  --   --    PHOS 2.3*  --  2.5*       Recent Labs     01/17/23  0211 01/15/23  0211   ALT 39 32   AP 97 91   TBILI 0.2 0.2   TP 5.7* 6.4   ALB 2.1* 2.3*   GLOB 3.6 4.1*       No results for input(s): INR, PTP, APTT, INREXT, INREXT in the last 72 hours. No results for input(s): FE, TIBC, PSAT, FERR in the last 72 hours. Lab Results   Component Value Date/Time    Folate 41.4 (H) 09/11/2022 10:49 AM      No results for input(s): PH, PCO2, PO2 in the last 72 hours. No results for input(s): CPK, CKNDX, TROIQ in the last 72 hours.     No lab exists for component: CPKMB  Lab Results   Component Value Date/Time    Cholesterol, total 147 01/02/2019 04:00 AM    HDL Cholesterol 66 01/02/2019 04:00 AM    LDL, calculated 65.6 01/02/2019 04:00 AM    Triglyceride 458 (H) 01/17/2023 02:11 AM    CHOL/HDL Ratio 2.2 01/02/2019 04:00 AM     Lab Results   Component Value Date/Time    Glucose (POC) 80 01/07/2023 03:20 PM    Glucose (POC) 64 (L) 01/07/2023 11:26 AM    Glucose (POC) 62 (L) 01/07/2023 08:49 AM    Glucose (POC) 64 (L) 01/07/2023 08:46 AM    Glucose (POC) 202 (H) 01/06/2023 10:21 PM     Lab Results   Component Value Date/Time    Color DARK YELLOW 01/10/2023 03:52 PM    Appearance TURBID (A) 01/10/2023 03:52 PM    Specific gravity 1.025 01/10/2023 03:52 PM    Specific gravity 1.010 09/03/2021 10:01 PM    pH (UA) 8.5 (H) 01/10/2023 03:52 PM    Protein 300 (A) 01/10/2023 03:52 PM    Glucose Negative 01/10/2023 03:52 PM    Ketone Negative 01/10/2023 03:52 PM    Bilirubin Negative 01/10/2023 03:52 PM    Urobilinogen 1.0 01/10/2023 03:52 PM    Nitrites Positive (A) 01/10/2023 03:52 PM    Leukocyte Esterase LARGE (A) 01/10/2023 03:52 PM    Epithelial cells FEW 01/10/2023 03:52 PM    Bacteria 2+ (A) 01/10/2023 03:52 PM    WBC 20-50 01/10/2023 03:52 PM    RBC 20-50 01/10/2023 03:52 PM         Medications Reviewed:     Current Facility-Administered Medications   Medication Dose Route Frequency    TPN ADULT - CENTRAL AA 5% D15% W/ ELECTROLYTES AND CA   IntraVENous CONTINUOUS    TPN ADULT - CENTRAL AA 5% D15% W/ ELECTROLYTES AND CA   IntraVENous CONTINUOUS    fat emulsion 20% (LIPOSYN, INTRAlipid) infusion 250 mL  250 mL IntraVENous Q MON, WED & FRI    acetaminophen (TYLENOL) tablet 1,000 mg  1,000 mg Oral Q6H PRN    dextrose 10% infusion 0-250 mL  0-250 mL IntraVENous PRN    dicyclomine (BENTYL) capsule 10 mg  10 mg Oral TID PRN    finasteride (PROSCAR) tablet 5 mg  5 mg Oral DAILY    folic acid tablet 1 mg  1 mg Oral DAILY    glucagon (GLUCAGEN) injection 1 mg  1 mg IntraMUSCular PRN    glucose chewable tablet 16 g  4 Tablet Oral PRN    heparin (porcine) pf 500 Units  500 Units InterCATHeter PRN    L.acidophilus-paracasei-S.thermophil-bifidobacter (RISAQUAD) 8 billion cell capsule  1 Capsule Oral DAILY    [Held by provider] lipase-protease-amylase (ZENPEP 20,000) capsule 1 Capsule  1 Capsule Oral TID WITH MEALS    metoprolol tartrate (LOPRESSOR) tablet 12.5 mg  12.5 mg Oral BID    mirtazapine (REMERON) tablet 15 mg  15 mg Oral QHS    naloxone (NARCAN) injection 0.4 mg  0.4 mg IntraVENous EVERY 2 MINUTES AS NEEDED    ondansetron (ZOFRAN ODT) tablet 4 mg  4 mg Oral Q8H PRN    oxyCODONE IR (ROXICODONE) tablet 10 mg  10 mg Oral Q4H PRN    phenol throat spray (CHLORASEPTIC) 1 Spray  1 Spray Oral Q1H PRN    polyethylene glycol (MIRALAX) packet 17 g  17 g Oral DAILY PRN    sodium chloride (NS) flush 5-40 mL  5-40 mL IntraVENous Q8H    sodium chloride (NS) flush 5-40 mL  5-40 mL IntraVENous PRN    tamsulosin (FLOMAX) capsule 0.4 mg  0.4 mg Oral DAILY    thiamine HCL (B-1) tablet 100 mg  100 mg Oral DAILY    meropenem (MERREM) 1 g in 0.9% sodium chloride (MBP/ADV) 50 mL MBP  1 g IntraVENous Q8H    nicotine (NICODERM CQ) 14 mg/24 hr patch 1 Patch  1 Patch TransDERmal QHS    alteplase (CATHFLO) 1 mg in sterile water (preservative free) 1 mL injection  1 mg InterCATHeter PRN     ______________________________________________________________________  EXPECTED LENGTH OF STAY: 3d 12h  ACTUAL LENGTH OF STAY:          4                 Lilian Cha V NP

## 2023-01-17 NOTE — PROGRESS NOTES
General Surgery Daily Progress Note    Admit Date: 2023  Post-Operative Day: * No surgery found * from * No surgery found *     Subjective:   CC: SBO    Last 24 hrs:  patient reports he had soft moderate sized BM last night and his needs to sit on commode again for another BM. Reports he has some mild \"soreness just below the naval\", but has improved. Tolerating clear liquids without N/V.  KUB results noted below    KUB  Diffuse small bowel distention, seemingly improved compared to prior  CT. Objective:     Blood pressure (!) 153/89, pulse 85, temperature 97.3 °F (36.3 °C), resp. rate 16, height 5' 8\" (1.727 m), weight 121 lb 0.5 oz (54.9 kg), SpO2 100 %. Temp (24hrs), Av.9 °F (36.6 °C), Min:97.3 °F (36.3 °C), Max:98.2 °F (36.8 °C)      _____________________  Physical Exam:     Alert and cooperative, in no acute distress. Cardiovascular: RRR, No peripheral edema  Lungs: unlabored  Abdomen: soft, flat, +BS, mild periumbilical TTP, no guarding or rebound     Data Review:    Recent Labs     23  0156   WBC 8.2 5.9   HGB 9.2* 8.9*   HCT 28.0* 26.9*    202     Recent Labs     236    138   K 4.3 3.9    103   CO2 31 35*   * 91   BUN 13 6   CREA 0.57* 0.51*   CA 8.1* 8.6   MG 2.1  --    PHOS 2.3*  --    ALB 2.1*  --    ALT 39  --      No results for input(s): AML, LPSE in the last 72 hours.         ______________________  Medications:    Current Facility-Administered Medications   Medication Dose Route Frequency    meropenem (MERREM) 1 g in 0.9% sodium chloride (MBP/ADV) 50 mL MBP  1 g IntraVENous Q8H    TPN ADULT - CENTRAL AA 5% D15% W/ ELECTROLYTES AND CA   IntraVENous CONTINUOUS    TPN ADULT - CENTRAL AA 5% D15% W/ ELECTROLYTES AND CA   IntraVENous CONTINUOUS    guaiFENesin ER (MUCINEX) tablet 600 mg  600 mg Oral Q12H    senna-docusate (PERICOLACE) 8.6-50 mg per tablet 1 Tablet  1 Tablet Oral DAILY    polyethylene glycol (MIRALAX) packet 17 g  17 g Oral DAILY    fat emulsion 20% (LIPOSYN, INTRAlipid) infusion 250 mL  250 mL IntraVENous Q MON, WED & FRI    acetaminophen (TYLENOL) tablet 1,000 mg  1,000 mg Oral Q6H PRN    dextrose 10% infusion 0-250 mL  0-250 mL IntraVENous PRN    dicyclomine (BENTYL) capsule 10 mg  10 mg Oral TID PRN    finasteride (PROSCAR) tablet 5 mg  5 mg Oral DAILY    folic acid tablet 1 mg  1 mg Oral DAILY    glucagon (GLUCAGEN) injection 1 mg  1 mg IntraMUSCular PRN    glucose chewable tablet 16 g  4 Tablet Oral PRN    heparin (porcine) pf 500 Units  500 Units InterCATHeter PRN    L.acidophilus-paracasei-S.thermophil-bifidobacter (RISAQUAD) 8 billion cell capsule  1 Capsule Oral DAILY    [Held by provider] lipase-protease-amylase (ZENPEP 20,000) capsule 1 Capsule  1 Capsule Oral TID WITH MEALS    metoprolol tartrate (LOPRESSOR) tablet 12.5 mg  12.5 mg Oral BID    mirtazapine (REMERON) tablet 15 mg  15 mg Oral QHS    naloxone (NARCAN) injection 0.4 mg  0.4 mg IntraVENous EVERY 2 MINUTES AS NEEDED    ondansetron (ZOFRAN ODT) tablet 4 mg  4 mg Oral Q8H PRN    oxyCODONE IR (ROXICODONE) tablet 10 mg  10 mg Oral Q4H PRN    phenol throat spray (CHLORASEPTIC) 1 Spray  1 Spray Oral Q1H PRN    polyethylene glycol (MIRALAX) packet 17 g  17 g Oral DAILY PRN    sodium chloride (NS) flush 5-40 mL  5-40 mL IntraVENous Q8H    sodium chloride (NS) flush 5-40 mL  5-40 mL IntraVENous PRN    tamsulosin (FLOMAX) capsule 0.4 mg  0.4 mg Oral DAILY    thiamine HCL (B-1) tablet 100 mg  100 mg Oral DAILY    nicotine (NICODERM CQ) 14 mg/24 hr patch 1 Patch  1 Patch TransDERmal QHS    alteplase (CATHFLO) 1 mg in sterile water (preservative free) 1 mL injection  1 mg InterCATHeter PRN               Assessment:   Active Problems:    Small bowel obstruction (HCC) (9/9/2022)      UTI due to extended-spectrum beta lactamase (ESBL) producing Escherichia coli (1/13/2023)      Severe protein-calorie malnutrition ([address] (1/13/2023)          Plan:     SBO: appears to be resolving  Start full liquid diet and continue to advance as tolerated   Start bowel regimen   KUB in the am       Rafa Centeno MD    1/18/2023    Attending addendum:  Patient seen and examined independently, agree with above note. improving    Small bowel obstruction (HCC)  SBO resolving. Advance diet. KUB in AM to follow. Once resolved, bowel regiment.

## 2023-01-17 NOTE — PROGRESS NOTES
RUR: 23% High    KENTRELL: Anticipated discharge home. Patient will need ride assistance once medically stable. Follow-up with PCP/specialist.      Primary Contact: Ex-Wife, Niko Carney, [phone]     *Will need 2nd IM letter prior to DC.     1:10PM - CM reviewed chart. Per review and ID rounds, patient is working on diet advancement/tolerance and bowel regimen. KUB scheduled for tomorrow morning, Wed. 1/18. Discharge pending medical progress and final recommendations. CM will continue to follow as needed.     SMITHA Pendleton   [phone]

## 2023-01-17 NOTE — PROGRESS NOTES
Physician Progress Note      PATIENT:               Marivel Lau  CSN #:                  [id number]  :                       1965  ADMIT DATE:       2023 4:31 AM  DISCH DATE:  RESPONDING  PROVIDER #:        MARIA ISABEL MIGUEL NP          QUERY TEXT:    Pt admitted with UTI. Pt noted to have chronic indwelling urinary catheter. If possible, please document in the progress notes and discharge summary if you are evaluating and/or treating any of the following: The medical record reflects the following:  Risk Factors: UTI      Clinical Indicators:  H&P   # Suspected ESBL UTI  - UA w/ bacteria, Nitrites and leukoesterase , wbc and RBC  Chronic urinary retention s/p Marrero  -Changed on 1/10/2022 at [facility]    urine cx  >100,000  COLONIES/mL  Culture result: ? ESCHERICHIA COLI ** (EXTENDED SPECTRUM BETA LACTAMASE ) **? Abnormal?  Culture result: ? PROTEUS MIRABILIS? Abnormal?      Treatment:  finasteride and Flomax  Merrem      Thank you,  Thad Kenny RN [facility]  Clinical Documentation  [phone] or via [employer]  Options provided:  -- UTI due to chronic indwelling urinary catheter  -- UTI not due to chronic indwelling urinary catheter  -- Other - I will add my own diagnosis  -- Disagree - Not applicable / Not valid  -- Disagree - Clinically unable to determine / Unknown  -- Refer to Clinical Documentation Reviewer    PROVIDER RESPONSE TEXT:    UTI is due to the chronic indwelling urinary catheter.     Query created by: Elizabeth Gupta on 2023 2:50 PM      Electronically signed by:  Juan Antonio MIGUEL NP 2023 10:05 AM

## 2023-01-17 NOTE — ASSESSMENT & PLAN NOTE
SBO resolved. Recommend enemas and bowel regiment given constipation on Xray. Will sing off, let us know if any other surgical issues arise.

## 2023-01-18 ENCOUNTER — APPOINTMENT (OUTPATIENT)
Dept: GENERAL RADIOLOGY | Age: 58
End: 2023-01-18
Payer: MEDICARE

## 2023-01-18 LAB
GLUCOSE BLD STRIP.AUTO-MCNC: 123 MG/DL (ref 65–117)
GLUCOSE BLD STRIP.AUTO-MCNC: 143 MG/DL (ref 65–117)
SERVICE CMNT-IMP: ABNORMAL
SERVICE CMNT-IMP: ABNORMAL

## 2023-01-18 PROCEDURE — 74011250637 HC RX REV CODE- 250/637: Performed by: NURSE PRACTITIONER

## 2023-01-18 PROCEDURE — 82962 GLUCOSE BLOOD TEST: CPT

## 2023-01-18 PROCEDURE — 74011000250 HC RX REV CODE- 250: Performed by: HOSPITALIST

## 2023-01-18 PROCEDURE — 74018 RADEX ABDOMEN 1 VIEW: CPT

## 2023-01-18 PROCEDURE — 65270000029 HC RM PRIVATE

## 2023-01-18 PROCEDURE — 74011000258 HC RX REV CODE- 258: Performed by: NURSE PRACTITIONER

## 2023-01-18 PROCEDURE — 74011250636 HC RX REV CODE- 250/636: Performed by: NURSE PRACTITIONER

## 2023-01-18 PROCEDURE — 74011250637 HC RX REV CODE- 250/637: Performed by: STUDENT IN AN ORGANIZED HEALTH CARE EDUCATION/TRAINING PROGRAM

## 2023-01-18 PROCEDURE — 74011000250 HC RX REV CODE- 250: Performed by: NURSE PRACTITIONER

## 2023-01-18 RX ADMIN — MEROPENEM 1 G: 1 INJECTION, POWDER, FOR SOLUTION INTRAVENOUS at 18:35

## 2023-01-18 RX ADMIN — TAMSULOSIN HYDROCHLORIDE 0.4 MG: 0.4 CAPSULE ORAL at 10:11

## 2023-01-18 RX ADMIN — OXYCODONE HYDROCHLORIDE 10 MG: 5 TABLET ORAL at 20:20

## 2023-01-18 RX ADMIN — OXYCODONE HYDROCHLORIDE 10 MG: 5 TABLET ORAL at 07:28

## 2023-01-18 RX ADMIN — MEROPENEM 1 G: 1 INJECTION, POWDER, FOR SOLUTION INTRAVENOUS at 10:10

## 2023-01-18 RX ADMIN — Medication 1 CAPSULE: at 10:11

## 2023-01-18 RX ADMIN — ASCORBIC ACID, VITAMIN A PALMITATE, CHOLECALCIFEROL, THIAMINE HYDROCHLORIDE, RIBOFLAVIN-5 PHOSPHATE SODIUM, PYRIDOXINE HYDROCHLORIDE, NIACINAMIDE, DEXPANTHENOL, ALPHA-TOCOPHEROL ACETATE, VITAMIN K1, FOLIC ACID, BIOTIN, CYANOCOBALAMIN: 200; 3300; 200; 6; 3.6; 6; 40; 15; 10; 150; 600; 60; 5 INJECTION, SOLUTION INTRAVENOUS at 18:35

## 2023-01-18 RX ADMIN — METOPROLOL TARTRATE 12.5 MG: 25 TABLET, FILM COATED ORAL at 10:11

## 2023-01-18 RX ADMIN — POLYETHYLENE GLYCOL 3350 17 G: 17 POWDER, FOR SOLUTION ORAL at 10:10

## 2023-01-18 RX ADMIN — Medication 1 MG: at 10:11

## 2023-01-18 RX ADMIN — METOPROLOL TARTRATE 12.5 MG: 25 TABLET, FILM COATED ORAL at 18:35

## 2023-01-18 RX ADMIN — GUAIFENESIN 600 MG: 600 TABLET, EXTENDED RELEASE ORAL at 20:20

## 2023-01-18 RX ADMIN — SENNOSIDES AND DOCUSATE SODIUM 1 TABLET: 50; 8.6 TABLET ORAL at 10:11

## 2023-01-18 RX ADMIN — I.V. FAT EMULSION 250 ML: 20 EMULSION INTRAVENOUS at 18:36

## 2023-01-18 RX ADMIN — OXYCODONE HYDROCHLORIDE 10 MG: 5 TABLET ORAL at 00:11

## 2023-01-18 RX ADMIN — FINASTERIDE 5 MG: 5 TABLET, FILM COATED ORAL at 10:11

## 2023-01-18 RX ADMIN — GUAIFENESIN 600 MG: 600 TABLET, EXTENDED RELEASE ORAL at 10:11

## 2023-01-18 RX ADMIN — MIRTAZAPINE 15 MG: 15 TABLET, FILM COATED ORAL at 20:20

## 2023-01-18 RX ADMIN — OXYCODONE HYDROCHLORIDE 10 MG: 5 TABLET ORAL at 13:25

## 2023-01-18 RX ADMIN — Medication 100 MG: at 10:10

## 2023-01-18 NOTE — PROGRESS NOTES
[address] [facility] Adult  Hospitalist Group                                                                                          Hospitalist Progress Note  Katerin Washington NP  Answering service: [phone] OR [phone] from in house phone        Date of Service:  2023  NAME:  Nini Strauss  :  1965  MRN:  [id number]      Admission Summary:   Nini Strauss is a 59 y.o. male with medical hx of  rectal cancer status postresection, hypertension, peptic ulcer disease, BPH, and mood disorder who presents with abdominal pain, and is being admitted for SBO. Note, he was from [facility] where he was admitted on  for recurrent SBO. Sigmoidoscopy there showed concern for rectal stricture. He is being transferred to [facility] for GI evaluation for upper GI. He has been hemodynamically stable. Interval history / Subjective: Tolerating full liquid diet  Renewed TPN  SBO improving  Still has diffuse abd discomfort    Assessment & Plan:     UTI  Hx of multiple MDR UTI  -  afebrile, wbc 8.2     U/A (1/10) wbc 20-50, 3+ bacteria, urine cx - ESBL E-coli and proteus mirabilis     Continue with IV Merrem, recommend to complete total 2 weeks, last dose ; this is his fourth UTI since 2022. History of rectal cancer s/p resection/radiation  History of gastric ulcer  Concern for rectal stricture  Abdominal pain 2/2 Recurrent small bowel obstruction   - surgery following;     KUB () Small bowel obstruction pattern appears mildly improved. Large  volume of stool in the ascending colon    MRCP (1/15) Multiple cystic lesions of the pancreas likely reflecting pseudocyst with no  MRI evidence of underlying malignancy. Follow-up after clinical improvement is  recommended. Small bowel obstruction pattern redemonstrated.     Simuel Pam Hardin showed possible rectal stricture    Stool WBC and enteric bacteria PCR (-)    GI signed off; recommend outpatient EGD/colonoscopy    Severe Malnutrition in the setting of chronic illness  - greater than 7.5% weigh loss in 3 months, severe body fat loss, severe muscle mass loss  - regular diet, ensure enlive TID, snacks BID  - 1/16 npo, start tpn/lipids     PVC  -EKG recurrent PVCs  -Keep K above 4 mag above 2  -On metoprolol 12.5 mg twice daily     Chronic urinary retention, s/p Marrero  -Changed on 1/10/2022 at [place] finasteride and Flomax     Acute on Chronic Pancreatitis  Pancreatic Anomaly  -scheduled for egd w/ EUS with GI as outpt but unable to make this appointment  -cannot r/o pancreatic cancer with recent ct  -continue zenpep when eating again     Hx of Polysubstance abuse     Code status: Full  Prophylaxis: [address] discussed with: patient, nurse, CM, surgery  Anticipated Disposition: > 48 hours     Hospital Problems  Date Reviewed: 1/6/2023            Codes Class Noted POA    UTI due to extended-spectrum beta lactamase (ESBL) producing Escherichia coli ICD-10-CM: N39.0, B96.29, Z16.12  ICD-9-CM: 599.0, 041.49, V09.1  1/13/2023 Unknown        Severe protein-calorie malnutrition ([facility] [address] (Chronic) ICD-10-CM: P28  ICD-9-CM: 262  1/13/2023 Yes        Small bowel obstruction ([facility] [address] ICD-10-CM: W13.589  ICD-9-CM: 560.9  9/9/2022 Yes       Review of Systems:               Symptom Y/N Comments   Symptom Y/N Comments   Fever/Chills n      Chest Pain  n      Poor Appetite       Edema        Cough       Abdominal Pain y       Sputum       Joint Pain        SOB/SZYMANSKI n      Pruritis/Rash        Nausea/vomit n      Tolerating PT/OT        Diarrhea      Tolerating Diet        Constipation       Other           Could NOT obtain due to:         Vital Signs:    Last 24hrs VS reviewed since prior progress note.  Most recent are:  Visit Vitals  BP (!) 153/91   Pulse 83   Temp 98.2 °F (36.8 °C)   Resp 16   Ht 5' 8\" (1.727 m)   Wt 54.9 kg (121 lb 0.5 oz)   SpO2 99%   BMI 18.40 kg/m² Intake/Output Summary (Last 24 hours) at 1/18/2023 0754  Last data filed at 1/17/2023 2211  Gross per 24 hour   Intake --   Output 2000 ml   Net -2000 ml          Physical Examination:     I had a face to face encounter with this patient and independently examined them on 1/18/2023 as outlined below:          Constitutional:  No acute distress, cooperative, pleasant. Cachetic, frail appearing   ENT:  Oral mucosa moist, oropharynx benign. Resp:  CTA bilaterally. No wheezing/rhonchi/rales. No accessory muscle use. CV:  Regular rhythm, normal rate, no murmurs, gallops, rubs    GI:  Soft, non distended, non tender. normoactive bowel sounds, no hepatosplenomegaly   : Marrero to gravity drainage    Musculoskeletal:    No edema, warm, 2+ pulses throughout    Neurologic:  Moves all extremities. AAOx3, CN II-XII reviewed            Data Review:    Review and/or order of clinical lab test  Review and/or order of tests in the radiology section of [facility]      Labs:     Recent Labs     01/17/23 0211 01/16/23 0156   WBC 8.2 5.9   HGB 9.2* 8.9*   HCT 28.0* 26.9*    202       Recent Labs     01/17/23 0211 01/16/23 0156    138   K 4.3 3.9    103   CO2 31 35*   BUN 13 6   CREA 0.57* 0.51*   * 91   CA 8.1* 8.6   MG 2.1  --    PHOS 2.3*  --        Recent Labs     01/17/23 0211   ALT 39   AP 97   TBILI 0.2   TP 5.7*   ALB 2.1*   GLOB 3.6       No results for input(s): INR, PTP, APTT, INREXT, INREXT in the last 72 hours. No results for input(s): FE, TIBC, PSAT, FERR in the last 72 hours. Lab Results   Component Value Date/Time    Folate 41.4 (H) 09/11/2022 10:49 AM      No results for input(s): PH, PCO2, PO2 in the last 72 hours. No results for input(s): CPK, CKNDX, TROIQ in the last 72 hours.     No lab exists for component: CPKMB  Lab Results   Component Value Date/Time    Cholesterol, total 147 01/02/2019 04:00 AM    HDL Cholesterol 66 01/02/2019 04:00 AM    LDL, calculated 65.6 01/02/2019 04:00 AM    Triglyceride 458 (H) 01/17/2023 02:11 AM    CHOL/HDL Ratio 2.2 01/02/2019 04:00 AM     Lab Results   Component Value Date/Time    Glucose (POC) 143 (H) 01/18/2023 12:15 AM    Glucose (POC) 158 (H) 01/17/2023 05:05 PM    Glucose (POC) 136 (H) 01/17/2023 11:25 AM    Glucose (POC) 80 01/07/2023 03:20 PM    Glucose (POC) 64 (L) 01/07/2023 11:26 AM     Lab Results   Component Value Date/Time    Color DARK YELLOW 01/10/2023 03:52 PM    Appearance TURBID (A) 01/10/2023 03:52 PM    Specific gravity 1.025 01/10/2023 03:52 PM    Specific gravity 1.010 09/03/2021 10:01 PM    pH (UA) 8.5 (H) 01/10/2023 03:52 PM    Protein 300 (A) 01/10/2023 03:52 PM    Glucose Negative 01/10/2023 03:52 PM    Ketone Negative 01/10/2023 03:52 PM    Bilirubin Negative 01/10/2023 03:52 PM    Urobilinogen 1.0 01/10/2023 03:52 PM    Nitrites Positive (A) 01/10/2023 03:52 PM    Leukocyte Esterase LARGE (A) 01/10/2023 03:52 PM    Epithelial cells FEW 01/10/2023 03:52 PM    Bacteria 2+ (A) 01/10/2023 03:52 PM    WBC 20-50 01/10/2023 03:52 PM    RBC 20-50 01/10/2023 03:52 PM         Medications Reviewed:     Current Facility-Administered Medications   Medication Dose Route Frequency    TPN ADULT - CENTRAL AA 5% D15% W/ ELECTROLYTES AND CA   IntraVENous CONTINUOUS    guaiFENesin ER (MUCINEX) tablet 600 mg  600 mg Oral Q12H    senna-docusate (PERICOLACE) 8.6-50 mg per tablet 1 Tablet  1 Tablet Oral DAILY    polyethylene glycol (MIRALAX) packet 17 g  17 g Oral DAILY    fat emulsion 20% (LIPOSYN, INTRAlipid) infusion 250 mL  250 mL IntraVENous Q MON, WED & FRI    acetaminophen (TYLENOL) tablet 1,000 mg  1,000 mg Oral Q6H PRN    dextrose 10% infusion 0-250 mL  0-250 mL IntraVENous PRN    dicyclomine (BENTYL) capsule 10 mg  10 mg Oral TID PRN    finasteride (PROSCAR) tablet 5 mg  5 mg Oral DAILY    folic acid tablet 1 mg  1 mg Oral DAILY    glucagon (GLUCAGEN) injection 1 mg  1 mg IntraMUSCular PRN    glucose chewable tablet 16 g  4 Tablet Oral PRN    heparin (porcine) pf 500 Units  500 Units InterCATHeter PRN    L.acidophilus-paracasei-S.thermophil-bifidobacter (RISAQUAD) 8 billion cell capsule  1 Capsule Oral DAILY    [Held by provider] lipase-protease-amylase (ZENPEP 20,000) capsule 1 Capsule  1 Capsule Oral TID WITH MEALS    metoprolol tartrate (LOPRESSOR) tablet 12.5 mg  12.5 mg Oral BID    mirtazapine (REMERON) tablet 15 mg  15 mg Oral QHS    naloxone (NARCAN) injection 0.4 mg  0.4 mg IntraVENous EVERY 2 MINUTES AS NEEDED    ondansetron (ZOFRAN ODT) tablet 4 mg  4 mg Oral Q8H PRN    oxyCODONE IR (ROXICODONE) tablet 10 mg  10 mg Oral Q4H PRN    phenol throat spray (CHLORASEPTIC) 1 Spray  1 Spray Oral Q1H PRN    polyethylene glycol (MIRALAX) packet 17 g  17 g Oral DAILY PRN    sodium chloride (NS) flush 5-40 mL  5-40 mL IntraVENous Q8H    sodium chloride (NS) flush 5-40 mL  5-40 mL IntraVENous PRN    tamsulosin (FLOMAX) capsule 0.4 mg  0.4 mg Oral DAILY    thiamine HCL (B-1) tablet 100 mg  100 mg Oral DAILY    nicotine (NICODERM CQ) 14 mg/24 hr patch 1 Patch  1 Patch TransDERmal QHS    alteplase (CATHFLO) 1 mg in sterile water (preservative free) 1 mL injection  1 mg InterCATHeter PRN     ______________________________________________________________________  EXPECTED LENGTH OF STAY: 3d 12h  ACTUAL LENGTH OF STAY:          5                 Venice Crawford, NP

## 2023-01-18 NOTE — ADT AUTH CERT NOTES
Comments  Comment          Patient Demographics    Patient Name   Ashvin Acharya   [id number] Legal Sex   Male    1965 Address   [address] Phone   [phone] (Home)   [phone] (Mobile)     Patient Demographics    Patient Name   Ashvin Acharya   [id number] Legal Sex   Male    1965 Address   [address] [address] Phone   [phone] (Home)   [phone] (Mobile)     CSN:   [id number]     Admit Date: Admit Time Room Bed   2023  4:31  [30969] 29 [03437]     Attending Providers    Provider Pager From To   Marques Regalado MD  23   Umm To PA-C  23   Valerie Cha, DEANNA  23   Chalo Sprague MD  01/14/23 01/15/23   Valerie Cha NP  01/15/23 01/17/23   Chalo Sprague MD  23   Sofya Gaitan MD  23      Patient Contacts    Name Relation Home Work Mobile   Lisseth hawley Spouse [phone]   IDRISSOLITARIO LOVEJIJWNG V [phone]   [facility] Sister [phone]   Hunt, Sharif Mckeon [phone]   Hunt, Lyla Alfaro Daughter [phone]     Utilization Reviews       Intestinal Obstruction - Care Day 5 (2023) by Montserrat Wilkinson       Review Entered Review Status   2023 1308 Completed      Criteria Review      Care Day: 5 Care Date: 2023 Level of Care: Inpatient Floor    Guideline Day 2    Level Of Care    (X) Floor    Clinical Status    (X) * Hypotension absent    (X) * Nausea and vomiting absent or improved    (X) * Pain absent or managed    2023 13:08:11 EST by Montserrat Wilkinson      Reports he has some mild \"soreness just below the naval\", but has improved.     (X) * Abdominal exam stable or improved    Activity    (X) Activity as tolerated    Routes    (X) IV fluids    (X) IV medications    2023 13:08:11 EST by Montserrat Wilkinson      MORPHINE 1MG IV X1, MEROPENEM 1G IV Q 8 HR    ( ) Diet as tolerated    1/18/2023 13:08:11 EST by Lupillo Gilmore      START FULL LIQUIDS, CONTINUE TPN    Interventions    (X) * NG tube absent    (X) Surgical re-evaluation    * Milestone   Additional Notes   Hospitalist Progress Note       Remains NPO, having sips of water   Renewed TPN   KUB w/ diffuse small bowel distension   Abd pain is somewhat improved   C/o chest congestion, requesting mucinex, brought some phlegm one time, unknown color       EXAM: ENT:   Oral mucosa moist, oropharynx benign. Resp:   CTA bilaterally. No wheezing/rhonchi/rales. No accessory muscle use. CV:   Regular rhythm, normal rate, no murmurs, gallops, rubs   GI:   Soft, non distended, non tender. normoactive bowel sounds, no hepatosplenomegaly    : Marrero to gravity drainage   Musculoskeletal:       No edema, warm, 2+ pulses throughout   Neurologic:   Moves all extremities. AAOx3, CN II-XII reviewed                                   T 98.2, /91, P 94, R 16         RBC 3.02,  ANION GAP 4, GLUC 158, CREAT 0.57, CA 2.3, TP 5.7, ALB 2.1, AST 40, TIGLYCERIDE 458        XR ABD KUB: Diffuse small bowel distention, seemingly improved compared to prior CT. Assessment & Plan:       Abdominal pain 2/2 Recurrent small bowel obstruction    - 1/15 already evaluated by Surgery at [facility], consult colorectal surgeon   - 1/15 MRCP: SBO pattern redemonstrated, multiple cystic lesions of the pancreas likley reflecting pseudocyst w/ no MRI evidence of underlying malignancy.  Small abdominal free fluid       History of rectal cancer s/p resection/radiation   History of gastric ulcer   Concern for rectal stricture   - 1/5 CT abdomen pelvis chest on admission with small bowel obstruction   - Flexoid sigmoidoscopy [facility] showed possible rectal stricture   -Stool WBC and enteric bacteria PCR checked   -GI signed off, will need to follow up for EGD/colonoscopy       Severe Malnutrition in the setting of chronic illness   - greater than 7.5% weigh loss in 3 months, severe body fat loss, severe muscle mass loss   - regular diet, ensure enlive TID, snacks BID   - 1/16 npo, start tpn/lipids       ESBL UTI    - UA w/ bacteria, Nitrites and leukoesterase , wbc and RBC    - 1/14  ecoli esbl and proteus   - cw Merrem (1/10 630pm day 1), stop date 1/17       PVC   -EKG recurrent PVCs   -Keep K above 4 mag above 2   -On metoprolol 12.5 mg twice daily       Chronic urinary retention, s/p Marrero   -Changed on 1/10/2022 at [facility]   -Continue finasteride and Flomax       Acute on Chronic Pancreatitis   Pancreatic Anomaly   -scheduled for egd w/ EUS with GI as outpt but unable to make this appointment   -cannot r/o pancreatic cancer with recent ct   -continue zenpep when eating again       Hx of Polysubstance abuse           General Surgery Daily Progress Note       Last 24 hrs:  patient reports he had soft moderate sized BM last night and his needs to sit on commode again for another BM. Reports he has some mild \"soreness just below the naval\", but has improved.  Tolerating clear liquids without N/V.  KUB results noted below       Assessment:   Active Problems:     Small bowel obstruction ([address] (9/9/2022)         UTI due to extended-spectrum beta lactamase (ESBL) producing Escherichia coli (1/13/2023)         Severe protein-calorie malnutrition ([address] (1/13/2023)                Plan:       SBO: appears to be resolving   Start full liquid diet and continue to advance as tolerated   Start bowel regimen   KUB in the am           MUCINEX 600MG PO Q 12 HR, ROXICODONE 10MG PO Q 4 HR PRN X4, ZOFRAN ODT 4MG PO Q 8 HR PRN X1, OTHER SCHED MEDS ARE SAME           Intestinal Obstruction - Care Day 4 (1/16/2023) by Cherry Ha       Review Entered Review Status   1/18/2023 1300 Completed      Criteria Review      Care Day: 4 Care Date: 1/16/2023 Level of Care: Inpatient Floor    Guideline Day 2    Level Of Care    (X) Floor    Clinical Status    (X) * Hypotension absent    1/18/2023 12:59:50 EST by Flora Simmonds      T 98.2, /88, P 71, R 18, 02 % RA    (X) * Nausea and vomiting absent or improved    1/18/2023 13:00:46 EST by Flora Simmonds      No nausea or vomiting    ( ) * Pain absent or managed    1/18/2023 13:00:46 EST by Flora Simmonds      Patient complains of persistent abdominal pain. (X) * Abdominal exam stable or improved    1/18/2023 13:00:46 EST by Flora Simmonds      Abdomen:        Soft, non-distended, mild generalized tenderness, no guarding, no rebound. + Bowel sounds. Activity    (X) Activity as tolerated    Routes    (X) IV fluids    (X) IV medications    1/18/2023 12:59:50 EST by Flora Simmonds      FAT EMULSION 20%  250ML IV Q M-W-F, MEROPENEM 1G IV Q 8 HR    ( ) Diet as tolerated    1/18/2023 12:59:50 EST by Flora Simmonds      TPN    Interventions    (X) * NG tube absent    (X) Surgical re-evaluation    * Milestone   Additional Notes   Hospitalist Progress Note       Remains NPO, starting TPN/lipids   Abd pain is constant, nausea better today   GI to follow up, pt continues to ask about EGD, defer to GI       EXAM: ENT:   Oral mucosa moist, oropharynx benign. Resp:   CTA bilaterally. No wheezing/rhonchi/rales. No accessory muscle use. CV:   Regular rhythm, normal rate, no murmurs, gallops, rubs   GI:   Soft, non distended, non tender. normoactive bowel sounds, no hepatosplenomegaly   : Marrero to gravity drainage   Musculoskeletal:       No edema, warm, 2+ pulses throughout   Neurologic:   Moves all extremities.   AAOx3, CN II-XII reviewed        RBC 3.01, C02 35, CREAT 0.51,         Assessment & Plan:       Abdominal pain 2/2 Recurrent small bowel obstruction    - 1/15 already evaluated by Surgery at [facility], consult colorectal surgeon   - 1/15 MRCP: SBO pattern redemonstrated, multiple cystic lesions of the pancreas likley reflecting pseudocyst w/ no MRI evidence of underlying malignancy. Small abdominal free fluid       History of rectal cancer s/p resection/radiation   History of gastric ulcer   Concern for rectal stricture   -CT abdomen pelvis chest on admission with small bowel obstruction   - Flexoid sigmoidoscopy [place] Dr. Gifford Brittle showed possible rectal stricture   -Stool WBC and enteric bacteria PCR checked   -GI following       Severe Malnutrition in the setting of chronic illness   - greater than 7.5% weigh loss in 3 months, severe body fat loss, severe muscle mass loss   - regular diet, ensure enlive TID, snacks BID   - 1/16 npo, start tpn/lipids       Suspected ESBL UTI    - UA w/ bacteria, Nitrites and leukoesterase , wbc and RBC    - 1/14  ecoli esbl and proteus   - cw Merrem (1/10 630pm day 1), stop date 1/17       PVC   -EKG recurrent PVCs   -Keep K above 4 mag above 2   -On metoprolol 12.5 mg twice daily       Chronic urinary retention s/p Marrero   -Changed on 1/10/2022 at [facility]   -Continue finasteride and Flomax       Acute on Chronic Pancreatitis   Pancreatic Anomaly   -scheduled for egd w/ EUS with GI as outpt but unable to make this appointment   -cannot r/o pancreatic cancer with recent ct   -continue zenpep        Hx of Polysubstance abuse           GI PROGRESS NOTE       Assessment:       Chronic pancreatitis with cysts: CA 19-9 normal. MRI/MRCP (1/14/23): multiple cystic lesions of the pancreas likely reflecting pseudocysts with no MRI evidence of underlying malignancy, follow-up after clinical improvement is recommended; small bowel obstruction pattern redemonstrated. Chronic abdominal pain   Constipation/diarrhea: stool WBC 5-10, negative enteric bacteria panel. Exocrine pancreatic insufficiency    ESBL urinary tract infection   Recurrent small bowel obstruction: (June, September, November, and January). History of rectal cancer status post resection/radiation   Malnutrition and weight loss       Plan:        On Zenpep   Supportive measures   Colorectal surgery following   Discussed with Dr. Yash Newman - MRI/MRCP with findings consistent with chronic pancreatitis and pseudocysts. No benefit for EUS at this time. Plan for outpatient follow up/imaging with primary gastroenterologist, Dr. Qian Guardado. We will sign off and be available again as needed. COLON & RECTAL SURG NOTE:        Patient with abdominal pain, dilated small bowel, constipation, pancreatic pseudocysts. His last ct was 10 days ago. Although he had an mri yesterday. Hard to know if his pain is related to pancreatic cysts or dilated small bowel. He is passing some gas and stool. Hard to know if this is a true bowel obstruction or ileus due to pancreatic issues. Either way,  would recommend bowel rest and TPN until this settles.         GEN SURG CONSULT:        Plan:       Would allow sips of clears   Cont tpn   KUB tomorrow   If sbo sx cont to resolve would start a bowel regimen as he has lg amt stool in colon               PROSCAR 5MG PO QD, FOLIC ACID 1MG PO QD, ROSAQUAD 1 CAP PO QD, LOPRESSOR 12,5MG PO BID, REMERON 15MG PO Q HS, ROXICODONE 10MG PO Q 4 HR PRN X 5, FLOMAX 0.4MG PO QD, THIAMINE HCL 100MG PO QD,

## 2023-01-19 LAB
GLUCOSE BLD STRIP.AUTO-MCNC: 130 MG/DL (ref 65–117)
GLUCOSE BLD STRIP.AUTO-MCNC: 134 MG/DL (ref 65–117)
GLUCOSE BLD STRIP.AUTO-MCNC: 136 MG/DL (ref 65–117)
SERVICE CMNT-IMP: ABNORMAL

## 2023-01-19 PROCEDURE — 74011000258 HC RX REV CODE- 258: Performed by: NURSE PRACTITIONER

## 2023-01-19 PROCEDURE — 74011000250 HC RX REV CODE- 250: Performed by: NURSE PRACTITIONER

## 2023-01-19 PROCEDURE — 82962 GLUCOSE BLOOD TEST: CPT

## 2023-01-19 PROCEDURE — 74011250637 HC RX REV CODE- 250/637: Performed by: NURSE PRACTITIONER

## 2023-01-19 PROCEDURE — 65270000029 HC RM PRIVATE

## 2023-01-19 PROCEDURE — 74011250637 HC RX REV CODE- 250/637: Performed by: STUDENT IN AN ORGANIZED HEALTH CARE EDUCATION/TRAINING PROGRAM

## 2023-01-19 PROCEDURE — 74011250636 HC RX REV CODE- 250/636: Performed by: NURSE PRACTITIONER

## 2023-01-19 PROCEDURE — 99232 SBSQ HOSP IP/OBS MODERATE 35: CPT

## 2023-01-19 RX ADMIN — OXYCODONE HYDROCHLORIDE 10 MG: 5 TABLET ORAL at 05:13

## 2023-01-19 RX ADMIN — OXYCODONE HYDROCHLORIDE 10 MG: 5 TABLET ORAL at 09:44

## 2023-01-19 RX ADMIN — Medication 100 MG: at 09:44

## 2023-01-19 RX ADMIN — POLYETHYLENE GLYCOL 3350 17 G: 17 POWDER, FOR SOLUTION ORAL at 09:47

## 2023-01-19 RX ADMIN — MEROPENEM 1 G: 1 INJECTION, POWDER, FOR SOLUTION INTRAVENOUS at 09:42

## 2023-01-19 RX ADMIN — METOPROLOL TARTRATE 12.5 MG: 25 TABLET, FILM COATED ORAL at 09:42

## 2023-01-19 RX ADMIN — MEROPENEM 1 G: 1 INJECTION, POWDER, FOR SOLUTION INTRAVENOUS at 18:12

## 2023-01-19 RX ADMIN — OXYCODONE HYDROCHLORIDE 10 MG: 5 TABLET ORAL at 00:57

## 2023-01-19 RX ADMIN — GUAIFENESIN 600 MG: 600 TABLET, EXTENDED RELEASE ORAL at 09:42

## 2023-01-19 RX ADMIN — METOPROLOL TARTRATE 12.5 MG: 25 TABLET, FILM COATED ORAL at 18:12

## 2023-01-19 RX ADMIN — Medication 1 CAPSULE: at 09:44

## 2023-01-19 RX ADMIN — FINASTERIDE 5 MG: 5 TABLET, FILM COATED ORAL at 09:42

## 2023-01-19 RX ADMIN — SENNOSIDES AND DOCUSATE SODIUM 1 TABLET: 50; 8.6 TABLET ORAL at 10:29

## 2023-01-19 RX ADMIN — OXYCODONE HYDROCHLORIDE 10 MG: 5 TABLET ORAL at 15:05

## 2023-01-19 RX ADMIN — Medication 1 MG: at 09:44

## 2023-01-19 RX ADMIN — OXYCODONE HYDROCHLORIDE 10 MG: 5 TABLET ORAL at 19:44

## 2023-01-19 RX ADMIN — TAMSULOSIN HYDROCHLORIDE 0.4 MG: 0.4 CAPSULE ORAL at 09:42

## 2023-01-19 RX ADMIN — LEUCINE, PHENYLALANINE, LYSINE, METHIONINE, ISOLEUCINE, VALINE, HISTIDINE, THREONINE, TRYPTOPHAN, ALANINE, GLYCINE, ARGININE, PROLINE, SERINE, TYROSINE, SODIUM ACETATE, DIBASIC POTASSIUM PHOSPHATE, MAGNESIUM CHLORIDE, SODIUM CHLORIDE, CALCIUM CHLORIDE, DEXTROSE
365; 280; 290; 200; 300; 290; 240; 210; 90; 1035; 515; 575; 340; 250; 20; 340; 261; 51; 59; 33; 15 INJECTION INTRAVENOUS at 18:09

## 2023-01-19 RX ADMIN — MEROPENEM 1 G: 1 INJECTION, POWDER, FOR SOLUTION INTRAVENOUS at 00:57

## 2023-01-19 NOTE — PROGRESS NOTES
[facility] Adult  Hospitalist Group                                                                                          Hospitalist Progress Note  Pham Munoz NP  Answering service: [phone] OR [phone] from in house phone        Date of Service:  2023  NAME:  Eric Ryan  :  1965  MRN:  [id number]      Admission Summary:   Eric Ryan is a 59 y.o. male with medical hx of  rectal cancer status postresection, hypertension, peptic ulcer disease, BPH, and mood disorder who presents with abdominal pain, and is being admitted for SBO. Note, he was from [facility] where he was admitted on  for recurrent SBO. Sigmoidoscopy there showed concern for rectal stricture. He is being transferred to [facility] for GI evaluation for upper GI. He has been hemodynamically stable. Interval history / Subjective: Tolerating full liquid diet  Renewed TPN  SBO improving  Still has diffuse abd discomfort    Assessment & Plan:     UTI  Hx of multiple MDR UTI  -  afebrile, wbc 8.2     U/A (1/10) wbc 20-50, 3+ bacteria, urine cx - ESBL E-coli and proteus mirabilis     Continue with IV Merrem, recommend to complete total 2 weeks, last dose ; this is his fourth UTI since 2022. History of rectal cancer s/p resection/radiation  History of gastric ulcer  Concern for rectal stricture  Abdominal pain 2/2 Recurrent small bowel obstruction   - surgery following;     KUB () Small bowel obstruction pattern appears mildly improved. Large  volume of stool in the ascending colon    MRCP (1/15) Multiple cystic lesions of the pancreas likely reflecting pseudocyst with no  MRI evidence of underlying malignancy. Follow-up after clinical improvement is  recommended. Small bowel obstruction pattern redemonstrated.     Mona Hawks Dr. Charlies Hashimoto showed possible rectal stricture    Stool WBC and enteric bacteria PCR (-)    GI signed off; recommend outpatient EGD/colonoscopy    Severe Malnutrition in the setting of chronic illness  - greater than 7.5% weigh loss in 3 months, severe body fat loss, severe muscle mass loss  - regular diet, ensure enlive TID, snacks BID  -  dietary consult; to increase PO intake while weaning off from parental feeding     PVC  -EKG recurrent PVCs  -Keep K above 4 mag above 2  -On metoprolol 12.5 mg twice daily     Chronic urinary retention, s/p Marrero  -[facility]     Acute on Chronic Pancreatitis  Pancreatic Anomaly  -scheduled for egd w/ EUS with GI as outpt but unable to make this appointment  -cannot r/o pancreatic cancer with recent ct  -continue zenpep when eating again     Hx of Polysubstance abuse     Code status: Full  Prophylaxis: [address] discussed with: patient, nurse, CM, surgery  Anticipated Disposition: pt needs to complete IV ABX in hospital setting in presence of substance abuse      Hospital Problems  Date Reviewed: 1/6/2023            Codes Class Noted POA    UTI due to extended-spectrum beta lactamase (ESBL) producing Escherichia coli ICD-10-CM: N39.0, B96.29, Z16.12  ICD-9-CM: 599.0, 041.49, V09.1  1/13/2023 Unknown        Severe protein-calorie malnutrition (HCC) (Chronic) ICD-10-CM: M60  ICD-9-CM: 262  1/13/2023 Yes        Small bowel obstruction ([facility] [address] ICD-10-CM: S16.074  ICD-9-CM: 560.9  9/9/2022 Yes       Review of Systems:               Symptom Y/N Comments   Symptom Y/N Comments   Fever/Chills n      Chest Pain  n      Poor Appetite       Edema        Cough       Abdominal Pain y       Sputum       Joint Pain        SOB/SZYMANSKI n      Pruritis/Rash        Nausea/vomit n      Tolerating PT/OT        Diarrhea      Tolerating Diet        Constipation       Other           Could NOT obtain due to:         Vital Signs:    Last 24hrs VS reviewed since prior progress note.  Most recent are:  Visit Vitals  /71   Pulse 88 Temp 97.3 °F (36.3 °C)   Resp 16   Ht 5' 8\" (1.727 m)   Wt 54.9 kg (121 lb 0.5 oz)   SpO2 100%   BMI 18.40 kg/m²         Intake/Output Summary (Last 24 hours) at 1/19/2023 0802  Last data filed at 1/19/2023 0516  Gross per 24 hour   Intake --   Output 5500 ml   Net -5500 ml          Physical Examination:     I had a face to face encounter with this patient and independently examined them on 1/19/2023 as outlined below:          Constitutional:  No acute distress, cooperative, pleasant. Cachetic, frail appearing   ENT:  Oral mucosa moist, oropharynx benign. Resp:  CTA bilaterally. No wheezing/rhonchi/rales. No accessory muscle use. CV:  Regular rhythm, normal rate, no murmurs, gallops, rubs    GI:  Soft, non distended, non tender. normoactive bowel sounds, no hepatosplenomegaly   : Marrero to gravity drainage    Musculoskeletal:    No edema, warm, 2+ pulses throughout    Neurologic:  Moves all extremities. AAOx3, CN II-XII reviewed            Data Review:    Review and/or order of clinical lab test  Review and/or order of tests in the radiology section of CPT      Labs:     Recent Labs     01/17/23 0211   WBC 8.2   HGB 9.2*   HCT 28.0*          Recent Labs     01/17/23 0211      K 4.3      CO2 31   BUN 13   CREA 0.57*   *   CA 8.1*   MG 2.1   PHOS 2.3*       Recent Labs     01/17/23 0211   ALT 39   AP 97   TBILI 0.2   TP 5.7*   ALB 2.1*   GLOB 3.6       No results for input(s): INR, PTP, APTT, INREXT, INREXT in the last 72 hours. No results for input(s): FE, TIBC, PSAT, FERR in the last 72 hours. Lab Results   Component Value Date/Time    Folate 41.4 (H) 09/11/2022 10:49 AM      No results for input(s): PH, PCO2, PO2 in the last 72 hours. No results for input(s): CPK, CKNDX, TROIQ in the last 72 hours.     No lab exists for component: CPKMB  Lab Results   Component Value Date/Time    Cholesterol, total 147 01/02/2019 04:00 AM    HDL Cholesterol 66 01/02/2019 04:00 AM    LDL, calculated 65.6 01/02/2019 04:00 AM    Triglyceride 458 (H) 01/17/2023 02:11 AM    CHOL/HDL Ratio 2.2 01/02/2019 04:00 AM     Lab Results   Component Value Date/Time    Glucose (POC) 123 (H) 01/18/2023 09:35 PM    Glucose (POC) 143 (H) 01/18/2023 12:15 AM    Glucose (POC) 158 (H) 01/17/2023 05:05 PM    Glucose (POC) 136 (H) 01/17/2023 11:25 AM    Glucose (POC) 80 01/07/2023 03:20 PM     Lab Results   Component Value Date/Time    Color DARK YELLOW 01/10/2023 03:52 PM    Appearance TURBID (A) 01/10/2023 03:52 PM    Specific gravity 1.025 01/10/2023 03:52 PM    Specific gravity 1.010 09/03/2021 10:01 PM    pH (UA) 8.5 (H) 01/10/2023 03:52 PM    Protein 300 (A) 01/10/2023 03:52 PM    Glucose Negative 01/10/2023 03:52 PM    Ketone Negative 01/10/2023 03:52 PM    Bilirubin Negative 01/10/2023 03:52 PM    Urobilinogen 1.0 01/10/2023 03:52 PM    Nitrites Positive (A) 01/10/2023 03:52 PM    Leukocyte Esterase LARGE (A) 01/10/2023 03:52 PM    Epithelial cells FEW 01/10/2023 03:52 PM    Bacteria 2+ (A) 01/10/2023 03:52 PM    WBC 20-50 01/10/2023 03:52 PM    RBC 20-50 01/10/2023 03:52 PM         Medications Reviewed:     Current Facility-Administered Medications   Medication Dose Route Frequency    meropenem (MERREM) 1 g in 0.9% sodium chloride (MBP/ADV) 50 mL MBP  1 g IntraVENous Q8H    TPN ADULT - CENTRAL AA 5% D15% W/ ELECTROLYTES AND CA   IntraVENous CONTINUOUS    guaiFENesin ER (MUCINEX) tablet 600 mg  600 mg Oral Q12H    senna-docusate (PERICOLACE) 8.6-50 mg per tablet 1 Tablet  1 Tablet Oral DAILY    polyethylene glycol (MIRALAX) packet 17 g  17 g Oral DAILY    fat emulsion 20% (LIPOSYN, INTRAlipid) infusion 250 mL  250 mL IntraVENous Q MON, WED & FRI    acetaminophen (TYLENOL) tablet 1,000 mg  1,000 mg Oral Q6H PRN    dextrose 10% infusion 0-250 mL  0-250 mL IntraVENous PRN    dicyclomine (BENTYL) capsule 10 mg  10 mg Oral TID PRN    finasteride (PROSCAR) tablet 5 mg  5 mg Oral DAILY    folic acid tablet 1 mg  1 mg Oral DAILY    glucagon (GLUCAGEN) injection 1 mg  1 mg IntraMUSCular PRN    glucose chewable tablet 16 g  4 Tablet Oral PRN    heparin (porcine) pf 500 Units  500 Units InterCATHeter PRN    L.acidophilus-paracasei-S.thermophil-bifidobacter (RISAQUAD) 8 billion cell capsule  1 Capsule Oral DAILY    [Held by provider] lipase-protease-amylase (ZENPEP 20,000) capsule 1 Capsule  1 Capsule Oral TID WITH MEALS    metoprolol tartrate (LOPRESSOR) tablet 12.5 mg  12.5 mg Oral BID    mirtazapine (REMERON) tablet 15 mg  15 mg Oral QHS    naloxone (NARCAN) injection 0.4 mg  0.4 mg IntraVENous EVERY 2 MINUTES AS NEEDED    ondansetron (ZOFRAN ODT) tablet 4 mg  4 mg Oral Q8H PRN    oxyCODONE IR (ROXICODONE) tablet 10 mg  10 mg Oral Q4H PRN    phenol throat spray (CHLORASEPTIC) 1 Spray  1 Spray Oral Q1H PRN    polyethylene glycol (MIRALAX) packet 17 g  17 g Oral DAILY PRN    sodium chloride (NS) flush 5-40 mL  5-40 mL IntraVENous Q8H    sodium chloride (NS) flush 5-40 mL  5-40 mL IntraVENous PRN    tamsulosin (FLOMAX) capsule 0.4 mg  0.4 mg Oral DAILY    thiamine HCL (B-1) tablet 100 mg  100 mg Oral DAILY    nicotine (NICODERM CQ) 14 mg/24 hr patch 1 Patch  1 Patch TransDERmal QHS    alteplase (CATHFLO) 1 mg in sterile water (preservative free) 1 mL injection  1 mg InterCATHeter PRN     ______________________________________________________________________  EXPECTED LENGTH OF STAY: 3d 12h  ACTUAL LENGTH OF STAY:          6                 Venice Crawford, NP

## 2023-01-19 NOTE — PROGRESS NOTES
General Surgery Daily Progress Note    Admit Date: 2023  Post-Operative Day: * No surgery found * from * No surgery found *     Subjective:        Last 24 hrs: patient states he is feeling better. Reports last BM was yesterday morning and feels like he needs to have another one soon. He is requesting to advance in diet. Denies any abdominal pain or n/v.     KUB 22  Small bowel obstruction pattern appears mildly improved. Large  volume of stool in the ascending colon. Objective:     Blood pressure (!) 160/77, pulse 82, temperature 98.5 °F (36.9 °C), resp. rate 16, height 5' 8\" (1.727 m), weight 121 lb 0.5 oz (54.9 kg), SpO2 100 %. Temp (24hrs), Av °F (36.7 °C), Min:97.3 °F (36.3 °C), Max:98.5 °F (36.9 °C)      _____________________  Physical Exam:     Alert and cooperative, in no acute distress. Cardiovascular: RRR  Lungs:unlabored  Abdomen: Soft, NT, +BS     Data Review:    Recent Labs     23  021   WBC 8.2   HGB 9.2*   HCT 28.0*        Recent Labs     23  021      K 4.3      CO2 31   *   BUN 13   CREA 0.57*   CA 8.1*   MG 2.1   PHOS 2.3*   ALB 2.1*   ALT 39     No results for input(s): AML, LPSE in the last 72 hours.         ______________________  Medications:    Current Facility-Administered Medications   Medication Dose Route Frequency    TPN ADULT - CENTRAL AA 5% D15% W/ ELECTROLYTES AND CA   IntraVENous CONTINUOUS    meropenem (MERREM) 1 g in 0.9% sodium chloride (MBP/ADV) 50 mL MBP  1 g IntraVENous Q8H    guaiFENesin ER (MUCINEX) tablet 600 mg  600 mg Oral Q12H    senna-docusate (PERICOLACE) 8.6-50 mg per tablet 1 Tablet  1 Tablet Oral DAILY    polyethylene glycol (MIRALAX) packet 17 g  17 g Oral DAILY    fat emulsion 20% (LIPOSYN, INTRAlipid) infusion 250 mL  250 mL IntraVENous Q MON, WED & FRI    acetaminophen (TYLENOL) tablet 1,000 mg  1,000 mg Oral Q6H PRN    dextrose 10% infusion 0-250 mL  0-250 mL IntraVENous PRN    dicyclomine (BENTYL) capsule 10 mg  10 mg Oral TID PRN    finasteride (PROSCAR) tablet 5 mg  5 mg Oral DAILY    folic acid tablet 1 mg  1 mg Oral DAILY    glucagon (GLUCAGEN) injection 1 mg  1 mg IntraMUSCular PRN    glucose chewable tablet 16 g  4 Tablet Oral PRN    heparin (porcine) pf 500 Units  500 Units InterCATHeter PRN    L.acidophilus-paracasei-S.thermophil-bifidobacter (RISAQUAD) 8 billion cell capsule  1 Capsule Oral DAILY    [Held by provider] lipase-protease-amylase (ZENPEP 20,000) capsule 1 Capsule  1 Capsule Oral TID WITH MEALS    metoprolol tartrate (LOPRESSOR) tablet 12.5 mg  12.5 mg Oral BID    mirtazapine (REMERON) tablet 15 mg  15 mg Oral QHS    naloxone (NARCAN) injection 0.4 mg  0.4 mg IntraVENous EVERY 2 MINUTES AS NEEDED    ondansetron (ZOFRAN ODT) tablet 4 mg  4 mg Oral Q8H PRN    oxyCODONE IR (ROXICODONE) tablet 10 mg  10 mg Oral Q4H PRN    phenol throat spray (CHLORASEPTIC) 1 Spray  1 Spray Oral Q1H PRN    polyethylene glycol (MIRALAX) packet 17 g  17 g Oral DAILY PRN    sodium chloride (NS) flush 5-40 mL  5-40 mL IntraVENous Q8H    sodium chloride (NS) flush 5-40 mL  5-40 mL IntraVENous PRN    tamsulosin (FLOMAX) capsule 0.4 mg  0.4 mg Oral DAILY    thiamine HCL (B-1) tablet 100 mg  100 mg Oral DAILY    nicotine (NICODERM CQ) 14 mg/24 hr patch 1 Patch  1 Patch TransDERmal QHS    alteplase (CATHFLO) 1 mg in sterile water (preservative free) 1 mL injection  1 mg InterCATHeter PRN               Assessment:   Active Problems:    Small bowel obstruction (HCC) (9/9/2022)      UTI due to extended-spectrum beta lactamase (ESBL) producing Escherichia coli (1/13/2023)      Severe protein-calorie malnutrition ([address] (1/13/2023)          Plan:     SBO: no indication for surgical intervention at this time; SBO appears to be resolving.   Advance diet as tolerated  Continue with bowel regimen   Encourage OOB and ambulation   Recommend repeat imaging in 6 weeks for close interval follow up of multiple pancreatic cystic lesions (see results below)  D/W Dr. Sarah Mckenzie  Will sign off for now. Please call with any questions of concerns                          MRCP (1/15) Multiple cystic lesions of the pancreas likely reflecting pseudocyst with no MRI evidence of underlying malignancy. Follow-up after clinical improvement is recommended. Rfaa Centeno MD    1/19/2023  Attending addendum:  Patient seen and examined independently, agree with above note. Doing better,     Small bowel obstruction (HCC)  SBO resolved. Recommend enemas and bowel regiment given constipation on Xray. Will sing off, let us know if any other surgical issues arise.

## 2023-01-19 NOTE — PROGRESS NOTES
Comprehensive Nutrition Assessment    Type and Reason for Visit: Reassess    Nutrition Recommendations/Plan:   Continue with Low Fiber diet  Will order Ensure Enlive BID and snacks per pt preference  Agree with weaning down TPN as diet has advanced and PO intakes improve  Recommend D/C lipids with TG level of 458       Malnutrition Assessment:  Malnutrition Status:  Severe malnutrition (01/13/23 1134)    Context:  Chronic illness     Findings of the 6 clinical characteristics of malnutrition:   Energy Intake:  75% or less est energy requirements for 1 month or longer  Weight Loss:  Greater than 7.5% over 3 months     Body Fat Loss:  Severe body fat loss, Triceps   Muscle Mass Loss:  Severe muscle mass loss, Clavicles (pectoralis &deltoids), Hand (interosseous)  Fluid Accumulation:  No significant fluid accumulation,     Strength:  Not performed        Nutrition Assessment:    58 yo male admitted for SBO. PMHx: rectal CA s/p resection and radiation, HTN, PUD, ETOH and drug abuse, pancreatitis. 1/19: Follow up. MD consult received for helping to increase PO intakes while weaning off TPN. TPN has been running at goal rate of D15, 5% AA at 83 ml/hr + 250 ml 20% lipids 3x/week. Order today to decrease to rate of 63 ml/hr. KUB today shows SBO mildly improving. Diet advanced to Low Fiber today. Spoke with pt at bedside. He states he ate peanut butter crackers this morning because his breakfast tray came really late, he then ate some of the breakfast (~25%); and almost all of a cheeseburger for lunch. Informed pt I will re-order Ensure shakes as he likes those and some snacks. Pt agreeable with this plan, asking for snacks at night time because he gets hungry after dinner time. New bed scale weight of 121 lbs. Labs: phos 2.3,       1/16:  MD consult received for TPN Rec's. Pt has been c/o having no taste for last 3 days, intermittent nausea (with no vomiting), and constant ABD pain.   MRI of ABD showed recurrent SBO and pancreatic cysts. Colorectal surgeon noted \"Hard to know if his pain is related to pancreatic cysts or dilated small bowel. He is passing some gas and stool. Hard to know if this is a true bowel obstruction or ileus due to pancreatic issues. \"  Recommends bowel rest and TPN. TPN ordered as D15, 5%AA at 83 ml/hr + 250 ml 20% lipids 3x/week. This is providing 7383-8789 ml, 1629 kcals (100% protein needs), 99 gm protein (100% protein needs), 298 gm CHO. Recommend continuing as ordered. Noted that pt was eating ~25% meals prior to being made NPO. Labs: phos 2.5        1/13: MST received for weight loss and poor PO. Spoke with pt at bedside. He reports being a \"light eater\" which makes him feel better so he only eats part of each meal.  Breakfast tray observed, pt ate eggs only and a few sips of milk. States his appetite at home PTA was poor 2/2 no appetite but feels his appetite is improving since admission. Drinks 2 Ensure shakes/day at home. Likes chocolate and strawberry. Agreeable to receive scheduled snacks; will order PB crackers and 1/2 sandwich for AM/PM snacks. Low BMI of 15. Pt admits to recent weight loss. Weight hx in EMR indicates 16% over last 4 months which is significant for time frame. Severe muscle and fat wasting present. Pt c/o alternating between constipation and diarrhea at home.        Labs: phos 2.4 (being repleted), K+ WNL today but previously low         Nutritionally Significant Medications:  Folic acid, Probiotic, Remeron, Miralax, Pericolace, thiamine      Estimated Daily Nutrient Needs:  Energy Requirements Based On: Kcal/kg  Weight Used for Energy Requirements: Current  Energy (kcal/day): 4717-2451 (35-40 kcals/kg)  Weight Used for Protein Requirements: Current  Protein (g/day): 82 (1.8 gm/kg (20%))  Method Used for Fluid Requirements: 1 ml/kcal  Fluid (ml/day): 1600    Nutrition Related Findings:   Edema: non-pitting BLE  Last BM: 01/16/23, Soft    Wounds: Skin tears      Current Nutrition Therapies:  Diet: Low Fiber  Supplements: none  Meal intake: Patient Vitals for the past 168 hrs:   % Diet Eaten   01/16/23 1005 0%   01/15/23 1400 1 - 25%   01/15/23 1000 26 - 50%   01/14/23 1800 26 - 50%   01/14/23 1000 1 - 25%     Supplement intake: No data found. Nutrition Support: TPN: D15, 5% AA at 63 ml/hr + 250 ml 20% lipids 3x/week      Anthropometric Measures:  Height: 5' 8\" (172.7 cm)  Ideal Body Weight (IBW): 154 lbs (70 kg)     Current Body Wt:  54.9 kg (121 lb 0.5 oz), 78.6 % IBW.  Bed scale  Current BMI (kg/m2): 18.4        Weight Adjustment: No adjustment                 BMI Category: Underweight (BMI less than 18.5)    Wt Readings from Last 10 Encounters:   01/18/23 54.9 kg (121 lb 0.5 oz)   01/06/23 43.8 kg (96 lb 8 oz)   11/29/22 45.5 kg (100 lb 5 oz)   09/09/22 54.4 kg (120 lb)   06/03/22 54.4 kg (120 lb)   02/04/22 52 kg (114 lb 9.6 oz)   02/02/22 48.5 kg (107 lb)   01/26/22 48.1 kg (106 lb)   09/06/21 54.3 kg (119 lb 12.8 oz)   09/02/21 55.1 kg (121 lb 8 oz)           Nutrition Diagnosis:   Severe malnutrition related to inadequate protein-energy intake as evidenced by weight loss, severe muscle loss, severe loss of subcutaneous fat, intake 26-50%    Nutrition Interventions:   Food and/or Nutrient Delivery: Continue current diet, Start oral nutrition supplement, Snacks (specify), Modify parenteral nutrition  Nutrition Education/Counseling: No recommendations at this time  Coordination of Nutrition Care: Continue to monitor while inpatient       Goals:     Goals: other (specify)  Specify Other Goals: PO intakes > 75% meals + ONS while weaning off TPN over next 5-7 days    Nutrition Monitoring and Evaluation:   Behavioral-Environmental Outcomes: None identified  Food/Nutrient Intake Outcomes: Food and nutrient intake, Supplement intake, Parenteral nutrition intake/tolerance  Physical Signs/Symptoms Outcomes: Biochemical data, Constipation, Diarrhea, GI status, Weight    Discharge Planning:    Continue current diet, Continue oral nutrition supplement    Miri Garcia RD  Available via [employer]

## 2023-01-19 NOTE — PROGRESS NOTES
RUR: 24% High    KENTRELL: Anticipated discharge home. Patient will need ride assistance once medically stable. Follow-up with PCP/specialist.      Primary Contact: Ex-Wife, Sae Bagley, [phone]     *Will need 2nd IM letter prior to DC.      1:15PM - CM reviewed chart. Per verbal notification from hospitalist-infectious disease physician, patient verbally expressed he has history of polysubstance abuse. Plan for patient to discharge home once IV abx is completed; last dose scheduled for Friday, 1/27.  CM will continue to follow as needed    SMITHA Regalado   [phone]

## 2023-01-20 LAB
GLUCOSE BLD STRIP.AUTO-MCNC: 133 MG/DL (ref 65–117)
GLUCOSE BLD STRIP.AUTO-MCNC: 138 MG/DL (ref 65–117)
SERVICE CMNT-IMP: ABNORMAL
SERVICE CMNT-IMP: ABNORMAL

## 2023-01-20 PROCEDURE — 74011250637 HC RX REV CODE- 250/637: Performed by: NURSE PRACTITIONER

## 2023-01-20 PROCEDURE — 74011250636 HC RX REV CODE- 250/636: Performed by: NURSE PRACTITIONER

## 2023-01-20 PROCEDURE — 74011000250 HC RX REV CODE- 250: Performed by: NURSE PRACTITIONER

## 2023-01-20 PROCEDURE — 74011250637 HC RX REV CODE- 250/637: Performed by: STUDENT IN AN ORGANIZED HEALTH CARE EDUCATION/TRAINING PROGRAM

## 2023-01-20 PROCEDURE — 65270000029 HC RM PRIVATE

## 2023-01-20 PROCEDURE — 2709999900 HC NON-CHARGEABLE SUPPLY

## 2023-01-20 PROCEDURE — 74011000258 HC RX REV CODE- 258: Performed by: NURSE PRACTITIONER

## 2023-01-20 PROCEDURE — 74011000250 HC RX REV CODE- 250: Performed by: STUDENT IN AN ORGANIZED HEALTH CARE EDUCATION/TRAINING PROGRAM

## 2023-01-20 PROCEDURE — 82962 GLUCOSE BLOOD TEST: CPT

## 2023-01-20 RX ORDER — CYCLOBENZAPRINE HCL 10 MG
5 TABLET ORAL
Status: DISCONTINUED | OUTPATIENT
Start: 2023-01-20 | End: 2023-01-28 | Stop reason: HOSPADM

## 2023-01-20 RX ADMIN — Medication 1 MG: at 14:07

## 2023-01-20 RX ADMIN — OXYCODONE HYDROCHLORIDE 10 MG: 5 TABLET ORAL at 10:37

## 2023-01-20 RX ADMIN — METOPROLOL TARTRATE 12.5 MG: 25 TABLET, FILM COATED ORAL at 11:01

## 2023-01-20 RX ADMIN — SODIUM CHLORIDE, PRESERVATIVE FREE 10 ML: 5 INJECTION INTRAVENOUS at 15:14

## 2023-01-20 RX ADMIN — GUAIFENESIN 600 MG: 600 TABLET, EXTENDED RELEASE ORAL at 10:37

## 2023-01-20 RX ADMIN — OXYCODONE HYDROCHLORIDE 10 MG: 5 TABLET ORAL at 15:10

## 2023-01-20 RX ADMIN — TAMSULOSIN HYDROCHLORIDE 0.4 MG: 0.4 CAPSULE ORAL at 10:37

## 2023-01-20 RX ADMIN — SODIUM CHLORIDE, PRESERVATIVE FREE 10 ML: 5 INJECTION INTRAVENOUS at 23:25

## 2023-01-20 RX ADMIN — CYCLOBENZAPRINE 5 MG: 10 TABLET, FILM COATED ORAL at 16:48

## 2023-01-20 RX ADMIN — FINASTERIDE 5 MG: 5 TABLET, FILM COATED ORAL at 10:37

## 2023-01-20 RX ADMIN — Medication 100 MG: at 10:37

## 2023-01-20 RX ADMIN — OXYCODONE HYDROCHLORIDE 10 MG: 5 TABLET ORAL at 00:38

## 2023-01-20 RX ADMIN — MEROPENEM 1 G: 1 INJECTION, POWDER, FOR SOLUTION INTRAVENOUS at 00:38

## 2023-01-20 RX ADMIN — MEROPENEM 1 G: 1 INJECTION, POWDER, FOR SOLUTION INTRAVENOUS at 11:03

## 2023-01-20 RX ADMIN — Medication 1 CAPSULE: at 10:37

## 2023-01-20 RX ADMIN — GUAIFENESIN 600 MG: 600 TABLET, EXTENDED RELEASE ORAL at 23:23

## 2023-01-20 RX ADMIN — ASCORBIC ACID, VITAMIN A PALMITATE, CHOLECALCIFEROL, THIAMINE HYDROCHLORIDE, RIBOFLAVIN-5 PHOSPHATE SODIUM, PYRIDOXINE HYDROCHLORIDE, NIACINAMIDE, DEXPANTHENOL, ALPHA-TOCOPHEROL ACETATE, VITAMIN K1, FOLIC ACID, BIOTIN, CYANOCOBALAMIN: 200; 3300; 200; 6; 3.6; 6; 40; 15; 10; 150; 600; 60; 5 INJECTION, SOLUTION INTRAVENOUS at 19:43

## 2023-01-20 RX ADMIN — METOPROLOL TARTRATE 12.5 MG: 25 TABLET, FILM COATED ORAL at 20:33

## 2023-01-20 RX ADMIN — OXYCODONE HYDROCHLORIDE 10 MG: 5 TABLET ORAL at 04:32

## 2023-01-20 RX ADMIN — OXYCODONE HYDROCHLORIDE 10 MG: 5 TABLET ORAL at 20:34

## 2023-01-20 RX ADMIN — MIRTAZAPINE 15 MG: 15 TABLET, FILM COATED ORAL at 23:23

## 2023-01-20 RX ADMIN — SENNOSIDES AND DOCUSATE SODIUM 1 TABLET: 50; 8.6 TABLET ORAL at 10:37

## 2023-01-20 RX ADMIN — POLYETHYLENE GLYCOL 3350 17 G: 17 POWDER, FOR SOLUTION ORAL at 10:39

## 2023-01-20 NOTE — PROGRESS NOTES
[facility]                                                                                          Hospitalist Progress Note  Rickie Regan NP  Answering service: [phone] from in house phone        Date of Service:  2023  NAME:  Carlos Ray  :  1965  MRN:  [id number]      Admission Summary:   Carlos Ray is a 59 y.o. male with medical hx of  rectal cancer status postresection, hypertension, peptic ulcer disease, BPH, and mood disorder who presents with abdominal pain, and is being admitted for SBO. Note, he was from [facility] where he was admitted on  for recurrent SBO. Sigmoidoscopy there showed concern for rectal stricture. He is being transferred to [facility] for GI evaluation for upper GI. He has been hemodynamically stable. Interval history / Subjective: Tolerating regular diet  Renewed TPN; rate decreased, will d/c TPN tomorrow  SBO improving  'feeling pretty good'    Assessment & Plan:     UTI  Hx of multiple MDR UTI  -  afebrile, wbc 8.2     U/A (1/10) wbc 20-50, 3+ bacteria, urine cx - ESBL E-coli and proteus mirabilis     Continue with IV Merrem, recommend to complete total 2 weeks, last dose ; this is his fourth UTI since 2022. History of rectal cancer s/p resection/radiation  History of gastric ulcer  Concern for rectal stricture  Abdominal pain 2/2 Recurrent small bowel obstruction   - surgery following;     KUB () Small bowel obstruction pattern appears mildly improved. Large  volume of stool in the ascending colon    MRCP (1/15) Multiple cystic lesions of the pancreas likely reflecting pseudocyst with no  MRI evidence of underlying malignancy. Follow-up after clinical improvement is  recommended. Small bowel obstruction pattern redemonstrated.     Aries BARBER showed possible rectal stricture    Stool WBC and enteric bacteria PCR (-)    GI signed off; recommend outpatient EGD/colonoscopy    Severe Malnutrition in the setting of chronic illness  - greater than 7.5% weigh loss in 3 months, severe body fat loss, severe muscle mass loss  - regular diet, ensure enlive TID, snacks BID  -  appreciate dietary input; d/c TPN today (, will recheck the level in couple days)     PVC  -EKG recurrent PVCs  -Keep K above 4 mag above 2  -On metoprolol 12.5 mg twice daily     Chronic urinary retention, s/p Marrero  -[facility]     Acute on Chronic Pancreatitis  Pancreatic Anomaly  -scheduled for egd w/ EUS with GI as outpt but unable to make this appointment  -cannot r/o pancreatic cancer with recent ct  -continue zenpep when eating again     Hx of Polysubstance abuse     Code status: Full  Prophylaxis: [address] discussed with: patient, nurse, CM, surgery  Anticipated Disposition: pt needs to complete IV ABX in hospital setting in presence of substance abuse.  May complete remaining abx therapy at [facility] Problems  Date Reviewed: 1/6/2023            Codes Class Noted POA    UTI due to extended-spectrum beta lactamase (ESBL) producing Escherichia coli ICD-10-CM: N39.0, B96.29, Z16.12  ICD-9-CM: 599.0, 041.49, V09.1  1/13/2023 Unknown        Severe protein-calorie malnutrition (HCC) (Chronic) ICD-10-CM: N10  ICD-9-CM: 262  1/13/2023 Yes        Small bowel obstruction ([address] ICD-10-CM: A78.810  ICD-9-CM: 560.9  9/9/2022 Yes       Review of Systems:               Symptom Y/N Comments   Symptom Y/N Comments   Fever/Chills n      Chest Pain  n      Poor Appetite       Edema        Cough       Abdominal Pain n       Sputum       Joint Pain        SOB/SZYMANSKI n      Pruritis/Rash        Nausea/vomit n      Tolerating PT/OT        Diarrhea      Tolerating Diet        Constipation       Other           Could NOT obtain due to:         Vital Signs:    Last 24hrs VS reviewed since prior progress note. Most recent are:  Visit Vitals  /81   Pulse 83   Temp 98.8 °F (37.1 °C)   Resp 16   Ht 5' 8\" (1.727 m)   Wt 54.9 kg (121 lb 0.5 oz)   SpO2 100%   BMI 18.40 kg/m²         Intake/Output Summary (Last 24 hours) at 1/20/2023 9735  Last data filed at 1/20/2023 6069  Gross per 24 hour   Intake --   Output 2350 ml   Net -2350 ml          Physical Examination:     I had a face to face encounter with this patient and independently examined them on 1/20/2023 as outlined below:          Constitutional:  No acute distress, cooperative, pleasant. Cachetic, frail appearing   ENT:  Oral mucosa moist, oropharynx benign. Resp:  CTA bilaterally. No wheezing/rhonchi/rales. No accessory muscle use. CV:  Regular rhythm, normal rate, no murmurs, gallops, rubs    GI:  Soft, non distended, non tender. normoactive bowel sounds, no hepatosplenomegaly   : Marrero to gravity drainage    Musculoskeletal:    No edema, warm, 2+ pulses throughout    Neurologic:  Moves all extremities. AAOx3, CN II-XII reviewed            Data Review:    Review and/or order of clinical lab test  Review and/or order of tests in the radiology section of CPT      Labs:   No results for input(s): WBC, HGB, HCT, PLT, HGBEXT, HCTEXT, PLTEXT, HGBEXT, HCTEXT, PLTEXT in the last 72 hours. No results for input(s): NA, K, CL, CO2, BUN, CREA, GLU, CA, MG, PHOS, URICA in the last 72 hours. No results for input(s): ALT, AP, TBIL, TBILI, TP, ALB, GLOB, GGT, AML, LPSE in the last 72 hours. No lab exists for component: SGOT, GPT, AMYP, HLPSE    No results for input(s): INR, PTP, APTT, INREXT, INREXT in the last 72 hours. No results for input(s): FE, TIBC, PSAT, FERR in the last 72 hours. Lab Results   Component Value Date/Time    Folate 41.4 (H) 09/11/2022 10:49 AM      No results for input(s): PH, PCO2, PO2 in the last 72 hours. No results for input(s): CPK, CKNDX, TROIQ in the last 72 hours.     No lab exists for component: CPKMB  Lab Results   Component Value Date/Time    Cholesterol, total 147 01/02/2019 04:00 AM    HDL Cholesterol 66 01/02/2019 04:00 AM    LDL, calculated 65.6 01/02/2019 04:00 AM    Triglyceride 458 (H) 01/17/2023 02:11 AM    CHOL/HDL Ratio 2.2 01/02/2019 04:00 AM     Lab Results   Component Value Date/Time    Glucose (POC) 138 (H) 01/20/2023 12:24 AM    Glucose (POC) 134 (H) 01/19/2023 09:37 PM    Glucose (POC) 136 (H) 01/19/2023 02:28 PM    Glucose (POC) 130 (H) 01/19/2023 08:41 AM    Glucose (POC) 123 (H) 01/18/2023 09:35 PM     Lab Results   Component Value Date/Time    Color DARK YELLOW 01/10/2023 03:52 PM    Appearance TURBID (A) 01/10/2023 03:52 PM    Specific gravity 1.025 01/10/2023 03:52 PM    Specific gravity 1.010 09/03/2021 10:01 PM    pH (UA) 8.5 (H) 01/10/2023 03:52 PM    Protein 300 (A) 01/10/2023 03:52 PM    Glucose Negative 01/10/2023 03:52 PM    Ketone Negative 01/10/2023 03:52 PM    Bilirubin Negative 01/10/2023 03:52 PM    Urobilinogen 1.0 01/10/2023 03:52 PM    Nitrites Positive (A) 01/10/2023 03:52 PM    Leukocyte Esterase LARGE (A) 01/10/2023 03:52 PM    Epithelial cells FEW 01/10/2023 03:52 PM    Bacteria 2+ (A) 01/10/2023 03:52 PM    WBC 20-50 01/10/2023 03:52 PM    RBC 20-50 01/10/2023 03:52 PM         Medications Reviewed:     Current Facility-Administered Medications   Medication Dose Route Frequency    TPN ADULT - CENTRAL AA 5% D15% W/ ELECTROLYTES AND CA   IntraVENous CONTINUOUS    meropenem (MERREM) 1 g in 0.9% sodium chloride (MBP/ADV) 50 mL MBP  1 g IntraVENous Q8H    guaiFENesin ER (MUCINEX) tablet 600 mg  600 mg Oral Q12H    senna-docusate (PERICOLACE) 8.6-50 mg per tablet 1 Tablet  1 Tablet Oral DAILY    polyethylene glycol (MIRALAX) packet 17 g  17 g Oral DAILY    fat emulsion 20% (LIPOSYN, INTRAlipid) infusion 250 mL  250 mL IntraVENous Q MON, WED & FRI    acetaminophen (TYLENOL) tablet 1,000 mg  1,000 mg Oral Q6H PRN    dextrose 10% infusion 0-250 mL  0-250 mL IntraVENous PRN dicyclomine (BENTYL) capsule 10 mg  10 mg Oral TID PRN    finasteride (PROSCAR) tablet 5 mg  5 mg Oral DAILY    folic acid tablet 1 mg  1 mg Oral DAILY    glucagon (GLUCAGEN) injection 1 mg  1 mg IntraMUSCular PRN    glucose chewable tablet 16 g  4 Tablet Oral PRN    heparin (porcine) pf 500 Units  500 Units InterCATHeter PRN    L.acidophilus-paracasei-S.thermophil-bifidobacter (RISAQUAD) 8 billion cell capsule  1 Capsule Oral DAILY    [Held by provider] lipase-protease-amylase (ZENPEP 20,000) capsule 1 Capsule  1 Capsule Oral TID WITH MEALS    metoprolol tartrate (LOPRESSOR) tablet 12.5 mg  12.5 mg Oral BID    mirtazapine (REMERON) tablet 15 mg  15 mg Oral QHS    naloxone (NARCAN) injection 0.4 mg  0.4 mg IntraVENous EVERY 2 MINUTES AS NEEDED    ondansetron (ZOFRAN ODT) tablet 4 mg  4 mg Oral Q8H PRN    oxyCODONE IR (ROXICODONE) tablet 10 mg  10 mg Oral Q4H PRN    phenol throat spray (CHLORASEPTIC) 1 Spray  1 Spray Oral Q1H PRN    polyethylene glycol (MIRALAX) packet 17 g  17 g Oral DAILY PRN    sodium chloride (NS) flush 5-40 mL  5-40 mL IntraVENous Q8H    sodium chloride (NS) flush 5-40 mL  5-40 mL IntraVENous PRN    tamsulosin (FLOMAX) capsule 0.4 mg  0.4 mg Oral DAILY    thiamine HCL (B-1) tablet 100 mg  100 mg Oral DAILY    nicotine (NICODERM CQ) 14 mg/24 hr patch 1 Patch  1 Patch TransDERmal QHS    alteplase (CATHFLO) 1 mg in sterile water (preservative free) 1 mL injection  1 mg InterCATHeter PRN     ______________________________________________________________________  EXPECTED LENGTH OF STAY: 3d 12h  ACTUAL LENGTH OF STAY:          7                 Venice Crawford, NP

## 2023-01-20 NOTE — PROGRESS NOTES
Physician Progress Note      PATIENT:               Leeann Gutierrez  CSN #:                  [id number]  :                       1965  ADMIT DATE:       2023 4:31 AM  DISCH DATE:  RESPONDING  PROVIDER #:        Emmanuel ANN NP          QUERY TEXT:    Noted documentation of Acute on Chronic Pancreatitis per Attending and Chronic Pancreatitis per GI Consultant. If possible, please document in progress notes and discharge summary if you are evaluating and /or treating any of the following: The medical record reflects the following:  Risk Factors: alcohol abuse, pancreatitic pseudocyst's  Clinical Indicators: admitted with abd pain, intermittent nausea, and diarrhea. Pt found to have SBO. MRI abd showing multiple cysts of pancreas likely reflecting pseudocysts. No Lipase has been done this admission. Documentation of Chronic Pancreatitis per GI consultant, however A/C Pancreatitis is documented in the Attending Provider's Progress Notes. Treatment: bowel rest, IVF, Fuad pep, antiemetics prn, pain meds, GI consult. Thank you,  Zane Whitfield RN  Clinical Documentation  [phone], or via [employer]  Options provided:  -- Acute on Chronic Pancreatitis confirmed and Chronic Pancreatitis ruled out  -- Chronic Pancreatitis confirmed, and Acute on Chronic Pancreatitis ruled out  -- Other - I will add my own diagnosis  -- Disagree - Not applicable / Not valid  -- Disagree - Clinically unable to determine / Unknown  -- Refer to Clinical Documentation Reviewer    PROVIDER RESPONSE TEXT:    Provider disagreed with this query. we are not treatig pancreatitis.  We are treting SBO and UTI    Query created by: Zayra Parry on 2023 3:30 PM      Electronically signed by:  Dannielle Coley NP 2023 7:13 AM

## 2023-01-21 LAB
ALBUMIN SERPL-MCNC: 2.6 G/DL (ref 3.5–5)
ALBUMIN/GLOB SERPL: 0.6 (ref 1.1–2.2)
ALP SERPL-CCNC: 275 U/L (ref 45–117)
ALT SERPL-CCNC: 555 U/L (ref 12–78)
ANION GAP SERPL CALC-SCNC: 4 MMOL/L (ref 5–15)
AST SERPL-CCNC: 1123 U/L (ref 15–37)
BILIRUB SERPL-MCNC: 0.6 MG/DL (ref 0.2–1)
BUN SERPL-MCNC: 19 MG/DL (ref 6–20)
BUN/CREAT SERPL: 37 (ref 12–20)
CALCIUM SERPL-MCNC: 8.9 MG/DL (ref 8.5–10.1)
CHLORIDE SERPL-SCNC: 95 MMOL/L (ref 97–108)
CO2 SERPL-SCNC: 32 MMOL/L (ref 21–32)
CREAT SERPL-MCNC: 0.51 MG/DL (ref 0.7–1.3)
GLOBULIN SER CALC-MCNC: 4.4 G/DL (ref 2–4)
GLUCOSE BLD STRIP.AUTO-MCNC: 116 MG/DL (ref 65–117)
GLUCOSE SERPL-MCNC: 112 MG/DL (ref 65–100)
POTASSIUM SERPL-SCNC: 5 MMOL/L (ref 3.5–5.1)
PROT SERPL-MCNC: 7 G/DL (ref 6.4–8.2)
SERVICE CMNT-IMP: NORMAL
SODIUM SERPL-SCNC: 131 MMOL/L (ref 136–145)

## 2023-01-21 PROCEDURE — 36415 COLL VENOUS BLD VENIPUNCTURE: CPT

## 2023-01-21 PROCEDURE — 65270000029 HC RM PRIVATE

## 2023-01-21 PROCEDURE — 74011250637 HC RX REV CODE- 250/637: Performed by: STUDENT IN AN ORGANIZED HEALTH CARE EDUCATION/TRAINING PROGRAM

## 2023-01-21 PROCEDURE — 82962 GLUCOSE BLOOD TEST: CPT

## 2023-01-21 PROCEDURE — 74011000250 HC RX REV CODE- 250: Performed by: HOSPITALIST

## 2023-01-21 PROCEDURE — 74011250636 HC RX REV CODE- 250/636: Performed by: NURSE PRACTITIONER

## 2023-01-21 PROCEDURE — 74011250636 HC RX REV CODE- 250/636: Performed by: HOSPITALIST

## 2023-01-21 PROCEDURE — 74011000258 HC RX REV CODE- 258: Performed by: NURSE PRACTITIONER

## 2023-01-21 PROCEDURE — 74011000250 HC RX REV CODE- 250: Performed by: STUDENT IN AN ORGANIZED HEALTH CARE EDUCATION/TRAINING PROGRAM

## 2023-01-21 PROCEDURE — 80053 COMPREHEN METABOLIC PANEL: CPT

## 2023-01-21 PROCEDURE — 74011250637 HC RX REV CODE- 250/637: Performed by: NURSE PRACTITIONER

## 2023-01-21 PROCEDURE — 74011000258 HC RX REV CODE- 258: Performed by: HOSPITALIST

## 2023-01-21 PROCEDURE — 74011250636 HC RX REV CODE- 250/636: Performed by: STUDENT IN AN ORGANIZED HEALTH CARE EDUCATION/TRAINING PROGRAM

## 2023-01-21 RX ADMIN — Medication 1 MG: at 09:37

## 2023-01-21 RX ADMIN — GUAIFENESIN 600 MG: 600 TABLET, EXTENDED RELEASE ORAL at 20:20

## 2023-01-21 RX ADMIN — CYCLOBENZAPRINE 5 MG: 10 TABLET, FILM COATED ORAL at 12:31

## 2023-01-21 RX ADMIN — OXYCODONE HYDROCHLORIDE 10 MG: 5 TABLET ORAL at 06:01

## 2023-01-21 RX ADMIN — METOPROLOL TARTRATE 12.5 MG: 25 TABLET, FILM COATED ORAL at 09:37

## 2023-01-21 RX ADMIN — ONDANSETRON 4 MG: 4 TABLET, ORALLY DISINTEGRATING ORAL at 20:20

## 2023-01-21 RX ADMIN — POLYETHYLENE GLYCOL 3350 17 G: 17 POWDER, FOR SOLUTION ORAL at 09:37

## 2023-01-21 RX ADMIN — Medication 100 MG: at 09:37

## 2023-01-21 RX ADMIN — OXYCODONE HYDROCHLORIDE 10 MG: 5 TABLET ORAL at 20:20

## 2023-01-21 RX ADMIN — MEROPENEM 1 G: 1 INJECTION, POWDER, FOR SOLUTION INTRAVENOUS at 09:37

## 2023-01-21 RX ADMIN — CYCLOBENZAPRINE 5 MG: 10 TABLET, FILM COATED ORAL at 18:24

## 2023-01-21 RX ADMIN — SODIUM CHLORIDE, PRESERVATIVE FREE 10 ML: 5 INJECTION INTRAVENOUS at 06:01

## 2023-01-21 RX ADMIN — SENNOSIDES AND DOCUSATE SODIUM 1 TABLET: 50; 8.6 TABLET ORAL at 09:37

## 2023-01-21 RX ADMIN — MEROPENEM 1 G: 1 INJECTION, POWDER, FOR SOLUTION INTRAVENOUS at 02:31

## 2023-01-21 RX ADMIN — TAMSULOSIN HYDROCHLORIDE 0.4 MG: 0.4 CAPSULE ORAL at 09:37

## 2023-01-21 RX ADMIN — OXYCODONE HYDROCHLORIDE 10 MG: 5 TABLET ORAL at 10:55

## 2023-01-21 RX ADMIN — FINASTERIDE 5 MG: 5 TABLET, FILM COATED ORAL at 09:37

## 2023-01-21 RX ADMIN — OXYCODONE HYDROCHLORIDE 10 MG: 5 TABLET ORAL at 16:40

## 2023-01-21 RX ADMIN — Medication 1 CAPSULE: at 09:37

## 2023-01-21 RX ADMIN — CALCIUM GLUCONATE: 94 INJECTION, SOLUTION INTRAVENOUS at 18:29

## 2023-01-21 RX ADMIN — MEROPENEM 1 G: 1 INJECTION, POWDER, FOR SOLUTION INTRAVENOUS at 17:00

## 2023-01-21 RX ADMIN — METOPROLOL TARTRATE 12.5 MG: 25 TABLET, FILM COATED ORAL at 18:25

## 2023-01-21 RX ADMIN — SODIUM CHLORIDE, PRESERVATIVE FREE 10 ML: 5 INJECTION INTRAVENOUS at 20:24

## 2023-01-21 RX ADMIN — GUAIFENESIN 600 MG: 600 TABLET, EXTENDED RELEASE ORAL at 09:37

## 2023-01-21 RX ADMIN — MIRTAZAPINE 15 MG: 15 TABLET, FILM COATED ORAL at 20:20

## 2023-01-21 NOTE — PROGRESS NOTES
[facility]  [facility]ist [facility]                                                                                          Hospitalist Progress Note  Jame Davila MD  Answering service: [phone] from in house phone        Date of Service:  2023  NAME:  Cindi Anderson  :  1965  MRN:  [id number]      Admission Summary:   Cindi Anderson is a 59 y.o. male with medical hx of  rectal cancer status postresection, hypertension, peptic ulcer disease, BPH, and mood disorder who presents with abdominal pain, and is being admitted for SBO. Note, he was from [facility] where he was admitted on  for recurrent SBO. Sigmoidoscopy there showed concern for rectal stricture. He is being transferred to [facility] for GI evaluation for upper GI. He has been hemodynamically stable. Interval history / Subjective: Follow up recurrent UTI  Having abd pain with eating  Did not eat this am but ate last night  Wants to hold off on his lunch as well    Assessment & Plan:     UTI  Hx of multiple MDR UTI  -  afebrile, wbc 8.2     U/A (1/10) wbc 20-50, 3+ bacteria, urine cx - ESBL E-coli and proteus mirabilis     Continue with IV Merrem, recommend to complete total 2 weeks, last dose ; this is his fourth UTI since 2022. History of rectal cancer s/p resection/radiation  History of gastric ulcer  Concern for rectal stricture  Abdominal pain 2/2 Recurrent small bowel obstruction   - surgery following;     KUB () Small bowel obstruction pattern appears mildly improved. Large  volume of stool in the ascending colon    MRCP (1/15) Multiple cystic lesions of the pancreas likely reflecting pseudocyst with no  MRI evidence of underlying malignancy. Follow-up after clinical improvement is  recommended. Small bowel obstruction pattern redemonstrated.     Flexible sigmoidoscopy Kristyn Guzman showed possible rectal stricture    Stool WBC and enteric bacteria PCR (-)    GI signed off; recommend outpatient EGD/colonoscopy    Severe Malnutrition in the setting of chronic illness  - greater than 7.5% weigh loss in 3 months, severe body fat loss, severe muscle mass loss  - regular diet, ensure enlive TID, snacks BID  -  appreciate dietary input; will continue TPN for now till continues to eat well     PVC  -EKG recurrent PVCs  -Keep K above 4 mag above 2  -On metoprolol 12.5 mg twice daily     Chronic urinary retention, s/p Marrero  -Changed on 1/10/2022 at [facility] and [facility]     Acute on Chronic Pancreatitis  Pancreatic Anomaly  -scheduled for egd w/ EUS with GI as outpt but unable to make this appointment  -cannot r/o pancreatic cancer with recent ct  -continue zenpep when eating again     Hx of Polysubstance abuse     Code status: Full  Prophylaxis: SCD's    The patient is quite critical and at a high risk of clinical decline. Spent 41 mn of critical care time with the patient    Plan: Continue IV antibiotics, continue TPN for now  Care Plan discussed with: patient, nurse, CM, surgery  Anticipated Disposition: pt needs to complete IV ABX in hospital setting in presence of substance abuse.  May complete remaining abx therapy at [facility] Problems  Date Reviewed: 1/6/2023            Codes Class Noted POA    UTI due to extended-spectrum beta lactamase (ESBL) producing Escherichia coli ICD-10-CM: N39.0, B96.29, Z16.12  ICD-9-CM: 599.0, 041.49, V09.1  1/13/2023 Unknown        Severe protein-calorie malnutrition (HCC) (Chronic) ICD-10-CM: Z08  ICD-9-CM: 262  1/13/2023 Yes        Small bowel obstruction ([facility] [address] ICD-10-CM: P55.105  ICD-9-CM: 560.9  9/9/2022 Yes       Review of Systems:               Symptom Y/N Comments   Symptom Y/N Comments   Fever/Chills n      Chest Pain  n      Poor Appetite       Edema        Cough       Abdominal Pain n       Sputum       Joint Pain        SOB/SZYMANSKI n      Pruritis/Rash Nausea/vomit n      Tolerating PT/OT        Diarrhea      Tolerating Diet        Constipation       Other           Could NOT obtain due to:         Vital Signs:    Last 24hrs VS reviewed since prior progress note. Most recent are:  Visit Vitals  /84   Pulse 78   Temp 98 °F (36.7 °C)   Resp 18   Ht 5' 8\" (1.727 m)   Wt 54.9 kg (121 lb 0.5 oz)   SpO2 100%   BMI 18.40 kg/m²         Intake/Output Summary (Last 24 hours) at 1/21/2023 1504  Last data filed at 1/21/2023 1217  Gross per 24 hour   Intake 30 ml   Output 2500 ml   Net -2470 ml          Physical Examination:     I had a face to face encounter with this patient and independently examined them on 1/21/2023 as outlined below:          Constitutional:  No acute distress, cooperative, pleasant. Cachetic, frail appearing   ENT:  Oral mucosa moist, oropharynx benign. Resp:  CTA bilaterally. No wheezing/rhonchi/rales. No accessory muscle use. CV:  Regular rhythm, normal rate, no murmurs, gallops, rubs    GI:  Soft, non distended, non tender. normoactive bowel sounds, no hepatosplenomegaly   : Marrero to gravity drainage    Musculoskeletal:    No edema, warm, 2+ pulses throughout    Neurologic:  Moves all extremities. AAOx3, CN II-XII reviewed            Data Review:    Review and/or order of clinical lab test  Review and/or order of tests in the radiology section of CPT      Labs:   No results for input(s): WBC, HGB, HCT, PLT, HGBEXT, HCTEXT, PLTEXT, HGBEXT, HCTEXT, PLTEXT in the last 72 hours. Recent Labs     01/21/23  0230   *   K 5.0   CL 95*   CO2 32   BUN 19   CREA 0.51*   *   CA 8.9       Recent Labs     01/21/23  0230   *   *   TBILI 0.6   TP 7.0   ALB 2.6*   GLOB 4.4*       No results for input(s): INR, PTP, APTT, INREXT, INREXT in the last 72 hours. No results for input(s): FE, TIBC, PSAT, FERR in the last 72 hours.    Lab Results   Component Value Date/Time    Folate 41.4 (H) 09/11/2022 10:49 AM      No results for input(s): PH, PCO2, PO2 in the last 72 hours. No results for input(s): CPK, CKNDX, TROIQ in the last 72 hours.     No lab exists for component: CPKMB  Lab Results   Component Value Date/Time    Cholesterol, total 147 01/02/2019 04:00 AM    HDL Cholesterol 66 01/02/2019 04:00 AM    LDL, calculated 65.6 01/02/2019 04:00 AM    Triglyceride 458 (H) 01/17/2023 02:11 AM    CHOL/HDL Ratio 2.2 01/02/2019 04:00 AM     Lab Results   Component Value Date/Time    Glucose (POC) 116 01/21/2023 11:31 AM    Glucose (POC) 133 (H) 01/20/2023 11:32 PM    Glucose (POC) 138 (H) 01/20/2023 12:24 AM    Glucose (POC) 134 (H) 01/19/2023 09:37 PM    Glucose (POC) 136 (H) 01/19/2023 02:28 PM     Lab Results   Component Value Date/Time    Color DARK YELLOW 01/10/2023 03:52 PM    Appearance TURBID (A) 01/10/2023 03:52 PM    Specific gravity 1.025 01/10/2023 03:52 PM    Specific gravity 1.010 09/03/2021 10:01 PM    pH (UA) 8.5 (H) 01/10/2023 03:52 PM    Protein 300 (A) 01/10/2023 03:52 PM    Glucose Negative 01/10/2023 03:52 PM    Ketone Negative 01/10/2023 03:52 PM    Bilirubin Negative 01/10/2023 03:52 PM    Urobilinogen 1.0 01/10/2023 03:52 PM    Nitrites Positive (A) 01/10/2023 03:52 PM    Leukocyte Esterase LARGE (A) 01/10/2023 03:52 PM    Epithelial cells FEW 01/10/2023 03:52 PM    Bacteria 2+ (A) 01/10/2023 03:52 PM    WBC 20-50 01/10/2023 03:52 PM    RBC 20-50 01/10/2023 03:52 PM         Medications Reviewed:     Current Facility-Administered Medications   Medication Dose Route Frequency    TPN ADULT - CENTRAL AA 5% D15% W/ ELECTROLYTES AND CA   IntraVENous CONTINUOUS    cyclobenzaprine (FLEXERIL) tablet 5 mg  5 mg Oral TID PRN    meropenem (MERREM) 1 g in 0.9% sodium chloride (MBP/ADV) 50 mL MBP  1 g IntraVENous Q8H    guaiFENesin ER (MUCINEX) tablet 600 mg  600 mg Oral Q12H    senna-docusate (PERICOLACE) 8.6-50 mg per tablet 1 Tablet  1 Tablet Oral DAILY    polyethylene glycol (MIRALAX) packet 17 g  17 g Oral DAILY    acetaminophen (TYLENOL) tablet 1,000 mg  1,000 mg Oral Q6H PRN    dextrose 10% infusion 0-250 mL  0-250 mL IntraVENous PRN    dicyclomine (BENTYL) capsule 10 mg  10 mg Oral TID PRN    finasteride (PROSCAR) tablet 5 mg  5 mg Oral DAILY    folic acid tablet 1 mg  1 mg Oral DAILY    glucagon (GLUCAGEN) injection 1 mg  1 mg IntraMUSCular PRN    glucose chewable tablet 16 g  4 Tablet Oral PRN    heparin (porcine) pf 500 Units  500 Units InterCATHeter PRN    L.acidophilus-paracasei-S.thermophil-bifidobacter (RISAQUAD) 8 billion cell capsule  1 Capsule Oral DAILY    [Held by provider] lipase-protease-amylase (ZENPEP 20,000) capsule 1 Capsule  1 Capsule Oral TID WITH MEALS    metoprolol tartrate (LOPRESSOR) tablet 12.5 mg  12.5 mg Oral BID    mirtazapine (REMERON) tablet 15 mg  15 mg Oral QHS    naloxone (NARCAN) injection 0.4 mg  0.4 mg IntraVENous EVERY 2 MINUTES AS NEEDED    ondansetron (ZOFRAN ODT) tablet 4 mg  4 mg Oral Q8H PRN    oxyCODONE IR (ROXICODONE) tablet 10 mg  10 mg Oral Q4H PRN    phenol throat spray (CHLORASEPTIC) 1 Spray  1 Spray Oral Q1H PRN    polyethylene glycol (MIRALAX) packet 17 g  17 g Oral DAILY PRN    sodium chloride (NS) flush 5-40 mL  5-40 mL IntraVENous Q8H    sodium chloride (NS) flush 5-40 mL  5-40 mL IntraVENous PRN    tamsulosin (FLOMAX) capsule 0.4 mg  0.4 mg Oral DAILY    thiamine HCL (B-1) tablet 100 mg  100 mg Oral DAILY    nicotine (NICODERM CQ) 14 mg/24 hr patch 1 Patch  1 Patch TransDERmal QHS    alteplase (CATHFLO) 1 mg in sterile water (preservative free) 1 mL injection  1 mg InterCATHeter PRN     ______________________________________________________________________  EXPECTED LENGTH OF STAY: 3d 12h  ACTUAL LENGTH OF STAY:          Aurelia French MD

## 2023-01-21 NOTE — PROGRESS NOTES
Complained of rectal spasms that come and go. Patient noted hollering out when spasms occur. Hospitalist made aware. Order received for Flexeril.

## 2023-01-22 LAB
ANION GAP SERPL CALC-SCNC: 3 MMOL/L (ref 5–15)
BASOPHILS # BLD: 0 K/UL (ref 0–0.1)
BASOPHILS NFR BLD: 0 % (ref 0–1)
BUN SERPL-MCNC: 26 MG/DL (ref 6–20)
BUN/CREAT SERPL: 39 (ref 12–20)
CALCIUM SERPL-MCNC: 9.6 MG/DL (ref 8.5–10.1)
CHLORIDE SERPL-SCNC: 91 MMOL/L (ref 97–108)
CO2 SERPL-SCNC: 33 MMOL/L (ref 21–32)
CREAT SERPL-MCNC: 0.67 MG/DL (ref 0.7–1.3)
DIFFERENTIAL METHOD BLD: ABNORMAL
EOSINOPHIL # BLD: 0 K/UL (ref 0–0.4)
EOSINOPHIL NFR BLD: 0 % (ref 0–7)
ERYTHROCYTE [DISTWIDTH] IN BLOOD BY AUTOMATED COUNT: 15.9 % (ref 11.5–14.5)
GLUCOSE BLD STRIP.AUTO-MCNC: 124 MG/DL (ref 65–117)
GLUCOSE BLD STRIP.AUTO-MCNC: 140 MG/DL (ref 65–117)
GLUCOSE BLD STRIP.AUTO-MCNC: 141 MG/DL (ref 65–117)
GLUCOSE BLD STRIP.AUTO-MCNC: 159 MG/DL (ref 65–117)
GLUCOSE SERPL-MCNC: 130 MG/DL (ref 65–100)
HCT VFR BLD AUTO: 27.8 % (ref 36.6–50.3)
HGB BLD-MCNC: 8.9 G/DL (ref 12.1–17)
IMM GRANULOCYTES # BLD AUTO: 0 K/UL (ref 0–0.04)
IMM GRANULOCYTES NFR BLD AUTO: 0 % (ref 0–0.5)
LYMPHOCYTES # BLD: 1.5 K/UL (ref 0.8–3.5)
LYMPHOCYTES NFR BLD: 23 % (ref 12–49)
MCH RBC QN AUTO: 28.7 PG (ref 26–34)
MCHC RBC AUTO-ENTMCNC: 32 G/DL (ref 30–36.5)
MCV RBC AUTO: 89.7 FL (ref 80–99)
MONOCYTES # BLD: 0.9 K/UL (ref 0–1)
MONOCYTES NFR BLD: 14 % (ref 5–13)
NEUTS SEG # BLD: 4.2 K/UL (ref 1.8–8)
NEUTS SEG NFR BLD: 63 % (ref 32–75)
NRBC # BLD: 0 K/UL (ref 0–0.01)
NRBC BLD-RTO: 0 PER 100 WBC
PLATELET # BLD AUTO: 309 K/UL (ref 150–400)
PMV BLD AUTO: 10.6 FL (ref 8.9–12.9)
POTASSIUM SERPL-SCNC: 4.9 MMOL/L (ref 3.5–5.1)
RBC # BLD AUTO: 3.1 M/UL (ref 4.1–5.7)
SERVICE CMNT-IMP: ABNORMAL
SODIUM SERPL-SCNC: 127 MMOL/L (ref 136–145)
WBC # BLD AUTO: 6.7 K/UL (ref 4.1–11.1)

## 2023-01-22 PROCEDURE — 74011000250 HC RX REV CODE- 250: Performed by: STUDENT IN AN ORGANIZED HEALTH CARE EDUCATION/TRAINING PROGRAM

## 2023-01-22 PROCEDURE — 74011250637 HC RX REV CODE- 250/637: Performed by: NURSE PRACTITIONER

## 2023-01-22 PROCEDURE — 74011250636 HC RX REV CODE- 250/636: Performed by: HOSPITALIST

## 2023-01-22 PROCEDURE — 85025 COMPLETE CBC W/AUTO DIFF WBC: CPT

## 2023-01-22 PROCEDURE — 74011000258 HC RX REV CODE- 258: Performed by: HOSPITALIST

## 2023-01-22 PROCEDURE — 82962 GLUCOSE BLOOD TEST: CPT

## 2023-01-22 PROCEDURE — 74011250637 HC RX REV CODE- 250/637: Performed by: STUDENT IN AN ORGANIZED HEALTH CARE EDUCATION/TRAINING PROGRAM

## 2023-01-22 PROCEDURE — 74011250636 HC RX REV CODE- 250/636: Performed by: NURSE PRACTITIONER

## 2023-01-22 PROCEDURE — 65270000029 HC RM PRIVATE

## 2023-01-22 PROCEDURE — 74011000250 HC RX REV CODE- 250: Performed by: HOSPITALIST

## 2023-01-22 PROCEDURE — 74011000258 HC RX REV CODE- 258: Performed by: NURSE PRACTITIONER

## 2023-01-22 PROCEDURE — 36415 COLL VENOUS BLD VENIPUNCTURE: CPT

## 2023-01-22 PROCEDURE — 80048 BASIC METABOLIC PNL TOTAL CA: CPT

## 2023-01-22 RX ADMIN — MEROPENEM 1 G: 1 INJECTION, POWDER, FOR SOLUTION INTRAVENOUS at 18:45

## 2023-01-22 RX ADMIN — MEROPENEM 1 G: 1 INJECTION, POWDER, FOR SOLUTION INTRAVENOUS at 09:20

## 2023-01-22 RX ADMIN — CYCLOBENZAPRINE 5 MG: 10 TABLET, FILM COATED ORAL at 13:56

## 2023-01-22 RX ADMIN — TAMSULOSIN HYDROCHLORIDE 0.4 MG: 0.4 CAPSULE ORAL at 09:21

## 2023-01-22 RX ADMIN — Medication 1 CAPSULE: at 09:21

## 2023-01-22 RX ADMIN — FINASTERIDE 5 MG: 5 TABLET, FILM COATED ORAL at 09:21

## 2023-01-22 RX ADMIN — CALCIUM GLUCONATE: 94 INJECTION, SOLUTION INTRAVENOUS at 18:47

## 2023-01-22 RX ADMIN — OXYCODONE HYDROCHLORIDE 10 MG: 5 TABLET ORAL at 06:46

## 2023-01-22 RX ADMIN — OXYCODONE HYDROCHLORIDE 10 MG: 5 TABLET ORAL at 12:14

## 2023-01-22 RX ADMIN — OXYCODONE HYDROCHLORIDE 10 MG: 5 TABLET ORAL at 22:34

## 2023-01-22 RX ADMIN — Medication 1 MG: at 09:20

## 2023-01-22 RX ADMIN — Medication 100 MG: at 09:21

## 2023-01-22 RX ADMIN — OXYCODONE HYDROCHLORIDE 10 MG: 5 TABLET ORAL at 00:55

## 2023-01-22 RX ADMIN — POLYETHYLENE GLYCOL 3350 17 G: 17 POWDER, FOR SOLUTION ORAL at 09:21

## 2023-01-22 RX ADMIN — SODIUM CHLORIDE, PRESERVATIVE FREE 10 ML: 5 INJECTION INTRAVENOUS at 21:48

## 2023-01-22 RX ADMIN — DICYCLOMINE HYDROCHLORIDE 10 MG: 10 CAPSULE ORAL at 00:55

## 2023-01-22 RX ADMIN — MEROPENEM 1 G: 1 INJECTION, POWDER, FOR SOLUTION INTRAVENOUS at 00:56

## 2023-01-22 RX ADMIN — SENNOSIDES AND DOCUSATE SODIUM 1 TABLET: 50; 8.6 TABLET ORAL at 09:21

## 2023-01-22 RX ADMIN — OXYCODONE HYDROCHLORIDE 10 MG: 5 TABLET ORAL at 18:53

## 2023-01-22 RX ADMIN — MIRTAZAPINE 15 MG: 15 TABLET, FILM COATED ORAL at 21:46

## 2023-01-22 NOTE — PROGRESS NOTES
[facility] Adult  Hospitalist Group                                                                                          Hospitalist Progress Note  Christina Hernández MD  Answering service: [phone] OR [phone] from in house phone        Date of Service:  2023  NAME:  Yamileth Bran  :  1965  MRN:  [id number]      Admission Summary:   Yamileth Bran is a 59 y.o. male with medical hx of  rectal cancer status postresection, hypertension, peptic ulcer disease, BPH, and mood disorder who presents with abdominal pain, and is being admitted for SBO. Note, he was from [facility] where he was admitted on  for recurrent SBO. Sigmoidoscopy there showed concern for rectal stricture. He is being transferred to [facility] for GI evaluation for upper GI. He has been hemodynamically stable. Interval history / Subjective: Follow up recurrent UTI  Eating but don't think it is enough  Hyponatremia  TPN ordered for today    Assessment & Plan:     UTI  Hx of multiple MDR UTI  -  afebrile, wbc 8.2     U/A (1/10) wbc 20-50, 3+ bacteria, urine cx - ESBL E-coli and proteus mirabilis     Continue with IV Merrem, recommend to complete total 2 weeks, last dose ; this is his fourth UTI since 2022. Hyponatremia: follow work up, continue same TPN, may need to change tomorrow if doesn't get better    History of rectal cancer s/p resection/radiation  History of gastric ulcer  Concern for rectal stricture  Abdominal pain 2/2 Recurrent small bowel obstruction   - surgery following;     KUB () Small bowel obstruction pattern appears mildly improved. Large  volume of stool in the ascending colon    MRCP (1/15) Multiple cystic lesions of the pancreas likely reflecting pseudocyst with no  MRI evidence of underlying malignancy. Follow-up after clinical improvement is  recommended. Small bowel obstruction pattern redemonstrated.     Flexible sigmoidoscopy [facility] showed possible rectal stricture    Stool WBC and enteric bacteria PCR (-)    GI signed off; recommend outpatient EGD/colonoscopy    Severe Malnutrition in the setting of chronic illness  - greater than 7.5% weigh loss in 3 months, severe body fat loss, severe muscle mass loss  - regular diet, ensure enlive TID, snacks BID  -  appreciate dietary input; will continue TPN for now till continues to eat well     PVC  -EKG recurrent PVCs  -Keep K above 4 mag above 2  -On metoprolol 12.5 mg twice daily     Chronic urinary retention, s/p Marrero  -Changed on 1/10/2022 at [facility]  -Continue finasteride and Flomax     Acute on Chronic Pancreatitis  Pancreatic Anomaly  -scheduled for egd w/ EUS with GI as outpt but unable to make this appointment  -cannot r/o pancreatic cancer with recent ct  -continue zenpep when eating again     Hx of Polysubstance abuse     Code status: Full  Prophylaxis: SCD's    The patient is quite critical and at a high risk of clinical decline. Spent 41 mn of critical care time with the patient    Plan: Continue IV antibiotics, continue TPN for now  Care Plan discussed with: patient, nurse, CM, surgery  Anticipated Disposition: pt needs to complete IV ABX in hospital setting in presence of substance abuse.  May complete remaining abx therapy at [facility] Problems  Date Reviewed: 1/6/2023            Codes Class Noted POA    * (Principal) UTI due to extended-spectrum beta lactamase (ESBL) producing Escherichia coli ICD-10-CM: N39.0, B96.29, Z16.12  ICD-9-CM: 599.0, 041.49, V09.1  1/13/2023 Yes        Severe protein-calorie malnutrition (HCC) (Chronic) ICD-10-CM: O84  ICD-9-CM: 262  1/13/2023 Yes        Small bowel obstruction ([facility] [address] ICD-10-CM: G48.355  ICD-9-CM: 560.9  9/9/2022 Yes       Review of Systems:               Symptom Y/N Comments   Symptom Y/N Comments   Fever/Chills n      Chest Pain  n      Poor Appetite       Edema        Cough       Abdominal Pain n Sputum       Joint Pain        SOB/SZYMANSKI n      Pruritis/Rash        Nausea/vomit n      Tolerating PT/OT        Diarrhea      Tolerating Diet        Constipation       Other           Could NOT obtain due to:         Vital Signs:    Last 24hrs VS reviewed since prior progress note. Most recent are:  Visit Vitals  /69   Pulse (!) 102   Temp 98.5 °F (36.9 °C)   Resp 18   Ht 5' 8\" (1.727 m)   Wt 54.9 kg (121 lb 0.5 oz)   SpO2 100%   BMI 18.40 kg/m²         Intake/Output Summary (Last 24 hours) at 1/22/2023 1334  Last data filed at 1/22/2023 2561  Gross per 24 hour   Intake --   Output 800 ml   Net -800 ml          Physical Examination:     I had a face to face encounter with this patient and independently examined them on 1/22/2023 as outlined below:          Constitutional:  No acute distress, cooperative, pleasant. Cachetic, frail appearing   ENT:  Oral mucosa moist, oropharynx benign. Resp:  CTA bilaterally. No wheezing/rhonchi/rales. No accessory muscle use. CV:  Regular rhythm, normal rate, no murmurs, gallops, rubs    GI:  Soft, non distended, non tender. normoactive bowel sounds, no hepatosplenomegaly   : Marrero to gravity drainage    Musculoskeletal:    No edema, warm, 2+ pulses throughout    Neurologic:  Moves all extremities. AAOx3, CN II-XII reviewed            Data Review:    Review and/or order of clinical lab test  Review and/or order of tests in the radiology section of CPT      Labs:     Recent Labs     01/22/23 0416   WBC 6.7   HGB 8.9*   HCT 27.8*          Recent Labs     01/22/23  0416 01/21/23  0230   * 131*   K 4.9 5.0   CL 91* 95*   CO2 33* 32   BUN 26* 19   CREA 0.67* 0.51*   * 112*   CA 9.6 8.9       Recent Labs     01/21/23  0230   *   *   TBILI 0.6   TP 7.0   ALB 2.6*   GLOB 4.4*       No results for input(s): INR, PTP, APTT, INREXT, INREXT in the last 72 hours. No results for input(s): FE, TIBC, PSAT, FERR in the last 72 hours.    Lab Results Component Value Date/Time    Folate 41.4 (H) 09/11/2022 10:49 AM      No results for input(s): PH, PCO2, PO2 in the last 72 hours. No results for input(s): CPK, CKNDX, TROIQ in the last 72 hours.     No lab exists for component: CPKMB  Lab Results   Component Value Date/Time    Cholesterol, total 147 01/02/2019 04:00 AM    HDL Cholesterol 66 01/02/2019 04:00 AM    LDL, calculated 65.6 01/02/2019 04:00 AM    Triglyceride 458 (H) 01/17/2023 02:11 AM    CHOL/HDL Ratio 2.2 01/02/2019 04:00 AM     Lab Results   Component Value Date/Time    Glucose (POC) 140 (H) 01/22/2023 11:19 AM    Glucose (POC) 124 (H) 01/22/2023 06:40 AM    Glucose (POC) 116 01/21/2023 11:31 AM    Glucose (POC) 133 (H) 01/20/2023 11:32 PM    Glucose (POC) 138 (H) 01/20/2023 12:24 AM     Lab Results   Component Value Date/Time    Color DARK YELLOW 01/10/2023 03:52 PM    Appearance TURBID (A) 01/10/2023 03:52 PM    Specific gravity 1.025 01/10/2023 03:52 PM    Specific gravity 1.010 09/03/2021 10:01 PM    pH (UA) 8.5 (H) 01/10/2023 03:52 PM    Protein 300 (A) 01/10/2023 03:52 PM    Glucose Negative 01/10/2023 03:52 PM    Ketone Negative 01/10/2023 03:52 PM    Bilirubin Negative 01/10/2023 03:52 PM    Urobilinogen 1.0 01/10/2023 03:52 PM    Nitrites Positive (A) 01/10/2023 03:52 PM    Leukocyte Esterase LARGE (A) 01/10/2023 03:52 PM    Epithelial cells FEW 01/10/2023 03:52 PM    Bacteria 2+ (A) 01/10/2023 03:52 PM    WBC 20-50 01/10/2023 03:52 PM    RBC 20-50 01/10/2023 03:52 PM         Medications Reviewed:     Current Facility-Administered Medications   Medication Dose Route Frequency    TPN ADULT - CENTRAL   IntraVENous CONTINUOUS    TPN ADULT - CENTRAL   IntraVENous CONTINUOUS    cyclobenzaprine (FLEXERIL) tablet 5 mg  5 mg Oral TID PRN    meropenem (MERREM) 1 g in 0.9% sodium chloride (MBP/ADV) 50 mL MBP  1 g IntraVENous Q8H    senna-docusate (PERICOLACE) 8.6-50 mg per tablet 1 Tablet  1 Tablet Oral DAILY    polyethylene glycol (MIRALAX) packet 17 g  17 g Oral DAILY    acetaminophen (TYLENOL) tablet 1,000 mg  1,000 mg Oral Q6H PRN    dextrose 10% infusion 0-250 mL  0-250 mL IntraVENous PRN    dicyclomine (BENTYL) capsule 10 mg  10 mg Oral TID PRN    finasteride (PROSCAR) tablet 5 mg  5 mg Oral DAILY    folic acid tablet 1 mg  1 mg Oral DAILY    glucagon (GLUCAGEN) injection 1 mg  1 mg IntraMUSCular PRN    glucose chewable tablet 16 g  4 Tablet Oral PRN    heparin (porcine) pf 500 Units  500 Units InterCATHeter PRN    L.acidophilus-paracasei-S.thermophil-bifidobacter (RISAQUAD) 8 billion cell capsule  1 Capsule Oral DAILY    [Held by provider] lipase-protease-amylase (ZENPEP 20,000) capsule 1 Capsule  1 Capsule Oral TID WITH MEALS    metoprolol tartrate (LOPRESSOR) tablet 12.5 mg  12.5 mg Oral BID    mirtazapine (REMERON) tablet 15 mg  15 mg Oral QHS    naloxone (NARCAN) injection 0.4 mg  0.4 mg IntraVENous EVERY 2 MINUTES AS NEEDED    ondansetron (ZOFRAN ODT) tablet 4 mg  4 mg Oral Q8H PRN    oxyCODONE IR (ROXICODONE) tablet 10 mg  10 mg Oral Q4H PRN    phenol throat spray (CHLORASEPTIC) 1 Spray  1 Spray Oral Q1H PRN    polyethylene glycol (MIRALAX) packet 17 g  17 g Oral DAILY PRN    sodium chloride (NS) flush 5-40 mL  5-40 mL IntraVENous Q8H    sodium chloride (NS) flush 5-40 mL  5-40 mL IntraVENous PRN    tamsulosin (FLOMAX) capsule 0.4 mg  0.4 mg Oral DAILY    thiamine HCL (B-1) tablet 100 mg  100 mg Oral DAILY    nicotine (NICODERM CQ) 14 mg/24 hr patch 1 Patch  1 Patch TransDERmal QHS    alteplase (CATHFLO) 1 mg in sterile water (preservative free) 1 mL injection  1 mg InterCATHeter PRN     ______________________________________________________________________  EXPECTED LENGTH OF STAY: 3d 12h  ACTUAL LENGTH OF STAY:          MD Oli

## 2023-01-23 ENCOUNTER — APPOINTMENT (OUTPATIENT)
Dept: GENERAL RADIOLOGY | Age: 58
End: 2023-01-23
Attending: NURSE PRACTITIONER
Payer: MEDICARE

## 2023-01-23 LAB
ALBUMIN SERPL-MCNC: 2.7 G/DL (ref 3.5–5)
ALBUMIN/GLOB SERPL: 0.6 (ref 1.1–2.2)
ALP SERPL-CCNC: 348 U/L (ref 45–117)
ALT SERPL-CCNC: 504 U/L (ref 12–78)
ANION GAP SERPL CALC-SCNC: 4 MMOL/L (ref 5–15)
AST SERPL-CCNC: 332 U/L (ref 15–37)
BASOPHILS # BLD: 0 K/UL (ref 0–0.1)
BASOPHILS NFR BLD: 0 % (ref 0–1)
BILIRUB DIRECT SERPL-MCNC: <0.1 MG/DL (ref 0–0.2)
BILIRUB SERPL-MCNC: 0.3 MG/DL (ref 0.2–1)
BUN SERPL-MCNC: 40 MG/DL (ref 6–20)
BUN/CREAT SERPL: 53 (ref 12–20)
CALCIUM SERPL-MCNC: 9.6 MG/DL (ref 8.5–10.1)
CHLORIDE SERPL-SCNC: 93 MMOL/L (ref 97–108)
CO2 SERPL-SCNC: 32 MMOL/L (ref 21–32)
CREAT SERPL-MCNC: 0.75 MG/DL (ref 0.7–1.3)
DIFFERENTIAL METHOD BLD: ABNORMAL
EOSINOPHIL # BLD: 0 K/UL (ref 0–0.4)
EOSINOPHIL NFR BLD: 0 % (ref 0–7)
ERYTHROCYTE [DISTWIDTH] IN BLOOD BY AUTOMATED COUNT: 15.8 % (ref 11.5–14.5)
GLOBULIN SER CALC-MCNC: 4.8 G/DL (ref 2–4)
GLUCOSE BLD STRIP.AUTO-MCNC: 128 MG/DL (ref 65–117)
GLUCOSE SERPL-MCNC: 145 MG/DL (ref 65–100)
HCT VFR BLD AUTO: 25 % (ref 36.6–50.3)
HGB BLD-MCNC: 8.4 G/DL (ref 12.1–17)
IMM GRANULOCYTES # BLD AUTO: 0 K/UL (ref 0–0.04)
IMM GRANULOCYTES NFR BLD AUTO: 0 % (ref 0–0.5)
LYMPHOCYTES # BLD: 1.7 K/UL (ref 0.8–3.5)
LYMPHOCYTES NFR BLD: 22 % (ref 12–49)
MCH RBC QN AUTO: 29.5 PG (ref 26–34)
MCHC RBC AUTO-ENTMCNC: 33.6 G/DL (ref 30–36.5)
MCV RBC AUTO: 87.7 FL (ref 80–99)
MONOCYTES # BLD: 1.1 K/UL (ref 0–1)
MONOCYTES NFR BLD: 15 % (ref 5–13)
NEUTS SEG # BLD: 4.7 K/UL (ref 1.8–8)
NEUTS SEG NFR BLD: 63 % (ref 32–75)
NRBC # BLD: 0 K/UL (ref 0–0.01)
NRBC BLD-RTO: 0 PER 100 WBC
OSMOLALITY SERPL: 291 MOSM/KG H2O
OSMOLALITY UR: 626 MOSM/KG H2O
PLATELET # BLD AUTO: 301 K/UL (ref 150–400)
PMV BLD AUTO: 10.3 FL (ref 8.9–12.9)
POTASSIUM SERPL-SCNC: 4.5 MMOL/L (ref 3.5–5.1)
PROT SERPL-MCNC: 7.5 G/DL (ref 6.4–8.2)
RBC # BLD AUTO: 2.85 M/UL (ref 4.1–5.7)
SERVICE CMNT-IMP: ABNORMAL
SODIUM SERPL-SCNC: 129 MMOL/L (ref 136–145)
SODIUM UR-SCNC: 42 MMOL/L
TRIGL SERPL-MCNC: 74 MG/DL (ref ?–150)
WBC # BLD AUTO: 7.6 K/UL (ref 4.1–11.1)

## 2023-01-23 PROCEDURE — 83930 ASSAY OF BLOOD OSMOLALITY: CPT

## 2023-01-23 PROCEDURE — 97535 SELF CARE MNGMENT TRAINING: CPT

## 2023-01-23 PROCEDURE — 74018 RADEX ABDOMEN 1 VIEW: CPT

## 2023-01-23 PROCEDURE — 74011000258 HC RX REV CODE- 258: Performed by: NURSE PRACTITIONER

## 2023-01-23 PROCEDURE — 74011250637 HC RX REV CODE- 250/637: Performed by: STUDENT IN AN ORGANIZED HEALTH CARE EDUCATION/TRAINING PROGRAM

## 2023-01-23 PROCEDURE — 80048 BASIC METABOLIC PNL TOTAL CA: CPT

## 2023-01-23 PROCEDURE — 65270000029 HC RM PRIVATE

## 2023-01-23 PROCEDURE — 36415 COLL VENOUS BLD VENIPUNCTURE: CPT

## 2023-01-23 PROCEDURE — 74011250637 HC RX REV CODE- 250/637: Performed by: NURSE PRACTITIONER

## 2023-01-23 PROCEDURE — 97161 PT EVAL LOW COMPLEX 20 MIN: CPT

## 2023-01-23 PROCEDURE — 74011250636 HC RX REV CODE- 250/636: Performed by: NURSE PRACTITIONER

## 2023-01-23 PROCEDURE — 84478 ASSAY OF TRIGLYCERIDES: CPT

## 2023-01-23 PROCEDURE — 97165 OT EVAL LOW COMPLEX 30 MIN: CPT

## 2023-01-23 PROCEDURE — 74011000250 HC RX REV CODE- 250: Performed by: STUDENT IN AN ORGANIZED HEALTH CARE EDUCATION/TRAINING PROGRAM

## 2023-01-23 PROCEDURE — 85025 COMPLETE CBC W/AUTO DIFF WBC: CPT

## 2023-01-23 PROCEDURE — 83935 ASSAY OF URINE OSMOLALITY: CPT

## 2023-01-23 PROCEDURE — 84300 ASSAY OF URINE SODIUM: CPT

## 2023-01-23 PROCEDURE — 82962 GLUCOSE BLOOD TEST: CPT

## 2023-01-23 PROCEDURE — 80076 HEPATIC FUNCTION PANEL: CPT

## 2023-01-23 RX ORDER — HEPARIN 100 UNIT/ML
500 SYRINGE INTRAVENOUS AS NEEDED
Status: DISCONTINUED | OUTPATIENT
Start: 2023-01-23 | End: 2023-01-28 | Stop reason: HOSPADM

## 2023-01-23 RX ORDER — BACITRACIN 500 UNIT/G
1 PACKET (EA) TOPICAL AS NEEDED
Status: DISCONTINUED | OUTPATIENT
Start: 2023-01-23 | End: 2023-01-28 | Stop reason: HOSPADM

## 2023-01-23 RX ORDER — FACIAL-BODY WIPES
10 EACH TOPICAL
Status: COMPLETED | OUTPATIENT
Start: 2023-01-23 | End: 2023-01-23

## 2023-01-23 RX ADMIN — MEROPENEM 1 G: 1 INJECTION, POWDER, FOR SOLUTION INTRAVENOUS at 00:32

## 2023-01-23 RX ADMIN — Medication 100 MG: at 08:50

## 2023-01-23 RX ADMIN — TAMSULOSIN HYDROCHLORIDE 0.4 MG: 0.4 CAPSULE ORAL at 08:50

## 2023-01-23 RX ADMIN — Medication 1 CAPSULE: at 08:50

## 2023-01-23 RX ADMIN — OXYCODONE HYDROCHLORIDE 10 MG: 5 TABLET ORAL at 08:50

## 2023-01-23 RX ADMIN — OXYCODONE HYDROCHLORIDE 10 MG: 5 TABLET ORAL at 21:54

## 2023-01-23 RX ADMIN — FINASTERIDE 5 MG: 5 TABLET, FILM COATED ORAL at 08:50

## 2023-01-23 RX ADMIN — METOPROLOL TARTRATE 12.5 MG: 25 TABLET, FILM COATED ORAL at 17:29

## 2023-01-23 RX ADMIN — MEROPENEM 1 G: 1 INJECTION, POWDER, FOR SOLUTION INTRAVENOUS at 08:53

## 2023-01-23 RX ADMIN — Medication 1 MG: at 08:50

## 2023-01-23 RX ADMIN — CYCLOBENZAPRINE 5 MG: 10 TABLET, FILM COATED ORAL at 02:50

## 2023-01-23 RX ADMIN — METOPROLOL TARTRATE 12.5 MG: 25 TABLET, FILM COATED ORAL at 08:50

## 2023-01-23 RX ADMIN — MIRTAZAPINE 15 MG: 15 TABLET, FILM COATED ORAL at 21:07

## 2023-01-23 RX ADMIN — SODIUM CHLORIDE, PRESERVATIVE FREE 10 ML: 5 INJECTION INTRAVENOUS at 17:29

## 2023-01-23 RX ADMIN — BISACODYL 10 MG: 10 SUPPOSITORY RECTAL at 17:29

## 2023-01-23 RX ADMIN — POLYETHYLENE GLYCOL 3350 17 G: 17 POWDER, FOR SOLUTION ORAL at 08:54

## 2023-01-23 RX ADMIN — LACTULOSE 45 ML: 20 SOLUTION ORAL at 17:29

## 2023-01-23 RX ADMIN — OXYCODONE HYDROCHLORIDE 10 MG: 5 TABLET ORAL at 17:40

## 2023-01-23 RX ADMIN — SENNOSIDES AND DOCUSATE SODIUM 1 TABLET: 50; 8.6 TABLET ORAL at 08:50

## 2023-01-23 RX ADMIN — OXYCODONE HYDROCHLORIDE 10 MG: 5 TABLET ORAL at 13:15

## 2023-01-23 RX ADMIN — MEROPENEM 1 G: 1 INJECTION, POWDER, FOR SOLUTION INTRAVENOUS at 17:29

## 2023-01-23 NOTE — PROGRESS NOTES
RUR: 25% High    KENTRELL: Anticipated discharge home. Patient will need ride assistance once medically stable. Follow-up with PCP/specialist.      Primary Contact: Ex-Wife, Maryann Rush, [phone]     *Will need 2nd IM letter prior to DC.      1:45PM - CM reviewed chart. Plan for patient to complete IV abx treatment in hospital due to history of polysubstance abuse; last dose scheduled for Friday, 1/27. PT/OT have signed off; no home health needed indicated. CM will continue to follow as needed.     Del Vargas, SMITHA   [phone]

## 2023-01-23 NOTE — PROGRESS NOTES
[address] [facility] Adult  Hospitalist Group                                                                                          Hospitalist Progress Note  Flaco Escobar NP  Answering service: [phone] or [phone] from in house phone        Date of Service:  2023  NAME:  Geraldine Phipps  :  1965  MRN:  [id number]      Admission Summary:   Geraldine Phipps is a 59 y.o. male with medical hx of  rectal cancer status postresection, hypertension, peptic ulcer disease, BPH, and mood disorder who presents with abdominal pain, and is being admitted for SBO. Note, he was from [facility] where he was admitted on  for recurrent SBO. Sigmoidoscopy there showed concern for rectal stricture. He is being transferred to [facility] for GI evaluation for upper GI. He has been hemodynamically stable. Interval history / Subjective: Tolerating regular diet, requiring frequent meal intake  D/c TPN  SBO improving  'feeling pretty good'    Assessment & Plan:     UTI  Hx of multiple MDR UTI  -  afebrile, wbc normal     U/A (1/10) wbc 20-50, 3+ bacteria, urine cx - ESBL E-coli and proteus mirabilis     Continue with IV Merrem, recommend to complete total 2 weeks, last dose ; this is his fourth UTI since 2022. History of rectal cancer s/p resection/radiation  History of gastric ulcer  Concern for rectal stricture  Abdominal pain 2/2 Recurrent small bowel obstruction (resolving)  - surgery following;     KUB () Moderate to large stool burden. There are borderline dilated loops  of small bowel left midabdomen similar to the prior study although slightly  improved. MRCP (1/15) Multiple cystic lesions of the pancreas likely reflecting pseudocyst with no  MRI evidence of underlying malignancy. Follow-up after clinical improvement is  recommended. Small bowel obstruction pattern redemonstrated.     [facility] showed possible rectal stricture    Stool WBC and enteric bacteria PCR (-)    GI signed off; recommend outpatient EGD/colonoscopy    Lactulose 30gm and dulcolax suppository x 1 now    Severe Malnutrition in the setting of chronic illness  - greater than 7.5% weigh loss in 3 months, severe body fat loss, severe muscle mass loss  -  regular diet, ensure enlive TID, snacks BID  -  appreciate dietary input; d/c TPN and lipid     PVC  -EKG recurrent PVCs  -Keep K above 4 mag above 2  -On metoprolol 12.5 mg twice daily     Chronic urinary retention, s/p Marrero  -Changed on 1/10/2022 at [place] finasteride and Flomax     Acute on Chronic Pancreatitis  Pancreatic Anomaly  -scheduled for egd w/ EUS with GI as outpt but unable to make this appointment  -cannot r/o pancreatic cancer with recent ct  -continue zenpep when eating again     Hx of Polysubstance abuse     Code status: Full  Prophylaxis: [address] discussed with: patient, nurse, CM, surgery  Anticipated Disposition: pt needs to complete IV ABX in hospital setting in presence of substance abuse.  May complete remaining abx therapy at [facility] Problems  Date Reviewed: 1/6/2023            Codes Class Noted POA    * (Principal) UTI due to extended-spectrum beta lactamase (ESBL) producing Escherichia coli ICD-10-CM: N39.0, B96.29, Z16.12  ICD-9-CM: 599.0, 041.49, V09.1  1/13/2023 Yes        Severe protein-calorie malnutrition (HCC) (Chronic) ICD-10-CM: S15  ICD-9-CM: 262  1/13/2023 Yes        Small bowel obstruction ([facility] [address] ICD-10-CM: I97.463  ICD-9-CM: 560.9  9/9/2022 Yes       Review of Systems:               Symptom Y/N Comments   Symptom Y/N Comments   Fever/Chills n      Chest Pain  n      Poor Appetite       Edema        Cough       Abdominal Pain y       Sputum       Joint Pain        SOB/SZYMANSKI n      Pruritis/Rash        Nausea/vomit n      Tolerating PT/OT        Diarrhea      Tolerating Diet        Constipation y      Other Could NOT obtain due to:         Vital Signs:    Last 24hrs VS reviewed since prior progress note. Most recent are:  Visit Vitals  /86   Pulse (!) 105   Temp 98.5 °F (36.9 °C)   Resp 17   Ht 5' 8\" (1.727 m)   Wt 54.9 kg (121 lb 0.5 oz)   SpO2 99%   BMI 18.40 kg/m²         Intake/Output Summary (Last 24 hours) at 1/23/2023 0755  Last data filed at 1/23/2023 5296  Gross per 24 hour   Intake 240 ml   Output 1220 ml   Net -980 ml          Physical Examination:     I had a face to face encounter with this patient and independently examined them on 1/23/2023 as outlined below:          Constitutional:  No acute distress, cooperative, pleasant. Cachetic, frail appearing   ENT:  Oral mucosa moist, oropharynx benign. Resp:  CTA bilaterally. No wheezing/rhonchi/rales. No accessory muscle use. CV:  Regular rhythm, normal rate, no murmurs, gallops, rubs    GI:  Soft, non distended, non tender. normoactive bowel sounds, no hepatosplenomegaly   : Marrero to gravity drainage    Musculoskeletal:    No edema, warm, 2+ pulses throughout    Neurologic:  Moves all extremities. AAOx3, CN II-XII reviewed            Data Review:    Review and/or order of clinical lab test  Review and/or order of tests in the radiology section of [facility]      Labs:     Recent Labs     01/23/23 0032 01/22/23 0416   WBC 7.6 6.7   HGB 8.4* 8.9*   HCT 25.0* 27.8*    309       Recent Labs     01/23/23  0032 01/22/23  0416 01/21/23  0230   * 127* 131*   K 4.5 4.9 5.0   CL 93* 91* 95*   CO2 32 33* 32   BUN 40* 26* 19   CREA 0.75 0.67* 0.51*   * 130* 112*   CA 9.6 9.6 8.9       Recent Labs     01/21/23  0230   *   *   TBILI 0.6   TP 7.0   ALB 2.6*   GLOB 4.4*       No results for input(s): INR, PTP, APTT, INREXT, INREXT in the last 72 hours. No results for input(s): FE, TIBC, PSAT, FERR in the last 72 hours.    Lab Results   Component Value Date/Time    Folate 41.4 (H) 09/11/2022 10:49 AM      No results for input(s): PH, PCO2, PO2 in the last 72 hours. No results for input(s): CPK, CKNDX, TROIQ in the last 72 hours.     No lab exists for component: CPKMB  Lab Results   Component Value Date/Time    Cholesterol, total 147 01/02/2019 04:00 AM    HDL Cholesterol 66 01/02/2019 04:00 AM    LDL, calculated 65.6 01/02/2019 04:00 AM    Triglyceride 458 (H) 01/17/2023 02:11 AM    CHOL/HDL Ratio 2.2 01/02/2019 04:00 AM     Lab Results   Component Value Date/Time    Glucose (POC) 128 (H) 01/23/2023 06:05 AM    Glucose (POC) 159 (H) 01/22/2023 09:51 PM    Glucose (POC) 141 (H) 01/22/2023 04:36 PM    Glucose (POC) 140 (H) 01/22/2023 11:19 AM    Glucose (POC) 124 (H) 01/22/2023 06:40 AM     Lab Results   Component Value Date/Time    Color DARK YELLOW 01/10/2023 03:52 PM    Appearance TURBID (A) 01/10/2023 03:52 PM    Specific gravity 1.025 01/10/2023 03:52 PM    Specific gravity 1.010 09/03/2021 10:01 PM    pH (UA) 8.5 (H) 01/10/2023 03:52 PM    Protein 300 (A) 01/10/2023 03:52 PM    Glucose Negative 01/10/2023 03:52 PM    Ketone Negative 01/10/2023 03:52 PM    Bilirubin Negative 01/10/2023 03:52 PM    Urobilinogen 1.0 01/10/2023 03:52 PM    Nitrites Positive (A) 01/10/2023 03:52 PM    Leukocyte Esterase LARGE (A) 01/10/2023 03:52 PM    Epithelial cells FEW 01/10/2023 03:52 PM    Bacteria 2+ (A) 01/10/2023 03:52 PM    WBC 20-50 01/10/2023 03:52 PM    RBC 20-50 01/10/2023 03:52 PM         Medications Reviewed:     Current Facility-Administered Medications   Medication Dose Route Frequency    TPN ADULT - CENTRAL   IntraVENous CONTINUOUS    cyclobenzaprine (FLEXERIL) tablet 5 mg  5 mg Oral TID PRN    meropenem (MERREM) 1 g in 0.9% sodium chloride (MBP/ADV) 50 mL MBP  1 g IntraVENous Q8H    senna-docusate (PERICOLACE) 8.6-50 mg per tablet 1 Tablet  1 Tablet Oral DAILY    polyethylene glycol (MIRALAX) packet 17 g  17 g Oral DAILY    acetaminophen (TYLENOL) tablet 1,000 mg  1,000 mg Oral Q6H PRN    dextrose 10% infusion 0-250 mL  0-250 mL IntraVENous PRN    dicyclomine (BENTYL) capsule 10 mg  10 mg Oral TID PRN    finasteride (PROSCAR) tablet 5 mg  5 mg Oral DAILY    folic acid tablet 1 mg  1 mg Oral DAILY    glucagon (GLUCAGEN) injection 1 mg  1 mg IntraMUSCular PRN    glucose chewable tablet 16 g  4 Tablet Oral PRN    heparin (porcine) pf 500 Units  500 Units InterCATHeter PRN    L.acidophilus-paracasei-S.thermophil-bifidobacter (RISAQUAD) 8 billion cell capsule  1 Capsule Oral DAILY    [Held by provider] lipase-protease-amylase (ZENPEP 20,000) capsule 1 Capsule  1 Capsule Oral TID WITH MEALS    metoprolol tartrate (LOPRESSOR) tablet 12.5 mg  12.5 mg Oral BID    mirtazapine (REMERON) tablet 15 mg  15 mg Oral QHS    naloxone (NARCAN) injection 0.4 mg  0.4 mg IntraVENous EVERY 2 MINUTES AS NEEDED    ondansetron (ZOFRAN ODT) tablet 4 mg  4 mg Oral Q8H PRN    oxyCODONE IR (ROXICODONE) tablet 10 mg  10 mg Oral Q4H PRN    phenol throat spray (CHLORASEPTIC) 1 Spray  1 Spray Oral Q1H PRN    polyethylene glycol (MIRALAX) packet 17 g  17 g Oral DAILY PRN    sodium chloride (NS) flush 5-40 mL  5-40 mL IntraVENous Q8H    sodium chloride (NS) flush 5-40 mL  5-40 mL IntraVENous PRN    tamsulosin (FLOMAX) capsule 0.4 mg  0.4 mg Oral DAILY    thiamine HCL (B-1) tablet 100 mg  100 mg Oral DAILY    nicotine (NICODERM CQ) 14 mg/24 hr patch 1 Patch  1 Patch TransDERmal QHS    alteplase (CATHFLO) 1 mg in sterile water (preservative free) 1 mL injection  1 mg InterCATHeter PRN     ______________________________________________________________________  EXPECTED LENGTH OF STAY: 3d 12h  ACTUAL LENGTH OF STAY:          10                 Venice Crawford, NP

## 2023-01-23 NOTE — PROGRESS NOTES
PHYSICAL THERAPY EVALUATION/DISCHARGE  Patient: Evette James (76 y.o. male)  Date: 1/23/2023  Primary Diagnosis: UTI due to extended-spectrum beta lactamase (ESBL) producing Escherichia coli [N39.0, B96.29, Z16.12]  Small bowel obstruction (HCC) [K56.609]       Precautions: contact         ASSESSMENT  Based on the objective data described below, the patient presents with overall modified independent to supervision level of mobility. Received sitting up in chair following bath with PCT assist.  Overall good strength, ROM and demonstrating good upright mobility. Tolerated walking in the salvador with Rw (baseline is rollator). Gait is steady and safe to mobilize with staff until discharge. It is important that he walks daily as he lives in high rise apartment with long [address] ways and walks to get his groceries. .        Other factors to consider for discharge:      Further skilled acute physical therapy is not indicated at this time. PLAN :  Recommendation for discharge: (in order for the patient to meet his/her long term goals)  No skilled physical therapy/ follow up rehabilitation needs identified at this time. This discharge recommendation:  Has not yet been discussed the attending provider and/or case management    IF patient discharges home will need the following DME: patient owns DME required for discharge       SUBJECTIVE:   Patient stated I feel more short of breath and my voice isn't usually like this.     I did not note any SOB,     OBJECTIVE DATA SUMMARY:   HISTORY:    Past Medical History:   Diagnosis Date    Cancer ([facility] [address]     rectal    Gastrointestinal disorder     Gastric Ulcers;  Rectal CA    Hematuria 9/1/2021    Hypertension      Past Surgical History:   Procedure Laterality Date    HX GI         Prior level of function: modified independent with rollator  Personal factors and/or comorbidities impacting plan of care:     Home Situation  Home Environment: Apartment  One/Two Story Residence: One story  Support Systems: Child(bonifacio)  Patient Expects to be Discharged to[de-identified] Home  Current DME Used/Available at Home: Shower chair, Walker, rollator, Grab bars  Tub or Shower Type: Tub/Shower combination    EXAMINATION/PRESENTATION/DECISION MAKING:   Critical Behavior:  Neurologic State: Alert  Orientation Level: Oriented X4  Cognition: Follows commands, Appropriate decision making     Hearing: Auditory  Auditory Impairment: None  Range Of Motion:  AROM: Within functional limits         Strength:    Strength: Within functional limits         Tone & Sensation:   Tone: Normal                Coordination:  Coordination: Within functional limits  Functional Mobility:  Bed Mobility:        Sit to Supine: Independent     Transfers:  Sit to Stand: Independent  Stand to Sit: Independent                       Balance:   Sitting: Intact  Standing: Impaired; With support  Standing - Static: Good  Standing - Dynamic : Good  Ambulation/Gait Training:  Distance (ft): 200 Feet (ft)  Assistive Device: Gait belt;Walker, rolling  Ambulation - Level of Assistance: Supervision                 Base of Support: Widened     Speed/Denise: Slow              Physical Therapy Evaluation Charge Determination   History Examination Presentation Decision-Making   MEDIUM  Complexity : 1-2 comorbidities / personal factors will impact the outcome/ POC  LOW Complexity : 1-2 Standardized tests and measures addressing body structure, function, activity limitation and / or participation in recreation  LOW Complexity : Stable, uncomplicated        Based on the above components, the patient evaluation is determined to be of the following complexity level: LOW       Activity Tolerance:   Good      After treatment patient left in no apparent distress:   Supine in bed, Call bell within reach, and Side rails x 3    COMMUNICATION/EDUCATION:   The patients plan of care was discussed with: Registered nurse.          Thank you for this referral.  Medina Davis Berry Castro, PT   Time Calculation: 19 mins

## 2023-01-23 NOTE — PROGRESS NOTES
OCCUPATIONAL THERAPY EVALUATION/DISCHARGE  Patient: Marc Goltz (61 y.o. male)   Date: 1/23/2023  Primary Diagnosis: UTI due to extended-spectrum beta lactamase (ESBL) producing Escherichia coli [N39.0, B96.29, Z16.12]  Small bowel obstruction (HCC) [K56.609]       Precautions: contact       ASSESSMENT  Based on the objective data described below, the patient presents with decreased stamina and SOB, not observed during light ADL activity. He is AAOx4 and demonstrated intact AROM and strength in BUEs. He completed ADLs with supervision for cues for energy conservation to independent. Education provided for energy conservation, endurance building and proper/importance of nutrition. No acute needs identified no further OT needs, dc when medically stable. Current Level of Function (ADLs/self-care): supervision progressing to independent. Functional Outcome Measure: The patient scored 75 on the Barthel outcome measure which is indicative of minimal impairment (mostly due to mondragon cath). Other factors to consider for discharge: at baseline     PLAN :  Recommend with staff: OOB for all meals, bathroom for toileting and washing up    Recommendation for discharge: (in order for the patient to meet his/her long term goals)  No skilled occupational therapy/ follow up rehabilitation needs identified at this time. This discharge recommendation:  Has not yet been discussed the attending provider and/or case management    IF patient discharges home will need the following DME: patient owns DME required for discharge and none       SUBJECTIVE:   Patient stated I hear what you are saying and I am going to do it.     OBJECTIVE DATA SUMMARY:   HISTORY:   Past Medical History:   Diagnosis Date    Cancer ([facility] [address]     rectal    Gastrointestinal disorder     Gastric Ulcers;  Rectal CA    Hematuria 9/1/2021    Hypertension      Past Surgical History:   Procedure Laterality Date    HX GI         Prior Level of Function/Environment/Context: independent to mod I with the use of a rollator at baseline   Expanded or extensive additional review of patient history:   Home Situation  Home Environment: Apartment  One/Two Story Residence: One story  Support Systems: Child(bonifacio)  Patient Expects to be Discharged to[de-identified] Home  Current DME Used/Available at Home: Shower chair, Walker, rollator, Grab bars  Tub or Shower Type: Tub/Shower combination    Hand dominance: Right    EXAMINATION OF PERFORMANCE DEFICITS:  Cognitive/Behavioral Status:  Neurologic State: Alert  Orientation Level: Oriented X4  Cognition: Appropriate decision making  Perception: Appears intact  Perseveration: No perseveration noted  Safety/Judgement: Awareness of environment    Skin: visible areas intact     Edema: none noted     Hearing: Auditory  Auditory Impairment: None    Vision/Perceptual:                           Acuity: Within Defined Limits         Range of Motion:    AROM: Within functional limits                         Strength:    Strength: Within functional limits                Coordination:  Coordination: Within functional limits  Fine Motor Skills-Upper: Left Intact; Right Intact    Gross Motor Skills-Upper: Left Intact; Right Intact    Tone & Sensation:    Tone: Normal                         Balance:  Sitting: Intact  Standing: Impaired; With support  Standing - Static: Good  Standing - Dynamic : Good    Functional Mobility and Transfers for ADLs:  Bed Mobility:  Sit to Supine: Independent    Transfers:  Sit to Stand: Independent  Stand to Sit: Independent  Bed to Chair: Modified independent  Bathroom Mobility: Independent    ADL Assessment:  Feeding: Setup    Oral Facial Hygiene/Grooming: Independent    Upper Body Dressing: Setup    Lower Body Dressing: Supervision  Education provided for energy conservation, seated v standing, pt overall progressed to mod I with strategies     Toileting:  Total assistance (mondragon)   Mondragon for urination, pt mod I to ind for BM             ADL Intervention and task modifications:   Patient instructed and indicated understanding the benefits of maintaining activity tolerance, functional mobility, and independence with self care tasks during acute stay  to ensure safe return home and to baseline. Encouraged patient to increase frequency and duration OOB, be out of bed for all meals, perform daily ADLs (as approved by RN/MD regarding bathing etc), and performing functional mobility to/from bathroom. Provided detailed energy conservation education and explanation. Explained energy in physical and visual terms \"like a water bottle that gets spent throughout the day. \" The patient was able to verbalize understanding and teach back basic principles by the end of the session. Heavy focus on pacing through activities, never starting something that can be terminated, and taking seated breaks throughout the day. Education on importance of nutrition and feeding his body to help with building strength and endurance. Cognitive Retraining  Safety/Judgement: Awareness of environment       Functional Measure:    Barthel Index:  Bathin  Bladder: 0  Bowels: 10  Groomin  Dressing: 10  Feeding: 10  Mobility: 10  Stairs: 0  Toilet Use: 10  Transfer (Bed to Chair and Back): 15  Total: 75/100      The Barthel ADL Index: Guidelines  1. The index should be used as a record of what a patient does, not as a record of what a patient could do. 2. The main aim is to establish degree of independence from any help, physical or verbal, however minor and for whatever reason. 3. The need for supervision renders the patient not independent. 4. A patient's performance should be established using the best available evidence. Asking the patient, friends/relatives and nurses are the usual sources, but direct observation and common sense are also important. However direct testing is not needed.   5. Usually the patient's performance over the preceding 24-48 hours is important, but occasionally longer periods will be relevant. 6. Middle categories imply that the patient supplies over 50 per cent of the effort. 7. Use of aids to be independent is allowed. Score Interpretation (from [facility])    Independent   60-79 Minimally independent   40-59 Partially dependent   20-39 Very dependent   <20 Totally dependent     -Daphnie Guadalupe., Barthel, D.W. (1965). Functional evaluation: the Barthel Index. [address] ([address] (1997). The Barthel activities of daily living index: self-reporting versus actual performance in the old (> or = 75 years). Journal of [address] [place] [address] 45(7), [facility], JAMES, Syl Bloom., Iris Cline. (1999). Measuring the change in disability after inpatient rehabilitation; comparison of the responsiveness of the Barthel Index and Functional [place] Measure. Journal of Neurology, Neurosurgery, and Psychiatry, 66(4), 395-076. KWAME Rolon, CARLENE Veliz, & Alia Larose MRHEA. (2004) Assessment of post-stroke quality of life in cost-effectiveness studies: The usefulness of the Barthel Index and the EuroQoL-5D.  Quality of Life Research, 15, 936-93         Occupational Therapy Evaluation Charge Determination   History Examination Decision-Making   LOW Complexity : Brief history review  LOW Complexity : 1-3 performance deficits relating to physical, cognitive , or psychosocial skils that result in activity limitations and / or participation restrictions  LOW Complexity : No comorbidities that affect functional and no verbal or physical assistance needed to complete eval tasks       Based on the above components, the patient evaluation is determined to be of the following complexity level: LOW   Pain Rating:  none    Activity Tolerance:   Good    After treatment patient left in no apparent distress:    Supine in bed, Call bell within reach, and Side rails x 3    COMMUNICATION/EDUCATION:   The patients plan of care was discussed with: Physical therapist and Registered nurse.      Thank you for this referral.  William Hopkins  Time Calculation: 23 mins

## 2023-01-24 LAB
ALBUMIN SERPL-MCNC: 2.8 G/DL (ref 3.5–5)
ALBUMIN/GLOB SERPL: 0.6 (ref 1.1–2.2)
ALP SERPL-CCNC: 417 U/L (ref 45–117)
ALT SERPL-CCNC: 584 U/L (ref 12–78)
ANION GAP SERPL CALC-SCNC: 3 MMOL/L (ref 5–15)
AST SERPL-CCNC: 643 U/L (ref 15–37)
BILIRUB SERPL-MCNC: 0.6 MG/DL (ref 0.2–1)
BUN SERPL-MCNC: 32 MG/DL (ref 6–20)
BUN/CREAT SERPL: 44 (ref 12–20)
CALCIUM SERPL-MCNC: 9.8 MG/DL (ref 8.5–10.1)
CHLORIDE SERPL-SCNC: 100 MMOL/L (ref 97–108)
CO2 SERPL-SCNC: 28 MMOL/L (ref 21–32)
COMMENT, HOLDF: NORMAL
CREAT SERPL-MCNC: 0.72 MG/DL (ref 0.7–1.3)
GLOBULIN SER CALC-MCNC: 4.6 G/DL (ref 2–4)
GLUCOSE SERPL-MCNC: 96 MG/DL (ref 65–100)
POTASSIUM SERPL-SCNC: 4.9 MMOL/L (ref 3.5–5.1)
PROT SERPL-MCNC: 7.4 G/DL (ref 6.4–8.2)
SAMPLES BEING HELD,HOLD: NORMAL
SODIUM SERPL-SCNC: 131 MMOL/L (ref 136–145)

## 2023-01-24 PROCEDURE — 65270000029 HC RM PRIVATE

## 2023-01-24 PROCEDURE — 80053 COMPREHEN METABOLIC PANEL: CPT

## 2023-01-24 PROCEDURE — 74011250636 HC RX REV CODE- 250/636: Performed by: NURSE PRACTITIONER

## 2023-01-24 PROCEDURE — 74011250637 HC RX REV CODE- 250/637: Performed by: NURSE PRACTITIONER

## 2023-01-24 PROCEDURE — 74011250637 HC RX REV CODE- 250/637: Performed by: STUDENT IN AN ORGANIZED HEALTH CARE EDUCATION/TRAINING PROGRAM

## 2023-01-24 PROCEDURE — 36415 COLL VENOUS BLD VENIPUNCTURE: CPT

## 2023-01-24 PROCEDURE — 74011000258 HC RX REV CODE- 258: Performed by: NURSE PRACTITIONER

## 2023-01-24 RX ADMIN — TAMSULOSIN HYDROCHLORIDE 0.4 MG: 0.4 CAPSULE ORAL at 09:31

## 2023-01-24 RX ADMIN — METOPROLOL TARTRATE 12.5 MG: 25 TABLET, FILM COATED ORAL at 09:29

## 2023-01-24 RX ADMIN — Medication 1 MG: at 09:29

## 2023-01-24 RX ADMIN — SENNOSIDES AND DOCUSATE SODIUM 1 TABLET: 50; 8.6 TABLET ORAL at 09:29

## 2023-01-24 RX ADMIN — Medication 1 CAPSULE: at 09:29

## 2023-01-24 RX ADMIN — OXYCODONE HYDROCHLORIDE 10 MG: 5 TABLET ORAL at 20:41

## 2023-01-24 RX ADMIN — FINASTERIDE 5 MG: 5 TABLET, FILM COATED ORAL at 09:29

## 2023-01-24 RX ADMIN — OXYCODONE HYDROCHLORIDE 10 MG: 5 TABLET ORAL at 09:29

## 2023-01-24 RX ADMIN — OXYCODONE HYDROCHLORIDE 10 MG: 5 TABLET ORAL at 15:35

## 2023-01-24 RX ADMIN — MIRTAZAPINE 15 MG: 15 TABLET, FILM COATED ORAL at 21:05

## 2023-01-24 RX ADMIN — Medication 100 MG: at 09:29

## 2023-01-24 RX ADMIN — MEROPENEM 1 G: 1 INJECTION, POWDER, FOR SOLUTION INTRAVENOUS at 02:06

## 2023-01-24 RX ADMIN — OXYCODONE HYDROCHLORIDE 10 MG: 5 TABLET ORAL at 05:02

## 2023-01-24 RX ADMIN — MEROPENEM 1 G: 1 INJECTION, POWDER, FOR SOLUTION INTRAVENOUS at 09:28

## 2023-01-24 RX ADMIN — METOPROLOL TARTRATE 12.5 MG: 25 TABLET, FILM COATED ORAL at 18:17

## 2023-01-24 RX ADMIN — MEROPENEM 1 G: 1 INJECTION, POWDER, FOR SOLUTION INTRAVENOUS at 18:17

## 2023-01-24 NOTE — PROGRESS NOTES
[address] [facility]  Hospitalist Group                                                                                          Hospitalist Progress Note  Walter Mendez NP  Answering service: [phone] from in house phone        Date of Service:  2023  NAME:  Apryl Hoffmann  :  1965  MRN:  [id number]      Admission Summary:   Apryl Hoffmann is a 59 y.o. male with medical hx of  rectal cancer status postresection, hypertension, peptic ulcer disease, BPH, and mood disorder who presents with abdominal pain, and is being admitted for SBO. Note, he was from [facility] where he was admitted on  for recurrent SBO. Sigmoidoscopy there showed concern for rectal stricture. He is being transferred to [facility] for GI evaluation for upper GI. He has been hemodynamically stable. Interval history / Subjective: Tolerating regular diet, requiring frequent meal intake  D/c TPN  Had two bowel movements this morning; feeling a little bit better (received lactulose 30gm and dulcolax suppository x 1 addition to his usual bowel regimen on )    Assessment & Plan:     UTI  Hx of multiple MDR UTI  -  afebrile, wbc normal     U/A (1/10) wbc 20-50, 3+ bacteria, urine cx - ESBL E-coli and proteus mirabilis     Continue with IV Merrem, recommend to complete total 2 weeks, last dose ; this is his fourth UTI since 2022. History of rectal cancer s/p resection/radiation  History of gastric ulcer  Concern for rectal stricture  Abdominal pain 2/2 Recurrent small bowel obstruction (resolving)  - surgery following;     KUB () Moderate to large stool burden. There are borderline dilated loops  of small bowel left midabdomen similar to the prior study although slightly  improved. MRCP (1/15) Multiple cystic lesions of the pancreas likely reflecting pseudocyst with no  MRI evidence of underlying malignancy.  Follow-up after clinical improvement is  recommended. Small bowel obstruction pattern redemonstrated. Luther Bernal showed possible rectal stricture    Stool WBC and enteric bacteria PCR (-)    GI signed off; recommend outpatient EGD/colonoscopy    Continue with bowel regimen    Severe Malnutrition in the setting of chronic illness  - greater than 7.5% weigh loss in 3 months, severe body fat loss, severe muscle mass loss     regular diet, ensure enlive TID, snacks BID     appreciate dietary input; TPN and lipid have been d/c'd on 1/23     PVC  -EKG recurrent PVCs   On metoprolol 12.5 mg twice daily     Chronic urinary retention, s/p Marrero  -Changed on 1/10/2022 at [facility]  -Continue finasteride and Flomax     Acute on Chronic Pancreatitis  Pancreatic Anomaly  -scheduled for egd w/ EUS with GI as outpt but unable to make this appointment  -cannot r/o pancreatic cancer with recent ct  -continue zenpep when eating again    Elevated LFT (although the level has been decreased since 1/21)  - check hepatitis panel     Hx of Polysubstance abuse     Code status: Full  Prophylaxis: [address] discussed with: patient, nurse, CM, surgery  Anticipated Disposition: pt needs to complete IV ABX in hospital setting in presence of substance abuse.  May complete remaining abx therapy at [facility] Problems  Date Reviewed: 1/6/2023            Codes Class Noted POA    * (Principal) UTI due to extended-spectrum beta lactamase (ESBL) producing Escherichia coli ICD-10-CM: N39.0, B96.29, Z16.12  ICD-9-CM: 599.0, 041.49, V09.1  1/13/2023 Yes        Severe protein-calorie malnutrition (HCC) (Chronic) ICD-10-CM: T60  ICD-9-CM: 262  1/13/2023 Yes        Small bowel obstruction ([facility] [address] ICD-10-CM: Y67.955  ICD-9-CM: 560.9  9/9/2022 Yes       Review of Systems:               Symptom Y/N Comments   Symptom Y/N Comments   Fever/Chills n      Chest Pain  n      Poor Appetite       Edema        Cough       Abdominal Pain y Sputum       Joint Pain        SOB/SZYMANSKI n      Pruritis/Rash        Nausea/vomit n      Tolerating PT/OT        Diarrhea      Tolerating Diet        Constipation y   had two BM this morning   Other           Could NOT obtain due to:         Vital Signs:    Last 24hrs VS reviewed since prior progress note. Most recent are:  Visit Vitals  /69   Pulse 93   Temp 97.9 °F (36.6 °C)   Resp 14   Ht 5' 8\" (1.727 m)   Wt 46.2 kg (101 lb 13.6 oz)   SpO2 98%   BMI 15.49 kg/m²         Intake/Output Summary (Last 24 hours) at 1/24/2023 0719  Last data filed at 1/23/2023 2113  Gross per 24 hour   Intake --   Output 750 ml   Net -750 ml          Physical Examination:      Constitutional:  No acute distress, cooperative, pleasant. Cachetic, frail appearing   ENT:  Oral mucosa moist, oropharynx benign. Resp:  CTA bilaterally. No wheezing/rhonchi/rales. No accessory muscle use. CV:  Regular rhythm, normal rate, no murmurs, gallops, rubs    GI:  Soft, non distended, non tender. normoactive bowel sounds, no hepatosplenomegaly   : Marrero to gravity drainage    Musculoskeletal:    No edema, warm, 2+ pulses throughout    Neurologic:  Moves all extremities. Data Review:    Review and/or order of clinical lab test  Review and/or order of tests in the radiology section of [facility]      Labs:     Recent Labs     01/23/23 0032 01/22/23 0416   WBC 7.6 6.7   HGB 8.4* 8.9*   HCT 25.0* 27.8*    309       Recent Labs     01/24/23  0505 01/23/23  0032 01/22/23 0416   * 129* 127*   K 4.9 4.5 4.9    93* 91*   CO2 28 32 33*   BUN 32* 40* 26*   CREA 0.72 0.75 0.67*   GLU 96 145* 130*   CA 9.8 9.6 9.6       Recent Labs     01/24/23  0505 01/23/23 0032   * 504*   * 348*   TBILI 0.6 0.3   TP 7.4 7.5   ALB 2.8* 2.7*   GLOB 4.6* 4.8*       No results for input(s): INR, PTP, APTT, INREXT, INREXT in the last 72 hours. No results for input(s): FE, TIBC, PSAT, FERR in the last 72 hours.    Lab Results Component Value Date/Time    Folate 41.4 (H) 09/11/2022 10:49 AM      No results for input(s): PH, PCO2, PO2 in the last 72 hours. No results for input(s): CPK, CKNDX, TROIQ in the last 72 hours.     No lab exists for component: CPKMB  Lab Results   Component Value Date/Time    Cholesterol, total 147 01/02/2019 04:00 AM    HDL Cholesterol 66 01/02/2019 04:00 AM    LDL, calculated 65.6 01/02/2019 04:00 AM    Triglyceride 74 01/23/2023 12:32 AM    CHOL/HDL Ratio 2.2 01/02/2019 04:00 AM     Lab Results   Component Value Date/Time    Glucose (POC) 128 (H) 01/23/2023 06:05 AM    Glucose (POC) 159 (H) 01/22/2023 09:51 PM    Glucose (POC) 141 (H) 01/22/2023 04:36 PM    Glucose (POC) 140 (H) 01/22/2023 11:19 AM    Glucose (POC) 124 (H) 01/22/2023 06:40 AM     Lab Results   Component Value Date/Time    Color DARK YELLOW 01/10/2023 03:52 PM    Appearance TURBID (A) 01/10/2023 03:52 PM    Specific gravity 1.025 01/10/2023 03:52 PM    Specific gravity 1.010 09/03/2021 10:01 PM    pH (UA) 8.5 (H) 01/10/2023 03:52 PM    Protein 300 (A) 01/10/2023 03:52 PM    Glucose Negative 01/10/2023 03:52 PM    Ketone Negative 01/10/2023 03:52 PM    Bilirubin Negative 01/10/2023 03:52 PM    Urobilinogen 1.0 01/10/2023 03:52 PM    Nitrites Positive (A) 01/10/2023 03:52 PM    Leukocyte Esterase LARGE (A) 01/10/2023 03:52 PM    Epithelial cells FEW 01/10/2023 03:52 PM    Bacteria 2+ (A) 01/10/2023 03:52 PM    WBC 20-50 01/10/2023 03:52 PM    RBC 20-50 01/10/2023 03:52 PM         Medications Reviewed:     Current Facility-Administered Medications   Medication Dose Route Frequency    heparin (porcine) pf 500 Units  500 Units InterCATHeter PRN    bacitracin 500 unit/gram packet 1 Packet  1 Packet Topical PRN    cyclobenzaprine (FLEXERIL) tablet 5 mg  5 mg Oral TID PRN    meropenem (MERREM) 1 g in 0.9% sodium chloride (MBP/ADV) 50 mL MBP  1 g IntraVENous Q8H    senna-docusate (PERICOLACE) 8.6-50 mg per tablet 1 Tablet  1 Tablet Oral DAILY polyethylene glycol (MIRALAX) packet 17 g  17 g Oral DAILY    acetaminophen (TYLENOL) tablet 1,000 mg  1,000 mg Oral Q6H PRN    dextrose 10% infusion 0-250 mL  0-250 mL IntraVENous PRN    dicyclomine (BENTYL) capsule 10 mg  10 mg Oral TID PRN    finasteride (PROSCAR) tablet 5 mg  5 mg Oral DAILY    folic acid tablet 1 mg  1 mg Oral DAILY    glucagon (GLUCAGEN) injection 1 mg  1 mg IntraMUSCular PRN    glucose chewable tablet 16 g  4 Tablet Oral PRN    heparin (porcine) pf 500 Units  500 Units InterCATHeter PRN    L.acidophilus-paracasei-S.thermophil-bifidobacter (RISAQUAD) 8 billion cell capsule  1 Capsule Oral DAILY    [Held by provider] lipase-protease-amylase (ZENPEP 20,000) capsule 1 Capsule  1 Capsule Oral TID WITH MEALS    metoprolol tartrate (LOPRESSOR) tablet 12.5 mg  12.5 mg Oral BID    mirtazapine (REMERON) tablet 15 mg  15 mg Oral QHS    naloxone (NARCAN) injection 0.4 mg  0.4 mg IntraVENous EVERY 2 MINUTES AS NEEDED    ondansetron (ZOFRAN ODT) tablet 4 mg  4 mg Oral Q8H PRN    oxyCODONE IR (ROXICODONE) tablet 10 mg  10 mg Oral Q4H PRN    phenol throat spray (CHLORASEPTIC) 1 Spray  1 Spray Oral Q1H PRN    polyethylene glycol (MIRALAX) packet 17 g  17 g Oral DAILY PRN    sodium chloride (NS) flush 5-40 mL  5-40 mL IntraVENous Q8H    sodium chloride (NS) flush 5-40 mL  5-40 mL IntraVENous PRN    tamsulosin (FLOMAX) capsule 0.4 mg  0.4 mg Oral DAILY    thiamine HCL (B-1) tablet 100 mg  100 mg Oral DAILY    nicotine (NICODERM CQ) 14 mg/24 hr patch 1 Patch  1 Patch TransDERmal QHS    alteplase (CATHFLO) 1 mg in sterile water (preservative free) 1 mL injection  1 mg InterCATHeter PRN     ______________________________________________________________________  EXPECTED LENGTH OF STAY: 3d 12h  ACTUAL LENGTH OF STAY:          11                 Venice Crawford, NP

## 2023-01-25 LAB
COMMENT, HOLDF: NORMAL
HAV IGM SER QL: NONREACTIVE
HBV CORE IGM SER QL: NONREACTIVE
HBV SURFACE AG SER QL: <0.1 INDEX
HBV SURFACE AG SER QL: NEGATIVE
HCV AB SERPL QL IA: NONREACTIVE
SAMPLES BEING HELD,HOLD: NORMAL
SP1: NORMAL
SP2: NORMAL
SP3: NORMAL

## 2023-01-25 PROCEDURE — 80074 ACUTE HEPATITIS PANEL: CPT

## 2023-01-25 PROCEDURE — 74011000258 HC RX REV CODE- 258: Performed by: NURSE PRACTITIONER

## 2023-01-25 PROCEDURE — 74011250636 HC RX REV CODE- 250/636: Performed by: NURSE PRACTITIONER

## 2023-01-25 PROCEDURE — 74011250637 HC RX REV CODE- 250/637: Performed by: NURSE PRACTITIONER

## 2023-01-25 PROCEDURE — 36415 COLL VENOUS BLD VENIPUNCTURE: CPT

## 2023-01-25 PROCEDURE — 65270000029 HC RM PRIVATE

## 2023-01-25 PROCEDURE — 74011250637 HC RX REV CODE- 250/637: Performed by: STUDENT IN AN ORGANIZED HEALTH CARE EDUCATION/TRAINING PROGRAM

## 2023-01-25 RX ADMIN — SENNOSIDES AND DOCUSATE SODIUM 1 TABLET: 50; 8.6 TABLET ORAL at 09:26

## 2023-01-25 RX ADMIN — FINASTERIDE 5 MG: 5 TABLET, FILM COATED ORAL at 09:26

## 2023-01-25 RX ADMIN — MEROPENEM 1 G: 1 INJECTION, POWDER, FOR SOLUTION INTRAVENOUS at 09:26

## 2023-01-25 RX ADMIN — OXYCODONE HYDROCHLORIDE 10 MG: 5 TABLET ORAL at 01:04

## 2023-01-25 RX ADMIN — Medication 1 CAPSULE: at 09:26

## 2023-01-25 RX ADMIN — MEROPENEM 1 G: 1 INJECTION, POWDER, FOR SOLUTION INTRAVENOUS at 16:33

## 2023-01-25 RX ADMIN — Medication 100 MG: at 09:26

## 2023-01-25 RX ADMIN — OXYCODONE HYDROCHLORIDE 10 MG: 5 TABLET ORAL at 16:32

## 2023-01-25 RX ADMIN — MIRTAZAPINE 15 MG: 15 TABLET, FILM COATED ORAL at 21:07

## 2023-01-25 RX ADMIN — Medication 1 MG: at 09:26

## 2023-01-25 RX ADMIN — TAMSULOSIN HYDROCHLORIDE 0.4 MG: 0.4 CAPSULE ORAL at 09:26

## 2023-01-25 RX ADMIN — METOPROLOL TARTRATE 12.5 MG: 25 TABLET, FILM COATED ORAL at 17:56

## 2023-01-25 RX ADMIN — MEROPENEM 1 G: 1 INJECTION, POWDER, FOR SOLUTION INTRAVENOUS at 01:04

## 2023-01-25 RX ADMIN — OXYCODONE HYDROCHLORIDE 10 MG: 5 TABLET ORAL at 07:02

## 2023-01-25 RX ADMIN — METOPROLOL TARTRATE 12.5 MG: 25 TABLET, FILM COATED ORAL at 09:26

## 2023-01-25 NOTE — PROGRESS NOTES
Bedside and Verbal shift change report given to Jose Luis Zheng  (oncoming nurse) by Dolores Joseph  (offgoing nurse). Report included the following information SBAR.

## 2023-01-25 NOTE — PROGRESS NOTES
[facility] [facility] Adult  Hospitalist Group                                                                                          Hospitalist Progress Note  [place]  Answering service: [phone] OR [phone] from in house phone        Date of Service:  2023  NAME:  Carlos Ray  :  1965  MRN:  [id number]      Admission Summary:   Carlos Ray is a 59 y.o. male with medical hx of  rectal cancer status postresection, hypertension, peptic ulcer disease, BPH, and mood disorder who presents with abdominal pain, and is being admitted for SBO. Note, he was from [facility] where he was admitted on  for recurrent SBO. Sigmoidoscopy there showed concern for rectal stricture. He is being transferred to [facility] for GI evaluation for upper GI. He has been hemodynamically stable. Interval history / Subjective:   Saw the patient on rounds today. He complains that the room is cold. Have asked RN to call engineering/maintenance to assess the HVAC. Assessment & Plan:     UTI  Hx of multiple MDR UTI  -  Afebrile, wbc normal as of   - U/A (1/10) wbc 20-50, 3+ bacteria, urine cx - ESBL E-coli and proteus mirabilis  - ABX: Merropenem, recommend to complete total 2 weeks, last dose ; this is his fourth UTI since 2022. History of rectal cancer s/p resection/radiation  History of gastric ulcer  Concern for rectal stricture  Abdominal pain 2/2 Recurrent small bowel obstruction (resolving)  - surgery following;   - KUB () Moderate to large stool burden. There are borderline dilated loops  of small bowel left midabdomen similar to the prior study although slightly  improved. - MRCP (1/15) Multiple cystic lesions of the pancreas likely reflecting pseudocyst with no  MRI evidence of underlying malignancy. Follow-up after clinical improvement is  recommended. Small bowel obstruction pattern redemonstrated.   - Flexoid sigmoidoscopy [facility] showed possible rectal stricture  - Stool WBC and enteric bacteria PCR (-)  - GI signed off; recommend outpatient EGD/colonoscopy  - Continue with bowel regimen     Severe Malnutrition in the setting of chronic illness  - greater than 7.5% weigh loss in 3 months, severe body fat loss, severe muscle mass loss  - regular diet, ensure enlive TID, snacks BID  - Appreciate dietary input; TPN and lipid have been d/c'd on 1/23     PVC  -EKG recurrent PVCs  - On metoprolol 12.5 mg twice daily     Chronic urinary retention, s/p Marrero  - Changed on 1/10/2022 at [facility]  - Continue finasteride and Flomax     Acute on Chronic Pancreatitis  Pancreatic Anomaly  - Scheduled for egd w/ EUS with GI as outpt but unable to make this appointment  - Cannot r/o pancreatic cancer with recent ct  - Continue zenpep when eating again     Elevated LFT (although the level has been decreased since 1/21)  - check hepatitis panel      Hx of Polysubstance abuse     Code status: Full  Prophylaxis: [address] discussed with: patient, RN, CM, attending  Anticipated Disposition: pt needs to complete IV ABX in hospital setting in presence of substance abuse. May complete remaining abx therapy at [facility] Problems  Date Reviewed: 1/6/2023            Codes Class Noted POA    * (Principal) UTI due to extended-spectrum beta lactamase (ESBL) producing Escherichia coli ICD-10-CM: N39.0, B96.29, Z16.12  ICD-9-CM: 599.0, 041.49, V09.1  1/13/2023 Yes        Severe protein-calorie malnutrition (HCC) (Chronic) ICD-10-CM: E43  ICD-9-CM: 262  1/13/2023 Yes        Small bowel obstruction ([facility] [address] ICD-10-CM: V74.117  ICD-9-CM: 560.9  9/9/2022 Yes             Review of Systems:   A comprehensive review of systems was negative except for that written in the HPI. Vital Signs:    Last 24hrs VS reviewed since prior progress note.  Most recent are:  Visit Vitals  /71   Pulse 96   Temp 98.2 °F (36.8 °C)   Resp 16   Ht 5' 8\" (1.727 m)   Wt 46.2 kg (101 lb 13.6 oz)   SpO2 100%   BMI 15.49 kg/m²       No intake or output data in the 24 hours ending 01/25/23 1214     Physical Examination:     I had a face to face encounter with this patient and independently examined them on 1/25/2023 as outlined below:          Constitutional:  No acute distress, frail appearing, cooperative, pleasant    ENT:  Oral mucosa moist, oropharynx benign. Resp:  CTA bilaterally. No wheezing/rhonchi/rales. No accessory muscle use. CV:  Regular rhythm, normal rate, no murmurs, gallops, rubs    GI:  :  Soft, non distended, non tender. normoactive bowel sounds   Marrero in place, yellow/clear urine in bag    Musculoskeletal:  No edema, warm, 2+ pulses throughout    Neurologic:  Moves all extremities. AAOx3, CN II-XII grossly intact            Data Review:    Review and/or order of clinical lab test  Review and/or order of tests in the radiology section of CPT  Review and/or order of tests in the medicine section of CPT      Labs:     Recent Labs     01/23/23  0032   WBC 7.6   HGB 8.4*   HCT 25.0*        Recent Labs     01/24/23  0505 01/23/23  0032   * 129*   K 4.9 4.5    93*   CO2 28 32   BUN 32* 40*   CREA 0.72 0.75   GLU 96 145*   CA 9.8 9.6     Recent Labs     01/24/23  0505 01/23/23  0032   * 504*   * 348*   TBILI 0.6 0.3   TP 7.4 7.5   ALB 2.8* 2.7*   GLOB 4.6* 4.8*     No results for input(s): INR, PTP, APTT, INREXT in the last 72 hours. No results for input(s): FE, TIBC, PSAT, FERR in the last 72 hours. Lab Results   Component Value Date/Time    Folate 41.4 (H) 09/11/2022 10:49 AM      No results for input(s): PH, PCO2, PO2 in the last 72 hours. No results for input(s): CPK, CKNDX, TROIQ in the last 72 hours.     No lab exists for component: CPKMB  Lab Results   Component Value Date/Time    Cholesterol, total 147 01/02/2019 04:00 AM    HDL Cholesterol 66 01/02/2019 04:00 AM    LDL, calculated 65.6 01/02/2019 04:00 AM    Triglyceride 74 01/23/2023 12:32 AM    CHOL/HDL Ratio 2.2 01/02/2019 04:00 AM     Lab Results   Component Value Date/Time    Glucose (POC) 128 (H) 01/23/2023 06:05 AM    Glucose (POC) 159 (H) 01/22/2023 09:51 PM    Glucose (POC) 141 (H) 01/22/2023 04:36 PM    Glucose (POC) 140 (H) 01/22/2023 11:19 AM    Glucose (POC) 124 (H) 01/22/2023 06:40 AM     Lab Results   Component Value Date/Time    Color DARK YELLOW 01/10/2023 03:52 PM    Appearance TURBID (A) 01/10/2023 03:52 PM    Specific gravity 1.025 01/10/2023 03:52 PM    Specific gravity 1.010 09/03/2021 10:01 PM    pH (UA) 8.5 (H) 01/10/2023 03:52 PM    Protein 300 (A) 01/10/2023 03:52 PM    Glucose Negative 01/10/2023 03:52 PM    Ketone Negative 01/10/2023 03:52 PM    Bilirubin Negative 01/10/2023 03:52 PM    Urobilinogen 1.0 01/10/2023 03:52 PM    Nitrites Positive (A) 01/10/2023 03:52 PM    Leukocyte Esterase LARGE (A) 01/10/2023 03:52 PM    Epithelial cells FEW 01/10/2023 03:52 PM    Bacteria 2+ (A) 01/10/2023 03:52 PM    WBC 20-50 01/10/2023 03:52 PM    RBC 20-50 01/10/2023 03:52 PM         Medications Reviewed:     Current Facility-Administered Medications   Medication Dose Route Frequency    heparin (porcine) pf 500 Units  500 Units InterCATHeter PRN    bacitracin 500 unit/gram packet 1 Packet  1 Packet Topical PRN    cyclobenzaprine (FLEXERIL) tablet 5 mg  5 mg Oral TID PRN    meropenem (MERREM) 1 g in 0.9% sodium chloride (MBP/ADV) 50 mL MBP  1 g IntraVENous Q8H    senna-docusate (PERICOLACE) 8.6-50 mg per tablet 1 Tablet  1 Tablet Oral DAILY    polyethylene glycol (MIRALAX) packet 17 g  17 g Oral DAILY    acetaminophen (TYLENOL) tablet 1,000 mg  1,000 mg Oral Q6H PRN    dextrose 10% infusion 0-250 mL  0-250 mL IntraVENous PRN    dicyclomine (BENTYL) capsule 10 mg  10 mg Oral TID PRN    finasteride (PROSCAR) tablet 5 mg  5 mg Oral DAILY    folic acid tablet 1 mg  1 mg Oral DAILY    glucagon (GLUCAGEN) injection 1 mg  1 mg IntraMUSCular PRN    glucose chewable tablet 16 g  4 Tablet Oral PRN    heparin (porcine) pf 500 Units  500 Units InterCATHeter PRN    L.acidophilus-paracasei-S.thermophil-bifidobacter (RISAQUAD) 8 billion cell capsule  1 Capsule Oral DAILY    [Held by provider] lipase-protease-amylase (ZENPEP 20,000) capsule 1 Capsule  1 Capsule Oral TID WITH MEALS    metoprolol tartrate (LOPRESSOR) tablet 12.5 mg  12.5 mg Oral BID    mirtazapine (REMERON) tablet 15 mg  15 mg Oral QHS    naloxone (NARCAN) injection 0.4 mg  0.4 mg IntraVENous EVERY 2 MINUTES AS NEEDED    ondansetron (ZOFRAN ODT) tablet 4 mg  4 mg Oral Q8H PRN    oxyCODONE IR (ROXICODONE) tablet 10 mg  10 mg Oral Q4H PRN    phenol throat spray (CHLORASEPTIC) 1 Spray  1 Spray Oral Q1H PRN    polyethylene glycol (MIRALAX) packet 17 g  17 g Oral DAILY PRN    sodium chloride (NS) flush 5-40 mL  5-40 mL IntraVENous Q8H    sodium chloride (NS) flush 5-40 mL  5-40 mL IntraVENous PRN    tamsulosin (FLOMAX) capsule 0.4 mg  0.4 mg Oral DAILY    thiamine HCL (B-1) tablet 100 mg  100 mg Oral DAILY    nicotine (NICODERM CQ) 14 mg/24 hr patch 1 Patch  1 Patch TransDERmal QHS    alteplase (CATHFLO) 1 mg in sterile water (preservative free) 1 mL injection  1 mg InterCATHeter PRN     ______________________________________________________________________  EXPECTED LENGTH OF STAY: 3d 12h  ACTUAL LENGTH OF STAY:          12                 David M Milligram, PA-C

## 2023-01-26 PROCEDURE — 74011250637 HC RX REV CODE- 250/637: Performed by: NURSE PRACTITIONER

## 2023-01-26 PROCEDURE — 74011000258 HC RX REV CODE- 258: Performed by: NURSE PRACTITIONER

## 2023-01-26 PROCEDURE — 74011000250 HC RX REV CODE- 250: Performed by: STUDENT IN AN ORGANIZED HEALTH CARE EDUCATION/TRAINING PROGRAM

## 2023-01-26 PROCEDURE — 74011250636 HC RX REV CODE- 250/636: Performed by: NURSE PRACTITIONER

## 2023-01-26 PROCEDURE — 74011250637 HC RX REV CODE- 250/637: Performed by: STUDENT IN AN ORGANIZED HEALTH CARE EDUCATION/TRAINING PROGRAM

## 2023-01-26 PROCEDURE — 65270000029 HC RM PRIVATE

## 2023-01-26 RX ADMIN — SENNOSIDES AND DOCUSATE SODIUM 1 TABLET: 50; 8.6 TABLET ORAL at 10:16

## 2023-01-26 RX ADMIN — OXYCODONE HYDROCHLORIDE 10 MG: 5 TABLET ORAL at 14:49

## 2023-01-26 RX ADMIN — MIRTAZAPINE 15 MG: 15 TABLET, FILM COATED ORAL at 22:26

## 2023-01-26 RX ADMIN — MEROPENEM 1 G: 1 INJECTION, POWDER, FOR SOLUTION INTRAVENOUS at 00:40

## 2023-01-26 RX ADMIN — METOPROLOL TARTRATE 12.5 MG: 25 TABLET, FILM COATED ORAL at 18:13

## 2023-01-26 RX ADMIN — OXYCODONE HYDROCHLORIDE 10 MG: 5 TABLET ORAL at 20:44

## 2023-01-26 RX ADMIN — Medication 1 CAPSULE: at 10:16

## 2023-01-26 RX ADMIN — MEROPENEM 1 G: 1 INJECTION, POWDER, FOR SOLUTION INTRAVENOUS at 10:15

## 2023-01-26 RX ADMIN — OXYCODONE HYDROCHLORIDE 10 MG: 5 TABLET ORAL at 04:27

## 2023-01-26 RX ADMIN — TAMSULOSIN HYDROCHLORIDE 0.4 MG: 0.4 CAPSULE ORAL at 10:17

## 2023-01-26 RX ADMIN — POLYETHYLENE GLYCOL 3350 17 G: 17 POWDER, FOR SOLUTION ORAL at 10:15

## 2023-01-26 RX ADMIN — FINASTERIDE 5 MG: 5 TABLET, FILM COATED ORAL at 10:17

## 2023-01-26 RX ADMIN — METOPROLOL TARTRATE 12.5 MG: 25 TABLET, FILM COATED ORAL at 10:17

## 2023-01-26 RX ADMIN — Medication 1 MG: at 10:16

## 2023-01-26 RX ADMIN — SODIUM CHLORIDE, PRESERVATIVE FREE 10 ML: 5 INJECTION INTRAVENOUS at 18:13

## 2023-01-26 RX ADMIN — MEROPENEM 1 G: 1 INJECTION, POWDER, FOR SOLUTION INTRAVENOUS at 18:13

## 2023-01-26 RX ADMIN — OXYCODONE HYDROCHLORIDE 10 MG: 5 TABLET ORAL at 10:17

## 2023-01-26 RX ADMIN — Medication 100 MG: at 10:17

## 2023-01-26 NOTE — PROGRESS NOTES
RUR: 23% High    KENTRELL: Anticipated discharge home. Patient will need ride assistance once medically stable. Follow-up with PCP/specialist.      Primary Contact: Ex-Wife, Gris Huff, [phone]     *Will need 2nd IM letter prior to DC.      11:25AM - CM reviewed chart. Per review and ID rounds, plan for patient to complete IV abx treatment in hospital due to history of polysubstance abuse; last dose scheduled for tomorrow, Friday, 1/27. PT/OT have signed off; no home health needed indicated. CM will continue to follow as needed.     Ruddy Dao, SMITHA   [phone]

## 2023-01-26 NOTE — PROGRESS NOTES
[facility] [facility] Adult  Hospitalist Group                                                                                          Hospitalist Progress Note  [place]  Answering service: [phone] or [phone] from in house phone        Date of Service:  2023  NAME:  Fadia Rene  :  1965  MRN:  [id number]      Admission Summary:   Fadia Rene is a 59 y.o. male with medical hx of  rectal cancer status postresection, hypertension, peptic ulcer disease, BPH, and mood disorder who presents with abdominal pain, and is being admitted for SBO. Note, he was from [facility] where he was admitted on  for recurrent SBO. Sigmoidoscopy there showed concern for rectal stricture. He is being transferred to [facility] for GI evaluation for upper GI. He has been hemodynamically stable. Interval history / Subjective:   No acute interval events. Course of meropenem completes tomorrow (). Pt is medically optimized and can be discharged following final dose of ABX. Pt states he is concerned about delirium. Explained to him that this can happen during hospitalization and usually resolves after discharge. Encouraged good sleep hygiene and to minimize naps during daylight     Assessment & Plan:     UTI  Hx of multiple MDR UTI  -  Afebrile, wbc normal as of   - U/A (1/10) wbc 20-50, 3+ bacteria, urine cx - ESBL E-coli and proteus mirabilis  - ABX: Merropenem, recommend to complete total 2 weeks, EOT ; this is his fourth UTI since 2022. History of rectal cancer s/p resection/radiation  History of gastric ulcer  Concern for rectal stricture  Abdominal pain 2/2 Recurrent small bowel obstruction (resolving)  - surgery signed off, recommend repeat abdominal imaging in 6 weeks (from note )  - KUB () Moderate to large stool burden.  There are borderline dilated loops  of small bowel left midabdomen similar to the prior study although slightly  improved. - MRCP (1/15) Multiple cystic lesions of the pancreas likely reflecting pseudocyst with no  MRI evidence of underlying malignancy. Follow-up after clinical improvement is  recommended. Small bowel obstruction pattern redemonstrated.   - Mona Hawks Dr. Charlies Hashimoto showed possible rectal stricture  - Stool WBC and enteric bacteria PCR (-)  - GI signed off; recommend outpatient EGD/colonoscopy  - Continue with bowel regimen     Severe Malnutrition in the setting of chronic illness  - greater than 7.5% weigh loss in 3 months, severe body fat loss, severe muscle mass loss  - regular diet, ensure enlive TID, snacks BID  - Appreciate dietary input; TPN and lipid have been d/c'd on 1/23     PVC  -EKG recurrent PVCs  - On metoprolol 12.5 mg twice daily     Chronic urinary retention, s/p Marrero  - Changed on 1/10/2022 at [facility]  - Continue finasteride and Flomax     Acute on Chronic Pancreatitis  Pancreatic Anomaly  - Scheduled for egd w/ EUS with GI as outpt but unable to make this appointment  - Cannot r/o pancreatic cancer with recent ct  - Continue zenpep when eating again     Elevated LFT (although the level has been decreased since 1/21)  - check hepatitis panel      Hx of Polysubstance abuse     Code status: Full  Prophylaxis: [address] discussed with: patient, RN, CM, attending  Anticipated Disposition: Home tomorrow (1/27) following completion of final dose of meropenem      Hospital Problems  Date Reviewed: 1/6/2023            Codes Class Noted POA    * (Principal) UTI due to extended-spectrum beta lactamase (ESBL) producing Escherichia coli ICD-10-CM: N39.0, B96.29, Z16.12  ICD-9-CM: 599.0, 041.49, V09.1  1/13/2023 Yes        Severe protein-calorie malnutrition ([address] (Chronic) ICD-10-CM: Q68  ICD-9-CM: 252  1/13/2023 Yes        Small bowel obstruction ([address] ICD-10-CM: Q37.345  ICD-9-CM: 560.9  9/9/2022 Yes           Review of Systems:   A comprehensive review of systems was negative except for that written in the HPI. Vital Signs:    Last 24hrs VS reviewed since prior progress note. Most recent are:  Visit Vitals  /74   Pulse 82   Temp 98 °F (36.7 °C)   Resp 16   Ht 5' 8\" (1.727 m)   Wt 44.6 kg (98 lb 5.2 oz)   SpO2 100%   BMI 14.95 kg/m²       No intake or output data in the 24 hours ending 01/26/23 1023     Physical Examination:     I had a face to face encounter with this patient and independently examined them on 1/26/2023 as outlined below:          Constitutional:  No acute distress, frail appearing, cooperative, pleasant    ENT:  Oral mucosa moist, oropharynx benign. Resp:  CTA bilaterally. No wheezing/rhonchi/rales. No accessory muscle use. CV:  Regular rhythm, normal rate, no murmurs, gallops, rubs    GI:  :  Soft, non distended, non tender. normoactive bowel sounds   Marrero in place, yellow/clear urine in bag    Musculoskeletal:  No edema, warm, 2+ pulses throughout    Neurologic:  Moves all extremities. AAOx3, CN II-XII grossly intact            Data Review:    Review and/or order of clinical lab test  Review and/or order of tests in the radiology section of CPT  Review and/or order of tests in the medicine section of CPT      Labs:     No results for input(s): WBC, HGB, HCT, PLT, HGBEXT, HCTEXT, PLTEXT, HGBEXT, HCTEXT, PLTEXT in the last 72 hours. Recent Labs     01/24/23  0505   *   K 4.9      CO2 28   BUN 32*   CREA 0.72   GLU 96   CA 9.8       Recent Labs     01/24/23  0505   *   *   TBILI 0.6   TP 7.4   ALB 2.8*   GLOB 4.6*       No results for input(s): INR, PTP, APTT, INREXT, INREXT in the last 72 hours. No results for input(s): FE, TIBC, PSAT, FERR in the last 72 hours. Lab Results   Component Value Date/Time    Folate 41.4 (H) 09/11/2022 10:49 AM        No results for input(s): PH, PCO2, PO2 in the last 72 hours.   No results for input(s): CPK, CKNDX, TROIQ in the last 72 hours.    No lab exists for component: CPKMB  Lab Results   Component Value Date/Time    Cholesterol, total 147 01/02/2019 04:00 AM    HDL Cholesterol 66 01/02/2019 04:00 AM    LDL, calculated 65.6 01/02/2019 04:00 AM    Triglyceride 74 01/23/2023 12:32 AM    CHOL/HDL Ratio 2.2 01/02/2019 04:00 AM     Lab Results   Component Value Date/Time    Glucose (POC) 128 (H) 01/23/2023 06:05 AM    Glucose (POC) 159 (H) 01/22/2023 09:51 PM    Glucose (POC) 141 (H) 01/22/2023 04:36 PM    Glucose (POC) 140 (H) 01/22/2023 11:19 AM    Glucose (POC) 124 (H) 01/22/2023 06:40 AM     Lab Results   Component Value Date/Time    Color DARK YELLOW 01/10/2023 03:52 PM    Appearance TURBID (A) 01/10/2023 03:52 PM    Specific gravity 1.025 01/10/2023 03:52 PM    Specific gravity 1.010 09/03/2021 10:01 PM    pH (UA) 8.5 (H) 01/10/2023 03:52 PM    Protein 300 (A) 01/10/2023 03:52 PM    Glucose Negative 01/10/2023 03:52 PM    Ketone Negative 01/10/2023 03:52 PM    Bilirubin Negative 01/10/2023 03:52 PM    Urobilinogen 1.0 01/10/2023 03:52 PM    Nitrites Positive (A) 01/10/2023 03:52 PM    Leukocyte Esterase LARGE (A) 01/10/2023 03:52 PM    Epithelial cells FEW 01/10/2023 03:52 PM    Bacteria 2+ (A) 01/10/2023 03:52 PM    WBC 20-50 01/10/2023 03:52 PM    RBC 20-50 01/10/2023 03:52 PM         Medications Reviewed:     Current Facility-Administered Medications   Medication Dose Route Frequency    heparin (porcine) pf 500 Units  500 Units InterCATHeter PRN    bacitracin 500 unit/gram packet 1 Packet  1 Packet Topical PRN    cyclobenzaprine (FLEXERIL) tablet 5 mg  5 mg Oral TID PRN    meropenem (MERREM) 1 g in 0.9% sodium chloride (MBP/ADV) 50 mL MBP  1 g IntraVENous Q8H    senna-docusate (PERICOLACE) 8.6-50 mg per tablet 1 Tablet  1 Tablet Oral DAILY    polyethylene glycol (MIRALAX) packet 17 g  17 g Oral DAILY    acetaminophen (TYLENOL) tablet 1,000 mg  1,000 mg Oral Q6H PRN    dextrose 10% infusion 0-250 mL  0-250 mL IntraVENous PRN dicyclomine (BENTYL) capsule 10 mg  10 mg Oral TID PRN    finasteride (PROSCAR) tablet 5 mg  5 mg Oral DAILY    folic acid tablet 1 mg  1 mg Oral DAILY    glucagon (GLUCAGEN) injection 1 mg  1 mg IntraMUSCular PRN    glucose chewable tablet 16 g  4 Tablet Oral PRN    heparin (porcine) pf 500 Units  500 Units InterCATHeter PRN    L.acidophilus-paracasei-S.thermophil-bifidobacter (RISAQUAD) 8 billion cell capsule  1 Capsule Oral DAILY    [Held by provider] lipase-protease-amylase (ZENPEP 20,000) capsule 1 Capsule  1 Capsule Oral TID WITH MEALS    metoprolol tartrate (LOPRESSOR) tablet 12.5 mg  12.5 mg Oral BID    mirtazapine (REMERON) tablet 15 mg  15 mg Oral QHS    naloxone (NARCAN) injection 0.4 mg  0.4 mg IntraVENous EVERY 2 MINUTES AS NEEDED    ondansetron (ZOFRAN ODT) tablet 4 mg  4 mg Oral Q8H PRN    oxyCODONE IR (ROXICODONE) tablet 10 mg  10 mg Oral Q4H PRN    phenol throat spray (CHLORASEPTIC) 1 Spray  1 Spray Oral Q1H PRN    polyethylene glycol (MIRALAX) packet 17 g  17 g Oral DAILY PRN    sodium chloride (NS) flush 5-40 mL  5-40 mL IntraVENous Q8H    sodium chloride (NS) flush 5-40 mL  5-40 mL IntraVENous PRN    tamsulosin (FLOMAX) capsule 0.4 mg  0.4 mg Oral DAILY    thiamine HCL (B-1) tablet 100 mg  100 mg Oral DAILY    nicotine (NICODERM CQ) 14 mg/24 hr patch 1 Patch  1 Patch TransDERmal QHS    alteplase (CATHFLO) 1 mg in sterile water (preservative free) 1 mL injection  1 mg InterCATHeter PRN     ______________________________________________________________________  EXPECTED LENGTH OF STAY: 3d 12h  ACTUAL LENGTH OF STAY:          13                 David M Milligram, PA-C

## 2023-01-26 NOTE — PROGRESS NOTES
Comprehensive Nutrition Assessment    Type and Reason for Visit: Reassess    Nutrition Recommendations/Plan:   Continue with Low Fiber diet  Continue with Ensure Enlive TID and snacks       Malnutrition Assessment:  Malnutrition Status:  Severe malnutrition (01/13/23 1134)    Context:  Chronic illness     Findings of the 6 clinical characteristics of malnutrition:   Energy Intake:  75% or less est energy requirements for 1 month or longer  Weight Loss:  Greater than 7.5% over 3 months     Body Fat Loss:  Severe body fat loss, Triceps   Muscle Mass Loss:  Severe muscle mass loss, Clavicles (pectoralis &deltoids), Hand (interosseous)  Fluid Accumulation:  No significant fluid accumulation,     Strength:  Not performed        Nutrition Assessment:    60 yo male admitted for SBO. PMHx: rectal CA s/p resection and radiation, HTN, PUD, ETOH and drug abuse, pancreatitis. 1/26:  Follow up. KUB 1/23 secondary to c/o abd pain - showed moderate to large stool burden. TPN was discontinued 1/23. Remains on Low Fiber diet. Spoke with pt at bedside. He reports eating a little bit of all the foods/meals. For breakfast today he only ate cereal and drank milk. C/o early satiety and abd pain when eating. Ensure Enlive ordered TID. Pt states he is drinking 3/day. Pt would like ham sandwich for a snack instead of PB crackers now. Weight has significantly decreased - down 23 lbs from a week ago. Current weight appears accurate based on visual assessment. Labs: TG now WNL since lipids d/c, LFT's elevated       1/19: Follow up. MD consult received for helping to increase PO intakes while weaning off TPN. TPN has been running at goal rate of D15, 5% AA at 83 ml/hr + 250 ml 20% lipids 3x/week. Order today to decrease to rate of 63 ml/hr. KUB today shows SBO mildly improving. Diet advanced to Low Fiber today. Spoke with pt at bedside.   He states he ate peanut butter crackers this morning because his breakfast tray came really late, he then ate some of the breakfast (~25%); and almost all of a cheeseburger for lunch. Informed pt I will re-order Ensure shakes as he likes those and some snacks. Pt agreeable with this plan, asking for snacks at night time because he gets hungry after dinner time. New bed scale weight of 121 lbs. Labs: phos 2.3,         1/16:  MD consult received for TPN Rec's. Pt has been c/o having no taste for last 3 days, intermittent nausea (with no vomiting), and constant ABD pain. MRI of ABD showed recurrent SBO and pancreatic cysts. Colorectal surgeon noted \"Hard to know if his pain is related to pancreatic cysts or dilated small bowel. He is passing some gas and stool. Hard to know if this is a true bowel obstruction or ileus due to pancreatic issues. \"  Recommends bowel rest and TPN. TPN ordered as D15, 5%AA at 83 ml/hr + 250 ml 20% lipids 3x/week. This is providing 0380-9798 ml, 1629 kcals (100% protein needs), 99 gm protein (100% protein needs), 298 gm CHO. Recommend continuing as ordered. Noted that pt was eating ~25% meals prior to being made NPO. Labs: phos 2.5        1/13: MST received for weight loss and poor PO. Spoke with pt at bedside. He reports being a \"light eater\" which makes him feel better so he only eats part of each meal.  Breakfast tray observed, pt ate eggs only and a few sips of milk. States his appetite at home PTA was poor 2/2 no appetite but feels his appetite is improving since admission. Drinks 2 Ensure shakes/day at home. Likes chocolate and strawberry. Agreeable to receive scheduled snacks; will order PB crackers and 1/2 sandwich for AM/PM snacks. Low BMI of 15. Pt admits to recent weight loss. Weight hx in EMR indicates 16% over last 4 months which is significant for time frame. Severe muscle and fat wasting present. Pt c/o alternating between constipation and diarrhea at home.        Labs: phos 2.4 (being repleted), K+ WNL today but previously low      Nutritionally Significant Medications:  Folic acid, Probiotic, Remeron, Pericolace, Thiamine      Estimated Daily Nutrient Needs:  Energy Requirements Based On: Kcal/kg  Weight Used for Energy Requirements: Current  Energy (kcal/day): 8233-7778 (35-40 kcals/kg)  Weight Used for Protein Requirements: Current  Protein (g/day): 82 (1.8 gm/kg (20%))  Method Used for Fluid Requirements: 1 ml/kcal  Fluid (ml/day): 1600    Nutrition Related Findings:   Edema: non-pitting BLE  Last BM: 01/25/23, Soft    Wounds: Skin tears      Current Nutrition Therapies:  Diet: Low Fiber  Supplements: Ensure Enlive TID  Meal intake: Patient Vitals for the past 168 hrs:   % Diet Eaten   01/20/23 1943 0%   01/20/23 1100 0%     Supplement intake: No data found. Nutrition Support: none      Anthropometric Measures:  Height: 5' 8\" (172.7 cm)  Ideal Body Weight (IBW): 154 lbs (70 kg)     Current Body Wt:  44.6 kg (98 lb 5.2 oz), 63.8 % IBW.  Bed scale  Current BMI (kg/m2): 15        Weight Adjustment: No adjustment                 BMI Category: Underweight (BMI less than 18.5)    Wt Readings from Last 10 Encounters:   01/26/23 44.6 kg (98 lb 5.2 oz)   01/06/23 43.8 kg (96 lb 8 oz)   11/29/22 45.5 kg (100 lb 5 oz)   09/09/22 54.4 kg (120 lb)   06/03/22 54.4 kg (120 lb)   02/04/22 52 kg (114 lb 9.6 oz)   02/02/22 48.5 kg (107 lb)   01/26/22 48.1 kg (106 lb)   09/06/21 54.3 kg (119 lb 12.8 oz)   09/02/21 55.1 kg (121 lb 8 oz)           Nutrition Diagnosis:   Severe malnutrition related to inadequate protein-energy intake as evidenced by weight loss, severe muscle loss, severe loss of subcutaneous fat, intake 26-50%    Nutrition Interventions:   Food and/or Nutrient Delivery: Continue current diet, Start oral nutrition supplement, Snacks (specify), Modify parenteral nutrition  Nutrition Education/Counseling: No recommendations at this time  Coordination of Nutrition Care: Continue to monitor while inpatient       Goals: Goals: other (specify)  Specify Other Goals: PO intakes > 50% meals and snacks + 3 ONS daily over next 5-7 days    Nutrition Monitoring and Evaluation:   Behavioral-Environmental Outcomes: None identified  Food/Nutrient Intake Outcomes: Food and nutrient intake, Supplement intake  Physical Signs/Symptoms Outcomes: Biochemical data, GI status, Weight    Discharge Planning:    Continue current diet, Continue oral nutrition supplement    Yasmeen Braun RD  Available via Get[employer]

## 2023-01-27 ENCOUNTER — APPOINTMENT (OUTPATIENT)
Dept: ULTRASOUND IMAGING | Age: 58
End: 2023-01-27
Attending: NURSE PRACTITIONER
Payer: MEDICARE

## 2023-01-27 LAB
ALBUMIN SERPL-MCNC: 3 G/DL (ref 3.5–5)
ALBUMIN/GLOB SERPL: 0.6 (ref 1.1–2.2)
ALP SERPL-CCNC: 897 U/L (ref 45–117)
ALT SERPL-CCNC: 1740 U/L (ref 12–78)
ANION GAP SERPL CALC-SCNC: 4 MMOL/L (ref 5–15)
APAP SERPL-MCNC: <2 UG/ML (ref 10–30)
AST SERPL-CCNC: 1039 U/L (ref 15–37)
BILIRUB SERPL-MCNC: 1.1 MG/DL (ref 0.2–1)
BUN SERPL-MCNC: 18 MG/DL (ref 6–20)
BUN/CREAT SERPL: 25 (ref 12–20)
CALCIUM SERPL-MCNC: 10.3 MG/DL (ref 8.5–10.1)
CHLORIDE SERPL-SCNC: 98 MMOL/L (ref 97–108)
CO2 SERPL-SCNC: 30 MMOL/L (ref 21–32)
CREAT SERPL-MCNC: 0.71 MG/DL (ref 0.7–1.3)
GLOBULIN SER CALC-MCNC: 5.1 G/DL (ref 2–4)
GLUCOSE SERPL-MCNC: 98 MG/DL (ref 65–100)
POTASSIUM SERPL-SCNC: 3.9 MMOL/L (ref 3.5–5.1)
PROT SERPL-MCNC: 8.1 G/DL (ref 6.4–8.2)
SODIUM SERPL-SCNC: 132 MMOL/L (ref 136–145)

## 2023-01-27 PROCEDURE — 74011250637 HC RX REV CODE- 250/637: Performed by: NURSE PRACTITIONER

## 2023-01-27 PROCEDURE — 74011000258 HC RX REV CODE- 258: Performed by: NURSE PRACTITIONER

## 2023-01-27 PROCEDURE — 65270000029 HC RM PRIVATE

## 2023-01-27 PROCEDURE — 74011250636 HC RX REV CODE- 250/636: Performed by: NURSE PRACTITIONER

## 2023-01-27 PROCEDURE — 80143 DRUG ASSAY ACETAMINOPHEN: CPT

## 2023-01-27 PROCEDURE — 80053 COMPREHEN METABOLIC PANEL: CPT

## 2023-01-27 PROCEDURE — 86301 IMMUNOASSAY TUMOR CA 19-9: CPT

## 2023-01-27 PROCEDURE — 76700 US EXAM ABDOM COMPLETE: CPT

## 2023-01-27 PROCEDURE — 74011250637 HC RX REV CODE- 250/637: Performed by: STUDENT IN AN ORGANIZED HEALTH CARE EDUCATION/TRAINING PROGRAM

## 2023-01-27 PROCEDURE — 36415 COLL VENOUS BLD VENIPUNCTURE: CPT

## 2023-01-27 RX ORDER — SODIUM CHLORIDE 9 MG/ML
75 INJECTION, SOLUTION INTRAVENOUS CONTINUOUS
Status: DISCONTINUED | OUTPATIENT
Start: 2023-01-27 | End: 2023-01-28 | Stop reason: HOSPADM

## 2023-01-27 RX ADMIN — MIRTAZAPINE 15 MG: 15 TABLET, FILM COATED ORAL at 21:20

## 2023-01-27 RX ADMIN — POLYETHYLENE GLYCOL 3350 17 G: 17 POWDER, FOR SOLUTION ORAL at 11:47

## 2023-01-27 RX ADMIN — MEROPENEM 1 G: 1 INJECTION, POWDER, FOR SOLUTION INTRAVENOUS at 00:42

## 2023-01-27 RX ADMIN — OXYCODONE HYDROCHLORIDE 10 MG: 5 TABLET ORAL at 11:43

## 2023-01-27 RX ADMIN — Medication 100 MG: at 11:45

## 2023-01-27 RX ADMIN — Medication 1 MG: at 11:58

## 2023-01-27 RX ADMIN — OXYCODONE HYDROCHLORIDE 10 MG: 5 TABLET ORAL at 20:23

## 2023-01-27 RX ADMIN — SODIUM CHLORIDE 75 ML/HR: 9 INJECTION, SOLUTION INTRAVENOUS at 14:45

## 2023-01-27 RX ADMIN — Medication 1 CAPSULE: at 11:47

## 2023-01-27 RX ADMIN — FINASTERIDE 5 MG: 5 TABLET, FILM COATED ORAL at 11:45

## 2023-01-27 RX ADMIN — MEROPENEM 1 G: 1 INJECTION, POWDER, FOR SOLUTION INTRAVENOUS at 09:30

## 2023-01-27 RX ADMIN — METOPROLOL TARTRATE 12.5 MG: 25 TABLET, FILM COATED ORAL at 11:44

## 2023-01-27 RX ADMIN — TAMSULOSIN HYDROCHLORIDE 0.4 MG: 0.4 CAPSULE ORAL at 11:47

## 2023-01-27 RX ADMIN — PANCRELIPASE LIPASE, PANCRELIPASE PROTEASE, PANCRELIPASE AMYLASE 1 CAPSULE: 20000; 63000; 84000 CAPSULE, DELAYED RELEASE ORAL at 18:42

## 2023-01-27 RX ADMIN — SENNOSIDES AND DOCUSATE SODIUM 1 TABLET: 50; 8.6 TABLET ORAL at 11:46

## 2023-01-27 RX ADMIN — METOPROLOL TARTRATE 12.5 MG: 25 TABLET, FILM COATED ORAL at 18:40

## 2023-01-27 RX ADMIN — OXYCODONE HYDROCHLORIDE 10 MG: 5 TABLET ORAL at 03:24

## 2023-01-27 NOTE — ACP (ADVANCE CARE PLANNING)
[address] [facility] Adult  Hospitalist Group                                    Advance Care Planning Note    Name: Trina Lawrence  YOB: 1965  MRN: [id number]  Admission Date: 1/13/2023  4:31 AM    Date of discussion: 1/27/2023    Active Diagnoses:    Hospital Problems  Date Reviewed: 1/6/2023            Codes Class Noted POA    * (Principal) UTI due to extended-spectrum beta lactamase (ESBL) producing Escherichia coli ICD-10-CM: N39.0, B96.29, Z16.12  ICD-9-CM: 599.0, 041.49, V09.1  1/13/2023 Yes        Severe protein-calorie malnutrition ([facility] (Chronic) ICD-10-CM: E43  ICD-9-CM: 262  1/13/2023 Yes        Small bowel obstruction ([facility] ICD-10-CM: N51.644  ICD-9-CM: 560.9  9/9/2022 Yes           These active diagnoses are of sufficient risk that focused discussion on advance care planning is indicated in order to allow the patient to thoughtfully consider personal goals of care, and if situations arise that prevent the ability to personally give input, to ensure appropriate representation of their personal desires for different levels and aggressiveness of care.      Discussion:     Persons present and participating in discussion: Trina Lawrence, Ash Rodriguez, NP  Spoke with pt's Dariela Varela ([phone]) via phone (additional time spent)    Topics Discussed:  Patient's medical condition and diagnosis: [ * ] yes [  ] no   Surrogate decision maker: [ *] yes in the event pt is unable to make his own decision [  ] no   Patient's current physical function/cognitive function/frailty: [  ] yes [  ] no   Code Status: [ * ] yes [  ] no   Artificial Nutrition; parental feeding OK, but, no tube feeding placement  No for Dialysis   Non-Invasive Ventilation - BIPAP ok  Blood Transfusion: ok  DNR/DNI  Potential Resources for home (durable medical equipment, home nursing, home O2): [  *] yes [  ] no    Time Spent: 35 minutes (not include phone conversation with pt's MPOA_    Total time spent face-to-face in education and discussion: 35 minutes.      Venice Junior NP  Date of Service:  1/27/2023  2:15 PM

## 2023-01-27 NOTE — PROGRESS NOTES
[address] [facility] Adult  Hospitalist Group                                                                                          Hospitalist Progress Note  Patricia Nelson NP  Answering service: [phone] from in house phone        Date of Service:  2023  NAME:  Evita Thompson  :  1965  MRN:  [id number]      Admission Summary:   Evita Thompson is a 59 y.o. male with medical hx of  rectal cancer status postresection, hypertension, peptic ulcer disease, BPH, and mood disorder who presents with abdominal pain, and is being admitted for SBO. Note, he was from [facility] where he was admitted on  for recurrent SBO. Sigmoidoscopy there showed concern for rectal stricture. He is being transferred to [facility] for GI evaluation for upper GI. He has been hemodynamically stable. Interval history / Subjective:   Completed 2 weeks course of IV abx therapy. Some sediments in mondragon catheter, asked nursing staff to flush the mondragon catheter. Daily BM, diffuse abd discomfort not pain. Elevated LFT & US of abd ordered     Assessment & Plan:     UTI  Hx of multiple MDR UTI  -  Afebrile, wbc normal as of   - U/A (1/10) wbc 20-50, 3+ bacteria, urine cx - ESBL E-coli and proteus mirabilis  - ABX: completed 2 weeks of IV Merropenem as of  (had 4 ESBL UTI since 2022)     History of rectal cancer s/p resection/radiation  History of gastric ulcer  Concern for rectal stricture  Abdominal pain; much better, but not resolved  Recurrent small bowel obstruction (resolved, having daily BM)  - surgery signed off, recommend repeat abdominal imaging in 6 weeks (from note )  - US of abdomen () There is mild intrahepatic biliary ductal dilatation. The CBD measures 13 mm diameter compared to 12 mm on the comparison MRI. Status post cholecystectomy. Again seen is a 3.8 cm cystic lesion along the head of the pancreas.   - KUB () Moderate to large stool burden. There are borderline dilated loops  of small bowel left midabdomen similar to the prior study although slightly  improved. - MRCP (1/15) Multiple cystic lesions of the pancreas likely reflecting pseudocyst with no  MRI evidence of underlying malignancy. Follow-up after clinical improvement is  recommended. Small bowel obstruction pattern redemonstrated. - Juli Estimable Dr. Pratik Cowan showed possible rectal stricture  - Stool WBC and enteric bacteria PCR (-)  - GI signed off on 1/16, recommend outpatient EGD/colonoscopy. Re consulted GI team to evaluate elevated LFT and abd discomfort  - Continue with bowel regimen    Elevated LFT  - acute hepatitis panel (-)    Pt is hemodynamically and clinically stable. Afebrile. No active s/sx of infection at this time.     TPN/lipid were stopped on 1/23    Severe Malnutrition in the setting of chronic illness  - greater than 7.5% weigh loss in 3 months, severe body fat loss, severe muscle mass loss  - regular diet, ensure enlive TID, snacks BID  - Appreciate dietary input; TPN and lipid have been d/c'd on 1/23     PVC  -EKG recurrent PVCs  - On metoprolol 12.5 mg twice daily     Chronic urinary retention, s/p Marrero  - Changed on 1/10/2022 at [facility] Salty finasteride and Flomax     Acute on Chronic Pancreatitis  Pancreatic Anomaly  - Scheduled for egd w/ EUS with GI as outpt but unable to make this appointment  - Jeanette Ivey r/o pancreatic cancer with recent ct  - Continue zenpep when eating again     Elevated LFT (although the level has been decreased since 1/21)  - check hepatitis panel      Hx of Polysubstance abuse     Code status: changed to DNR/DNI after a long discussion  Prophylaxis: [address] discussed with: Pt, pt's nurse, CM, and pt's Ben Squires ([phone])  Anticipated Disposition: 24-48 hours     Hospital Problems  Date Reviewed: 1/6/2023            Codes Class Noted POA    * (Principal) UTI due to extended-spectrum beta lactamase (ESBL) producing Escherichia coli ICD-10-CM: N39.0, B96.29, Z16.12  ICD-9-CM: 599.0, 041.49, V09.1  1/13/2023 Yes        Severe protein-calorie malnutrition ([facility]) (Chronic) ICD-10-CM: R14  ICD-9-CM: 262  1/13/2023 Yes        Small bowel obstruction ([address] ICD-10-CM: T34.055  ICD-9-CM: 560.9  9/9/2022 Yes         Review of Systems:   A comprehensive review of systems was negative except for that written in the HPI. Vital Signs:    Last 24hrs VS reviewed since prior progress note. Most recent are:  Visit Vitals  /89   Pulse 87   Temp 97.8 °F (36.6 °C)   Resp 16   Ht 5' 8\" (1.727 m)   Wt 44.6 kg (98 lb 5.2 oz)   SpO2 100%   BMI 14.95 kg/m²         Intake/Output Summary (Last 24 hours) at 1/27/2023 1355  Last data filed at 1/27/2023 1752  Gross per 24 hour   Intake --   Output 500 ml   Net -500 ml          Physical Examination:     I had a face to face encounter with this patient and independently examined them on 1/27/2023 as outlined below:          Constitutional:  No acute distress, frail appearing, cooperative, pleasant    ENT:  Oral mucosa moist, oropharynx benign. Resp:  CTA bilaterally. No wheezing/rhonchi/rales. No accessory muscle use. CV:  Regular rhythm, normal rate, no murmurs, gallops, rubs    GI:  :  Soft, non distended, diffuse tender. normoactive bowel sounds   Marrero in place, yellow/clear urine in bag    Musculoskeletal:  No edema, warm, 2+ pulses throughout    Neurologic:  Moves all extremities. AAOx3, CN II-XII grossly intact            Data Review:    Review and/or order of clinical lab test  Review and/or order of tests in the radiology section of CPT  Review and/or order of tests in the medicine section of CPT      Labs:     No results for input(s): WBC, HGB, HCT, PLT, HGBEXT, HCTEXT, PLTEXT, HGBEXT, HCTEXT, PLTEXT in the last 72 hours.     Recent Labs     01/27/23  0940   *   K 3.9   CL 98   CO2 30   BUN 18   CREA 0.71   GLU 98   CA 10.3* Recent Labs     01/27/23  0940   ALT 1,740*   *   TBILI 1.1*   TP 8.1   ALB 3.0*   GLOB 5.1*       No results for input(s): INR, PTP, APTT, INREXT, INREXT in the last 72 hours. No results for input(s): FE, TIBC, PSAT, FERR in the last 72 hours. Lab Results   Component Value Date/Time    Folate 41.4 (H) 09/11/2022 10:49 AM        No results for input(s): PH, PCO2, PO2 in the last 72 hours. No results for input(s): CPK, CKNDX, TROIQ in the last 72 hours.     No lab exists for component: CPKMB  Lab Results   Component Value Date/Time    Cholesterol, total 147 01/02/2019 04:00 AM    HDL Cholesterol 66 01/02/2019 04:00 AM    LDL, calculated 65.6 01/02/2019 04:00 AM    Triglyceride 74 01/23/2023 12:32 AM    CHOL/HDL Ratio 2.2 01/02/2019 04:00 AM     Lab Results   Component Value Date/Time    Glucose (POC) 128 (H) 01/23/2023 06:05 AM    Glucose (POC) 159 (H) 01/22/2023 09:51 PM    Glucose (POC) 141 (H) 01/22/2023 04:36 PM    Glucose (POC) 140 (H) 01/22/2023 11:19 AM    Glucose (POC) 124 (H) 01/22/2023 06:40 AM     Lab Results   Component Value Date/Time    Color DARK YELLOW 01/10/2023 03:52 PM    Appearance TURBID (A) 01/10/2023 03:52 PM    Specific gravity 1.025 01/10/2023 03:52 PM    Specific gravity 1.010 09/03/2021 10:01 PM    pH (UA) 8.5 (H) 01/10/2023 03:52 PM    Protein 300 (A) 01/10/2023 03:52 PM    Glucose Negative 01/10/2023 03:52 PM    Ketone Negative 01/10/2023 03:52 PM    Bilirubin Negative 01/10/2023 03:52 PM    Urobilinogen 1.0 01/10/2023 03:52 PM    Nitrites Positive (A) 01/10/2023 03:52 PM    Leukocyte Esterase LARGE (A) 01/10/2023 03:52 PM    Epithelial cells FEW 01/10/2023 03:52 PM    Bacteria 2+ (A) 01/10/2023 03:52 PM    WBC 20-50 01/10/2023 03:52 PM    RBC 20-50 01/10/2023 03:52 PM         Medications Reviewed:     Current Facility-Administered Medications   Medication Dose Route Frequency    0.9% sodium chloride infusion  75 mL/hr IntraVENous CONTINUOUS    heparin (porcine) pf 500 Units  500 Units InterCATHeter PRN    bacitracin 500 unit/gram packet 1 Packet  1 Packet Topical PRN    cyclobenzaprine (FLEXERIL) tablet 5 mg  5 mg Oral TID PRN    meropenem (MERREM) 1 g in 0.9% sodium chloride (MBP/ADV) 50 mL MBP  1 g IntraVENous Q8H    senna-docusate (PERICOLACE) 8.6-50 mg per tablet 1 Tablet  1 Tablet Oral DAILY    polyethylene glycol (MIRALAX) packet 17 g  17 g Oral DAILY    acetaminophen (TYLENOL) tablet 1,000 mg  1,000 mg Oral Q6H PRN    dextrose 10% infusion 0-250 mL  0-250 mL IntraVENous PRN    dicyclomine (BENTYL) capsule 10 mg  10 mg Oral TID PRN    finasteride (PROSCAR) tablet 5 mg  5 mg Oral DAILY    folic acid tablet 1 mg  1 mg Oral DAILY    glucagon (GLUCAGEN) injection 1 mg  1 mg IntraMUSCular PRN    glucose chewable tablet 16 g  4 Tablet Oral PRN    heparin (porcine) pf 500 Units  500 Units InterCATHeter PRN    L.acidophilus-paracasei-S.thermophil-bifidobacter (RISAQUAD) 8 billion cell capsule  1 Capsule Oral DAILY    lipase-protease-amylase (ZENPEP 20,000) capsule 1 Capsule  1 Capsule Oral TID WITH MEALS    metoprolol tartrate (LOPRESSOR) tablet 12.5 mg  12.5 mg Oral BID    mirtazapine (REMERON) tablet 15 mg  15 mg Oral QHS    naloxone (NARCAN) injection 0.4 mg  0.4 mg IntraVENous EVERY 2 MINUTES AS NEEDED    ondansetron (ZOFRAN ODT) tablet 4 mg  4 mg Oral Q8H PRN    oxyCODONE IR (ROXICODONE) tablet 10 mg  10 mg Oral Q4H PRN    phenol throat spray (CHLORASEPTIC) 1 Spray  1 Spray Oral Q1H PRN    polyethylene glycol (MIRALAX) packet 17 g  17 g Oral DAILY PRN    sodium chloride (NS) flush 5-40 mL  5-40 mL IntraVENous Q8H    sodium chloride (NS) flush 5-40 mL  5-40 mL IntraVENous PRN    tamsulosin (FLOMAX) capsule 0.4 mg  0.4 mg Oral DAILY    thiamine HCL (B-1) tablet 100 mg  100 mg Oral DAILY    nicotine (NICODERM CQ) 14 mg/24 hr patch 1 Patch  1 Patch TransDERmal QHS    alteplase (CATHFLO) 1 mg in sterile water (preservative free) 1 mL injection  1 mg InterCATHeter PRN ______________________________________________________________________  EXPECTED LENGTH OF STAY: 3d 12h  ACTUAL LENGTH OF STAY:          14                 Venice Sanchez NP

## 2023-01-27 NOTE — PROGRESS NOTES
RUR: 20% High    KENTRELL: Anticipated discharge home. Patient will need ride assistance once medically stable. Follow-up with PCP/specialist.      Primary Contact: Ex-Wife, Clarissa Escobar, [phone]     *Will need 2nd IM letter prior to DC.      11:52AM - CM reviewed chart. Patient to receive last dose of IV abx today, 1/27. Per ID rounds, elevated LFT noted; abdominal ultrasound ordered. PT/OT signed off; no home health needed indicated. CM will continue to follow as needed.     SMITHA Blood   [phone]

## 2023-01-27 NOTE — CONSULTS
Sarita Murray [address]  [address] [facility], [address] [facility]       GI PROGRESS NOTE  Will Ashish Bailon  [phone] office  [phone] NP/PA in-hospital cell phone M-F until 4:30PM  After 5PM or on weekends, please call  for physician on call      NAME: Martin Kemp   :  1965   MRN:  [id number]       Subjective:   Patient was seen earlier during this admission for chronic pancreatitis with cysts, abdominal pain, constipation/diarrhea, and recurrent small bowel obstruction. We were re-consulted by Venice Crawford NP, for abdominal pain, elevated LFTs. Patient reports some lower abdominal pain, although reports improvement. He reports some nausea. No vomiting. Objective:     VITALS:   Last 24hrs VS reviewed since prior progress note. Most recent are:  Visit Vitals  /89   Pulse 87   Temp 97.8 °F (36.6 °C)   Resp 16   Ht 5' 8\" (1.727 m)   Wt 44.6 kg (98 lb 5.2 oz)   SpO2 100%   BMI 14.95 kg/m²     PHYSICAL EXAM:  General: Cooperative, no acute distress    Neurologic:  Alert and oriented  HEENT: EOMI, no scleral icterus   Lungs:  No acute respiratory distress  Abdomen: Soft, non-distended, mild left-sided tenderness, no guarding, no rebound. Extremities: Warm  Psych:   Not anxious or agitated    Lab Data Reviewed:     Recent Results (from the past 24 hour(s))   METABOLIC PANEL, COMPREHENSIVE    Collection Time: 23  9:40 AM   Result Value Ref Range    Sodium 132 (L) 136 - 145 mmol/L    Potassium 3.9 3.5 - 5.1 mmol/L    Chloride 98 97 - 108 mmol/L    CO2 30 21 - 32 mmol/L    Anion gap 4 (L) 5 - 15 mmol/L    Glucose 98 65 - 100 mg/dL    BUN 18 6 - 20 MG/DL    Creatinine 0.71 0.70 - 1.30 MG/DL    BUN/Creatinine ratio 25 (H) 12 - 20      eGFR >60 >60 ml/min/1.73m2    Calcium 10.3 (H) 8.5 - 10.1 MG/DL    Bilirubin, total 1.1 (H) 0.2 - 1.0 MG/DL    ALT (SGPT) 1,740 (H) 12 - 78 U/L    AST (SGOT) 1,039 (H) 15 - 37 U/L    Alk.  phosphatase 897 (H) 45 - 117 U/L    Protein, total 8.1 6.4 - 8.2 g/dL    Albumin 3.0 (L) 3.5 - 5.0 g/dL    Globulin 5.1 (H) 2.0 - 4.0 g/dL    A-G Ratio 0.6 (L) 1.1 - 2.2       IMPRESSION     1. Multiple cystic lesions of the pancreas likely reflecting pseudocyst with no  MRI evidence of underlying malignancy. Follow-up after clinical improvement is  recommended. 2. Small bowel obstruction pattern redemonstrated. 3. Small abdominal free fluid. Assessment:     Elevated LFTs: AST 1,039, ALT 1,740, alkaline phosphatase 897, total bilirubin 1.1 (LFTs unremarkable 1/17/23). Negative acute hepatitis panel. Abdominal ultrasound (1/27/23): mild intrahepatic biliary ductal dilatation, CBD measures 13 mm (compared to 12 mm on comparison MRI); status post cholecystectomy; again seen is a 3.8 cm cystic lesion along the head of the pancreas. MRI/MRCP (1/14/23) impression above. Per hospitalist note, no signs of infection, TPN/lipids stopped 1/23. Medications were reviewed: mirtazapine - unknown start date of medication (elevated LFTs > 3 times ULN per UpToDate, likelihood score C for clinically apparent liver disease per LiverTox) and meropenem (< or equal to 1% hepatic failure per UpToDate, likelihood score D per Livertox). Differential includes hepatobiliary, medications, and infection.    Urinary tract infection  History of rectal cancer status post resection and radiation  History of recurrent small bowel obstruction  Chronic pancreatitis  History of polysubstance abuse  Severe malnutrition     Patient Active Problem List   Diagnosis Code    Seizure ([facility] R56.9    Alcohol abuse F10.10    Drug abuse ([facility] F19.10    Rectal cancer ([facility] C20    Pancreatitis K85.90    Acute pancreatitis K85.90    Hydronephrosis, bilateral N13.30    Hematuria R31.9    Small bowel obstruction ([facility] K56.609    SBO (small bowel obstruction) ([facility] K56.609    UTI due to extended-spectrum beta lactamase (ESBL) producing Escherichia coli N39.0, B96.29, Z16.12    Severe protein-calorie malnutrition ([facility] E43     Plan:     Trend LFTs  Avoid hepatoxic medications (mirtazapine as above)  Next consider repeat MRI/MRCP based on clinical course  Further recommendations per Dr. Manda Nieves     Signed By: GERI Ruaon     1/27/2023  2:04 PM        This patient was seen and examined by me in a face-to-face visit today. I reviewed the medical record including lab work, imaging and other provider notes. I confirmed the interval history as described above. I spoke to the patient, discussing our findings and plans. I discussed this case in detail with Rebel NEVAREZ. I formulated an updated  assessment of this patient and guided our treatment plan. I agree with the above progress note. I agree with the history, exam and assessment and plan as outlined in the note. I would like to add the following:     Abd: slightly decreased bowel sounds, nd, nt, no rebound/guarding. Neuro: no asterixis. Probable DILI. Will see how he does off of meropenem. Trend liver tests. PT/INR. Weekend coverage to chart check.     Dr. Manda Nieves

## 2023-01-28 VITALS
BODY MASS INDEX: 14.7 KG/M2 | HEIGHT: 68 IN | DIASTOLIC BLOOD PRESSURE: 81 MMHG | OXYGEN SATURATION: 100 % | HEART RATE: 84 BPM | SYSTOLIC BLOOD PRESSURE: 121 MMHG | RESPIRATION RATE: 16 BRPM | TEMPERATURE: 98.1 F | WEIGHT: 97 LBS

## 2023-01-28 PROBLEM — K56.609 SMALL BOWEL OBSTRUCTION (HCC): Status: RESOLVED | Noted: 2022-09-09 | Resolved: 2023-01-28

## 2023-01-28 PROBLEM — N39.0 UTI DUE TO EXTENDED-SPECTRUM BETA LACTAMASE (ESBL) PRODUCING ESCHERICHIA COLI: Status: RESOLVED | Noted: 2023-01-13 | Resolved: 2023-01-28

## 2023-01-28 PROBLEM — B96.29 UTI DUE TO EXTENDED-SPECTRUM BETA LACTAMASE (ESBL) PRODUCING ESCHERICHIA COLI: Status: RESOLVED | Noted: 2023-01-13 | Resolved: 2023-01-28

## 2023-01-28 PROBLEM — Z16.12 UTI DUE TO EXTENDED-SPECTRUM BETA LACTAMASE (ESBL) PRODUCING ESCHERICHIA COLI: Status: RESOLVED | Noted: 2023-01-13 | Resolved: 2023-01-28

## 2023-01-28 LAB
ALBUMIN SERPL-MCNC: 2.6 G/DL (ref 3.5–5)
ALBUMIN/GLOB SERPL: 0.6 (ref 1.1–2.2)
ALP SERPL-CCNC: 683 U/L (ref 45–117)
ALT SERPL-CCNC: 1112 U/L (ref 12–78)
AST SERPL-CCNC: 426 U/L (ref 15–37)
BASOPHILS # BLD: 0 K/UL (ref 0–0.1)
BASOPHILS NFR BLD: 1 % (ref 0–1)
BILIRUB DIRECT SERPL-MCNC: 0.3 MG/DL (ref 0–0.2)
BILIRUB SERPL-MCNC: 0.9 MG/DL (ref 0.2–1)
DIFFERENTIAL METHOD BLD: ABNORMAL
EOSINOPHIL # BLD: 0.1 K/UL (ref 0–0.4)
EOSINOPHIL NFR BLD: 2 % (ref 0–7)
ERYTHROCYTE [DISTWIDTH] IN BLOOD BY AUTOMATED COUNT: 15.5 % (ref 11.5–14.5)
GLOBULIN SER CALC-MCNC: 4.4 G/DL (ref 2–4)
HCT VFR BLD AUTO: 24.2 % (ref 36.6–50.3)
HGB BLD-MCNC: 8.4 G/DL (ref 12.1–17)
IMM GRANULOCYTES # BLD AUTO: 0 K/UL (ref 0–0.04)
IMM GRANULOCYTES NFR BLD AUTO: 0 % (ref 0–0.5)
INR PPP: 1.1 (ref 0.9–1.1)
LYMPHOCYTES # BLD: 1.8 K/UL (ref 0.8–3.5)
LYMPHOCYTES NFR BLD: 34 % (ref 12–49)
MCH RBC QN AUTO: 29.9 PG (ref 26–34)
MCHC RBC AUTO-ENTMCNC: 34.7 G/DL (ref 30–36.5)
MCV RBC AUTO: 86.1 FL (ref 80–99)
MONOCYTES # BLD: 0.7 K/UL (ref 0–1)
MONOCYTES NFR BLD: 12 % (ref 5–13)
NEUTS SEG # BLD: 2.8 K/UL (ref 1.8–8)
NEUTS SEG NFR BLD: 51 % (ref 32–75)
NRBC # BLD: 0 K/UL (ref 0–0.01)
NRBC BLD-RTO: 0 PER 100 WBC
PLATELET # BLD AUTO: 317 K/UL (ref 150–400)
PMV BLD AUTO: 9.9 FL (ref 8.9–12.9)
PROT SERPL-MCNC: 7 G/DL (ref 6.4–8.2)
PROTHROMBIN TIME: 11.8 SEC (ref 9–11.1)
RBC # BLD AUTO: 2.81 M/UL (ref 4.1–5.7)
WBC # BLD AUTO: 5.5 K/UL (ref 4.1–11.1)

## 2023-01-28 PROCEDURE — 36415 COLL VENOUS BLD VENIPUNCTURE: CPT

## 2023-01-28 PROCEDURE — 80076 HEPATIC FUNCTION PANEL: CPT

## 2023-01-28 PROCEDURE — 85610 PROTHROMBIN TIME: CPT

## 2023-01-28 PROCEDURE — 74011250637 HC RX REV CODE- 250/637: Performed by: NURSE PRACTITIONER

## 2023-01-28 PROCEDURE — 74011250637 HC RX REV CODE- 250/637: Performed by: STUDENT IN AN ORGANIZED HEALTH CARE EDUCATION/TRAINING PROGRAM

## 2023-01-28 PROCEDURE — 85025 COMPLETE CBC W/AUTO DIFF WBC: CPT

## 2023-01-28 RX ORDER — DICYCLOMINE HYDROCHLORIDE 10 MG/1
10 CAPSULE ORAL
Qty: 30 CAPSULE | Refills: 0 | Status: SHIPPED | OUTPATIENT
Start: 2023-01-28

## 2023-01-28 RX ORDER — ONDANSETRON 4 MG/1
4 TABLET, ORALLY DISINTEGRATING ORAL
Qty: 15 TABLET | Refills: 0 | Status: SHIPPED | OUTPATIENT
Start: 2023-01-28

## 2023-01-28 RX ORDER — POLYETHYLENE GLYCOL 3350 17 G/17G
17 POWDER, FOR SOLUTION ORAL
Qty: 30 PACKET | Refills: 0 | Status: SHIPPED | OUTPATIENT
Start: 2023-01-28

## 2023-01-28 RX ADMIN — OXYCODONE HYDROCHLORIDE 10 MG: 5 TABLET ORAL at 10:24

## 2023-01-28 RX ADMIN — SENNOSIDES AND DOCUSATE SODIUM 1 TABLET: 50; 8.6 TABLET ORAL at 10:15

## 2023-01-28 RX ADMIN — Medication 1 MG: at 10:15

## 2023-01-28 RX ADMIN — Medication 1 CAPSULE: at 10:15

## 2023-01-28 RX ADMIN — TAMSULOSIN HYDROCHLORIDE 0.4 MG: 0.4 CAPSULE ORAL at 10:15

## 2023-01-28 RX ADMIN — Medication 100 MG: at 10:15

## 2023-01-28 RX ADMIN — OXYCODONE HYDROCHLORIDE 10 MG: 5 TABLET ORAL at 02:32

## 2023-01-28 RX ADMIN — FINASTERIDE 5 MG: 5 TABLET, FILM COATED ORAL at 10:15

## 2023-01-28 RX ADMIN — METOPROLOL TARTRATE 12.5 MG: 25 TABLET, FILM COATED ORAL at 10:15

## 2023-01-28 RX ADMIN — POLYETHYLENE GLYCOL 3350 17 G: 17 POWDER, FOR SOLUTION ORAL at 10:15

## 2023-01-28 RX ADMIN — PANCRELIPASE LIPASE, PANCRELIPASE PROTEASE, PANCRELIPASE AMYLASE 1 CAPSULE: 20000; 63000; 84000 CAPSULE, DELAYED RELEASE ORAL at 07:24

## 2023-01-28 NOTE — PROGRESS NOTES
Round Trip Justification      Date/Time:  11/14/2018 10:05 AM    Patient will be discharged from [facility] on 1/28/2023 @ 230 pm.     Verified address with patient  [address] [place] [address]    Transportation Mode: car     Is Transportation paid for by Insurance: no    If transport is not covered by [employer] does the patient live within the 25-30 mile service area: yes    Justification for LYFT Transport: There is no other feasible option of transportation    Ride ID- Ride ID [id number]  Estimated Cost 19.00-23.00    Round Trip - Phone: [phone]    Cm consult noted for PCP follow up- cm emailed Opal Espinoza- case management specialist to arrange PCP follow up.

## 2023-01-28 NOTE — DISCHARGE INSTRUCTIONS
Discharge Instructions       PATIENT ID: Cindi Anderson  MRN: [id number]   YOB: 1965    DATE OF ADMISSION: 1/13/2023   DATE OF DISCHARGE: 1/28/2023    PRIMARY CARE PROVIDER: Ani Aguilar     ATTENDING PHYSICIAN: Kathie Coto NP   DISCHARGING PROVIDER: Magdalena Regalado NP    To contact this individual call [phone] and ask the  to page. If unavailable ask to be transferred the Adult Hospitalist Department. DISCHARGE DIAGNOSES      UTI  Hx of multiple MDR UTI  History of rectal cancer s/p resection/radiation  History of gastric ulcer  Concern for rectal stricture  Abdominal pain; much better, but not resolved  Recurrent small bowel obstruction (resolved, having daily BM)  Elevated LFT   Severe Malnutrition in the setting of chronic illness  PVC  Chronic urinary retention, s/p Marrero  Acute on Chronic Pancreatitis  Pancreatic Anomaly          CONSULTATIONS: gastrology and surgery team    PROCEDURES/SURGERIES: * No surgery found *    PENDING TEST RESULTS:   At the time of discharge the following test results are still pending: none    FOLLOW UP APPOINTMENTS:   Recommend to follow up with Dr. Cristóbal Motley, gastroenterologist in 2-3 weeks    ADDITIONAL CARE RECOMMENDATIONS:   Please get repeat blood work by 2/2    DIET: Regular Diet  Oral Nutritional Supplements: Ensure Clear or Ensure Active Clear Three times daily     ACTIVITY: Activity as tolerated    WOUND CARE: none    EQUIPMENT needed: pt has DME at home      DISCHARGE MEDICATIONS:   See Medication Reconciliation Form    It is important that you take the medication exactly as they are prescribed. Keep your medication in the bottles provided by the pharmacist and keep a list of the medication names, dosages, and times to be taken in your wallet. Do not take other medications without consulting your doctor. NOTIFY YOUR PHYSICIAN FOR ANY OF THE FOLLOWING:   Fever over 101 degrees for 24 hours.    Chest pain, shortness of breath, fever, chills, nausea, vomiting, diarrhea, change in mentation, falling, weakness, bleeding. Severe pain or pain not relieved by medications. Or, any other signs or symptoms that you may have questions about.       DISPOSITION: home    Home With:   OT  PT  [facility]  RN       SNF/Inpatient Rehab/LTAC    Independent/assisted living    Hospice    Other:     CDMP Checked:   Yes y     PROBLEM LIST Updated:  Yes y       Signed:   Nato Clemons NP  1/28/2023  9:18 AM

## 2023-01-28 NOTE — PROGRESS NOTES
[facility] Adult  Hospitalist Group                                                                                          Hospitalist Progress Note  Lobito Wells NP  Answering service: [phone] from in house phone        Date of Service:  2023  NAME:  Fadia Rene  :  1965  MRN:  [id number]      Admission Summary:   Fadia Rene is a 59 y.o. male with medical hx of  rectal cancer status postresection, hypertension, peptic ulcer disease, BPH, and mood disorder who presents with abdominal pain, and is being admitted for SBO. Note, he was from [facility] where he was admitted on  for recurrent SBO. Sigmoidoscopy there showed concern for rectal stricture. He is being transferred to [facility] for GI evaluation for upper GI. He has been hemodynamically stable. Interval history / Subjective:   Completed 2 weeks course of IV abx therapy. Some sediments in mondragon catheter, asked nursing staff to flush the mondragon catheter. Daily BM    Pt voices feeling much better today, eager to going home today. Assessment & Plan:     UTI  Hx of multiple MDR UTI  -  Afebrile, wbc normal as of   - U/A (1/10) wbc 20-50, 3+ bacteria, urine cx - ESBL E-coli and proteus mirabilis  - ABX: completed 2 weeks of IV Merropenem as of  (had 4 ESBL UTI since 2022)     History of rectal cancer s/p resection/radiation  History of gastric ulcer  Concern for rectal stricture  Abdominal pain; much better, but not resolved  Recurrent small bowel obstruction (resolved, having daily BM)  - surgery signed off, recommend repeat abdominal imaging in 6 weeks (from note )  - US of abdomen () There is mild intrahepatic biliary ductal dilatation. The CBD measures 13 mm diameter compared to 12 mm on the comparison MRI. Status post cholecystectomy. Again seen is a 3.8 cm cystic lesion along the head of the pancreas.   - KUB () Moderate to large stool burden. There are borderline dilated loops  of small bowel left midabdomen similar to the prior study although slightly  improved. - MRCP (1/15) Multiple cystic lesions of the pancreas likely reflecting pseudocyst with no  MRI evidence of underlying malignancy. Follow-up after clinical improvement is  recommended. Small bowel obstruction pattern redemonstrated. - [place] Dr. Tatum Turner showed possible rectal stricture  - Stool WBC and enteric bacteria PCR (-)  - GI signed off on 1/16, recommend outpatient EGD/colonoscopy. Re consulted GI team to evaluate elevated LFT and abd discomfort  - Continue with bowel regimen    Elevated LFT   - acute hepatitis panel (-)    Pt is hemodynamically and clinically stable. Afebrile. No active s/sx of infection at this time. TPN/lipid were stopped on 1/23    LFT trending down since off from merrem.  Recommend to repeat blood work in one week; ALT 1740->1112, AST 1039->426, ->683     Appreciate GI input    Severe Malnutrition in the setting of chronic illness  - greater than 7.5% weigh loss in 3 months, severe body fat loss, severe muscle mass loss  - regular diet, ensure enlive TID, snacks BID  - Appreciate dietary input; TPN and lipid have been d/c'd on 1/23     PVC  -EKG recurrent PVCs  - On metoprolol 12.5 mg twice daily     Chronic urinary retention, s/p Marrero  - Changed on 1/10/2022 at [facility] finasteride and Flomax     Acute on Chronic Pancreatitis  Pancreatic Anomaly  - Scheduled for egd w/ EUS with GI as outpt but unable to make this appointment  - Cannot r/o pancreatic cancer with recent ct  - Continue zenpep when eating again        Hx of Polysubstance abuse     Code status: changed to DNR/DNI after a long discussion  Prophylaxis: [address] discussed with: Pt, pt's nurse, and pt's David Gonzalez ([phone])  Anticipated Disposition: Pt is clear to discharge today  CM consult placed to set up fup visit with pcp in 7-10 days and repeat cmp in one week     Hospital Problems  Date Reviewed: 1/6/2023            Codes Class Noted POA    * (Principal) UTI due to extended-spectrum beta lactamase (ESBL) producing Escherichia coli ICD-10-CM: N39.0, B96.29, Z16.12  ICD-9-CM: 599.0, 041.49, V09.1  1/13/2023 Yes        Severe protein-calorie malnutrition (HCC) (Chronic) ICD-10-CM: N79  ICD-9-CM: 262  1/13/2023 Yes        Small bowel obstruction ([facility] [address] ICD-10-CM: R43.557  ICD-9-CM: 560.9  9/9/2022 Yes         Review of Systems:   A comprehensive review of systems was negative except for that written in the HPI. Vital Signs:    Last 24hrs VS reviewed since prior progress note. Most recent are:  Visit Vitals  /70   Pulse 88   Temp 97.7 °F (36.5 °C)   Resp 18   Ht 5' 8\" (1.727 m)   Wt 44.6 kg (98 lb 5.2 oz)   SpO2 96%   BMI 14.95 kg/m²         Intake/Output Summary (Last 24 hours) at 1/28/2023 3308  Last data filed at 1/27/2023 1445  Gross per 24 hour   Intake --   Output 250 ml   Net -250 ml          Physical Examination:     I had a face to face encounter with this patient and independently examined them on 1/28/2023 as outlined below:          Constitutional:  No acute distress, frail appearing, cooperative, pleasant    ENT:  Oral mucosa moist, oropharynx benign. Resp:  CTA bilaterally. No wheezing/rhonchi/rales. No accessory muscle use. CV:  Regular rhythm, normal rate, no murmurs, gallops, rubs    GI:  :  Soft, non distended, no tender. normoactive bowel sounds   Mondragon in place, some sediments in mondragon catheter; asked nursing staff to flush it    Musculoskeletal:  No edema, warm, 2+ pulses throughout    Neurologic:  Moves all extremities.   AAOx3, CN II-XII grossly intact            Data Review:    Review and/or order of clinical lab test  Review and/or order of tests in the radiology section of CPT  Review and/or order of tests in the medicine section of CPT      Labs:     Recent Labs 01/28/23 0233   WBC 5.5   HGB 8.4*   HCT 24.2*          Recent Labs     01/27/23 0940   *   K 3.9   CL 98   CO2 30   BUN 18   CREA 0.71   GLU 98   CA 10.3*       Recent Labs     01/28/23 0233 01/27/23 0940   ALT 1,112* 1,740*   * 897*   TBILI 0.9 1.1*   TP 7.0 8.1   ALB 2.6* 3.0*   GLOB 4.4* 5.1*       Recent Labs     01/28/23 0233   INR 1.1   PTP 11.8*        No results for input(s): FE, TIBC, PSAT, FERR in the last 72 hours. Lab Results   Component Value Date/Time    Folate 41.4 (H) 09/11/2022 10:49 AM        No results for input(s): PH, PCO2, PO2 in the last 72 hours. No results for input(s): CPK, CKNDX, TROIQ in the last 72 hours.     No lab exists for component: CPKMB  Lab Results   Component Value Date/Time    Cholesterol, total 147 01/02/2019 04:00 AM    HDL Cholesterol 66 01/02/2019 04:00 AM    LDL, calculated 65.6 01/02/2019 04:00 AM    Triglyceride 74 01/23/2023 12:32 AM    CHOL/HDL Ratio 2.2 01/02/2019 04:00 AM     Lab Results   Component Value Date/Time    Glucose (POC) 128 (H) 01/23/2023 06:05 AM    Glucose (POC) 159 (H) 01/22/2023 09:51 PM    Glucose (POC) 141 (H) 01/22/2023 04:36 PM    Glucose (POC) 140 (H) 01/22/2023 11:19 AM    Glucose (POC) 124 (H) 01/22/2023 06:40 AM     Lab Results   Component Value Date/Time    Color DARK YELLOW 01/10/2023 03:52 PM    Appearance TURBID (A) 01/10/2023 03:52 PM    Specific gravity 1.025 01/10/2023 03:52 PM    Specific gravity 1.010 09/03/2021 10:01 PM    pH (UA) 8.5 (H) 01/10/2023 03:52 PM    Protein 300 (A) 01/10/2023 03:52 PM    Glucose Negative 01/10/2023 03:52 PM    Ketone Negative 01/10/2023 03:52 PM    Bilirubin Negative 01/10/2023 03:52 PM    Urobilinogen 1.0 01/10/2023 03:52 PM    Nitrites Positive (A) 01/10/2023 03:52 PM    Leukocyte Esterase LARGE (A) 01/10/2023 03:52 PM    Epithelial cells FEW 01/10/2023 03:52 PM    Bacteria 2+ (A) 01/10/2023 03:52 PM    WBC 20-50 01/10/2023 03:52 PM    RBC 20-50 01/10/2023 03:52 PM Medications Reviewed:     Current Facility-Administered Medications   Medication Dose Route Frequency    0.9% sodium chloride infusion  75 mL/hr IntraVENous CONTINUOUS    heparin (porcine) pf 500 Units  500 Units InterCATHeter PRN    bacitracin 500 unit/gram packet 1 Packet  1 Packet Topical PRN    cyclobenzaprine (FLEXERIL) tablet 5 mg  5 mg Oral TID PRN    senna-docusate (PERICOLACE) 8.6-50 mg per tablet 1 Tablet  1 Tablet Oral DAILY    polyethylene glycol (MIRALAX) packet 17 g  17 g Oral DAILY    acetaminophen (TYLENOL) tablet 1,000 mg  1,000 mg Oral Q6H PRN    dextrose 10% infusion 0-250 mL  0-250 mL IntraVENous PRN    dicyclomine (BENTYL) capsule 10 mg  10 mg Oral TID PRN    finasteride (PROSCAR) tablet 5 mg  5 mg Oral DAILY    folic acid tablet 1 mg  1 mg Oral DAILY    glucagon (GLUCAGEN) injection 1 mg  1 mg IntraMUSCular PRN    glucose chewable tablet 16 g  4 Tablet Oral PRN    heparin (porcine) pf 500 Units  500 Units InterCATHeter PRN    L.acidophilus-paracasei-S.thermophil-bifidobacter (RISAQUAD) 8 billion cell capsule  1 Capsule Oral DAILY    lipase-protease-amylase (ZENPEP 20,000) capsule 1 Capsule  1 Capsule Oral TID WITH MEALS    metoprolol tartrate (LOPRESSOR) tablet 12.5 mg  12.5 mg Oral BID    mirtazapine (REMERON) tablet 15 mg  15 mg Oral QHS    naloxone (NARCAN) injection 0.4 mg  0.4 mg IntraVENous EVERY 2 MINUTES AS NEEDED    ondansetron (ZOFRAN ODT) tablet 4 mg  4 mg Oral Q8H PRN    oxyCODONE IR (ROXICODONE) tablet 10 mg  10 mg Oral Q4H PRN    phenol throat spray (CHLORASEPTIC) 1 Spray  1 Spray Oral Q1H PRN    polyethylene glycol (MIRALAX) packet 17 g  17 g Oral DAILY PRN    sodium chloride (NS) flush 5-40 mL  5-40 mL IntraVENous Q8H    sodium chloride (NS) flush 5-40 mL  5-40 mL IntraVENous PRN    tamsulosin (FLOMAX) capsule 0.4 mg  0.4 mg Oral DAILY    thiamine HCL (B-1) tablet 100 mg  100 mg Oral DAILY    nicotine (NICODERM CQ) 14 mg/24 hr patch 1 Patch  1 Patch TransDERmal QHS alteplase (CATHFLO) 1 mg in sterile water (preservative free) 1 mL injection  1 mg InterCATHeter PRN     ______________________________________________________________________  EXPECTED LENGTH OF STAY: 3d 12h  ACTUAL LENGTH OF STAY:          15                 Venice Crawford, NP

## 2023-01-28 NOTE — PROGRESS NOTES
I have reviewed discharge instructions with the patient. The patient verbalized understanding. Mondragon cathter care education for chronic mondragon was completed and patient verbalized understanding. Discharge medications reviewed with patient and appropriate educational materials and side effects teaching were provided.

## 2023-01-28 NOTE — DISCHARGE SUMMARY
Physician Discharge Summary     Patient ID:    Kelly Saleem  [id number]    1965    Admit date: 1/13/2023    Discharge date and time: 1/28/2023    Hospital Diagnoses and Treatment Rendered:   HPI: Kelly Saleem is a 59 y.o. male with medical hx of  rectal cancer status postresection, hypertension, peptic ulcer disease, BPH, and mood disorder who presents with abdominal pain, and is being admitted for SBO. Note, he was from [facility] where he was admitted on January 5 for recurrent SBO. Sigmoidoscopy there showed concern for rectal stricture. He is being transferred to [facility] for GI evaluation for upper GI. He has been hemodynamically stable. Diagnosis and treatment:   UTI  Hx of multiple MDR UTI  -  Afebrile, wbc normal      U/A (1/10) wbc 20-50, 3+ bacteria, urine cx - ESBL E-coli and proteus mirabilis     completed 2 weeks of IV Merropenem as of 1/27 (had 4 ESBL UTI since 6/2022)     History of rectal cancer s/p resection/radiation  History of gastric ulcer  Concern for rectal stricture  Abdominal pain; much better, but not resolved  Recurrent small bowel obstruction (resolved, having daily BM)  - surgery signed off, recommend repeat abdominal imaging in 6 weeks (from note 1/19)    US of abdomen (1/27) There is mild intrahepatic biliary ductal dilatation. The CBD measures 13 mm diameter compared to 12 mm on the comparison MRI. Status post cholecystectomy. Again seen is a 3.8 cm cystic lesion along the head of the pancreas. KUB (1/23) Moderate to large stool burden. There are borderline dilated loops  of small bowel left midabdomen similar to the prior study although slightly  improved. MRCP (1/15) Multiple cystic lesions of the pancreas likely reflecting pseudocyst with no  MRI evidence of underlying malignancy. Follow-up after clinical improvement is  recommended. Small bowel obstruction pattern redemonstrated.     P[facility] Chyle sigmoidoscopy [place] Dr. Tatum Turner showed possible rectal stricture    Stool WBC and enteric bacteria PCR (-)        GI signed off on 1/16, recommend outpatient EGD/colonoscopy. Re consulted GI team to evaluate elevated LFT on 1/27 who thought elevated LFT may r/t medication. LFT trending down over night since merrem has been off. Pt is feeling great today and wants to go home. Recommend to check CMP on 2/2 then follow up with GI team.      Continue with regular bowel regimen     Elevated LFT; trending down since off from [address]  - acute hepatitis panel (-)    Pt is hemodynamically and clinically stable. Afebrile. No active s/sx of infection at this time. TPN/lipid were stopped on 1/23    LFT trending down since off from merrem. Recommend to repeat blood work in one week; ALT 1740->1112, AST 1039->426, ->683     Appreciate GI input     Severe Malnutrition in the setting of chronic illness  - greater than 7.5% weigh loss in 3 months, severe body fat loss, severe muscle mass loss    regular diet, ensure enlive TID, snacks BID    Pt received TPN and lipid until 1/23     PVC  - EKG recurrent PVCs    Continue with metoprolol 12.5 mg twice daily     Chronic urinary retention, s/p Marrero  - Changed on 1/10/2022 at [facility]    Continue finasteride and Flomax     Acute on Chronic Pancreatitis  Pancreatic Anomaly  - Scheduled for egd w/ EUS with GI as outpt but unable to make this appointment    Cannot r/o pancreatic cancer with recent CT    CA 19-9 level (1/6 was 31, repeat level from 1/27 pending)    Continue zenpep         Hx of Polysubstance abuse  - discussed about importance of polysubstance (cocaine) cessation. Physical Exam:     98.1 121/89-94-16                                      Constitutional:  No acute distress, frail appearing, cooperative, pleasant    ENT:  Oral mucosa moist, oropharynx benign. Resp:  CTA bilaterally. No wheezing/rhonchi/rales. No accessory muscle use. CV:  Regular rhythm, normal rate, no murmurs, gallops, rubs    GI:  :  Soft, non distended, no tender. normoactive bowel sounds   Mnodragon in place, some sediments in mondragon catheter; asked nursing staff to flush it    Musculoskeletal:  No edema, warm, 2+ pulses throughout    Neurologic:  Moves all extremities. AAOx3, CN II-XII grossly intact     Chronic Diagnoses:    Problem List as of 1/28/2023 Date Reviewed: 1/6/2023            Codes Class Noted - Resolved    * (Principal) UTI due to extended-spectrum beta lactamase (ESBL) producing Escherichia coli ICD-10-CM: N39.0, B96.29, Z16.12  ICD-9-CM: 599.0, 041.49, V09.1  1/13/2023 - Present        Severe protein-calorie malnutrition (HCC) (Chronic) ICD-10-CM: E43  ICD-9-CM: 262  1/13/2023 - Present        SBO (small bowel obstruction) ([facility] ICD-10-CM: Y46.862  ICD-9-CM: 560.9  11/29/2022 - Present        Small bowel obstruction ([facility] ICD-10-CM: M05.098  ICD-9-CM: 560.9  9/9/2022 - Present        Hematuria ICD-10-CM: R31.9  ICD-9-CM: 599.70  9/1/2021 - Present        Hydronephrosis, bilateral ICD-10-CM: N13.30  ICD-9-CM: 302  8/31/2021 - Present        Pancreatitis ICD-10-CM: K85.90  ICD-9-CM: 837.5  12/20/2020 - Present        Acute pancreatitis ICD-10-CM: K85.90  ICD-9-CM: 642.8  12/20/2020 - Present        Seizure ([facility] ICD-10-CM: R56.9  ICD-9-CM: 780.39  12/31/2018 - Present        Alcohol abuse ICD-10-CM: F10.10  ICD-9-CM: 305.00  12/31/2018 - Present        Drug abuse ([facility] ICD-10-CM: F19.10  ICD-9-CM: 305.90  12/31/2018 - Present        Rectal cancer ([facility] ICD-10-CM: C20  ICD-9-CM: 154.1  12/31/2018 - Present        RESOLVED: ARF (acute renal failure) ([facility] ICD-10-CM: N17.9  ICD-9-CM: 584.9  2/2/2022 - 2/5/2022           Discharge Medications:   Current Discharge Medication List            Follow up Care:    1. Carmelo Dunn MD in 1-2 weeks. Please call to set up an appointment shortly after discharge.     2. Reminded to follow up with GI team, Dr. Alaina West in 2 weeks. CMP to be done 2/2/2023. Diet:  Regular Diet; low fiber            Nutritional supplement; ensure TID with frequent snacks    Disposition:  Home. Advanced Directive:   FULL    DNR y     Discharge Exam:  See today's note. CONSULTATIONS: GI and General Surgery    Significant Diagnostic Studies:   No results found for requested labs within first 48 hours of the last admission day. Recent Labs     01/28/23 0233   WBC 5.5   HGB 8.4*   HCT 24.2*        Recent Labs     01/27/23  0940   *   K 3.9   CL 98   CO2 30   BUN 18   CREA 0.71   GLU 98   CA 10.3*     Recent Labs     01/28/23 0233 01/27/23  0940   * 897*   TP 7.0 8.1   ALB 2.6* 3.0*   GLOB 4.4* 5.1*     Recent Labs     01/28/23 0233   INR 1.1   PTP 11.8*      No results for input(s): FE, TIBC, PSAT, FERR in the last 72 hours. No results for input(s): PH, PCO2, PO2 in the last 72 hours. No results for input(s): CPK, CKMB in the last 72 hours.     No lab exists for component: TROPONINI  No components found for: [place]      Time spent for discharge > 35 minutes    Signed:  Venice Crawford NP  1/28/2023  9:03 AM

## 2023-01-29 LAB — CANCER AG19-9 SERPL-ACNC: 209 U/ML (ref 0–35)

## 2023-02-02 ENCOUNTER — HOSPITAL ENCOUNTER (EMERGENCY)
Age: 58
Discharge: HOME | End: 2023-02-02
Attending: EMERGENCY MEDICINE | Admitting: EMERGENCY MEDICINE
Payer: MEDICARE

## 2023-02-02 VITALS
WEIGHT: 96 LBS | RESPIRATION RATE: 20 BRPM | BODY MASS INDEX: 14.55 KG/M2 | DIASTOLIC BLOOD PRESSURE: 86 MMHG | TEMPERATURE: 97.7 F | SYSTOLIC BLOOD PRESSURE: 138 MMHG | OXYGEN SATURATION: 100 % | HEART RATE: 74 BPM | HEIGHT: 68 IN

## 2023-02-02 DIAGNOSIS — T83.9XXA FOLEY CATHETER PROBLEM, INITIAL ENCOUNTER (HCC): Primary | ICD-10-CM

## 2023-02-02 DIAGNOSIS — B37.41 YEAST CYSTITIS: ICD-10-CM

## 2023-02-02 LAB
APPEARANCE UR: ABNORMAL
BACTERIA URNS QL MICRO: ABNORMAL /HPF
BILIRUB UR QL: NEGATIVE
COLOR UR: ABNORMAL
EPITH CASTS URNS QL MICRO: ABNORMAL /LPF
GLUCOSE UR STRIP.AUTO-MCNC: NEGATIVE MG/DL
HGB UR QL STRIP: ABNORMAL
KETONES UR QL STRIP.AUTO: NEGATIVE MG/DL
LEUKOCYTE ESTERASE UR QL STRIP.AUTO: ABNORMAL
NITRITE UR QL STRIP.AUTO: NEGATIVE
PH UR STRIP: 5.5 (ref 5–8)
PROT UR STRIP-MCNC: 300 MG/DL
RBC #/AREA URNS HPF: ABNORMAL /HPF (ref 0–5)
SP GR UR REFRACTOMETRY: 1.02
UA: UC IF INDICATED,UAUC: ABNORMAL
UROBILINOGEN UR QL STRIP.AUTO: 0.2 EU/DL (ref 0.2–1)
WBC URNS QL MICRO: ABNORMAL /HPF (ref 0–4)
YEAST URNS QL MICRO: PRESENT

## 2023-02-02 PROCEDURE — 81001 URINALYSIS AUTO W/SCOPE: CPT

## 2023-02-02 PROCEDURE — 87086 URINE CULTURE/COLONY COUNT: CPT

## 2023-02-02 PROCEDURE — 74011250636 HC RX REV CODE- 250/636: Performed by: EMERGENCY MEDICINE

## 2023-02-02 PROCEDURE — 51702 INSERT TEMP BLADDER CATH: CPT | Performed by: EMERGENCY MEDICINE

## 2023-02-02 PROCEDURE — 96372 THER/PROPH/DIAG INJ SC/IM: CPT | Performed by: EMERGENCY MEDICINE

## 2023-02-02 PROCEDURE — 99284 EMERGENCY DEPT VISIT MOD MDM: CPT | Performed by: EMERGENCY MEDICINE

## 2023-02-02 PROCEDURE — 77030040830 HC CATH URETH FOL MDII -A

## 2023-02-02 RX ORDER — PHENAZOPYRIDINE HYDROCHLORIDE 200 MG/1
200 TABLET, FILM COATED ORAL 3 TIMES DAILY
Qty: 6 TABLET | Refills: 0 | Status: SHIPPED | OUTPATIENT
Start: 2023-02-02 | End: 2023-02-04

## 2023-02-02 RX ORDER — FLUCONAZOLE 100 MG/1
100 TABLET ORAL DAILY
Qty: 7 TABLET | Refills: 0 | Status: SHIPPED | OUTPATIENT
Start: 2023-02-02 | End: 2023-02-09

## 2023-02-02 RX ORDER — HYDROMORPHONE HYDROCHLORIDE 1 MG/ML
1 INJECTION, SOLUTION INTRAMUSCULAR; INTRAVENOUS; SUBCUTANEOUS
Status: COMPLETED | OUTPATIENT
Start: 2023-02-02 | End: 2023-02-02

## 2023-02-02 RX ADMIN — HYDROMORPHONE HYDROCHLORIDE 1 MG: 1 INJECTION, SOLUTION INTRAMUSCULAR; INTRAVENOUS; SUBCUTANEOUS at 10:40

## 2023-02-02 NOTE — ED NOTES

## 2023-02-02 NOTE — ED TRIAGE NOTES
Patient with mondragon catheter, reports decreased urine output and leaking around catheter. Patient recently hospitalized for bowel obstruction, discharged from [facility] 01/28/2023, had not emptied mondragon bag since discharge.

## 2023-02-02 NOTE — PROGRESS NOTES
CM opened case for assessment of D/C planning needs, CM reviewed chart. Patient  recent discharge from [facility], CM review chart no follow-up listed not sure who patient was to follow-up with for mondragon cath. CM following for discharge planning.     [address] [facility], [address] [phone]

## 2023-02-02 NOTE — ED PROVIDER NOTES
EMERGENCY DEPARTMENT HISTORY AND PHYSICAL EXAM      Patient Name: Iain Bales  MRN: [id number]  YOB: 1965    Provider: Margurite Mohs, MD  PCP: Lambert Castaneda MD     Time/Date of evaluation: 10:35 AM 2/2/23      History of Presenting Illness     Chief Complaint   Patient presents with    Urinary Retention     History Provided By: Patient     History Bladimir Luna): Iain Bales is a 59 y.o. male with a PMHx of rectal cancer with stricture, peptic ulcer disease, hypertension, and BPH  who presents to the emergency department (room 12) by POV C/O decreased urine output from his Marrero catheter over the past 24 hours resulting in pelvic pressure. Patient denies any nausea, vomiting, or change in bowel function. He he was recently admitted to [facility] for 2 weeks for small bowel obstruction thought to be due to a rectal stricture. He has follow-up appointment scheduled next week with GI. Past History     Past Medical History:  Past Medical History:   Diagnosis Date    Cancer ([facility] [address]     rectal    Gastrointestinal disorder     Gastric Ulcers; Rectal CA    Hematuria 9/1/2021    Hypertension        Past Surgical History:  Past Surgical History:   Procedure Laterality Date    HX GI         Family History:  Family History   Problem Relation Age of Onset    Cancer Brother        Social History:  Social History     Tobacco Use    Smoking status: Every Day     Packs/day: 0.25     Types: Cigarettes    Smokeless tobacco: Never    Tobacco comments:     \"1 pack last me three days\"   Substance Use Topics    Alcohol use: Not Currently     Comment: hasn't drank x3mo (6/3/22)    Drug use: Yes     Types: Marijuana     Comment: Occasional for appetite       Medications:  Current Outpatient Medications   Medication Sig Dispense Refill    fluconazole (Diflucan) 100 mg tablet Take 1 Tablet by mouth daily for 7 days. FDA advises cautious prescribing of oral fluconazole in pregnancy.  7 Tablet 0 phenazopyridine (Pyridium) 200 mg tablet Take 1 Tablet by mouth three (3) times daily for 2 days. 6 Tablet 0    ondansetron (ZOFRAN ODT) 4 mg disintegrating tablet Take 1 Tablet by mouth every eight (8) hours as needed for Nausea or Vomiting. 15 Tablet 0    polyethylene glycol (MIRALAX) 17 gram packet Take 1 Packet by mouth daily as needed for Constipation. 30 Packet 0    dicyclomine (BENTYL) 10 mg capsule Take 1 Capsule by mouth three (3) times daily as needed for Abdominal Cramps, Cramping or Pain. 30 Capsule 0    lipase-protease-amylase (ZENPEP 20,000) 20,000-63,000- 84,000 unit capsule Take 1 Capsule by mouth three (3) times daily (with meals). 90 Capsule 0    OTHER CMP on 2/2, the result to be send to Dr. Irene Olivier 1 Act 0    aspirin delayed-release 81 mg tablet Take 81 mg by mouth daily. ferrous sulfate (IRON) 325 mg (65 mg iron) EC tablet Take 325 mg by mouth daily. melatonin 1 mg tablet Take 1 mg by mouth daily. tamsulosin (FLOMAX) 0.4 mg capsule Take 1 Capsule by mouth daily. 30 Capsule 1    thiamine mononitrate (B-1) 100 mg tablet Take 1 Tablet by mouth daily. 30 Tablet 1    lactobacillus combination no.4 3 billion cell cap Take 1 Capsule by mouth daily. 10 Capsule 0    metoprolol tartrate (LOPRESSOR) 25 mg tablet Take 0.5 Tablets by mouth two (2) times a day. 60 Tablet 0    folic acid (FOLVITE) 1 mg tablet Take 1 Tablet by mouth daily. 30 Tablet 1    finasteride (PROSCAR) 5 mg tablet Take 5 mg by mouth daily. mirtazapine (REMERON) 15 mg tablet Take 15 mg by mouth nightly.          Allergies:  No Known Allergies    Social Determinants of Health:  Social Determinants of Health     Tobacco Use: High Risk    Smoking Tobacco Use: Every Day    Smokeless Tobacco Use: Never    Passive Exposure: Not on file   Alcohol Use: Not on file   Financial Resource Strain: Not on file   Food Insecurity: Not on file   Transportation Needs: Not on file   Physical Activity: Not on file   Stress: Not on file Social Connections: Not on file   Intimate Partner Violence: Not on file   Depression: Not on file   Housing Stability: Not on file       Review of Systems     Review of Systems   Gastrointestinal:  Positive for abdominal pain (Suprapubic). Genitourinary:  Positive for decreased urine volume. Urinary retention     Physical Exam     Patient Vitals for the past 24 hrs:   Temp Pulse Resp BP SpO2   02/02/23 1004 97.7 °F (36.5 °C) 74 20 138/86 100 %       Physical Exam  Vitals and nursing note reviewed. Constitutional:       Appearance: Normal appearance. He is well-developed. HENT:      Head: Normocephalic and atraumatic. Cardiovascular:      Rate and Rhythm: Normal rate and regular rhythm. Pulses: Normal pulses. Heart sounds: Normal heart sounds. Pulmonary:      Effort: Pulmonary effort is normal. No respiratory distress. Breath sounds: Normal breath sounds. Chest:      Chest wall: No tenderness. Abdominal:      General: Bowel sounds are normal.      Palpations: Abdomen is soft. Tenderness: There is no abdominal tenderness. There is no guarding or rebound. Genitourinary:     Comments: Marrero catheter in place with significant sediment in the tubing, minimal urine output  Musculoskeletal:      Cervical back: Full passive range of motion without pain, normal range of motion and neck supple. Skin:     General: Skin is warm and dry. Findings: No erythema or rash. Neurological:      Mental Status: He is alert and oriented to person, place, and time. Psychiatric:         Speech: Speech normal.         Behavior: Behavior normal.         Thought Content:  Thought content normal.         Judgment: Judgment normal.       Diagnostic Study Results     Labs:  Recent Results (from the past 12 hour(s))   URINALYSIS W/ REFLEX CULTURE    Collection Time: 02/02/23 11:32 AM    Specimen: Urine   Result Value Ref Range    Color YELLOW/STRAW      Appearance TURBID (A) CLEAR      Specific gravity 1.020      pH (UA) 5.5 5.0 - 8.0      Protein 300 (A) NEG mg/dL    Glucose Negative NEG mg/dL    Ketone Negative NEG mg/dL    Bilirubin Negative NEG      Blood LARGE (A) NEG      Urobilinogen 0.2 0.2 - 1.0 EU/dL    Nitrites Negative NEG      Leukocyte Esterase LARGE (A) NEG      WBC  0 - 4 /hpf    RBC 10-20 0 - 5 /hpf    Epithelial cells MODERATE (A) FEW /lpf    Bacteria 1+ (A) NEG /hpf    UA:UC IF INDICATED URINE CULTURE ORDERED (A) CNI      Yeast w/hyphae PRESENT (A) NEG     UA positive for large leukocyte esterase,  white cells, and 1+ bacteria as well as yeast.    ED Course     10:35 AM I Miguel Richey MD) am the first provider for this patient. Initial assessment performed. I reviewed the vital signs, available nursing notes, past medical history, past surgical history, family history and social history. The patients presenting problems have been discussed, and they are in agreement with the care plan formulated and outlined with them. I have encouraged them to ask questions as they arise throughout their visit. Records Reviewed: Nursing Notes, Old Medical Records, Previous Laboratory Studies, and urine culture results    Cardiac Monitor:  Rate: 74 bpm  Rhythm: Sinus Rhythm    Pulse Oximetry Analysis - 100% on RA    MEDICATIONS ADMINISTERED IN THE ED:  Medications   HYDROmorphone (DILAUDID) injection 1 mg (1 mg IntraMUSCular Given 2/2/23 1040)       ED Course as of 02/02/23 1318   Thu Feb 02, 2023   1307 Patient states his pain is improved after the IM Dilaudid and changing out the Marrero catheter. He had approximately 500 mL of urine drained after the new catheter was placed. His catheter tubing was essentially occluded with purulent sediment. [MS]      ED Course User Index  [MS] Deette Opitz, MD     Patient was treated with a 2-week course of IV meropenem for his ESBL UTI. Suspect he is chronically colonized at this point.   Will not treat him with antibiotics at this time but will prescribe him Diflucan 100 mg daily for 7 days for the yeast cystitis. Patient's Marrero catheter has been changed. We will also have him follow-up with outpatient urology as well as GI as scheduled upon discharge from [facility]. High suspicion for patient to bounce back to the ED for continued pain despite the prescription for Pyridium and the Diflucan. Medical Decision Making     DDX: Urinary retention, UTI, constipation    DISCUSSION:  This appears to be a severe conditon. This appears to be an acute condition. 59 y.o. male presents with decreased urine output from his Marrero catheter. His Marrero appears to be occluded with sediment which is likely causing the symptoms. We will have nursing remove the 16 Ukrainian Marrero catheter and place a larger, either 18 or 20 Ukrainian catheter and send a urine sample for analysis. In evaluation of the above differential diagnosis, consideration was given to the following tests and treatments (change Marrero catheter, urinalysis, analgesics). The decision to perform testing and results were discussed with the patient. I discussed each of these tests and considerations with the patient. The patient agrees with the plan of discharge. Additional Considerations: The following Chronic Conditions impacted the patient's care: Rectal stricture because patient is followed by GI and has a follow-up appointment scheduled. He denies any change in bowel movements. Tests considered in the ED, but not accomplished include: CBC, CMP because patient has stable vitals and suspect this is a Marrero catheter complication. Imaging considered in the ED, but not accomplished include: CT abdomen pelvis because patient denies any change in bowel function or vomiting. Outpatient prescriptions considered but not provided: Opioid pain medications because patient has a history of chronic constipation and substance abuse.       Diagnosis and Disposition     DISCHARGE NOTE:  Rowdy Khan Jr's results have been reviewed with him. He has been counseled regarding his diagnosis, treatment, and plan. He verbally conveys understanding and agreement of the signs, symptoms, diagnosis, treatment and prognosis and additionally agrees to follow up as discussed. He also agrees with the care-plan and conveys that all of his questions have been answered. I have also provided discharge instructions for him that include: educational information regarding their diagnosis and treatment, and list of reasons why they would want to return to the ED prior to their follow-up appointment, should his condition change. He has been provided with education for proper emergency department utilization. CLINICAL IMPRESSION:    1. Marrero catheter problem, initial encounter ([facility] [address]    2. Yeast cystitis        PLAN:  1. D/C Home  2. Current Discharge Medication List        START taking these medications    Details   fluconazole (Diflucan) 100 mg tablet Take 1 Tablet by mouth daily for 7 days. FDA advises cautious prescribing of oral fluconazole in pregnancy. Qty: 7 Tablet, Refills: 0  Start date: 2/2/2023, End date: 2/9/2023      phenazopyridine (Pyridium) 200 mg tablet Take 1 Tablet by mouth three (3) times daily for 2 days. Qty: 6 Tablet, Refills: 0  Start date: 2/2/2023, End date: 2/4/2023           3.    Follow-up Information       Follow up With Specialties Details Why Contact Info    Destin Chiang MD Internal Medicine Physician Schedule an appointment as soon as possible for a visit   [address] [facility] [address] [address] [facility]  Schedule an appointment as soon as possible for a visit   [address] [facility] [address] Carine Gabriel MD Gastroenterology Schedule an appointment as soon as possible for a visit   [place] [address]  [phone]            I Kristie Magana MD am the primary clinician of record. Latasha Disclaimer     Please note that this dictation was completed with [employer], the computer voice recognition software. Quite often unanticipated grammatical, syntax, homophones, and other interpretive errors are inadvertently transcribed by the computer software. Please disregard these errors. Please excuse any errors that have escaped final proofreading.     Nataliya Rojas MD

## 2023-02-02 NOTE — ED NOTES
Discharge instructions were given to the patient by Shania Loera RN. The patient left the Emergency Department ambulatory, alert and oriented and in no acute distress with 2 prescriptions. The patient was encouraged to call or return to the ED for worsening issues or problems and was encouraged to schedule a follow up appointment for continuing care. The patient verbalized understanding of discharge instructions and prescriptions, all questions were answered. The patient has no further concerns at this time.

## 2023-02-03 LAB
BACTERIA SPEC CULT: ABNORMAL
BACTERIA SPEC CULT: ABNORMAL
CC UR VC: ABNORMAL
SERVICE CMNT-IMP: ABNORMAL

## 2023-02-16 ENCOUNTER — ANESTHESIA EVENT (OUTPATIENT)
Dept: ENDOSCOPY | Age: 58
End: 2023-02-16
Payer: MEDICARE

## 2023-02-16 ENCOUNTER — ANESTHESIA (OUTPATIENT)
Dept: ENDOSCOPY | Age: 58
End: 2023-02-16
Payer: MEDICARE

## 2023-02-16 ENCOUNTER — HOSPITAL ENCOUNTER (OUTPATIENT)
Age: 58
Setting detail: OUTPATIENT SURGERY
Discharge: HOME | End: 2023-02-16
Attending: INTERNAL MEDICINE | Admitting: INTERNAL MEDICINE
Payer: MEDICARE

## 2023-02-16 ENCOUNTER — APPOINTMENT (OUTPATIENT)
Dept: ULTRASOUND IMAGING | Age: 58
End: 2023-02-16
Attending: INTERNAL MEDICINE
Payer: MEDICARE

## 2023-02-16 VITALS
DIASTOLIC BLOOD PRESSURE: 94 MMHG | RESPIRATION RATE: 14 BRPM | OXYGEN SATURATION: 99 % | HEIGHT: 68 IN | WEIGHT: 100.1 LBS | BODY MASS INDEX: 15.17 KG/M2 | HEART RATE: 67 BPM | SYSTOLIC BLOOD PRESSURE: 178 MMHG | TEMPERATURE: 97.3 F

## 2023-02-16 PROCEDURE — 76060000032 HC ANESTHESIA 0.5 TO 1 HR: Performed by: INTERNAL MEDICINE

## 2023-02-16 PROCEDURE — 74011250636 HC RX REV CODE- 250/636: Performed by: INTERNAL MEDICINE

## 2023-02-16 PROCEDURE — 76040000007: Performed by: INTERNAL MEDICINE

## 2023-02-16 PROCEDURE — 2709999900 HC NON-CHARGEABLE SUPPLY: Performed by: INTERNAL MEDICINE

## 2023-02-16 PROCEDURE — 74011000250 HC RX REV CODE- 250: Performed by: NURSE ANESTHETIST, CERTIFIED REGISTERED

## 2023-02-16 PROCEDURE — 74011250636 HC RX REV CODE- 250/636: Performed by: NURSE ANESTHETIST, CERTIFIED REGISTERED

## 2023-02-16 RX ORDER — SODIUM CHLORIDE, SODIUM LACTATE, POTASSIUM CHLORIDE, CALCIUM CHLORIDE 600; 310; 30; 20 MG/100ML; MG/100ML; MG/100ML; MG/100ML
INJECTION, SOLUTION INTRAVENOUS
Status: DISCONTINUED | OUTPATIENT
Start: 2023-02-16 | End: 2023-02-16 | Stop reason: HOSPADM

## 2023-02-16 RX ORDER — FLUMAZENIL 0.1 MG/ML
0.2 INJECTION INTRAVENOUS
Status: DISCONTINUED | OUTPATIENT
Start: 2023-02-16 | End: 2023-02-16 | Stop reason: HOSPADM

## 2023-02-16 RX ORDER — NALOXONE HYDROCHLORIDE 0.4 MG/ML
0.4 INJECTION, SOLUTION INTRAMUSCULAR; INTRAVENOUS; SUBCUTANEOUS
Status: DISCONTINUED | OUTPATIENT
Start: 2023-02-16 | End: 2023-02-16 | Stop reason: HOSPADM

## 2023-02-16 RX ORDER — SODIUM CHLORIDE 0.9 % (FLUSH) 0.9 %
5-40 SYRINGE (ML) INJECTION AS NEEDED
Status: DISCONTINUED | OUTPATIENT
Start: 2023-02-16 | End: 2023-02-16 | Stop reason: HOSPADM

## 2023-02-16 RX ORDER — SODIUM CHLORIDE 9 MG/ML
50 INJECTION, SOLUTION INTRAVENOUS CONTINUOUS
Status: DISCONTINUED | OUTPATIENT
Start: 2023-02-16 | End: 2023-02-16 | Stop reason: HOSPADM

## 2023-02-16 RX ORDER — MIDAZOLAM HYDROCHLORIDE 1 MG/ML
.25-5 INJECTION, SOLUTION INTRAMUSCULAR; INTRAVENOUS
Status: DISCONTINUED | OUTPATIENT
Start: 2023-02-16 | End: 2023-02-16 | Stop reason: HOSPADM

## 2023-02-16 RX ORDER — FENTANYL CITRATE 50 UG/ML
25-200 INJECTION, SOLUTION INTRAMUSCULAR; INTRAVENOUS
Status: DISCONTINUED | OUTPATIENT
Start: 2023-02-16 | End: 2023-02-16 | Stop reason: HOSPADM

## 2023-02-16 RX ORDER — SODIUM CHLORIDE 0.9 % (FLUSH) 0.9 %
5-40 SYRINGE (ML) INJECTION EVERY 8 HOURS
Status: DISCONTINUED | OUTPATIENT
Start: 2023-02-16 | End: 2023-02-16 | Stop reason: HOSPADM

## 2023-02-16 RX ORDER — HEPARIN 100 UNIT/ML
300 SYRINGE INTRAVENOUS AS NEEDED
Status: DISCONTINUED | OUTPATIENT
Start: 2023-02-16 | End: 2023-02-16 | Stop reason: HOSPADM

## 2023-02-16 RX ORDER — ATROPINE SULFATE 0.1 MG/ML
0.5 INJECTION INTRAVENOUS
Status: DISCONTINUED | OUTPATIENT
Start: 2023-02-16 | End: 2023-02-16 | Stop reason: HOSPADM

## 2023-02-16 RX ORDER — LIDOCAINE HYDROCHLORIDE 20 MG/ML
INJECTION, SOLUTION EPIDURAL; INFILTRATION; INTRACAUDAL; PERINEURAL AS NEEDED
Status: DISCONTINUED | OUTPATIENT
Start: 2023-02-16 | End: 2023-02-16 | Stop reason: HOSPADM

## 2023-02-16 RX ORDER — PROPOFOL 10 MG/ML
INJECTION, EMULSION INTRAVENOUS AS NEEDED
Status: DISCONTINUED | OUTPATIENT
Start: 2023-02-16 | End: 2023-02-16 | Stop reason: HOSPADM

## 2023-02-16 RX ORDER — EPINEPHRINE 0.1 MG/ML
1 INJECTION INTRACARDIAC; INTRAVENOUS
Status: DISCONTINUED | OUTPATIENT
Start: 2023-02-16 | End: 2023-02-16 | Stop reason: HOSPADM

## 2023-02-16 RX ORDER — DEXTROMETHORPHAN/PSEUDOEPHED 2.5-7.5/.8
1.2 DROPS ORAL
Status: DISCONTINUED | OUTPATIENT
Start: 2023-02-16 | End: 2023-02-16 | Stop reason: HOSPADM

## 2023-02-16 RX ADMIN — SODIUM CHLORIDE, SODIUM LACTATE, POTASSIUM CHLORIDE, AND CALCIUM CHLORIDE: 600; 310; 30; 20 INJECTION, SOLUTION INTRAVENOUS at 09:47

## 2023-02-16 RX ADMIN — PROPOFOL 30 MG: 10 INJECTION, EMULSION INTRAVENOUS at 10:10

## 2023-02-16 RX ADMIN — PROPOFOL 100 MG: 10 INJECTION, EMULSION INTRAVENOUS at 09:50

## 2023-02-16 RX ADMIN — PROPOFOL 30 MG: 10 INJECTION, EMULSION INTRAVENOUS at 10:04

## 2023-02-16 RX ADMIN — PROPOFOL 30 MG: 10 INJECTION, EMULSION INTRAVENOUS at 10:07

## 2023-02-16 RX ADMIN — LIDOCAINE HYDROCHLORIDE 40 MG: 20 INJECTION, SOLUTION EPIDURAL; INFILTRATION; INTRACAUDAL; PERINEURAL at 09:50

## 2023-02-16 RX ADMIN — HEPARIN 300 UNITS: 100 SYRINGE at 10:40

## 2023-02-16 RX ADMIN — PROPOFOL 30 MG: 10 INJECTION, EMULSION INTRAVENOUS at 10:00

## 2023-02-16 RX ADMIN — PROPOFOL 30 MG: 10 INJECTION, EMULSION INTRAVENOUS at 09:56

## 2023-02-16 RX ADMIN — PROPOFOL 30 MG: 10 INJECTION, EMULSION INTRAVENOUS at 09:53

## 2023-02-16 NOTE — H&P
[address]  [address] [facility], [facility] [address]      History and Physical       NAME:  Muriel Luevano   :   1965   MRN:   [id number]             History of Present Illness:  Patient is a 59 y.o. who is seen for chronic pancreatitis and pancreas cysts. PMH:  Past Medical History:   Diagnosis Date    Cancer [facility])     rectal    Gastrointestinal disorder     Gastric Ulcers; Rectal CA    Hematuria 2021    Hypertension        PSH:  Past Surgical History:   Procedure Laterality Date    HX GI         Allergies:  No Known Allergies    Home Medications:  Prior to Admission Medications   Prescriptions Last Dose Informant Patient Reported? Taking? OTHER Unknown  No No   Sig: CMP on , the result to be send to Dr. Deepak Marino   dicyclomine (BENTYL) 10 mg capsule 2/15/2023  No Yes   Sig: Take 1 Capsule by mouth three (3) times daily as needed for Abdominal Cramps, Cramping or Pain. ferrous sulfate (IRON) 325 mg (65 mg iron) EC tablet 2/15/2023  Yes Yes   Sig: Take 325 mg by mouth daily. finasteride (PROSCAR) 5 mg tablet Unknown Other Yes No   Sig: Take 5 mg by mouth daily. folic acid (FOLVITE) 1 mg tablet 2/15/2023 Other No Yes   Sig: Take 1 Tablet by mouth daily. lactobacillus combination no.4 3 billion cell cap Unknown Other No No   Sig: Take 1 Capsule by mouth daily. lipase-protease-amylase (ZENPEP 20,000) 20,000-63,000- 84,000 unit capsule 2/15/2023  No Yes   Sig: Take 1 Capsule by mouth three (3) times daily (with meals). melatonin 1 mg tablet 2/15/2023  Yes Yes   Sig: Take 1 mg by mouth daily. metoprolol tartrate (LOPRESSOR) 25 mg tablet 2/15/2023 Other No Yes   Sig: Take 0.5 Tablets by mouth two (2) times a day. mirtazapine (REMERON) 15 mg tablet 2/15/2023 Other Yes Yes   Sig: Take 15 mg by mouth nightly. ondansetron (ZOFRAN ODT) 4 mg disintegrating tablet 2/15/2023  No Yes   Sig: Take 1 Tablet by mouth every eight (8) hours as needed for Nausea or Vomiting. polyethylene glycol (MIRALAX) 17 gram packet 2/14/2023  No No   Sig: Take 1 Packet by mouth daily as needed for Constipation. tamsulosin (FLOMAX) 0.4 mg capsule Unknown Other No No   Sig: Take 1 Capsule by mouth daily. thiamine mononitrate (B-1) 100 mg tablet Unknown Other No No   Sig: Take 1 Tablet by mouth daily.       Facility-Administered Medications: None       Hospital Medications:  Current Facility-Administered Medications   Medication Dose Route Frequency    0.9% sodium chloride infusion  50 mL/hr IntraVENous CONTINUOUS    sodium chloride (NS) flush 5-40 mL  5-40 mL IntraVENous Q8H    sodium chloride (NS) flush 5-40 mL  5-40 mL IntraVENous PRN    midazolam (VERSED) injection 0.25-5 mg  0.25-5 mg IntraVENous Multiple    fentaNYL citrate (PF) injection  mcg   mcg IntraVENous Multiple    naloxone (NARCAN) injection 0.4 mg  0.4 mg IntraVENous Multiple    flumazeniL (ROMAZICON) 0.1 mg/mL injection 0.2 mg  0.2 mg IntraVENous Multiple    simethicone (MYLICON) 87CE/2.2EL oral drops 80 mg  1.2 mL Oral Multiple    atropine injection 0.5 mg  0.5 mg IntraVENous ONCE PRN    EPINEPHrine (ADRENALIN) 0.1 mg/mL syringe 1 mg  1 mg Endoscopically ONCE PRN       Social History:  Social History     Tobacco Use    Smoking status: Every Day     Packs/day: 0.25     Types: Cigarettes    Smokeless tobacco: Never    Tobacco comments:     \"1 pack last me three days\"   Substance Use Topics    Alcohol use: Not Currently     Comment: hasn't drank x3mo (6/3/22)       Family History:  Family History   Problem Relation Age of Onset    Cancer Brother              Review of Systems:      Constitutional: negative fever, negative chills, negative weight loss  Eyes:   negative visual changes  ENT:   negative sore throat, tongue or lip swelling  Respiratory:  negative cough, negative dyspnea  Cards:  negative for chest pain, palpitations, lower extremity edema  GI:   See HPI  :  negative for frequency, dysuria  Integument: negative for rash and pruritus  Heme:  negative for easy bruising and gum/nose bleeding  Musculoskel: negative for myalgias,  back pain and muscle weakness  Neuro: negative for headaches, dizziness, vertigo  Psych:  negative for feelings of anxiety, depression       Objective:   No data found. No intake/output data recorded. No intake/output data recorded. EXAM:     NEURO-a&o   HEENT-wnl   LUNGS-clear    COR-regular rate and rhythym     ABD-soft , no tenderness, no rebound, good bs     EXT-no edema     Data Review     No results for input(s): WBC, HGB, HCT, PLT, HGBEXT, HCTEXT, PLTEXT in the last 72 hours. No results for input(s): NA, K, CL, CO2, BUN, CREA, GLU, PHOS, CA in the last 72 hours. No results for input(s): AP, TBIL, TP, ALB, GLOB, GGT, AML, LPSE in the last 72 hours. No lab exists for component: SGOT, GPT, AMYP, HLPSE  No results for input(s): INR, PTP, APTT, INREXT in the last 72 hours. Assessment:     Chronic pancreatitis  Pancreas cysts     Patient Active Problem List   Diagnosis Code    Seizure ([facility] [address] R56.9    Alcohol abuse F10.10    Drug abuse ([facility] [address] F19.10    Rectal cancer ([facility] [address] C20    Pancreatitis K85.90    Acute pancreatitis K85.90    Hydronephrosis, bilateral N13.30    Hematuria R31.9    SBO (small bowel obstruction) ([facility] [address] K56.609    Severe protein-calorie malnutrition ([facility] [address] E43     Plan:   The patient was counseled at length about the risks of michi Covid-19 in the twan-operative and post-operative states including the recovery window of their procedure. The patient was made aware that michi Covid-19 after a surgical procedure may worsen their prognosis for recovering from the virus and lend to a higher morbidity and or mortality risk. The patient was given the options of postponing their procedure. All of the risks, benefits, and alternatives were discussed. The patient does wish to proceed with the procedure.   Endoscopic procedure with MAC     Signed By: Liz Garcia. Astrid Briones MD     2/16/2023  9:33 AM

## 2023-02-16 NOTE — PROCEDURES
[address] [place] [address]       Endoscopic Ultrasound    NAME:  Margene Alpers   :   1965   MRN:   [id number]       Procedure Type: Endoscopic Ultrasound    Indications: chronic pancreatitis, pancreas cysts, pancreatic duct stones    Pre-operative Diagnosis: see indication above    Post-operative Diagnosis:  See findings below    : Juan Turner. Ren Shore MD    Staff: Endoscopy Darrell Escalantetise: Charly Mancera RN-1: Feliberto Lopez RN     Referring Provider: NUVIA Howell MD, Guy Crawford MD    Anethesia/Sedation:  MAC anesthesia Propofol      Procedure Details     After informed consent was obtained for the procedure, with all risks and benefits of procedure explained the patient was taken to the endoscopy suite and placed in the left lateral decubitus position. Following sequential administration of sedation as per above, an EGD was performed. Findings are listed below. Next, the linear echoendoscope was inserted into the mouth and advanced under direct vision to the second portion of the duodenum. Findings:     Endoscopic:   Esophagus:normal   Stomach: normal    Duodenum: normal, including tangential view of ampulla      Ultrasound:      Pancreas:     Areas examined: the entire gland    Parenchyma: The pancreatic parenchyma was diffusely heterogeneous. There were innumerable hyperechoic foci throughout the parenchyma. A large 3.2 cm cystic lesion was seen in the head of the pancreas. No wall thickening, but there appeared to be hyperechoic debris within the cyst. It could not be reliably discerned if this communicates with the main pancreatic duct. In the pancreatic body, the main pancreatic duct was 5.6 mm to 8 mm with multiple large hyperechoic foci with echogenic shadowing consistent with pancreatic duct stones. The largest was approximately 7.5 mm in its widest diameter.  There were multiple smaller cystic lesions throughout the pancreatic parenchyma. There was evidence of twan-pancreatic fluid that was disorganized. A distinct mass could not be reliably ruled out in the pancreas. Pancreatic Duct: as above   Liver:     Parenchyma: normal appearing   Gallbladder: contracted   Bile Duct: 7.5 mm diameter with no sludge or stones   Lymph Node: no pathologically enlarged lymph nodes could be seen         Specimen Removed:  none    Complications: None. EBL:  None. Interventions: see above    Impression:  1. Chronic pancreatitis  2. Dilated pancreatic duct  3. Multiple large pancreatic duct stones  4. Large head of pancreas cystic lesion and multiple smaller cystic lesions    Recommendations:   1. Smoking cessation  2. Continue pancreatic enzymes  3. Consider referral to pancreas center  4. Repeat CT abdomen/pelvis with IV contrast (pancreatic protocol) in 3 months    Signed By: Mahsa Santiago.  Ivan Chester MD     2/16/2023  10:17 AM

## 2023-02-16 NOTE — DISCHARGE INSTRUCTIONS
[address], [address] [place] [address]    Endoscopic ultrasound DISCHARGE INSTRUCTIONS    Ольга Pagan  [id number]  1965    Discomfort:  Sore throat- throat lozenges or warm salt water gargle  redness at IV site- apply warm compress to area; if redness or soreness persist- contact your physician  Gaseous discomfort- walking, belching will help relieve any discomfort  You may not operate a vehicle for 12 hours  You may not engage in an occupation involving machinery or appliances for rest of today  You may not drink alcoholic beverages for at least 12 hours  Avoid making any critical decisions for at least 24 hour  DIET  You may eat and drink now and after you leave. You may resume your regular diet - however -  remember your colon is empty and a heavy meal will produce gas. Avoid these foods:  vegetables, fried / greasy foods, carbonated drinks    ACTIVITY  You may resume your normal daily activities   Spend the remainder of the day resting -  avoid any strenuous activity. CALL M.D. ANY SIGN OF   Increasing pain, nausea, vomiting  Abdominal distension (swelling)  New increased bleeding (oral or rectal)  Fever (chills)  Pain in chest area  Bloody discharge from nose or mouth  Shortness of breath    Follow-up Instructions:   Call Dr. Baldemar Plunkett for any questions or problems. Telephone # [phone]    ENDOSCOPY FINDINGS:   Your endoscopy showed chronic inflammation in the pancreas and stones in the pancreas. I will discuss these findings with Dr. Keyla Jovel and we will inform you about the next steps. Please continue your current medications. Please make efforts towards quitting smoking. Signed By: Chuyita Ch MD     2/16/2023  10:15 AM

## 2023-02-16 NOTE — PROGRESS NOTES

## 2023-02-16 NOTE — ANESTHESIA POSTPROCEDURE EVALUATION
Procedure(s):  ESOPHAGOGASTRODUODENOSCOPY (EGD) WITH LINEAR ENDOSCOPIC ULTRASOUND/FINE NEEDLE ASPIRATION  ENDOSCOPIC ULTRASOUND (EUS). MAC    Anesthesia Post Evaluation        Patient participation: complete - patient participated  Level of consciousness: awake  Pain management: adequate  Airway patency: patent  Anesthetic complications: no  Cardiovascular status: hemodynamically stable  Respiratory status: acceptable  Hydration status: acceptable  Comments: The patient is ready for PACU discharge. Vernell Harrell DO                   Post anesthesia nausea and vomiting:  controlled      INITIAL Post-op Vital signs:   Vitals Value Taken Time   /83 02/16/23 1025   Temp 36.3 °C (97.3 °F) 02/16/23 1018   Pulse 69 02/16/23 1028   Resp 19 02/16/23 1028   SpO2 100 % 02/16/23 1028   Vitals shown include unvalidated device data.

## 2023-02-16 NOTE — PERIOP NOTES
Report given to Patrizia Ramirez pt sent from facility after possible cardiac or respiratory arrest, on arrival pt awake, alert, BVM by EMS to chronic trach, pt no distress

## 2023-02-16 NOTE — ANESTHESIA PREPROCEDURE EVALUATION
Relevant Problems   No relevant active problems       Anesthetic History   No history of anesthetic complications            Review of Systems / Medical History  Patient summary reviewed, nursing notes reviewed and pertinent labs reviewed    Pulmonary  Within defined limits                 Neuro/Psych   Within defined limits           Cardiovascular    Hypertension                   GI/Hepatic/Renal  Within defined limits              Endo/Other  Within defined limits           Other Findings              Physical Exam    Airway  Mallampati: II  TM Distance: 4 - 6 cm  Neck ROM: normal range of motion   Mouth opening: Normal     Cardiovascular    Rhythm: regular  Rate: normal         Dental  No notable dental hx       Pulmonary  Breath sounds clear to auscultation               Abdominal  Abdominal exam normal       Other Findings            Anesthetic Plan    ASA: 2  Anesthesia type: MAC          Induction: Intravenous  Anesthetic plan and risks discussed with: Patient

## 2023-05-18 RX ORDER — FINASTERIDE 5 MG/1
5 TABLET, FILM COATED ORAL DAILY
COMMUNITY

## 2023-05-18 RX ORDER — LANOLIN ALCOHOL/MO/W.PET/CERES
100 CREAM (GRAM) TOPICAL DAILY
COMMUNITY
Start: 2022-12-04

## 2023-05-18 RX ORDER — LANOLIN ALCOHOL/MO/W.PET/CERES
325 CREAM (GRAM) TOPICAL DAILY
COMMUNITY
Start: 2022-11-03

## 2023-05-18 RX ORDER — POLYETHYLENE GLYCOL 3350 17 G/17G
17 POWDER, FOR SOLUTION ORAL DAILY PRN
COMMUNITY
Start: 2023-01-28

## 2023-05-18 RX ORDER — ONDANSETRON 4 MG/1
4 TABLET, ORALLY DISINTEGRATING ORAL EVERY 8 HOURS PRN
COMMUNITY
Start: 2023-01-28

## 2023-05-18 RX ORDER — DICYCLOMINE HYDROCHLORIDE 10 MG/1
10 CAPSULE ORAL 3 TIMES DAILY PRN
COMMUNITY
Start: 2023-01-28

## 2023-05-18 RX ORDER — TAMSULOSIN HYDROCHLORIDE 0.4 MG/1
0.4 CAPSULE ORAL DAILY
COMMUNITY
Start: 2022-12-04

## 2023-05-18 RX ORDER — MIRTAZAPINE 15 MG/1
15 TABLET, FILM COATED ORAL NIGHTLY
COMMUNITY

## 2023-05-18 RX ORDER — UREA 10 %
1 LOTION (ML) TOPICAL DAILY
COMMUNITY
Start: 2021-05-28

## 2023-05-18 RX ORDER — FOLIC ACID 1 MG/1
1 TABLET ORAL DAILY
COMMUNITY
Start: 2021-09-09

## 2023-05-28 NOTE — CONSULTS
Surgery Consult    Subjective:      Adry Goss is a 59 y.o. male who has a history of rectal carcinoma status postresection and chemoradiation presented complaining of a 3 to 4-day history of abdominal pain nausea vomiting and diarrhea. He states it feels similar to when he had a bowel obstruction in the past.  Apparently those times they have been treated nonoperatively. Patient no longer follows up with oncology. He does state that he has had a 30 pound weight loss. He does not member the last time that he has had a colonoscopy. Complains of some distention. Patient Active Problem List    Diagnosis Date Noted    Small bowel obstruction ([facility] [address] [address] 09/09/2022    Hematuria 09/01/2021    Hydronephrosis, bilateral 08/31/2021    Pancreatitis 12/20/2020    Acute pancreatitis 12/20/2020    Seizure ([facility] [address] 12/31/2018    Alcohol abuse 12/31/2018    Drug abuse ([facility] 12/31/2018    Rectal cancer ([facility] 12/31/2018     Past Medical History:   Diagnosis Date    Cancer [facility])     rectal    Gastrointestinal disorder     Gastric Ulcers;  Rectal CA    Hematuria 9/1/2021    Hypertension       Past Surgical History:   Procedure Laterality Date    HX GI        Social History     Tobacco Use    Smoking status: Every Day     Packs/day: 0.25     Types: Cigarettes    Smokeless tobacco: Never    Tobacco comments:     \"1 pack last me three days\"   Substance Use Topics    Alcohol use: Not Currently     Comment: hasn't drank x3mo (6/3/22)      Family History   Problem Relation Age of Onset    Cancer Brother       Current Facility-Administered Medications   Medication Dose Route Frequency    sodium chloride (NS) flush 5-40 mL  5-40 mL IntraVENous Q8H    sodium chloride (NS) flush 5-40 mL  5-40 mL IntraVENous PRN    acetaminophen (TYLENOL) tablet 650 mg  650 mg Oral Q6H PRN    Or    acetaminophen (TYLENOL) suppository 650 mg  650 mg Rectal Q6H PRN    ondansetron (ZOFRAN ODT) tablet 4 mg  4 mg Oral Q8H PRN    Or    ondansetron (ZOFRAN) injection 4 mg  4 mg IntraVENous Q6H PRN    enoxaparin (LOVENOX) injection 40 mg  40 mg SubCUTAneous DAILY    cefTRIAXone (ROCEPHIN) 1 g in 0.9% sodium chloride 10 mL IV syringe  1 g IntraVENous Q24H    pantoprazole (PROTONIX) 40 mg in 0.9% sodium chloride 10 mL injection  40 mg IntraVENous Q12H    lactated Ringers infusion  150 mL/hr IntraVENous CONTINUOUS    HYDROmorphone (DILAUDID) injection 1 mg  1 mg IntraVENous Q4H PRN    hydrALAZINE (APRESOLINE) 20 mg/mL injection 10 mg  10 mg IntraVENous Q6H PRN    nicotine (NICODERM CQ) 21 mg/24 hr patch 1 Patch  1 Patch TransDERmal DAILY      No Known Allergies    Review of Systems:    Pertinent items are noted in the History of Present Illness. Objective:      Visit Vitals  /74 (BP 1 Location: Left upper arm, BP Patient Position: At rest)   Pulse 64   Temp 97.6 °F (36.4 °C)   Resp 16   SpO2 99%       Physical Exam:  GENERAL: alert, cooperative, no distress, appears stated age, EYE: negative, THROAT & NECK: normal and no erythema or exudates noted. , LUNG: clear to auscultation bilaterally, HEART: regular rate and rhythm, ABDOMEN: Soft distended minimal tenderness, EXTREMITIES:  extremities normal, atraumatic, no cyanosis or edema, no edema, SKIN: Normal., NEUROLOGIC: negative, PSYCH: non focal    Imaging:  images and reports reviewed  CT- Patient is status post rectal resection and has a history of pelvic radiation. Increasing small bowel distention compared to the previous examination. Small  bowel obstruction with transition point in the deep pelvis. Transition point at  the level of the anastomosis in the deep pelvis. Presacral soft tissue thickening is similar to the prior study and most likely  post therapy changes. Extensive pancreatic calcifications related to episodes of chronic pancreatitis. No evidence of significant hydronephrosis. There is a Marrero catheter in a  partially decompressed urinary bladder.  Residual contrast, calculi or high  density material layering in the urinary bladder. .  Lab/Data Review: All lab results for the last 24 hours reviewed. Recent Results (from the past 24 hour(s))   METABOLIC PANEL, COMPREHENSIVE    Collection Time: 09/09/22 11:56 AM   Result Value Ref Range    Sodium 139 136 - 145 mmol/L    Potassium 3.7 3.5 - 5.1 mmol/L    Chloride 102 97 - 108 mmol/L    CO2 30 21 - 32 mmol/L    Anion gap 7 5 - 15 mmol/L    Glucose 132 (H) 65 - 100 mg/dL    BUN 11 6 - 20 MG/DL    Creatinine 1.05 0.70 - 1.30 MG/DL    BUN/Creatinine ratio 10 (L) 12 - 20      GFR est AA >60 >60 ml/min/1.73m2    GFR est non-AA >60 >60 ml/min/1.73m2    Calcium 8.8 8.5 - 10.1 MG/DL    Bilirubin, total 0.4 0.2 - 1.0 MG/DL    ALT (SGPT) 24 12 - 78 U/L    AST (SGOT) 24 15 - 37 U/L    Alk. phosphatase 92 45 - 117 U/L    Protein, total 7.1 6.4 - 8.2 g/dL    Albumin 3.2 (L) 3.5 - 5.0 g/dL    Globulin 3.9 2.0 - 4.0 g/dL    A-G Ratio 0.8 (L) 1.1 - 2.2     CBC WITH AUTOMATED DIFF    Collection Time: 09/09/22 11:56 AM   Result Value Ref Range    WBC 8.9 4.1 - 11.1 K/uL    RBC 3.63 (L) 4.10 - 5.70 M/uL    HGB 10.4 (L) 12.1 - 17.0 g/dL    HCT 32.8 (L) 36.6 - 50.3 %    MCV 90.4 80.0 - 99.0 FL    MCH 28.7 26.0 - 34.0 PG    MCHC 31.7 30.0 - 36.5 g/dL    RDW 14.4 11.5 - 14.5 %    PLATELET 656 339 - 482 K/uL    MPV 10.3 8.9 - 12.9 FL    NRBC 0.0 0  WBC    ABSOLUTE NRBC 0.00 0.00 - 0.01 K/uL    NEUTROPHILS 68 32 - 75 %    LYMPHOCYTES 24 12 - 49 %    MONOCYTES 7 5 - 13 %    EOSINOPHILS 1 0 - 7 %    BASOPHILS 0 0 - 1 %    IMMATURE GRANULOCYTES 0 0.0 - 0.5 %    ABS. NEUTROPHILS 6.0 1.8 - 8.0 K/UL    ABS. LYMPHOCYTES 2.1 0.8 - 3.5 K/UL    ABS. MONOCYTES 0.7 0.0 - 1.0 K/UL    ABS. EOSINOPHILS 0.1 0.0 - 0.4 K/UL    ABS. BASOPHILS 0.0 0.0 - 0.1 K/UL    ABS. IMM.  GRANS. 0.0 0.00 - 0.04 K/UL    DF AUTOMATED     LIPASE    Collection Time: 09/09/22 11:56 AM   Result Value Ref Range    Lipase 455 (H) 73 - 393 U/L   URINALYSIS W/ REFLEX CULTURE    Collection Time: 09/09/22 12:45 PM    Specimen: Miscellaneous sample    Urine specimen   Result Value Ref Range    Color DARK YELLOW      Appearance TURBID (A) CLEAR      Specific gravity 1.025      pH (UA) 6.0 5.0 - 8.0      Protein 300 (A) NEG mg/dL    Glucose Negative NEG mg/dL    Ketone TRACE (A) NEG mg/dL    Blood LARGE (A) NEG      Urobilinogen 1.0 0.2 - 1.0 EU/dL    Nitrites Positive (A) NEG      Leukocyte Esterase LARGE (A) NEG      WBC >100 (H) 0 - 4 /hpf    RBC >100 (H) 0 - 5 /hpf    Epithelial cells FEW FEW /lpf    Bacteria 2+ (A) NEG /hpf    UA:UC IF INDICATED URINE CULTURE ORDERED (A) CNI     BILIRUBIN, CONFIRM    Collection Time: 09/09/22 12:45 PM   Result Value Ref Range    Bilirubin UA, confirm Negative NEG     DRUG SCREEN, URINE    Collection Time: 09/10/22 12:38 AM   Result Value Ref Range    AMPHETAMINES Negative NEG      BARBITURATES Negative NEG      BENZODIAZEPINES Negative NEG      COCAINE Positive (A) NEG      METHADONE Negative NEG      OPIATES Positive (A) NEG      PCP(PHENCYCLIDINE) Negative NEG      THC (TH-CANNABINOL) Positive (A) NEG      Drug screen comment (NOTE)    CBC WITH AUTOMATED DIFF    Collection Time: 09/10/22 12:38 AM   Result Value Ref Range    WBC 10.4 4.1 - 11.1 K/uL    RBC 3.35 (L) 4.10 - 5.70 M/uL    HGB 10.0 (L) 12.1 - 17.0 g/dL    HCT 30.0 (L) 36.6 - 50.3 %    MCV 89.6 80.0 - 99.0 FL    MCH 29.9 26.0 - 34.0 PG    MCHC 33.3 30.0 - 36.5 g/dL    RDW 14.4 11.5 - 14.5 %    PLATELET 966 869 - 709 K/uL    MPV 10.2 8.9 - 12.9 FL    NRBC 0.0 0  WBC    ABSOLUTE NRBC 0.00 0.00 - 0.01 K/uL    NEUTROPHILS 74 32 - 75 %    LYMPHOCYTES 17 12 - 49 %    MONOCYTES 8 5 - 13 %    EOSINOPHILS 1 0 - 7 %    BASOPHILS 0 0 - 1 %    IMMATURE GRANULOCYTES 0 0.0 - 0.5 %    ABS. NEUTROPHILS 7.6 1.8 - 8.0 K/UL    ABS. LYMPHOCYTES 1.8 0.8 - 3.5 K/UL    ABS. MONOCYTES 0.8 0.0 - 1.0 K/UL    ABS. EOSINOPHILS 0.1 0.0 - 0.4 K/UL    ABS. BASOPHILS 0.0 0.0 - 0.1 K/UL    ABS. IMM.  GRANS. 0.0 0.00 - 0.04 K/UL    DF AUTOMATED     IRON PROFILE    Collection Time: 09/10/22 12:38 AM   Result Value Ref Range    Iron 41 35 - 150 ug/dL    TIBC 217 (L) 250 - 450 ug/dL    Iron % saturation 19 (L) 20 - 50 %       Assessment:PLan    Small bowel obstruction  Patient not currently vomiting. Okay to hold on NG tube for now. Should vomiting begin would recommend NG tube. Hopefully this will be able to be treated conservatively with bowel rest.  If not may require operative intervention. It is of course somewhat concerning his weight loss. Patient does not follow with oncology. The medical service is already asked for an oncology consult we will see what they say.   Follow-up abdominal x-ray in am. Statement Selected

## 2023-07-01 NOTE — ED PROVIDER NOTES
Please note that this dictation was completed with ubigrate, the computer voice recognition software.  Quite often unanticipated grammatical, syntax, homophones, and other interpretive errors are inadvertently transcribed by the computer software.  Please disregard these errors.  Please excuse any errors that have escaped final proofreading. 67-year-old male with a history of rectal cancer 5 years ago status post rectal radiation with subsequent troubles with diarrhea and constipation related to radiation damage (per patient), peptic ulcer disease, hypertension, hematuria, hydronephrosis with UPJ obstruction for which he iscurrently being worked up by urology and has an indwelling mondragon presents with 2 days of consistent diarrhea. Reports he also had 3 days of diarrhea last week. States his stool looks like \"baby stool\" it is yellowish in color. Also reports that his urine looks like the color of grapefruit juice. Patient has had a Mondragon for the last 3 months. He supposed to get it changed monthly but he missed his appointment last month. He actually has an appointment with urology in [place] at 8:30 AM; however, he was feeling too weak and had no strength and he predicted that he went they would tell him to go to the ER anyway for diarrhea so he came in. Patient denies fever, nausea, vomiting. He reports lower abdominal pain. Past Medical History:   Diagnosis Date    Cancer [facility])     rectal    Gastrointestinal disorder     Gastric Ulcers;  Rectal CA    Hematuria 9/1/2021    Hypertension        Past Surgical History:   Procedure Laterality Date    HX GI           Family History:   Problem Relation Age of Onset    Cancer Brother        Social History     Socioeconomic History    Marital status: LEGALLY      Spouse name: Not on file    Number of children: Not on file    Years of education: Not on file    Highest education level: Not on file   Occupational History    Not on file   Tobacco Use    Smoking status: Current Every Day Smoker     Packs/day: 0.25    Smokeless tobacco: Never Used    Tobacco comment: \"1 pack last me three days\"   Substance and Sexual Activity    Alcohol use: Not Currently     Comment: hasn't drank x3mo (6/3/22)    Drug use: Yes     Types: Marijuana     Comment: Occasional for appetite    Sexual activity: Yes     Partners: Female   Other Topics Concern    Not on file   Social History Narrative    Not on file     Social Determinants of Health     Financial Resource Strain:     Difficulty of Paying Living Expenses: Not on file   Food Insecurity:     Worried About Running Out of Food in the Last Year: Not on file    [place] of Food in the Last Year: Not on file   Transportation Needs:     Lack of Transportation (Medical): Not on file    Lack of Transportation (Non-Medical): Not on file   Physical Activity:     Days of Exercise per Week: Not on file    Minutes of Exercise per Session: Not on file   Stress:     Feeling of Stress : Not on file   Social Connections:     Frequency of Communication with Friends and Family: Not on file    Frequency of Social Gatherings with Friends and Family: Not on file    Attends Mandaen Services: Not on file    Active Member of [address] [place] [address] [employer] or Organizations: Not on file    Attends Club or Organization Meetings: Not on file    Marital Status: Not on file   Intimate Partner Violence:     Fear of Current or Ex-Partner: Not on file    Emotionally Abused: Not on file    Physically Abused: Not on file    Sexually Abused: Not on file   Housing Stability:     Unable to Pay for Housing in the Last Year: Not on file    Number of [place] in the Last Year: Not on file    Unstable Housing in the Last Year: Not on file         ALLERGIES: Patient has no known allergies. Review of Systems   Constitutional: Negative. Negative for fever. HENT: Negative. Negative for drooling, facial swelling and trouble swallowing. Eyes: Negative. Negative for discharge and redness. Respiratory: Negative. Negative for chest tightness, shortness of breath and wheezing. Cardiovascular: Negative. Negative for chest pain. Gastrointestinal: Positive for abdominal pain and diarrhea. Negative for abdominal distention, constipation, nausea and vomiting. Endocrine: Negative. Genitourinary: Negative. Negative for difficulty urinating and dysuria. Musculoskeletal: Negative. Negative for arthralgias and myalgias. Skin: Negative. Negative for color change and rash. Allergic/Immunologic: Negative. Neurological: Positive for weakness. Negative for syncope, facial asymmetry and speech difficulty. Hematological: Negative. Psychiatric/Behavioral: Negative. Negative for agitation and confusion. All other systems reviewed and are negative. Vitals:    06/03/22 0332   BP: 133/86   Pulse: 84   Resp: 18   Temp: 97.6 °F (36.4 °C)   SpO2: 100%   Weight: 54.4 kg (120 lb)   Height: 5' 8\" (1.727 m)            Physical Exam  Vitals and nursing note reviewed. Constitutional:       Appearance: Normal appearance. He is well-developed. Comments: thin   HENT:      Head: Normocephalic and atraumatic. Eyes:      Conjunctiva/sclera: Conjunctivae normal.   Cardiovascular:      Rate and Rhythm: Normal rate and regular rhythm. Pulmonary:      Effort: No accessory muscle usage or respiratory distress. Abdominal:      Palpations: Abdomen is soft. Tenderness: There is abdominal tenderness in the suprapubic area and left lower quadrant. Genitourinary:     Comments: Indwelling mondragon draining cloudy malodorous urine. Musculoskeletal:         General: Normal range of motion. Cervical back: Neck supple. Skin:     General: Skin is warm and dry. Neurological:      Mental Status: He is alert and oriented to person, place, and time. Psychiatric:         Behavior: Behavior normal.         Thought Content:  Thought content normal.          MDM  Number of Diagnoses or Management Options    ED Course as of 06/07/22 1735   Fri Jun 03, 2022   1256 Still exquisitely tender in lower abdomen. Plan was to discharge if he felt better, but with persistent tenderness to palpation despite morphine, will CT abdomen to rule out diverticulitis, obstruction, perforation. [SS]   1753 CAre signed out to Dr. Chelsea Cohen [SS]   5752 CT scan findings are reviewed, they show some distended loops of small bowel, shows fecal stasis, \"may represent small bowel obstruction. \"  However, patient is not having any abdominal distention, no nausea or vomiting, is tolerating p.o. without any difficulty and requests more food. Exam is not consistent with bowel obstruction. He is resting comfortably on reevaluation. Continues to have some pain in his lower abdomen, however no epigastric pain or tenderness on my palpation, not concerning for acute pancreatitis. His vitals are unremarkable and he is nontoxic and well-appearing. Patient is counseled on supportive care and return precautions. Will return to the ED for any worsening pain, distention, intractable vomiting, fevers, or any new or worrisome symptoms. Will followup with primary care doctor within 2 days. [CM]      ED Course User Index  [CM] Elisabet Dave MD  [SS] Seble Sal MD       Procedures    LABORATORY TESTS:  No results found for this or any previous visit (from the past 12 hour(s)). IMAGING RESULTS:  CT ABD PELV W CONT   Final Result      1. Patient is status post rectal resection and has a history of pelvic   radiation. There are mildly distended loops of small bowel in left upper   quadrant. There are distended loops of small bowel continuing into the pelvis. There is fecal stasis in small bowel loops in the pelvis with an area of   narrowing in the pelvis may represent changes of radiation therapy with partial   small bowel obstruction.  Presacral soft tissue thickening is similar to the   prior study and most likely post therapy changes. 2. Multiple calcifications are again noted within the pancreas. There is a   pseudocyst in pancreatic tail which is slightly larger. There is suspicion of a   mild amount of edema around the pancreas and correlation with lipase values   would be helpful. 3. There has been resolution of previously noted hydronephrosis. There is a   Marrero catheter in a partially decompressed urinary bladder. There is high   density in the urinary bladder posteriorly and slight high density in the distal   ureters. There is no contrast excretion into the upper collecting systems and   this could represent mucosal enhancement and may represent   inflammatory/infectious changes again possibly secondary to radiation. MEDICATIONS GIVEN:  Medications   sodium chloride 0.9 % bolus infusion 1,000 mL (0 mL IntraVENous IV Completed 6/3/22 0722)   morphine injection 4 mg (4 mg IntraVENous Given 6/3/22 0546)   calcium carbonate (OS-AIDA) tablet 500 mg [elemental] (500 mg Oral Given 6/3/22 0706)   magnesium sulfate 2 g/50 ml IVPB (premix or compounded) (0 g IntraVENous IV Completed 6/3/22 0755)   cefTRIAXone (ROCEPHIN) 1 g in sterile water (preservative free) 10 mL IV syringe (1 g IntraVENous Given 6/3/22 0609)   iopamidoL (ISOVUE-370) 76 % injection 100 mL (100 mL IntraVENous Given 6/3/22 0751)   morphine injection 4 mg (4 mg IntraVENous Given 6/3/22 0829)       IMPRESSION:  1. Urinary tract infection associated with indwelling urethral catheter, initial encounter ([facility]    2. Hypomagnesemia    3. Hypocalcemia    4. Diarrhea, unspecified type    5. Abdominal pain, LLQ (left lower quadrant)        PLAN:  1. Discharge Medication List as of 6/3/2022  8:52 AM      START taking these medications    Details   calcium carbonate 500 mg calcium (1,250 mg) capsule Take 1,250 mg by mouth two (2) times daily (with meals). , Normal, Disp-6 Capsule, R-0      loperamide (Imodium A-D) 2 mg tablet Take 1 Tablet by mouth four (4) times daily as needed for Diarrhea for up to 10 days. , Normal, Disp-16 Tablet, R-0      lactobacillus combination no.4 3 billion cell cap Take 1 Capsule by mouth daily. , Normal, Disp-10 Capsule, R-0      !! famotidine (Pepcid) 20 mg tablet Take 1 Tablet by mouth nightly for 20 days. , Normal, Disp-20 Tablet, R-0      naproxen (Naprosyn) 500 mg tablet Take 1 Tablet by mouth two (2) times daily (with meals) for 10 days. , Normal, Disp-20 Tablet, R-0      cephALEXin (Keflex) 500 mg capsule Take 1 Capsule by mouth four (4) times daily for 10 days. , Normal, Disp-40 Capsule, R-0       !! - Potential duplicate medications found. Please discuss with provider. CONTINUE these medications which have NOT CHANGED    Details   metoprolol tartrate (LOPRESSOR) 25 mg tablet Take 0.5 Tablets by mouth two (2) times a day., Normal, Disp-60 Tablet, R-0      tamsulosin (FLOMAX) 0.4 mg capsule Take 1 Capsule by mouth daily. , Normal, Disp-30 Capsule, R-1      thiamine mononitrate (B-1) 100 mg tablet Take 1 Tablet by mouth daily. , Normal, Disp-30 Tablet, R-1      folic acid (FOLVITE) 1 mg tablet Take 1 Tablet by mouth daily. , Normal, Disp-30 Tablet, R-1      finasteride (PROSCAR) 5 mg tablet Take 5 mg by mouth daily. , Historical Med      mirtazapine (REMERON) 15 mg tablet Take 15 mg by mouth nightly., Historical Med      !! famotidine (Pepcid) 20 mg tablet Take 20 mg by mouth nightly as needed for Heartburn., Historical Med       !! - Potential duplicate medications found. Please discuss with provider.         2.   Follow-up Information     Follow up With Specialties Details Why Contact Info    Aleksandar Esteves MD Internal Medicine Physician Schedule an appointment as soon as possible for a visit in 2 days  [address] [facility] [address] [phone]      [facility] EMERGENCY DEPT Emergency Medicine  As needed, If symptoms worsen Dayne Talamantes Return to ED if worse 05-Jul-2023

## 2023-09-08 ENCOUNTER — APPOINTMENT (OUTPATIENT)
Facility: HOSPITAL | Age: 58
End: 2023-09-08
Payer: MEDICARE

## 2023-09-08 ENCOUNTER — HOSPITAL ENCOUNTER (EMERGENCY)
Facility: HOSPITAL | Age: 58
Discharge: ANOTHER HOSPITAL | End: 2023-09-09
Payer: MEDICARE

## 2023-09-08 DIAGNOSIS — K56.609 SBO (SMALL BOWEL OBSTRUCTION) (HCC): Primary | ICD-10-CM

## 2023-09-08 LAB
ALBUMIN SERPL-MCNC: 3 G/DL (ref 3.5–5)
ALBUMIN/GLOB SERPL: 0.5 (ref 1.1–2.2)
ALP SERPL-CCNC: 120 U/L (ref 45–117)
ALT SERPL-CCNC: 18 U/L (ref 12–78)
ANION GAP SERPL CALC-SCNC: 11 MMOL/L (ref 5–15)
APPEARANCE UR: ABNORMAL
AST SERPL-CCNC: 21 U/L (ref 15–37)
BACTERIA URNS QL MICRO: ABNORMAL /HPF
BASOPHILS # BLD: 0.1 K/UL (ref 0–0.1)
BASOPHILS NFR BLD: 0 % (ref 0–1)
BILIRUB SERPL-MCNC: 0.3 MG/DL (ref 0.2–1)
BILIRUB UR QL: NEGATIVE
BUN SERPL-MCNC: 11 MG/DL (ref 6–20)
BUN/CREAT SERPL: 9 (ref 12–20)
CALCIUM SERPL-MCNC: 9.8 MG/DL (ref 8.5–10.1)
CHLORIDE SERPL-SCNC: 98 MMOL/L (ref 97–108)
CO2 SERPL-SCNC: 28 MMOL/L (ref 21–32)
COLOR UR: ABNORMAL
CREAT SERPL-MCNC: 1.26 MG/DL (ref 0.7–1.3)
DIFFERENTIAL METHOD BLD: ABNORMAL
EOSINOPHIL # BLD: 0.1 K/UL (ref 0–0.4)
EOSINOPHIL NFR BLD: 0 % (ref 0–7)
EPITH CASTS URNS QL MICRO: ABNORMAL /LPF
ERYTHROCYTE [DISTWIDTH] IN BLOOD BY AUTOMATED COUNT: 16.9 % (ref 11.5–14.5)
GLOBULIN SER CALC-MCNC: 6.3 G/DL (ref 2–4)
GLUCOSE BLD STRIP.AUTO-MCNC: 93 MG/DL (ref 65–117)
GLUCOSE SERPL-MCNC: 115 MG/DL (ref 65–100)
GLUCOSE UR STRIP.AUTO-MCNC: NEGATIVE MG/DL
HCT VFR BLD AUTO: 31.4 % (ref 36.6–50.3)
HGB BLD-MCNC: 10.2 G/DL (ref 12.1–17)
HGB UR QL STRIP: ABNORMAL
IMM GRANULOCYTES # BLD AUTO: 0.1 K/UL (ref 0–0.04)
IMM GRANULOCYTES NFR BLD AUTO: 1 % (ref 0–0.5)
KETONES UR QL STRIP.AUTO: NEGATIVE MG/DL
LACTATE BLD-SCNC: 3.9 MMOL/L (ref 0.4–2)
LEUKOCYTE ESTERASE UR QL STRIP.AUTO: ABNORMAL
LIPASE SERPL-CCNC: 249 U/L (ref 73–393)
LYMPHOCYTES # BLD: 3.1 K/UL (ref 0.8–3.5)
LYMPHOCYTES NFR BLD: 19 % (ref 12–49)
MCH RBC QN AUTO: 28.4 PG (ref 26–34)
MCHC RBC AUTO-ENTMCNC: 32.5 G/DL (ref 30–36.5)
MCV RBC AUTO: 87.5 FL (ref 80–99)
MONOCYTES # BLD: 0.7 K/UL (ref 0–1)
MONOCYTES NFR BLD: 4 % (ref 5–13)
NEUTS SEG # BLD: 12.4 K/UL (ref 1.8–8)
NEUTS SEG NFR BLD: 76 % (ref 32–75)
NITRITE UR QL STRIP.AUTO: NEGATIVE
NRBC # BLD: 0 K/UL (ref 0–0.01)
NRBC BLD-RTO: 0 PER 100 WBC
PH UR STRIP: 8 (ref 5–8)
PLATELET # BLD AUTO: 493 K/UL (ref 150–400)
PMV BLD AUTO: 10.3 FL (ref 8.9–12.9)
POTASSIUM SERPL-SCNC: 4.6 MMOL/L (ref 3.5–5.1)
PROT SERPL-MCNC: 9.3 G/DL (ref 6.4–8.2)
PROT UR STRIP-MCNC: 100 MG/DL
RBC # BLD AUTO: 3.59 M/UL (ref 4.1–5.7)
RBC #/AREA URNS HPF: ABNORMAL /HPF (ref 0–5)
SERVICE CMNT-IMP: NORMAL
SODIUM SERPL-SCNC: 137 MMOL/L (ref 136–145)
SP GR UR REFRACTOMETRY: 1 (ref 1–1.03)
TROPONIN I SERPL HS-MCNC: 5 NG/L (ref 0–76)
URINE CULTURE IF INDICATED: ABNORMAL
UROBILINOGEN UR QL STRIP.AUTO: 1 EU/DL (ref 0.2–1)
WBC # BLD AUTO: 16.4 K/UL (ref 4.1–11.1)
WBC URNS QL MICRO: ABNORMAL /HPF (ref 0–4)

## 2023-09-08 PROCEDURE — 80053 COMPREHEN METABOLIC PANEL: CPT

## 2023-09-08 PROCEDURE — 96366 THER/PROPH/DIAG IV INF ADDON: CPT

## 2023-09-08 PROCEDURE — 96376 TX/PRO/DX INJ SAME DRUG ADON: CPT

## 2023-09-08 PROCEDURE — 82962 GLUCOSE BLOOD TEST: CPT

## 2023-09-08 PROCEDURE — 93005 ELECTROCARDIOGRAM TRACING: CPT | Performed by: EMERGENCY MEDICINE

## 2023-09-08 PROCEDURE — 83605 ASSAY OF LACTIC ACID: CPT

## 2023-09-08 PROCEDURE — 2580000003 HC RX 258: Performed by: NURSE PRACTITIONER

## 2023-09-08 PROCEDURE — 99285 EMERGENCY DEPT VISIT HI MDM: CPT

## 2023-09-08 PROCEDURE — 84484 ASSAY OF TROPONIN QUANT: CPT

## 2023-09-08 PROCEDURE — 96365 THER/PROPH/DIAG IV INF INIT: CPT

## 2023-09-08 PROCEDURE — 85025 COMPLETE CBC W/AUTO DIFF WBC: CPT

## 2023-09-08 PROCEDURE — 6360000002 HC RX W HCPCS: Performed by: NURSE PRACTITIONER

## 2023-09-08 PROCEDURE — 74177 CT ABD & PELVIS W/CONTRAST: CPT

## 2023-09-08 PROCEDURE — 84145 PROCALCITONIN (PCT): CPT

## 2023-09-08 PROCEDURE — 87186 SC STD MICRODIL/AGAR DIL: CPT

## 2023-09-08 PROCEDURE — 83690 ASSAY OF LIPASE: CPT

## 2023-09-08 PROCEDURE — 87086 URINE CULTURE/COLONY COUNT: CPT

## 2023-09-08 PROCEDURE — 2580000003 HC RX 258: Performed by: EMERGENCY MEDICINE

## 2023-09-08 PROCEDURE — 36415 COLL VENOUS BLD VENIPUNCTURE: CPT

## 2023-09-08 PROCEDURE — 87077 CULTURE AEROBIC IDENTIFY: CPT

## 2023-09-08 PROCEDURE — 96375 TX/PRO/DX INJ NEW DRUG ADDON: CPT

## 2023-09-08 PROCEDURE — 81001 URINALYSIS AUTO W/SCOPE: CPT

## 2023-09-08 PROCEDURE — 87040 BLOOD CULTURE FOR BACTERIA: CPT

## 2023-09-08 PROCEDURE — 71045 X-RAY EXAM CHEST 1 VIEW: CPT

## 2023-09-08 PROCEDURE — 6360000004 HC RX CONTRAST MEDICATION: Performed by: NURSE PRACTITIONER

## 2023-09-08 RX ORDER — MORPHINE SULFATE 2 MG/ML
2 INJECTION, SOLUTION INTRAMUSCULAR; INTRAVENOUS
Status: COMPLETED | OUTPATIENT
Start: 2023-09-08 | End: 2023-09-08

## 2023-09-08 RX ORDER — SODIUM CHLORIDE, SODIUM LACTATE, POTASSIUM CHLORIDE, AND CALCIUM CHLORIDE .6; .31; .03; .02 G/100ML; G/100ML; G/100ML; G/100ML
30 INJECTION, SOLUTION INTRAVENOUS ONCE
Status: COMPLETED | OUTPATIENT
Start: 2023-09-08 | End: 2023-09-08

## 2023-09-08 RX ORDER — ONDANSETRON 2 MG/ML
4 INJECTION INTRAMUSCULAR; INTRAVENOUS
Status: COMPLETED | OUTPATIENT
Start: 2023-09-08 | End: 2023-09-08

## 2023-09-08 RX ORDER — SODIUM CHLORIDE, SODIUM LACTATE, POTASSIUM CHLORIDE, CALCIUM CHLORIDE 600; 310; 30; 20 MG/100ML; MG/100ML; MG/100ML; MG/100ML
INJECTION, SOLUTION INTRAVENOUS ONCE
Status: COMPLETED | OUTPATIENT
Start: 2023-09-08 | End: 2023-09-09

## 2023-09-08 RX ADMIN — ONDANSETRON 4 MG: 2 INJECTION INTRAMUSCULAR; INTRAVENOUS at 21:06

## 2023-09-08 RX ADMIN — MORPHINE SULFATE 2 MG: 2 INJECTION, SOLUTION INTRAMUSCULAR; INTRAVENOUS at 21:07

## 2023-09-08 RX ADMIN — MORPHINE SULFATE 2 MG: 2 INJECTION, SOLUTION INTRAMUSCULAR; INTRAVENOUS at 19:37

## 2023-09-08 RX ADMIN — IOPAMIDOL 100 ML: 755 INJECTION, SOLUTION INTRAVENOUS at 20:43

## 2023-09-08 RX ADMIN — SODIUM CHLORIDE, POTASSIUM CHLORIDE, SODIUM LACTATE AND CALCIUM CHLORIDE: 600; 310; 30; 20 INJECTION, SOLUTION INTRAVENOUS at 23:04

## 2023-09-08 RX ADMIN — PIPERACILLIN AND TAZOBACTAM 4500 MG: 4; .5 INJECTION, POWDER, LYOPHILIZED, FOR SOLUTION INTRAVENOUS at 23:04

## 2023-09-08 RX ADMIN — SODIUM CHLORIDE, POTASSIUM CHLORIDE, SODIUM LACTATE AND CALCIUM CHLORIDE 1197 ML: 600; 310; 30; 20 INJECTION, SOLUTION INTRAVENOUS at 19:27

## 2023-09-08 ASSESSMENT — ENCOUNTER SYMPTOMS
BACK PAIN: 0
VOMITING: 1
SHORTNESS OF BREATH: 0
ABDOMINAL PAIN: 1

## 2023-09-08 ASSESSMENT — PAIN - FUNCTIONAL ASSESSMENT: PAIN_FUNCTIONAL_ASSESSMENT: NONE - DENIES PAIN

## 2023-09-09 ENCOUNTER — HOSPITAL ENCOUNTER (INPATIENT)
Facility: HOSPITAL | Age: 58
LOS: 7 days | Discharge: HOME | End: 2023-09-16
Attending: INTERNAL MEDICINE | Admitting: FAMILY MEDICINE
Payer: MEDICARE

## 2023-09-09 ENCOUNTER — APPOINTMENT (OUTPATIENT)
Facility: HOSPITAL | Age: 58
End: 2023-09-09
Payer: MEDICARE

## 2023-09-09 VITALS
DIASTOLIC BLOOD PRESSURE: 74 MMHG | TEMPERATURE: 98.7 F | RESPIRATION RATE: 18 BRPM | HEIGHT: 68 IN | WEIGHT: 88 LBS | BODY MASS INDEX: 13.34 KG/M2 | OXYGEN SATURATION: 100 % | SYSTOLIC BLOOD PRESSURE: 139 MMHG | HEART RATE: 82 BPM

## 2023-09-09 DIAGNOSIS — K85.20 ALCOHOL-INDUCED ACUTE PANCREATITIS, UNSPECIFIED COMPLICATION STATUS: Primary | ICD-10-CM

## 2023-09-09 LAB
EKG ATRIAL RATE: 102 BPM
EKG DIAGNOSIS: NORMAL
EKG P AXIS: 87 DEGREES
EKG P-R INTERVAL: 124 MS
EKG Q-T INTERVAL: 344 MS
EKG QRS DURATION: 72 MS
EKG QTC CALCULATION (BAZETT): 448 MS
EKG R AXIS: 79 DEGREES
EKG T AXIS: 81 DEGREES
EKG VENTRICULAR RATE: 102 BPM
LACTATE BLD-SCNC: 4.7 MMOL/L (ref 0.4–2)
LACTATE SERPL-SCNC: 0.5 MMOL/L (ref 0.4–2)
PROCALCITONIN SERPL-MCNC: 0.15 NG/ML

## 2023-09-09 PROCEDURE — 96366 THER/PROPH/DIAG IV INF ADDON: CPT

## 2023-09-09 PROCEDURE — 2580000003 HC RX 258: Performed by: NURSE PRACTITIONER

## 2023-09-09 PROCEDURE — 36600 WITHDRAWAL OF ARTERIAL BLOOD: CPT

## 2023-09-09 PROCEDURE — 83605 ASSAY OF LACTIC ACID: CPT

## 2023-09-09 PROCEDURE — A4216 STERILE WATER/SALINE, 10 ML: HCPCS | Performed by: NURSE PRACTITIONER

## 2023-09-09 PROCEDURE — 36415 COLL VENOUS BLD VENIPUNCTURE: CPT

## 2023-09-09 PROCEDURE — 1100000000 HC RM PRIVATE

## 2023-09-09 PROCEDURE — 6360000002 HC RX W HCPCS: Performed by: NURSE PRACTITIONER

## 2023-09-09 PROCEDURE — 6360000002 HC RX W HCPCS: Performed by: EMERGENCY MEDICINE

## 2023-09-09 PROCEDURE — 2500000003 HC RX 250 WO HCPCS: Performed by: NURSE PRACTITIONER

## 2023-09-09 PROCEDURE — 74018 RADEX ABDOMEN 1 VIEW: CPT

## 2023-09-09 PROCEDURE — 99221 1ST HOSP IP/OBS SF/LOW 40: CPT | Performed by: SURGERY

## 2023-09-09 PROCEDURE — 96375 TX/PRO/DX INJ NEW DRUG ADDON: CPT

## 2023-09-09 PROCEDURE — 6360000004 HC RX CONTRAST MEDICATION: Performed by: SURGERY

## 2023-09-09 RX ORDER — BISACODYL 10 MG
10 SUPPOSITORY, RECTAL RECTAL DAILY PRN
Status: DISCONTINUED | OUTPATIENT
Start: 2023-09-09 | End: 2023-09-16 | Stop reason: HOSPADM

## 2023-09-09 RX ORDER — HYDRALAZINE HYDROCHLORIDE 20 MG/ML
10 INJECTION INTRAMUSCULAR; INTRAVENOUS EVERY 8 HOURS PRN
Status: DISCONTINUED | OUTPATIENT
Start: 2023-09-09 | End: 2023-09-16 | Stop reason: HOSPADM

## 2023-09-09 RX ORDER — SODIUM CHLORIDE, SODIUM LACTATE, POTASSIUM CHLORIDE, CALCIUM CHLORIDE 600; 310; 30; 20 MG/100ML; MG/100ML; MG/100ML; MG/100ML
INJECTION, SOLUTION INTRAVENOUS CONTINUOUS
Status: DISCONTINUED | OUTPATIENT
Start: 2023-09-09 | End: 2023-09-13

## 2023-09-09 RX ORDER — FAMOTIDINE 20 MG/1
20 TABLET, FILM COATED ORAL NIGHTLY PRN
COMMUNITY

## 2023-09-09 RX ORDER — ACETAMINOPHEN 650 MG/1
650 SUPPOSITORY RECTAL EVERY 6 HOURS PRN
Status: DISCONTINUED | OUTPATIENT
Start: 2023-09-09 | End: 2023-09-16 | Stop reason: HOSPADM

## 2023-09-09 RX ORDER — PEDIATRIC MULTIVIT 61/D3/VIT K 1500-800
2 CAPSULE ORAL DAILY
COMMUNITY
Start: 2023-08-11 | End: 2023-11-13

## 2023-09-09 RX ORDER — ACETAMINOPHEN 325 MG/1
650 TABLET ORAL EVERY 6 HOURS PRN
Status: DISCONTINUED | OUTPATIENT
Start: 2023-09-09 | End: 2023-09-16 | Stop reason: HOSPADM

## 2023-09-09 RX ORDER — ONDANSETRON 4 MG/1
4 TABLET, ORALLY DISINTEGRATING ORAL EVERY 8 HOURS PRN
Status: DISCONTINUED | OUTPATIENT
Start: 2023-09-09 | End: 2023-09-16 | Stop reason: HOSPADM

## 2023-09-09 RX ORDER — SODIUM CHLORIDE 0.9 % (FLUSH) 0.9 %
5-40 SYRINGE (ML) INJECTION EVERY 12 HOURS SCHEDULED
Status: DISCONTINUED | OUTPATIENT
Start: 2023-09-09 | End: 2023-09-16 | Stop reason: HOSPADM

## 2023-09-09 RX ORDER — SODIUM CHLORIDE 9 MG/ML
INJECTION, SOLUTION INTRAVENOUS PRN
Status: DISCONTINUED | OUTPATIENT
Start: 2023-09-09 | End: 2023-09-16 | Stop reason: HOSPADM

## 2023-09-09 RX ORDER — PANCRELIPASE 24000; 76000; 120000 [USP'U]/1; [USP'U]/1; [USP'U]/1
1 CAPSULE, DELAYED RELEASE PELLETS ORAL
COMMUNITY
Start: 2023-08-11

## 2023-09-09 RX ORDER — MORPHINE SULFATE 4 MG/ML
4 INJECTION, SOLUTION INTRAMUSCULAR; INTRAVENOUS
Status: DISCONTINUED | OUTPATIENT
Start: 2023-09-09 | End: 2023-09-09

## 2023-09-09 RX ORDER — ONDANSETRON 2 MG/ML
4 INJECTION INTRAMUSCULAR; INTRAVENOUS EVERY 6 HOURS PRN
Status: DISCONTINUED | OUTPATIENT
Start: 2023-09-09 | End: 2023-09-16 | Stop reason: HOSPADM

## 2023-09-09 RX ORDER — HYDROMORPHONE HYDROCHLORIDE 1 MG/ML
1 INJECTION, SOLUTION INTRAMUSCULAR; INTRAVENOUS; SUBCUTANEOUS ONCE
Status: COMPLETED | OUTPATIENT
Start: 2023-09-09 | End: 2023-09-09

## 2023-09-09 RX ORDER — HYDROMORPHONE HYDROCHLORIDE 1 MG/ML
1 INJECTION, SOLUTION INTRAMUSCULAR; INTRAVENOUS; SUBCUTANEOUS EVERY 4 HOURS PRN
Status: DISCONTINUED | OUTPATIENT
Start: 2023-09-09 | End: 2023-09-12

## 2023-09-09 RX ORDER — GABAPENTIN 300 MG/1
300 CAPSULE ORAL 2 TIMES DAILY
Qty: 60 CAPSULE | Refills: 3 | COMMUNITY
Start: 2023-05-19 | End: 2023-09-16

## 2023-09-09 RX ORDER — SODIUM CHLORIDE 0.9 % (FLUSH) 0.9 %
5-40 SYRINGE (ML) INJECTION PRN
Status: DISCONTINUED | OUTPATIENT
Start: 2023-09-09 | End: 2023-09-16 | Stop reason: HOSPADM

## 2023-09-09 RX ORDER — AMITRIPTYLINE HYDROCHLORIDE 10 MG/1
10 TABLET, FILM COATED ORAL NIGHTLY
COMMUNITY
Start: 2023-08-31

## 2023-09-09 RX ADMIN — HYDROMORPHONE HYDROCHLORIDE 1 MG: 1 INJECTION, SOLUTION INTRAMUSCULAR; INTRAVENOUS; SUBCUTANEOUS at 11:08

## 2023-09-09 RX ADMIN — ONDANSETRON 4 MG: 2 INJECTION INTRAMUSCULAR; INTRAVENOUS at 21:50

## 2023-09-09 RX ADMIN — DIATRIZOATE MEGLUMINE AND DIATRIZOATE SODIUM 100 ML: 660; 100 LIQUID ORAL; RECTAL at 16:51

## 2023-09-09 RX ADMIN — PIPERACILLIN AND TAZOBACTAM 3375 MG: 3; .375 INJECTION, POWDER, LYOPHILIZED, FOR SOLUTION INTRAVENOUS at 08:49

## 2023-09-09 RX ADMIN — HYDROMORPHONE HYDROCHLORIDE 1 MG: 1 INJECTION, SOLUTION INTRAMUSCULAR; INTRAVENOUS; SUBCUTANEOUS at 01:36

## 2023-09-09 RX ADMIN — SODIUM CHLORIDE, POTASSIUM CHLORIDE, SODIUM LACTATE AND CALCIUM CHLORIDE: 600; 310; 30; 20 INJECTION, SOLUTION INTRAVENOUS at 16:49

## 2023-09-09 RX ADMIN — SODIUM CHLORIDE, PRESERVATIVE FREE 20 MG: 5 INJECTION INTRAVENOUS at 08:52

## 2023-09-09 RX ADMIN — SODIUM CHLORIDE, PRESERVATIVE FREE 10 ML: 5 INJECTION INTRAVENOUS at 08:54

## 2023-09-09 RX ADMIN — HYDROMORPHONE HYDROCHLORIDE 1 MG: 1 INJECTION, SOLUTION INTRAMUSCULAR; INTRAVENOUS; SUBCUTANEOUS at 16:45

## 2023-09-09 RX ADMIN — PIPERACILLIN AND TAZOBACTAM 3375 MG: 3; .375 INJECTION, POWDER, LYOPHILIZED, FOR SOLUTION INTRAVENOUS at 16:15

## 2023-09-09 RX ADMIN — HYDROMORPHONE HYDROCHLORIDE 1 MG: 1 INJECTION, SOLUTION INTRAMUSCULAR; INTRAVENOUS; SUBCUTANEOUS at 21:55

## 2023-09-09 ASSESSMENT — PAIN DESCRIPTION - ORIENTATION
ORIENTATION: ANTERIOR
ORIENTATION: ANTERIOR

## 2023-09-09 ASSESSMENT — PAIN DESCRIPTION - LOCATION
LOCATION: ABDOMEN
LOCATION: ABDOMEN

## 2023-09-09 ASSESSMENT — PAIN SCALES - GENERAL
PAINLEVEL_OUTOF10: 6
PAINLEVEL_OUTOF10: 6
PAINLEVEL_OUTOF10: 7

## 2023-09-09 ASSESSMENT — PAIN DESCRIPTION - DESCRIPTORS
DESCRIPTORS: ACHING
DESCRIPTORS: ACHING

## 2023-09-09 NOTE — PROGRESS NOTES
Bedside shift change report given to Erin Vance RN (oncoming nurse) by [employer], RN (offgoing nurse). Report included the following information Nurse Handoff Report, Index, ED Encounter Summary, ED SBAR, Adult Overview, Surgery Report, Intake/Output, MAR, Recent Results, Med Rec Status, Quality Measures, and Neuro Assessment.

## 2023-09-09 NOTE — PROGRESS NOTES
TRANSFER - IN REPORT:    Verbal report received from [facility] on Zee Flannery  being received from [facility] SUPPORT [place] for routine progression of patient care      Report consisted of patient's Situation, Background, Assessment and   Recommendations(SBAR). Information from the following report(s) Nurse Handoff Report, Index, ED Encounter Summary, ED SBAR, Adult Overview, Surgery Report, Intake/Output, MAR, Recent Results, Med Rec Status, and Neuro Assessment was reviewed with the receiving nurse. Opportunity for questions and clarification was provided. Assessment completed upon patient's arrival to unit and care assumed.

## 2023-09-09 NOTE — ED NOTES
Pt inserted NG connected to LIWS. Pt tolerated well.      [address] [facility] PHAN Andrews  09/08/23 2049

## 2023-09-09 NOTE — PROGRESS NOTES
After the patient showed up on the computer system, notified the attending Dr. Khushbu Castano through the perfect serve retarding patient's arrival at [address] [place] [address] # [phone].

## 2023-09-09 NOTE — PROGRESS NOTES
Patient arrived from [facility] via EMS. Alert and oriented X 4. Came with Jansen and NG tube in R nare. Put on low intermittent suction. Room air. All fall and other safety precaution has been taken. Bed in low position with brake on. Call light within the reach of the pt. Called the supervisor/Back board- Denial to request to put the patient on board / computer system.

## 2023-09-09 NOTE — ED NOTES
Pt picked up by ambulance via stretcher. Report given to transport team. All questions answered. Pt stable at this time. Belongings handed to EMS.      [facility] PHAN Andrews  09/09/23 [id number]

## 2023-09-09 NOTE — ED NOTES
General surgery MD, Jackson speaking to ANISH vides at this time.       Frida Fried RN  09/08/23 [id number]

## 2023-09-09 NOTE — H&P
History and Physical    Date of Service:  9/9/2023  Primary Care Provider: Laura Gauthier MD  Source of information: The patient and Chart review    Chief Complaint: Abdominal Pain    History of Presenting Illness:   Kaylin Figueroa is a 59 y.o. male who initially presented to [facility] ED with abdominal pain. He reported he had vomited multiple times for the past 3 days along with loose stools PTA. On chart review, note that he has history of SBO and pancreatitis. He denied alcohol intake and indicated he has chronic, constant pain. He has a Jansen catheter because he has problems voiding - unknown how long he has had - he thinks a year. He is a poor historian - does not know his home meds. Chart review revealed that he had an EUS in 2/2023 which showed chronic pancreatitis, dilated pancreatic duct, multiple large pancreatic duct stones and a large head of pancreas cystic lesion with multiples smaller cystic lesions. A repeat CT abd/pelvis has been recommended in 3 months (due May-June). He was admitted prior to that in Dec 2022 with a SBO. Pt was seen and examined, he was lying in bed in no acute distress. NGT was clamped, ordered and placed to LIWS. Pt denies HA, dizziness, chest pain, pressure, SOB or cough. + lower GI pain (6/10 on pain scale) but no nausea or vomiting since yesterday. + loose stools which he reports are chronic. Medication reconciliation was completed from pharmacy list and recent fills. Holding home medications due to n.p.o./NG tube to low intermittent suction. Will provide as needed hydralazine as needed for elevated blood pressures. Patient updated on plan of care, general surgery has been consulted now that the patient is here. He has been placed on Zosyn and Dilaudid ordered as needed for pain. REVIEW OF SYSTEMS:  As mentioned above in the HPI    Past Medical History:   Diagnosis Date    Cancer ([address])     rectal    Gastrointestinal disorder     Gastric Ulcers;  Rectal CONTACT/SURROGATE DECISION MAKER: Sonia Alejo [phone] (wife)    CRITICAL CARE WAS PERFORMED FOR THIS ENCOUNTER: NO.    Signed By: DEANA Friend NP     September 9, 2023       Please note that this dictation may have been completed with Dragon, the computer voice recognition software. Quite often unanticipated grammatical, syntax, homophones, and other interpretive errors are inadvertently transcribed by the computer software. Please disregard these errors. Please excuse any errors that have escaped final proofreading.

## 2023-09-09 NOTE — CONSULTS
General Surgery Consult Note            Date:9/9/2023        Patient Promise Haines     YOB: 1965     Age:58 y.o. Reason for Consult: Small bowel obstruction        History of Present Illness   The patient is a 75-year-old male who is followed at [address] [place] [address] with a history of chronic pancreatitis and chronic dilated bowels with small bowel obstruction. Patient has been admitted multiple times for SBO. Upon reviewing [facility]'s chart he has been there several times since his last admission here. There CT scans also reveal dilated loops of bowel. Patient states that he has had increasing abdominal pain with nausea vomiting and loose stools that brought him in he states it is similar to his previous attacks. Past Medical History     Past Medical History:   Diagnosis Date    Cancer [facility])     rectal    Gastrointestinal disorder     Gastric Ulcers; Rectal CA    Hematuria 9/1/2021    Hypertension         Past Surgical History     Past Surgical History:   Procedure Laterality Date    GI          Medications     Prior to Admission medications    Medication Sig Start Date End Date Taking? Authorizing Provider   vitamin D3 (CHOLECALCIFEROL) 125 MCG (5000 UT) TABS tablet Take 1 tablet by mouth daily 6/28/23 6/27/24 Yes Historical Provider, MD   gabapentin (NEURONTIN) 300 MG capsule Take 1 capsule by mouth 2 times daily.  Max Daily Amount: 600 mg 5/19/23 9/16/23 Yes Historical Provider, MD   Multiple Vitamins-Minerals (ADEK GUMMIES PLUS ZN) CHEW Take 2 tablets by mouth daily 8/11/23 11/13/23 Yes Historical Provider, MD   amitriptyline (ELAVIL) 10 MG tablet Take 1 tablet by mouth nightly 8/31/23   Historical Provider, MD   famotidine (PEPCID) 20 MG tablet Take 1 tablet by mouth nightly as needed    Historical Provider, MD   hydrocortisone 2.5 % cream Apply 1 Tube topically as needed 8/1/23   Historical Provider, MD   CREON 42324-78583 units delayed release capsule Take 1 capsule by mouth 3 (SINGLE AP VIEW)    Result Date: 9/9/2023  Small bowel distention is concerning for obstruction. An NG tube is not visualized. XR CHEST PORTABLE    Result Date: 9/8/2023  No acute process identified. CT ABDOMEN PELVIS W IV CONTRAST Additional Contrast? None    Result Date: 9/8/2023  1. Sequela of chronic pancreatitis. Superimposed acute pancreatitis is not excluded. 2. Severe dilation of the proximal to mid small bowel. The distal small bowel is only mildly dilated, with several areas of apparent luminal narrowing which could reflect strictures causing obstruction. There is no single abrupt transition point. 3. A few small gas-containing foci in the renal calyces bilaterally of uncertain significance. This may reflect reflux of gas from the bladder, relating to recent catheterization, less likely atypical calculi. 4. Increased intra and extra hepatic biliary dilatation compared to January 2023. Recommend correlation with liver function tests. Assessment/Plan      Hospital Problems             Last Modified POA    * (Principal) SBO (small bowel obstruction) ([address]) 9/9/2023 Yes   58-year-old male with chronically dilated bowels on CT scan. Unclear whether or not he actually has an obstruction or this is a chronic finding. On his previous CT scans done here and at [facility] this seems to be the finding. Previous admissions at [facility] they have done small bowel follow-through's with good transit times despite the dilated loops of bowel. At this point the patient states that he is continuing to pass gas and having small bowel movements he denies any nausea vomiting though he has had 1 L out. We will attempt a Gastrografin swallow with follow-up x-ray and see how he does. If there is no obstruction NG tube may be able to be removed.       Electronically signed by David Stark MD on 9/9/23 at 2:28 PM EDT

## 2023-09-10 ENCOUNTER — APPOINTMENT (OUTPATIENT)
Facility: HOSPITAL | Age: 58
End: 2023-09-10
Attending: INTERNAL MEDICINE
Payer: MEDICARE

## 2023-09-10 LAB
ALBUMIN SERPL-MCNC: 2.5 G/DL (ref 3.5–5)
ALBUMIN/GLOB SERPL: 0.5 (ref 1.1–2.2)
ALP SERPL-CCNC: 96 U/L (ref 45–117)
ALT SERPL-CCNC: 16 U/L (ref 12–78)
ANION GAP SERPL CALC-SCNC: 3 MMOL/L (ref 5–15)
AST SERPL-CCNC: 16 U/L (ref 15–37)
BACTERIA SPEC CULT: ABNORMAL
BACTERIA SPEC CULT: ABNORMAL
BACTERIA SPEC CULT: NORMAL
BASOPHILS # BLD: 0 K/UL (ref 0–0.1)
BASOPHILS NFR BLD: 0 % (ref 0–1)
BILIRUB SERPL-MCNC: 0.4 MG/DL (ref 0.2–1)
BUN SERPL-MCNC: 12 MG/DL (ref 6–20)
BUN/CREAT SERPL: 9 (ref 12–20)
CALCIUM SERPL-MCNC: 9.2 MG/DL (ref 8.5–10.1)
CC UR VC: ABNORMAL
CHLORIDE SERPL-SCNC: 105 MMOL/L (ref 97–108)
CO2 SERPL-SCNC: 31 MMOL/L (ref 21–32)
CREAT SERPL-MCNC: 1.27 MG/DL (ref 0.7–1.3)
DIFFERENTIAL METHOD BLD: ABNORMAL
EOSINOPHIL # BLD: 0.1 K/UL (ref 0–0.4)
EOSINOPHIL NFR BLD: 1 % (ref 0–7)
ERYTHROCYTE [DISTWIDTH] IN BLOOD BY AUTOMATED COUNT: 16.9 % (ref 11.5–14.5)
GLOBULIN SER CALC-MCNC: 4.8 G/DL (ref 2–4)
GLUCOSE SERPL-MCNC: 59 MG/DL (ref 65–100)
HCT VFR BLD AUTO: 32.3 % (ref 36.6–50.3)
HGB BLD-MCNC: 10.1 G/DL (ref 12.1–17)
IMM GRANULOCYTES # BLD AUTO: 0.1 K/UL (ref 0–0.04)
IMM GRANULOCYTES NFR BLD AUTO: 1 % (ref 0–0.5)
LYMPHOCYTES # BLD: 2.1 K/UL (ref 0.8–3.5)
LYMPHOCYTES NFR BLD: 25 % (ref 12–49)
MCH RBC QN AUTO: 28.3 PG (ref 26–34)
MCHC RBC AUTO-ENTMCNC: 31.3 G/DL (ref 30–36.5)
MCV RBC AUTO: 90.5 FL (ref 80–99)
MONOCYTES # BLD: 0.5 K/UL (ref 0–1)
MONOCYTES NFR BLD: 6 % (ref 5–13)
NEUTS SEG # BLD: 5.6 K/UL (ref 1.8–8)
NEUTS SEG NFR BLD: 67 % (ref 32–75)
NRBC # BLD: 0 K/UL (ref 0–0.01)
NRBC BLD-RTO: 0 PER 100 WBC
PLATELET # BLD AUTO: 266 K/UL (ref 150–400)
PMV BLD AUTO: 9.8 FL (ref 8.9–12.9)
POTASSIUM SERPL-SCNC: 3.9 MMOL/L (ref 3.5–5.1)
PROT SERPL-MCNC: 7.3 G/DL (ref 6.4–8.2)
RBC # BLD AUTO: 3.57 M/UL (ref 4.1–5.7)
SERVICE CMNT-IMP: ABNORMAL
SERVICE CMNT-IMP: NORMAL
SODIUM SERPL-SCNC: 139 MMOL/L (ref 136–145)
WBC # BLD AUTO: 8.3 K/UL (ref 4.1–11.1)

## 2023-09-10 PROCEDURE — 2500000003 HC RX 250 WO HCPCS: Performed by: NURSE PRACTITIONER

## 2023-09-10 PROCEDURE — 80053 COMPREHEN METABOLIC PANEL: CPT

## 2023-09-10 PROCEDURE — 99232 SBSQ HOSP IP/OBS MODERATE 35: CPT | Performed by: SURGERY

## 2023-09-10 PROCEDURE — 74019 RADEX ABDOMEN 2 VIEWS: CPT

## 2023-09-10 PROCEDURE — A4216 STERILE WATER/SALINE, 10 ML: HCPCS | Performed by: NURSE PRACTITIONER

## 2023-09-10 PROCEDURE — 2580000003 HC RX 258: Performed by: NURSE PRACTITIONER

## 2023-09-10 PROCEDURE — 1100000000 HC RM PRIVATE

## 2023-09-10 PROCEDURE — 85025 COMPLETE CBC W/AUTO DIFF WBC: CPT

## 2023-09-10 PROCEDURE — 6360000002 HC RX W HCPCS: Performed by: NURSE PRACTITIONER

## 2023-09-10 PROCEDURE — 36415 COLL VENOUS BLD VENIPUNCTURE: CPT

## 2023-09-10 RX ADMIN — HYDROMORPHONE HYDROCHLORIDE 1 MG: 1 INJECTION, SOLUTION INTRAMUSCULAR; INTRAVENOUS; SUBCUTANEOUS at 16:47

## 2023-09-10 RX ADMIN — PIPERACILLIN AND TAZOBACTAM 3375 MG: 3; .375 INJECTION, POWDER, LYOPHILIZED, FOR SOLUTION INTRAVENOUS at 10:44

## 2023-09-10 RX ADMIN — PIPERACILLIN AND TAZOBACTAM 3375 MG: 3; .375 INJECTION, POWDER, LYOPHILIZED, FOR SOLUTION INTRAVENOUS at 18:26

## 2023-09-10 RX ADMIN — PIPERACILLIN AND TAZOBACTAM 3375 MG: 3; .375 INJECTION, POWDER, LYOPHILIZED, FOR SOLUTION INTRAVENOUS at 00:29

## 2023-09-10 RX ADMIN — HYDROMORPHONE HYDROCHLORIDE 1 MG: 1 INJECTION, SOLUTION INTRAMUSCULAR; INTRAVENOUS; SUBCUTANEOUS at 03:30

## 2023-09-10 RX ADMIN — HYDROMORPHONE HYDROCHLORIDE 1 MG: 1 INJECTION, SOLUTION INTRAMUSCULAR; INTRAVENOUS; SUBCUTANEOUS at 21:33

## 2023-09-10 RX ADMIN — HYDROMORPHONE HYDROCHLORIDE 1 MG: 1 INJECTION, SOLUTION INTRAMUSCULAR; INTRAVENOUS; SUBCUTANEOUS at 11:06

## 2023-09-10 RX ADMIN — SODIUM CHLORIDE, PRESERVATIVE FREE 20 MG: 5 INJECTION INTRAVENOUS at 10:45

## 2023-09-10 RX ADMIN — SODIUM CHLORIDE, PRESERVATIVE FREE 10 ML: 5 INJECTION INTRAVENOUS at 10:49

## 2023-09-10 ASSESSMENT — PAIN DESCRIPTION - LOCATION
LOCATION: ABDOMEN
LOCATION: ABDOMEN

## 2023-09-10 ASSESSMENT — PAIN DESCRIPTION - DESCRIPTORS
DESCRIPTORS: ACHING
DESCRIPTORS: ACHING;SORE;TENDER

## 2023-09-10 ASSESSMENT — PAIN SCALES - GENERAL
PAINLEVEL_OUTOF10: 7
PAINLEVEL_OUTOF10: 7
PAINLEVEL_OUTOF10: 8
PAINLEVEL_OUTOF10: 7

## 2023-09-10 ASSESSMENT — PAIN DESCRIPTION - ORIENTATION
ORIENTATION: ANTERIOR
ORIENTATION: ANTERIOR

## 2023-09-10 NOTE — PROGRESS NOTES
The patients both IV access got pulled out by the patient accidentally,I have poked him 4 times and Kiara Trevino tried 3 time with no success,I called [facility] for the rapid nurse to assist but they didn't have any for the night. and could not give up a nurse to assist given that they were all busy. patient handed over to day nurse who will contact the PICC team for assistance. patient informed.

## 2023-09-10 NOTE — PROGRESS NOTES
[facility]  Hospitalist Group                                                                                          Hospitalist Progress Note  DEANA Dahl NP  Office Phone: [phone]        Date of Service:  9/10/2023  NAME:  Camron Vasquez  :  1965  MRN:  [id number]       Admission Summary:   Camron Vasquez is a 59 y.o. male who initially presented to [address] [place] [address] ED with abdominal pain. He reported he had vomited multiple times for the past 3 days along with loose stools PTA. On chart review, note that he has history of SBO and pancreatitis. He denied alcohol intake and indicated he has chronic, constant pain. He has a Jansen catheter because he has problems voiding - unknown how long he has had - he thinks a year. He is a poor historian - does not know his home meds. Chart review revealed that he had an EUS in 2023 which showed chronic pancreatitis, dilated pancreatic duct, multiple large pancreatic duct stones and a large head of pancreas cystic lesion with multiples smaller cystic lesions. A repeat CT abd/pelvis has been recommended in 3 months (due May-). He was admitted prior to that in Dec 2022 with a SBO. Pt was seen and examined, he was lying in bed in no acute distress. NGT was clamped, ordered and placed to [facility]. Pt denies HA, dizziness, chest pain, pressure, SOB or cough. + lower GI pain (6/10 on pain scale) but no nausea or vomiting since yesterday. + loose stools which he reports are chronic. Medication reconciliation was completed from pharmacy list and recent fills. Holding home medications due to n.p.o./NG tube to low intermittent suction. Will provide as needed hydralazine as needed for elevated blood pressures. Patient updated on plan of care, general surgery has been consulted now that the patient is here. He has been placed on Zosyn and Dilaudid ordered as needed for pain.        Interval history / Subjective:      AXR suspicious

## 2023-09-11 LAB
ALBUMIN SERPL-MCNC: 2.1 G/DL (ref 3.5–5)
ALBUMIN/GLOB SERPL: 0.5 (ref 1.1–2.2)
ALP SERPL-CCNC: 80 U/L (ref 45–117)
ALT SERPL-CCNC: 16 U/L (ref 12–78)
ANION GAP SERPL CALC-SCNC: 3 MMOL/L (ref 5–15)
AST SERPL-CCNC: 16 U/L (ref 15–37)
BASOPHILS # BLD: 0 K/UL (ref 0–0.1)
BASOPHILS NFR BLD: 0 % (ref 0–1)
BILIRUB SERPL-MCNC: 0.3 MG/DL (ref 0.2–1)
BUN SERPL-MCNC: 12 MG/DL (ref 6–20)
BUN/CREAT SERPL: 10 (ref 12–20)
CALCIUM SERPL-MCNC: 8.4 MG/DL (ref 8.5–10.1)
CHLORIDE SERPL-SCNC: 105 MMOL/L (ref 97–108)
CO2 SERPL-SCNC: 33 MMOL/L (ref 21–32)
CREAT SERPL-MCNC: 1.17 MG/DL (ref 0.7–1.3)
DIFFERENTIAL METHOD BLD: ABNORMAL
EKG ATRIAL RATE: 102 BPM
EKG DIAGNOSIS: NORMAL
EKG P AXIS: 87 DEGREES
EKG P-R INTERVAL: 124 MS
EKG Q-T INTERVAL: 344 MS
EKG QRS DURATION: 72 MS
EKG QTC CALCULATION (BAZETT): 448 MS
EKG R AXIS: 79 DEGREES
EKG T AXIS: 81 DEGREES
EKG VENTRICULAR RATE: 102 BPM
EOSINOPHIL # BLD: 0.1 K/UL (ref 0–0.4)
EOSINOPHIL NFR BLD: 2 % (ref 0–7)
ERYTHROCYTE [DISTWIDTH] IN BLOOD BY AUTOMATED COUNT: 16.7 % (ref 11.5–14.5)
GLOBULIN SER CALC-MCNC: 4.1 G/DL (ref 2–4)
GLUCOSE BLD STRIP.AUTO-MCNC: 88 MG/DL (ref 65–117)
GLUCOSE SERPL-MCNC: 74 MG/DL (ref 65–100)
HCT VFR BLD AUTO: 25.2 % (ref 36.6–50.3)
HGB BLD-MCNC: 8.1 G/DL (ref 12.1–17)
IMM GRANULOCYTES # BLD AUTO: 0 K/UL
IMM GRANULOCYTES NFR BLD AUTO: 0 %
LYMPHOCYTES # BLD: 0.4 K/UL (ref 0.8–3.5)
LYMPHOCYTES NFR BLD: 7 % (ref 12–49)
MAGNESIUM SERPL-MCNC: 2.1 MG/DL (ref 1.6–2.4)
MCH RBC QN AUTO: 28.9 PG (ref 26–34)
MCHC RBC AUTO-ENTMCNC: 32.1 G/DL (ref 30–36.5)
MCV RBC AUTO: 90 FL (ref 80–99)
MONOCYTES # BLD: 0.2 K/UL (ref 0–1)
MONOCYTES NFR BLD: 3 % (ref 5–13)
NEUTS SEG # BLD: 5.4 K/UL (ref 1.8–8)
NEUTS SEG NFR BLD: 88 % (ref 32–75)
NRBC # BLD: 0 K/UL (ref 0–0.01)
NRBC BLD-RTO: 0 PER 100 WBC
PHOSPHATE SERPL-MCNC: 3.1 MG/DL (ref 2.6–4.7)
PLATELET # BLD AUTO: 237 K/UL (ref 150–400)
PMV BLD AUTO: 9.7 FL (ref 8.9–12.9)
POTASSIUM SERPL-SCNC: 3.3 MMOL/L (ref 3.5–5.1)
PROT SERPL-MCNC: 6.2 G/DL (ref 6.4–8.2)
RBC # BLD AUTO: 2.8 M/UL (ref 4.1–5.7)
RBC MORPH BLD: ABNORMAL
SERVICE CMNT-IMP: NORMAL
SODIUM SERPL-SCNC: 141 MMOL/L (ref 136–145)
WBC # BLD AUTO: 6.1 K/UL (ref 4.1–11.1)

## 2023-09-11 PROCEDURE — A4216 STERILE WATER/SALINE, 10 ML: HCPCS | Performed by: NURSE PRACTITIONER

## 2023-09-11 PROCEDURE — 2500000003 HC RX 250 WO HCPCS: Performed by: NURSE PRACTITIONER

## 2023-09-11 PROCEDURE — 85025 COMPLETE CBC W/AUTO DIFF WBC: CPT

## 2023-09-11 PROCEDURE — 2580000003 HC RX 258: Performed by: NURSE PRACTITIONER

## 2023-09-11 PROCEDURE — 82962 GLUCOSE BLOOD TEST: CPT

## 2023-09-11 PROCEDURE — 83735 ASSAY OF MAGNESIUM: CPT

## 2023-09-11 PROCEDURE — 6360000002 HC RX W HCPCS

## 2023-09-11 PROCEDURE — 84100 ASSAY OF PHOSPHORUS: CPT

## 2023-09-11 PROCEDURE — 6360000002 HC RX W HCPCS: Performed by: NURSE PRACTITIONER

## 2023-09-11 PROCEDURE — 1100000000 HC RM PRIVATE

## 2023-09-11 PROCEDURE — 36415 COLL VENOUS BLD VENIPUNCTURE: CPT

## 2023-09-11 PROCEDURE — 80053 COMPREHEN METABOLIC PANEL: CPT

## 2023-09-11 PROCEDURE — 93010 ELECTROCARDIOGRAM REPORT: CPT | Performed by: SPECIALIST

## 2023-09-11 PROCEDURE — 99232 SBSQ HOSP IP/OBS MODERATE 35: CPT | Performed by: SURGERY

## 2023-09-11 RX ORDER — POTASSIUM CHLORIDE 7.45 MG/ML
10 INJECTION INTRAVENOUS
Status: COMPLETED | OUTPATIENT
Start: 2023-09-11 | End: 2023-09-11

## 2023-09-11 RX ADMIN — PIPERACILLIN AND TAZOBACTAM 3375 MG: 3; .375 INJECTION, POWDER, LYOPHILIZED, FOR SOLUTION INTRAVENOUS at 11:17

## 2023-09-11 RX ADMIN — HYDROMORPHONE HYDROCHLORIDE 1 MG: 1 INJECTION, SOLUTION INTRAMUSCULAR; INTRAVENOUS; SUBCUTANEOUS at 21:18

## 2023-09-11 RX ADMIN — SODIUM CHLORIDE, PRESERVATIVE FREE 10 ML: 5 INJECTION INTRAVENOUS at 09:09

## 2023-09-11 RX ADMIN — PIPERACILLIN AND TAZOBACTAM 3375 MG: 3; .375 INJECTION, POWDER, LYOPHILIZED, FOR SOLUTION INTRAVENOUS at 19:08

## 2023-09-11 RX ADMIN — POTASSIUM CHLORIDE 10 MEQ: 10 INJECTION, SOLUTION INTRAVENOUS at 12:52

## 2023-09-11 RX ADMIN — POTASSIUM CHLORIDE 10 MEQ: 10 INJECTION, SOLUTION INTRAVENOUS at 11:13

## 2023-09-11 RX ADMIN — ONDANSETRON 4 MG: 2 INJECTION INTRAMUSCULAR; INTRAVENOUS at 12:58

## 2023-09-11 RX ADMIN — SODIUM CHLORIDE, PRESERVATIVE FREE 10 ML: 5 INJECTION INTRAVENOUS at 21:17

## 2023-09-11 RX ADMIN — HYDROMORPHONE HYDROCHLORIDE 1 MG: 1 INJECTION, SOLUTION INTRAMUSCULAR; INTRAVENOUS; SUBCUTANEOUS at 03:04

## 2023-09-11 RX ADMIN — HYDROMORPHONE HYDROCHLORIDE 1 MG: 1 INJECTION, SOLUTION INTRAMUSCULAR; INTRAVENOUS; SUBCUTANEOUS at 09:07

## 2023-09-11 RX ADMIN — PIPERACILLIN AND TAZOBACTAM 3375 MG: 3; .375 INJECTION, POWDER, LYOPHILIZED, FOR SOLUTION INTRAVENOUS at 03:04

## 2023-09-11 RX ADMIN — HYDROMORPHONE HYDROCHLORIDE 1 MG: 1 INJECTION, SOLUTION INTRAMUSCULAR; INTRAVENOUS; SUBCUTANEOUS at 16:53

## 2023-09-11 RX ADMIN — SODIUM CHLORIDE, PRESERVATIVE FREE 20 MG: 5 INJECTION INTRAVENOUS at 08:37

## 2023-09-11 ASSESSMENT — PAIN DESCRIPTION - LOCATION
LOCATION: ABDOMEN
LOCATION: ABDOMEN

## 2023-09-11 ASSESSMENT — PAIN SCALES - GENERAL
PAINLEVEL_OUTOF10: 6

## 2023-09-11 ASSESSMENT — PAIN DESCRIPTION - DESCRIPTORS
DESCRIPTORS: CRAMPING
DESCRIPTORS: ACHING;CRAMPING

## 2023-09-11 ASSESSMENT — PAIN DESCRIPTION - ORIENTATION: ORIENTATION: LOWER

## 2023-09-11 NOTE — CONSULTS
Comprehensive Nutrition Assessment    Type and Reason for Visit: Initial, Consult    Nutrition Recommendations/Plan:     Recommend advance diet order to Regular as medically appropriate  Will order Ensure Enlive TID when diet advances    If diet unable to be advanced by tomorrow or pt does not tolerate PO, recommend starting PPN (recommended if PN will run < 5 days) for nutrition support:           PPN Rec's:               Day 1: D10, 4.25% AA at 42 ml/hr + 250 ml 20% lipids daily              Day 2: Goal rate of D10, 4.25% AA at 70 ml/hr + 250 ml 20% lipids  daily         Malnutrition Assessment:  Malnutrition Status:  Severe malnutrition (09/11/23 1035)    Context:  Chronic Illness     Findings of the 6 clinical characteristics of malnutrition:  Energy Intake:  75% or less estimated energy requirements for 1 month or longer  Weight Loss:  No significant weight loss (9% loss x 7 months)     Body Fat Loss:  Severe body fat loss Triceps, Orbital   Muscle Mass Loss:  Severe muscle mass loss Calf (gastrocnemius), Thigh (quadraceps), Temples (temporalis)  Fluid Accumulation:  No significant fluid accumulation     Strength:  Not Performed       Nutrition Assessment:    58 yo male admitted for ABD pain and vomiting x 3 days. ABD x-ray showing SBO vs ileus but MD notes pt with chronic dilation of small intestine. MD consult received for poor PO intakes. Pt has been NPO since admission with NGT to suction. MD ordered for NGT to be clamped today to assess how pt does but NGT came out. Pt was allowed to have coffee this morning. Spoke with pt at bedside. He is c/o being very hungry and wanting to eat. States he has not had anything to eat for 3-4 days. Spoke with MD who states will likely keep him NPO today. Pt states he has fair PO intakes normally at home, varies/day. Discussed ONS options, pt likes Ensure and would like it with meals, prefers chocolate and vanilla.       If diet unable to be advanced by Nutrition Care: Continue to monitor while inpatient       Goals:     Goals: other (specify)  Specify Other Goals: Advance PO diet with intakes > 50% meals + ONS over next 5-7 days    Nutrition Monitoring and Evaluation:   Behavioral-Environmental Outcomes: None Identified  Food/Nutrient Intake Outcomes: Diet Advancement/Tolerance, Food and Nutrient Intake, Supplement Intake  Physical Signs/Symptoms Outcomes: Biochemical Data, GI Status, Weight    Discharge Planning:     Too soon to determine     Yani Webb RD  Available via My Pick Box

## 2023-09-11 NOTE — PROGRESS NOTES
Surgery Progress Note    9/11/2023    Admit Date: 9/9/2023  5:18 AM    CC: Abd pain      Subjective:     Begging for coffee. Mild abd pain. Some stool smears. Constitutional: No fever or chills  Neurologic: No headache  Eyes: No scleral icterus or irritated eyes  Nose: No nasal pain or drainage  Mouth: No oral lesions or sore throat  Cardiac: No palpations or chest pain  Pulmonary: No cough or shortness of breath  Gastrointestinal: Mild abd pain, No nausea, emesis, diarrhea, or constipation  Genitourinary: No dysuria  Musculoskeletal: No muscle or joint tenderness  Skin: No rashes or lesions  Psychiatric: No anxiety or depressed mood    Objective:     Vitals:    09/11/23 0554   BP:    Pulse: 53   Resp:    Temp:    SpO2:        General: No acute distress, conversant  Eyes: PERRLA, no scleral icterus  HENT: Normocephalic without oral lesions  Neck: Trachea midline without LAD  Cardiac: Normal pulse rate and rhythm  Pulmonary: Symmetric chest rise with normal effort  GI: Soft, NT, ND, no hernia, no splenomegaly, NGT with bilious output  Skin: Warm without rash  Extremities: No edema or joint stiffness  Psych: Appropriate mood and affect    Labs, vital signs, and I/O reviewed. Assessment:     60 y/o M with concern for SBO    Plan:     PRN pain meds  IVF  Clamp NGT. Okay for Coffee. Will leave clamped all day today.   Recommend DVT ppx  Cont pepcid  Abx per primary team. Not needed for SBO POV  Ambulate      Frida Hamm MD, FACS, [facility]  Bariatric and General Surgeon  [facility]

## 2023-09-11 NOTE — CARE COORDINATION
Care Management Initial Assessment       RUR: 14% Low  Readmission? No  1st IM letter given? Yes - 9/923  1st  letter given: MAMIE    CM met with patient at bedside to introduce self and explain role. Patient lives alone in a 3rd floor apartment with elevator access. Patient was independent with ADL's, IADL's and ambulates with the use of a rollator. Patient admitted with a leija, which is he is competent in leija care. CM verified PCP, demographics and insurance. Preferred pharmacy is [facility] on [address] [facility] in [facility] with no barriers obtaining needed prescriptions. Patient will need ride assistance once medically stable for discharge. CM received call from [facility]  stating patient is open to them for [facility] PT, OT & SN. Will need [facility] orders for DC. CM will continue to follow as needed. 09/11/23 1202   Service Assessment   Patient Orientation Alert and Oriented;Person;Place;Situation;Self   Cognition Alert   History Provided By Patient   Primary Caregiver Self   Accompanied By/Relationship [address] Family Members   Patient's Healthcare Decision Maker is: Legal Next of [address] [facility]   PCP Verified by CM Yes  (Dr. Dyana Ocampo)   Last Visit to PCP Within last year   Prior Functional Level Independent in ADLs/IADLs   Current Functional Level Independent in ADLs/IADLs   Can patient return to prior living arrangement Yes   Ability to make needs known: Good   Family able to assist with home care needs: Yes   Would you like for me to discuss the discharge plan with any other family members/significant others, and if so, who?  Yes  (Upon patient request)   Financial Resources [employer] None   Social/Functional History   Type of [address] Rollator   ADL Assistance Independent   Homemaking Assistance Independent   Ambulation Assistance Independent   Transfer Assistance

## 2023-09-11 NOTE — PROGRESS NOTES
my ability given all available resources at the time of admission. Route is PO if not otherwise noted.       Medications Reviewed:     Current Facility-Administered Medications   Medication Dose Route Frequency    sodium chloride flush 0.9 % injection 5-40 mL  5-40 mL IntraVENous 2 times per day    sodium chloride flush 0.9 % injection 5-40 mL  5-40 mL IntraVENous PRN    0.9 % sodium chloride infusion   IntraVENous PRN    ondansetron (ZOFRAN-ODT) disintegrating tablet 4 mg  4 mg Oral Q8H PRN    Or    ondansetron (ZOFRAN) injection 4 mg  4 mg IntraVENous Q6H PRN    acetaminophen (TYLENOL) tablet 650 mg  650 mg Oral Q6H PRN    Or    acetaminophen (TYLENOL) suppository 650 mg  650 mg Rectal Q6H PRN    lactated ringers IV soln infusion   IntraVENous Continuous    bisacodyl (DULCOLAX) suppository 10 mg  10 mg Rectal Daily PRN    HYDROmorphone HCl PF (DILAUDID) injection 1 mg  1 mg IntraVENous Q4H PRN    famotidine (PEPCID) 20 mg in sodium chloride (PF) 0.9 % 10 mL injection  20 mg IntraVENous Q24H    piperacillin-tazobactam (ZOSYN) 3,375 mg in sodium chloride 0.9 % 50 mL IVPB (mini-bag)  3,375 mg IntraVENous Q8H    hydrALAZINE (APRESOLINE) injection 10 mg  10 mg IntraVENous Q8H PRN    diatrizoate meglumine-sodium (GASTROGRAFIN) 66-10 % solution 100 mL  100 mL Per NG tube ONCE PRN     ______________________________________________________________________  EXPECTED LENGTH OF STAY: 4  ACTUAL LENGTH OF STAY:          2                 DEANA Chapman NP

## 2023-09-12 LAB
ANION GAP SERPL CALC-SCNC: 3 MMOL/L (ref 5–15)
BACTERIA SPEC CULT: ABNORMAL
BACTERIA SPEC CULT: ABNORMAL
BASOPHILS # BLD: 0 K/UL (ref 0–0.1)
BASOPHILS NFR BLD: 0 % (ref 0–1)
BUN SERPL-MCNC: 6 MG/DL (ref 6–20)
BUN/CREAT SERPL: 5 (ref 12–20)
CALCIUM SERPL-MCNC: 8.1 MG/DL (ref 8.5–10.1)
CC UR VC: ABNORMAL
CHLORIDE SERPL-SCNC: 102 MMOL/L (ref 97–108)
CO2 SERPL-SCNC: 32 MMOL/L (ref 21–32)
CREAT SERPL-MCNC: 1.16 MG/DL (ref 0.7–1.3)
DIFFERENTIAL METHOD BLD: ABNORMAL
EOSINOPHIL # BLD: 0.1 K/UL (ref 0–0.4)
EOSINOPHIL NFR BLD: 2 % (ref 0–7)
ERYTHROCYTE [DISTWIDTH] IN BLOOD BY AUTOMATED COUNT: 16.7 % (ref 11.5–14.5)
GLUCOSE SERPL-MCNC: 125 MG/DL (ref 65–100)
HCT VFR BLD AUTO: 26.6 % (ref 36.6–50.3)
HGB BLD-MCNC: 8.5 G/DL (ref 12.1–17)
IMM GRANULOCYTES # BLD AUTO: 0 K/UL (ref 0–0.04)
IMM GRANULOCYTES NFR BLD AUTO: 0 % (ref 0–0.5)
LYMPHOCYTES # BLD: 1.5 K/UL (ref 0.8–3.5)
LYMPHOCYTES NFR BLD: 21 % (ref 12–49)
MCH RBC QN AUTO: 28.7 PG (ref 26–34)
MCHC RBC AUTO-ENTMCNC: 32 G/DL (ref 30–36.5)
MCV RBC AUTO: 89.9 FL (ref 80–99)
MONOCYTES # BLD: 0.5 K/UL (ref 0–1)
MONOCYTES NFR BLD: 7 % (ref 5–13)
NEUTS SEG # BLD: 5.1 K/UL (ref 1.8–8)
NEUTS SEG NFR BLD: 70 % (ref 32–75)
NRBC # BLD: 0 K/UL (ref 0–0.01)
NRBC BLD-RTO: 0 PER 100 WBC
PLATELET # BLD AUTO: 252 K/UL (ref 150–400)
PMV BLD AUTO: 9.8 FL (ref 8.9–12.9)
POTASSIUM SERPL-SCNC: 3.3 MMOL/L (ref 3.5–5.1)
RBC # BLD AUTO: 2.96 M/UL (ref 4.1–5.7)
SERVICE CMNT-IMP: ABNORMAL
SODIUM SERPL-SCNC: 137 MMOL/L (ref 136–145)
WBC # BLD AUTO: 7.3 K/UL (ref 4.1–11.1)

## 2023-09-12 PROCEDURE — 1100000000 HC RM PRIVATE

## 2023-09-12 PROCEDURE — 36415 COLL VENOUS BLD VENIPUNCTURE: CPT

## 2023-09-12 PROCEDURE — 6360000002 HC RX W HCPCS

## 2023-09-12 PROCEDURE — 99231 SBSQ HOSP IP/OBS SF/LOW 25: CPT | Performed by: NURSE PRACTITIONER

## 2023-09-12 PROCEDURE — 80048 BASIC METABOLIC PNL TOTAL CA: CPT

## 2023-09-12 PROCEDURE — 2500000003 HC RX 250 WO HCPCS: Performed by: NURSE PRACTITIONER

## 2023-09-12 PROCEDURE — 6360000002 HC RX W HCPCS: Performed by: NURSE PRACTITIONER

## 2023-09-12 PROCEDURE — 85025 COMPLETE CBC W/AUTO DIFF WBC: CPT

## 2023-09-12 PROCEDURE — 2580000003 HC RX 258

## 2023-09-12 PROCEDURE — 6370000000 HC RX 637 (ALT 250 FOR IP)

## 2023-09-12 PROCEDURE — 2580000003 HC RX 258: Performed by: NURSE PRACTITIONER

## 2023-09-12 RX ORDER — DOCUSATE SODIUM 100 MG/1
100 CAPSULE, LIQUID FILLED ORAL DAILY
Status: DISCONTINUED | OUTPATIENT
Start: 2023-09-12 | End: 2023-09-16 | Stop reason: HOSPADM

## 2023-09-12 RX ORDER — OXYCODONE HYDROCHLORIDE 5 MG/1
5 TABLET ORAL EVERY 4 HOURS PRN
Status: DISCONTINUED | OUTPATIENT
Start: 2023-09-12 | End: 2023-09-16 | Stop reason: HOSPADM

## 2023-09-12 RX ORDER — POTASSIUM CHLORIDE 14.9 MG/ML
10 INJECTION INTRAVENOUS
Status: COMPLETED | OUTPATIENT
Start: 2023-09-12 | End: 2023-09-12

## 2023-09-12 RX ORDER — MIRTAZAPINE 15 MG/1
15 TABLET, FILM COATED ORAL NIGHTLY
Status: DISCONTINUED | OUTPATIENT
Start: 2023-09-12 | End: 2023-09-16 | Stop reason: HOSPADM

## 2023-09-12 RX ORDER — OXYCODONE HYDROCHLORIDE 5 MG/1
10 TABLET ORAL EVERY 4 HOURS PRN
Status: DISCONTINUED | OUTPATIENT
Start: 2023-09-12 | End: 2023-09-16 | Stop reason: HOSPADM

## 2023-09-12 RX ORDER — FINASTERIDE 5 MG/1
5 TABLET, FILM COATED ORAL DAILY
Status: DISCONTINUED | OUTPATIENT
Start: 2023-09-13 | End: 2023-09-16 | Stop reason: HOSPADM

## 2023-09-12 RX ORDER — TAMSULOSIN HYDROCHLORIDE 0.4 MG/1
0.4 CAPSULE ORAL DAILY
Status: DISCONTINUED | OUTPATIENT
Start: 2023-09-12 | End: 2023-09-16 | Stop reason: HOSPADM

## 2023-09-12 RX ORDER — GABAPENTIN 300 MG/1
300 CAPSULE ORAL 2 TIMES DAILY
Status: DISCONTINUED | OUTPATIENT
Start: 2023-09-12 | End: 2023-09-16 | Stop reason: HOSPADM

## 2023-09-12 RX ORDER — FAMOTIDINE 20 MG/1
20 TABLET, FILM COATED ORAL DAILY
Status: DISCONTINUED | OUTPATIENT
Start: 2023-09-13 | End: 2023-09-16 | Stop reason: HOSPADM

## 2023-09-12 RX ADMIN — POTASSIUM CHLORIDE 10 MEQ: 14.9 INJECTION, SOLUTION INTRAVENOUS at 08:39

## 2023-09-12 RX ADMIN — SODIUM CHLORIDE, POTASSIUM CHLORIDE, SODIUM LACTATE AND CALCIUM CHLORIDE: 600; 310; 30; 20 INJECTION, SOLUTION INTRAVENOUS at 00:06

## 2023-09-12 RX ADMIN — SODIUM CHLORIDE, PRESERVATIVE FREE 20 MG: 5 INJECTION INTRAVENOUS at 08:39

## 2023-09-12 RX ADMIN — GABAPENTIN 300 MG: 300 CAPSULE ORAL at 21:20

## 2023-09-12 RX ADMIN — HYDRALAZINE HYDROCHLORIDE 10 MG: 20 INJECTION, SOLUTION INTRAMUSCULAR; INTRAVENOUS at 15:33

## 2023-09-12 RX ADMIN — TAMSULOSIN HYDROCHLORIDE 0.4 MG: 0.4 CAPSULE ORAL at 16:57

## 2023-09-12 RX ADMIN — METOPROLOL TARTRATE 12.5 MG: 25 TABLET, FILM COATED ORAL at 21:20

## 2023-09-12 RX ADMIN — POTASSIUM CHLORIDE 10 MEQ: 14.9 INJECTION, SOLUTION INTRAVENOUS at 08:40

## 2023-09-12 RX ADMIN — HYDROMORPHONE HYDROCHLORIDE 1 MG: 1 INJECTION, SOLUTION INTRAMUSCULAR; INTRAVENOUS; SUBCUTANEOUS at 05:43

## 2023-09-12 RX ADMIN — SODIUM CHLORIDE, PRESERVATIVE FREE 10 ML: 5 INJECTION INTRAVENOUS at 21:20

## 2023-09-12 RX ADMIN — PIPERACILLIN AND TAZOBACTAM 3375 MG: 3; .375 INJECTION, POWDER, LYOPHILIZED, FOR SOLUTION INTRAVENOUS at 03:31

## 2023-09-12 RX ADMIN — DOCUSATE SODIUM 100 MG: 100 CAPSULE, LIQUID FILLED ORAL at 16:57

## 2023-09-12 RX ADMIN — WATER 1000 MG: 1 INJECTION INTRAMUSCULAR; INTRAVENOUS; SUBCUTANEOUS at 11:07

## 2023-09-12 RX ADMIN — OXYCODONE HYDROCHLORIDE 10 MG: 5 TABLET ORAL at 17:45

## 2023-09-12 RX ADMIN — MIRTAZAPINE 15 MG: 15 TABLET, FILM COATED ORAL at 21:20

## 2023-09-12 RX ADMIN — OXYCODONE HYDROCHLORIDE 10 MG: 5 TABLET ORAL at 21:24

## 2023-09-12 RX ADMIN — HYDROMORPHONE HYDROCHLORIDE 1 MG: 1 INJECTION, SOLUTION INTRAMUSCULAR; INTRAVENOUS; SUBCUTANEOUS at 10:06

## 2023-09-12 RX ADMIN — POTASSIUM CHLORIDE 10 MEQ: 14.9 INJECTION, SOLUTION INTRAVENOUS at 09:42

## 2023-09-12 ASSESSMENT — PAIN DESCRIPTION - ORIENTATION
ORIENTATION: ANTERIOR
ORIENTATION: ANTERIOR
ORIENTATION: LOWER

## 2023-09-12 ASSESSMENT — PAIN DESCRIPTION - DESCRIPTORS
DESCRIPTORS: ACHING

## 2023-09-12 ASSESSMENT — PAIN DESCRIPTION - LOCATION
LOCATION: ABDOMEN

## 2023-09-12 ASSESSMENT — PAIN SCALES - GENERAL
PAINLEVEL_OUTOF10: 6
PAINLEVEL_OUTOF10: 8
PAINLEVEL_OUTOF10: 7
PAINLEVEL_OUTOF10: 6

## 2023-09-12 NOTE — PROGRESS NOTES
Physician Progress Note      PATIENT:               Ej Elena  CSN #:                  [id number]  :                       1965  ADMIT DATE:       2023 5:18 AM  DISCH DATE:  RESPONDING  PROVIDER #:        Oziel Varela TEXT:    Dear attending,    Patient had severe malnutrition documented per dietary on 23    If possible, please document in the progress notes and discharge summary if   you are evaluating and/or treating any of the following: The medical record reflects the following:  Risk Factors: admitted for ABD pain and vomiting x 3 days. Clinical Indicators: -per dietary-  Malnutrition Status:  Severe malnutrition (23 1035)  Context:  Chronic Illness  Findings of the 6 clinical characteristics of malnutrition:  Energy Intake:  75% or less estimated energy requirements for 1 month or   longer  Weight Loss:  No significant weight loss (9% loss x 7 months)  Body Fat Loss:  Severe body fat loss Triceps, Orbital  Muscle Mass Loss:  Severe muscle mass loss Calf (gastrocnemius), Thigh   (quadraceps), Temples (temporalis)  Fluid Accumulation:  No significant fluid accumulation   Strength:  Not Performed    Anthropometric Measures:  Height: 172.7 cm (5' 8\")  Ideal Body Weight (IBW): 154 lbs (70 kg)  Current Body Weight: 87 lb 15.4 oz (39.9 kg), 57.1 % IBW.  Weight Source: Not   Specified  Current BMI (kg/m2): 13.4  Weight Adjustment For: No Adjustment  BMI Categories: Underweight (BMI less than 18.5)      Wt Readings from Last 10 Encounters:  23 39.9 kg (88 lb)  23 44 kg (97 lb)  23 43.8 kg (96 lb 8 oz)  21 54.9 kg (121 lb)    Nutrition Diagnosis:  Severe malnutrition related to inadequate protein-energy intake as evidenced   by severe muscle loss, severe loss of subcutaneous fat, poor intake prior to   admission    Treatment: Dietary consult, monitor weight, I&O      ASPEN Criteria: https://aspenjournals. onlinelibrary. peralta. com/doi/full/10.1177/975755166021543  5      Thank you,  Caroline Shah RN, BSN, CRCR, CCDS  Clinical Documentation Improvement  [phone] or via [address] provided:  -- Severe Malnutrition  -- malnutrition, please specify degree. -- Other - I will add my own diagnosis  -- Disagree - Not applicable / Not valid  -- Disagree - Clinically unable to determine / Unknown  -- Refer to Clinical Documentation Reviewer    PROVIDER RESPONSE TEXT:    This patient has severe malnutrition.     Query created by: Yumiko Law on 9/12/2023 7:48 AM      Electronically signed by:  Javan Marquez 9/12/2023 7:52 AM

## 2023-09-12 NOTE — PROGRESS NOTES
[address] [facility]  Hospitalist Group                                                                                          Hospitalist Progress Note  DEANA Covington NP  Office Phone: [phone]        Date of Service:  2023  NAME:  Maria Ines Gomes  :  1965  MRN:  [id number]       Admission Summary:   Maria Ines Gomes is a 59 y.o. male who initially presented to [facility] ED with abdominal pain. He reported he had vomited multiple times for the past 3 days along with loose stools PTA. On chart review, note that he has history of SBO and pancreatitis. He denied alcohol intake and indicated he has chronic, constant pain. He has a Jansen catheter because he has problems voiding - unknown how long he has had - he thinks a year. He is a poor historian - does not know his home meds. Chart review revealed that he had an EUS in 2023 which showed chronic pancreatitis, dilated pancreatic duct, multiple large pancreatic duct stones and a large head of pancreas cystic lesion with multiples smaller cystic lesions. A repeat CT abd/pelvis has been recommended in 3 months (due May-). He was admitted prior to that in Dec 2022 with a SBO. Pt was seen and examined, he was lying in bed in no acute distress. NGT was clamped, ordered and placed to [facility]. Pt denies HA, dizziness, chest pain, pressure, SOB or cough. + lower GI pain (6/10 on pain scale) but no nausea or vomiting since yesterday. + loose stools which he reports are chronic. Medication reconciliation was completed from pharmacy list and recent fills. Holding home medications due to n.p.o./NG tube to low intermittent suction. Will provide as needed hydralazine as needed for elevated blood pressures. Patient updated on plan of care, general surgery has been consulted now that the patient is here. He has been placed on Zosyn and Dilaudid ordered as needed for pain.      Interval history / Subjective:      Patient seen

## 2023-09-12 NOTE — PROGRESS NOTES
Physician Progress Note      PATIENT:               Silvino Ramos  CSN #:                  [id number]  :                       1965  ADMIT DATE:       2023 5:18 AM  DISCH DATE:  RESPONDING  PROVIDER #:        Refugio Merritt TEXT:    Dear attending,    Pt admitted with UTI. Pt noted to have chronic indwelling urinary catheter   that had been changed at the [facility] ED. FYI-In the setting of does not establish   a cause and effect link. If possible, please document in the progress notes and discharge summary if   you are evaluating and/or treating any of the following: The medical record reflects the following:  Risk Factors: had chronic Leija    Clinical Indicators:  - Per PN- He has a Leija catheter because he has problems voiding -   unknown how long he has had - he thinks a year.   Possible UTI in setting of chronic leija for chronic urinary retention  reports catheter was changed at [facility] ED  urine culture (): Proteus mirabilis, lactose fermenting gram negative rods   -- ID and susceptibility to follow  BC X 1 (): NGTD    Treatment: IV Zosyn, Monitor vital signs, labwork, imaging      Thank you,    Caroline Shah RN, BSN, CRCR, CCDS  Clinical Documentation Improvement  [phone] or via Perfect Serve  Options provided:  -- UTI due to previous urinary catheter PTA  -- UTI not due to indwelling urinary catheter  -- Other - I will add my own diagnosis  -- Disagree - Not applicable / Not valid  -- Disagree - Clinically unable to determine / Unknown  -- Refer to Clinical Documentation Reviewer    PROVIDER RESPONSE TEXT:    UTI is due to the previous indwelling urinary catheter PTA    Query created by: Jovany Jovel on 2023 7:54 AM      Electronically signed by:  Fernanda Gilliland 2023 7:58 AM

## 2023-09-13 LAB
ANION GAP SERPL CALC-SCNC: 2 MMOL/L (ref 5–15)
BASOPHILS # BLD: 0 K/UL (ref 0–0.1)
BASOPHILS NFR BLD: 0 % (ref 0–1)
BUN SERPL-MCNC: 5 MG/DL (ref 6–20)
BUN/CREAT SERPL: 5 (ref 12–20)
CALCIUM SERPL-MCNC: 8 MG/DL (ref 8.5–10.1)
CHLORIDE SERPL-SCNC: 104 MMOL/L (ref 97–108)
CO2 SERPL-SCNC: 32 MMOL/L (ref 21–32)
CREAT SERPL-MCNC: 0.96 MG/DL (ref 0.7–1.3)
DIFFERENTIAL METHOD BLD: ABNORMAL
EOSINOPHIL # BLD: 0.1 K/UL (ref 0–0.4)
EOSINOPHIL NFR BLD: 1 % (ref 0–7)
ERYTHROCYTE [DISTWIDTH] IN BLOOD BY AUTOMATED COUNT: 16.6 % (ref 11.5–14.5)
GLUCOSE SERPL-MCNC: 70 MG/DL (ref 65–100)
HCT VFR BLD AUTO: 29.3 % (ref 36.6–50.3)
HGB BLD-MCNC: 9.4 G/DL (ref 12.1–17)
IMM GRANULOCYTES # BLD AUTO: 0 K/UL (ref 0–0.04)
IMM GRANULOCYTES NFR BLD AUTO: 0 % (ref 0–0.5)
LYMPHOCYTES # BLD: 1.8 K/UL (ref 0.8–3.5)
LYMPHOCYTES NFR BLD: 21 % (ref 12–49)
MCH RBC QN AUTO: 28.8 PG (ref 26–34)
MCHC RBC AUTO-ENTMCNC: 32.1 G/DL (ref 30–36.5)
MCV RBC AUTO: 89.9 FL (ref 80–99)
MONOCYTES # BLD: 0.8 K/UL (ref 0–1)
MONOCYTES NFR BLD: 9 % (ref 5–13)
NEUTS SEG # BLD: 5.7 K/UL (ref 1.8–8)
NEUTS SEG NFR BLD: 69 % (ref 32–75)
NRBC # BLD: 0 K/UL (ref 0–0.01)
NRBC BLD-RTO: 0 PER 100 WBC
PLATELET # BLD AUTO: 247 K/UL (ref 150–400)
PMV BLD AUTO: 9.7 FL (ref 8.9–12.9)
POTASSIUM SERPL-SCNC: 4.1 MMOL/L (ref 3.5–5.1)
RBC # BLD AUTO: 3.26 M/UL (ref 4.1–5.7)
SODIUM SERPL-SCNC: 138 MMOL/L (ref 136–145)
WBC # BLD AUTO: 8.5 K/UL (ref 4.1–11.1)

## 2023-09-13 PROCEDURE — 99232 SBSQ HOSP IP/OBS MODERATE 35: CPT | Performed by: SURGERY

## 2023-09-13 PROCEDURE — 2580000003 HC RX 258

## 2023-09-13 PROCEDURE — 6370000000 HC RX 637 (ALT 250 FOR IP)

## 2023-09-13 PROCEDURE — 85025 COMPLETE CBC W/AUTO DIFF WBC: CPT

## 2023-09-13 PROCEDURE — 1100000000 HC RM PRIVATE

## 2023-09-13 PROCEDURE — 36415 COLL VENOUS BLD VENIPUNCTURE: CPT

## 2023-09-13 PROCEDURE — 80048 BASIC METABOLIC PNL TOTAL CA: CPT

## 2023-09-13 PROCEDURE — 2580000003 HC RX 258: Performed by: NURSE PRACTITIONER

## 2023-09-13 PROCEDURE — 97161 PT EVAL LOW COMPLEX 20 MIN: CPT

## 2023-09-13 PROCEDURE — 97530 THERAPEUTIC ACTIVITIES: CPT

## 2023-09-13 PROCEDURE — 6370000000 HC RX 637 (ALT 250 FOR IP): Performed by: NURSE PRACTITIONER

## 2023-09-13 PROCEDURE — 6360000002 HC RX W HCPCS

## 2023-09-13 RX ADMIN — FINASTERIDE 5 MG: 5 TABLET, FILM COATED ORAL at 09:32

## 2023-09-13 RX ADMIN — SODIUM CHLORIDE, PRESERVATIVE FREE 10 ML: 5 INJECTION INTRAVENOUS at 09:36

## 2023-09-13 RX ADMIN — GABAPENTIN 300 MG: 300 CAPSULE ORAL at 22:15

## 2023-09-13 RX ADMIN — DOCUSATE SODIUM 100 MG: 100 CAPSULE, LIQUID FILLED ORAL at 09:26

## 2023-09-13 RX ADMIN — FAMOTIDINE 20 MG: 20 TABLET, FILM COATED ORAL at 09:32

## 2023-09-13 RX ADMIN — METOPROLOL TARTRATE 12.5 MG: 25 TABLET, FILM COATED ORAL at 09:34

## 2023-09-13 RX ADMIN — OXYCODONE HYDROCHLORIDE 10 MG: 5 TABLET ORAL at 09:37

## 2023-09-13 RX ADMIN — OXYCODONE HYDROCHLORIDE 10 MG: 5 TABLET ORAL at 22:15

## 2023-09-13 RX ADMIN — WATER 1000 MG: 1 INJECTION INTRAMUSCULAR; INTRAVENOUS; SUBCUTANEOUS at 11:17

## 2023-09-13 RX ADMIN — METOPROLOL TARTRATE 12.5 MG: 25 TABLET, FILM COATED ORAL at 22:16

## 2023-09-13 RX ADMIN — ACETAMINOPHEN 650 MG: 325 TABLET ORAL at 22:15

## 2023-09-13 RX ADMIN — TAMSULOSIN HYDROCHLORIDE 0.4 MG: 0.4 CAPSULE ORAL at 09:36

## 2023-09-13 RX ADMIN — MIRTAZAPINE 15 MG: 15 TABLET, FILM COATED ORAL at 22:16

## 2023-09-13 RX ADMIN — GABAPENTIN 300 MG: 300 CAPSULE ORAL at 09:30

## 2023-09-13 RX ADMIN — OXYCODONE HYDROCHLORIDE 10 MG: 5 TABLET ORAL at 15:24

## 2023-09-13 ASSESSMENT — PAIN DESCRIPTION - LOCATION
LOCATION: ABDOMEN

## 2023-09-13 ASSESSMENT — PAIN SCALES - GENERAL
PAINLEVEL_OUTOF10: 8
PAINLEVEL_OUTOF10: 7
PAINLEVEL_OUTOF10: 7
PAINLEVEL_OUTOF10: 6
PAINLEVEL_OUTOF10: 7

## 2023-09-13 ASSESSMENT — PAIN DESCRIPTION - ORIENTATION
ORIENTATION: ANTERIOR
ORIENTATION: ANTERIOR
ORIENTATION: INNER
ORIENTATION: ANTERIOR

## 2023-09-13 ASSESSMENT — PAIN DESCRIPTION - DESCRIPTORS
DESCRIPTORS: ACHING

## 2023-09-13 NOTE — PROGRESS NOTES
Occupational Therapy  09/13/23    Order acknowledged, chart reviewed. Attempted to see for OT evaluation however patient declining any activity at this time d/t stomach pain, RN obtaining pain medication. Will follow up as able/appropriate.      Thank you,   Andreea Pierre, CARMINE, OTR/L

## 2023-09-13 NOTE — CARE COORDINATION
Transition of Care Plan:    RUR: 13% Low   Prior Level of Functioning: Independent   Disposition: Home with [facility] PT, OT and SN w/ [facility] (p: [phone])  Follow up appointments: PCP, specialist   DME needed: Rollator   Transportation at discharge: Ride assistance likely needed   IM/IMM Medicare/ letter given: 1st IM: 9/13  Is patient a [facility] and connected with [facility]? NA  Caregiver Contact: Ex-wife, Natalia Arias, [phone]  Discharge Caregiver contacted prior to discharge? No   Care Conference needed? No   Barriers to discharge: Medical    10:40AM - CM reviewed chart. Per review and ID rounds, anticipate DC home within 24 hours pending medical progress. [facility] PT, OT and SN order obtained. Order sent to [facility] (p: [phone]) and accepted. AVS updated. CM will continue to follow as needed.       Leora Chavez, MSW   [phone]

## 2023-09-13 NOTE — PROGRESS NOTES
Bedside shift change report given to Bushra Aviles RN (oncoming nurse) by [employer], RN (offgoing nurse). Report included the following information Nurse Handoff Report, Index, ED Encounter Summary, ED SBAR, Adult Overview, Surgery Report, Intake/Output, MAR, Recent Results, Med Rec Status, Alarm Parameters, Quality Measures, and Neuro Assessment.

## 2023-09-13 NOTE — PLAN OF CARE
Problem: Physical Therapy - Adult  Goal: By Discharge: Performs mobility at highest level of function for planned discharge setting. See evaluation for individualized goals. Description: FUNCTIONAL STATUS PRIOR TO ADMISSION: Patient was modified independent using a rollator for functional mobility. HOME SUPPORT PRIOR TO ADMISSION: The patient lived alone with home health to provide assistance. Physical Therapy Goals  Initiated 9/13/2023  1. Patient will move from supine to sit and sit to supine and roll side to side in bed with modified independence within 7 day(s). 2.  Patient will perform sit to stand with modified independence within 7 day(s). 3.  Patient will transfer from bed to chair and chair to bed with modified independence using the least restrictive device within 7 day(s). 4.  Patient will ambulate with modified independence for 150 feet with the least restrictive device within 7 day(s). Outcome: Progressing   PHYSICAL THERAPY EVALUATION    Patient: Stephany Garcia (32 y.o. male)  Date: 9/13/2023  Primary Diagnosis: SBO (small bowel obstruction) ([address]) [K56.609]       Precautions:                      ASSESSMENT :   DEFICITS/IMPAIRMENTS:   The patient is limited by decreased activity tolerance, endurance, safety awareness, reports of dizziness . Assessed with RW (uses rollator at baseline). Patient indicates periods of decreased awareness and waking up with phone in his hand. Reported this to nursing. Based on the impairments listed above patient is likely close to his baseline. He does indicate getting home health PT working on \"the same as you. \"  States waiting for a scooter and informed him to follow up with his home health on that. Safe to be up with staff to the bathroom and in chair for all meals. .    Patient will benefit from skilled intervention to address the above impairments. Functional Outcome Measure:   The patient scored 23/24 on the [facility] outcome measure

## 2023-09-14 ENCOUNTER — APPOINTMENT (OUTPATIENT)
Facility: HOSPITAL | Age: 58
End: 2023-09-14
Attending: INTERNAL MEDICINE
Payer: MEDICARE

## 2023-09-14 LAB
BACTERIA SPEC CULT: NORMAL
SERVICE CMNT-IMP: NORMAL

## 2023-09-14 PROCEDURE — 6370000000 HC RX 637 (ALT 250 FOR IP)

## 2023-09-14 PROCEDURE — 97535 SELF CARE MNGMENT TRAINING: CPT

## 2023-09-14 PROCEDURE — 2580000003 HC RX 258

## 2023-09-14 PROCEDURE — APPSS15 APP SPLIT SHARED TIME 0-15 MINUTES

## 2023-09-14 PROCEDURE — 6360000002 HC RX W HCPCS

## 2023-09-14 PROCEDURE — 97165 OT EVAL LOW COMPLEX 30 MIN: CPT

## 2023-09-14 PROCEDURE — 1100000000 HC RM PRIVATE

## 2023-09-14 PROCEDURE — 74018 RADEX ABDOMEN 1 VIEW: CPT

## 2023-09-14 RX ADMIN — TAMSULOSIN HYDROCHLORIDE 0.4 MG: 0.4 CAPSULE ORAL at 09:19

## 2023-09-14 RX ADMIN — METOPROLOL TARTRATE 12.5 MG: 25 TABLET, FILM COATED ORAL at 21:01

## 2023-09-14 RX ADMIN — PANCRELIPASE LIPASE, PANCRELIPASE PROTEASE, PANCRELIPASE AMYLASE 5000 UNITS: 5000; 17000; 24000 CAPSULE, DELAYED RELEASE ORAL at 07:01

## 2023-09-14 RX ADMIN — FAMOTIDINE 20 MG: 20 TABLET, FILM COATED ORAL at 09:19

## 2023-09-14 RX ADMIN — MIRTAZAPINE 15 MG: 15 TABLET, FILM COATED ORAL at 21:02

## 2023-09-14 RX ADMIN — OXYCODONE HYDROCHLORIDE 10 MG: 5 TABLET ORAL at 21:01

## 2023-09-14 RX ADMIN — WATER 1000 MG: 1 INJECTION INTRAMUSCULAR; INTRAVENOUS; SUBCUTANEOUS at 12:12

## 2023-09-14 RX ADMIN — DOCUSATE SODIUM 100 MG: 100 CAPSULE, LIQUID FILLED ORAL at 09:19

## 2023-09-14 RX ADMIN — GABAPENTIN 300 MG: 300 CAPSULE ORAL at 21:01

## 2023-09-14 RX ADMIN — GABAPENTIN 300 MG: 300 CAPSULE ORAL at 09:19

## 2023-09-14 RX ADMIN — OXYCODONE HYDROCHLORIDE 10 MG: 5 TABLET ORAL at 07:04

## 2023-09-14 RX ADMIN — METOPROLOL TARTRATE 12.5 MG: 25 TABLET, FILM COATED ORAL at 09:19

## 2023-09-14 RX ADMIN — PANCRELIPASE LIPASE, PANCRELIPASE PROTEASE, PANCRELIPASE AMYLASE 20000 UNITS: 20000; 63000; 84000 CAPSULE, DELAYED RELEASE ORAL at 07:02

## 2023-09-14 RX ADMIN — FINASTERIDE 5 MG: 5 TABLET, FILM COATED ORAL at 09:19

## 2023-09-14 ASSESSMENT — PAIN DESCRIPTION - LOCATION
LOCATION: ABDOMEN

## 2023-09-14 ASSESSMENT — PAIN DESCRIPTION - DESCRIPTORS: DESCRIPTORS: ACHING

## 2023-09-14 ASSESSMENT — PAIN SCALES - GENERAL
PAINLEVEL_OUTOF10: 7
PAINLEVEL_OUTOF10: 8
PAINLEVEL_OUTOF10: 5
PAINLEVEL_OUTOF10: 4

## 2023-09-14 ASSESSMENT — PAIN DESCRIPTION - ORIENTATION
ORIENTATION: ANTERIOR
ORIENTATION: ANTERIOR

## 2023-09-14 NOTE — PROGRESS NOTES
Comprehensive Nutrition Assessment    Type and Reason for Visit: Reassess    Nutrition Recommendations/Plan:     Recommend advance diet order to Regular as medically appropriate  Will order Ensure Enlive TID when diet advances    If diet unable to be advanced or pt does not tolerate PO, recommend starting PPN (recommended if PN will run < 5 days) for nutrition support:           PPN Rec's:               Day 1: D10, 4.25% AA at 42 ml/hr + 250 ml 20% lipids daily              Day 2: Goal rate of D10, 4.25% AA at 70 ml/hr + 250 ml 20% lipids  daily         Malnutrition Assessment:  Malnutrition Status:  Severe malnutrition (09/11/23 1035)    Context:  Chronic Illness     Findings of the 6 clinical characteristics of malnutrition:  Energy Intake:  75% or less estimated energy requirements for 1 month or longer  Weight Loss:  No significant weight loss (9% loss x 7 months)     Body Fat Loss:  Severe body fat loss Triceps, Orbital   Muscle Mass Loss:  Severe muscle mass loss Calf (gastrocnemius), Thigh (quadraceps), Temples (temporalis)  Fluid Accumulation:  No significant fluid accumulation     Strength:  Not Performed       Nutrition Assessment:    60 yo male admitted for ABD pain and vomiting x 3 days. ABD x-ray showing SBO vs ileus but MD notes pt with chronic dilation of small intestine. 9/14: Follow up. Diet had advanced to Regular on 9/13, Ensure Enlive was added with meals. Pt ate 25-50%. Today he is c/o abd pain, no BM or flatus so diet changed back to NPO. If pt to remain NPO, recommend starting PPN for nutrition support (rec's below). No new weight. Labs reviewed. 9/11: MD consult received for poor PO intakes. Pt has been NPO since admission with NGT to suction. MD ordered for NGT to be clamped today to assess how pt does but NGT came out. Pt was allowed to have coffee this morning. Spoke with pt at bedside. He is c/o being very hungry and wanting to eat.   States he has not had Diagnosis:   Severe malnutrition related to inadequate protein-energy intake as evidenced by severe muscle loss, severe loss of subcutaneous fat, poor intake prior to admission    Nutrition Interventions:   Food and/or Nutrient Delivery: Start Oral Diet, Start Oral Nutrition Supplement  Nutrition Education/Counseling: No recommendation at this time  Coordination of Nutrition Care: Continue to monitor while inpatient       Goals:     Goals: other (specify)  Specify Other Goals: Advance PO diet with intakes > 50% meals + ONS or initiate PN to meet > 85% EEN's over next 5-7 days    Nutrition Monitoring and Evaluation:   Behavioral-Environmental Outcomes: None Identified  Food/Nutrient Intake Outcomes: Diet Advancement/Tolerance, Food and Nutrient Intake, Supplement Intake  Physical Signs/Symptoms Outcomes: Biochemical Data, GI Status, Weight    Discharge Planning:     Too soon to determine     Sandrine Stanford, RD  Available via [employer]

## 2023-09-14 NOTE — PROGRESS NOTES
[address] [facility]  Hospitalist Group                                                                                          Hospitalist Progress Note  DEANA Feliz - ANISH  Office Phone: [phone]        Date of Service:  2023  NAME:  Jerry Garcia  :  1965  MRN:  [id number]       Admission Summary:   Jerry Garcia is a 59 y.o. male who initially presented to [facility] ED with abdominal pain. He reported he had vomited multiple times for the past 3 days along with loose stools PTA. On chart review, note that he has history of SBO and pancreatitis. He denied alcohol intake and indicated he has chronic, constant pain. He has a Jansen catheter because he has problems voiding - unknown how long he has had - he thinks a year. He is a poor historian - does not know his home meds. Chart review revealed that he had an EUS in 2023 which showed chronic pancreatitis, dilated pancreatic duct, multiple large pancreatic duct stones and a large head of pancreas cystic lesion with multiples smaller cystic lesions. A repeat CT abd/pelvis has been recommended in 3 months (due May-). He was admitted prior to that in Dec 2022 with a SBO. Pt was seen and examined, he was lying in bed in no acute distress. NGT was clamped, ordered and placed to [facility]. Pt denies HA, dizziness, chest pain, pressure, SOB or cough. + lower GI pain (6/10 on pain scale) but no nausea or vomiting since yesterday. + loose stools which he reports are chronic. Medication reconciliation was completed from pharmacy list and recent fills. Holding home medications due to n.p.o./NG tube to low intermittent suction. Will provide as needed hydralazine as needed for elevated blood pressures. Patient updated on plan of care, general surgery has been consulted now that the patient is here. He has been placed on Zosyn and Dilaudid ordered as needed for pain.      Interval history / Subjective:      Patient seen rather than 3 large meals). Recommend taking 2 tablets with full meals  - resume Creon    Hx Rectal Cancer  - s/p resection and XRT in 2016  - follows w/[facility] surgical oncology    Hx Gastric Ulcer  - continue pepcid       Code status: Full  Prophylaxis: [address] discussed with: patient, RN, care manager, Dr. Yissel Rodriguez  Anticipated Disposition: likely home in 24 hours  Inpatient  Cardiac monitoring: Remote Telemetry         Social Determinants of Health     Tobacco Use: High Risk (2/16/2023)    Patient History     Smoking Tobacco Use: Every Day     Smokeless Tobacco Use: Never     Passive Exposure: Not on file   Alcohol Use: Not on file   Financial Resource Strain: Not on file   Food Insecurity: Not on file   Transportation Needs: Not on file   Physical Activity: Not on file   Stress: Not on file   Social Connections: Not on file   Intimate Partner Violence: Not on file   Depression: Not on file   Housing Stability: Not on file       Review of Systems:   Complains of mild abdominal discomfort after eating      Vital Signs:    Last 24hrs VS reviewed since prior progress note.  Most recent are:  Vitals:    09/14/23 1000   BP:    Pulse: 55   Resp:    Temp:    SpO2:          Intake/Output Summary (Last 24 hours) at 9/14/2023 1102  Last data filed at 9/14/2023 7058  Gross per 24 hour   Intake --   Output 1010 ml   Net -1010 ml          Physical Examination:     I had a face to face encounter with this patient and independently examined them on 9/14/2023 as outlined below:          General :alert x 3, awake, no acute distress  HEENT: PEERL, EOMI, moist mucus membrane  Neck: supple, no JVD  Chest: Clear to auscultation bilaterally   CVS: S1 S2 heard, Capillary refill less than 2 seconds, NSR  Abd: soft/tender to lower quadrants, non distended, BS physiological  : leija to gravity  Ext: no clubbing, no cyanosis, no edema, brisk 2+ DP pulses  Neuro/Psych: pleasant mood and affect, CN 2-12 grossly intact, sensory grossly

## 2023-09-14 NOTE — PROGRESS NOTES
Occupational Therapy  09/14/23    OT order received and acknowledged. Chart reviewed and attempted to see patient for eval. Pt currently with other provider.  Will defer for now and follow up later as able    Thank you   Arlyn LOU, OTR/L

## 2023-09-15 LAB
GLUCOSE BLD STRIP.AUTO-MCNC: 102 MG/DL (ref 65–117)
SERVICE CMNT-IMP: NORMAL

## 2023-09-15 PROCEDURE — 99232 SBSQ HOSP IP/OBS MODERATE 35: CPT | Performed by: SURGERY

## 2023-09-15 PROCEDURE — 2580000003 HC RX 258

## 2023-09-15 PROCEDURE — 1100000000 HC RM PRIVATE

## 2023-09-15 PROCEDURE — 82962 GLUCOSE BLOOD TEST: CPT

## 2023-09-15 PROCEDURE — 2580000003 HC RX 258: Performed by: NURSE PRACTITIONER

## 2023-09-15 PROCEDURE — 6360000002 HC RX W HCPCS

## 2023-09-15 PROCEDURE — 6370000000 HC RX 637 (ALT 250 FOR IP)

## 2023-09-15 PROCEDURE — 6370000000 HC RX 637 (ALT 250 FOR IP): Performed by: NURSE PRACTITIONER

## 2023-09-15 RX ADMIN — DOCUSATE SODIUM 100 MG: 100 CAPSULE, LIQUID FILLED ORAL at 08:29

## 2023-09-15 RX ADMIN — OXYCODONE HYDROCHLORIDE 10 MG: 5 TABLET ORAL at 22:09

## 2023-09-15 RX ADMIN — SODIUM CHLORIDE, PRESERVATIVE FREE 10 ML: 5 INJECTION INTRAVENOUS at 20:48

## 2023-09-15 RX ADMIN — METOPROLOL TARTRATE 12.5 MG: 25 TABLET, FILM COATED ORAL at 20:45

## 2023-09-15 RX ADMIN — OXYCODONE HYDROCHLORIDE 5 MG: 5 TABLET ORAL at 17:25

## 2023-09-15 RX ADMIN — OXYCODONE HYDROCHLORIDE 5 MG: 5 TABLET ORAL at 12:24

## 2023-09-15 RX ADMIN — MIRTAZAPINE 15 MG: 15 TABLET, FILM COATED ORAL at 20:45

## 2023-09-15 RX ADMIN — PANCRELIPASE LIPASE, PANCRELIPASE PROTEASE, PANCRELIPASE AMYLASE 5000 UNITS: 5000; 17000; 24000 CAPSULE, DELAYED RELEASE ORAL at 17:16

## 2023-09-15 RX ADMIN — OXYCODONE HYDROCHLORIDE 5 MG: 5 TABLET ORAL at 08:30

## 2023-09-15 RX ADMIN — ACETAMINOPHEN 650 MG: 325 TABLET ORAL at 20:43

## 2023-09-15 RX ADMIN — GABAPENTIN 300 MG: 300 CAPSULE ORAL at 08:31

## 2023-09-15 RX ADMIN — WATER 1000 MG: 1 INJECTION INTRAMUSCULAR; INTRAVENOUS; SUBCUTANEOUS at 11:02

## 2023-09-15 RX ADMIN — OXYCODONE HYDROCHLORIDE 10 MG: 5 TABLET ORAL at 01:22

## 2023-09-15 RX ADMIN — PANCRELIPASE LIPASE, PANCRELIPASE PROTEASE, PANCRELIPASE AMYLASE 5000 UNITS: 5000; 17000; 24000 CAPSULE, DELAYED RELEASE ORAL at 11:20

## 2023-09-15 RX ADMIN — METOPROLOL TARTRATE 12.5 MG: 25 TABLET, FILM COATED ORAL at 08:28

## 2023-09-15 RX ADMIN — FAMOTIDINE 20 MG: 20 TABLET, FILM COATED ORAL at 08:29

## 2023-09-15 RX ADMIN — PANCRELIPASE LIPASE, PANCRELIPASE PROTEASE, PANCRELIPASE AMYLASE 5000 UNITS: 5000; 17000; 24000 CAPSULE, DELAYED RELEASE ORAL at 08:33

## 2023-09-15 RX ADMIN — FINASTERIDE 5 MG: 5 TABLET, FILM COATED ORAL at 08:31

## 2023-09-15 RX ADMIN — PANCRELIPASE LIPASE, PANCRELIPASE PROTEASE, PANCRELIPASE AMYLASE 20000 UNITS: 20000; 63000; 84000 CAPSULE, DELAYED RELEASE ORAL at 08:33

## 2023-09-15 RX ADMIN — GABAPENTIN 300 MG: 300 CAPSULE ORAL at 20:45

## 2023-09-15 RX ADMIN — PANCRELIPASE LIPASE, PANCRELIPASE PROTEASE, PANCRELIPASE AMYLASE 20000 UNITS: 20000; 63000; 84000 CAPSULE, DELAYED RELEASE ORAL at 17:16

## 2023-09-15 RX ADMIN — SODIUM CHLORIDE, PRESERVATIVE FREE 10 ML: 5 INJECTION INTRAVENOUS at 08:37

## 2023-09-15 RX ADMIN — PANCRELIPASE LIPASE, PANCRELIPASE PROTEASE, PANCRELIPASE AMYLASE 20000 UNITS: 20000; 63000; 84000 CAPSULE, DELAYED RELEASE ORAL at 11:19

## 2023-09-15 RX ADMIN — ACETAMINOPHEN 650 MG: 325 TABLET ORAL at 11:18

## 2023-09-15 RX ADMIN — TAMSULOSIN HYDROCHLORIDE 0.4 MG: 0.4 CAPSULE ORAL at 08:27

## 2023-09-15 ASSESSMENT — PAIN SCALES - GENERAL
PAINLEVEL_OUTOF10: 6
PAINLEVEL_OUTOF10: 5
PAINLEVEL_OUTOF10: 5
PAINLEVEL_OUTOF10: 6
PAINLEVEL_OUTOF10: 5
PAINLEVEL_OUTOF10: 6
PAINLEVEL_OUTOF10: 5
PAINLEVEL_OUTOF10: 7

## 2023-09-15 ASSESSMENT — PAIN DESCRIPTION - DESCRIPTORS
DESCRIPTORS: THROBBING
DESCRIPTORS: CRAMPING
DESCRIPTORS: THROBBING

## 2023-09-15 ASSESSMENT — PAIN DESCRIPTION - LOCATION
LOCATION: ABDOMEN

## 2023-09-15 ASSESSMENT — PAIN DESCRIPTION - ORIENTATION
ORIENTATION: LEFT;LOWER
ORIENTATION: LOWER;LEFT

## 2023-09-15 NOTE — PROGRESS NOTES
Surgery Progress Note    9/15/2023    Admit Date: 9/9/2023  5:18 AM    CC: Abd pain      Subjective:     Passing gas and BM's per patient. No nausea. Still some pain in LLQ. Constitutional: No fever or chills  Neurologic: No headache  Eyes: No scleral icterus or irritated eyes  Nose: No nasal pain or drainage  Mouth: No oral lesions or sore throat  Cardiac: No palpations or chest pain  Pulmonary: No cough or shortness of breath  Gastrointestinal: mild abd pain, No nausea, emesis, diarrhea, or constipation  Genitourinary: No dysuria  Musculoskeletal: No muscle or joint tenderness  Skin: No rashes or lesions  Psychiatric: No anxiety or depressed mood    Objective:     Vitals:    09/15/23 0400   BP:    Pulse: 58   Resp:    Temp:    SpO2:        General: No acute distress, conversant  Eyes: PERRLA, no scleral icterus  HENT: Normocephalic without oral lesions  Neck: Trachea midline without LAD  Cardiac: Normal pulse rate and rhythm  Pulmonary: Symmetric chest rise with normal effort  GI: Soft, soreness LLQ, ND, no hernia, no splenomegaly  Skin: Warm without rash  Extremities: No edema or joint stiffness  Psych: Appropriate mood and affect    Labs, vital signs, and I/O reviewed. Assessment:     60 y/o M improving after SBO    Plan:     PRN pain meds  Clears this AM. Low fiber diet for lunch.  Appears to have full ROBF  Nut supplements and Zenpep  Care per primary    Shante Betts MD, FACS, [facility]  Bariatric and General Surgeon  [facility]

## 2023-09-15 NOTE — PROGRESS NOTES
Chart reviewed in preparation for PT session. Pt resting in bed and declining OOB/ambulation. Pt reports just returned to bed. Pt reports up frequently throughout the day - up ad nannette in room to [facility] - reports of frequent loose stools. For the weekend, recommend patient to complete as able in order to maintain strength, endurance and independence with nursing: OOB to chair 3x/day and ambulating with RW. PT to follow-up with patient after the weekend.       Read Washburn, PT

## 2023-09-15 NOTE — PROGRESS NOTES
[address] [facility]  Hospitalist Group                                                                                          Hospitalist Progress Note  DEANA Tavera NP  Office Phone: [phone]        Date of Service:  9/15/2023  NAME:  Maria Ines Gomes  :  1965  MRN:  [id number]       Admission Summary:   Maria Ines Gomes is a 59 y.o. male who initially presented to [facility] ED with abdominal pain. He reported he had vomited multiple times for the past 3 days along with loose stools PTA. On chart review, note that he has history of SBO and pancreatitis. He denied alcohol intake and indicated he has chronic, constant pain. He has a Jansen catheter because he has problems voiding - unknown how long he has had - he thinks a year. He is a poor historian - does not know his home meds. Chart review revealed that he had an EUS in 2023 which showed chronic pancreatitis, dilated pancreatic duct, multiple large pancreatic duct stones and a large head of pancreas cystic lesion with multiples smaller cystic lesions. A repeat CT abd/pelvis has been recommended in 3 months (due May-). He was admitted prior to that in Dec 2022 with a SBO. Pt was seen and examined, he was lying in bed in no acute distress. NGT was clamped, ordered and placed to [facility]. Pt denies HA, dizziness, chest pain, pressure, SOB or cough. + lower GI pain (6/10 on pain scale) but no nausea or vomiting since yesterday. + loose stools which he reports are chronic. Medication reconciliation was completed from pharmacy list and recent fills. Holding home medications due to n.p.o./NG tube to low intermittent suction. Will provide as needed hydralazine as needed for elevated blood pressures. Patient updated on plan of care, general surgery has been consulted now that the patient is here. He has been placed on Zosyn and Dilaudid ordered as needed for pain.      Interval history / Subjective:      Patient seen edema, brisk 2+ DP pulses  Neuro/Psych: pleasant mood and affect, CN 2-12 grossly intact, sensory grossly within normal limit, Strength 5/5 in all extremities  Skin: warm     Data Review:    Review and/or order of clinical lab test  Review and/or order of tests in the radiology section of CPT  Review and/or order of tests in the medicine section of CPT      I have personally and independently reviewed all pertinent labs, diagnostic studies, imaging, and have provided independent interpretation of the same. Labs:     Recent Labs     09/13/23  0111   WBC 8.5   HGB 9.4*   HCT 29.3*            Notes reviewed from all clinical/nonclinical/nursing services involved in patient's clinical care. Care coordination discussions were held with appropriate clinical/nonclinical/ nursing providers based on care coordination needs. Patients current active Medications were reviewed, considered, added and adjusted based on the clinical condition today. Home Medications were reconciled to the best of my ability given all available resources at the time of admission. Route is PO if not otherwise noted.       Medications Reviewed:     Current Facility-Administered Medications   Medication Dose Route Frequency    lipase-protease-amylase (ZENPEP) delayed release capsule 5,000 Units  5,000 Units Oral TID WC    lipase-protease-amylase (ZENPEP) 22674-04312 units delayed release capsule 20,000 Units  20,000 Units Oral TID WC    cefTRIAXone (ROCEPHIN) 1,000 mg in sterile water 10 mL IV syringe  1,000 mg IntraVENous Q24H    oxyCODONE (ROXICODONE) immediate release tablet 5 mg  5 mg Oral Q4H PRN    oxyCODONE (ROXICODONE) immediate release tablet 10 mg  10 mg Oral Q4H PRN    famotidine (PEPCID) tablet 20 mg  20 mg Oral Daily    finasteride (PROSCAR) tablet 5 mg  5 mg Oral Daily    gabapentin (NEURONTIN) capsule 300 mg  300 mg Oral BID    metoprolol tartrate (LOPRESSOR) tablet 12.5 mg  12.5 mg Oral BID    mirtazapine (REMERON)

## 2023-09-16 VITALS
SYSTOLIC BLOOD PRESSURE: 134 MMHG | RESPIRATION RATE: 16 BRPM | OXYGEN SATURATION: 100 % | HEIGHT: 68 IN | TEMPERATURE: 97.3 F | HEART RATE: 66 BPM | DIASTOLIC BLOOD PRESSURE: 82 MMHG | BODY MASS INDEX: 13.38 KG/M2

## 2023-09-16 PROCEDURE — 2580000003 HC RX 258

## 2023-09-16 PROCEDURE — 6360000002 HC RX W HCPCS: Performed by: NURSE PRACTITIONER

## 2023-09-16 PROCEDURE — 6360000002 HC RX W HCPCS

## 2023-09-16 PROCEDURE — 2580000003 HC RX 258: Performed by: NURSE PRACTITIONER

## 2023-09-16 PROCEDURE — 6370000000 HC RX 637 (ALT 250 FOR IP)

## 2023-09-16 RX ORDER — MIRTAZAPINE 15 MG/1
15 TABLET, FILM COATED ORAL NIGHTLY
Qty: 30 TABLET | Refills: 3 | Status: SHIPPED | OUTPATIENT
Start: 2023-09-16

## 2023-09-16 RX ORDER — OXYCODONE HYDROCHLORIDE 5 MG/1
5 TABLET ORAL EVERY 4 HOURS PRN
Qty: 30 TABLET | Refills: 0 | Status: SHIPPED | OUTPATIENT
Start: 2023-09-16 | End: 2023-09-21

## 2023-09-16 RX ORDER — HEPARIN 100 UNIT/ML
300 SYRINGE INTRAVENOUS PRN
Status: DISCONTINUED | OUTPATIENT
Start: 2023-09-16 | End: 2023-09-16 | Stop reason: HOSPADM

## 2023-09-16 RX ORDER — LEVOFLOXACIN 750 MG/1
750 TABLET ORAL DAILY
Qty: 3 TABLET | Refills: 0 | Status: SHIPPED | OUTPATIENT
Start: 2023-09-16 | End: 2023-09-26

## 2023-09-16 RX ADMIN — FINASTERIDE 5 MG: 5 TABLET, FILM COATED ORAL at 09:30

## 2023-09-16 RX ADMIN — OXYCODONE HYDROCHLORIDE 10 MG: 5 TABLET ORAL at 01:08

## 2023-09-16 RX ADMIN — GABAPENTIN 300 MG: 300 CAPSULE ORAL at 09:30

## 2023-09-16 RX ADMIN — METOPROLOL TARTRATE 12.5 MG: 25 TABLET, FILM COATED ORAL at 09:29

## 2023-09-16 RX ADMIN — SODIUM CHLORIDE, PRESERVATIVE FREE 10 ML: 5 INJECTION INTRAVENOUS at 09:33

## 2023-09-16 RX ADMIN — TAMSULOSIN HYDROCHLORIDE 0.4 MG: 0.4 CAPSULE ORAL at 09:30

## 2023-09-16 RX ADMIN — HEPARIN 300 UNITS: 100 SYRINGE at 14:47

## 2023-09-16 RX ADMIN — OXYCODONE HYDROCHLORIDE 10 MG: 5 TABLET ORAL at 11:09

## 2023-09-16 RX ADMIN — PANCRELIPASE LIPASE, PANCRELIPASE PROTEASE, PANCRELIPASE AMYLASE 5000 UNITS: 5000; 17000; 24000 CAPSULE, DELAYED RELEASE ORAL at 12:46

## 2023-09-16 RX ADMIN — OXYCODONE HYDROCHLORIDE 10 MG: 5 TABLET ORAL at 06:14

## 2023-09-16 RX ADMIN — PANCRELIPASE LIPASE, PANCRELIPASE PROTEASE, PANCRELIPASE AMYLASE 20000 UNITS: 20000; 63000; 84000 CAPSULE, DELAYED RELEASE ORAL at 09:31

## 2023-09-16 RX ADMIN — PANCRELIPASE LIPASE, PANCRELIPASE PROTEASE, PANCRELIPASE AMYLASE 20000 UNITS: 20000; 63000; 84000 CAPSULE, DELAYED RELEASE ORAL at 12:46

## 2023-09-16 RX ADMIN — WATER 1000 MG: 1 INJECTION INTRAMUSCULAR; INTRAVENOUS; SUBCUTANEOUS at 11:07

## 2023-09-16 RX ADMIN — PANCRELIPASE LIPASE, PANCRELIPASE PROTEASE, PANCRELIPASE AMYLASE 5000 UNITS: 5000; 17000; 24000 CAPSULE, DELAYED RELEASE ORAL at 09:31

## 2023-09-16 RX ADMIN — FAMOTIDINE 20 MG: 20 TABLET, FILM COATED ORAL at 09:30

## 2023-09-16 RX ADMIN — DOCUSATE SODIUM 100 MG: 100 CAPSULE, LIQUID FILLED ORAL at 09:30

## 2023-09-16 ASSESSMENT — PAIN SCALES - GENERAL
PAINLEVEL_OUTOF10: 7
PAINLEVEL_OUTOF10: 8
PAINLEVEL_OUTOF10: 6

## 2023-09-16 ASSESSMENT — PAIN DESCRIPTION - DESCRIPTORS: DESCRIPTORS: ACHING

## 2023-09-16 ASSESSMENT — PAIN DESCRIPTION - LOCATION
LOCATION: ABDOMEN
LOCATION: ABDOMEN

## 2023-09-16 ASSESSMENT — PAIN DESCRIPTION - ORIENTATION: ORIENTATION: LEFT

## 2023-09-16 NOTE — DISCHARGE INSTRUCTIONS
Discharge Instructions       PATIENT ID: Tommy Acharya  MRN: [id number]   YOB: 1965    DATE OF ADMISSION: 9/9/2023   DATE OF DISCHARGE: 9/16/2023    PRIMARY CARE PROVIDER: Skyler James     ATTENDING PHYSICIAN: Chanell Tariq MD   DISCHARGING PROVIDER: DEANA Flynn NP    To contact this individual call [phone] and ask the  to page. If unavailable ask to be transferred the Adult Hospitalist Department. DISCHARGE DIAGNOSES     Small Bowel Obstruction   Pancreatitis   Abdominal Pain     CONSULTATIONS:     General Surgery   Gastroenterology   Hospitalist Medicine     PROCEDURES/SURGERIES: * No surgery found *    PENDING TEST RESULTS:   At the time of discharge the following test results are still pending: none     FOLLOW UP APPOINTMENTS:     Please call to schedule follow up appointment with your GI Doctors that you typically see at [address]   Schedule Follow up with Dr. Joo Espinoza , PCP     ADDITIONAL CARE RECOMMENDATIONS:     Continue with low fiber diet   I recommend taking 1 tab of creon with each meal that you eat to prevent loose stools   Continue taking all other medications as prescribed     DIET: regular diet  Oral Nutritional Supplements: none     ACTIVITY: activity as tolerated    WOUND CARE: none     EQUIPMENT needed: none       DISCHARGE MEDICATIONS:   See Medication Reconciliation Form    It is important that you take the medication exactly as they are prescribed. Keep your medication in the bottles provided by the pharmacist and keep a list of the medication names, dosages, and times to be taken in your wallet. Do not take other medications without consulting your doctor. NOTIFY YOUR PHYSICIAN FOR ANY OF THE FOLLOWING:   Fever over 101 degrees for 24 hours. Chest pain, shortness of breath, fever, chills, nausea, vomiting, diarrhea, change in mentation, falling, weakness, bleeding.  Severe pain or pain not relieved by

## 2023-09-16 NOTE — CASE COMMUNICATION
Case Management Note:    Patient requested assistance with transportation, arranged with [employer],  ETA 13:45. Confirmed address with patient.     DEBORAH BOBO.  1:00 PM

## 2023-09-16 NOTE — DISCHARGE SUMMARY
Discharge Summary       PATIENT ID: Funmilayo Davila  MRN: [id number]   YOB: 1965    DATE OF ADMISSION: 9/9/2023  5:18 AM    DATE OF DISCHARGE: 9/16/2023  PRIMARY CARE PROVIDER: Lolita Ramirez MD     ATTENDING PHYSICIAN: Dr. Karis Sung   DISCHARGING PROVIDER: DEANA Gray NP    To contact this individual call [phone] and ask the  to page. If unavailable ask to be transferred the Adult Hospitalist Department. CONSULTATIONS: IP CONSULT TO GENERAL SURGERY  IP CONSULT TO DIETITIAN  IP CONSULT TO CASE MANAGEMENT    PROCEDURES/SURGERIES: * No surgery found *     ADMITTING DIAGNOSES & HOSPITAL COURSE:     Funmilayo Davila is a 59 y.o. male who initially presented to [facility] ED with abdominal pain. He reported he had vomited multiple times for the past 3 days along with loose stools PTA. On chart review, note that he has history of SBO and pancreatitis. He denied alcohol intake and indicated he has chronic, constant pain. He has a Jansen catheter because he has problems voiding - unknown how long he has had - he thinks a year. He is a poor historian - does not know his home meds. Chart review revealed that he had an EUS in 2/2023 which showed chronic pancreatitis, dilated pancreatic duct, multiple large pancreatic duct stones and a large head of pancreas cystic lesion with multiples smaller cystic lesions. A repeat CT abd/pelvis has been recommended in 3 months (due May-June). He was admitted prior to that in Dec 2022 with a SBO. The patient was diagnosed with urinary tract infection during this admission and treated with 7 days of IV Rocephin, he will be discharged with three additional days of Levaquin to complete 10 day course. The patient is followed typically outpatient by [facility]. The patient has been admitted here multiples here for SBO and multiple times at [facility]. He appears to have chronically dilated loops of bowel.  He has now resolved nausea and

## 2023-12-02 ENCOUNTER — APPOINTMENT (OUTPATIENT)
Facility: HOSPITAL | Age: 58
DRG: 388 | End: 2023-12-02
Payer: MEDICARE

## 2023-12-02 ENCOUNTER — HOSPITAL ENCOUNTER (INPATIENT)
Facility: HOSPITAL | Age: 58
LOS: 13 days | Discharge: HOME | DRG: 388 | End: 2023-12-15
Attending: EMERGENCY MEDICINE | Admitting: INTERNAL MEDICINE
Payer: MEDICARE

## 2023-12-02 DIAGNOSIS — K59.00 CONSTIPATION, UNSPECIFIED CONSTIPATION TYPE: Primary | ICD-10-CM

## 2023-12-02 DIAGNOSIS — E83.42 HYPOMAGNESEMIA: ICD-10-CM

## 2023-12-02 DIAGNOSIS — K86.0 ALCOHOL-INDUCED CHRONIC PANCREATITIS (HCC): ICD-10-CM

## 2023-12-02 DIAGNOSIS — R10.9 INTRACTABLE ABDOMINAL PAIN: ICD-10-CM

## 2023-12-02 DIAGNOSIS — E87.6 HYPOKALEMIA: ICD-10-CM

## 2023-12-02 PROBLEM — K56.600 PARTIAL SMALL BOWEL OBSTRUCTION (HCC): Status: ACTIVE | Noted: 2023-12-02

## 2023-12-02 LAB
ALBUMIN SERPL-MCNC: 2.6 G/DL (ref 3.5–5)
ALBUMIN/GLOB SERPL: 0.5 (ref 1.1–2.2)
ALP SERPL-CCNC: 112 U/L (ref 45–117)
ALT SERPL-CCNC: 30 U/L (ref 12–78)
ANION GAP BLD CALC-SCNC: 12 (ref 10–20)
ANION GAP SERPL CALC-SCNC: 1 MMOL/L (ref 5–15)
APPEARANCE UR: CLEAR
AST SERPL-CCNC: 31 U/L (ref 15–37)
BACTERIA URNS QL MICRO: ABNORMAL /HPF
BASE DEFICIT BLD-SCNC: 5.2 MMOL/L
BASOPHILS # BLD: 0 K/UL (ref 0–0.1)
BASOPHILS NFR BLD: 0 % (ref 0–1)
BILIRUB SERPL-MCNC: 0.3 MG/DL (ref 0.2–1)
BILIRUB UR QL: NEGATIVE
BUN SERPL-MCNC: 12 MG/DL (ref 6–20)
BUN/CREAT SERPL: 12 (ref 12–20)
C COLI+JEJUNI TUF STL QL NAA+PROBE: NEGATIVE
CA-I BLD-MCNC: 1.36 MMOL/L (ref 1.12–1.32)
CALCIUM SERPL-MCNC: 8.6 MG/DL (ref 8.5–10.1)
CHLORIDE BLD-SCNC: 108 MMOL/L (ref 100–108)
CHLORIDE SERPL-SCNC: 112 MMOL/L (ref 97–108)
CO2 BLD-SCNC: 23 MMOL/L (ref 19–24)
CO2 SERPL-SCNC: 27 MMOL/L (ref 21–32)
COLOR UR: ABNORMAL
CREAT SERPL-MCNC: 0.97 MG/DL (ref 0.7–1.3)
CREAT UR-MCNC: 0.7 MG/DL (ref 0.6–1.3)
DIFFERENTIAL METHOD BLD: ABNORMAL
EC STX1+STX2 GENES STL QL NAA+PROBE: NEGATIVE
EOSINOPHIL # BLD: 0.1 K/UL (ref 0–0.4)
EOSINOPHIL NFR BLD: 1 % (ref 0–7)
EPITH CASTS URNS QL MICRO: ABNORMAL /LPF
ERYTHROCYTE [DISTWIDTH] IN BLOOD BY AUTOMATED COUNT: 17.4 % (ref 11.5–14.5)
ETEC ELTA+ESTB GENES STL QL NAA+PROBE: NEGATIVE
GLOBULIN SER CALC-MCNC: 5.4 G/DL (ref 2–4)
GLUCOSE BLD STRIP.AUTO-MCNC: 103 MG/DL (ref 74–106)
GLUCOSE SERPL-MCNC: 101 MG/DL (ref 65–100)
GLUCOSE UR STRIP.AUTO-MCNC: NEGATIVE MG/DL
HCO3 BLDA-SCNC: 23 MMOL/L
HCT VFR BLD AUTO: 30.9 % (ref 36.6–50.3)
HGB BLD-MCNC: 9.8 G/DL (ref 12.1–17)
HGB UR QL STRIP: ABNORMAL
IMM GRANULOCYTES # BLD AUTO: 0 K/UL (ref 0–0.04)
IMM GRANULOCYTES NFR BLD AUTO: 0 % (ref 0–0.5)
KETONES UR QL STRIP.AUTO: NEGATIVE MG/DL
LACTATE BLD-SCNC: 1.12 MMOL/L (ref 0.4–2)
LEUKOCYTE ESTERASE UR QL STRIP.AUTO: ABNORMAL
LIPASE SERPL-CCNC: 69 U/L (ref 13–75)
LYMPHOCYTES # BLD: 1.7 K/UL (ref 0.8–3.5)
LYMPHOCYTES NFR BLD: 22 % (ref 12–49)
MAGNESIUM SERPL-MCNC: 1.5 MG/DL (ref 1.6–2.4)
MCH RBC QN AUTO: 29.5 PG (ref 26–34)
MCHC RBC AUTO-ENTMCNC: 31.7 G/DL (ref 30–36.5)
MCV RBC AUTO: 93.1 FL (ref 80–99)
MONOCYTES # BLD: 0.5 K/UL (ref 0–1)
MONOCYTES NFR BLD: 6 % (ref 5–13)
NEUTS SEG # BLD: 5.3 K/UL (ref 1.8–8)
NEUTS SEG NFR BLD: 70 % (ref 32–75)
NITRITE UR QL STRIP.AUTO: POSITIVE
NRBC # BLD: 0 K/UL (ref 0–0.01)
NRBC BLD-RTO: 0 PER 100 WBC
P SHIGELLOIDES DNA STL QL NAA+PROBE: NEGATIVE
PCO2 BLDV: 52.6 MMHG (ref 41–51)
PH BLDV: 7.24 (ref 7.32–7.42)
PH UR STRIP: 6 (ref 5–8)
PLATELET # BLD AUTO: 306 K/UL (ref 150–400)
PMV BLD AUTO: 10.2 FL (ref 8.9–12.9)
PO2 BLDV: <27 MMHG (ref 25–40)
POTASSIUM BLD-SCNC: 2.9 MMOL/L (ref 3.5–5.5)
POTASSIUM SERPL-SCNC: 3 MMOL/L (ref 3.5–5.1)
PROCALCITONIN SERPL-MCNC: <0.05 NG/ML
PROT SERPL-MCNC: 8 G/DL (ref 6.4–8.2)
PROT UR STRIP-MCNC: NEGATIVE MG/DL
RBC # BLD AUTO: 3.32 M/UL (ref 4.1–5.7)
RBC #/AREA URNS HPF: ABNORMAL /HPF (ref 0–5)
SALMONELLA SP SPAO STL QL NAA+PROBE: NEGATIVE
SHIGELLA SP+EIEC IPAH STL QL NAA+PROBE: NEGATIVE
SODIUM BLD-SCNC: 143 MMOL/L (ref 136–145)
SODIUM SERPL-SCNC: 140 MMOL/L (ref 136–145)
SP GR UR REFRACTOMETRY: <1.005 (ref 1–1.03)
SPECIMEN SITE: ABNORMAL
TROPONIN I SERPL HS-MCNC: 6 NG/L (ref 0–76)
URINE CULTURE IF INDICATED: ABNORMAL
UROBILINOGEN UR QL STRIP.AUTO: 0.2 EU/DL (ref 0.2–1)
V CHOL+PARA+VUL DNA STL QL NAA+NON-PROBE: NEGATIVE
WBC # BLD AUTO: 7.6 K/UL (ref 4.1–11.1)
WBC URNS QL MICRO: >100 /HPF (ref 0–4)
Y ENTEROCOL DNA STL QL NAA+NON-PROBE: NEGATIVE

## 2023-12-02 PROCEDURE — 87086 URINE CULTURE/COLONY COUNT: CPT

## 2023-12-02 PROCEDURE — 84132 ASSAY OF SERUM POTASSIUM: CPT

## 2023-12-02 PROCEDURE — 6370000000 HC RX 637 (ALT 250 FOR IP): Performed by: INTERNAL MEDICINE

## 2023-12-02 PROCEDURE — 83690 ASSAY OF LIPASE: CPT

## 2023-12-02 PROCEDURE — 82803 BLOOD GASES ANY COMBINATION: CPT

## 2023-12-02 PROCEDURE — 99285 EMERGENCY DEPT VISIT HI MDM: CPT

## 2023-12-02 PROCEDURE — 87324 CLOSTRIDIUM AG IA: CPT

## 2023-12-02 PROCEDURE — 87449 NOS EACH ORGANISM AG IA: CPT

## 2023-12-02 PROCEDURE — 87040 BLOOD CULTURE FOR BACTERIA: CPT

## 2023-12-02 PROCEDURE — 6370000000 HC RX 637 (ALT 250 FOR IP): Performed by: EMERGENCY MEDICINE

## 2023-12-02 PROCEDURE — 82947 ASSAY GLUCOSE BLOOD QUANT: CPT

## 2023-12-02 PROCEDURE — 84484 ASSAY OF TROPONIN QUANT: CPT

## 2023-12-02 PROCEDURE — 85025 COMPLETE CBC W/AUTO DIFF WBC: CPT

## 2023-12-02 PROCEDURE — 6360000002 HC RX W HCPCS: Performed by: EMERGENCY MEDICINE

## 2023-12-02 PROCEDURE — 2580000003 HC RX 258: Performed by: EMERGENCY MEDICINE

## 2023-12-02 PROCEDURE — 6360000004 HC RX CONTRAST MEDICATION: Performed by: EMERGENCY MEDICINE

## 2023-12-02 PROCEDURE — 71045 X-RAY EXAM CHEST 1 VIEW: CPT

## 2023-12-02 PROCEDURE — 93005 ELECTROCARDIOGRAM TRACING: CPT | Performed by: EMERGENCY MEDICINE

## 2023-12-02 PROCEDURE — 96365 THER/PROPH/DIAG IV INF INIT: CPT

## 2023-12-02 PROCEDURE — 87493 C DIFF AMPLIFIED PROBE: CPT

## 2023-12-02 PROCEDURE — 96366 THER/PROPH/DIAG IV INF ADDON: CPT

## 2023-12-02 PROCEDURE — 96361 HYDRATE IV INFUSION ADD-ON: CPT

## 2023-12-02 PROCEDURE — 96376 TX/PRO/DX INJ SAME DRUG ADON: CPT

## 2023-12-02 PROCEDURE — 87077 CULTURE AEROBIC IDENTIFY: CPT

## 2023-12-02 PROCEDURE — 96375 TX/PRO/DX INJ NEW DRUG ADDON: CPT

## 2023-12-02 PROCEDURE — 36415 COLL VENOUS BLD VENIPUNCTURE: CPT

## 2023-12-02 PROCEDURE — 74018 RADEX ABDOMEN 1 VIEW: CPT

## 2023-12-02 PROCEDURE — 87186 SC STD MICRODIL/AGAR DIL: CPT

## 2023-12-02 PROCEDURE — 74177 CT ABD & PELVIS W/CONTRAST: CPT

## 2023-12-02 PROCEDURE — 84145 PROCALCITONIN (PCT): CPT

## 2023-12-02 PROCEDURE — 1100000000 HC RM PRIVATE

## 2023-12-02 PROCEDURE — 87506 IADNA-DNA/RNA PROBE TQ 6-11: CPT

## 2023-12-02 PROCEDURE — 80053 COMPREHEN METABOLIC PANEL: CPT

## 2023-12-02 PROCEDURE — 83735 ASSAY OF MAGNESIUM: CPT

## 2023-12-02 PROCEDURE — 82330 ASSAY OF CALCIUM: CPT

## 2023-12-02 PROCEDURE — 6360000002 HC RX W HCPCS: Performed by: INTERNAL MEDICINE

## 2023-12-02 PROCEDURE — 2580000003 HC RX 258: Performed by: INTERNAL MEDICINE

## 2023-12-02 PROCEDURE — 81001 URINALYSIS AUTO W/SCOPE: CPT

## 2023-12-02 PROCEDURE — 84295 ASSAY OF SERUM SODIUM: CPT

## 2023-12-02 RX ORDER — FINASTERIDE 5 MG/1
5 TABLET, FILM COATED ORAL DAILY
Status: DISCONTINUED | OUTPATIENT
Start: 2023-12-03 | End: 2023-12-15 | Stop reason: HOSPADM

## 2023-12-02 RX ORDER — SODIUM CHLORIDE 0.9 % (FLUSH) 0.9 %
5-40 SYRINGE (ML) INJECTION EVERY 12 HOURS SCHEDULED
Status: DISCONTINUED | OUTPATIENT
Start: 2023-12-02 | End: 2023-12-15 | Stop reason: HOSPADM

## 2023-12-02 RX ORDER — LANOLIN ALCOHOL/MO/W.PET/CERES
100 CREAM (GRAM) TOPICAL DAILY
Status: DISCONTINUED | OUTPATIENT
Start: 2023-12-02 | End: 2023-12-02

## 2023-12-02 RX ORDER — AMITRIPTYLINE HYDROCHLORIDE 10 MG/1
10 TABLET, FILM COATED ORAL NIGHTLY
Status: DISCONTINUED | OUTPATIENT
Start: 2023-12-02 | End: 2023-12-15 | Stop reason: HOSPADM

## 2023-12-02 RX ORDER — SODIUM CHLORIDE 9 MG/ML
INJECTION, SOLUTION INTRAVENOUS PRN
Status: DISCONTINUED | OUTPATIENT
Start: 2023-12-02 | End: 2023-12-15 | Stop reason: HOSPADM

## 2023-12-02 RX ORDER — ONDANSETRON 2 MG/ML
4 INJECTION INTRAMUSCULAR; INTRAVENOUS ONCE
Status: COMPLETED | OUTPATIENT
Start: 2023-12-02 | End: 2023-12-02

## 2023-12-02 RX ORDER — TAMSULOSIN HYDROCHLORIDE 0.4 MG/1
0.4 CAPSULE ORAL DAILY
Status: DISCONTINUED | OUTPATIENT
Start: 2023-12-02 | End: 2023-12-15 | Stop reason: HOSPADM

## 2023-12-02 RX ORDER — FERROUS SULFATE 325(65) MG
325 TABLET ORAL DAILY
Status: DISCONTINUED | OUTPATIENT
Start: 2023-12-02 | End: 2023-12-15 | Stop reason: HOSPADM

## 2023-12-02 RX ORDER — SODIUM CHLORIDE 9 MG/ML
INJECTION, SOLUTION INTRAVENOUS CONTINUOUS
Status: DISCONTINUED | OUTPATIENT
Start: 2023-12-02 | End: 2023-12-07

## 2023-12-02 RX ORDER — ACETAMINOPHEN 325 MG/1
650 TABLET ORAL EVERY 6 HOURS PRN
Status: DISCONTINUED | OUTPATIENT
Start: 2023-12-02 | End: 2023-12-15 | Stop reason: HOSPADM

## 2023-12-02 RX ORDER — ACETAMINOPHEN 650 MG/1
650 SUPPOSITORY RECTAL EVERY 6 HOURS PRN
Status: DISCONTINUED | OUTPATIENT
Start: 2023-12-02 | End: 2023-12-15 | Stop reason: HOSPADM

## 2023-12-02 RX ORDER — POLYETHYLENE GLYCOL 3350 17 G/17G
17 POWDER, FOR SOLUTION ORAL DAILY
Status: DISCONTINUED | OUTPATIENT
Start: 2023-12-03 | End: 2023-12-04

## 2023-12-02 RX ORDER — DICYCLOMINE HYDROCHLORIDE 10 MG/1
10 CAPSULE ORAL 3 TIMES DAILY PRN
Status: DISCONTINUED | OUTPATIENT
Start: 2023-12-02 | End: 2023-12-15 | Stop reason: HOSPADM

## 2023-12-02 RX ORDER — ACETAMINOPHEN 325 MG/1
650 TABLET ORAL EVERY 4 HOURS PRN
Status: DISCONTINUED | OUTPATIENT
Start: 2023-12-02 | End: 2023-12-02 | Stop reason: SDUPTHER

## 2023-12-02 RX ORDER — FOLIC ACID 1 MG/1
1 TABLET ORAL DAILY
Status: DISCONTINUED | OUTPATIENT
Start: 2023-12-02 | End: 2023-12-15 | Stop reason: HOSPADM

## 2023-12-02 RX ORDER — KETOROLAC TROMETHAMINE 30 MG/ML
15 INJECTION, SOLUTION INTRAMUSCULAR; INTRAVENOUS
Status: COMPLETED | OUTPATIENT
Start: 2023-12-02 | End: 2023-12-02

## 2023-12-02 RX ORDER — SODIUM CHLORIDE 0.9 % (FLUSH) 0.9 %
5-40 SYRINGE (ML) INJECTION PRN
Status: DISCONTINUED | OUTPATIENT
Start: 2023-12-02 | End: 2023-12-15 | Stop reason: HOSPADM

## 2023-12-02 RX ORDER — ONDANSETRON 4 MG/1
4 TABLET, ORALLY DISINTEGRATING ORAL EVERY 8 HOURS PRN
Status: DISCONTINUED | OUTPATIENT
Start: 2023-12-02 | End: 2023-12-15 | Stop reason: HOSPADM

## 2023-12-02 RX ORDER — VITAMIN B COMPLEX
5000 TABLET ORAL DAILY
Status: DISCONTINUED | OUTPATIENT
Start: 2023-12-02 | End: 2023-12-15 | Stop reason: HOSPADM

## 2023-12-02 RX ORDER — SENNA AND DOCUSATE SODIUM 50; 8.6 MG/1; MG/1
1 TABLET, FILM COATED ORAL 2 TIMES DAILY
Status: DISCONTINUED | OUTPATIENT
Start: 2023-12-02 | End: 2023-12-09

## 2023-12-02 RX ORDER — MAGNESIUM SULFATE 1 G/100ML
1000 INJECTION INTRAVENOUS ONCE
Status: COMPLETED | OUTPATIENT
Start: 2023-12-02 | End: 2023-12-02

## 2023-12-02 RX ORDER — SODIUM CHLORIDE, SODIUM LACTATE, POTASSIUM CHLORIDE, CALCIUM CHLORIDE 600; 310; 30; 20 MG/100ML; MG/100ML; MG/100ML; MG/100ML
INJECTION, SOLUTION INTRAVENOUS CONTINUOUS
Status: DISCONTINUED | OUTPATIENT
Start: 2023-12-02 | End: 2023-12-09

## 2023-12-02 RX ORDER — POTASSIUM CHLORIDE 20 MEQ/1
40 TABLET, EXTENDED RELEASE ORAL ONCE
Status: COMPLETED | OUTPATIENT
Start: 2023-12-02 | End: 2023-12-02

## 2023-12-02 RX ORDER — LANOLIN ALCOHOL/MO/W.PET/CERES
1.5 CREAM (GRAM) TOPICAL NIGHTLY
Status: DISCONTINUED | OUTPATIENT
Start: 2023-12-02 | End: 2023-12-15 | Stop reason: HOSPADM

## 2023-12-02 RX ORDER — FAMOTIDINE 20 MG/1
20 TABLET, FILM COATED ORAL DAILY
Status: DISCONTINUED | OUTPATIENT
Start: 2023-12-03 | End: 2023-12-05

## 2023-12-02 RX ORDER — FENTANYL CITRATE 50 UG/ML
50 INJECTION, SOLUTION INTRAMUSCULAR; INTRAVENOUS
Status: COMPLETED | OUTPATIENT
Start: 2023-12-02 | End: 2023-12-02

## 2023-12-02 RX ORDER — ONDANSETRON 2 MG/ML
4 INJECTION INTRAMUSCULAR; INTRAVENOUS EVERY 6 HOURS PRN
Status: DISCONTINUED | OUTPATIENT
Start: 2023-12-02 | End: 2023-12-15 | Stop reason: HOSPADM

## 2023-12-02 RX ORDER — ENOXAPARIN SODIUM 100 MG/ML
30 INJECTION SUBCUTANEOUS DAILY
Status: DISCONTINUED | OUTPATIENT
Start: 2023-12-02 | End: 2023-12-15 | Stop reason: HOSPADM

## 2023-12-02 RX ORDER — GAUZE BANDAGE 2" X 2"
100 BANDAGE TOPICAL DAILY
Status: DISCONTINUED | OUTPATIENT
Start: 2023-12-02 | End: 2023-12-15 | Stop reason: HOSPADM

## 2023-12-02 RX ORDER — SODIUM CHLORIDE, SODIUM LACTATE, POTASSIUM CHLORIDE, AND CALCIUM CHLORIDE .6; .31; .03; .02 G/100ML; G/100ML; G/100ML; G/100ML
1000 INJECTION, SOLUTION INTRAVENOUS ONCE
Status: COMPLETED | OUTPATIENT
Start: 2023-12-02 | End: 2023-12-02

## 2023-12-02 RX ADMIN — TAMSULOSIN HYDROCHLORIDE 0.4 MG: 0.4 CAPSULE ORAL at 18:50

## 2023-12-02 RX ADMIN — METOPROLOL TARTRATE 12.5 MG: 25 TABLET, FILM COATED ORAL at 20:51

## 2023-12-02 RX ADMIN — SODIUM CHLORIDE: 9 INJECTION, SOLUTION INTRAVENOUS at 18:53

## 2023-12-02 RX ADMIN — ONDANSETRON 4 MG: 2 INJECTION INTRAMUSCULAR; INTRAVENOUS at 12:16

## 2023-12-02 RX ADMIN — IOPAMIDOL 100 ML: 755 INJECTION, SOLUTION INTRAVENOUS at 12:00

## 2023-12-02 RX ADMIN — AMITRIPTYLINE HYDROCHLORIDE 10 MG: 10 TABLET, FILM COATED ORAL at 20:51

## 2023-12-02 RX ADMIN — POTASSIUM CHLORIDE 40 MEQ: 1500 TABLET, EXTENDED RELEASE ORAL at 15:27

## 2023-12-02 RX ADMIN — PANCRELIPASE LIPASE, PANCRELIPASE PROTEASE, PANCRELIPASE AMYLASE 20000 UNITS: 20000; 63000; 84000 CAPSULE, DELAYED RELEASE ORAL at 18:51

## 2023-12-02 RX ADMIN — FERROUS SULFATE TAB 325 MG (65 MG ELEMENTAL FE) 325 MG: 325 (65 FE) TAB at 18:50

## 2023-12-02 RX ADMIN — MAGNESIUM SULFATE HEPTAHYDRATE 1000 MG: 1 INJECTION, SOLUTION INTRAVENOUS at 15:26

## 2023-12-02 RX ADMIN — CHOLECALCIFEROL TAB 25 MCG (1000 UNIT) 5000 UNITS: 25 TAB at 18:50

## 2023-12-02 RX ADMIN — FENTANYL CITRATE 50 MCG: 50 INJECTION INTRAMUSCULAR; INTRAVENOUS at 16:21

## 2023-12-02 RX ADMIN — MELATONIN 1.5 MG: at 20:51

## 2023-12-02 RX ADMIN — Medication 100 MG: at 18:50

## 2023-12-02 RX ADMIN — FENTANYL CITRATE 50 MCG: 50 INJECTION INTRAMUSCULAR; INTRAVENOUS at 12:14

## 2023-12-02 RX ADMIN — SODIUM CHLORIDE, PRESERVATIVE FREE 10 ML: 5 INJECTION INTRAVENOUS at 20:52

## 2023-12-02 RX ADMIN — DICYCLOMINE HYDROCHLORIDE 10 MG: 10 CAPSULE ORAL at 18:51

## 2023-12-02 RX ADMIN — FOLIC ACID 1 MG: 1 TABLET ORAL at 18:50

## 2023-12-02 RX ADMIN — SODIUM CHLORIDE, POTASSIUM CHLORIDE, SODIUM LACTATE AND CALCIUM CHLORIDE 1000 ML: 600; 310; 30; 20 INJECTION, SOLUTION INTRAVENOUS at 12:18

## 2023-12-02 RX ADMIN — KETOROLAC TROMETHAMINE 15 MG: 30 INJECTION, SOLUTION INTRAMUSCULAR; INTRAVENOUS at 14:20

## 2023-12-02 ASSESSMENT — PAIN DESCRIPTION - LOCATION
LOCATION: ABDOMEN
LOCATION: ABDOMEN

## 2023-12-02 ASSESSMENT — PAIN DESCRIPTION - FREQUENCY: FREQUENCY: INTERMITTENT

## 2023-12-02 ASSESSMENT — PAIN DESCRIPTION - ORIENTATION: ORIENTATION: LEFT;LOWER

## 2023-12-02 ASSESSMENT — PAIN SCALES - GENERAL
PAINLEVEL_OUTOF10: 8
PAINLEVEL_OUTOF10: 7

## 2023-12-02 ASSESSMENT — PAIN DESCRIPTION - PAIN TYPE: TYPE: ACUTE PAIN

## 2023-12-02 ASSESSMENT — PAIN - FUNCTIONAL ASSESSMENT
PAIN_FUNCTIONAL_ASSESSMENT: ACTIVITIES ARE NOT PREVENTED
PAIN_FUNCTIONAL_ASSESSMENT: 0-10

## 2023-12-02 ASSESSMENT — PAIN DESCRIPTION - ONSET: ONSET: ON-GOING

## 2023-12-02 ASSESSMENT — PAIN DESCRIPTION - DESCRIPTORS: DESCRIPTORS: CRAMPING

## 2023-12-02 NOTE — H&P
evident pneumothorax or pleural effusion. Port catheter is satisfactory. No Acute Disease.       _______________________________________________________________________    TOTAL TIME:  76 Minutes    Critical Care Provided     Minutes non procedure based    Signed: Jackeline Cabrera MD    Procedures: see electronic medical records for all procedures/Xrays and details which were not copied into this note but were reviewed prior to creation of Plan.

## 2023-12-02 NOTE — ED NOTES
This RN gave SBAR report to BRITTNI TIERNEY RN at this time. Offered oncoming RN the opportunity to ask any questions and stated pertinent information, additionally tasks that still need to be completed. Pt's bed locked & in lowest position and call light w/in reach. Emergency equipment at bedside.         Lady Leyva, RN  12/02/23 3216

## 2023-12-02 NOTE — ED PROVIDER NOTES
left lower quadrant rated 8 out of 10. He states in addition he has had multiple episodes of loose stool described as cream-colored. He denies any fever or chills. He denies any nausea or vomiting. He denies any chest pain or shortness of breath. He denies any cough or cold symptoms. Patient's urinary catheter has been present for approximately a month and a half has not been changed out. Will obtain CBC, CMP, lipase, magnesium, CT abdomen pelvis, C. difficile and stool cultures. EKG will also be obtained for possible electrolyte disturbance. EKG obtained at 11:01 AM interpreted by me sinus bradycardia rate 55, normal axis/ID/QRS, early repull, no acute ST changes. Patient with hypokalemia will replace orally. Patient with hypomagnesemia will replace IV. CT abdomen pelvis showing significant constipation throughout the colon in addition to some small bowel dilatation without a definitive transition point. Patient's had no active nausea and vomiting. Will attempt soapsuds enema. Following soapsuds enema. Patient did not have any significant bowel movement. Patient has been admitted for this on several different occasions at [facility]. Will consult hospitalist for admission. Urinalysis pending. Consult note:  Case discussed with Dr. Rudy Benjamin. Patient will be evaluated and admitted. FINAL IMPRESSION     1. Constipation, unspecified constipation type    2. Intractable abdominal pain    3. Hypokalemia    4. Hypomagnesemia    5. Alcohol-induced chronic pancreatitis [facility])          DISPOSITION/PLAN   Harpreet Stanford Jr's  results have been reviewed with him. He has been counseled regarding his diagnosis, treatment, and plan. He verbally conveys understanding and agreement of the signs, symptoms, diagnosis, treatment and prognosis and additionally agrees to follow up as discussed. He also agrees with the care-plan and conveys that all of his questions have been answered.       CLINICAL

## 2023-12-02 NOTE — ED NOTES
Gave pt soap subs enema at this time per Wilfrid Ortega MD order.      [facility], RN  12/02/23 1202

## 2023-12-03 LAB
ANION GAP SERPL CALC-SCNC: 0 MMOL/L (ref 5–15)
BASOPHILS # BLD: 0 K/UL (ref 0–0.1)
BASOPHILS NFR BLD: 1 % (ref 0–1)
BUN SERPL-MCNC: 10 MG/DL (ref 6–20)
BUN/CREAT SERPL: 14 (ref 12–20)
C DIFF TOX GENS STL QL NAA+PROBE: NEGATIVE
C DIFF TOXIN INTERPRETATION: ABNORMAL
CALCIUM SERPL-MCNC: 8.2 MG/DL (ref 8.5–10.1)
CHLORIDE SERPL-SCNC: 117 MMOL/L (ref 97–108)
CO2 SERPL-SCNC: 25 MMOL/L (ref 21–32)
CREAT SERPL-MCNC: 0.71 MG/DL (ref 0.7–1.3)
DIFFERENTIAL METHOD BLD: ABNORMAL
EKG ATRIAL RATE: 55 BPM
EKG DIAGNOSIS: NORMAL
EKG P AXIS: 81 DEGREES
EKG P-R INTERVAL: 118 MS
EKG Q-T INTERVAL: 412 MS
EKG QRS DURATION: 82 MS
EKG QTC CALCULATION (BAZETT): 394 MS
EKG R AXIS: 68 DEGREES
EKG T AXIS: 66 DEGREES
EKG VENTRICULAR RATE: 55 BPM
EOSINOPHIL # BLD: 0.2 K/UL (ref 0–0.4)
EOSINOPHIL NFR BLD: 2 % (ref 0–7)
ERYTHROCYTE [DISTWIDTH] IN BLOOD BY AUTOMATED COUNT: 17.2 % (ref 11.5–14.5)
GLUCOSE SERPL-MCNC: 73 MG/DL (ref 65–100)
HCT VFR BLD AUTO: 27.6 % (ref 36.6–50.3)
HGB BLD-MCNC: 8.8 G/DL (ref 12.1–17)
IMM GRANULOCYTES # BLD AUTO: 0.1 K/UL (ref 0–0.04)
IMM GRANULOCYTES NFR BLD AUTO: 1 % (ref 0–0.5)
LYMPHOCYTES # BLD: 1.8 K/UL (ref 0.8–3.5)
LYMPHOCYTES NFR BLD: 21 % (ref 12–49)
MAGNESIUM SERPL-MCNC: 1.7 MG/DL (ref 1.6–2.4)
MCH RBC QN AUTO: 29.2 PG (ref 26–34)
MCHC RBC AUTO-ENTMCNC: 31.9 G/DL (ref 30–36.5)
MCV RBC AUTO: 91.7 FL (ref 80–99)
MONOCYTES # BLD: 0.6 K/UL (ref 0–1)
MONOCYTES NFR BLD: 7 % (ref 5–13)
NEUTS SEG # BLD: 5.9 K/UL (ref 1.8–8)
NEUTS SEG NFR BLD: 68 % (ref 32–75)
NRBC # BLD: 0 K/UL (ref 0–0.01)
NRBC BLD-RTO: 0 PER 100 WBC
PLATELET # BLD AUTO: 261 K/UL (ref 150–400)
PMV BLD AUTO: 9.7 FL (ref 8.9–12.9)
POTASSIUM SERPL-SCNC: 3.4 MMOL/L (ref 3.5–5.1)
RBC # BLD AUTO: 3.01 M/UL (ref 4.1–5.7)
REFLEX: ABNORMAL
SODIUM SERPL-SCNC: 142 MMOL/L (ref 136–145)
WBC # BLD AUTO: 8.6 K/UL (ref 4.1–11.1)

## 2023-12-03 PROCEDURE — 6370000000 HC RX 637 (ALT 250 FOR IP): Performed by: STUDENT IN AN ORGANIZED HEALTH CARE EDUCATION/TRAINING PROGRAM

## 2023-12-03 PROCEDURE — 6370000000 HC RX 637 (ALT 250 FOR IP): Performed by: INTERNAL MEDICINE

## 2023-12-03 PROCEDURE — 83735 ASSAY OF MAGNESIUM: CPT

## 2023-12-03 PROCEDURE — 2580000003 HC RX 258: Performed by: INTERNAL MEDICINE

## 2023-12-03 PROCEDURE — 85025 COMPLETE CBC W/AUTO DIFF WBC: CPT

## 2023-12-03 PROCEDURE — 1100000000 HC RM PRIVATE

## 2023-12-03 PROCEDURE — 36415 COLL VENOUS BLD VENIPUNCTURE: CPT

## 2023-12-03 PROCEDURE — 80048 BASIC METABOLIC PNL TOTAL CA: CPT

## 2023-12-03 RX ADMIN — SODIUM CHLORIDE, PRESERVATIVE FREE 10 ML: 5 INJECTION INTRAVENOUS at 09:32

## 2023-12-03 RX ADMIN — MELATONIN 1.5 MG: at 21:59

## 2023-12-03 RX ADMIN — PANCRELIPASE LIPASE, PANCRELIPASE PROTEASE, PANCRELIPASE AMYLASE 20000 UNITS: 20000; 63000; 84000 CAPSULE, DELAYED RELEASE ORAL at 13:23

## 2023-12-03 RX ADMIN — DICYCLOMINE HYDROCHLORIDE 10 MG: 10 CAPSULE ORAL at 06:13

## 2023-12-03 RX ADMIN — FAMOTIDINE 20 MG: 20 TABLET, FILM COATED ORAL at 09:32

## 2023-12-03 RX ADMIN — AMITRIPTYLINE HYDROCHLORIDE 10 MG: 10 TABLET, FILM COATED ORAL at 21:59

## 2023-12-03 RX ADMIN — PANCRELIPASE LIPASE, PANCRELIPASE PROTEASE, PANCRELIPASE AMYLASE 20000 UNITS: 20000; 63000; 84000 CAPSULE, DELAYED RELEASE ORAL at 17:58

## 2023-12-03 RX ADMIN — DOCUSATE SODIUM 50MG AND SENNOSIDES 8.6MG 1 TABLET: 8.6; 5 TABLET, FILM COATED ORAL at 21:59

## 2023-12-03 RX ADMIN — METOPROLOL TARTRATE 12.5 MG: 25 TABLET, FILM COATED ORAL at 09:32

## 2023-12-03 RX ADMIN — CHOLECALCIFEROL TAB 25 MCG (1000 UNIT) 5000 UNITS: 25 TAB at 10:00

## 2023-12-03 RX ADMIN — TAMSULOSIN HYDROCHLORIDE 0.4 MG: 0.4 CAPSULE ORAL at 09:32

## 2023-12-03 RX ADMIN — FOLIC ACID 1 MG: 1 TABLET ORAL at 09:32

## 2023-12-03 RX ADMIN — FINASTERIDE 5 MG: 5 TABLET, FILM COATED ORAL at 09:32

## 2023-12-03 RX ADMIN — PANCRELIPASE LIPASE, PANCRELIPASE PROTEASE, PANCRELIPASE AMYLASE 20000 UNITS: 20000; 63000; 84000 CAPSULE, DELAYED RELEASE ORAL at 09:32

## 2023-12-03 RX ADMIN — Medication 100 MG: at 10:00

## 2023-12-03 RX ADMIN — SODIUM CHLORIDE, PRESERVATIVE FREE 10 ML: 5 INJECTION INTRAVENOUS at 22:00

## 2023-12-03 RX ADMIN — METOPROLOL TARTRATE 12.5 MG: 25 TABLET, FILM COATED ORAL at 22:01

## 2023-12-03 RX ADMIN — DICYCLOMINE HYDROCHLORIDE 10 MG: 10 CAPSULE ORAL at 22:00

## 2023-12-03 RX ADMIN — FERROUS SULFATE TAB 325 MG (65 MG ELEMENTAL FE) 325 MG: 325 (65 FE) TAB at 09:32

## 2023-12-03 ASSESSMENT — PAIN DESCRIPTION - ORIENTATION
ORIENTATION: LEFT
ORIENTATION: ANTERIOR

## 2023-12-03 ASSESSMENT — PAIN SCALES - GENERAL
PAINLEVEL_OUTOF10: 8
PAINLEVEL_OUTOF10: 2
PAINLEVEL_OUTOF10: 0
PAINLEVEL_OUTOF10: 7

## 2023-12-03 ASSESSMENT — PAIN DESCRIPTION - DESCRIPTORS
DESCRIPTORS: ACHING;CRAMPING
DESCRIPTORS: CRAMPING

## 2023-12-03 ASSESSMENT — PAIN DESCRIPTION - LOCATION
LOCATION: ABDOMEN
LOCATION: ABDOMEN

## 2023-12-03 ASSESSMENT — PAIN DESCRIPTION - PAIN TYPE: TYPE: ACUTE PAIN

## 2023-12-03 ASSESSMENT — PAIN DESCRIPTION - FREQUENCY: FREQUENCY: INTERMITTENT

## 2023-12-03 ASSESSMENT — PAIN - FUNCTIONAL ASSESSMENT: PAIN_FUNCTIONAL_ASSESSMENT: PREVENTS OR INTERFERES SOME ACTIVE ACTIVITIES AND ADLS

## 2023-12-03 ASSESSMENT — PAIN DESCRIPTION - ONSET: ONSET: GRADUAL

## 2023-12-04 LAB
ANION GAP SERPL CALC-SCNC: 4 MMOL/L (ref 5–15)
BASOPHILS # BLD: 0 K/UL (ref 0–0.1)
BASOPHILS NFR BLD: 0 % (ref 0–1)
BUN SERPL-MCNC: 8 MG/DL (ref 6–20)
BUN/CREAT SERPL: 10 (ref 12–20)
CALCIUM SERPL-MCNC: 8 MG/DL (ref 8.5–10.1)
CHLORIDE SERPL-SCNC: 115 MMOL/L (ref 97–108)
CO2 SERPL-SCNC: 20 MMOL/L (ref 21–32)
CREAT SERPL-MCNC: 0.78 MG/DL (ref 0.7–1.3)
DIFFERENTIAL METHOD BLD: ABNORMAL
EOSINOPHIL # BLD: 0.1 K/UL (ref 0–0.4)
EOSINOPHIL NFR BLD: 2 % (ref 0–7)
ERYTHROCYTE [DISTWIDTH] IN BLOOD BY AUTOMATED COUNT: 17.2 % (ref 11.5–14.5)
GLUCOSE SERPL-MCNC: 89 MG/DL (ref 65–100)
HCT VFR BLD AUTO: 25.5 % (ref 36.6–50.3)
HGB BLD-MCNC: 8.4 G/DL (ref 12.1–17)
IMM GRANULOCYTES # BLD AUTO: 0 K/UL (ref 0–0.04)
IMM GRANULOCYTES NFR BLD AUTO: 0 % (ref 0–0.5)
LYMPHOCYTES # BLD: 2.2 K/UL (ref 0.8–3.5)
LYMPHOCYTES NFR BLD: 26 % (ref 12–49)
MCH RBC QN AUTO: 30.1 PG (ref 26–34)
MCHC RBC AUTO-ENTMCNC: 32.9 G/DL (ref 30–36.5)
MCV RBC AUTO: 91.4 FL (ref 80–99)
MONOCYTES # BLD: 0.6 K/UL (ref 0–1)
MONOCYTES NFR BLD: 7 % (ref 5–13)
NEUTS SEG # BLD: 5.2 K/UL (ref 1.8–8)
NEUTS SEG NFR BLD: 65 % (ref 32–75)
NRBC # BLD: 0 K/UL (ref 0–0.01)
NRBC BLD-RTO: 0 PER 100 WBC
PLATELET # BLD AUTO: 230 K/UL (ref 150–400)
PMV BLD AUTO: 10.3 FL (ref 8.9–12.9)
POTASSIUM SERPL-SCNC: 3.6 MMOL/L (ref 3.5–5.1)
RBC # BLD AUTO: 2.79 M/UL (ref 4.1–5.7)
SODIUM SERPL-SCNC: 139 MMOL/L (ref 136–145)
WBC # BLD AUTO: 8.2 K/UL (ref 4.1–11.1)

## 2023-12-04 PROCEDURE — 36415 COLL VENOUS BLD VENIPUNCTURE: CPT

## 2023-12-04 PROCEDURE — 1100000000 HC RM PRIVATE

## 2023-12-04 PROCEDURE — 6360000002 HC RX W HCPCS: Performed by: STUDENT IN AN ORGANIZED HEALTH CARE EDUCATION/TRAINING PROGRAM

## 2023-12-04 PROCEDURE — 6370000000 HC RX 637 (ALT 250 FOR IP): Performed by: STUDENT IN AN ORGANIZED HEALTH CARE EDUCATION/TRAINING PROGRAM

## 2023-12-04 PROCEDURE — 80048 BASIC METABOLIC PNL TOTAL CA: CPT

## 2023-12-04 PROCEDURE — 6360000002 HC RX W HCPCS: Performed by: INTERNAL MEDICINE

## 2023-12-04 PROCEDURE — 51702 INSERT TEMP BLADDER CATH: CPT

## 2023-12-04 PROCEDURE — 2580000003 HC RX 258: Performed by: INTERNAL MEDICINE

## 2023-12-04 PROCEDURE — 85025 COMPLETE CBC W/AUTO DIFF WBC: CPT

## 2023-12-04 PROCEDURE — 2580000003 HC RX 258: Performed by: STUDENT IN AN ORGANIZED HEALTH CARE EDUCATION/TRAINING PROGRAM

## 2023-12-04 PROCEDURE — 6370000000 HC RX 637 (ALT 250 FOR IP): Performed by: INTERNAL MEDICINE

## 2023-12-04 RX ORDER — POLYETHYLENE GLYCOL 3350 17 G/17G
17 POWDER, FOR SOLUTION ORAL 2 TIMES DAILY
Status: DISCONTINUED | OUTPATIENT
Start: 2023-12-04 | End: 2023-12-09

## 2023-12-04 RX ORDER — TRAMADOL HYDROCHLORIDE 50 MG/1
50 TABLET ORAL EVERY 6 HOURS PRN
Status: DISCONTINUED | OUTPATIENT
Start: 2023-12-04 | End: 2023-12-15 | Stop reason: HOSPADM

## 2023-12-04 RX ADMIN — POLYETHYLENE GLYCOL 3350 17 G: 17 POWDER, FOR SOLUTION ORAL at 11:01

## 2023-12-04 RX ADMIN — TRAMADOL HYDROCHLORIDE 50 MG: 50 TABLET ORAL at 21:23

## 2023-12-04 RX ADMIN — DOCUSATE SODIUM 50MG AND SENNOSIDES 8.6MG 1 TABLET: 8.6; 5 TABLET, FILM COATED ORAL at 11:02

## 2023-12-04 RX ADMIN — METOPROLOL TARTRATE 12.5 MG: 25 TABLET, FILM COATED ORAL at 11:02

## 2023-12-04 RX ADMIN — AMITRIPTYLINE HYDROCHLORIDE 10 MG: 10 TABLET, FILM COATED ORAL at 21:07

## 2023-12-04 RX ADMIN — FAMOTIDINE 20 MG: 20 TABLET, FILM COATED ORAL at 11:01

## 2023-12-04 RX ADMIN — FOLIC ACID 1 MG: 1 TABLET ORAL at 11:02

## 2023-12-04 RX ADMIN — PANCRELIPASE LIPASE, PANCRELIPASE PROTEASE, PANCRELIPASE AMYLASE 20000 UNITS: 20000; 63000; 84000 CAPSULE, DELAYED RELEASE ORAL at 11:01

## 2023-12-04 RX ADMIN — CEFTRIAXONE SODIUM 1000 MG: 1 INJECTION, POWDER, FOR SOLUTION INTRAMUSCULAR; INTRAVENOUS at 13:11

## 2023-12-04 RX ADMIN — Medication 100 MG: at 11:02

## 2023-12-04 RX ADMIN — FINASTERIDE 5 MG: 5 TABLET, FILM COATED ORAL at 11:01

## 2023-12-04 RX ADMIN — SODIUM CHLORIDE, PRESERVATIVE FREE 10 ML: 5 INJECTION INTRAVENOUS at 21:34

## 2023-12-04 RX ADMIN — MELATONIN 1.5 MG: at 21:07

## 2023-12-04 RX ADMIN — CHOLECALCIFEROL TAB 25 MCG (1000 UNIT) 5000 UNITS: 25 TAB at 11:01

## 2023-12-04 RX ADMIN — TRAMADOL HYDROCHLORIDE 50 MG: 50 TABLET ORAL at 11:01

## 2023-12-04 RX ADMIN — TAMSULOSIN HYDROCHLORIDE 0.4 MG: 0.4 CAPSULE ORAL at 11:01

## 2023-12-04 RX ADMIN — PANCRELIPASE LIPASE, PANCRELIPASE PROTEASE, PANCRELIPASE AMYLASE 20000 UNITS: 20000; 63000; 84000 CAPSULE, DELAYED RELEASE ORAL at 18:05

## 2023-12-04 RX ADMIN — FERROUS SULFATE TAB 325 MG (65 MG ELEMENTAL FE) 325 MG: 325 (65 FE) TAB at 11:01

## 2023-12-04 RX ADMIN — METOPROLOL TARTRATE 12.5 MG: 25 TABLET, FILM COATED ORAL at 21:07

## 2023-12-04 ASSESSMENT — PAIN DESCRIPTION - FREQUENCY: FREQUENCY: CONTINUOUS

## 2023-12-04 ASSESSMENT — PAIN SCALES - GENERAL
PAINLEVEL_OUTOF10: 10
PAINLEVEL_OUTOF10: 0
PAINLEVEL_OUTOF10: 0
PAINLEVEL_OUTOF10: 8

## 2023-12-04 ASSESSMENT — PAIN DESCRIPTION - PAIN TYPE: TYPE: ACUTE PAIN

## 2023-12-04 ASSESSMENT — PAIN DESCRIPTION - LOCATION
LOCATION: ABDOMEN
LOCATION: ABDOMEN

## 2023-12-04 ASSESSMENT — PAIN DESCRIPTION - ORIENTATION: ORIENTATION: MID

## 2023-12-04 ASSESSMENT — PAIN DESCRIPTION - ONSET: ONSET: ON-GOING

## 2023-12-04 ASSESSMENT — PAIN - FUNCTIONAL ASSESSMENT: PAIN_FUNCTIONAL_ASSESSMENT: ACTIVITIES ARE NOT PREVENTED

## 2023-12-04 ASSESSMENT — PAIN DESCRIPTION - DESCRIPTORS: DESCRIPTORS: ACHING

## 2023-12-04 NOTE — PLAN OF CARE
Problem: Discharge Planning  Goal: Discharge to home or other facility with appropriate resources  Outcome: Progressing  Flowsheets (Taken 12/3/2023 2047)  Discharge to home or other facility with appropriate resources:   Identify barriers to discharge with patient and caregiver   Arrange for needed discharge resources and transportation as appropriate   Identify discharge learning needs (meds, wound care, etc)     Problem: Pain  Goal: Verbalizes/displays adequate comfort level or baseline comfort level  Outcome: Progressing  Flowsheets (Taken 12/3/2023 2200)  Verbalizes/displays adequate comfort level or baseline comfort level:   Encourage patient to monitor pain and request assistance   Administer analgesics based on type and severity of pain and evaluate response   Assess pain using appropriate pain scale     Problem: Skin/Tissue Integrity  Goal: Absence of new skin breakdown  Description: 1. Monitor for areas of redness and/or skin breakdown  2. Assess vascular access sites hourly  3. Every 4-6 hours minimum:  Change oxygen saturation probe site  4. Every 4-6 hours:  If on nasal continuous positive airway pressure, respiratory therapy assess nares and determine need for appliance change or resting period.   Outcome: Progressing     Problem: Safety - Adult  Goal: Free from fall injury  Outcome: Progressing  Flowsheets (Taken 12/3/2023 2047)  Free From Fall Injury: Instruct family/caregiver on patient safety

## 2023-12-05 ENCOUNTER — APPOINTMENT (OUTPATIENT)
Facility: HOSPITAL | Age: 58
DRG: 388 | End: 2023-12-05
Payer: MEDICARE

## 2023-12-05 LAB
BACTERIA SPEC CULT: ABNORMAL
CC UR VC: ABNORMAL
SERVICE CMNT-IMP: ABNORMAL

## 2023-12-05 PROCEDURE — 2580000003 HC RX 258: Performed by: STUDENT IN AN ORGANIZED HEALTH CARE EDUCATION/TRAINING PROGRAM

## 2023-12-05 PROCEDURE — 6360000002 HC RX W HCPCS: Performed by: STUDENT IN AN ORGANIZED HEALTH CARE EDUCATION/TRAINING PROGRAM

## 2023-12-05 PROCEDURE — 92610 EVALUATE SWALLOWING FUNCTION: CPT

## 2023-12-05 PROCEDURE — 6370000000 HC RX 637 (ALT 250 FOR IP): Performed by: INTERNAL MEDICINE

## 2023-12-05 PROCEDURE — 6370000000 HC RX 637 (ALT 250 FOR IP): Performed by: NURSE PRACTITIONER

## 2023-12-05 PROCEDURE — 2580000003 HC RX 258: Performed by: INTERNAL MEDICINE

## 2023-12-05 PROCEDURE — 1100000000 HC RM PRIVATE

## 2023-12-05 PROCEDURE — 74220 X-RAY XM ESOPHAGUS 1CNTRST: CPT

## 2023-12-05 PROCEDURE — 6370000000 HC RX 637 (ALT 250 FOR IP): Performed by: STUDENT IN AN ORGANIZED HEALTH CARE EDUCATION/TRAINING PROGRAM

## 2023-12-05 PROCEDURE — 6360000002 HC RX W HCPCS: Performed by: INTERNAL MEDICINE

## 2023-12-05 RX ORDER — PANTOPRAZOLE SODIUM 40 MG/1
40 TABLET, DELAYED RELEASE ORAL
Status: DISCONTINUED | OUTPATIENT
Start: 2023-12-05 | End: 2023-12-15 | Stop reason: HOSPADM

## 2023-12-05 RX ADMIN — Medication 100 MG: at 08:40

## 2023-12-05 RX ADMIN — TAMSULOSIN HYDROCHLORIDE 0.4 MG: 0.4 CAPSULE ORAL at 08:41

## 2023-12-05 RX ADMIN — AMITRIPTYLINE HYDROCHLORIDE 10 MG: 10 TABLET, FILM COATED ORAL at 19:56

## 2023-12-05 RX ADMIN — ONDANSETRON 4 MG: 2 INJECTION INTRAMUSCULAR; INTRAVENOUS at 10:36

## 2023-12-05 RX ADMIN — CEFTRIAXONE SODIUM 1000 MG: 1 INJECTION, POWDER, FOR SOLUTION INTRAMUSCULAR; INTRAVENOUS at 12:04

## 2023-12-05 RX ADMIN — PANTOPRAZOLE SODIUM 40 MG: 40 TABLET, DELAYED RELEASE ORAL at 17:21

## 2023-12-05 RX ADMIN — PANCRELIPASE LIPASE, PANCRELIPASE PROTEASE, PANCRELIPASE AMYLASE 20000 UNITS: 20000; 63000; 84000 CAPSULE, DELAYED RELEASE ORAL at 17:21

## 2023-12-05 RX ADMIN — SODIUM CHLORIDE: 9 INJECTION, SOLUTION INTRAVENOUS at 19:54

## 2023-12-05 RX ADMIN — CHOLECALCIFEROL TAB 25 MCG (1000 UNIT) 5000 UNITS: 25 TAB at 08:40

## 2023-12-05 RX ADMIN — TRAMADOL HYDROCHLORIDE 50 MG: 50 TABLET ORAL at 19:59

## 2023-12-05 RX ADMIN — POLYETHYLENE GLYCOL 3350 17 G: 17 POWDER, FOR SOLUTION ORAL at 08:41

## 2023-12-05 RX ADMIN — PANCRELIPASE LIPASE, PANCRELIPASE PROTEASE, PANCRELIPASE AMYLASE 20000 UNITS: 20000; 63000; 84000 CAPSULE, DELAYED RELEASE ORAL at 12:05

## 2023-12-05 RX ADMIN — TRAMADOL HYDROCHLORIDE 50 MG: 50 TABLET ORAL at 08:22

## 2023-12-05 RX ADMIN — PANCRELIPASE LIPASE, PANCRELIPASE PROTEASE, PANCRELIPASE AMYLASE 20000 UNITS: 20000; 63000; 84000 CAPSULE, DELAYED RELEASE ORAL at 08:41

## 2023-12-05 RX ADMIN — LINACLOTIDE 145 MCG: 145 CAPSULE, GELATIN COATED ORAL at 08:45

## 2023-12-05 RX ADMIN — METOPROLOL TARTRATE 12.5 MG: 25 TABLET, FILM COATED ORAL at 08:40

## 2023-12-05 RX ADMIN — FINASTERIDE 5 MG: 5 TABLET, FILM COATED ORAL at 08:41

## 2023-12-05 RX ADMIN — FOLIC ACID 1 MG: 1 TABLET ORAL at 08:40

## 2023-12-05 RX ADMIN — FERROUS SULFATE TAB 325 MG (65 MG ELEMENTAL FE) 325 MG: 325 (65 FE) TAB at 08:40

## 2023-12-05 RX ADMIN — MELATONIN 1.5 MG: at 19:55

## 2023-12-05 RX ADMIN — FAMOTIDINE 20 MG: 20 TABLET, FILM COATED ORAL at 08:40

## 2023-12-05 ASSESSMENT — PAIN DESCRIPTION - DESCRIPTORS
DESCRIPTORS: ACHING

## 2023-12-05 ASSESSMENT — PAIN DESCRIPTION - ORIENTATION
ORIENTATION: LOWER;LEFT
ORIENTATION: ANTERIOR
ORIENTATION: LOWER
ORIENTATION: LOWER

## 2023-12-05 ASSESSMENT — PAIN DESCRIPTION - LOCATION
LOCATION: ABDOMEN

## 2023-12-05 ASSESSMENT — PAIN DESCRIPTION - ONSET
ONSET: ON-GOING

## 2023-12-05 ASSESSMENT — PAIN DESCRIPTION - FREQUENCY
FREQUENCY: CONTINUOUS

## 2023-12-05 ASSESSMENT — PAIN SCALES - GENERAL
PAINLEVEL_OUTOF10: 4
PAINLEVEL_OUTOF10: 4
PAINLEVEL_OUTOF10: 8
PAINLEVEL_OUTOF10: 8

## 2023-12-05 ASSESSMENT — PAIN DESCRIPTION - PAIN TYPE
TYPE: ACUTE PAIN

## 2023-12-05 ASSESSMENT — PAIN - FUNCTIONAL ASSESSMENT
PAIN_FUNCTIONAL_ASSESSMENT: ACTIVITIES ARE NOT PREVENTED

## 2023-12-05 NOTE — CARE COORDINATION
Care Management Initial Assessment       RUR: 22%  Readmission? No  1st IM letter given? Yes -   1st  letter given: No     CM completed assessment w/pt at bedside. Patient is currently open w/[facility] [id number] [facility]. DME use includes a r/w. Patient does not report any support system & is  from his wife. Patient uses [employer] in [place]. Patient states his insurance will provide transportation at d/c. CM will continue to follow. 12/05/23 1601   Service Assessment   Patient Orientation Alert and Oriented   Cognition Alert   History Provided By Patient   Primary Caregiver Self   Support Systems None   PCP Verified by CM Yes   Last Visit to PCP Within last year   Prior Functional Level Independent in ADLs/IADLs   Current Functional Level Independent in ADLs/IADLs   Can patient return to prior living arrangement Yes   Family able to assist with home care needs: No   Would you like for me to discuss the discharge plan with any other family members/significant others, and if so, who?  No   Financial Resources [employer] None   Social/Functional History   Lives With Alone   Type of Home Apartment  (High rise apartment no HOME  has elevator)   Home Equipment Walker, rolling   Active  Yes     Les Sprawls  Ext [phone]

## 2023-12-05 NOTE — PLAN OF CARE
Problem: Discharge Planning  Goal: Discharge to home or other facility with appropriate resources  12/4/2023 1928 by Michel Feliciano RN  Outcome: Progressing  12/4/2023 0941 by Joretta Bloch, RN  Outcome: Progressing  Flowsheets (Taken 12/3/2023 2047)  Discharge to home or other facility with appropriate resources:   Identify barriers to discharge with patient and caregiver   Arrange for needed discharge resources and transportation as appropriate   Identify discharge learning needs (meds, wound care, etc)     Problem: Pain  Goal: Verbalizes/displays adequate comfort level or baseline comfort level  12/4/2023 1928 by Michel Feliciano RN  Outcome: Progressing  12/4/2023 0941 by Joretta Bloch, RN  Outcome: Progressing  Flowsheets (Taken 12/3/2023 2200)  Verbalizes/displays adequate comfort level or baseline comfort level:   Encourage patient to monitor pain and request assistance   Administer analgesics based on type and severity of pain and evaluate response   Assess pain using appropriate pain scale     Problem: Skin/Tissue Integrity  Goal: Absence of new skin breakdown  Description: 1. Monitor for areas of redness and/or skin breakdown  2. Assess vascular access sites hourly  3. Every 4-6 hours minimum:  Change oxygen saturation probe site  4. Every 4-6 hours:  If on nasal continuous positive airway pressure, respiratory therapy assess nares and determine need for appliance change or resting period.   12/4/2023 1928 by Michel Feliciano RN  Outcome: Progressing  12/4/2023 0941 by Joretta Bloch, RN  Outcome: Progressing     Problem: Safety - Adult  Goal: Free from fall injury  12/4/2023 1928 by Michel Feliciano RN  Outcome: Progressing  12/4/2023 0941 by Joretta Bloch, RN  Outcome: Progressing  Flowsheets (Taken 12/3/2023 2047)  Free From Fall Injury: Instruct family/caregiver on patient safety

## 2023-12-05 NOTE — PLAN OF CARE
Problem: Discharge Planning  Goal: Discharge to home or other facility with appropriate resources  Outcome: Progressing  Flowsheets (Taken 12/5/2023 7674)  Discharge to home or other facility with appropriate resources:   Identify barriers to discharge with patient and caregiver   Arrange for needed discharge resources and transportation as appropriate   Identify discharge learning needs (meds, wound care, etc)     Problem: Pain  Goal: Verbalizes/displays adequate comfort level or baseline comfort level  Outcome: Progressing     Problem: Skin/Tissue Integrity  Goal: Absence of new skin breakdown  Description: 1. Monitor for areas of redness and/or skin breakdown  2. Assess vascular access sites hourly  3. Every 4-6 hours minimum:  Change oxygen saturation probe site  4. Every 4-6 hours:  If on nasal continuous positive airway pressure, respiratory therapy assess nares and determine need for appliance change or resting period.   Outcome: Progressing     Problem: Safety - Adult  Goal: Free from fall injury  Outcome: Progressing

## 2023-12-06 ENCOUNTER — APPOINTMENT (OUTPATIENT)
Facility: HOSPITAL | Age: 58
DRG: 388 | End: 2023-12-06
Payer: MEDICARE

## 2023-12-06 LAB
ANION GAP SERPL CALC-SCNC: 4 MMOL/L (ref 5–15)
BASOPHILS # BLD: 0 K/UL (ref 0–0.1)
BASOPHILS NFR BLD: 0 % (ref 0–1)
BUN SERPL-MCNC: 9 MG/DL (ref 6–20)
BUN/CREAT SERPL: 12 (ref 12–20)
CALCIUM SERPL-MCNC: 8.6 MG/DL (ref 8.5–10.1)
CHLORIDE SERPL-SCNC: 113 MMOL/L (ref 97–108)
CO2 SERPL-SCNC: 20 MMOL/L (ref 21–32)
CREAT SERPL-MCNC: 0.74 MG/DL (ref 0.7–1.3)
DIFFERENTIAL METHOD BLD: ABNORMAL
EOSINOPHIL # BLD: 0.1 K/UL (ref 0–0.4)
EOSINOPHIL NFR BLD: 1 % (ref 0–7)
ERYTHROCYTE [DISTWIDTH] IN BLOOD BY AUTOMATED COUNT: 17.4 % (ref 11.5–14.5)
GLUCOSE SERPL-MCNC: 85 MG/DL (ref 65–100)
HCT VFR BLD AUTO: 24.5 % (ref 36.6–50.3)
HGB BLD-MCNC: 8.1 G/DL (ref 12.1–17)
IMM GRANULOCYTES # BLD AUTO: 0 K/UL (ref 0–0.04)
IMM GRANULOCYTES NFR BLD AUTO: 0 % (ref 0–0.5)
LYMPHOCYTES # BLD: 1.8 K/UL (ref 0.8–3.5)
LYMPHOCYTES NFR BLD: 20 % (ref 12–49)
MCH RBC QN AUTO: 29.9 PG (ref 26–34)
MCHC RBC AUTO-ENTMCNC: 33.1 G/DL (ref 30–36.5)
MCV RBC AUTO: 90.4 FL (ref 80–99)
MONOCYTES # BLD: 0.8 K/UL (ref 0–1)
MONOCYTES NFR BLD: 8 % (ref 5–13)
NEUTS SEG # BLD: 6.6 K/UL (ref 1.8–8)
NEUTS SEG NFR BLD: 71 % (ref 32–75)
NRBC # BLD: 0 K/UL (ref 0–0.01)
NRBC BLD-RTO: 0 PER 100 WBC
PLATELET # BLD AUTO: 219 K/UL (ref 150–400)
PMV BLD AUTO: 11 FL (ref 8.9–12.9)
POTASSIUM SERPL-SCNC: 4.2 MMOL/L (ref 3.5–5.1)
RBC # BLD AUTO: 2.71 M/UL (ref 4.1–5.7)
SODIUM SERPL-SCNC: 137 MMOL/L (ref 136–145)
WBC # BLD AUTO: 9.5 K/UL (ref 4.1–11.1)

## 2023-12-06 PROCEDURE — 80048 BASIC METABOLIC PNL TOTAL CA: CPT

## 2023-12-06 PROCEDURE — 6370000000 HC RX 637 (ALT 250 FOR IP): Performed by: NURSE PRACTITIONER

## 2023-12-06 PROCEDURE — 1100000000 HC RM PRIVATE

## 2023-12-06 PROCEDURE — 74018 RADEX ABDOMEN 1 VIEW: CPT

## 2023-12-06 PROCEDURE — 6360000002 HC RX W HCPCS: Performed by: STUDENT IN AN ORGANIZED HEALTH CARE EDUCATION/TRAINING PROGRAM

## 2023-12-06 PROCEDURE — 36415 COLL VENOUS BLD VENIPUNCTURE: CPT

## 2023-12-06 PROCEDURE — 6370000000 HC RX 637 (ALT 250 FOR IP): Performed by: STUDENT IN AN ORGANIZED HEALTH CARE EDUCATION/TRAINING PROGRAM

## 2023-12-06 PROCEDURE — 85025 COMPLETE CBC W/AUTO DIFF WBC: CPT

## 2023-12-06 PROCEDURE — 6370000000 HC RX 637 (ALT 250 FOR IP): Performed by: INTERNAL MEDICINE

## 2023-12-06 PROCEDURE — 2580000003 HC RX 258: Performed by: STUDENT IN AN ORGANIZED HEALTH CARE EDUCATION/TRAINING PROGRAM

## 2023-12-06 PROCEDURE — 6360000002 HC RX W HCPCS: Performed by: INTERNAL MEDICINE

## 2023-12-06 PROCEDURE — 2580000003 HC RX 258: Performed by: INTERNAL MEDICINE

## 2023-12-06 RX ADMIN — AMITRIPTYLINE HYDROCHLORIDE 10 MG: 10 TABLET, FILM COATED ORAL at 21:31

## 2023-12-06 RX ADMIN — PANCRELIPASE LIPASE, PANCRELIPASE PROTEASE, PANCRELIPASE AMYLASE 20000 UNITS: 20000; 63000; 84000 CAPSULE, DELAYED RELEASE ORAL at 08:20

## 2023-12-06 RX ADMIN — CHOLECALCIFEROL TAB 25 MCG (1000 UNIT) 5000 UNITS: 25 TAB at 08:20

## 2023-12-06 RX ADMIN — FOLIC ACID 1 MG: 1 TABLET ORAL at 08:19

## 2023-12-06 RX ADMIN — DOCUSATE SODIUM 50MG AND SENNOSIDES 8.6MG 1 TABLET: 8.6; 5 TABLET, FILM COATED ORAL at 08:21

## 2023-12-06 RX ADMIN — DOCUSATE SODIUM 50MG AND SENNOSIDES 8.6MG 1 TABLET: 8.6; 5 TABLET, FILM COATED ORAL at 21:31

## 2023-12-06 RX ADMIN — MELATONIN 1.5 MG: at 21:31

## 2023-12-06 RX ADMIN — TAMSULOSIN HYDROCHLORIDE 0.4 MG: 0.4 CAPSULE ORAL at 08:21

## 2023-12-06 RX ADMIN — PANTOPRAZOLE SODIUM 40 MG: 40 TABLET, DELAYED RELEASE ORAL at 16:29

## 2023-12-06 RX ADMIN — ONDANSETRON 4 MG: 2 INJECTION INTRAMUSCULAR; INTRAVENOUS at 11:04

## 2023-12-06 RX ADMIN — PANCRELIPASE LIPASE, PANCRELIPASE PROTEASE, PANCRELIPASE AMYLASE 20000 UNITS: 20000; 63000; 84000 CAPSULE, DELAYED RELEASE ORAL at 16:29

## 2023-12-06 RX ADMIN — Medication 100 MG: at 08:20

## 2023-12-06 RX ADMIN — METOPROLOL TARTRATE 12.5 MG: 25 TABLET, FILM COATED ORAL at 21:31

## 2023-12-06 RX ADMIN — TRAMADOL HYDROCHLORIDE 50 MG: 50 TABLET ORAL at 08:19

## 2023-12-06 RX ADMIN — TRAMADOL HYDROCHLORIDE 50 MG: 50 TABLET ORAL at 21:42

## 2023-12-06 RX ADMIN — SODIUM CHLORIDE, PRESERVATIVE FREE 10 ML: 5 INJECTION INTRAVENOUS at 21:32

## 2023-12-06 RX ADMIN — POLYETHYLENE GLYCOL 3350 17 G: 17 POWDER, FOR SOLUTION ORAL at 08:21

## 2023-12-06 RX ADMIN — PANCRELIPASE LIPASE, PANCRELIPASE PROTEASE, PANCRELIPASE AMYLASE 20000 UNITS: 20000; 63000; 84000 CAPSULE, DELAYED RELEASE ORAL at 11:51

## 2023-12-06 RX ADMIN — ENOXAPARIN SODIUM 30 MG: 100 INJECTION SUBCUTANEOUS at 08:19

## 2023-12-06 RX ADMIN — FINASTERIDE 5 MG: 5 TABLET, FILM COATED ORAL at 08:19

## 2023-12-06 RX ADMIN — SODIUM CHLORIDE: 9 INJECTION, SOLUTION INTRAVENOUS at 21:48

## 2023-12-06 RX ADMIN — METOPROLOL TARTRATE 12.5 MG: 25 TABLET, FILM COATED ORAL at 08:20

## 2023-12-06 RX ADMIN — FERROUS SULFATE TAB 325 MG (65 MG ELEMENTAL FE) 325 MG: 325 (65 FE) TAB at 08:19

## 2023-12-06 RX ADMIN — SODIUM CHLORIDE: 9 INJECTION, SOLUTION INTRAVENOUS at 07:00

## 2023-12-06 RX ADMIN — CEFTRIAXONE SODIUM 1000 MG: 1 INJECTION, POWDER, FOR SOLUTION INTRAMUSCULAR; INTRAVENOUS at 11:52

## 2023-12-06 RX ADMIN — LINACLOTIDE 145 MCG: 145 CAPSULE, GELATIN COATED ORAL at 06:59

## 2023-12-06 RX ADMIN — POLYETHYLENE GLYCOL 3350 17 G: 17 POWDER, FOR SOLUTION ORAL at 21:32

## 2023-12-06 RX ADMIN — PANTOPRAZOLE SODIUM 40 MG: 40 TABLET, DELAYED RELEASE ORAL at 06:59

## 2023-12-06 ASSESSMENT — PAIN DESCRIPTION - ONSET
ONSET: ON-GOING

## 2023-12-06 ASSESSMENT — PAIN DESCRIPTION - DESCRIPTORS
DESCRIPTORS: ACHING

## 2023-12-06 ASSESSMENT — PAIN SCALES - GENERAL
PAINLEVEL_OUTOF10: 4
PAINLEVEL_OUTOF10: 5
PAINLEVEL_OUTOF10: 5
PAINLEVEL_OUTOF10: 7
PAINLEVEL_OUTOF10: 5
PAINLEVEL_OUTOF10: 8

## 2023-12-06 ASSESSMENT — PAIN DESCRIPTION - LOCATION
LOCATION: ABDOMEN

## 2023-12-06 ASSESSMENT — PAIN DESCRIPTION - FREQUENCY
FREQUENCY: CONTINUOUS

## 2023-12-06 ASSESSMENT — PAIN DESCRIPTION - ORIENTATION
ORIENTATION: LEFT
ORIENTATION: LEFT;LOWER
ORIENTATION: LEFT
ORIENTATION: ANTERIOR

## 2023-12-06 ASSESSMENT — PAIN DESCRIPTION - PAIN TYPE
TYPE: ACUTE PAIN

## 2023-12-06 ASSESSMENT — PAIN - FUNCTIONAL ASSESSMENT
PAIN_FUNCTIONAL_ASSESSMENT: ACTIVITIES ARE NOT PREVENTED
PAIN_FUNCTIONAL_ASSESSMENT: PREVENTS OR INTERFERES SOME ACTIVE ACTIVITIES AND ADLS
PAIN_FUNCTIONAL_ASSESSMENT: ACTIVITIES ARE NOT PREVENTED
PAIN_FUNCTIONAL_ASSESSMENT: ACTIVITIES ARE NOT PREVENTED

## 2023-12-07 LAB
ANION GAP SERPL CALC-SCNC: 4 MMOL/L (ref 5–15)
BASOPHILS # BLD: 0 K/UL (ref 0–0.1)
BASOPHILS NFR BLD: 0 % (ref 0–1)
BUN SERPL-MCNC: 9 MG/DL (ref 6–20)
BUN/CREAT SERPL: 11 (ref 12–20)
CALCIUM SERPL-MCNC: 8.2 MG/DL (ref 8.5–10.1)
CHLORIDE SERPL-SCNC: 111 MMOL/L (ref 97–108)
CO2 SERPL-SCNC: 21 MMOL/L (ref 21–32)
CREAT SERPL-MCNC: 0.85 MG/DL (ref 0.7–1.3)
DIFFERENTIAL METHOD BLD: ABNORMAL
EOSINOPHIL # BLD: 0.1 K/UL (ref 0–0.4)
EOSINOPHIL NFR BLD: 1 % (ref 0–7)
ERYTHROCYTE [DISTWIDTH] IN BLOOD BY AUTOMATED COUNT: 17.2 % (ref 11.5–14.5)
GLUCOSE SERPL-MCNC: 132 MG/DL (ref 65–100)
HCT VFR BLD AUTO: 24.2 % (ref 36.6–50.3)
HGB BLD-MCNC: 7.7 G/DL (ref 12.1–17)
IMM GRANULOCYTES # BLD AUTO: 0 K/UL (ref 0–0.04)
IMM GRANULOCYTES NFR BLD AUTO: 0 % (ref 0–0.5)
LYMPHOCYTES # BLD: 2 K/UL (ref 0.8–3.5)
LYMPHOCYTES NFR BLD: 22 % (ref 12–49)
MCH RBC QN AUTO: 29.6 PG (ref 26–34)
MCHC RBC AUTO-ENTMCNC: 31.8 G/DL (ref 30–36.5)
MCV RBC AUTO: 93.1 FL (ref 80–99)
MONOCYTES # BLD: 0.7 K/UL (ref 0–1)
MONOCYTES NFR BLD: 7 % (ref 5–13)
NEUTS SEG # BLD: 6.2 K/UL (ref 1.8–8)
NEUTS SEG NFR BLD: 70 % (ref 32–75)
NRBC # BLD: 0 K/UL (ref 0–0.01)
NRBC BLD-RTO: 0 PER 100 WBC
PLATELET # BLD AUTO: 186 K/UL (ref 150–400)
PMV BLD AUTO: 10.2 FL (ref 8.9–12.9)
POTASSIUM SERPL-SCNC: 4.5 MMOL/L (ref 3.5–5.1)
RBC # BLD AUTO: 2.6 M/UL (ref 4.1–5.7)
SODIUM SERPL-SCNC: 136 MMOL/L (ref 136–145)
WBC # BLD AUTO: 9.1 K/UL (ref 4.1–11.1)

## 2023-12-07 PROCEDURE — 6360000002 HC RX W HCPCS: Performed by: STUDENT IN AN ORGANIZED HEALTH CARE EDUCATION/TRAINING PROGRAM

## 2023-12-07 PROCEDURE — 2580000003 HC RX 258: Performed by: STUDENT IN AN ORGANIZED HEALTH CARE EDUCATION/TRAINING PROGRAM

## 2023-12-07 PROCEDURE — 2580000003 HC RX 258: Performed by: INTERNAL MEDICINE

## 2023-12-07 PROCEDURE — 6370000000 HC RX 637 (ALT 250 FOR IP): Performed by: NURSE PRACTITIONER

## 2023-12-07 PROCEDURE — 6370000000 HC RX 637 (ALT 250 FOR IP): Performed by: INTERNAL MEDICINE

## 2023-12-07 PROCEDURE — 2580000003 HC RX 258: Performed by: EMERGENCY MEDICINE

## 2023-12-07 PROCEDURE — 85025 COMPLETE CBC W/AUTO DIFF WBC: CPT

## 2023-12-07 PROCEDURE — 6370000000 HC RX 637 (ALT 250 FOR IP): Performed by: STUDENT IN AN ORGANIZED HEALTH CARE EDUCATION/TRAINING PROGRAM

## 2023-12-07 PROCEDURE — 1100000000 HC RM PRIVATE

## 2023-12-07 PROCEDURE — 36415 COLL VENOUS BLD VENIPUNCTURE: CPT

## 2023-12-07 PROCEDURE — 80048 BASIC METABOLIC PNL TOTAL CA: CPT

## 2023-12-07 RX ADMIN — NYSTATIN 500000 UNITS: 100000 SUSPENSION ORAL at 22:47

## 2023-12-07 RX ADMIN — TAMSULOSIN HYDROCHLORIDE 0.4 MG: 0.4 CAPSULE ORAL at 09:05

## 2023-12-07 RX ADMIN — NYSTATIN 500000 UNITS: 100000 SUSPENSION ORAL at 16:44

## 2023-12-07 RX ADMIN — FINASTERIDE 5 MG: 5 TABLET, FILM COATED ORAL at 09:06

## 2023-12-07 RX ADMIN — POLYETHYLENE GLYCOL 3350 17 G: 17 POWDER, FOR SOLUTION ORAL at 22:48

## 2023-12-07 RX ADMIN — CHOLECALCIFEROL TAB 25 MCG (1000 UNIT) 5000 UNITS: 25 TAB at 09:07

## 2023-12-07 RX ADMIN — SODIUM CHLORIDE: 9 INJECTION, SOLUTION INTRAVENOUS at 10:49

## 2023-12-07 RX ADMIN — PANCRELIPASE LIPASE, PANCRELIPASE PROTEASE, PANCRELIPASE AMYLASE 20000 UNITS: 20000; 63000; 84000 CAPSULE, DELAYED RELEASE ORAL at 12:16

## 2023-12-07 RX ADMIN — METOPROLOL TARTRATE 12.5 MG: 25 TABLET, FILM COATED ORAL at 22:54

## 2023-12-07 RX ADMIN — SODIUM CHLORIDE, PRESERVATIVE FREE 10 ML: 5 INJECTION INTRAVENOUS at 22:48

## 2023-12-07 RX ADMIN — FERROUS SULFATE TAB 325 MG (65 MG ELEMENTAL FE) 325 MG: 325 (65 FE) TAB at 09:06

## 2023-12-07 RX ADMIN — PANTOPRAZOLE SODIUM 40 MG: 40 TABLET, DELAYED RELEASE ORAL at 16:45

## 2023-12-07 RX ADMIN — SODIUM CHLORIDE, POTASSIUM CHLORIDE, SODIUM LACTATE AND CALCIUM CHLORIDE: 600; 310; 30; 20 INJECTION, SOLUTION INTRAVENOUS at 17:34

## 2023-12-07 RX ADMIN — LINACLOTIDE 145 MCG: 145 CAPSULE, GELATIN COATED ORAL at 07:51

## 2023-12-07 RX ADMIN — CEFTRIAXONE SODIUM 1000 MG: 1 INJECTION, POWDER, FOR SOLUTION INTRAMUSCULAR; INTRAVENOUS at 12:19

## 2023-12-07 RX ADMIN — TRAMADOL HYDROCHLORIDE 50 MG: 50 TABLET ORAL at 09:11

## 2023-12-07 RX ADMIN — AMITRIPTYLINE HYDROCHLORIDE 10 MG: 10 TABLET, FILM COATED ORAL at 22:47

## 2023-12-07 RX ADMIN — TRAMADOL HYDROCHLORIDE 50 MG: 50 TABLET ORAL at 22:54

## 2023-12-07 RX ADMIN — PANTOPRAZOLE SODIUM 40 MG: 40 TABLET, DELAYED RELEASE ORAL at 07:51

## 2023-12-07 RX ADMIN — TRAMADOL HYDROCHLORIDE 50 MG: 50 TABLET ORAL at 16:45

## 2023-12-07 RX ADMIN — DOCUSATE SODIUM 50MG AND SENNOSIDES 8.6MG 1 TABLET: 8.6; 5 TABLET, FILM COATED ORAL at 09:05

## 2023-12-07 RX ADMIN — FOLIC ACID 1 MG: 1 TABLET ORAL at 09:05

## 2023-12-07 RX ADMIN — DOCUSATE SODIUM 50MG AND SENNOSIDES 8.6MG 1 TABLET: 8.6; 5 TABLET, FILM COATED ORAL at 22:47

## 2023-12-07 RX ADMIN — MELATONIN 1.5 MG: at 22:47

## 2023-12-07 RX ADMIN — Medication 100 MG: at 09:07

## 2023-12-07 RX ADMIN — PANCRELIPASE LIPASE, PANCRELIPASE PROTEASE, PANCRELIPASE AMYLASE 20000 UNITS: 20000; 63000; 84000 CAPSULE, DELAYED RELEASE ORAL at 09:05

## 2023-12-07 RX ADMIN — METOPROLOL TARTRATE 12.5 MG: 25 TABLET, FILM COATED ORAL at 09:06

## 2023-12-07 RX ADMIN — POLYETHYLENE GLYCOL 3350 17 G: 17 POWDER, FOR SOLUTION ORAL at 09:08

## 2023-12-07 RX ADMIN — PANCRELIPASE LIPASE, PANCRELIPASE PROTEASE, PANCRELIPASE AMYLASE 20000 UNITS: 20000; 63000; 84000 CAPSULE, DELAYED RELEASE ORAL at 16:45

## 2023-12-07 ASSESSMENT — PAIN SCALES - GENERAL
PAINLEVEL_OUTOF10: 8
PAINLEVEL_OUTOF10: 5
PAINLEVEL_OUTOF10: 8
PAINLEVEL_OUTOF10: 8

## 2023-12-07 ASSESSMENT — PAIN DESCRIPTION - ORIENTATION: ORIENTATION: ANTERIOR

## 2023-12-07 ASSESSMENT — PAIN DESCRIPTION - DESCRIPTORS: DESCRIPTORS: ACHING

## 2023-12-07 ASSESSMENT — PAIN DESCRIPTION - LOCATION: LOCATION: ABDOMEN

## 2023-12-07 ASSESSMENT — PAIN - FUNCTIONAL ASSESSMENT: PAIN_FUNCTIONAL_ASSESSMENT: PREVENTS OR INTERFERES SOME ACTIVE ACTIVITIES AND ADLS

## 2023-12-07 NOTE — PLAN OF CARE
Problem: Discharge Planning  Goal: Discharge to home or other facility with appropriate resources  Outcome: Progressing  Flowsheets (Taken 12/6/2023 1927)  Discharge to home or other facility with appropriate resources:   Identify barriers to discharge with patient and caregiver   Arrange for needed discharge resources and transportation as appropriate   Identify discharge learning needs (meds, wound care, etc)     Problem: Pain  Goal: Verbalizes/displays adequate comfort level or baseline comfort level  Outcome: Progressing  Flowsheets (Taken 12/6/2023 2142)  Verbalizes/displays adequate comfort level or baseline comfort level:   Encourage patient to monitor pain and request assistance   Assess pain using appropriate pain scale   Administer analgesics based on type and severity of pain and evaluate response   Implement non-pharmacological measures as appropriate and evaluate response     Problem: Skin/Tissue Integrity  Goal: Absence of new skin breakdown  Description: 1. Monitor for areas of redness and/or skin breakdown  2. Assess vascular access sites hourly  3. Every 4-6 hours minimum:  Change oxygen saturation probe site  4. Every 4-6 hours:  If on nasal continuous positive airway pressure, respiratory therapy assess nares and determine need for appliance change or resting period.   Outcome: Progressing     Problem: Safety - Adult  Goal: Free from fall injury  Outcome: Progressing  Flowsheets (Taken 12/6/2023 1927)  Free From Fall Injury: Instruct family/caregiver on patient safety

## 2023-12-07 NOTE — PLAN OF CARE
Problem: Discharge Planning  Goal: Discharge to home or other facility with appropriate resources  12/7/2023 1407 by Ivonne Martinez RN  Outcome: Progressing  12/7/2023 0244 by Norris Crespo RN  Outcome: Progressing  Flowsheets (Taken 12/6/2023 1927)  Discharge to home or other facility with appropriate resources:   Identify barriers to discharge with patient and caregiver   Arrange for needed discharge resources and transportation as appropriate   Identify discharge learning needs (meds, wound care, etc)     Problem: Pain  Goal: Verbalizes/displays adequate comfort level or baseline comfort level  12/7/2023 1407 by Ivonne Martinez RN  Outcome: Progressing  12/7/2023 0244 by Norris Crespo RN  Outcome: Progressing  Flowsheets (Taken 12/6/2023 2142)  Verbalizes/displays adequate comfort level or baseline comfort level:   Encourage patient to monitor pain and request assistance   Assess pain using appropriate pain scale   Administer analgesics based on type and severity of pain and evaluate response   Implement non-pharmacological measures as appropriate and evaluate response     Problem: Skin/Tissue Integrity  Goal: Absence of new skin breakdown  Description: 1. Monitor for areas of redness and/or skin breakdown  2. Assess vascular access sites hourly  3. Every 4-6 hours minimum:  Change oxygen saturation probe site  4. Every 4-6 hours:  If on nasal continuous positive airway pressure, respiratory therapy assess nares and determine need for appliance change or resting period.   12/7/2023 1407 by Ivonne Martinez RN  Outcome: Progressing  12/7/2023 0244 by Norris Crespo RN  Outcome: Progressing     Problem: Safety - Adult  Goal: Free from fall injury  12/7/2023 1407 by Ivonne Martinez RN  Outcome: Progressing  12/7/2023 0244 by Norris Crespo RN  Outcome: Progressing  Flowsheets (Taken 12/6/2023 1927)  Free From Fall Injury: Instruct family/caregiver on

## 2023-12-08 LAB
BACTERIA SPEC CULT: NORMAL
BACTERIA SPEC CULT: NORMAL
SERVICE CMNT-IMP: NORMAL
SERVICE CMNT-IMP: NORMAL

## 2023-12-08 PROCEDURE — 2580000003 HC RX 258: Performed by: INTERNAL MEDICINE

## 2023-12-08 PROCEDURE — 6370000000 HC RX 637 (ALT 250 FOR IP): Performed by: NURSE PRACTITIONER

## 2023-12-08 PROCEDURE — 2580000003 HC RX 258: Performed by: STUDENT IN AN ORGANIZED HEALTH CARE EDUCATION/TRAINING PROGRAM

## 2023-12-08 PROCEDURE — 2580000003 HC RX 258: Performed by: EMERGENCY MEDICINE

## 2023-12-08 PROCEDURE — 6370000000 HC RX 637 (ALT 250 FOR IP): Performed by: INTERNAL MEDICINE

## 2023-12-08 PROCEDURE — 1100000000 HC RM PRIVATE

## 2023-12-08 PROCEDURE — 6360000002 HC RX W HCPCS: Performed by: STUDENT IN AN ORGANIZED HEALTH CARE EDUCATION/TRAINING PROGRAM

## 2023-12-08 PROCEDURE — 6370000000 HC RX 637 (ALT 250 FOR IP): Performed by: STUDENT IN AN ORGANIZED HEALTH CARE EDUCATION/TRAINING PROGRAM

## 2023-12-08 RX ADMIN — AMITRIPTYLINE HYDROCHLORIDE 10 MG: 10 TABLET, FILM COATED ORAL at 21:01

## 2023-12-08 RX ADMIN — TRAMADOL HYDROCHLORIDE 50 MG: 50 TABLET ORAL at 06:20

## 2023-12-08 RX ADMIN — TRAMADOL HYDROCHLORIDE 50 MG: 50 TABLET ORAL at 16:58

## 2023-12-08 RX ADMIN — DOCUSATE SODIUM 50MG AND SENNOSIDES 8.6MG 1 TABLET: 8.6; 5 TABLET, FILM COATED ORAL at 09:36

## 2023-12-08 RX ADMIN — POLYETHYLENE GLYCOL 3350 17 G: 17 POWDER, FOR SOLUTION ORAL at 21:01

## 2023-12-08 RX ADMIN — PANCRELIPASE LIPASE, PANCRELIPASE PROTEASE, PANCRELIPASE AMYLASE 20000 UNITS: 20000; 63000; 84000 CAPSULE, DELAYED RELEASE ORAL at 09:35

## 2023-12-08 RX ADMIN — PANCRELIPASE LIPASE, PANCRELIPASE PROTEASE, PANCRELIPASE AMYLASE 20000 UNITS: 20000; 63000; 84000 CAPSULE, DELAYED RELEASE ORAL at 12:42

## 2023-12-08 RX ADMIN — FOLIC ACID 1 MG: 1 TABLET ORAL at 09:36

## 2023-12-08 RX ADMIN — DICYCLOMINE HYDROCHLORIDE 10 MG: 10 CAPSULE ORAL at 16:58

## 2023-12-08 RX ADMIN — METOPROLOL TARTRATE 12.5 MG: 25 TABLET, FILM COATED ORAL at 21:02

## 2023-12-08 RX ADMIN — METOPROLOL TARTRATE 12.5 MG: 25 TABLET, FILM COATED ORAL at 09:38

## 2023-12-08 RX ADMIN — CHOLECALCIFEROL TAB 25 MCG (1000 UNIT) 5000 UNITS: 25 TAB at 09:37

## 2023-12-08 RX ADMIN — SODIUM CHLORIDE, PRESERVATIVE FREE 10 ML: 5 INJECTION INTRAVENOUS at 09:41

## 2023-12-08 RX ADMIN — PANTOPRAZOLE SODIUM 40 MG: 40 TABLET, DELAYED RELEASE ORAL at 06:13

## 2023-12-08 RX ADMIN — DOCUSATE SODIUM 50MG AND SENNOSIDES 8.6MG 1 TABLET: 8.6; 5 TABLET, FILM COATED ORAL at 21:01

## 2023-12-08 RX ADMIN — MELATONIN 1.5 MG: at 21:01

## 2023-12-08 RX ADMIN — Medication 100 MG: at 09:39

## 2023-12-08 RX ADMIN — PANTOPRAZOLE SODIUM 40 MG: 40 TABLET, DELAYED RELEASE ORAL at 14:46

## 2023-12-08 RX ADMIN — NYSTATIN 500000 UNITS: 100000 SUSPENSION ORAL at 21:01

## 2023-12-08 RX ADMIN — SODIUM CHLORIDE, PRESERVATIVE FREE 10 ML: 5 INJECTION INTRAVENOUS at 21:09

## 2023-12-08 RX ADMIN — CEFTRIAXONE SODIUM 1000 MG: 1 INJECTION, POWDER, FOR SOLUTION INTRAMUSCULAR; INTRAVENOUS at 11:39

## 2023-12-08 RX ADMIN — ONDANSETRON 4 MG: 4 TABLET, ORALLY DISINTEGRATING ORAL at 10:25

## 2023-12-08 RX ADMIN — FINASTERIDE 5 MG: 5 TABLET, FILM COATED ORAL at 09:36

## 2023-12-08 RX ADMIN — LINACLOTIDE 145 MCG: 145 CAPSULE, GELATIN COATED ORAL at 06:13

## 2023-12-08 RX ADMIN — NYSTATIN 500000 UNITS: 100000 SUSPENSION ORAL at 09:41

## 2023-12-08 RX ADMIN — NYSTATIN 500000 UNITS: 100000 SUSPENSION ORAL at 12:42

## 2023-12-08 RX ADMIN — NYSTATIN 500000 UNITS: 100000 SUSPENSION ORAL at 16:59

## 2023-12-08 RX ADMIN — DICYCLOMINE HYDROCHLORIDE 10 MG: 10 CAPSULE ORAL at 09:47

## 2023-12-08 RX ADMIN — ACETAMINOPHEN 650 MG: 325 TABLET ORAL at 21:08

## 2023-12-08 RX ADMIN — ACETAMINOPHEN 650 MG: 325 TABLET ORAL at 09:47

## 2023-12-08 RX ADMIN — TAMSULOSIN HYDROCHLORIDE 0.4 MG: 0.4 CAPSULE ORAL at 09:36

## 2023-12-08 RX ADMIN — SODIUM CHLORIDE, POTASSIUM CHLORIDE, SODIUM LACTATE AND CALCIUM CHLORIDE: 600; 310; 30; 20 INJECTION, SOLUTION INTRAVENOUS at 03:42

## 2023-12-08 RX ADMIN — FERROUS SULFATE TAB 325 MG (65 MG ELEMENTAL FE) 325 MG: 325 (65 FE) TAB at 09:36

## 2023-12-08 RX ADMIN — POLYETHYLENE GLYCOL 3350 17 G: 17 POWDER, FOR SOLUTION ORAL at 09:34

## 2023-12-08 ASSESSMENT — PAIN SCALES - GENERAL
PAINLEVEL_OUTOF10: 0
PAINLEVEL_OUTOF10: 8
PAINLEVEL_OUTOF10: 5
PAINLEVEL_OUTOF10: 0
PAINLEVEL_OUTOF10: 3
PAINLEVEL_OUTOF10: 8
PAINLEVEL_OUTOF10: 8

## 2023-12-08 ASSESSMENT — PAIN DESCRIPTION - LOCATION
LOCATION: ABDOMEN

## 2023-12-08 ASSESSMENT — PAIN DESCRIPTION - DESCRIPTORS
DESCRIPTORS: ACHING

## 2023-12-08 ASSESSMENT — PAIN DESCRIPTION - ORIENTATION
ORIENTATION: ANTERIOR;LOWER;LEFT
ORIENTATION: ANTERIOR

## 2023-12-08 ASSESSMENT — PAIN - FUNCTIONAL ASSESSMENT
PAIN_FUNCTIONAL_ASSESSMENT: PREVENTS OR INTERFERES SOME ACTIVE ACTIVITIES AND ADLS
PAIN_FUNCTIONAL_ASSESSMENT: ACTIVITIES ARE NOT PREVENTED

## 2023-12-08 NOTE — PLAN OF CARE
Problem: Discharge Planning  Goal: Discharge to home or other facility with appropriate resources  12/8/2023 0224 by Joretta Bloch, RN  Outcome: Progressing  Flowsheets (Taken 12/7/2023 2035)  Discharge to home or other facility with appropriate resources:   Identify barriers to discharge with patient and caregiver   Arrange for needed discharge resources and transportation as appropriate   Identify discharge learning needs (meds, wound care, etc)   Arrange for interpreters to assist at discharge as needed  12/7/2023 1407 by Michel Feliciano RN  Outcome: Progressing     Problem: Pain  Goal: Verbalizes/displays adequate comfort level or baseline comfort level  12/8/2023 0224 by Joretta Bloch, RN  Outcome: Progressing  12/7/2023 1407 by Mcihel Feliciano RN  Outcome: Progressing     Problem: Skin/Tissue Integrity  Goal: Absence of new skin breakdown  Description: 1. Monitor for areas of redness and/or skin breakdown  2. Assess vascular access sites hourly  3. Every 4-6 hours minimum:  Change oxygen saturation probe site  4. Every 4-6 hours:  If on nasal continuous positive airway pressure, respiratory therapy assess nares and determine need for appliance change or resting period.   12/8/2023 0224 by Joretta Bloch, RN  Outcome: Progressing  12/7/2023 1407 by Michel Feliciano RN  Outcome: Progressing     Problem: Safety - Adult  Goal: Free from fall injury  12/8/2023 0224 by Joretta Bloch, RN  Outcome: Evette Fines (Taken 12/7/2023 2035)  Free From Fall Injury: Instruct family/caregiver on patient safety  12/7/2023 1407 by Michel Feliciano RN  Outcome: Progressing

## 2023-12-08 NOTE — CARE COORDINATION
Transition of Care Plan:    RUR: 22%  Prior Level of Functioning:   Disposition: Home  If SNF or IPR: Date FOC offered:   Date FOC received:   Accepting facility:   Date authorization started with reference number:   Date authorization received and expires: Follow up appointments:   DME needed: n/a  Transportation at discharge: Lee Samuel states he has transportation benefits  IM/IMM Medicare/ letter given: 2nd IM needed  Is patient a [place] and connected with [place]? If yes, was [employer] transfer form completed and [facility] notified? Caregiver Contact: pt states he does not have a support system  Discharge Caregiver contacted prior to discharge? Care Conference needed? Barriers to discharge:     CM reviewed chart. Patient is expected to d/c on Monday. CM will continue to follow.     Miguelangel Childers  Ext [phone]

## 2023-12-09 ENCOUNTER — APPOINTMENT (OUTPATIENT)
Facility: HOSPITAL | Age: 58
DRG: 388 | End: 2023-12-09
Payer: MEDICARE

## 2023-12-09 PROCEDURE — 6370000000 HC RX 637 (ALT 250 FOR IP): Performed by: INTERNAL MEDICINE

## 2023-12-09 PROCEDURE — 2580000003 HC RX 258: Performed by: INTERNAL MEDICINE

## 2023-12-09 PROCEDURE — 71045 X-RAY EXAM CHEST 1 VIEW: CPT

## 2023-12-09 PROCEDURE — 6360000002 HC RX W HCPCS: Performed by: INTERNAL MEDICINE

## 2023-12-09 PROCEDURE — 6370000000 HC RX 637 (ALT 250 FOR IP): Performed by: STUDENT IN AN ORGANIZED HEALTH CARE EDUCATION/TRAINING PROGRAM

## 2023-12-09 PROCEDURE — 6370000000 HC RX 637 (ALT 250 FOR IP): Performed by: NURSE PRACTITIONER

## 2023-12-09 PROCEDURE — 1100000000 HC RM PRIVATE

## 2023-12-09 RX ORDER — SODIUM CHLORIDE, SODIUM LACTATE, POTASSIUM CHLORIDE, CALCIUM CHLORIDE 600; 310; 30; 20 MG/100ML; MG/100ML; MG/100ML; MG/100ML
INJECTION, SOLUTION INTRAVENOUS CONTINUOUS
Status: DISCONTINUED | OUTPATIENT
Start: 2023-12-09 | End: 2023-12-10

## 2023-12-09 RX ADMIN — MELATONIN 1.5 MG: at 21:15

## 2023-12-09 RX ADMIN — SODIUM CHLORIDE, PRESERVATIVE FREE 10 ML: 5 INJECTION INTRAVENOUS at 21:18

## 2023-12-09 RX ADMIN — SODIUM CHLORIDE: 9 INJECTION, SOLUTION INTRAVENOUS at 11:17

## 2023-12-09 RX ADMIN — SODIUM CHLORIDE, POTASSIUM CHLORIDE, SODIUM LACTATE AND CALCIUM CHLORIDE: 600; 310; 30; 20 INJECTION, SOLUTION INTRAVENOUS at 13:34

## 2023-12-09 RX ADMIN — TAMSULOSIN HYDROCHLORIDE 0.4 MG: 0.4 CAPSULE ORAL at 08:42

## 2023-12-09 RX ADMIN — DICYCLOMINE HYDROCHLORIDE 10 MG: 10 CAPSULE ORAL at 08:42

## 2023-12-09 RX ADMIN — PANCRELIPASE LIPASE, PANCRELIPASE PROTEASE, PANCRELIPASE AMYLASE 20000 UNITS: 20000; 63000; 84000 CAPSULE, DELAYED RELEASE ORAL at 11:18

## 2023-12-09 RX ADMIN — POLYETHYLENE GLYCOL 3350 17 G: 17 POWDER, FOR SOLUTION ORAL at 08:41

## 2023-12-09 RX ADMIN — TRAMADOL HYDROCHLORIDE 50 MG: 50 TABLET ORAL at 21:15

## 2023-12-09 RX ADMIN — NYSTATIN 500000 UNITS: 100000 SUSPENSION ORAL at 21:15

## 2023-12-09 RX ADMIN — ONDANSETRON 4 MG: 2 INJECTION INTRAMUSCULAR; INTRAVENOUS at 21:20

## 2023-12-09 RX ADMIN — NYSTATIN 500000 UNITS: 100000 SUSPENSION ORAL at 17:08

## 2023-12-09 RX ADMIN — NYSTATIN 500000 UNITS: 100000 SUSPENSION ORAL at 08:43

## 2023-12-09 RX ADMIN — TRAMADOL HYDROCHLORIDE 50 MG: 50 TABLET ORAL at 06:15

## 2023-12-09 RX ADMIN — PANCRELIPASE LIPASE, PANCRELIPASE PROTEASE, PANCRELIPASE AMYLASE 20000 UNITS: 20000; 63000; 84000 CAPSULE, DELAYED RELEASE ORAL at 08:42

## 2023-12-09 RX ADMIN — SODIUM CHLORIDE, PRESERVATIVE FREE 10 ML: 5 INJECTION INTRAVENOUS at 08:45

## 2023-12-09 RX ADMIN — CHOLECALCIFEROL TAB 25 MCG (1000 UNIT) 5000 UNITS: 25 TAB at 08:41

## 2023-12-09 RX ADMIN — METOPROLOL TARTRATE 12.5 MG: 25 TABLET, FILM COATED ORAL at 08:44

## 2023-12-09 RX ADMIN — NYSTATIN 500000 UNITS: 100000 SUSPENSION ORAL at 11:18

## 2023-12-09 RX ADMIN — PANTOPRAZOLE SODIUM 40 MG: 40 TABLET, DELAYED RELEASE ORAL at 06:16

## 2023-12-09 RX ADMIN — PANTOPRAZOLE SODIUM 40 MG: 40 TABLET, DELAYED RELEASE ORAL at 17:07

## 2023-12-09 RX ADMIN — FINASTERIDE 5 MG: 5 TABLET, FILM COATED ORAL at 08:42

## 2023-12-09 RX ADMIN — METOPROLOL TARTRATE 12.5 MG: 25 TABLET, FILM COATED ORAL at 21:15

## 2023-12-09 RX ADMIN — FOLIC ACID 1 MG: 1 TABLET ORAL at 08:43

## 2023-12-09 RX ADMIN — AMITRIPTYLINE HYDROCHLORIDE 10 MG: 10 TABLET, FILM COATED ORAL at 21:15

## 2023-12-09 RX ADMIN — DOCUSATE SODIUM 50MG AND SENNOSIDES 8.6MG 1 TABLET: 8.6; 5 TABLET, FILM COATED ORAL at 08:42

## 2023-12-09 RX ADMIN — PANCRELIPASE LIPASE, PANCRELIPASE PROTEASE, PANCRELIPASE AMYLASE 20000 UNITS: 20000; 63000; 84000 CAPSULE, DELAYED RELEASE ORAL at 17:07

## 2023-12-09 RX ADMIN — CEFTRIAXONE SODIUM 1000 MG: 1 INJECTION, POWDER, FOR SOLUTION INTRAMUSCULAR; INTRAVENOUS at 11:17

## 2023-12-09 RX ADMIN — Medication 100 MG: at 08:41

## 2023-12-09 RX ADMIN — ACETAMINOPHEN 650 MG: 325 TABLET ORAL at 08:43

## 2023-12-09 RX ADMIN — FERROUS SULFATE TAB 325 MG (65 MG ELEMENTAL FE) 325 MG: 325 (65 FE) TAB at 08:42

## 2023-12-09 RX ADMIN — DICYCLOMINE HYDROCHLORIDE 10 MG: 10 CAPSULE ORAL at 17:07

## 2023-12-09 RX ADMIN — LINACLOTIDE 145 MCG: 145 CAPSULE, GELATIN COATED ORAL at 06:16

## 2023-12-09 ASSESSMENT — PAIN - FUNCTIONAL ASSESSMENT
PAIN_FUNCTIONAL_ASSESSMENT: ACTIVITIES ARE NOT PREVENTED
PAIN_FUNCTIONAL_ASSESSMENT: ACTIVITIES ARE NOT PREVENTED

## 2023-12-09 ASSESSMENT — PAIN DESCRIPTION - ORIENTATION: ORIENTATION: PROXIMAL

## 2023-12-09 ASSESSMENT — PAIN DESCRIPTION - LOCATION
LOCATION: ABDOMEN

## 2023-12-09 ASSESSMENT — PAIN DESCRIPTION - DESCRIPTORS
DESCRIPTORS: ACHING
DESCRIPTORS: ACHING

## 2023-12-09 ASSESSMENT — PAIN SCALES - GENERAL
PAINLEVEL_OUTOF10: 4
PAINLEVEL_OUTOF10: 8
PAINLEVEL_OUTOF10: 4
PAINLEVEL_OUTOF10: 8
PAINLEVEL_OUTOF10: 8

## 2023-12-09 NOTE — PLAN OF CARE
Problem: Discharge Planning  Goal: Discharge to home or other facility with appropriate resources  Outcome: Progressing  Flowsheets (Taken 12/8/2023 2145)  Discharge to home or other facility with appropriate resources:   Identify barriers to discharge with patient and caregiver   Identify discharge learning needs (meds, wound care, etc)   Arrange for needed discharge resources and transportation as appropriate   Arrange for interpreters to assist at discharge as needed     Problem: Pain  Goal: Verbalizes/displays adequate comfort level or baseline comfort level  Outcome: Progressing  Flowsheets (Taken 12/8/2023 2145)  Verbalizes/displays adequate comfort level or baseline comfort level:   Encourage patient to monitor pain and request assistance   Administer analgesics based on type and severity of pain and evaluate response   Assess pain using appropriate pain scale   Implement non-pharmacological measures as appropriate and evaluate response     Problem: Skin/Tissue Integrity  Goal: Absence of new skin breakdown  Description: 1. Monitor for areas of redness and/or skin breakdown  2. Assess vascular access sites hourly  3. Every 4-6 hours minimum:  Change oxygen saturation probe site  4. Every 4-6 hours:  If on nasal continuous positive airway pressure, respiratory therapy assess nares and determine need for appliance change or resting period.   Outcome: Progressing     Problem: Safety - Adult  Goal: Free from fall injury  Outcome: Progressing

## 2023-12-10 ENCOUNTER — APPOINTMENT (OUTPATIENT)
Facility: HOSPITAL | Age: 58
DRG: 388 | End: 2023-12-10
Payer: MEDICARE

## 2023-12-10 LAB
ANION GAP SERPL CALC-SCNC: 3 MMOL/L (ref 5–15)
BASOPHILS # BLD: 0 K/UL (ref 0–0.1)
BASOPHILS NFR BLD: 0 % (ref 0–1)
BUN SERPL-MCNC: 12 MG/DL (ref 6–20)
BUN/CREAT SERPL: 13 (ref 12–20)
CALCIUM SERPL-MCNC: 8.7 MG/DL (ref 8.5–10.1)
CHLORIDE SERPL-SCNC: 102 MMOL/L (ref 97–108)
CO2 SERPL-SCNC: 29 MMOL/L (ref 21–32)
CREAT SERPL-MCNC: 0.95 MG/DL (ref 0.7–1.3)
DIFFERENTIAL METHOD BLD: ABNORMAL
EOSINOPHIL # BLD: 0.2 K/UL (ref 0–0.4)
EOSINOPHIL NFR BLD: 2 % (ref 0–7)
ERYTHROCYTE [DISTWIDTH] IN BLOOD BY AUTOMATED COUNT: 17.4 % (ref 11.5–14.5)
GLUCOSE SERPL-MCNC: 142 MG/DL (ref 65–100)
HCT VFR BLD AUTO: 25.7 % (ref 36.6–50.3)
HGB BLD-MCNC: 8.4 G/DL (ref 12.1–17)
IMM GRANULOCYTES # BLD AUTO: 0 K/UL (ref 0–0.04)
IMM GRANULOCYTES NFR BLD AUTO: 0 % (ref 0–0.5)
LYMPHOCYTES # BLD: 1.7 K/UL (ref 0.8–3.5)
LYMPHOCYTES NFR BLD: 25 % (ref 12–49)
MCH RBC QN AUTO: 30.1 PG (ref 26–34)
MCHC RBC AUTO-ENTMCNC: 32.7 G/DL (ref 30–36.5)
MCV RBC AUTO: 92.1 FL (ref 80–99)
MONOCYTES # BLD: 0.7 K/UL (ref 0–1)
MONOCYTES NFR BLD: 10 % (ref 5–13)
NEUTS SEG # BLD: 4.2 K/UL (ref 1.8–8)
NEUTS SEG NFR BLD: 63 % (ref 32–75)
NRBC # BLD: 0 K/UL (ref 0–0.01)
NRBC BLD-RTO: 0 PER 100 WBC
PLATELET # BLD AUTO: 178 K/UL (ref 150–400)
PMV BLD AUTO: 10.6 FL (ref 8.9–12.9)
POTASSIUM SERPL-SCNC: 4.5 MMOL/L (ref 3.5–5.1)
RBC # BLD AUTO: 2.79 M/UL (ref 4.1–5.7)
SODIUM SERPL-SCNC: 134 MMOL/L (ref 136–145)
WBC # BLD AUTO: 6.7 K/UL (ref 4.1–11.1)

## 2023-12-10 PROCEDURE — 6370000000 HC RX 637 (ALT 250 FOR IP): Performed by: INTERNAL MEDICINE

## 2023-12-10 PROCEDURE — 6370000000 HC RX 637 (ALT 250 FOR IP): Performed by: NURSE PRACTITIONER

## 2023-12-10 PROCEDURE — 74018 RADEX ABDOMEN 1 VIEW: CPT

## 2023-12-10 PROCEDURE — 6360000002 HC RX W HCPCS: Performed by: INTERNAL MEDICINE

## 2023-12-10 PROCEDURE — 2580000003 HC RX 258: Performed by: INTERNAL MEDICINE

## 2023-12-10 PROCEDURE — 85025 COMPLETE CBC W/AUTO DIFF WBC: CPT

## 2023-12-10 PROCEDURE — 1100000000 HC RM PRIVATE

## 2023-12-10 PROCEDURE — 36415 COLL VENOUS BLD VENIPUNCTURE: CPT

## 2023-12-10 PROCEDURE — 80048 BASIC METABOLIC PNL TOTAL CA: CPT

## 2023-12-10 PROCEDURE — 6370000000 HC RX 637 (ALT 250 FOR IP): Performed by: STUDENT IN AN ORGANIZED HEALTH CARE EDUCATION/TRAINING PROGRAM

## 2023-12-10 RX ORDER — MECLIZINE HCL 12.5 MG/1
12.5 TABLET ORAL 3 TIMES DAILY PRN
Status: DISCONTINUED | OUTPATIENT
Start: 2023-12-10 | End: 2023-12-15 | Stop reason: HOSPADM

## 2023-12-10 RX ORDER — POLYETHYLENE GLYCOL 3350 17 G/17G
17 POWDER, FOR SOLUTION ORAL DAILY
Status: DISCONTINUED | OUTPATIENT
Start: 2023-12-10 | End: 2023-12-15 | Stop reason: HOSPADM

## 2023-12-10 RX ADMIN — FERROUS SULFATE TAB 325 MG (65 MG ELEMENTAL FE) 325 MG: 325 (65 FE) TAB at 07:58

## 2023-12-10 RX ADMIN — LINACLOTIDE 145 MCG: 145 CAPSULE, GELATIN COATED ORAL at 07:33

## 2023-12-10 RX ADMIN — DICYCLOMINE HYDROCHLORIDE 10 MG: 10 CAPSULE ORAL at 16:44

## 2023-12-10 RX ADMIN — SODIUM CHLORIDE, PRESERVATIVE FREE 10 ML: 5 INJECTION INTRAVENOUS at 08:07

## 2023-12-10 RX ADMIN — TRAMADOL HYDROCHLORIDE 50 MG: 50 TABLET ORAL at 04:49

## 2023-12-10 RX ADMIN — NYSTATIN 500000 UNITS: 100000 SUSPENSION ORAL at 20:54

## 2023-12-10 RX ADMIN — CEFTRIAXONE SODIUM 1000 MG: 1 INJECTION, POWDER, FOR SOLUTION INTRAMUSCULAR; INTRAVENOUS at 11:35

## 2023-12-10 RX ADMIN — NYSTATIN 500000 UNITS: 100000 SUSPENSION ORAL at 16:44

## 2023-12-10 RX ADMIN — PANTOPRAZOLE SODIUM 40 MG: 40 TABLET, DELAYED RELEASE ORAL at 16:44

## 2023-12-10 RX ADMIN — SODIUM CHLORIDE, PRESERVATIVE FREE 10 ML: 5 INJECTION INTRAVENOUS at 20:54

## 2023-12-10 RX ADMIN — MELATONIN 1.5 MG: at 20:49

## 2023-12-10 RX ADMIN — TAMSULOSIN HYDROCHLORIDE 0.4 MG: 0.4 CAPSULE ORAL at 07:59

## 2023-12-10 RX ADMIN — AMITRIPTYLINE HYDROCHLORIDE 10 MG: 10 TABLET, FILM COATED ORAL at 20:49

## 2023-12-10 RX ADMIN — TRAMADOL HYDROCHLORIDE 50 MG: 50 TABLET ORAL at 20:49

## 2023-12-10 RX ADMIN — NYSTATIN 500000 UNITS: 100000 SUSPENSION ORAL at 08:01

## 2023-12-10 RX ADMIN — POLYETHYLENE GLYCOL 3350 17 G: 17 POWDER, FOR SOLUTION ORAL at 13:27

## 2023-12-10 RX ADMIN — MECLIZINE 12.5 MG: 12.5 TABLET ORAL at 13:26

## 2023-12-10 RX ADMIN — METOPROLOL TARTRATE 12.5 MG: 25 TABLET, FILM COATED ORAL at 20:48

## 2023-12-10 RX ADMIN — FOLIC ACID 1 MG: 1 TABLET ORAL at 07:59

## 2023-12-10 RX ADMIN — PANCRELIPASE LIPASE, PANCRELIPASE PROTEASE, PANCRELIPASE AMYLASE 20000 UNITS: 20000; 63000; 84000 CAPSULE, DELAYED RELEASE ORAL at 11:36

## 2023-12-10 RX ADMIN — DICYCLOMINE HYDROCHLORIDE 10 MG: 10 CAPSULE ORAL at 20:48

## 2023-12-10 RX ADMIN — NYSTATIN 500000 UNITS: 100000 SUSPENSION ORAL at 11:39

## 2023-12-10 RX ADMIN — PANTOPRAZOLE SODIUM 40 MG: 40 TABLET, DELAYED RELEASE ORAL at 06:17

## 2023-12-10 RX ADMIN — PANCRELIPASE LIPASE, PANCRELIPASE PROTEASE, PANCRELIPASE AMYLASE 20000 UNITS: 20000; 63000; 84000 CAPSULE, DELAYED RELEASE ORAL at 16:43

## 2023-12-10 RX ADMIN — CHOLECALCIFEROL TAB 25 MCG (1000 UNIT) 5000 UNITS: 25 TAB at 07:58

## 2023-12-10 RX ADMIN — PANCRELIPASE LIPASE, PANCRELIPASE PROTEASE, PANCRELIPASE AMYLASE 20000 UNITS: 20000; 63000; 84000 CAPSULE, DELAYED RELEASE ORAL at 06:17

## 2023-12-10 RX ADMIN — ACETAMINOPHEN 650 MG: 325 TABLET ORAL at 07:59

## 2023-12-10 RX ADMIN — METOPROLOL TARTRATE 12.5 MG: 25 TABLET, FILM COATED ORAL at 08:01

## 2023-12-10 RX ADMIN — Medication 100 MG: at 07:59

## 2023-12-10 RX ADMIN — DICYCLOMINE HYDROCHLORIDE 10 MG: 10 CAPSULE ORAL at 07:33

## 2023-12-10 RX ADMIN — FINASTERIDE 5 MG: 5 TABLET, FILM COATED ORAL at 07:59

## 2023-12-10 RX ADMIN — SODIUM CHLORIDE, POTASSIUM CHLORIDE, SODIUM LACTATE AND CALCIUM CHLORIDE: 600; 310; 30; 20 INJECTION, SOLUTION INTRAVENOUS at 07:29

## 2023-12-10 ASSESSMENT — PAIN SCALES - GENERAL
PAINLEVEL_OUTOF10: 4
PAINLEVEL_OUTOF10: 4
PAINLEVEL_OUTOF10: 10
PAINLEVEL_OUTOF10: 8
PAINLEVEL_OUTOF10: 8
PAINLEVEL_OUTOF10: 4
PAINLEVEL_OUTOF10: 4

## 2023-12-10 ASSESSMENT — PAIN DESCRIPTION - DESCRIPTORS
DESCRIPTORS: SHARP
DESCRIPTORS: ACHING
DESCRIPTORS: ACHING

## 2023-12-10 ASSESSMENT — PAIN - FUNCTIONAL ASSESSMENT
PAIN_FUNCTIONAL_ASSESSMENT: ACTIVITIES ARE NOT PREVENTED

## 2023-12-10 ASSESSMENT — PAIN DESCRIPTION - LOCATION
LOCATION: ABDOMEN

## 2023-12-11 ENCOUNTER — APPOINTMENT (OUTPATIENT)
Facility: HOSPITAL | Age: 58
DRG: 388 | End: 2023-12-11
Payer: MEDICARE

## 2023-12-11 PROCEDURE — 6370000000 HC RX 637 (ALT 250 FOR IP): Performed by: INTERNAL MEDICINE

## 2023-12-11 PROCEDURE — 2580000003 HC RX 258: Performed by: INTERNAL MEDICINE

## 2023-12-11 PROCEDURE — 2580000003 HC RX 258: Performed by: SURGERY

## 2023-12-11 PROCEDURE — 6370000000 HC RX 637 (ALT 250 FOR IP): Performed by: NURSE PRACTITIONER

## 2023-12-11 PROCEDURE — 99222 1ST HOSP IP/OBS MODERATE 55: CPT | Performed by: SURGERY

## 2023-12-11 PROCEDURE — 6370000000 HC RX 637 (ALT 250 FOR IP): Performed by: STUDENT IN AN ORGANIZED HEALTH CARE EDUCATION/TRAINING PROGRAM

## 2023-12-11 PROCEDURE — 1100000000 HC RM PRIVATE

## 2023-12-11 PROCEDURE — 74018 RADEX ABDOMEN 1 VIEW: CPT

## 2023-12-11 PROCEDURE — 74176 CT ABD & PELVIS W/O CONTRAST: CPT

## 2023-12-11 PROCEDURE — 6360000002 HC RX W HCPCS: Performed by: INTERNAL MEDICINE

## 2023-12-11 RX ORDER — SENNA AND DOCUSATE SODIUM 50; 8.6 MG/1; MG/1
1 TABLET, FILM COATED ORAL DAILY
Status: DISCONTINUED | OUTPATIENT
Start: 2023-12-11 | End: 2023-12-15 | Stop reason: HOSPADM

## 2023-12-11 RX ORDER — SODIUM CHLORIDE 9 MG/ML
INJECTION, SOLUTION INTRAVENOUS CONTINUOUS
Status: DISCONTINUED | OUTPATIENT
Start: 2023-12-11 | End: 2023-12-14

## 2023-12-11 RX ADMIN — PHENOL 1 SPRAY: 1.5 LIQUID ORAL at 22:32

## 2023-12-11 RX ADMIN — DICYCLOMINE HYDROCHLORIDE 10 MG: 10 CAPSULE ORAL at 12:51

## 2023-12-11 RX ADMIN — SODIUM CHLORIDE: 9 INJECTION, SOLUTION INTRAVENOUS at 18:06

## 2023-12-11 RX ADMIN — NYSTATIN 500000 UNITS: 100000 SUSPENSION ORAL at 08:30

## 2023-12-11 RX ADMIN — DOCUSATE SODIUM 50MG AND SENNOSIDES 8.6MG 1 TABLET: 8.6; 5 TABLET, FILM COATED ORAL at 11:02

## 2023-12-11 RX ADMIN — DICYCLOMINE HYDROCHLORIDE 10 MG: 10 CAPSULE ORAL at 06:24

## 2023-12-11 RX ADMIN — SODIUM CHLORIDE, PRESERVATIVE FREE 10 ML: 5 INJECTION INTRAVENOUS at 21:49

## 2023-12-11 RX ADMIN — TAMSULOSIN HYDROCHLORIDE 0.4 MG: 0.4 CAPSULE ORAL at 08:31

## 2023-12-11 RX ADMIN — NYSTATIN 500000 UNITS: 100000 SUSPENSION ORAL at 21:47

## 2023-12-11 RX ADMIN — PANCRELIPASE LIPASE, PANCRELIPASE PROTEASE, PANCRELIPASE AMYLASE 20000 UNITS: 20000; 63000; 84000 CAPSULE, DELAYED RELEASE ORAL at 06:24

## 2023-12-11 RX ADMIN — PANTOPRAZOLE SODIUM 40 MG: 40 TABLET, DELAYED RELEASE ORAL at 06:24

## 2023-12-11 RX ADMIN — METOPROLOL TARTRATE 12.5 MG: 25 TABLET, FILM COATED ORAL at 08:31

## 2023-12-11 RX ADMIN — TRAMADOL HYDROCHLORIDE 50 MG: 50 TABLET ORAL at 18:40

## 2023-12-11 RX ADMIN — PANCRELIPASE LIPASE, PANCRELIPASE PROTEASE, PANCRELIPASE AMYLASE 20000 UNITS: 20000; 63000; 84000 CAPSULE, DELAYED RELEASE ORAL at 12:46

## 2023-12-11 RX ADMIN — SODIUM CHLORIDE, PRESERVATIVE FREE 10 ML: 5 INJECTION INTRAVENOUS at 08:33

## 2023-12-11 RX ADMIN — AMITRIPTYLINE HYDROCHLORIDE 10 MG: 10 TABLET, FILM COATED ORAL at 21:33

## 2023-12-11 RX ADMIN — LINACLOTIDE 145 MCG: 145 CAPSULE, GELATIN COATED ORAL at 06:24

## 2023-12-11 RX ADMIN — MELATONIN 1.5 MG: at 21:33

## 2023-12-11 RX ADMIN — FERROUS SULFATE TAB 325 MG (65 MG ELEMENTAL FE) 325 MG: 325 (65 FE) TAB at 08:31

## 2023-12-11 RX ADMIN — METOPROLOL TARTRATE 12.5 MG: 25 TABLET, FILM COATED ORAL at 21:33

## 2023-12-11 RX ADMIN — SODIUM CHLORIDE: 9 INJECTION, SOLUTION INTRAVENOUS at 22:28

## 2023-12-11 RX ADMIN — FINASTERIDE 5 MG: 5 TABLET, FILM COATED ORAL at 08:31

## 2023-12-11 RX ADMIN — NYSTATIN 500000 UNITS: 100000 SUSPENSION ORAL at 12:46

## 2023-12-11 RX ADMIN — POLYETHYLENE GLYCOL 3350 17 G: 17 POWDER, FOR SOLUTION ORAL at 08:31

## 2023-12-11 RX ADMIN — CHOLECALCIFEROL TAB 25 MCG (1000 UNIT) 5000 UNITS: 25 TAB at 08:31

## 2023-12-11 RX ADMIN — TRAMADOL HYDROCHLORIDE 50 MG: 50 TABLET ORAL at 12:51

## 2023-12-11 RX ADMIN — Medication 100 MG: at 08:31

## 2023-12-11 RX ADMIN — FOLIC ACID 1 MG: 1 TABLET ORAL at 08:31

## 2023-12-11 RX ADMIN — TRAMADOL HYDROCHLORIDE 50 MG: 50 TABLET ORAL at 06:24

## 2023-12-11 RX ADMIN — CEFTRIAXONE SODIUM 1000 MG: 1 INJECTION, POWDER, FOR SOLUTION INTRAMUSCULAR; INTRAVENOUS at 11:04

## 2023-12-11 ASSESSMENT — PAIN SCALES - GENERAL: PAINLEVEL_OUTOF10: 8

## 2023-12-11 ASSESSMENT — PAIN - FUNCTIONAL ASSESSMENT: PAIN_FUNCTIONAL_ASSESSMENT: ACTIVITIES ARE NOT PREVENTED

## 2023-12-11 ASSESSMENT — PAIN DESCRIPTION - DESCRIPTORS: DESCRIPTORS: ACHING

## 2023-12-11 ASSESSMENT — PAIN DESCRIPTION - LOCATION: LOCATION: ABDOMEN

## 2023-12-12 PROCEDURE — 1100000000 HC RM PRIVATE

## 2023-12-12 PROCEDURE — 6370000000 HC RX 637 (ALT 250 FOR IP): Performed by: INTERNAL MEDICINE

## 2023-12-12 PROCEDURE — 6370000000 HC RX 637 (ALT 250 FOR IP): Performed by: NURSE PRACTITIONER

## 2023-12-12 PROCEDURE — 2580000003 HC RX 258: Performed by: INTERNAL MEDICINE

## 2023-12-12 PROCEDURE — 6360000002 HC RX W HCPCS: Performed by: INTERNAL MEDICINE

## 2023-12-12 PROCEDURE — 6370000000 HC RX 637 (ALT 250 FOR IP): Performed by: STUDENT IN AN ORGANIZED HEALTH CARE EDUCATION/TRAINING PROGRAM

## 2023-12-12 RX ADMIN — NYSTATIN 500000 UNITS: 100000 SUSPENSION ORAL at 21:42

## 2023-12-12 RX ADMIN — SODIUM CHLORIDE, PRESERVATIVE FREE 10 ML: 5 INJECTION INTRAVENOUS at 21:45

## 2023-12-12 RX ADMIN — PHENOL 1 SPRAY: 1.5 LIQUID ORAL at 07:07

## 2023-12-12 RX ADMIN — SODIUM CHLORIDE, PRESERVATIVE FREE 10 ML: 5 INJECTION INTRAVENOUS at 09:02

## 2023-12-12 RX ADMIN — CEFTRIAXONE SODIUM 1000 MG: 1 INJECTION, POWDER, FOR SOLUTION INTRAMUSCULAR; INTRAVENOUS at 11:03

## 2023-12-12 RX ADMIN — TRAMADOL HYDROCHLORIDE 50 MG: 50 TABLET ORAL at 17:02

## 2023-12-12 RX ADMIN — Medication 100 MG: at 07:53

## 2023-12-12 RX ADMIN — POLYETHYLENE GLYCOL 3350 17 G: 17 POWDER, FOR SOLUTION ORAL at 07:54

## 2023-12-12 RX ADMIN — PANTOPRAZOLE SODIUM 40 MG: 40 TABLET, DELAYED RELEASE ORAL at 07:04

## 2023-12-12 RX ADMIN — POLYETHYLENE GLYCOL-3350 AND ELECTROLYTES 4000 ML: 236; 6.74; 5.86; 2.97; 22.74 POWDER, FOR SOLUTION ORAL at 12:00

## 2023-12-12 RX ADMIN — TAMSULOSIN HYDROCHLORIDE 0.4 MG: 0.4 CAPSULE ORAL at 07:54

## 2023-12-12 RX ADMIN — LINACLOTIDE 145 MCG: 145 CAPSULE, GELATIN COATED ORAL at 07:05

## 2023-12-12 RX ADMIN — METOPROLOL TARTRATE 12.5 MG: 25 TABLET, FILM COATED ORAL at 21:42

## 2023-12-12 RX ADMIN — FOLIC ACID 1 MG: 1 TABLET ORAL at 07:53

## 2023-12-12 RX ADMIN — FINASTERIDE 5 MG: 5 TABLET, FILM COATED ORAL at 07:53

## 2023-12-12 RX ADMIN — AMITRIPTYLINE HYDROCHLORIDE 10 MG: 10 TABLET, FILM COATED ORAL at 21:42

## 2023-12-12 RX ADMIN — FERROUS SULFATE TAB 325 MG (65 MG ELEMENTAL FE) 325 MG: 325 (65 FE) TAB at 07:54

## 2023-12-12 RX ADMIN — TRAMADOL HYDROCHLORIDE 50 MG: 50 TABLET ORAL at 07:54

## 2023-12-12 RX ADMIN — DOCUSATE SODIUM 50MG AND SENNOSIDES 8.6MG 1 TABLET: 8.6; 5 TABLET, FILM COATED ORAL at 07:53

## 2023-12-12 RX ADMIN — METOPROLOL TARTRATE 12.5 MG: 25 TABLET, FILM COATED ORAL at 07:54

## 2023-12-12 RX ADMIN — CHOLECALCIFEROL TAB 25 MCG (1000 UNIT) 5000 UNITS: 25 TAB at 07:53

## 2023-12-12 RX ADMIN — MELATONIN 1.5 MG: at 21:42

## 2023-12-12 NOTE — CONSULTS
GI CONSULTATION NOTE  Jennifer De Jesus, ANISH  [phone] NP in-hospital cell phone M-F until 4:30  After 5pm or on weekends, please call  for physician on call    NAME: Tawanna Chan   :  1965   MRN:  [id number]   Attending: Dr. Nichols Officer  Primary GI: Dr. Rica Dominique  Date/Time:  2023 11:58 AM  Assessment:   Severe constipation  Hx of rectal cancer  Hx of chronic pancreatitis  Reported several days of increased diarrhea, abd distention and pain  CT abd/pelvis: Severe constipation. Moderate distention of small bowel without abrupt transition point. 2.  Sequelae of chronic pancreatitis. Evaluation for acute on chronic pancreatitis limited. Recommend correlation with enzymes. 3.  Large calcified dependent stones in the bladder. 4.  Partially visualized right scrotal hydrocele. Lipase 69, LFTS WNL  Last Fleg Sig 10/2022 with rectal narrowing and congested mucosa    Plan:   Suspect a component of overflow constipation and malabsorption syndrome  Follow up stool studies - C.diff currently indeterminate  Agree with pancreatic enzymes as ordered  Have increased Miralax to BID and will start Linzess. Avoid opioids  Replete eleoctrolytes  Follow GI output    Plan discussed with Dr. Wendy Crenshaw  Subjective:     HISTORY OF PRESENT ILLNESS:     Tawanna Chan is an 59 y.o. male who we are asked to see for complaint of severe constipation. Longstanding issue beginning with rectal cancer and in 2016 underwent low anterior resection with coloanal anastomosis w/ diverting protective loop ileostomy by Dr. Brian Luevano and underwent neoadjuvant chemo for presumed T3N1M0 rectal cancer. He had ileostomy take down in May 2016. He was followed by Dr. Juan C Cardoso. He has a hx of recurrent SBO and many years of loose, multiple BMs daily with CT imaging showing significant colonic stool burden. Also has a hx of chronic pancreatitis w/ pseudocyst. He has been on pancreatic enzymes.  Last Fleg Sig in
Pay for Housing in the Last Year: Yes     Number of Places Lived in the Last Year: 1     Unstable Housing in the Last Year: No     Current Facility-Administered Medications   Medication Dose Route Frequency Provider Last Rate Last Admin    sennosides-docusate sodium (SENOKOT-S) 8.6-50 MG tablet 1 tablet  1 tablet Oral Daily Brenda Sargent MD   1 tablet at 12/11/23 1102    0.9 % sodium chloride infusion   IntraVENous Continuous Ashtyn Olivo MD        meclizine (ANTIVERT) tablet 12.5 mg  12.5 mg Oral TID PRN Brenda Sargent MD   12.5 mg at 12/10/23 1326    polyethylene glycol (GLYCOLAX) packet 17 g  17 g Oral Daily Brenda Sargent MD   17 g at 12/11/23 0831    nystatin (MYCOSTATIN) 776210 UNIT/ML suspension 500,000 Units  5 mL Oral 4x Daily Pearly Side, APRN - CNP   500,000 Units at 12/11/23 1246    pantoprazole (PROTONIX) tablet 40 mg  40 mg Oral BID AC Rochelle Owen MD   40 mg at 12/11/23 9410    traMADol (ULTRAM) tablet 50 mg  50 mg Oral Q6H PRN Glenis Mariano MD   50 mg at 12/11/23 1251    cefTRIAXone (ROCEPHIN) 1,000 mg in sodium chloride 0.9 % 50 mL IVPB (mini-bag)  1,000 mg IntraVENous Q24H Brenda Sargent MD   Stopped at 12/11/23 1137    linaclotide (LINZESS) capsule 145 mcg  145 mcg Oral QAM AC Pearly Side, APRN - CNP   145 mcg at 12/11/23 5326    amitriptyline (ELAVIL) tablet 10 mg  10 mg Oral Nightly Trevin CAPELLAN MD   10 mg at 12/10/23 2049    lipase-protease-amylase (ZENPEP) 67863-84352 units delayed release capsule 20,000 Units  20,000 Units Oral TID WC Trevin CAPELLAN MD   20,000 Units at 12/11/23 1246    dicyclomine (BENTYL) capsule 10 mg  10 mg Oral TID PRN Trevin CAPELLAN MD   10 mg at 12/11/23 1251    finasteride (PROSCAR) tablet 5 mg  5 mg Oral Daily Trevin CAPELLAN MD   5 mg at 93/39/50 5263    folic acid (FOLVITE) tablet 1 mg  1 mg Oral Daily Trevin CAPELLAN MD   1 mg at 12/11/23 0831    ferrous sulfate (IRON 325) tablet 325 mg
findings on imaging showing constipation and small bowel dilation (this appears chronic when compared to prior scans). Plan:     - He does have radiation changes on exam. I believe the palpable mass is an enlarged prostate rather than rectal mass. encouraging that he very recently had a pretty unremarkable colonoscopy without evidence of recurrence  - I did order a new CEA for completion sake. will defer EUA at this point unless CEA in remarkably higher than the last one  - currently getting golytely via NGT for large colonic stool burden seen on CT. Verónica Bearded some sort of dysmotility issue as underlying cause of his chronic sx's. Defer management to medical teams.      Signed By: Sri Figueroa MD     December 12, 2023

## 2023-12-13 ENCOUNTER — APPOINTMENT (OUTPATIENT)
Facility: HOSPITAL | Age: 58
DRG: 388 | End: 2023-12-13
Payer: MEDICARE

## 2023-12-13 PROCEDURE — 1100000000 HC RM PRIVATE

## 2023-12-13 PROCEDURE — 6370000000 HC RX 637 (ALT 250 FOR IP): Performed by: INTERNAL MEDICINE

## 2023-12-13 PROCEDURE — 6370000000 HC RX 637 (ALT 250 FOR IP): Performed by: NURSE PRACTITIONER

## 2023-12-13 PROCEDURE — 2580000003 HC RX 258: Performed by: INTERNAL MEDICINE

## 2023-12-13 PROCEDURE — 2580000003 HC RX 258: Performed by: SURGERY

## 2023-12-13 PROCEDURE — 6370000000 HC RX 637 (ALT 250 FOR IP): Performed by: STUDENT IN AN ORGANIZED HEALTH CARE EDUCATION/TRAINING PROGRAM

## 2023-12-13 PROCEDURE — 6360000002 HC RX W HCPCS: Performed by: INTERNAL MEDICINE

## 2023-12-13 PROCEDURE — 74018 RADEX ABDOMEN 1 VIEW: CPT

## 2023-12-13 RX ADMIN — METOPROLOL TARTRATE 12.5 MG: 25 TABLET, FILM COATED ORAL at 21:10

## 2023-12-13 RX ADMIN — PANTOPRAZOLE SODIUM 40 MG: 40 TABLET, DELAYED RELEASE ORAL at 17:17

## 2023-12-13 RX ADMIN — TRAMADOL HYDROCHLORIDE 50 MG: 50 TABLET ORAL at 18:14

## 2023-12-13 RX ADMIN — Medication 100 MG: at 08:56

## 2023-12-13 RX ADMIN — METOPROLOL TARTRATE 12.5 MG: 25 TABLET, FILM COATED ORAL at 08:56

## 2023-12-13 RX ADMIN — SODIUM CHLORIDE: 9 INJECTION, SOLUTION INTRAVENOUS at 04:38

## 2023-12-13 RX ADMIN — DICYCLOMINE HYDROCHLORIDE 10 MG: 10 CAPSULE ORAL at 21:10

## 2023-12-13 RX ADMIN — NYSTATIN 500000 UNITS: 100000 SUSPENSION ORAL at 08:56

## 2023-12-13 RX ADMIN — TAMSULOSIN HYDROCHLORIDE 0.4 MG: 0.4 CAPSULE ORAL at 08:56

## 2023-12-13 RX ADMIN — DICYCLOMINE HYDROCHLORIDE 10 MG: 10 CAPSULE ORAL at 05:32

## 2023-12-13 RX ADMIN — PANTOPRAZOLE SODIUM 40 MG: 40 TABLET, DELAYED RELEASE ORAL at 05:28

## 2023-12-13 RX ADMIN — PANCRELIPASE LIPASE, PANCRELIPASE PROTEASE, PANCRELIPASE AMYLASE 20000 UNITS: 20000; 63000; 84000 CAPSULE, DELAYED RELEASE ORAL at 17:17

## 2023-12-13 RX ADMIN — CHOLECALCIFEROL TAB 25 MCG (1000 UNIT) 5000 UNITS: 25 TAB at 08:57

## 2023-12-13 RX ADMIN — SODIUM CHLORIDE, PRESERVATIVE FREE 10 ML: 5 INJECTION INTRAVENOUS at 08:58

## 2023-12-13 RX ADMIN — DOCUSATE SODIUM 50MG AND SENNOSIDES 8.6MG 1 TABLET: 8.6; 5 TABLET, FILM COATED ORAL at 08:56

## 2023-12-13 RX ADMIN — PANCRELIPASE LIPASE, PANCRELIPASE PROTEASE, PANCRELIPASE AMYLASE 20000 UNITS: 20000; 63000; 84000 CAPSULE, DELAYED RELEASE ORAL at 08:56

## 2023-12-13 RX ADMIN — FINASTERIDE 5 MG: 5 TABLET, FILM COATED ORAL at 08:56

## 2023-12-13 RX ADMIN — FOLIC ACID 1 MG: 1 TABLET ORAL at 08:56

## 2023-12-13 RX ADMIN — CEFTRIAXONE SODIUM 1000 MG: 1 INJECTION, POWDER, FOR SOLUTION INTRAMUSCULAR; INTRAVENOUS at 11:46

## 2023-12-13 RX ADMIN — SODIUM CHLORIDE, PRESERVATIVE FREE 10 ML: 5 INJECTION INTRAVENOUS at 21:19

## 2023-12-13 RX ADMIN — MELATONIN 1.5 MG: at 21:10

## 2023-12-13 RX ADMIN — AMITRIPTYLINE HYDROCHLORIDE 10 MG: 10 TABLET, FILM COATED ORAL at 21:10

## 2023-12-13 RX ADMIN — LINACLOTIDE 145 MCG: 145 CAPSULE, GELATIN COATED ORAL at 05:29

## 2023-12-13 RX ADMIN — FERROUS SULFATE TAB 325 MG (65 MG ELEMENTAL FE) 325 MG: 325 (65 FE) TAB at 08:56

## 2023-12-13 ASSESSMENT — PAIN DESCRIPTION - DESCRIPTORS: DESCRIPTORS: SHARP

## 2023-12-13 ASSESSMENT — PAIN DESCRIPTION - LOCATION: LOCATION: ABDOMEN

## 2023-12-13 ASSESSMENT — PAIN SCALES - GENERAL: PAINLEVEL_OUTOF10: 8

## 2023-12-13 ASSESSMENT — PAIN DESCRIPTION - ORIENTATION: ORIENTATION: LEFT

## 2023-12-13 NOTE — CARE COORDINATION
Transition of Care Plan:     RUR: 15%  Prior Level of Functioning:   Disposition: Home  If SNF or IPR: Date FOC offered:   Date FOC received:   Accepting facility:   Date authorization started with reference number:   Date authorization received and expires: Follow up appointments:   DME needed: n/a  Transportation at discharge: Tacey June states he has transportation benefits  IM/IMM Medicare/ letter given: 2nd IM needed  Is patient a [place] and connected with [facility]? If yes, was [employer] transfer form completed and VA notified? Caregiver Contact: pt states he does not have a support system  Discharge Caregiver contacted prior to discharge? Care Conference needed? Barriers to discharge:     Patient is expected to d/c tomorrow without any needs. CM will continue to follow.     Elmer Richard  Ext [phone]

## 2023-12-14 PROCEDURE — 6370000000 HC RX 637 (ALT 250 FOR IP): Performed by: STUDENT IN AN ORGANIZED HEALTH CARE EDUCATION/TRAINING PROGRAM

## 2023-12-14 PROCEDURE — 1100000000 HC RM PRIVATE

## 2023-12-14 PROCEDURE — 6370000000 HC RX 637 (ALT 250 FOR IP): Performed by: INTERNAL MEDICINE

## 2023-12-14 PROCEDURE — 6370000000 HC RX 637 (ALT 250 FOR IP): Performed by: NURSE PRACTITIONER

## 2023-12-14 PROCEDURE — 2580000003 HC RX 258: Performed by: INTERNAL MEDICINE

## 2023-12-14 PROCEDURE — 2580000003 HC RX 258: Performed by: SURGERY

## 2023-12-14 RX ADMIN — AMITRIPTYLINE HYDROCHLORIDE 10 MG: 10 TABLET, FILM COATED ORAL at 22:38

## 2023-12-14 RX ADMIN — TRAMADOL HYDROCHLORIDE 50 MG: 50 TABLET ORAL at 12:26

## 2023-12-14 RX ADMIN — DICYCLOMINE HYDROCHLORIDE 10 MG: 10 CAPSULE ORAL at 20:09

## 2023-12-14 RX ADMIN — METOPROLOL TARTRATE 12.5 MG: 25 TABLET, FILM COATED ORAL at 08:24

## 2023-12-14 RX ADMIN — TRAMADOL HYDROCHLORIDE 50 MG: 50 TABLET ORAL at 06:23

## 2023-12-14 RX ADMIN — PANCRELIPASE LIPASE, PANCRELIPASE PROTEASE, PANCRELIPASE AMYLASE 20000 UNITS: 20000; 63000; 84000 CAPSULE, DELAYED RELEASE ORAL at 17:17

## 2023-12-14 RX ADMIN — PANCRELIPASE LIPASE, PANCRELIPASE PROTEASE, PANCRELIPASE AMYLASE 20000 UNITS: 20000; 63000; 84000 CAPSULE, DELAYED RELEASE ORAL at 06:23

## 2023-12-14 RX ADMIN — PANCRELIPASE LIPASE, PANCRELIPASE PROTEASE, PANCRELIPASE AMYLASE 20000 UNITS: 20000; 63000; 84000 CAPSULE, DELAYED RELEASE ORAL at 12:23

## 2023-12-14 RX ADMIN — METOPROLOL TARTRATE 12.5 MG: 25 TABLET, FILM COATED ORAL at 22:38

## 2023-12-14 RX ADMIN — SODIUM CHLORIDE, PRESERVATIVE FREE 10 ML: 5 INJECTION INTRAVENOUS at 08:25

## 2023-12-14 RX ADMIN — LINACLOTIDE 145 MCG: 145 CAPSULE, GELATIN COATED ORAL at 06:23

## 2023-12-14 RX ADMIN — FERROUS SULFATE TAB 325 MG (65 MG ELEMENTAL FE) 325 MG: 325 (65 FE) TAB at 08:25

## 2023-12-14 RX ADMIN — TRAMADOL HYDROCHLORIDE 50 MG: 50 TABLET ORAL at 20:09

## 2023-12-14 RX ADMIN — FINASTERIDE 5 MG: 5 TABLET, FILM COATED ORAL at 08:25

## 2023-12-14 RX ADMIN — SODIUM CHLORIDE, PRESERVATIVE FREE 10 ML: 5 INJECTION INTRAVENOUS at 22:39

## 2023-12-14 RX ADMIN — SODIUM CHLORIDE: 9 INJECTION, SOLUTION INTRAVENOUS at 00:27

## 2023-12-14 RX ADMIN — Medication 100 MG: at 08:24

## 2023-12-14 RX ADMIN — DICYCLOMINE HYDROCHLORIDE 10 MG: 10 CAPSULE ORAL at 06:23

## 2023-12-14 RX ADMIN — NYSTATIN 500000 UNITS: 100000 SUSPENSION ORAL at 12:23

## 2023-12-14 RX ADMIN — NYSTATIN 500000 UNITS: 100000 SUSPENSION ORAL at 08:24

## 2023-12-14 RX ADMIN — MELATONIN 1.5 MG: at 22:38

## 2023-12-14 RX ADMIN — TRAMADOL HYDROCHLORIDE 50 MG: 50 TABLET ORAL at 00:25

## 2023-12-14 RX ADMIN — FOLIC ACID 1 MG: 1 TABLET ORAL at 08:25

## 2023-12-14 RX ADMIN — CHOLECALCIFEROL TAB 25 MCG (1000 UNIT) 5000 UNITS: 25 TAB at 08:25

## 2023-12-14 RX ADMIN — PANTOPRAZOLE SODIUM 40 MG: 40 TABLET, DELAYED RELEASE ORAL at 17:17

## 2023-12-14 RX ADMIN — TAMSULOSIN HYDROCHLORIDE 0.4 MG: 0.4 CAPSULE ORAL at 08:25

## 2023-12-14 RX ADMIN — PANTOPRAZOLE SODIUM 40 MG: 40 TABLET, DELAYED RELEASE ORAL at 06:23

## 2023-12-14 ASSESSMENT — PAIN SCALES - GENERAL
PAINLEVEL_OUTOF10: 5
PAINLEVEL_OUTOF10: 7
PAINLEVEL_OUTOF10: 8
PAINLEVEL_OUTOF10: 8
PAINLEVEL_OUTOF10: 3
PAINLEVEL_OUTOF10: 5
PAINLEVEL_OUTOF10: 8

## 2023-12-14 ASSESSMENT — PAIN DESCRIPTION - LOCATION
LOCATION: ABDOMEN

## 2023-12-14 ASSESSMENT — PAIN DESCRIPTION - ONSET
ONSET: ON-GOING
ONSET: ON-GOING

## 2023-12-14 ASSESSMENT — PAIN - FUNCTIONAL ASSESSMENT
PAIN_FUNCTIONAL_ASSESSMENT: ACTIVITIES ARE NOT PREVENTED

## 2023-12-14 ASSESSMENT — PAIN DESCRIPTION - ORIENTATION
ORIENTATION: LEFT

## 2023-12-14 ASSESSMENT — PAIN DESCRIPTION - PAIN TYPE
TYPE: ACUTE PAIN
TYPE: ACUTE PAIN

## 2023-12-14 ASSESSMENT — PAIN DESCRIPTION - DESCRIPTORS
DESCRIPTORS: ACHING

## 2023-12-14 ASSESSMENT — PAIN DESCRIPTION - FREQUENCY
FREQUENCY: INTERMITTENT
FREQUENCY: INTERMITTENT

## 2023-12-15 VITALS
HEIGHT: 68 IN | WEIGHT: 90 LBS | TEMPERATURE: 97.9 F | RESPIRATION RATE: 16 BRPM | DIASTOLIC BLOOD PRESSURE: 78 MMHG | BODY MASS INDEX: 13.64 KG/M2 | OXYGEN SATURATION: 100 % | HEART RATE: 68 BPM | SYSTOLIC BLOOD PRESSURE: 134 MMHG

## 2023-12-15 LAB
ALBUMIN SERPL-MCNC: 2.1 G/DL (ref 3.5–5)
ALBUMIN/GLOB SERPL: 0.5 (ref 1.1–2.2)
ALP SERPL-CCNC: 78 U/L (ref 45–117)
ALT SERPL-CCNC: 15 U/L (ref 12–78)
ANION GAP SERPL CALC-SCNC: 6 MMOL/L (ref 5–15)
AST SERPL-CCNC: 10 U/L (ref 15–37)
BASOPHILS # BLD: 0 K/UL (ref 0–0.1)
BASOPHILS NFR BLD: 1 % (ref 0–1)
BILIRUB SERPL-MCNC: 0.3 MG/DL (ref 0.2–1)
BUN SERPL-MCNC: 4 MG/DL (ref 6–20)
BUN/CREAT SERPL: 5 (ref 12–20)
CALCIUM SERPL-MCNC: 8.4 MG/DL (ref 8.5–10.1)
CEA SERPL-MCNC: 8.6 NG/ML
CHLORIDE SERPL-SCNC: 113 MMOL/L (ref 97–108)
CO2 SERPL-SCNC: 22 MMOL/L (ref 21–32)
CREAT SERPL-MCNC: 0.84 MG/DL (ref 0.7–1.3)
DIFFERENTIAL METHOD BLD: ABNORMAL
EOSINOPHIL # BLD: 0.2 K/UL (ref 0–0.4)
EOSINOPHIL NFR BLD: 3 % (ref 0–7)
ERYTHROCYTE [DISTWIDTH] IN BLOOD BY AUTOMATED COUNT: 16.7 % (ref 11.5–14.5)
GLOBULIN SER CALC-MCNC: 4 G/DL (ref 2–4)
GLUCOSE SERPL-MCNC: 107 MG/DL (ref 65–100)
HCT VFR BLD AUTO: 25.3 % (ref 36.6–50.3)
HGB BLD-MCNC: 8.2 G/DL (ref 12.1–17)
IMM GRANULOCYTES # BLD AUTO: 0 K/UL (ref 0–0.04)
IMM GRANULOCYTES NFR BLD AUTO: 0 % (ref 0–0.5)
LYMPHOCYTES # BLD: 1.9 K/UL (ref 0.8–3.5)
LYMPHOCYTES NFR BLD: 30 % (ref 12–49)
MCH RBC QN AUTO: 30.8 PG (ref 26–34)
MCHC RBC AUTO-ENTMCNC: 32.4 G/DL (ref 30–36.5)
MCV RBC AUTO: 95.1 FL (ref 80–99)
MONOCYTES # BLD: 0.6 K/UL (ref 0–1)
MONOCYTES NFR BLD: 10 % (ref 5–13)
NEUTS SEG # BLD: 3.6 K/UL (ref 1.8–8)
NEUTS SEG NFR BLD: 56 % (ref 32–75)
NRBC # BLD: 0 K/UL (ref 0–0.01)
NRBC BLD-RTO: 0 PER 100 WBC
PLATELET # BLD AUTO: 184 K/UL (ref 150–400)
PMV BLD AUTO: 10.3 FL (ref 8.9–12.9)
POTASSIUM SERPL-SCNC: 3.8 MMOL/L (ref 3.5–5.1)
PROT SERPL-MCNC: 6.1 G/DL (ref 6.4–8.2)
RBC # BLD AUTO: 2.66 M/UL (ref 4.1–5.7)
SODIUM SERPL-SCNC: 141 MMOL/L (ref 136–145)
WBC # BLD AUTO: 6.4 K/UL (ref 4.1–11.1)

## 2023-12-15 PROCEDURE — 6370000000 HC RX 637 (ALT 250 FOR IP): Performed by: NURSE PRACTITIONER

## 2023-12-15 PROCEDURE — 6370000000 HC RX 637 (ALT 250 FOR IP): Performed by: STUDENT IN AN ORGANIZED HEALTH CARE EDUCATION/TRAINING PROGRAM

## 2023-12-15 PROCEDURE — 6370000000 HC RX 637 (ALT 250 FOR IP): Performed by: INTERNAL MEDICINE

## 2023-12-15 PROCEDURE — 80053 COMPREHEN METABOLIC PANEL: CPT

## 2023-12-15 PROCEDURE — 82378 CARCINOEMBRYONIC ANTIGEN: CPT

## 2023-12-15 PROCEDURE — 36415 COLL VENOUS BLD VENIPUNCTURE: CPT

## 2023-12-15 PROCEDURE — 2580000003 HC RX 258: Performed by: INTERNAL MEDICINE

## 2023-12-15 PROCEDURE — 85025 COMPLETE CBC W/AUTO DIFF WBC: CPT

## 2023-12-15 RX ORDER — TRAMADOL HYDROCHLORIDE 50 MG/1
50 TABLET ORAL EVERY 8 HOURS PRN
Qty: 12 TABLET | Refills: 0 | Status: SHIPPED | OUTPATIENT
Start: 2023-12-15 | End: 2023-12-20

## 2023-12-15 RX ORDER — POLYETHYLENE GLYCOL 3350 17 G/17G
17 POWDER, FOR SOLUTION ORAL DAILY
Qty: 30 EACH | Refills: 2 | Status: SHIPPED | COMMUNITY
Start: 2023-12-15 | End: 2024-03-14

## 2023-12-15 RX ORDER — LACTULOSE 10 G/15ML
20 SOLUTION ORAL DAILY PRN
Qty: 473 ML | Refills: 1 | Status: SHIPPED | OUTPATIENT
Start: 2023-12-15

## 2023-12-15 RX ADMIN — CHOLECALCIFEROL TAB 25 MCG (1000 UNIT) 5000 UNITS: 25 TAB at 09:32

## 2023-12-15 RX ADMIN — TRAMADOL HYDROCHLORIDE 50 MG: 50 TABLET ORAL at 16:58

## 2023-12-15 RX ADMIN — PANTOPRAZOLE SODIUM 40 MG: 40 TABLET, DELAYED RELEASE ORAL at 06:25

## 2023-12-15 RX ADMIN — PANCRELIPASE LIPASE, PANCRELIPASE PROTEASE, PANCRELIPASE AMYLASE 20000 UNITS: 20000; 63000; 84000 CAPSULE, DELAYED RELEASE ORAL at 16:58

## 2023-12-15 RX ADMIN — PANTOPRAZOLE SODIUM 40 MG: 40 TABLET, DELAYED RELEASE ORAL at 16:58

## 2023-12-15 RX ADMIN — DICYCLOMINE HYDROCHLORIDE 10 MG: 10 CAPSULE ORAL at 06:30

## 2023-12-15 RX ADMIN — FERROUS SULFATE TAB 325 MG (65 MG ELEMENTAL FE) 325 MG: 325 (65 FE) TAB at 09:33

## 2023-12-15 RX ADMIN — METOPROLOL TARTRATE 12.5 MG: 25 TABLET, FILM COATED ORAL at 09:33

## 2023-12-15 RX ADMIN — TAMSULOSIN HYDROCHLORIDE 0.4 MG: 0.4 CAPSULE ORAL at 09:33

## 2023-12-15 RX ADMIN — Medication 100 MG: at 09:33

## 2023-12-15 RX ADMIN — ACETAMINOPHEN 650 MG: 325 TABLET ORAL at 09:40

## 2023-12-15 RX ADMIN — PANCRELIPASE LIPASE, PANCRELIPASE PROTEASE, PANCRELIPASE AMYLASE 20000 UNITS: 20000; 63000; 84000 CAPSULE, DELAYED RELEASE ORAL at 06:25

## 2023-12-15 RX ADMIN — SODIUM CHLORIDE, PRESERVATIVE FREE 10 ML: 5 INJECTION INTRAVENOUS at 09:36

## 2023-12-15 RX ADMIN — LINACLOTIDE 145 MCG: 145 CAPSULE, GELATIN COATED ORAL at 06:25

## 2023-12-15 RX ADMIN — TRAMADOL HYDROCHLORIDE 50 MG: 50 TABLET ORAL at 06:30

## 2023-12-15 RX ADMIN — FOLIC ACID 1 MG: 1 TABLET ORAL at 09:33

## 2023-12-15 RX ADMIN — FINASTERIDE 5 MG: 5 TABLET, FILM COATED ORAL at 09:33

## 2023-12-15 ASSESSMENT — PAIN DESCRIPTION - DESCRIPTORS
DESCRIPTORS: DISCOMFORT;CRAMPING
DESCRIPTORS: SHARP

## 2023-12-15 ASSESSMENT — PAIN SCALES - GENERAL
PAINLEVEL_OUTOF10: 8
PAINLEVEL_OUTOF10: 4
PAINLEVEL_OUTOF10: 8

## 2023-12-15 ASSESSMENT — PAIN DESCRIPTION - LOCATION
LOCATION: ABDOMEN
LOCATION: ABDOMEN

## 2023-12-15 ASSESSMENT — PAIN - FUNCTIONAL ASSESSMENT: PAIN_FUNCTIONAL_ASSESSMENT: ACTIVITIES ARE NOT PREVENTED

## 2023-12-15 NOTE — CARE COORDINATION
Transition of Care Plan:     RUR: 23%  Prior Level of Functioning:   Disposition: Home  If SNF or IPR: Date FOC offered:   Date FOC received:   Accepting facility:   Date authorization started with reference number:   Date authorization received and expires: Follow up appointments:   DME needed: n/a  Transportation at discharge: Alejandro [id number] between 6pm & 7pm  IM/IMM Medicare/ letter given: 2nd IM given 12/15  Is patient a [place] and connected with [facility]? If yes, was [employer] transfer form completed and VA notified? Caregiver Contact: pt states he does not have a support system  Discharge Caregiver contacted prior to discharge? Care Conference needed? Barriers to discharge:     3:54  CM scheduled transportation w/[employer]. CM informed them pt would be ready at [address] has up to 3hr to send transport. Transport should not arrive any later than 7pm.  will be instructed to call nurses station upon arrival.  Patient is d/c home without any needs. Patient stated someone will be home when he arrives. CM was unable to reach PCP office to schedule f/u.     Liya Brown  Ext [phone]

## 2024-01-02 ENCOUNTER — HOSPITAL ENCOUNTER (EMERGENCY)
Facility: HOSPITAL | Age: 59
Discharge: HOME | End: 2024-01-02
Attending: EMERGENCY MEDICINE
Payer: MEDICARE

## 2024-01-02 VITALS
SYSTOLIC BLOOD PRESSURE: 141 MMHG | RESPIRATION RATE: 15 BRPM | HEIGHT: 68 IN | TEMPERATURE: 98.4 F | WEIGHT: 100 LBS | BODY MASS INDEX: 15.16 KG/M2 | OXYGEN SATURATION: 100 % | DIASTOLIC BLOOD PRESSURE: 83 MMHG | HEART RATE: 86 BPM

## 2024-01-02 DIAGNOSIS — T83.011A MALFUNCTION OF FOLEY CATHETER, INITIAL ENCOUNTER (HCC): Primary | ICD-10-CM

## 2024-01-02 PROCEDURE — 51702 INSERT TEMP BLADDER CATH: CPT

## 2024-01-02 PROCEDURE — 99283 EMERGENCY DEPT VISIT LOW MDM: CPT

## 2024-01-02 RX ORDER — LIDOCAINE HYDROCHLORIDE 20 MG/ML
JELLY TOPICAL PRN
Status: DISCONTINUED | OUTPATIENT
Start: 2024-01-02 | End: 2024-01-02 | Stop reason: HOSPADM

## 2024-01-02 ASSESSMENT — PAIN - FUNCTIONAL ASSESSMENT: PAIN_FUNCTIONAL_ASSESSMENT: NONE - DENIES PAIN

## 2024-01-02 NOTE — ED PROVIDER NOTES
Tabs tablet  Commonly known as: CHOLECALCIFEROL                DISCONTINUED MEDICATIONS:  Current Discharge Medication List          I am the Primary Clinician of Record.   Antonio Sage MD (electronically signed)    (Please note that parts of this dictation were completed with voice recognition software. Quite often unanticipated grammatical, syntax, homophones, and other interpretive errors are inadvertently transcribed by the computer software. Please disregards these errors. Please excuse any errors that have escaped final proofreading.)         Antonio Sage MD  01/02/24 0378

## 2024-01-02 NOTE — ED NOTES
Discharge instructions were given to the patient by Louis Gee RN  .  The patient left the Emergency Department alert and oriented and in no acute distress with 0 prescription(s). The patient was encouraged to call or return to the ED for worsening issues or problems and was encouraged to schedule a follow up appointment for continuing care.  The patient verbalized understanding of discharge instructions and prescriptions; all questions were answered. The patient has no further concerns at this time.

## 2024-01-02 NOTE — ED NOTES
Pt presents to ED complaining of urinary catheter disconnection. Pt states that he has had his catheter in for approximately 1.5 months and that now it has disconnected from the bag and displays a light green clotting in the tube. Pt denies pain. Pt is alert and oriented x 4, RR even and unlabored, skin is warm and dry. Pt appears in NAD at this time. Assessment completed and pt updated on plan of care.  Call bell in reach.   Emergency Department Nursing Plan of Care  The Nursing Plan of Care is developed from the Nursing assessment and Emergency Department Attending provider initial evaluation.  The plan of care may be reviewed in the “ED Provider note”.  The Plan of Care was developed with the following considerations:  Patient / Family readiness to learn indicated by:Refer to Medical chart in [facility]  Persons(s) to be included in education: Refer to Medical chart in [facility]  Barriers to Learning/Limitations:Normal

## 2024-01-02 NOTE — ED TRIAGE NOTES
Pt presents ambulatory to ED complaining of urinary catheter problem. Pt states his urinary leg bag connection is loose and keeps falling off of catheter. Pt reports no pain or cessation of urine.

## 2024-01-31 ENCOUNTER — HOSPITAL ENCOUNTER (EMERGENCY)
Facility: HOSPITAL | Age: 59
Discharge: HOME | End: 2024-01-31
Payer: MEDICARE

## 2024-01-31 VITALS
HEART RATE: 89 BPM | SYSTOLIC BLOOD PRESSURE: 176 MMHG | HEIGHT: 68 IN | DIASTOLIC BLOOD PRESSURE: 76 MMHG | TEMPERATURE: 98.3 F | WEIGHT: 110 LBS | BODY MASS INDEX: 16.67 KG/M2 | OXYGEN SATURATION: 98 % | RESPIRATION RATE: 21 BRPM

## 2024-01-31 DIAGNOSIS — N39.0 URINARY TRACT INFECTION WITHOUT HEMATURIA, SITE UNSPECIFIED: ICD-10-CM

## 2024-01-31 DIAGNOSIS — T83.9XXA PROBLEM WITH FOLEY CATHETER, INITIAL ENCOUNTER (HCC): Primary | ICD-10-CM

## 2024-01-31 LAB
APPEARANCE UR: ABNORMAL
BACTERIA URNS QL MICRO: NEGATIVE /HPF
BILIRUB UR QL: NEGATIVE
COLOR UR: ABNORMAL
EPITH CASTS URNS QL MICRO: ABNORMAL /LPF
GLUCOSE UR STRIP.AUTO-MCNC: NEGATIVE MG/DL
HGB UR QL STRIP: ABNORMAL
KETONES UR QL STRIP.AUTO: NEGATIVE MG/DL
LEUKOCYTE ESTERASE UR QL STRIP.AUTO: ABNORMAL
NITRITE UR QL STRIP.AUTO: NEGATIVE
PH UR STRIP: 5.5 (ref 5–8)
PROT UR STRIP-MCNC: ABNORMAL MG/DL
RBC #/AREA URNS HPF: ABNORMAL /HPF (ref 0–5)
SP GR UR REFRACTOMETRY: 1.01
URINE CULTURE IF INDICATED: ABNORMAL
UROBILINOGEN UR QL STRIP.AUTO: 0.2 EU/DL (ref 0.2–1)
WBC URNS QL MICRO: ABNORMAL /HPF (ref 0–4)
YEAST URNS QL MICRO: PRESENT

## 2024-01-31 PROCEDURE — 81001 URINALYSIS AUTO W/SCOPE: CPT

## 2024-01-31 PROCEDURE — 99283 EMERGENCY DEPT VISIT LOW MDM: CPT

## 2024-01-31 PROCEDURE — 87086 URINE CULTURE/COLONY COUNT: CPT

## 2024-01-31 PROCEDURE — 6370000000 HC RX 637 (ALT 250 FOR IP): Performed by: PHYSICIAN ASSISTANT

## 2024-01-31 RX ORDER — CEPHALEXIN 500 MG/1
500 CAPSULE ORAL
Status: DISCONTINUED | OUTPATIENT
Start: 2024-01-31 | End: 2024-01-31 | Stop reason: HOSPADM

## 2024-01-31 RX ORDER — CEPHALEXIN 500 MG/1
500 CAPSULE ORAL 4 TIMES DAILY
Qty: 20 CAPSULE | Refills: 0 | Status: SHIPPED | OUTPATIENT
Start: 2024-01-31 | End: 2024-02-05

## 2024-01-31 RX ORDER — OXYCODONE HYDROCHLORIDE 5 MG/1
5 TABLET ORAL
Status: COMPLETED | OUTPATIENT
Start: 2024-01-31 | End: 2024-01-31

## 2024-01-31 RX ADMIN — OXYCODONE HYDROCHLORIDE 5 MG: 5 TABLET ORAL at 16:44

## 2024-01-31 ASSESSMENT — PAIN DESCRIPTION - PAIN TYPE: TYPE: ACUTE PAIN

## 2024-01-31 ASSESSMENT — PAIN DESCRIPTION - DESCRIPTORS: DESCRIPTORS: ACHING;SORE

## 2024-01-31 ASSESSMENT — PAIN - FUNCTIONAL ASSESSMENT: PAIN_FUNCTIONAL_ASSESSMENT: 0-10

## 2024-01-31 ASSESSMENT — PAIN SCALES - GENERAL: PAINLEVEL_OUTOF10: 10

## 2024-01-31 ASSESSMENT — PAIN DESCRIPTION - LOCATION: LOCATION: ABDOMEN

## 2024-01-31 ASSESSMENT — PAIN DESCRIPTION - ORIENTATION: ORIENTATION: LOWER

## 2024-01-31 NOTE — ED NOTES
Pt presents to ED complaining of urinary catheter occlusion since last night. Pt also endorses lower back pain and catheter leakage. Pt is alert and oriented x 4, RR even and unlabored, skin is warm and dry. Pt appears in NAD at this time. Assessment completed and pt updated on plan of care.  Call bell in reach.   Emergency Department Nursing Plan of Care  The Nursing Plan of Care is developed from the Nursing assessment and Emergency Department Attending provider initial evaluation.  The plan of care may be reviewed in the “ED Provider note”.  The Plan of Care was developed with the following considerations:  Patient / Family readiness to learn indicated by:Refer to Medical chart in [facility]  Persons(s) to be included in education: Refer to Medical chart in [facility]  Barriers to Learning/Limitations:Normal

## 2024-01-31 NOTE — ED TRIAGE NOTES
Pt presents to the ED reporting no urinary output from chronic urinary catheter since last night. Pt reports urine is leaking from penis at insertion site. Pt reports lower abdominal pain.

## 2024-01-31 NOTE — ED NOTES
Patient has been instructed that they have been given oxycodone* which contains opioids, benzodiazepines, or other sedating drugs. Patient is aware that they will need to refrain from driving or operating heavy machinery after taking this medication.  Patient also instructed that they need to avoid drinking alcohol and using other products containing opioids, benzodiazepines, or other sedating drugs.  Patient verbalized understanding.

## 2024-01-31 NOTE — DISCHARGE INSTRUCTIONS
If you have any return of pain, fever, vomiting, worsening symptoms you need to return to the emergency department.  Please call your urologist and schedule follow-up.  It was also noted that your blood pressure was elevated here today.  Please discuss this with your primary care.  If you develop symptoms such as chest pain, shortness of breath, strokelike symptoms you need to return to the emergency department

## 2024-01-31 NOTE — ED PROVIDER NOTES
[facility] EMERGENCY DEPT  EMERGENCY DEPARTMENT ENCOUNTER       Pt Name: Edmond Cr Jr  MRN: [id number]  Birthdate 1965  Date of Evaluation: 1/31/2024  Provider: Yoly Griffith PA-C   PCP: Walker Kohler MD  Note Started: 6:11 PM 1/31/24     CHIEF COMPLAINT       Chief Complaint   Patient presents with    Urinary Catheter     \"Stopped up\"        HISTORY OF PRESENT ILLNESS: 1 or more elements      History From: Patient  None     Edmond Cr Jr is a 58 y.o. male who presents to the ED today with urinary catheter issue.  Has had a urinary catheter for a long time.  This 1 has been present for 15 days.  Patient states that he has been having intermittent pain.  He has not had any urine output through the catheter since yesterday and feels that it stopped up.  Comes here for evaluation     Nursing Notes were all reviewed and agreed with or any disagreements were addressed in the HPI.     REVIEW OF SYSTEMS      Review of Systems     Positives and Pertinent negatives as per HPI.    PAST HISTORY     Past Medical History:  Past Medical History:   Diagnosis Date    Cancer (HCC)     rectal    Gastrointestinal disorder     Gastric Ulcers; Rectal CA    Hematuria 9/1/2021    Hypertension        Past Surgical History:  Past Surgical History:   Procedure Laterality Date    GI         Family History:  Family History   Problem Relation Age of Onset    Cancer Brother        Social History:  Social History     Tobacco Use    Smoking status: Every Day     Current packs/day: 0.25     Types: Cigarettes    Smokeless tobacco: Never    Tobacco comments:     Quit smoking: \"1 pack last me three days\"   Substance Use Topics    Alcohol use: Not Currently    Drug use: Yes     Types: Marijuana (Weed)       Allergies:  No Known Allergies    CURRENT MEDICATIONS      Previous Medications    AMITRIPTYLINE (ELAVIL) 10 MG TABLET    Take 1 tablet by mouth nightly    DICYCLOMINE (BENTYL) 10 MG CAPSULE    Take 1 capsule by mouth 3 times

## 2024-02-01 LAB
BACTERIA SPEC CULT: NORMAL
CC UR VC: NORMAL
SERVICE CMNT-IMP: NORMAL

## 2024-03-14 NOTE — PROGRESS NOTES
* No surgery found * 
* No surgery found * Bedside shift change report given to [address] (oncoming nurse) by Trevin Johnson (offgoing nurse). Report included the following information [facility] Phone:   [phone] Significant changes during shift:  Complaint of dizziness, pt feeling better DR Rock Alves just continue monitoring Patient Information Neal Oswald 52 y.o. 
12/31/2018  1:30 AM by Roldan Lopez MD. Neal Oswald was admitted from Home 
 
Problem List 
 
Patient Active Problem List  
 Diagnosis Date Noted  Seizure ([facility] 12/31/2018  Alcohol abuse 12/31/2018  Drug abuse ([facility] 12/31/2018  Rectal cancer ([facility] 12/31/2018 History reviewed. No pertinent past medical history. Core Measures: CVA: No No 
CHF:No No 
PNA:No No 
 
Post Op Surgical (If Applicable):  
 
NActivity Status: OOB to Chair No 
Ambulated this shift No  
Bed Rest No 
 
Supplemental O2: (If Applicable) NC No 
NRB No 
Venti-mask No 
On  Liters/min LINES AND DRAINS:portacath acessed in ER 
DVT prophylaxis: DVT prophylaxis Med- Yes DVT prophylaxis SCD or HECTOR- No  
 
Wounds: (If Applicable) Wounds- No 
 
Location Patient Safety: 
 
Falls Score Total Score: 2 Safety Level_______ Bed Alarm On? Yes Sitter? No 
 
Plan for upcoming shift: Safety, seizure preacautions MRI and EEG in AM 
 
 
Discharge Plan: Yes TBD Active Consults: 
IP CONSULT TO NEUROLOGY Have Your Spot(S) Been Treated In The Past?: has not been treated Hpi Title: Evaluation of Skin Lesions

## 2024-07-08 ENCOUNTER — HOSPITAL ENCOUNTER (EMERGENCY)
Facility: HOSPITAL | Age: 59
Discharge: HOME | End: 2024-07-08
Attending: STUDENT IN AN ORGANIZED HEALTH CARE EDUCATION/TRAINING PROGRAM
Payer: MEDICARE

## 2024-07-08 VITALS
BODY MASS INDEX: 14.93 KG/M2 | HEIGHT: 68 IN | HEART RATE: 84 BPM | WEIGHT: 98.5 LBS | OXYGEN SATURATION: 99 % | RESPIRATION RATE: 18 BRPM | DIASTOLIC BLOOD PRESSURE: 72 MMHG | SYSTOLIC BLOOD PRESSURE: 122 MMHG | TEMPERATURE: 98.4 F

## 2024-07-08 DIAGNOSIS — N40.0 BENIGN PROSTATIC HYPERPLASIA WITHOUT LOWER URINARY TRACT SYMPTOMS: ICD-10-CM

## 2024-07-08 DIAGNOSIS — T83.011A MALFUNCTION OF FOLEY CATHETER, INITIAL ENCOUNTER (HCC): Primary | ICD-10-CM

## 2024-07-08 PROCEDURE — 99283 EMERGENCY DEPT VISIT LOW MDM: CPT

## 2024-07-08 ASSESSMENT — PAIN - FUNCTIONAL ASSESSMENT: PAIN_FUNCTIONAL_ASSESSMENT: NONE - DENIES PAIN

## 2024-07-08 NOTE — ED NOTES
Pt presents to ED requesting a catheter change. Pt states his home health came to change his catheter and was unable to put it back in. Pt states he has had a catheter for several years because of prostate issues and and outlet obstruction. Pt denies any sx at this time.. Pt is alert and oriented x 4, RR even and unlabored, skin is warm and dry. Pt appears in NAD at this time. Assessment completed and pt updated on plan of care.  Call bell in reach.  Emergency Department Nursing Plan of Care  The Nursing Plan of Care is developed from the Nursing assessment and Emergency Department Attending provider initial evaluation.  The plan of care may be reviewed in the “ED Provider note”.  The Plan of Care was developed with the following considerations:  Patient / Family readiness to learn indicated by:Refer to Medical chart in [facility]  Persons(s) to be included in education: Refer to Medical chart in [facility]  Barriers to Learning/Limitations:Normal

## 2024-07-08 NOTE — ED NOTES
Discharge instructions were given to the patient by PHAN Morales.     The patient left the Emergency Department alert and oriented and in no acute distress with 0 prescriptions. The patient was encouraged to call or return to the ED for worsening issues or problems and was encouraged to schedule a follow up appointment for continuing care.     Ambulation assessment completed before discharge.  Pt left Emergency Department ambulating at baseline with no ortho devices  Ortho device education: none    The patient verbalized understanding of discharge instructions and prescriptions, all questions were answered. The patient has no further concerns at this time.

## 2024-07-08 NOTE — ED PROVIDER NOTES
reviewed.   Constitutional:       Appearance: Normal appearance.   HENT:      Head: Normocephalic.   Eyes:      Extraocular Movements: Extraocular movements intact.      Pupils: Pupils are equal, round, and reactive to light.   Cardiovascular:      Rate and Rhythm: Normal rate.   Pulmonary:      Effort: Pulmonary effort is normal.   Abdominal:      General: Abdomen is flat.   Musculoskeletal:         General: Normal range of motion.      Cervical back: Normal range of motion.   Skin:     General: Skin is warm and dry.   Neurological:      General: No focal deficit present.      Mental Status: He is alert.   Psychiatric:         Mood and Affect: Mood normal.         Behavior: Behavior normal.          DIAGNOSTIC RESULTS   LABS:     No results found for this or any previous visit (from the past 24 hour(s)).    EKG: If performed, independent interpretation documented below in the MDM section     RADIOLOGY:  Non-plain film images such as CT, Ultrasound and MRI are read by the radiologist. Plain radiographic images are visualized and preliminarily interpreted by the ED Provider with the findings documented in the MDM section.     Interpretation per the Radiologist below, if available at the time of this note:     No orders to display        PROCEDURES   Unless otherwise noted below, none  Procedures     CRITICAL CARE TIME   none    EMERGENCY DEPARTMENT COURSE and DIFFERENTIAL DIAGNOSIS/MDM   Vitals:    Vitals:    07/08/24 1547   BP: 122/72   Pulse: 84   Resp: 18   Temp: 98.4 °F (36.9 °C)   TempSrc: Oral   SpO2: 99%   Weight: 44.7 kg (98 lb 8 oz)   Height: 1.727 m (5' 8\")        Patient was given the following medications:  Medications - No data to display    Medical Decision Making  59yoM with PMH of seizures, pancreatitis, etoh abuse, sbo, bph presenting with Jansen catheter malfunction.  Notes it was taken out today to get replaced and was unable to place back by home health nurse.  Notes he has had this for several

## 2024-11-30 NOTE — PROGRESS NOTES
* No surgery found * 
* No surgery found * Bedside shift change report given to Cristhian Garcia RN (oncoming nurse) by Porfirio Kraft (offgoing nurse). [facility] the [facility] 
  
[facility] Phone:   [phone] 
  
  
Significant changes during shift:  emesis x1, CIWA 10, ativan given 
  
  
Patient Information 
  
[employer] 52 y.o. 
12/31/2018  1:30 AM by Bry Rahman MD. [employer] was admitted from Home 
  
Problem List 
  
    
Patient Active Problem List  
  Diagnosis Date Noted  Seizure ([facility] 12/31/2018  Alcohol abuse 12/31/2018  Drug abuse ([facility] 12/31/2018  Rectal cancer ([facility] 12/31/2018  
  
History reviewed. No pertinent past medical history. 
  
  
Core Measures: 
  
CVA: No No 
CHF:No No 
PNA:No No 
  
Post Op Surgical (If Applicable):  
  
NActivity Status: 
  
OOB to Chair No 
Ambulated this shift No  
Bed Rest No 
  
Supplemental O2: (If Applicable) 
  
NC No 
NRB No 
Venti-mask No 
On  Liters/min 
  
  
LINES AND DRAINS:portacath acessed in ER 
  
DVT prophylaxis: 
  
DVT prophylaxis Med- Yes DVT prophylaxis SCD or HECTOR- No  
  
Wounds: (If Applicable) 
  
Wounds- No 
  
Location  
  
Patient Safety: 
  
Falls Score Total Score: 2 Safety Level_______ Bed Alarm On? Yes Sitter?  No 
  
Plan for upcoming shift: Safety, seizure preacautions MRI and EEG in AM 
  
  
  
Discharge Plan: Yes TBD 
  
Active Consults: 
IP CONSULT TO NEUROLOGY 
  
 
  
  
 
 
 Try melatonin 1 to 3mg at bedtime as needed    Patient Education   Preventive Care Advice   This is general advice given by our system to help you stay healthy. However, your care team may have specific advice just for you. Please talk to your care team about your preventive care needs.  Nutrition  Eat 5 or more servings of fruits and vegetables each day.  Try wheat bread, brown rice and whole grain pasta (instead of white bread, rice, and pasta).  Get enough calcium and vitamin D. Check the label on foods and aim for 100% of the RDA (recommended daily allowance).  Lifestyle  Exercise at least 150 minutes each week  (30 minutes a day, 5 days a week).  Do muscle strengthening activities 2 days a week. These help control your weight and prevent disease.  No smoking.  Wear sunscreen to prevent skin cancer.  Have a dental exam and cleaning every 6 months.  Yearly exams  See your health care team every year to talk about:  Any changes in your health.  Any medicines your care team has prescribed.  Preventive care, family planning, and ways to prevent chronic diseases.  Shots (vaccines)   HPV shots (up to age 26), if you've never had them before.  Hepatitis B shots (up to age 59), if you've never had them before.  COVID-19 shot: Get this shot when it's due.  Flu shot: Get a flu shot every year.  Tetanus shot: Get a tetanus shot every 10 years.  Pneumococcal, hepatitis A, and RSV shots: Ask your care team if you need these based on your risk.  Shingles shot (for age 50 and up)  General health tests  Diabetes screening:  Starting at age 35, Get screened for diabetes at least every 3 years.  If you are younger than age 35, ask your care team if you should be screened for diabetes.  Cholesterol test: At age 39, start having a cholesterol test every 5 years, or more often if advised.  Bone density scan (DEXA): At age 50, ask your care team if you should have this scan for osteoporosis (brittle bones).  Hepatitis C: Get tested  at least once in your life.  STIs (sexually transmitted infections)  Before age 24: Ask your care team if you should be screened for STIs.  After age 24: Get screened for STIs if you're at risk. You are at risk for STIs (including HIV) if:  You are sexually active with more than one person.  You don't use condoms every time.  You or a partner was diagnosed with a sexually transmitted infection.  If you are at risk for HIV, ask about PrEP medicine to prevent HIV.  Get tested for HIV at least once in your life, whether you are at risk for HIV or not.  Cancer screening tests  Cervical cancer screening: If you have a cervix, begin getting regular cervical cancer screening tests starting at age 21.  Breast cancer scan (mammogram): If you've ever had breasts, begin having regular mammograms starting at age 40. This is a scan to check for breast cancer.  Colon cancer screening: It is important to start screening for colon cancer at age 45.  Have a colonoscopy test every 10 years (or more often if you're at risk) Or, ask your provider about stool tests like a FIT test every year or Cologuard test every 3 years.  To learn more about your testing options, visit:   .  For help making a decision, visit:   https://bit.ly/ba64944.  Prostate cancer screening test: If you have a prostate, ask your care team if a prostate cancer screening test (PSA) at age 55 is right for you.  Lung cancer screening: If you are a current or former smoker ages 50 to 80, ask your care team if ongoing lung cancer screenings are right for you.  For informational purposes only. Not to replace the advice of your health care provider. Copyright   2023 [employer]. All rights reserved. Clinically reviewed by the [facility] Transitions Program. Fingerprint [id number] - REV 01/24.

## 2025-03-24 NOTE — PROGRESS NOTES
Medication: ambien 10mg #30  Last office visit date: 11/12/24  Pharmacy: [id number] - [facility] [address] [address]    Next OV: 11/18/25    PDMP reviewed and ambien 10mg #30 last dispensed on 2/28/25. Earliest to refill is 3/28/25 (Friday), will have dr leiva sign on 3/27 with note to hold until due.         Care plan intiated

## (undated) DEVICE — TUBING HYDR IRR --